# Patient Record
Sex: MALE | Race: WHITE | Employment: UNEMPLOYED | URBAN - METROPOLITAN AREA
[De-identification: names, ages, dates, MRNs, and addresses within clinical notes are randomized per-mention and may not be internally consistent; named-entity substitution may affect disease eponyms.]

---

## 2022-04-20 ENCOUNTER — APPOINTMENT (OUTPATIENT)
Dept: GENERAL RADIOLOGY | Age: 62
DRG: 617 | End: 2022-04-20
Payer: MEDICARE

## 2022-04-20 PROCEDURE — 96374 THER/PROPH/DIAG INJ IV PUSH: CPT

## 2022-04-20 PROCEDURE — 73630 X-RAY EXAM OF FOOT: CPT

## 2022-04-20 PROCEDURE — 99285 EMERGENCY DEPT VISIT HI MDM: CPT

## 2022-04-20 PROCEDURE — 96375 TX/PRO/DX INJ NEW DRUG ADDON: CPT

## 2022-04-20 ASSESSMENT — PAIN DESCRIPTION - PAIN TYPE: TYPE: ACUTE PAIN

## 2022-04-20 ASSESSMENT — PAIN - FUNCTIONAL ASSESSMENT: PAIN_FUNCTIONAL_ASSESSMENT: 0-10

## 2022-04-20 ASSESSMENT — PAIN DESCRIPTION - ORIENTATION: ORIENTATION: RIGHT

## 2022-04-20 ASSESSMENT — PAIN DESCRIPTION - LOCATION: LOCATION: TOE (COMMENT WHICH ONE)

## 2022-04-20 NOTE — ED PROVIDER NOTES
FIRST PROVIDER CONTACT ASSESSMENT NOTE           Department of Emergency Medicine                 First Provider Note            22  7:11 PM EDT    Date of Encounter: No admission date for patient encounter. Patient Name: Genesis Fuentes  : 1960  MRN: [id number]    Chief Complaint: Wound Check (wound check R foot second toe.)      History of Present Illness:   Genesis Fuentes is a 63 y.o. male who presents to the ED for evaluation of diabetic foot ulcer on right 2nd toe and follows with a podiatrist joel Gregorio and had a history of having frostbite this past January and recently was told he needs to start taking insulin by his pcp and he does take metformin and does not take his daily blood sugars. He does follow with a provider Ardean Fleischer from [facility] and is staying with a friend in [facility] and was told to come to the ED for evaluation for worsening pain. He reports the last time he was on antibiotics was 5 or 6 weeks ago and was taking Bactrim. He denies any fever or chills and states he has pain in the toe and does have diabetic neuropathies. Focused Physical Exam:  VS:    ED Triage Vitals [22 1907]   BP Temp Temp Source Pulse Resp SpO2 Height Weight   (!) 140/103 97.6 °F (36.4 °C) Temporal 94 16 96 % 6' (1.829 m) 270 lb (122.5 kg)        Physical Ex: Constitutional: Alert and non-toxic. Medical History:  has no past medical history on file. Surgical History:  has no past surgical history on file. Social History:    Family History: family history is not on file. Allergies: Patient has no known allergies.      Initial Plan of Care: Initiate Treatment-Testing, Proceed toTreatment Area When Bed Available for ED Attending/MLP to Continue Care      ---END OF FIRST PROVIDER CONTACT ASSESSMENT NOTE---  Electronically signed by CHRIST Denise CNP   DD: 22      CHRIST Denise CNP  22 250 CHRIST Foss CNP  22 [id number]

## 2022-04-21 ENCOUNTER — APPOINTMENT (OUTPATIENT)
Dept: INTERVENTIONAL RADIOLOGY/VASCULAR | Age: 62
DRG: 617 | End: 2022-04-21
Payer: MEDICARE

## 2022-04-21 ENCOUNTER — HOSPITAL ENCOUNTER (INPATIENT)
Age: 62
LOS: 5 days | Discharge: HOME | DRG: 617 | End: 2022-04-26
Attending: STUDENT IN AN ORGANIZED HEALTH CARE EDUCATION/TRAINING PROGRAM | Admitting: INTERNAL MEDICINE
Payer: MEDICARE

## 2022-04-21 ENCOUNTER — ANESTHESIA EVENT (OUTPATIENT)
Dept: OPERATING ROOM | Age: 62
DRG: 617 | End: 2022-04-21
Payer: MEDICARE

## 2022-04-21 DIAGNOSIS — S91.301A OPEN WOUND OF RIGHT FOOT, INITIAL ENCOUNTER: Primary | ICD-10-CM

## 2022-04-21 PROBLEM — J44.9 COPD (CHRONIC OBSTRUCTIVE PULMONARY DISEASE) (HCC): Status: ACTIVE | Noted: 2022-04-21

## 2022-04-21 PROBLEM — E11.51 DM (DIABETES MELLITUS) WITH PERIPHERAL VASCULAR COMPLICATION (HCC): Status: ACTIVE | Noted: 2022-04-21

## 2022-04-21 PROBLEM — I10 HTN (HYPERTENSION): Status: ACTIVE | Noted: 2022-04-21

## 2022-04-21 PROBLEM — S91.104A OPEN WOUND OF SECOND TOE OF RIGHT FOOT: Status: ACTIVE | Noted: 2022-04-21

## 2022-04-21 PROBLEM — S99.921A: Status: ACTIVE | Noted: 2022-04-21

## 2022-04-21 PROBLEM — G47.30 SLEEP APNEA: Status: ACTIVE | Noted: 2022-04-21

## 2022-04-21 LAB
ANION GAP SERPL CALCULATED.3IONS-SCNC: 11 MMOL/L (ref 7–16)
BASOPHILS ABSOLUTE: 0.08 E9/L (ref 0–0.2)
BASOPHILS RELATIVE PERCENT: 0.9 % (ref 0–2)
BUN BLDV-MCNC: 11 MG/DL (ref 6–23)
C-REACTIVE PROTEIN: 1.1 MG/DL (ref 0–0.4)
CALCIUM SERPL-MCNC: 8.8 MG/DL (ref 8.6–10.2)
CHLORIDE BLD-SCNC: 102 MMOL/L (ref 98–107)
CO2: 25 MMOL/L (ref 22–29)
CREAT SERPL-MCNC: 0.9 MG/DL (ref 0.7–1.2)
EOSINOPHILS ABSOLUTE: 0.18 E9/L (ref 0.05–0.5)
EOSINOPHILS RELATIVE PERCENT: 2.1 % (ref 0–6)
GFR AFRICAN AMERICAN: >60
GFR NON-AFRICAN AMERICAN: >60 ML/MIN/1.73
GLUCOSE BLD-MCNC: 188 MG/DL (ref 74–99)
HCT VFR BLD CALC: 41.7 % (ref 37–54)
HEMOGLOBIN: 13.8 G/DL (ref 12.5–16.5)
IMMATURE GRANULOCYTES #: 0.03 E9/L
IMMATURE GRANULOCYTES %: 0.3 % (ref 0–5)
LACTIC ACID, SEPSIS: 1.5 MMOL/L (ref 0.5–1.9)
LACTIC ACID: 2.2 MMOL/L (ref 0.5–2.2)
LYMPHOCYTES ABSOLUTE: 2.76 E9/L (ref 1.5–4)
LYMPHOCYTES RELATIVE PERCENT: 31.9 % (ref 20–42)
MCH RBC QN AUTO: 29.4 PG (ref 26–35)
MCHC RBC AUTO-ENTMCNC: 33.1 % (ref 32–34.5)
MCV RBC AUTO: 88.9 FL (ref 80–99.9)
METER GLUCOSE: 141 MG/DL (ref 74–99)
METER GLUCOSE: 146 MG/DL (ref 74–99)
METER GLUCOSE: 153 MG/DL (ref 74–99)
METER GLUCOSE: 228 MG/DL (ref 74–99)
MONOCYTES ABSOLUTE: 0.72 E9/L (ref 0.1–0.95)
MONOCYTES RELATIVE PERCENT: 8.3 % (ref 2–12)
NEUTROPHILS ABSOLUTE: 4.88 E9/L (ref 1.8–7.3)
NEUTROPHILS RELATIVE PERCENT: 56.5 % (ref 43–80)
PDW BLD-RTO: 15.2 FL (ref 11.5–15)
PLATELET # BLD: 162 E9/L (ref 130–450)
PMV BLD AUTO: 11 FL (ref 7–12)
POTASSIUM SERPL-SCNC: 4.2 MMOL/L (ref 3.5–5)
PROCALCITONIN: 0.1 NG/ML (ref 0–0.08)
RBC # BLD: 4.69 E12/L (ref 3.8–5.8)
SEDIMENTATION RATE, ERYTHROCYTE: 23 MM/HR (ref 0–15)
SODIUM BLD-SCNC: 138 MMOL/L (ref 132–146)
WBC # BLD: 8.7 E9/L (ref 4.5–11.5)

## 2022-04-21 PROCEDURE — 99223 1ST HOSP IP/OBS HIGH 75: CPT | Performed by: INTERNAL MEDICINE

## 2022-04-21 PROCEDURE — APPSS45 APP SPLIT SHARED TIME 31-45 MINUTES: Performed by: NURSE PRACTITIONER

## 2022-04-21 PROCEDURE — 85651 RBC SED RATE NONAUTOMATED: CPT

## 2022-04-21 PROCEDURE — 80048 BASIC METABOLIC PNL TOTAL CA: CPT

## 2022-04-21 PROCEDURE — 6360000002 HC RX W HCPCS: Performed by: NURSE PRACTITIONER

## 2022-04-21 PROCEDURE — 87040 BLOOD CULTURE FOR BACTERIA: CPT

## 2022-04-21 PROCEDURE — 2580000003 HC RX 258: Performed by: STUDENT IN AN ORGANIZED HEALTH CARE EDUCATION/TRAINING PROGRAM

## 2022-04-21 PROCEDURE — 85025 COMPLETE CBC W/AUTO DIFF WBC: CPT

## 2022-04-21 PROCEDURE — 6360000002 HC RX W HCPCS: Performed by: STUDENT IN AN ORGANIZED HEALTH CARE EDUCATION/TRAINING PROGRAM

## 2022-04-21 PROCEDURE — 87070 CULTURE OTHR SPECIMN AEROBIC: CPT

## 2022-04-21 PROCEDURE — 36415 COLL VENOUS BLD VENIPUNCTURE: CPT

## 2022-04-21 PROCEDURE — 1200000000 HC SEMI PRIVATE

## 2022-04-21 PROCEDURE — 82962 GLUCOSE BLOOD TEST: CPT

## 2022-04-21 PROCEDURE — 6370000000 HC RX 637 (ALT 250 FOR IP): Performed by: NURSE PRACTITIONER

## 2022-04-21 PROCEDURE — 84145 PROCALCITONIN (PCT): CPT

## 2022-04-21 PROCEDURE — 86140 C-REACTIVE PROTEIN: CPT

## 2022-04-21 PROCEDURE — 2580000003 HC RX 258: Performed by: NURSE PRACTITIONER

## 2022-04-21 PROCEDURE — 87205 SMEAR GRAM STAIN: CPT

## 2022-04-21 PROCEDURE — 87186 SC STD MICRODIL/AGAR DIL: CPT

## 2022-04-21 PROCEDURE — 83605 ASSAY OF LACTIC ACID: CPT

## 2022-04-21 PROCEDURE — 87077 CULTURE AEROBIC IDENTIFY: CPT

## 2022-04-21 PROCEDURE — 93923 UPR/LXTR ART STDY 3+ LVLS: CPT

## 2022-04-21 RX ORDER — SERTRALINE HYDROCHLORIDE 100 MG/1
50 TABLET, FILM COATED ORAL DAILY
Status: DISCONTINUED | OUTPATIENT
Start: 2022-04-21 | End: 2022-04-26 | Stop reason: HOSPADM

## 2022-04-21 RX ORDER — ATORVASTATIN CALCIUM 40 MG/1
40 TABLET, FILM COATED ORAL NIGHTLY
COMMUNITY

## 2022-04-21 RX ORDER — ENOXAPARIN SODIUM 100 MG/ML
30 INJECTION SUBCUTANEOUS 2 TIMES DAILY
Status: DISCONTINUED | OUTPATIENT
Start: 2022-04-21 | End: 2022-04-26 | Stop reason: HOSPADM

## 2022-04-21 RX ORDER — SODIUM CHLORIDE 9 MG/ML
INJECTION, SOLUTION INTRAVENOUS PRN
Status: DISCONTINUED | OUTPATIENT
Start: 2022-04-21 | End: 2022-04-26 | Stop reason: HOSPADM

## 2022-04-21 RX ORDER — ONDANSETRON 2 MG/ML
4 INJECTION INTRAMUSCULAR; INTRAVENOUS EVERY 6 HOURS PRN
Status: DISCONTINUED | OUTPATIENT
Start: 2022-04-21 | End: 2022-04-26 | Stop reason: HOSPADM

## 2022-04-21 RX ORDER — LISINOPRIL 10 MG/1
10 TABLET ORAL DAILY
Status: ON HOLD | COMMUNITY
End: 2022-04-21

## 2022-04-21 RX ORDER — NICOTINE POLACRILEX 4 MG
15 LOZENGE BUCCAL PRN
Status: DISCONTINUED | OUTPATIENT
Start: 2022-04-21 | End: 2022-04-25 | Stop reason: CLARIF

## 2022-04-21 RX ORDER — POLYETHYLENE GLYCOL 3350 17 G/17G
17 POWDER, FOR SOLUTION ORAL DAILY PRN
Status: DISCONTINUED | OUTPATIENT
Start: 2022-04-21 | End: 2022-04-26 | Stop reason: HOSPADM

## 2022-04-21 RX ORDER — PREGABALIN 50 MG/1
100 CAPSULE ORAL NIGHTLY
Status: DISCONTINUED | OUTPATIENT
Start: 2022-04-21 | End: 2022-04-26 | Stop reason: HOSPADM

## 2022-04-21 RX ORDER — ATORVASTATIN CALCIUM 40 MG/1
40 TABLET, FILM COATED ORAL NIGHTLY
Status: DISCONTINUED | OUTPATIENT
Start: 2022-04-21 | End: 2022-04-26 | Stop reason: HOSPADM

## 2022-04-21 RX ORDER — SODIUM CHLORIDE 0.9 % (FLUSH) 0.9 %
5-40 SYRINGE (ML) INJECTION PRN
Status: DISCONTINUED | OUTPATIENT
Start: 2022-04-21 | End: 2022-04-26 | Stop reason: HOSPADM

## 2022-04-21 RX ORDER — HYDROCHLOROTHIAZIDE 12.5 MG/1
12.5 TABLET ORAL DAILY
Status: DISCONTINUED | OUTPATIENT
Start: 2022-04-21 | End: 2022-04-26 | Stop reason: HOSPADM

## 2022-04-21 RX ORDER — EMPAGLIFLOZIN 10 MG/1
10 TABLET, FILM COATED ORAL DAILY
COMMUNITY
Start: 2022-03-25

## 2022-04-21 RX ORDER — ACETAMINOPHEN 325 MG/1
650 TABLET ORAL EVERY 6 HOURS PRN
Status: DISCONTINUED | OUTPATIENT
Start: 2022-04-21 | End: 2022-04-26 | Stop reason: HOSPADM

## 2022-04-21 RX ORDER — MORPHINE SULFATE 2 MG/ML
2 INJECTION, SOLUTION INTRAMUSCULAR; INTRAVENOUS EVERY 4 HOURS PRN
Status: DISCONTINUED | OUTPATIENT
Start: 2022-04-21 | End: 2022-04-26 | Stop reason: HOSPADM

## 2022-04-21 RX ORDER — INSULIN LISPRO 100 [IU]/ML
0-3 INJECTION, SOLUTION INTRAVENOUS; SUBCUTANEOUS NIGHTLY
Status: DISCONTINUED | OUTPATIENT
Start: 2022-04-21 | End: 2022-04-26 | Stop reason: HOSPADM

## 2022-04-21 RX ORDER — LISINOPRIL AND HYDROCHLOROTHIAZIDE 20; 12.5 MG/1; MG/1
20 TABLET ORAL DAILY
COMMUNITY
Start: 2022-03-25

## 2022-04-21 RX ORDER — GLIMEPIRIDE 4 MG/1
4 TABLET ORAL 2 TIMES DAILY
COMMUNITY

## 2022-04-21 RX ORDER — LISINOPRIL AND HYDROCHLOROTHIAZIDE 20; 12.5 MG/1; MG/1
20 TABLET ORAL DAILY
Status: DISCONTINUED | OUTPATIENT
Start: 2022-04-21 | End: 2022-04-21 | Stop reason: CLARIF

## 2022-04-21 RX ORDER — LISINOPRIL 10 MG/1
10 TABLET ORAL DAILY
Status: DISCONTINUED | OUTPATIENT
Start: 2022-04-21 | End: 2022-04-24

## 2022-04-21 RX ORDER — 0.9 % SODIUM CHLORIDE 0.9 %
1000 INTRAVENOUS SOLUTION INTRAVENOUS ONCE
Status: COMPLETED | OUTPATIENT
Start: 2022-04-21 | End: 2022-04-21

## 2022-04-21 RX ORDER — ONDANSETRON 4 MG/1
4 TABLET, ORALLY DISINTEGRATING ORAL EVERY 8 HOURS PRN
Status: DISCONTINUED | OUTPATIENT
Start: 2022-04-21 | End: 2022-04-26 | Stop reason: HOSPADM

## 2022-04-21 RX ORDER — NICOTINE 21 MG/24HR
1 PATCH, TRANSDERMAL 24 HOURS TRANSDERMAL DAILY
Status: DISCONTINUED | OUTPATIENT
Start: 2022-04-21 | End: 2022-04-26 | Stop reason: HOSPADM

## 2022-04-21 RX ORDER — DEXTROSE MONOHYDRATE 50 MG/ML
100 INJECTION, SOLUTION INTRAVENOUS PRN
Status: DISCONTINUED | OUTPATIENT
Start: 2022-04-21 | End: 2022-04-26 | Stop reason: HOSPADM

## 2022-04-21 RX ORDER — LISINOPRIL 2.5 MG/1
2.5 TABLET ORAL DAILY
Status: ON HOLD | COMMUNITY
End: 2022-04-21

## 2022-04-21 RX ORDER — LEVOTHYROXINE SODIUM 0.03 MG/1
25 TABLET ORAL DAILY
COMMUNITY

## 2022-04-21 RX ORDER — DEXTROSE MONOHYDRATE 25 G/50ML
12.5 INJECTION, SOLUTION INTRAVENOUS PRN
Status: DISCONTINUED | OUTPATIENT
Start: 2022-04-21 | End: 2022-04-26 | Stop reason: HOSPADM

## 2022-04-21 RX ORDER — FENTANYL CITRATE 50 UG/ML
50 INJECTION, SOLUTION INTRAMUSCULAR; INTRAVENOUS ONCE
Status: COMPLETED | OUTPATIENT
Start: 2022-04-21 | End: 2022-04-21

## 2022-04-21 RX ORDER — INSULIN LISPRO 100 [IU]/ML
0-6 INJECTION, SOLUTION INTRAVENOUS; SUBCUTANEOUS
Status: DISCONTINUED | OUTPATIENT
Start: 2022-04-21 | End: 2022-04-26 | Stop reason: HOSPADM

## 2022-04-21 RX ORDER — ACETAMINOPHEN 650 MG/1
650 SUPPOSITORY RECTAL EVERY 6 HOURS PRN
Status: DISCONTINUED | OUTPATIENT
Start: 2022-04-21 | End: 2022-04-26 | Stop reason: HOSPADM

## 2022-04-21 RX ORDER — LEVOTHYROXINE SODIUM 0.03 MG/1
25 TABLET ORAL DAILY
Status: DISCONTINUED | OUTPATIENT
Start: 2022-04-21 | End: 2022-04-26 | Stop reason: HOSPADM

## 2022-04-21 RX ORDER — SODIUM CHLORIDE 9 MG/ML
INJECTION, SOLUTION INTRAVENOUS CONTINUOUS
Status: DISCONTINUED | OUTPATIENT
Start: 2022-04-21 | End: 2022-04-26

## 2022-04-21 RX ORDER — PREGABALIN 100 MG/1
100 CAPSULE ORAL NIGHTLY
COMMUNITY
Start: 2022-03-25

## 2022-04-21 RX ORDER — SODIUM CHLORIDE 0.9 % (FLUSH) 0.9 %
5-40 SYRINGE (ML) INJECTION EVERY 12 HOURS SCHEDULED
Status: DISCONTINUED | OUTPATIENT
Start: 2022-04-21 | End: 2022-04-26 | Stop reason: HOSPADM

## 2022-04-21 RX ORDER — HYDROXYZINE HYDROCHLORIDE 10 MG/1
10 TABLET, FILM COATED ORAL ONCE
Status: COMPLETED | OUTPATIENT
Start: 2022-04-21 | End: 2022-04-21

## 2022-04-21 RX ADMIN — SODIUM CHLORIDE, PRESERVATIVE FREE 10 ML: 5 INJECTION INTRAVENOUS at 20:40

## 2022-04-21 RX ADMIN — SODIUM CHLORIDE: 9 INJECTION, SOLUTION INTRAVENOUS at 18:37

## 2022-04-21 RX ADMIN — INSULIN LISPRO 1 UNITS: 100 INJECTION, SOLUTION INTRAVENOUS; SUBCUTANEOUS at 21:58

## 2022-04-21 RX ADMIN — Medication 2000 MG: at 02:41

## 2022-04-21 RX ADMIN — HYDROXYZINE HYDROCHLORIDE 10 MG: 10 TABLET ORAL at 21:56

## 2022-04-21 RX ADMIN — METOPROLOL TARTRATE 25 MG: 25 TABLET, FILM COATED ORAL at 12:42

## 2022-04-21 RX ADMIN — FENTANYL CITRATE 50 MCG: 50 INJECTION, SOLUTION INTRAMUSCULAR; INTRAVENOUS at 02:26

## 2022-04-21 RX ADMIN — PREGABALIN 100 MG: 50 CAPSULE ORAL at 20:39

## 2022-04-21 RX ADMIN — SERTRALINE 50 MG: 100 TABLET, FILM COATED ORAL at 12:42

## 2022-04-21 RX ADMIN — SODIUM CHLORIDE 1000 ML: 9 INJECTION, SOLUTION INTRAVENOUS at 02:27

## 2022-04-21 RX ADMIN — ATORVASTATIN CALCIUM 40 MG: 40 TABLET, FILM COATED ORAL at 20:39

## 2022-04-21 RX ADMIN — MORPHINE SULFATE 2 MG: 2 INJECTION, SOLUTION INTRAMUSCULAR; INTRAVENOUS at 15:37

## 2022-04-21 RX ADMIN — SODIUM CHLORIDE: 9 INJECTION, SOLUTION INTRAVENOUS at 05:47

## 2022-04-21 RX ADMIN — ENOXAPARIN SODIUM 30 MG: 100 INJECTION SUBCUTANEOUS at 12:42

## 2022-04-21 RX ADMIN — MORPHINE SULFATE 2 MG: 2 INJECTION, SOLUTION INTRAMUSCULAR; INTRAVENOUS at 11:01

## 2022-04-21 RX ADMIN — MORPHINE SULFATE 2 MG: 2 INJECTION, SOLUTION INTRAMUSCULAR; INTRAVENOUS at 20:37

## 2022-04-21 RX ADMIN — LISINOPRIL 10 MG: 10 TABLET ORAL at 12:42

## 2022-04-21 RX ADMIN — HYDROCHLOROTHIAZIDE 12.5 MG: 12.5 TABLET ORAL at 12:46

## 2022-04-21 RX ADMIN — SODIUM CHLORIDE, PRESERVATIVE FREE 5 ML: 5 INJECTION INTRAVENOUS at 11:01

## 2022-04-21 RX ADMIN — INSULIN LISPRO 2 UNITS: 100 INJECTION, SOLUTION INTRAVENOUS; SUBCUTANEOUS at 16:44

## 2022-04-21 RX ADMIN — MORPHINE SULFATE 2 MG: 2 INJECTION, SOLUTION INTRAMUSCULAR; INTRAVENOUS at 07:02

## 2022-04-21 RX ADMIN — ENOXAPARIN SODIUM 30 MG: 100 INJECTION SUBCUTANEOUS at 20:38

## 2022-04-21 ASSESSMENT — PAIN - FUNCTIONAL ASSESSMENT
PAIN_FUNCTIONAL_ASSESSMENT: ACTIVITIES ARE NOT PREVENTED
PAIN_FUNCTIONAL_ASSESSMENT: ACTIVITIES ARE NOT PREVENTED

## 2022-04-21 ASSESSMENT — ENCOUNTER SYMPTOMS
BLOOD IN STOOL: 0
NAUSEA: 0
SHORTNESS OF BREATH: 0
RHINORRHEA: 0
ABDOMINAL PAIN: 0
COUGH: 0
VOMITING: 0
DIARRHEA: 0
CONSTIPATION: 0
BACK PAIN: 0
SORE THROAT: 0
SHORTNESS OF BREATH: 0

## 2022-04-21 ASSESSMENT — PAIN SCALES - WONG BAKER
WONGBAKER_NUMERICALRESPONSE: 0
WONGBAKER_NUMERICALRESPONSE: 0

## 2022-04-21 ASSESSMENT — PAIN DESCRIPTION - PAIN TYPE
TYPE: ACUTE PAIN
TYPE: ACUTE PAIN

## 2022-04-21 ASSESSMENT — PAIN DESCRIPTION - ORIENTATION
ORIENTATION: RIGHT

## 2022-04-21 ASSESSMENT — PAIN SCALES - GENERAL
PAINLEVEL_OUTOF10: 4
PAINLEVEL_OUTOF10: 1
PAINLEVEL_OUTOF10: 2
PAINLEVEL_OUTOF10: 9
PAINLEVEL_OUTOF10: 9
PAINLEVEL_OUTOF10: 7
PAINLEVEL_OUTOF10: 5
PAINLEVEL_OUTOF10: 0
PAINLEVEL_OUTOF10: 9
PAINLEVEL_OUTOF10: 9

## 2022-04-21 ASSESSMENT — PAIN DESCRIPTION - LOCATION
LOCATION: FOOT
LOCATION: TOE (COMMENT WHICH ONE)
LOCATION: FOOT
LOCATION: TOE (COMMENT WHICH ONE)

## 2022-04-21 ASSESSMENT — PAIN DESCRIPTION - DESCRIPTORS
DESCRIPTORS: ACHING

## 2022-04-21 NOTE — H&P
[facility] History and Physical      CHIEF COMPLAINT:  Right foot wound    History of Present Illness: This is a 63year old male with PMH of HLD, DM, sleep apnea, HTN, CABG x 2 vessel and anxiety/depression. Patient living out of car since . Was seen in [address] [place] [address] in January for frostbite of right foot 2nd toe. Recently moved in with niece in order to get better. Was changing dressing Tuesday night and wound occurred. Seen Dr. Leonidas Hankins yesterday afternoon in office (foot doctor) that wanted him to go to [address] [place] [address] for amputee. Do to new living situation patient decided to come here instead. Patient denies fever, chills, SOB, CP, and abdominal pain. Is diabetic but does not check his BS. XR right foot showing no acute osseous abnormality and plantar soft tissue edema adjacent to MTP joints. Given 1 L bolus, cefazolin, and fentanyl in ED. Informant(s) for H&P: Patient and chart review    REVIEW OF SYSTEMS:  A comprehensive review of systems was negative except for: what is in the HPI      PMH:  No past medical history on file. HLD, DM, COPD, Sleep apnea, HTN, anxiety, and depression. Surgical History:  No past surgical history on file. CABG x 2 vessel, cholecystectomy, and back surgery x2. Medications Prior to Admission:    Prior to Admission medications    Not on File       Allergies:    Patient has no known allergies. Social History:    Patient started smoking at age 27 (28). Smoked 2-3 PPD until the last 4 years. Last 4 years patient smokes about 15 cigarettes per day. Denies alcohol and drugs. Was recently living in car since . Now staying with niece. Family History:   family history is not on file. Mother had dementia. Father  from stroke. Sister x 2 had cancer. PHYSICAL EXAM:  Vitals:  /65   Pulse 82   Temp 97.6 °F (36.4 °C) (Tympanic)   Resp 16   Ht 6' (1.829 m)   Wt 270 lb (122.5 kg)   SpO2 96%   BMI 36.62 kg/m²     General Appearance: Unkempt. alert and oriented to person, place and time and in no acute distress  Skin: warm and dry  Head: normocephalic and atraumatic  Eyes: pupils equal, round, and reactive to light, conjunctivae normal  Pulmonary/Chest: Lungs diminished to auscultation bilaterally- no wheezes, rales or rhonchi, no respiratory distress  Cardiovascular: normal rate, normal S1 and S2   Abdomen: soft, non-tender, non-distended, normal bowel sounds, no masses or organomegaly  Extremities: BLE discolored with multiple scabbed areas. Right 2nd phalange bone of foot protruding through skin. Neurologic: speech normal        LABS:  Recent Labs     04/21/22 0023      K 4.2      CO2 25   BUN 11   CREATININE 0.9   GLUCOSE 188*   CALCIUM 8.8       Recent Labs     04/21/22 0023   WBC 8.7   RBC 4.69   HGB 13.8   HCT 41.7   MCV 88.9   MCH 29.4   MCHC 33.1   RDW 15.2*      MPV 11.0       No results for input(s): POCGLU in the last 72 hours. CBC with Differential:    Lab Results   Component Value Date    WBC 8.7 04/21/2022    RBC 4.69 04/21/2022    HGB 13.8 04/21/2022    HCT 41.7 04/21/2022     04/21/2022    MCV 88.9 04/21/2022    MCH 29.4 04/21/2022    MCHC 33.1 04/21/2022    RDW 15.2 04/21/2022    LYMPHOPCT 31.9 04/21/2022    MONOPCT 8.3 04/21/2022    BASOPCT 0.9 04/21/2022    MONOSABS 0.72 04/21/2022    LYMPHSABS 2.76 04/21/2022    EOSABS 0.18 04/21/2022    BASOSABS 0.08 04/21/2022     BMP:    Lab Results   Component Value Date     04/21/2022    K 4.2 04/21/2022     04/21/2022    CO2 25 04/21/2022    BUN 11 04/21/2022    CREATININE 0.9 04/21/2022    CALCIUM 8.8 04/21/2022    GFRAA >60 04/21/2022    LABGLOM >60 04/21/2022    GLUCOSE 188 04/21/2022       Radiology:   XR FOOT RIGHT (MIN 3 VIEWS)   Final Result   No acute osseous abnormality. Plantar soft tissue edema adjacent to the MTP joints. EKG: NA    ASSESSMENT:      Active Problems:    * No active hospital problems.  *  Resolved Problems:    * No resolved hospital problems. *      PLAN:    1. Right 2nd phalange wound- Podiatry consulted. NPO for possible surgery this AM. Given cefazolin in ED. BC and wound culture done. Negative for osteomyelitis. Afebrile with no WBC count. Will hold off on antibiotics for now. 2.  Pain- tylenol and morphine as needed for pain. 3.  DM- AC HS BS checks with [facility]. Hypoglycemia protocol. 3.  HLD- Continue home medication. 4.  COPD- Stable. Will continue to monitor. 5.  Sleep apnea- Monitor for the need of oxygen. Non-complaint and does not use CPAP. 6.  Tobacco abuse- Counseled on smoking cessation. Will give nicotine patch while hospitalized. Code Status: Full code  DVT prophylaxis: Lovenox      NOTE: This report was transcribed using voice recognition software. Every effort was made to ensure accuracy; however, inadvertent computerized transcription errors may be present. Electronically signed by CHRIST Garcia CNP on 4/21/2022 at 3:16 AM         Aron Hurtado [facility]  Internal Medicine Clinic    Attending Physician Statement:  Merle Strong M.D., F.A.C.P. I have discussed the case, including pertinent history and exam findings with the resident/NP. I have seen and examined the patient and the key elements of the encounter have been performed by me. I agree with the resident ROS, PMHx, PSHx, meds reviewed and assessment, plan and orders as documented by the resident/NP      Hospital charts reviewed, including other providers notes, relevant labs and imaging. Chronic DM, PVD, poor hygiene, neuropathy, heavy tobacco  Was living in his car since 2013 (subsequently moved in with niece)  intially frost bite in [address]  -- was following podiatry dr Slava Siddiqi- afflliated with CC  (told needed amputated in past)  - he was removing dressing-- and pulled off tissue/toe- dry grangrene  And now bone exposed  Right 2nd phalange wound- Podiatry consulted  ? OR  DM control  Copd- not on o2, nicotene  Hx of ALCON - noncompliant cpap    >50% of time spent coordinating care with other providers and/or counseling patient/family  Remainder of medical problems as per resident note.

## 2022-04-21 NOTE — PLAN OF CARE
Problem: Discharge Planning  Goal: Discharge to home or other facility with appropriate resources  Outcome: Progressing     Problem: Pain  Goal: Verbalizes/displays adequate comfort level or baseline comfort level  4/21/2022 1735 by Nelly Cho RN  Outcome: Progressing  4/21/2022 0600 by Colin Arnold RN  Outcome: Progressing     Problem: Skin/Tissue Integrity  Goal: Absence of new skin breakdown  Description: 1. Monitor for areas of redness and/or skin breakdown  2. Assess vascular access sites hourly  3. Every 4-6 hours minimum:  Change oxygen saturation probe site  4. Every 4-6 hours:  If on nasal continuous positive airway pressure, respiratory therapy assess nares and determine need for appliance change or resting period.   Outcome: Progressing     Problem: Safety - Adult  Goal: Free from fall injury  Outcome: Progressing     Problem: ABCDS Injury Assessment  Goal: Absence of physical injury  Outcome: Progressing

## 2022-04-21 NOTE — PLAN OF CARE
Patient was seen and examined this AM. Seen by Podiatry for Right second toe ulceration to the bone level and possible underlying osteomyelitis. He is planned for the OR tomorrow.     Electronically signed by Tatum Dawson MD on 4/21/2022 at 3:21 PM

## 2022-04-21 NOTE — PROGRESS NOTES
Chart reviewed    X rays reviewed    Pt with exposed nonviable bone, nonviable tissue, rt 2nd toe    vasc studies     OR tomorrow for amp, full note to follow

## 2022-04-21 NOTE — ED PROVIDER NOTES
Ailyn Baldwin is a 63 y.o. male with a PMHx significant for DM, HTN, COPD,  who presents for evaluation of wound, beginning prior to arrival.  The complaint has been persistent, moderate in severity, and worsened by nothing. The patient states that he got frost bite of his foot back in January and it had been clearing up, but recently broke open. Niece at bedside helping to provide history. Notes he went to take the bandage off the foot two days ago; he had an appointment to see Dorothy Davila (his foot doctor), who saw him today told him it had to be taken off. Told him to go to a [facility] hospital, however due to proximity of care giver the patient came here. The history is provided by the patient, a relative and medical records. Review of Systems   Constitutional: Negative for chills and fever. HENT: Negative for postnasal drip, rhinorrhea and sore throat. Eyes: Negative for visual disturbance. Respiratory: Negative for cough and shortness of breath. Cardiovascular: Negative for chest pain. Gastrointestinal: Negative for abdominal pain, blood in stool, constipation, diarrhea, nausea and vomiting. Genitourinary: Negative for difficulty urinating, dysuria and urgency. Musculoskeletal: Negative for back pain. Skin: Positive for wound. Negative for rash. Neurological: Negative for dizziness, numbness and headaches. Physical Exam  Vitals and nursing note reviewed. Constitutional:       Appearance: He is well-developed. HENT:      Head: Normocephalic and atraumatic. Eyes:      Pupils: Pupils are equal, round, and reactive to light. Cardiovascular:      Rate and Rhythm: Normal rate and regular rhythm. Heart sounds: Normal heart sounds. No murmur heard. Pulmonary:      Effort: Pulmonary effort is normal. No respiratory distress. Breath sounds: Normal breath sounds. No wheezing or rales.    Abdominal:      General: Bowel sounds are normal.      Palpations: Abdomen is soft. Tenderness: There is no abdominal tenderness. There is no guarding or rebound. Musculoskeletal:      Cervical back: Normal range of motion and neck supple. Skin:     General: Skin is warm and dry. Neurological:      Mental Status: He is alert and oriented to person, place, and time. Cranial Nerves: No cranial nerve deficit. Coordination: Coordination normal.                Procedures     [facility]     ED Course as of 04/23/22 0546   Thu Apr 21, 2022   0309 Spoke with Dr. Heather Lopez, discussed the patient. He will admit the patient. Requesting we speak with Podiatry. [BB]   80 Spoke with Dr. Home Temple on call podiatry, discused the patient. He will provide consult. [BB]      ED Course User Index  [BB] DO Corrina Tena presents to the ED for evaluation of wound to foot. Workup in the ED revealed open ulceration and wound to second digit on foot. Patient notes he been having issues with this on and off for months now. Recently took the bandage off causing exposure. On exam there is visible bone, lactic acid at 1.5, WBCs at 8.7. XR foot was obtained, no acute osseous abnormality, plantar soft tissue edema adjacent to the MTP joints. Due to bone being exposed Ancef was ordered. Patient requesting Dr. Hayder Velázquez for podiatry however when attempting to contact service states they are not on-call therefore on-call Dr. Home Temple was contacted per hospitalist.  Patient requires continued workup and management of their symptoms and will be admitted to the hospital for further evaluation and treatment.      --------------------------------------------- PAST HISTORY ---------------------------------------------  Past Medical History:  has a past medical history of Anxiety, COPD (chronic obstructive pulmonary disease) ([facility] [address] Depression, [facility] mellitus[facility] Frostbite, HLD (hyperlipidemia), HTN (hypertension), and Sleep apnea.     Past Surgical History:  has a past surgical history that includes Cholecystectomy; back surgery; Coronary artery bypass graft; Uvulopalatopharygoplasty; and Toe amputation (Right, 4/22/2022). Social History:  reports that he has been smoking cigarettes. He started smoking about 42 years ago. He has been smoking about 2.50 packs per day. He does not have any smokeless tobacco history on file. He reports that he does not drink alcohol and does not use drugs. Family History: family history includes Dementia in his mother; Diabetes in his sister; Stroke in his father. The patients home medications have been reviewed. Allergies: Patient has no known allergies. -------------------------------------------------- RESULTS -------------------------------------------------    LABS:  Results for orders placed or performed during the hospital encounter of 04/21/22   Culture, Blood 1    Specimen: Blood   Result Value Ref Range    Blood Culture, Routine 24 Hours no growth    Culture, Blood 2    Specimen: Blood   Result Value Ref Range    Culture, Blood 2 24 Hours no growth    Culture, Wound    Specimen: Foot; Wound   Result Value Ref Range    Organism Staphylococcus aureus (A)     WOUND/ABSCESS Heavy growth  Sensitivity to follow      Gram stain    Specimen: Foot; Wound   Result Value Ref Range    Gram Stain Orderable       Gram stain performed from swab, interpret results with  caution. Swab specimens of sterile fluids are inferior to  aspirate specimens for organism recovery.   Rare Polymorphonuclear leukocytes  Epithelial cells not seen  Rare Gram positive cocci     CBC with Auto Differential   Result Value Ref Range    WBC 8.7 4.5 - 11.5 E9/L    RBC 4.69 3.80 - 5.80 E12/L    Hemoglobin 13.8 12.5 - 16.5 g/dL    Hematocrit 41.7 37.0 - 54.0 %    MCV 88.9 80.0 - 99.9 fL    MCH 29.4 26.0 - 35.0 pg    MCHC 33.1 32.0 - 34.5 %    RDW 15.2 (H) 11.5 - 15.0 fL    Platelets 362 499 - 423 E9/L    MPV 11.0 7.0 - 12.0 fL    Neutrophils % 56.5 43.0 - 80.0 %    Immature Granulocytes % 0.3 0.0 - 5.0 %    Lymphocytes % 31.9 20.0 - 42.0 %    Monocytes % 8.3 2.0 - 12.0 %    Eosinophils % 2.1 0.0 - 6.0 %    Basophils % 0.9 0.0 - 2.0 %    Neutrophils Absolute 4.88 1.80 - 7.30 E9/L    Immature Granulocytes # 0.03 E9/L    Lymphocytes Absolute 2.76 1.50 - 4.00 E9/L    Monocytes Absolute 0.72 0.10 - 0.95 E9/L    Eosinophils Absolute 0.18 0.05 - 0.50 E9/L    Basophils Absolute 0.08 0.00 - 0.20 O8/N   Basic Metabolic Panel   Result Value Ref Range    Sodium 138 132 - 146 mmol/L    Potassium 4.2 3.5 - 5.0 mmol/L    Chloride 102 98 - 107 mmol/L    CO2 25 22 - 29 mmol/L    Anion Gap 11 7 - 16 mmol/L    Glucose 188 (H) 74 - 99 mg/dL    BUN 11 6 - 23 mg/dL    CREATININE 0.9 0.7 - 1.2 mg/dL    GFR Non-African American >60 >=60 mL/min/1.73    GFR African American >60     Calcium 8.8 8.6 - 10.2 mg/dL   Lactic Acid   Result Value Ref Range    Lactic Acid 2.2 0.5 - 2.2 mmol/L   Sedimentation Rate   Result Value Ref Range    Sed Rate 23 (H) 0 - 15 mm/Hr   C-Reactive Protein   Result Value Ref Range    CRP 1.1 (H) 0.0 - 0.4 mg/dL   Lactate, Sepsis   Result Value Ref Range    Lactic Acid, Sepsis 1.5 0.5 - 1.9 mmol/L   Procalcitonin   Result Value Ref Range    Procalcitonin 0.10 (H) 0.00 - 0.08 ng/mL   CBC with Auto Differential   Result Value Ref Range    WBC 7.0 4.5 - 11.5 E9/L    RBC 4.29 3.80 - 5.80 E12/L    Hemoglobin 12.6 12.5 - 16.5 g/dL    Hematocrit 37.9 37.0 - 54.0 %    MCV 88.3 80.0 - 99.9 fL    MCH 29.4 26.0 - 35.0 pg    MCHC 33.2 32.0 - 34.5 %    RDW 14.9 11.5 - 15.0 fL    Platelets 342 122 - 263 E9/L    MPV 11.0 7.0 - 12.0 fL    Neutrophils % 60.6 43.0 - 80.0 %    Immature Granulocytes % 0.4 0.0 - 5.0 %    Lymphocytes % 28.5 20.0 - 42.0 %    Monocytes % 7.3 2.0 - 12.0 %    Eosinophils % 2.3 0.0 - 6.0 %    Basophils % 0.9 0.0 - 2.0 %    Neutrophils Absolute 4.24 1.80 - 7.30 E9/L    Immature Granulocytes # 0.03 E9/L    Lymphocytes Absolute 1.99 1.50 - 4.00 E9/L    Monocytes Absolute 0.51 0.10 - 0.95 E9/L    Eosinophils Absolute 0.16 0.05 - 0.50 E9/L    Basophils Absolute 0.06 0.00 - 0.20 E9/L   Comprehensive Metabolic Panel w/ Reflex to MG   Result Value Ref Range    Sodium 135 132 - 146 mmol/L    Potassium reflex Magnesium 4.5 3.5 - 5.0 mmol/L    Chloride 104 98 - 107 mmol/L    CO2 24 22 - 29 mmol/L    Anion Gap 7 7 - 16 mmol/L    Glucose 126 (H) 74 - 99 mg/dL    BUN 12 6 - 23 mg/dL    CREATININE 0.9 0.7 - 1.2 mg/dL    GFR Non-African American >60 >=60 mL/min/1.73    GFR African American >60     Calcium 8.2 (L) 8.6 - 10.2 mg/dL    Total Protein 6.3 (L) 6.4 - 8.3 g/dL    Albumin 3.2 (L) 3.5 - 5.2 g/dL    Total Bilirubin 0.3 0.0 - 1.2 mg/dL    Alkaline Phosphatase 117 40 - 129 U/L    ALT 18 0 - 40 U/L    AST 22 0 - 39 U/L   Hemoglobin A1C   Result Value Ref Range    Hemoglobin A1C 7.7 (H) 4.0 - 5.6 %   POCT Glucose   Result Value Ref Range    Meter Glucose 146 (H) 74 - 99 mg/dL   POCT Glucose   Result Value Ref Range    Meter Glucose 141 (H) 74 - 99 mg/dL   POCT Glucose   Result Value Ref Range    Meter Glucose 228 (H) 74 - 99 mg/dL   POCT Glucose   Result Value Ref Range    Meter Glucose 153 (H) 74 - 99 mg/dL   POCT Glucose   Result Value Ref Range    Meter Glucose 153 (H) 74 - 99 mg/dL   POCT Glucose   Result Value Ref Range    Meter Glucose 141 (H) 74 - 99 mg/dL   POCT Glucose   Result Value Ref Range    Meter Glucose 155 (H) 74 - 99 mg/dL   POCT Glucose   Result Value Ref Range    Meter Glucose 299 (H) 74 - 99 mg/dL   POCT Glucose   Result Value Ref Range    Meter Glucose 364 (H) 74 - 99 mg/dL       RADIOLOGY:  VL LOWER EXTREMITY ARTERIAL SEGMENTAL PRESSURES W PPG   Final Result   1. Normal ankle brachial indices bilaterally at 1.2. Multiphasic waveforms   identified at all levels bilaterally on arterial Doppler evaluation. This   lower suspicion for the presence of a high-grade stenosis. 2.  Normal PVR evaluation of the bilateral lower extremities.   Digital   waveform evaluation shows preserved admission.    --------------------------------- ADDITIONAL PROVIDER NOTES ---------------------------------  Consultations:  Time: 0320. Spoke with Dr. Berenice Smith. Discussed case. They will admit the patient. This patient's ED course included: a personal history and physicial examination, re-evaluation prior to disposition, multiple bedside re-evaluations, continuous pulse oximetry and complex medical decision making and emergency management    This patient has remained hemodynamically stable during their ED course. Diagnosis:  1. Open wound of right foot, initial encounter        Disposition:  Patient's disposition: Admit to telemetry  Patient's condition is stable. Please note that the above documentation was prepared using voice recognition software. Every attempt was made to ensure accuracy but there may be spelling, grammatical, and contextual errors.          Gardenia Ormond, [place]  04/23/22 7497

## 2022-04-21 NOTE — Clinical Note
Discharge Plan[de-identified] Other/[facility])   Telemetry/Cardiac Monitoring Required?: Yes

## 2022-04-21 NOTE — CONSULTS
Podiatry Consult H&P  4/21/2022   Dipak Fuentes       Consulting Diagnosis: Right second toe trauma  Consulting Physician: Dr. Emanuel Garza    Chief Complaint: Open fracture right second digit    SUBJECTIVE: Dipak Fuentes is a 63 y.o. non-insulin-dependent type II tabetic male who presented to the [facility] ED on 4/20/2022 with complaint of an open right second digit fracture. Podiatry was consulted on 4/21/2022 for evaluation management of the same chief complaint. Patient states that he was living out of his car for several years and earlier this past winter developed frostbite to the digits of the right foot. He has been following with a podiatrist out of [place], Dr. Amparo Dunn. He has been having regular dressing changes and states that the wound developed because he was removing one of the dressings and mistook his skin for part of the dressing. He has diffusely neuropathic to bilateral lower extremities. He denies any nausea, vomiting, diarrhea, fevers, chills, shortness of breath, calf pain, chest pain. X-rays in the ED reveal open fracture of the distal interphalangeal joint right second digit. Arterial studies are ordered and pending 4/21/2022, will require arterial studies for surgical planning. Arterial studies were placed as stat.       Past Medical History:   Diagnosis Date    Anxiety     COPD [facility]chronic obstructive [facility] disease) [facility] [address]     Depression     DM (diabetes mellitus) ([facility] [address]     Frostbite     HLD (hyperlipidemia)     HTN (hypertension)     Sleep apnea         Past Surgical History:   Procedure Laterality Date    BACK SURGERY      CHOLECYSTECTOMY      CORONARY ARTERY BYPASS GRAFT      2 vessel    UVULOPALATOPHARYGOPLASTY           Family History   Problem Relation Age of Onset    Dementia Mother     Stroke Father     Diabetes Sister         Social History     Tobacco Use    Smoking status: Current Every Day Smoker     Packs/day: 2.50     Types: Cigarettes     Start date: 1980    Smokeless tobacco: Not on file   Substance Use Topics    Alcohol use: Never        Prior to Admission medications    Medication Sig Start Date End Date Taking? Authorizing Provider   atorvastatin (LIPITOR) 40 MG tablet Take 40 mg by mouth at bedtime   Yes Historical Provider, MD   metoprolol tartrate (LOPRESSOR) 25 MG tablet Take 25 mg by mouth 2 times daily   Yes Historical Provider, MD   metFORMIN (GLUCOPHAGE) 1000 MG tablet Take 1,000 mg by mouth 2 times daily (with meals)   Yes Historical Provider, MD   levothyroxine (SYNTHROID) 25 MCG tablet Take 25 mcg by mouth Daily   Yes Historical Provider, MD   sertraline (ZOLOFT) 50 MG tablet Take 50 mg by mouth daily   Yes Historical Provider, MD   glimepiride (AMARYL) 4 MG tablet Take 4 mg by mouth in the morning and at bedtime   Yes Historical Provider, MD   JARDIANCE 10 MG tablet Take 10 mg by mouth daily 3/25/22   Historical Provider, MD   pregabalin (LYRICA) 100 MG capsule Take 100 mg by mouth at bedtime. 3/25/22   Historical Provider, MD   lisinopril-hydroCHLOROthiazide (PRINZIDE;ZESTORETIC) 20-12.5 MG per tablet Take 20 tablets by mouth daily 3/25/22   Historical Provider, MD   lisinopril (PRINIVIL;ZESTRIL) 2.5 MG tablet Take 2.5 mg by mouth daily  4/21/22  Historical Provider, MD        Patient has no known allergies. OBJECTIVE:        Vitals:    04/21/22 1101   BP:    Pulse:    Resp: 16   Temp:    SpO2:                           EXAM:        Pt is AAOx3, NAD    Vascular Exam:  DP and PT pulses intact with hand held doppler bilaterally. CFT is sluggish to 3 and 5 seconds digits 1345 right and 1-5 left. Skin temperature warm to warm proximal leg to distal toes. Soft compressible posterior most compartments bilaterally.     Neuro Exam: Diminished protective sensation    Dermatologic Exam:    -Full-thickness ulcer down to level of exposed bone right second digit DIPJ with exposed phalanx, devitalized nonviable dry gangrenous type changes to the remaining digit with degloving of the surrounding skin. Dried blood to right third digit nail bed. No other open wounds or lesions. MSK: Muscle strength 5/5 Bilateral  ROM is full without pain or crepitation bilateral.  No tenderness on palpation to periwound or to secondary right foot.     Current Facility-Administered Medications   Medication Dose Route Frequency Provider Last Rate Last Admin    sodium chloride flush 0.9 % injection 5-40 mL  5-40 mL IntraVENous 2 times per day CHRIST Duenas CNP   5 mL at 04/21/22 1101    sodium chloride flush 0.9 % injection 5-40 mL  5-40 mL IntraVENous PRN CHRIST Duenas CNP        0.9 % sodium chloride infusion   IntraVENous PRN CHRIST Duenas CNP        enoxaparin Sodium (LOVENOX) injection 30 mg  30 mg SubCUTAneous BID CHRIST Duenas CNP        ondansetron (ZOFRAN-ODT) disintegrating tablet 4 mg  4 mg Oral Q8H PRN CHRIST Duenas CNP        Or    ondansetron (ZOFRAN) injection 4 mg  4 mg IntraVENous Q6H PRN CHRIST Duenas CNP        polyethylene glycol (GLYCOLAX) packet 17 g  17 g Oral Daily PRN CHRIST Duenas CNP        acetaminophen (TYLENOL) tablet 650 mg  650 mg Oral Q6H PRN CHRIST Duenas CNP        Or    acetaminophen (TYLENOL) suppository 650 mg  650 mg Rectal Q6H PRN CHRIST Duenas CNP        morphine (PF) injection 2 mg  2 mg IntraVENous Q4H PRN CHRIST Duenas CNP   2 mg at 04/21/22 1101    glucose (GLUTOSE) 40 % oral gel 15 g  15 g Oral PRN CHRIST Duenas CNP        dextrose 50 % IV solution  12.5 g IntraVENous PRN CHRIST Duenas CNP        glucagon (rDNA) injection 1 mg  1 mg IntraMUSCular PRN CHRIST Duenas CNP        dextrose 5 % solution  100 mL/hr IntraVENous PRN HCRIST Duenas CNP        insulin lispro (HUMALOG) injection vial 0-6 Units  0-6 Units SubCUTAneous TID  CHRIST Duenas - CNP        insulin lispro (HUMALOG) injection vial 0-3 Units  0-3 Units SubCUTAneous Nightly Philippa HolsteinCHRIST - CNP        0.9 % sodium chloride infusion   IntraVENous Continuous Philippa HolsteinCHRIST - CNP 75 mL/hr at 04/21/22 0547 New Bag at 04/21/22 0547    atorvastatin (LIPITOR) tablet 40 mg  40 mg Oral Nightly Philippa Holstein, APRN - CNP        levothyroxine (SYNTHROID) tablet 25 mcg  25 mcg Oral Daily Philippa HolsteinCHRIST  CNP        metoprolol tartrate (LOPRESSOR) tablet 25 mg  25 mg Oral BID Philippa Holstein, APRN - CNP        pregabalin (LYRICA) capsule 100 mg  100 mg Oral Nightly Philippa HolsteinCHRIST  CNP        sertraline (ZOLOFT) tablet 50 mg  50 mg Oral Daily Philippa Holstein, APRN - CNP        lisinopril (PRINIVIL;ZESTRIL) tablet 10 mg  10 mg Oral Daily Philippa Holstein, APRN - CNP        And    hydroCHLOROthiazide (HYDRODIURIL) tablet 12.5 mg  12.5 mg Oral Daily Philippa Holstein APRJOANA - CNP        nicotine (NICODERM CQ) 21 MG/24HR 1 patch  1 patch TransDERmal Daily Philippa Holstein APRN - CNP   1 patch at 04/21/22 1107        Lab Results   Component Value Date    WBC 8.7 04/21/2022    HCT 41.7 04/21/2022    HGB 13.8 04/21/2022     04/21/2022     04/21/2022    K 4.2 04/21/2022     04/21/2022    CO2 25 04/21/2022    BUN 11 04/21/2022    CREATININE 0.9 04/21/2022    GLUCOSE 188 (H) 04/21/2022    CRP 1.1 (H) 04/21/2022       ASSESSMENT:  - Right second toe open dislocation  -Osteomyelitis right second digit, POA  -Full-thickness wound/ulcer down to level exposed bone right foot, POA, infected  -Non-insulin-dependent type 2 diabetes mellitus with peripheral neuropathy  - Frostbite right foot    PLAN:  - Examined and evaluated  - All labs, imaging, and charts reviewed  - WBC: 8.7, CRP 1.1, ESR 23  -X-ray right foot 4/21/2022: Plantar soft tissue edema with open fracture at the distal interphalangeal joint right second digit  -Arterial studies ordered and pending 4/21/2022  -Intraoperative cultures to be obtained 4/22/2022. - Abx per IM/ID  -Plan procedure: Right foot secondary amputation to be performed by Dr. Benjamín Mccoy on 4/22/22 @ noon.   - NPO midnight  - We will continue to follow    D/W: Dr. Benjamín Mccoy    Thank you for involving podiatry in this patients care. Please do not hesitate to call with any questions or concerns. Lauren Seay Podiatry PGY2  4/21/2022   12:31 PM      Patient evaluated, discussed with resident in detail. Agree with findings, treatment plan as outlined. Any changes made by me as deemed necessary.

## 2022-04-21 NOTE — PROGRESS NOTES
Reached out to POD to inquire about plans for OR since pt has been NPO since admission. Dr. Lizz Carpio will be here to see pt in 20 min to assess.

## 2022-04-21 NOTE — PROGRESS NOTES
[facility] Med Surg/Tele  [address]  [facility] [address]  Phone: [phone]             April 21, 2022    Patient: Jeane Levine May   YOB: 1960   Date of Visit: 4/21/2022       To Whom It May Concern:    Bertha Lilly May was seen and treated in our facility  beginning 4/21/2022 until}. His Niece Henrique Calle has been assisting in care planning during his stay.        Sincerely,       Fiorella Flores RN         Signature:__________________________________

## 2022-04-21 NOTE — PROGRESS NOTES
Consult multiple areas BLE  Podiatry consulted. Will defer to them  Harshil Marshall.  Philip Ferris, CNS, Wound Care

## 2022-04-21 NOTE — PLAN OF CARE
Problem: Pain  Goal: Verbalizes/displays adequate comfort level or baseline comfort level  Outcome: Progressing

## 2022-04-21 NOTE — CARE COORDINATION
CM/SW met with pt/ maikel Brewster at bedside. Pt from the [facility] area and has been transient , living in his car since 2013, and is very happy with the situation. Pt has a PCP Cristina Garrett in [place]; office( # [phone]) and sees a podiatrist in [place]; Dr Yunier Mckeon. Has no local PCP. Pt admitted with rt 2nd toe infection, for OR 4/22. Pt uses no assistive devices, but requests ww. No DME preferences; ordered through [place] [facility] DME. will need to notify her  when pt discharged for delivery. ID following, needs TBD. Pt plans to stay with maikel Mejia post discharge at [address] [facility]; [facility] , [address] [place] [address] ph [phone]. Will monitor for poss iv atb's;no HHC preference; will have [facility] DME follow. If so, will need to check with PCP in [place] to see if she can follow. Olga Mi plans to stay with Catalina Mejia post discharge until he recovers period of time undetermined. await OR/ consultant input and will follow. Evan Talbot.

## 2022-04-21 NOTE — ANESTHESIA PRE PROCEDURE
Department of Anesthesiology  Preprocedure Note       Name:  Radha Abdul   Age:  63 y.o.  :  1960                                          MRN:  [id number]         Date:  2022      Surgeon: Janice Lamb):  Kvng Valerio DPM    Procedure: Procedure(s):  AMPUTATION RIGHT SECOND TOE    Medications prior to admission:   Prior to Admission medications    Medication Sig Start Date End Date Taking? Authorizing Provider   atorvastatin (LIPITOR) 40 MG tablet Take 40 mg by mouth at bedtime   Yes Historical Provider, MD   metoprolol tartrate (LOPRESSOR) 25 MG tablet Take 25 mg by mouth 2 times daily   Yes Historical Provider, MD   metFORMIN (GLUCOPHAGE) 1000 MG tablet Take 1,000 mg by mouth 2 times daily (with meals)   Yes Historical Provider, MD   levothyroxine (SYNTHROID) 25 MCG tablet Take 25 mcg by mouth Daily   Yes Historical Provider, MD   sertraline (ZOLOFT) 50 MG tablet Take 50 mg by mouth daily   Yes Historical Provider, MD   glimepiride (AMARYL) 4 MG tablet Take 4 mg by mouth in the morning and at bedtime   Yes Historical Provider, MD   JARDIANCE 10 MG tablet Take 10 mg by mouth daily 3/25/22   Historical Provider, MD   pregabalin (LYRICA) 100 MG capsule Take 100 mg by mouth at bedtime.  3/25/22   Historical Provider, MD   lisinopril-hydroCHLOROthiazide (PRINZIDE;ZESTORETIC) 20-12.5 MG per tablet Take 20 tablets by mouth daily 3/25/22   Historical Provider, MD   lisinopril (PRINIVIL;ZESTRIL) 2.5 MG tablet Take 2.5 mg by mouth daily  22  Historical Provider, MD       Current medications:    Current Facility-Administered Medications   Medication Dose Route Frequency Provider Last Rate Last Admin    sodium chloride flush 0.9 % injection 5-40 mL  5-40 mL IntraVENous 2 times per day CHRIST Arguelles - CNP   5 mL at 22 1101    sodium chloride flush 0.9 % injection 5-40 mL  5-40 mL IntraVENous PRN CHRIST Arguelles - CNP        0.9 % sodium chloride infusion   IntraVENous PRN Cheng Julian CHRIST [place] CNP        enoxaparin Sodium (LOVENOX) injection 30 mg  30 mg SubCUTAneous BID CHRIST Arellano CNP   30 mg at 04/21/22 1242    ondansetron (ZOFRAN-ODT) disintegrating tablet 4 mg  4 mg Oral Q8H PRN CHRIST Arellano CNP        Or    ondansetron (ZOFRAN) injection 4 mg  4 mg IntraVENous Q6H PRN CHRIST Arellano CNP        polyethylene glycol (GLYCOLAX) packet 17 g  17 g Oral Daily PRN CHRIST Arellano CNP        acetaminophen (TYLENOL) tablet 650 mg  650 mg Oral Q6H PRN CHRIST Arellano CNP        Or    acetaminophen (TYLENOL) suppository 650 mg  650 mg Rectal Q6H PRN CHRIST Arellano CNP        morphine (PF) injection 2 mg  2 mg IntraVENous Q4H PRN CHRIST Arellano CNP   2 mg at 04/21/22 1101    glucose (GLUTOSE) 40 % oral gel 15 g  15 g Oral PRN CHRIST Arellano CNP        dextrose 50 % IV solution  12.5 g IntraVENous PRN CHRIST Arellano CNP        glucagon (rDNA) injection 1 mg  1 mg IntraMUSCular PRN CHRIST Arellano CNP        dextrose 5 % solution  100 mL/hr IntraVENous PRN CHRIST Arellano CNP        insulin lispro (HUMALOG) injection vial 0-6 Units  0-6 Units SubCUTAneous TID WC CHRIST Arellano CNP        insulin lispro (HUMALOG) injection vial 0-3 Units  0-3 Units SubCUTAneous Nightly CHRIST Arellano CNP        0.9 % sodium chloride infusion   IntraVENous Continuous CHRIST Arellano CNP 75 mL/hr at 04/21/22 0547 New Bag at 04/21/22 0547    atorvastatin (LIPITOR) tablet 40 mg  40 mg Oral Nightly CHRIST Arellano CNP        levothyroxine (SYNTHROID) tablet 25 mcg  25 mcg Oral Daily CHRIST Arellano CNP        metoprolol tartrate (LOPRESSOR) tablet 25 mg  25 mg Oral BID CHRIST Arellano CNP   25 mg at 04/21/22 1242    pregabalin (LYRICA) capsule 100 mg  100 mg Oral Nightly Minnette Ernie, APRN - CNP        sertraline (ZOLOFT) tablet 50 mg  50 mg Oral Daily [facility], APRN - CNP   50 mg at 04/21/22 1242    lisinopril (PRINIVIL;ZESTRIL) tablet 10 mg  10 mg Oral Daily [facility], APRN - CNP   10 mg at 04/21/22 1242    And    hydroCHLOROthiazide (HYDRODIURIL) tablet 12.5 mg  12.5 mg Oral Daily Thurl [place], APRN - CNP   12.5 mg at 04/21/22 1246    nicotine (NICODERM CQ) 21 MG/24HR 1 patch  1 patch TransDERmal Daily [facility], APRN - CNP   1 patch at 04/21/22 1107       Allergies:  No Known Allergies    Problem List:    Patient Active Problem List   Diagnosis Code    Open wound of second toe of right foot S91.104A    Toe trauma, right, initial encounter U56.867V    DM (diabetes mellitus) with peripheral vascular complication (HCC) X49.82    COPD (chronic obstructive pulmonary disease) ([facility] [address] J44.9    Sleep apnea G47.30    HTN (hypertension) I10       Past Medical History:        Diagnosis Date    Anxiety     [facility] ([facility] disease) ([facility] [address]     Depression     [facility]     Frostbite     HLD (hyperlipidemia)     HTN (hypertension)     Sleep apnea        Past Surgical History:        Procedure Laterality Date    BACK SURGERY      CHOLECYSTECTOMY      CORONARY ARTERY BYPASS GRAFT      2 vessel    UVULOPALATOPHARYGOPLASTY         Social History:    Social History     Tobacco Use    Smoking status: Current Every Day Smoker     Packs/day: 2.50     Types: Cigarettes     Start date: 1980    Smokeless tobacco: Not on file   Substance Use Topics    Alcohol use: Never                                Ready to quit: No  Counseling given: Yes      Vital Signs (Current):   Vitals:    04/21/22 0321 04/21/22 0415 04/21/22 0800 04/21/22 1101   BP: (!) 106/51 124/69 110/66    Pulse: 79 80 72    Resp: 16 16 16 16   Temp:  36.8 °C (98.3 °F) 36.7 °C (98 °F)    TempSrc:  Oral Oral    SpO2: 97% 98% 98%    Weight:       Height:                                                  BP Readings from Last 3 Encounters:   04/21/22 110/66       NPO Status:   Pt educated to remain NPO after midnight                                                                               BMI:   Wt Readings from Last 3 Encounters:   04/20/22 270 lb (122.5 kg)     Body mass index is 36.62 kg/m². CBC:   Lab Results   Component Value Date    WBC 8.7 04/21/2022    RBC 4.69 04/21/2022    HGB 13.8 04/21/2022    HCT 41.7 04/21/2022    MCV 88.9 04/21/2022    RDW 15.2 04/21/2022     04/21/2022       CMP:   Lab Results   Component Value Date     04/21/2022    K 4.2 04/21/2022     04/21/2022    CO2 25 04/21/2022    BUN 11 04/21/2022    CREATININE 0.9 04/21/2022    GFRAA >60 04/21/2022    LABGLOM >60 04/21/2022    GLUCOSE 188 04/21/2022    CALCIUM 8.8 04/21/2022       POC Tests: No results for input(s): POCGLU, POCNA, POCK, POCCL, POCBUN, POCHEMO, POCHCT in the last 72 hours. Coags: No results found for: PROTIME, INR, APTT    HCG (If Applicable): No results found for: PREGTESTUR, PREGSERUM, HCG, HCGQUANT     ABGs: No results found for: PHART, PO2ART, NFM0JTP, ZXL8TUE, BEART, Y4KEDGVV     Type & Screen (If Applicable):  No results found for: LABABO, LABRH    Drug/Infectious Status (If Applicable):  No results found for: HIV, HEPCAB    COVID-19 Screening (If Applicable): No results found for: COVID19        Anesthesia Evaluation  Patient summary reviewed history of anesthetic complications:   Airway: Mallampati: III  TM distance: <3 FB   Neck ROM: full  Comment: Large amount of facial hair  Mouth opening: < 3 FB Dental:          Pulmonary:normal exam    (+) COPD:  sleep apnea: on noncompliant,  current smoker ( 1 PPD x 45 years)    (-) shortness of breath          Patient did not smoke on day of surgery. Cardiovascular:  Exercise tolerance: poor (<4 METS),   (+) hypertension:, CABG/stent (Hx.  Double bypass 2017):, hyperlipidemia    (-) valvular problems/murmurs, past MI and  angina      Rhythm: regular  Rate: normal           Beta Blocker:  Dose within 24 Hrs         Neuro/Psych:   (+) psychiatric history:depression/anxiety    (-) seizures, TIA and CVA           GI/Hepatic/Renal:   (+) GERD:, morbid obesity     (-) liver disease and no renal disease       Endo/Other:    (+) DiabetesType II DM, , hypothyroidism, blood dyscrasia: anemia:., .                  ROS comment: Open wound of second toe of right foot  Toe trauma, right,     Abdominal:   (+) obese ( Morbid obesity),           Vascular: negative vascular ROS. Other Findings:           Anesthesia Plan      MAC     ASA 4       Induction: intravenous. MIPS: Postoperative opioids intended. Anesthetic plan and risks discussed with patient. Use of blood products discussed with patient whom consented to blood products. Plan discussed with CRNA and attending.               Conor Willett RN   4/21/2022

## 2022-04-21 NOTE — PROGRESS NOTES
Patients family said her or I will clean him up later. Family said its not happening right now because of patient out of it and sleeping. I did pass linens anyway.

## 2022-04-22 ENCOUNTER — ANESTHESIA (OUTPATIENT)
Dept: OPERATING ROOM | Age: 62
DRG: 617 | End: 2022-04-22
Payer: MEDICARE

## 2022-04-22 ENCOUNTER — ANESTHESIA EVENT (OUTPATIENT)
Dept: OPERATING ROOM | Age: 62
DRG: 617 | End: 2022-04-22
Payer: MEDICARE

## 2022-04-22 VITALS — DIASTOLIC BLOOD PRESSURE: 65 MMHG | SYSTOLIC BLOOD PRESSURE: 106 MMHG | OXYGEN SATURATION: 100 %

## 2022-04-22 LAB
ALBUMIN SERPL-MCNC: 3.2 G/DL (ref 3.5–5.2)
ALP BLD-CCNC: 117 U/L (ref 40–129)
ALT SERPL-CCNC: 18 U/L (ref 0–40)
ANION GAP SERPL CALCULATED.3IONS-SCNC: 7 MMOL/L (ref 7–16)
AST SERPL-CCNC: 22 U/L (ref 0–39)
BASOPHILS ABSOLUTE: 0.06 E9/L (ref 0–0.2)
BASOPHILS RELATIVE PERCENT: 0.9 % (ref 0–2)
BILIRUB SERPL-MCNC: 0.3 MG/DL (ref 0–1.2)
BUN BLDV-MCNC: 12 MG/DL (ref 6–23)
CALCIUM SERPL-MCNC: 8.2 MG/DL (ref 8.6–10.2)
CHLORIDE BLD-SCNC: 104 MMOL/L (ref 98–107)
CO2: 24 MMOL/L (ref 22–29)
CREAT SERPL-MCNC: 0.9 MG/DL (ref 0.7–1.2)
EOSINOPHILS ABSOLUTE: 0.16 E9/L (ref 0.05–0.5)
EOSINOPHILS RELATIVE PERCENT: 2.3 % (ref 0–6)
GFR AFRICAN AMERICAN: >60
GFR NON-AFRICAN AMERICAN: >60 ML/MIN/1.73
GLUCOSE BLD-MCNC: 126 MG/DL (ref 74–99)
GRAM STAIN ORDERABLE: NORMAL
HBA1C MFR BLD: 7.7 % (ref 4–5.6)
HCT VFR BLD CALC: 37.9 % (ref 37–54)
HEMOGLOBIN: 12.6 G/DL (ref 12.5–16.5)
IMMATURE GRANULOCYTES #: 0.03 E9/L
IMMATURE GRANULOCYTES %: 0.4 % (ref 0–5)
LYMPHOCYTES ABSOLUTE: 1.99 E9/L (ref 1.5–4)
LYMPHOCYTES RELATIVE PERCENT: 28.5 % (ref 20–42)
MCH RBC QN AUTO: 29.4 PG (ref 26–35)
MCHC RBC AUTO-ENTMCNC: 33.2 % (ref 32–34.5)
MCV RBC AUTO: 88.3 FL (ref 80–99.9)
METER GLUCOSE: 141 MG/DL (ref 74–99)
METER GLUCOSE: 153 MG/DL (ref 74–99)
METER GLUCOSE: 155 MG/DL (ref 74–99)
METER GLUCOSE: 299 MG/DL (ref 74–99)
METER GLUCOSE: 364 MG/DL (ref 74–99)
MONOCYTES ABSOLUTE: 0.51 E9/L (ref 0.1–0.95)
MONOCYTES RELATIVE PERCENT: 7.3 % (ref 2–12)
NEUTROPHILS ABSOLUTE: 4.24 E9/L (ref 1.8–7.3)
NEUTROPHILS RELATIVE PERCENT: 60.6 % (ref 43–80)
PDW BLD-RTO: 14.9 FL (ref 11.5–15)
PLATELET # BLD: 156 E9/L (ref 130–450)
PMV BLD AUTO: 11 FL (ref 7–12)
POTASSIUM REFLEX MAGNESIUM: 4.5 MMOL/L (ref 3.5–5)
RBC # BLD: 4.29 E12/L (ref 3.8–5.8)
SODIUM BLD-SCNC: 135 MMOL/L (ref 132–146)
TOTAL PROTEIN: 6.3 G/DL (ref 6.4–8.3)
WBC # BLD: 7 E9/L (ref 4.5–11.5)

## 2022-04-22 PROCEDURE — 36415 COLL VENOUS BLD VENIPUNCTURE: CPT

## 2022-04-22 PROCEDURE — 87015 SPECIMEN INFECT AGNT CONCNTJ: CPT

## 2022-04-22 PROCEDURE — 3700000000 HC ANESTHESIA ATTENDED CARE: Performed by: PODIATRIST

## 2022-04-22 PROCEDURE — 6370000000 HC RX 637 (ALT 250 FOR IP): Performed by: PODIATRIST

## 2022-04-22 PROCEDURE — 87205 SMEAR GRAM STAIN: CPT

## 2022-04-22 PROCEDURE — 2580000003 HC RX 258: Performed by: NURSE PRACTITIONER

## 2022-04-22 PROCEDURE — 99232 SBSQ HOSP IP/OBS MODERATE 35: CPT | Performed by: INTERNAL MEDICINE

## 2022-04-22 PROCEDURE — 6370000000 HC RX 637 (ALT 250 FOR IP): Performed by: NURSE PRACTITIONER

## 2022-04-22 PROCEDURE — 87075 CULTR BACTERIA EXCEPT BLOOD: CPT

## 2022-04-22 PROCEDURE — 87070 CULTURE OTHR SPECIMN AEROBIC: CPT

## 2022-04-22 PROCEDURE — 88305 TISSUE EXAM BY PATHOLOGIST: CPT

## 2022-04-22 PROCEDURE — 3600000002 HC SURGERY LEVEL 2 BASE: Performed by: PODIATRIST

## 2022-04-22 PROCEDURE — 83036 HEMOGLOBIN GLYCOSYLATED A1C: CPT

## 2022-04-22 PROCEDURE — 3700000001 HC ADD 15 MINUTES (ANESTHESIA): Performed by: PODIATRIST

## 2022-04-22 PROCEDURE — 80053 COMPREHEN METABOLIC PANEL: CPT

## 2022-04-22 PROCEDURE — 87206 SMEAR FLUORESCENT/ACID STAI: CPT

## 2022-04-22 PROCEDURE — 6360000002 HC RX W HCPCS: Performed by: PODIATRIST

## 2022-04-22 PROCEDURE — 1200000000 HC SEMI PRIVATE

## 2022-04-22 PROCEDURE — 2580000003 HC RX 258: Performed by: PODIATRIST

## 2022-04-22 PROCEDURE — 3600000012 HC SURGERY LEVEL 2 ADDTL 15MIN: Performed by: PODIATRIST

## 2022-04-22 PROCEDURE — 6360000002 HC RX W HCPCS: Performed by: NURSE PRACTITIONER

## 2022-04-22 PROCEDURE — 7100000000 HC PACU RECOVERY - FIRST 15 MIN: Performed by: PODIATRIST

## 2022-04-22 PROCEDURE — 87116 MYCOBACTERIA CULTURE: CPT

## 2022-04-22 PROCEDURE — 0Y6R0Z0 DETACHMENT AT RIGHT 2ND TOE, COMPLETE, OPEN APPROACH: ICD-10-PCS | Performed by: PODIATRIST

## 2022-04-22 PROCEDURE — 6360000002 HC RX W HCPCS

## 2022-04-22 PROCEDURE — 6360000002 HC RX W HCPCS: Performed by: ANESTHESIOLOGY

## 2022-04-22 PROCEDURE — 7100000001 HC PACU RECOVERY - ADDTL 15 MIN: Performed by: PODIATRIST

## 2022-04-22 PROCEDURE — 2500000003 HC RX 250 WO HCPCS

## 2022-04-22 PROCEDURE — 87077 CULTURE AEROBIC IDENTIFY: CPT

## 2022-04-22 PROCEDURE — 82962 GLUCOSE BLOOD TEST: CPT

## 2022-04-22 PROCEDURE — 2580000003 HC RX 258: Performed by: INTERNAL MEDICINE

## 2022-04-22 PROCEDURE — 88311 DECALCIFY TISSUE: CPT

## 2022-04-22 PROCEDURE — 87102 FUNGUS ISOLATION CULTURE: CPT

## 2022-04-22 PROCEDURE — 87186 SC STD MICRODIL/AGAR DIL: CPT

## 2022-04-22 PROCEDURE — 2500000003 HC RX 250 WO HCPCS: Performed by: PODIATRIST

## 2022-04-22 PROCEDURE — 85025 COMPLETE CBC W/AUTO DIFF WBC: CPT

## 2022-04-22 PROCEDURE — 2709999900 HC NON-CHARGEABLE SUPPLY: Performed by: PODIATRIST

## 2022-04-22 RX ORDER — 0.9 % SODIUM CHLORIDE 0.9 %
1000 INTRAVENOUS SOLUTION INTRAVENOUS ONCE
Status: COMPLETED | OUTPATIENT
Start: 2022-04-22 | End: 2022-04-22

## 2022-04-22 RX ORDER — LABETALOL HYDROCHLORIDE 5 MG/ML
10 INJECTION, SOLUTION INTRAVENOUS
Status: DISCONTINUED | OUTPATIENT
Start: 2022-04-22 | End: 2022-04-22

## 2022-04-22 RX ORDER — LIDOCAINE HYDROCHLORIDE 20 MG/ML
INJECTION, SOLUTION EPIDURAL; INFILTRATION; INTRACAUDAL; PERINEURAL PRN
Status: DISCONTINUED | OUTPATIENT
Start: 2022-04-22 | End: 2022-04-22 | Stop reason: SDUPTHER

## 2022-04-22 RX ORDER — SODIUM CHLORIDE 0.9 % (FLUSH) 0.9 %
5-40 SYRINGE (ML) INJECTION PRN
Status: DISCONTINUED | OUTPATIENT
Start: 2022-04-22 | End: 2022-04-22

## 2022-04-22 RX ORDER — LORAZEPAM 2 MG/ML
0.5 INJECTION INTRAMUSCULAR
Status: DISCONTINUED | OUTPATIENT
Start: 2022-04-22 | End: 2022-04-22

## 2022-04-22 RX ORDER — ONDANSETRON 2 MG/ML
INJECTION INTRAMUSCULAR; INTRAVENOUS PRN
Status: DISCONTINUED | OUTPATIENT
Start: 2022-04-22 | End: 2022-04-22 | Stop reason: SDUPTHER

## 2022-04-22 RX ORDER — SODIUM CHLORIDE 9 MG/ML
INJECTION, SOLUTION INTRAVENOUS PRN
Status: DISCONTINUED | OUTPATIENT
Start: 2022-04-22 | End: 2022-04-22

## 2022-04-22 RX ORDER — MEPERIDINE HYDROCHLORIDE 25 MG/ML
12.5 INJECTION INTRAMUSCULAR; INTRAVENOUS; SUBCUTANEOUS EVERY 5 MIN PRN
Status: DISCONTINUED | OUTPATIENT
Start: 2022-04-22 | End: 2022-04-22

## 2022-04-22 RX ORDER — SODIUM CHLORIDE 0.9 % (FLUSH) 0.9 %
5-40 SYRINGE (ML) INJECTION EVERY 12 HOURS SCHEDULED
Status: DISCONTINUED | OUTPATIENT
Start: 2022-04-22 | End: 2022-04-22

## 2022-04-22 RX ORDER — DEXAMETHASONE SODIUM PHOSPHATE 4 MG/ML
INJECTION, SOLUTION INTRA-ARTICULAR; INTRALESIONAL; INTRAMUSCULAR; INTRAVENOUS; SOFT TISSUE PRN
Status: DISCONTINUED | OUTPATIENT
Start: 2022-04-22 | End: 2022-04-22 | Stop reason: SDUPTHER

## 2022-04-22 RX ORDER — MIDAZOLAM HYDROCHLORIDE 1 MG/ML
INJECTION INTRAMUSCULAR; INTRAVENOUS PRN
Status: DISCONTINUED | OUTPATIENT
Start: 2022-04-22 | End: 2022-04-22 | Stop reason: SDUPTHER

## 2022-04-22 RX ORDER — HYDRALAZINE HYDROCHLORIDE 20 MG/ML
10 INJECTION INTRAMUSCULAR; INTRAVENOUS
Status: DISCONTINUED | OUTPATIENT
Start: 2022-04-22 | End: 2022-04-22

## 2022-04-22 RX ORDER — IPRATROPIUM BROMIDE AND ALBUTEROL SULFATE 2.5; .5 MG/3ML; MG/3ML
1 SOLUTION RESPIRATORY (INHALATION)
Status: DISCONTINUED | OUTPATIENT
Start: 2022-04-22 | End: 2022-04-22

## 2022-04-22 RX ORDER — DIPHENHYDRAMINE HYDROCHLORIDE 50 MG/ML
12.5 INJECTION INTRAMUSCULAR; INTRAVENOUS
Status: DISCONTINUED | OUTPATIENT
Start: 2022-04-22 | End: 2022-04-22

## 2022-04-22 RX ORDER — BUPIVACAINE HYDROCHLORIDE 5 MG/ML
INJECTION, SOLUTION EPIDURAL; INTRACAUDAL PRN
Status: DISCONTINUED | OUTPATIENT
Start: 2022-04-22 | End: 2022-04-22 | Stop reason: ALTCHOICE

## 2022-04-22 RX ORDER — PROPOFOL 10 MG/ML
INJECTION, EMULSION INTRAVENOUS PRN
Status: DISCONTINUED | OUTPATIENT
Start: 2022-04-22 | End: 2022-04-22 | Stop reason: SDUPTHER

## 2022-04-22 RX ORDER — KETAMINE HYDROCHLORIDE 10 MG/ML
INJECTION, SOLUTION INTRAMUSCULAR; INTRAVENOUS PRN
Status: DISCONTINUED | OUTPATIENT
Start: 2022-04-22 | End: 2022-04-22 | Stop reason: SDUPTHER

## 2022-04-22 RX ORDER — FENTANYL CITRATE 50 UG/ML
INJECTION, SOLUTION INTRAMUSCULAR; INTRAVENOUS PRN
Status: DISCONTINUED | OUTPATIENT
Start: 2022-04-22 | End: 2022-04-22 | Stop reason: SDUPTHER

## 2022-04-22 RX ORDER — OXYCODONE HYDROCHLORIDE AND ACETAMINOPHEN 5; 325 MG/1; MG/1
1 TABLET ORAL EVERY 6 HOURS PRN
Status: DISCONTINUED | OUTPATIENT
Start: 2022-04-22 | End: 2022-04-26 | Stop reason: HOSPADM

## 2022-04-22 RX ORDER — OXYCODONE HYDROCHLORIDE 5 MG/1
5 TABLET ORAL
Status: DISCONTINUED | OUTPATIENT
Start: 2022-04-22 | End: 2022-04-22

## 2022-04-22 RX ORDER — GLYCOPYRROLATE 1 MG/5 ML
SYRINGE (ML) INTRAVENOUS PRN
Status: DISCONTINUED | OUTPATIENT
Start: 2022-04-22 | End: 2022-04-22 | Stop reason: SDUPTHER

## 2022-04-22 RX ORDER — HALOPERIDOL 5 MG/ML
1 INJECTION INTRAMUSCULAR
Status: DISCONTINUED | OUTPATIENT
Start: 2022-04-22 | End: 2022-04-22

## 2022-04-22 RX ADMIN — SERTRALINE 50 MG: 100 TABLET, FILM COATED ORAL at 08:31

## 2022-04-22 RX ADMIN — PROPOFOL 50 MG: 10 INJECTION, EMULSION INTRAVENOUS at 11:04

## 2022-04-22 RX ADMIN — KETAMINE HYDROCHLORIDE 30 MG: 10 INJECTION INTRAMUSCULAR; INTRAVENOUS at 11:04

## 2022-04-22 RX ADMIN — MORPHINE SULFATE 2 MG: 2 INJECTION, SOLUTION INTRAMUSCULAR; INTRAVENOUS at 02:36

## 2022-04-22 RX ADMIN — FENTANYL CITRATE 25 MCG: 50 INJECTION, SOLUTION INTRAMUSCULAR; INTRAVENOUS at 11:25

## 2022-04-22 RX ADMIN — ENOXAPARIN SODIUM 30 MG: 100 INJECTION SUBCUTANEOUS at 19:30

## 2022-04-22 RX ADMIN — PHENYLEPHRINE HYDROCHLORIDE 100 MCG: 10 INJECTION INTRAVENOUS at 11:07

## 2022-04-22 RX ADMIN — SODIUM CHLORIDE 1000 ML: 9 INJECTION, SOLUTION INTRAVENOUS at 15:01

## 2022-04-22 RX ADMIN — INSULIN LISPRO 3 UNITS: 100 INJECTION, SOLUTION INTRAVENOUS; SUBCUTANEOUS at 19:30

## 2022-04-22 RX ADMIN — LEVOTHYROXINE SODIUM 25 MCG: 25 TABLET ORAL at 06:36

## 2022-04-22 RX ADMIN — MORPHINE SULFATE 2 MG: 2 INJECTION, SOLUTION INTRAMUSCULAR; INTRAVENOUS at 22:53

## 2022-04-22 RX ADMIN — LIDOCAINE HYDROCHLORIDE 60 MG: 20 INJECTION, SOLUTION EPIDURAL; INFILTRATION; INTRACAUDAL; PERINEURAL at 11:04

## 2022-04-22 RX ADMIN — ATORVASTATIN CALCIUM 40 MG: 40 TABLET, FILM COATED ORAL at 19:29

## 2022-04-22 RX ADMIN — MORPHINE SULFATE 2 MG: 2 INJECTION, SOLUTION INTRAMUSCULAR; INTRAVENOUS at 18:54

## 2022-04-22 RX ADMIN — METOPROLOL TARTRATE 25 MG: 25 TABLET, FILM COATED ORAL at 19:30

## 2022-04-22 RX ADMIN — SODIUM CHLORIDE: 9 INJECTION, SOLUTION INTRAVENOUS at 13:20

## 2022-04-22 RX ADMIN — PHENYLEPHRINE HYDROCHLORIDE 100 MCG: 10 INJECTION INTRAVENOUS at 11:15

## 2022-04-22 RX ADMIN — PREGABALIN 100 MG: 50 CAPSULE ORAL at 19:29

## 2022-04-22 RX ADMIN — MORPHINE SULFATE 2 MG: 2 INJECTION, SOLUTION INTRAMUSCULAR; INTRAVENOUS at 06:37

## 2022-04-22 RX ADMIN — MIDAZOLAM 2 MG: 1 INJECTION INTRAMUSCULAR; INTRAVENOUS at 10:45

## 2022-04-22 RX ADMIN — SODIUM CHLORIDE: 9 INJECTION, SOLUTION INTRAVENOUS at 11:15

## 2022-04-22 RX ADMIN — PROPOFOL 75 MCG/KG/MIN: 10 INJECTION, EMULSION INTRAVENOUS at 11:05

## 2022-04-22 RX ADMIN — SODIUM CHLORIDE, PRESERVATIVE FREE 5 ML: 5 INJECTION INTRAVENOUS at 07:44

## 2022-04-22 RX ADMIN — SODIUM CHLORIDE, PRESERVATIVE FREE 10 ML: 5 INJECTION INTRAVENOUS at 20:06

## 2022-04-22 RX ADMIN — INSULIN LISPRO 3 UNITS: 100 INJECTION, SOLUTION INTRAVENOUS; SUBCUTANEOUS at 16:41

## 2022-04-22 RX ADMIN — Medication 0.2 MG: at 10:45

## 2022-04-22 RX ADMIN — ONDANSETRON 4 MG: 2 INJECTION INTRAMUSCULAR; INTRAVENOUS at 10:45

## 2022-04-22 RX ADMIN — METOPROLOL TARTRATE 25 MG: 25 TABLET, FILM COATED ORAL at 08:32

## 2022-04-22 RX ADMIN — SODIUM CHLORIDE: 9 INJECTION, SOLUTION INTRAVENOUS at 10:45

## 2022-04-22 RX ADMIN — DEXAMETHASONE SODIUM PHOSPHATE 10 MG: 4 INJECTION, SOLUTION INTRAMUSCULAR; INTRAVENOUS at 10:45

## 2022-04-22 RX ADMIN — HYDROMORPHONE HYDROCHLORIDE 0.5 MG: 1 INJECTION, SOLUTION INTRAMUSCULAR; INTRAVENOUS; SUBCUTANEOUS at 11:55

## 2022-04-22 RX ADMIN — ENOXAPARIN SODIUM 30 MG: 100 INJECTION SUBCUTANEOUS at 13:04

## 2022-04-22 RX ADMIN — ACETAMINOPHEN 650 MG: 325 TABLET ORAL at 13:06

## 2022-04-22 ASSESSMENT — PAIN SCALES - GENERAL
PAINLEVEL_OUTOF10: 8
PAINLEVEL_OUTOF10: 10
PAINLEVEL_OUTOF10: 2
PAINLEVEL_OUTOF10: 0
PAINLEVEL_OUTOF10: 0

## 2022-04-22 ASSESSMENT — PAIN DESCRIPTION - DESCRIPTORS
DESCRIPTORS: ACHING
DESCRIPTORS: ACHING

## 2022-04-22 ASSESSMENT — PULMONARY FUNCTION TESTS
PIF_VALUE: 1

## 2022-04-22 ASSESSMENT — PAIN DESCRIPTION - LOCATION
LOCATION: FOOT
LOCATION: FOOT

## 2022-04-22 ASSESSMENT — PAIN DESCRIPTION - PAIN TYPE
TYPE: ACUTE PAIN
TYPE: ACUTE PAIN

## 2022-04-22 ASSESSMENT — PAIN SCALES - WONG BAKER
WONGBAKER_NUMERICALRESPONSE: 0
WONGBAKER_NUMERICALRESPONSE: 8
WONGBAKER_NUMERICALRESPONSE: 0
WONGBAKER_NUMERICALRESPONSE: 0

## 2022-04-22 ASSESSMENT — PAIN DESCRIPTION - ORIENTATION
ORIENTATION: RIGHT

## 2022-04-22 ASSESSMENT — LIFESTYLE VARIABLES
SMOKING_STATUS: 1
SMOKING_STATUS: 1

## 2022-04-22 NOTE — PROGRESS NOTES
[facility] Progress Note    Admitting Date and Time: 4/21/2022 12:43 AM  Admit Dx: Open wound of right foot, initial encounter [S91.301A]  Toe trauma, right, initial encounter [N19.438R]  Open wound of second toe of right foot, initial encounter [S91.104A]      Assessment:    Principal Problem:    Open wound of second toe of right foot  Active Problems: Toe trauma, right, initial encounter    DM (diabetes mellitus) with peripheral vascular complication (HCC)    COPD (chronic obstructive pulmonary disease) (HCC)    Sleep apnea    HTN (hypertension)  Resolved Problems:    * No resolved hospital problems. *      Plan:  Osteomyelitis of the right second toe with open dislocation  -S/p amputation of the right second toe per podiatry, awaiting bone biopsies. .    -Cancer studies ordered showed normal DK bilaterally  -ID noted no antibiotics warranted at this time. Hx diabetes mellitus, non-insulin-dependent  -Hold p.o. meds, currently on L SSI  -No A1c on file, will check    Hx hypertension-holding BP meds for borderline blood pressure post operative. Hx hyperlipidemia-on Lipitor  Hx hypothyroidism-on Synthroid  Hx COPD with underlying ALCON, not using CPAP    Hx CAD, s/p CABG    Lovenox  Full code    Subjective:  Patient is being followed for Open wound of right foot, initial encounter [S91.301A]  Toe trauma, right, initial encounter [P13.329T]  Open wound of second toe of right foot, initial encounter [C17.154N]     Patient was seen after surgery. Awake and alert, complains of pain at the surgical site. ROS: denies fever, chills, cp, sob, n/v, HA unless stated above.       sodium chloride flush  5-40 mL IntraVENous 2 times per day    enoxaparin  30 mg SubCUTAneous BID    insulin lispro  0-6 Units SubCUTAneous TID WC    insulin lispro  0-3 Units SubCUTAneous Nightly    atorvastatin  40 mg Oral Nightly    levothyroxine  25 mcg Oral Daily    metoprolol tartrate  25 mg Oral BID    pregabalin  100 mg Oral Nightly    sertraline  50 mg Oral Daily    [Held by provider] lisinopril  10 mg Oral Daily    And    [Held by provider] hydroCHLOROthiazide  12.5 mg Oral Daily    nicotine  1 patch TransDERmal Daily     oxyCODONE-acetaminophen, 1 tablet, Q6H PRN  sodium chloride flush, 5-40 mL, PRN  sodium chloride, , PRN  ondansetron, 4 mg, Q8H PRN   Or  ondansetron, 4 mg, Q6H PRN  polyethylene glycol, 17 g, Daily PRN  acetaminophen, 650 mg, Q6H PRN   Or  acetaminophen, 650 mg, Q6H PRN  morphine, 2 mg, Q4H PRN  glucose, 15 g, PRN  dextrose, 12.5 g, PRN  glucagon (rDNA), 1 mg, PRN  dextrose, 100 mL/hr, PRN         Objective:    BP (!) 109/55   Pulse 73   Temp 97 °F (36.1 °C) (Oral)   Resp 16   Ht 6' (1.829 m)   Wt 279 lb 12.8 oz (126.9 kg)   SpO2 98%   BMI 37.95 kg/m²     General Appearance: alert and oriented to person, place and time and in no acute distress  Skin: warm and dry  Head: normocephalic and atraumatic  Eyes: pupils equal, round, and reactive to light, extraocular eye movements intact, conjunctivae normal  Neck: neck supple and non tender without mass   Pulmonary/Chest: clear to auscultation bilaterally- no wheezes, rales or rhonchi, normal air movement, no respiratory distress  Cardiovascular: normal rate, normal S1 and S2 and no carotid bruits  Abdomen: soft, non-tender, non-distended, normal bowel sounds, no masses or organomegaly  Extremities: Right foot is wrapped in sterile dressing, s/p amputation of the second right toe  Neurologic: no cranial nerve deficit and speech normal        Recent Labs     04/21/22  0023 04/22/22  0249    135   K 4.2 4.5    104   CO2 25 24   BUN 11 12   CREATININE 0.9 0.9   GLUCOSE 188* 126*   CALCIUM 8.8 8.2*       Recent Labs     04/21/22  0023 04/22/22  0249   WBC 8.7 7.0   RBC 4.69 4.29   HGB 13.8 12.6   HCT 41.7 37.9   MCV 88.9 88.3   MCH 29.4 29.4   MCHC 33.1 33.2   RDW 15.2* 14.9    156   MPV 11.0 11.0       NOTE: This report was transcribed using voice recognition software. Every effort was made to ensure accuracy; however, inadvertent computerized transcription errors may be present.   Electronically signed by Mana Stephens MD on 4/22/2022 at 3:54 PM

## 2022-04-22 NOTE — ANESTHESIA PRE PROCEDURE
Department of Anesthesiology  Preprocedure Note       Name:  Nba Santamaria   Age:  63 y.o.  :  1960                                          MRN:  [id number]         Date:  2022      Surgeon: Lisbeth Maxwell):  Gregory Dumont DPM    Procedure: Procedure(s):  DELAYED PRIMARY CLOSURE RIGHT FOOT WOUND    Medications prior to admission:   Prior to Admission medications    Medication Sig Start Date End Date Taking? Authorizing Provider   atorvastatin (LIPITOR) 40 MG tablet Take 40 mg by mouth at bedtime   Yes Historical Provider, MD   metoprolol tartrate (LOPRESSOR) 25 MG tablet Take 25 mg by mouth 2 times daily   Yes Historical Provider, MD   metFORMIN (GLUCOPHAGE) 1000 MG tablet Take 1,000 mg by mouth 2 times daily (with meals)   Yes Historical Provider, MD   levothyroxine (SYNTHROID) 25 MCG tablet Take 25 mcg by mouth Daily   Yes Historical Provider, MD   sertraline (ZOLOFT) 50 MG tablet Take 50 mg by mouth daily   Yes Historical Provider, MD   glimepiride (AMARYL) 4 MG tablet Take 4 mg by mouth in the morning and at bedtime   Yes Historical Provider, MD   JARDIANCE 10 MG tablet Take 10 mg by mouth daily 3/25/22   Historical Provider, MD   pregabalin (LYRICA) 100 MG capsule Take 100 mg by mouth at bedtime.  3/25/22   Historical Provider, MD   lisinopril-hydroCHLOROthiazide (PRINZIDE;ZESTORETIC) 20-12.5 MG per tablet Take 20 tablets by mouth daily 3/25/22   Historical Provider, MD   lisinopril (PRINIVIL;ZESTRIL) 2.5 MG tablet Take 2.5 mg by mouth daily  22  Historical Provider, MD       Current medications:    Current Facility-Administered Medications   Medication Dose Route Frequency Provider Last Rate Last Admin    sodium chloride flush 0.9 % injection 5-40 mL  5-40 mL IntraVENous 2 times per day JEREMIAH ShahM   5 mL at 22 0744    sodium chloride flush 0.9 % injection 5-40 mL  5-40 mL IntraVENous PRN Gregory Dumont DPMOMO        0.9 % sodium chloride infusion   IntraVENous PRN Gregory Dumont DPM   New Bag at 04/22/22 1115    enoxaparin Sodium (LOVENOX) injection 30 mg  30 mg SubCUTAneous BID Mason Loosen, DPM   30 mg at 04/22/22 1304    ondansetron (ZOFRAN-ODT) disintegrating tablet 4 mg  4 mg Oral Q8H PRN Mason Loosen, DPM        Or    ondansetron [facility]) injection 4 mg  4 mg IntraVENous Q6H PRN Mason Loosen, DPM        polyethylene glycol (GLYCOLAX) packet 17 g  17 g Oral Daily PRN Mason Loosen, DPM        acetaminophen (TYLENOL) tablet 650 mg  650 mg Oral Q6H PRN Mason Loosen, DPM   650 mg at 04/22/22 1306    Or    acetaminophen (TYLENOL) suppository 650 mg  650 mg Rectal Q6H PRN Mason Loosen, DPM        morphine (PF) injection 2 mg  2 mg IntraVENous Q4H PRN Mason Loosen, DPM   2 mg at 04/22/22 0637    glucose (GLUTOSE) 40 % oral gel 15 g  15 g Oral PRN Mason Loosen, DPM        dextrose 50 % IV solution  12.5 g IntraVENous PRN Mason Loosen, DPM        glucagon (rDNA) injection 1 mg  1 mg IntraMUSCular PRN Mason Loosen, DPM        dextrose 5 % solution  100 mL/hr IntraVENous PRN Mason Loosen, DPM        insulin lispro (HUMALOG) injection vial 0-6 Units  0-6 Units SubCUTAneous TID WC Mason Loosen, DPM   2 Units at 04/21/22 1644    insulin lispro (HUMALOG) injection vial 0-3 Units  0-3 Units SubCUTAneous Nightly Mason Loosen, DPM   1 Units at 04/21/22 2158    0.9 % sodium chloride infusion   IntraVENous Continuous Mason Loosen, DPM 75 mL/hr at 04/22/22 1320 New Bag at 04/22/22 1320    atorvastatin (LIPITOR) tablet 40 mg  40 mg Oral Nightly Mason Loosen, DPM   40 mg at 04/21/22 2039    levothyroxine (SYNTHROID) tablet 25 mcg  25 mcg Oral Daily Mason Loosen, DPM   25 mcg at 04/22/22 0636    metoprolol tartrate (LOPRESSOR) tablet 25 mg  25 mg Oral BID Mason Loosen, DPM   25 mg at 04/22/22 8543    pregabalin (LYRICA) capsule 100 mg  100 mg Oral Nightly Mason Loosen, DPM   100 mg at 04/21/22 2039    sertraline (ZOLOFT) tablet 50 mg  50 mg Oral Daily Mason Maya, DPM   50 mg at 04/22/22 0831    lisinopril (PRINIVIL;ZESTRIL) tablet 10 mg  10 mg Oral Daily Mason Loosen, DPM   10 mg at 04/21/22 1242    And    hydroCHLOROthiazide (HYDRODIURIL) tablet 12.5 mg  12.5 mg Oral Daily Mason Loosen, DPM   12.5 mg at 04/21/22 1246    nicotine (NICODERM CQ) 21 MG/24HR 1 patch  1 patch TransDERmal Daily Mason Loosen, DPM   1 patch at 04/22/22 2849       Allergies:  No Known Allergies    Problem List:    Patient Active Problem List   Diagnosis Code    Open wound of second toe of right foot S91.104A    Toe trauma, right, initial encounter C95.057L    DM (diabetes mellitus) with peripheral vascular complication ([facility] M72.55    COPD (chronic obstructive pulmonary disease) ([facility]) J44.9    Sleep apnea G47.30    HTN (hypertension) I10       Past Medical History:        Diagnosis Date    Anxiety     [facility]     Depression     DM [facility]diabetes mellitus) [facility]     Frostbite     HLD (hyperlipidemia)     HTN (hypertension)     Sleep apnea        Past Surgical History:        Procedure Laterality Date    BACK SURGERY      CHOLECYSTECTOMY      CORONARY ARTERY BYPASS GRAFT REDO      2 vessel    TOE AMPUTATION Right 4/22/2022    AMPUTATION RIGHT SECOND TOE [facility] Mason [facility]    UVULOPALATOPHARYGOPLASTY         Social History:    Social History     Tobacco Use    Smoking status: Current Every Day Smoker     Packs/day: 2.50     Types: Cigarettes     Start date: 1980    Smokeless tobacco: Not on file   Substance Use Topics    Alcohol use: Never                                Ready to quit: No  Counseling given: Yes      Vital Signs (Current):   Vitals:    04/22/22 1200 04/22/22 1215 04/22/22 1227 04/22/22 1300   BP: (!) 82/56 102/65 92/65 (!) 83/41   Pulse: 61 62 55 76   Resp: 12 10 13 14   Temp:    36.1 °C (97 °F)   TempSrc:    Oral   SpO2: 92% 92% 93% 96%   Weight:       Height: BP Readings from Last 3 Encounters:   04/22/22 (!) 83/41       NPO Status: Time of last liquid consumption: 0000                        Time of last solid consumption: 0000                        Date of last liquid consumption: 04/21/22                        Date of last solid food consumption: 04/21/22    BMI:   Wt Readings from Last 3 Encounters:   04/22/22 279 lb 12.8 oz (126.9 kg)     Body mass index is 37.95 kg/m². CBC:   Lab Results   Component Value Date    WBC 7.0 04/22/2022    RBC 4.29 04/22/2022    HGB 12.6 04/22/2022    HCT 37.9 04/22/2022    MCV 88.3 04/22/2022    RDW 14.9 04/22/2022     04/22/2022       CMP:   Lab Results   Component Value Date     04/22/2022    K 4.5 04/22/2022     04/22/2022    CO2 24 04/22/2022    BUN 12 04/22/2022    CREATININE 0.9 04/22/2022    GFRAA >60 04/22/2022    LABGLOM >60 04/22/2022    GLUCOSE 126 04/22/2022    PROT 6.3 04/22/2022    CALCIUM 8.2 04/22/2022    BILITOT 0.3 04/22/2022    ALKPHOS 117 04/22/2022    AST 22 04/22/2022    ALT 18 04/22/2022       POC Tests: No results for input(s): POCGLU, POCNA, POCK, POCCL, POCBUN, POCHEMO, POCHCT in the last 72 hours. Coags: No results found for: PROTIME, INR, APTT    HCG (If Applicable): No results found for: PREGTESTUR, PREGSERUM, HCG, HCGQUANT     ABGs: No results found for: PHART, PO2ART, XUM6DXL, ZRH9HEY, BEART, K2GNSDOC     Type & Screen (If Applicable):  No results found for: LABABO, LABRH    Drug/Infectious Status (If Applicable):  No results found for: HIV, HEPCAB    COVID-19 Screening (If Applicable): No results found for: COVID19    Foot Xray 4/20/22  Impression   No acute osseous abnormality.       Plantar soft tissue edema adjacent to the MTP joints. Anesthesia Evaluation  Patient summary reviewed and Nursing notes reviewed history of anesthetic complications ( patient states he has a hard time swallowing and thought it was from a breathing tube.  Reassured patient it is probably related to esophagus and not trachea): Airway: Mallampati: IV  TM distance: <3 FB   Neck ROM: full  Comment: Large amount of facial hair  Mouth opening: < 3 FB Dental:          Pulmonary:   (+) COPD:  sleep apnea: on noncompliant,  decreased breath sounds,  current smoker          Patient did not smoke on day of surgery. Cardiovascular:    (+) hypertension:, CABG/stent ( CABG X2 April of 2017):, hyperlipidemia        Rhythm: regular  Rate: normal           Beta Blocker:  Dose within 24 Hrs         Neuro/Psych:   (+) psychiatric history:depression/anxiety  ( )            GI/Hepatic/Renal:   (+) GERD:,          ROS comment: obese. Endo/Other:    (+) DiabetesType II DM, , hypothyroidism: arthritis: OA., .                  ROS comment: obese Abdominal:             Vascular: negative vascular ROS. Other Findings: Patient reports history of difficult intubation          Anesthesia Plan      MAC     ASA 4       Induction: intravenous. Anesthetic plan and risks discussed with patient. Plan discussed with CRNA and attending. Shoshana Michaud RN   4/22/2022    DOS STAFF ADDENDUM:    Patient seen and examined, physical exam updated as needed, chart reviewed. NPO status confirmed. Anesthetic options and risks discussed with patient/legal guardian. Patient/legal guardian verbalized understanding and agrees to proceed. Erickson Wilkinson MD  Staff Anesthesiologist  April 25, 2022  10:02 AM                Chart reviewed, patient seen and interviewed. Agree with note above.   CHRIST Flower - CRNA

## 2022-04-22 NOTE — BRIEF OP NOTE
Brief Postoperative Note      Patient: Juliette Alvarez May  YOB: 1960  MRN: [id number]    Date of Procedure: 4/22/2022    Pre-Op Diagnosis: -Wound/osteomyelitis right foot second toe    Post-Op Diagnosis: Same       Procedure(s):  AMPUTATION RIGHT SECOND TOE    Surgeon(s):  Tyler Perales DPM    Assistant:  * No surgical staff found *    Anesthesia: Monitor Anesthesia Care    Estimated Blood Loss (mL): Minimal    Complications: None    Specimens:   ID Type Source Tests Collected by Time Destination   1 : PROXIMAL PHALANX RIGHT 2ND TOE FOR CULTURE Bone Bone CULTURE, ANAEROBIC, CULTURE, FUNGUS, GRAM STAIN, CULTURE, SURGICAL, CULTURE WITH SMEAR, ACID FAST BACILLIUS Tyler Perales DPM 4/22/2022 1118    A : RIGHT 2ND TOE Tissue Tissue SURGICAL PATHOLOGY Tyler Perales DPM 4/22/2022 1115        Implants:  * No implants in log *      Drains: * No LDAs found *    Findings:     Electronically signed by Tyler Perales DPM on 4/22/2022 at 11:41 AM

## 2022-04-22 NOTE — CONSULTS
[address] [place] [address] [facility]  Consultation Note     Admit Date: 4/21/2022 12:43 AM    Reason for Consult:   Osteomyelitis toe    Attending Physician:  Camilla Santos MD    HISTORY OF PRESENT ILLNESS:             The history is obtained from extensive review of available past medical records. The patient is a 63 y.o. male who is not known to the ID service. The patient was dealing with an ulcer on the dorsum of his right second toe since January of this year. He had been on a couple of courses of oral antibiotics. He remembers being on Bactrim and Cephalexin. He was admitted to the hospital on 4/21/2022. He underwent an amputation of the right second toe.     Past Medical History:        Diagnosis Date    Anxiety     [facility]     Depression     DM (diabetes mellitus) [facility]     Frostbite     HLD (hyperlipidemia)     HTN (hypertension)     Sleep apnea      Past Surgical History:        Procedure Laterality Date    BACK SURGERY      CHOLECYSTECTOMY      CORONARY ARTERY BYPASS GRAFT REDO      2 vessel    TOE AMPUTATION Right 4/22/2022    AMPUTATION RIGHT SECOND TOE performed by Macy Drew DPM [facility]    UVULOPALATOPHARYGOPLASTY       Current Medications:   Scheduled Meds:   sodium chloride  1,000 mL IntraVENous Once    sodium chloride flush  5-40 mL IntraVENous 2 times per day    enoxaparin  30 mg SubCUTAneous BID    insulin lispro  0-6 Units SubCUTAneous TID WC    insulin lispro  0-3 Units SubCUTAneous Nightly    atorvastatin  40 mg Oral Nightly    levothyroxine  25 mcg Oral Daily    metoprolol tartrate  25 mg Oral BID    pregabalin  100 mg Oral Nightly    sertraline  50 mg Oral Daily    [Held by provider] lisinopril  10 mg Oral Daily    And    [Held by provider] hydroCHLOROthiazide  12.5 mg Oral Daily    nicotine  1 patch TransDERmal Daily     Continuous Infusions:   sodium chloride      dextrose      sodium chloride 75 mL/hr at 04/22/22 1320     PRN Meds:oxyCODONE-acetaminophen, sodium chloride flush, sodium chloride, ondansetron **OR** ondansetron, polyethylene glycol, acetaminophen **OR** acetaminophen, morphine, glucose, dextrose, glucagon (rDNA), dextrose    Allergies:  Patient has no known allergies. Social History:   Social History     Socioeconomic History    Marital status:      Spouse name: None    Number of children: None    Years of education: None    Highest education level: None   Occupational History    None   Tobacco Use    Smoking status: Current Every Day Smoker     Packs/day: 2.50     Types: Cigarettes     Start date: 1980    Smokeless tobacco: None   Substance and Sexual Activity    Alcohol use: Never    Drug use: Never    Sexual activity: None   Other Topics Concern    None   Social History Narrative    None     Social Determinants of Health     Financial Resource Strain:     Difficulty of Paying Living Expenses: Not on file   Food Insecurity:     Worried About Running Out of Food in the Last Year: Not on file    Lexie of Food in the Last Year: Not on file   Transportation Needs:     Lack of Transportation (Medical): Not on file    Lack of Transportation (Non-Medical):  Not on file   Physical Activity:     Days of Exercise per Week: Not on file    Minutes of Exercise per Session: Not on file   Stress:     Feeling of Stress : Not on file   Social Connections:     Frequency of Communication with Friends and Family: Not on file    Frequency of Social Gatherings with Friends and Family: Not on file    Attends Yazdanism Services: Not on file    Active Member of Clubs or Organizations: Not on file    Attends Club or Organization Meetings: Not on file    Marital Status: Not on file   Intimate Partner Violence:     Fear of Current or Ex-Partner: Not on file    Emotionally Abused: Not on file    Physically Abused: Not on file    Sexually Abused: Not on file   Housing Stability:     Unable to Pay for Housing in the Last Year: Not on file    Number of Places Lived in the Last Year: Not on file    Unstable Housing in the Last Year: Not on file      Pets: Dog  Travel: No  The patient lives alone. She retired from driving trucks    Family History:       Problem Relation Age of Onset    Dementia Mother     Stroke Father     Diabetes Sister    . Otherwise non-pertinent to the chief complaint. REVIEW OF SYSTEMS:    Constitutional: Negative for fevers, chills, diaphoresis  Neurologic: Negative   Psychiatric: Negative  Rheumatologic: Negative   Endocrine: Diabetes. He does not check his sugars at home  Hematologic: Negative  Immunologic: Negative  ENT: Negative  Respiratory: Negative   Cardiovascular: Negative  GI: Negative  : Negative  Musculoskeletal: As in the HPI  Skin: No rashes. PHYSICAL EXAM:    Vitals:   BP (!) 83/41   Pulse 76   Temp 97 °F (36.1 °C) (Oral)   Resp 14   Ht 6' (1.829 m)   Wt 279 lb 12.8 oz (126.9 kg)   SpO2 96%   BMI 37.95 kg/m²   Constitutional: The patient is awake, alert, and oriented. No distress. Skin: Warm and dry. No rashes were noted. HEENT: Eyes show round, and reactive pupils. No jaundice. Moist mucous membranes, no ulcerations, no thrush. Neck: Supple to movements. No lymphadenopathy. Chest: No use of accessory muscles to breathe. Symmetrical expansion. Auscultation reveals no wheezing, crackles, or rhonchi. Cardiovascular: S1 and S2 are rhythmic and regular. No murmurs appreciated. Abdomen: Positive bowel sounds to auscultation. Benign to palpation. No masses felt. No hepatosplenomegaly. Extremities: Right foot is wrapped. The second toe is missing.   Lines: Peripheral.      CBC+dif:  Recent Labs     04/21/22  0023 04/21/22  0023 04/22/22  0249   WBC 8.7  --  7.0   HGB 13.8   < > 12.6   HCT 41.7   < > 37.9   MCV 88.9   < > 88.3      < > 156   NEUTROABS 4.88   < > 4.24    < > = values in this interval not displayed. Lab Results   Component Value Date    CRP 1.1 (H) 04/21/2022      No results found for: CRPHS  Lab Results   Component Value Date    SEDRATE 23 (H) 04/21/2022     Lab Results   Component Value Date    ALT 18 04/22/2022    AST 22 04/22/2022    ALKPHOS 117 04/22/2022    BILITOT 0.3 04/22/2022     Lab Results   Component Value Date     04/22/2022    K 4.5 04/22/2022     04/22/2022    CO2 24 04/22/2022    BUN 12 04/22/2022    CREATININE 0.9 04/22/2022    GFRAA >60 04/22/2022    LABGLOM >60 04/22/2022    GLUCOSE 126 04/22/2022    PROT 6.3 04/22/2022    LABALBU 3.2 04/22/2022    CALCIUM 8.2 04/22/2022    BILITOT 0.3 04/22/2022    ALKPHOS 117 04/22/2022    AST 22 04/22/2022    ALT 18 04/22/2022       No results found for: PROTIME, INR    No results found for: TSH    No results found for: NITRITE, COLORU, PHUR, LABCAST, WBCUA, RBCUA, MUCUS, TRICHOMONAS, YEAST, BACTERIA, CLARITYU, SPECGRAV, LEUKOCYTESUR, UROBILINOGEN, BILIRUBINUR, BLOODU, GLUCOSEU, AMORPHOUS    No results found for: HCO3, BE, O2SAT, PH, THGB, PCO2, PO2, TCO2  Radiology:  Noted    Microbiology:  Pending  Recent Labs     04/21/22  0215   BC 24 Hours no growth     Recent Labs     04/21/22  1218   ORG Staphylococcus aureus*     Recent Labs     04/21/22  0225   BLOODCULT2 24 Hours no growth     No results for input(s): STREPNEUMAGU in the last 72 hours. No results for input(s): LP1UAG in the last 72 hours. No results for input(s): ASO in the last 72 hours. No results for input(s): CULTRESP in the last 72 hours. Recent Labs     04/21/22  0520   PROCAL 0.10*       Assessment:  · Chronic osteomyelitis of the right second toe secondary to Staphylococcus aureus  · Status post amputation of the right second toe. There was no cellulitis or skin and soft tissue infection    Plan:    · The patient does not warrant any further antibiotic therapy  · ID will sign off    Thank you for having us see this patient in consultation.      Angela Montgomery Greyson Resendiz MD  2:56 PM  4/22/2022

## 2022-04-22 NOTE — ANESTHESIA POSTPROCEDURE EVALUATION
Department of Anesthesiology  Postprocedure Note    Patient: Tanya Fuentes  MRN: [id number]  YOB: 1960  Date of evaluation: 4/22/2022  Time:  1:07 PM     Procedure Summary     Date: 04/22/22 Room / Location: [facility] 01 / [facility]    Anesthesia Start: 2299 Anesthesia Stop: 6284    Procedure: AMPUTATION RIGHT SECOND TOE (Right Second Toe) Diagnosis: (-)    Surgeons: Avril Slater DPM Responsible Provider: Sherlie Lombard, MD    Anesthesia Type: MAC ASA Status: 4          Anesthesia Type: MAC    Bernard Phase I: Bernard Score: 10    Bernard Phase II:      Last vitals: Reviewed and per EMR flowsheets.        Anesthesia Post Evaluation    Patient location during evaluation: PACU  Patient participation: complete - patient participated  Level of consciousness: awake and alert  Pain score: 3  Airway patency: patent  Nausea & Vomiting: no nausea and no vomiting  Complications: no  Cardiovascular status: hemodynamically stable  Respiratory status: acceptable  Hydration status: euvolemic

## 2022-04-22 NOTE — OP NOTE
Operative Note      Patient: Carlotta Hoffman May  YOB: 1960  MRN: [id number]    Date of Procedure: 4/22/2022    Pre-Op Diagnosis: -Wound/osteomyelitis right second toe    Post-Op Diagnosis: Same       Procedure(s):  AMPUTATION RIGHT SECOND TOE    Surgeon(s):  Reyna Prieto DPM    Assistant:   * No surgical staff found *    Anesthesia: Monitor Anesthesia Care    Estimated Blood Loss (mL): Minimal    Complications: None    Specimens:   ID Type Source Tests Collected by Time Destination   1 : PROXIMAL PHALANX RIGHT 2ND TOE FOR CULTURE Bone Bone CULTURE, ANAEROBIC, CULTURE, FUNGUS, GRAM STAIN, CULTURE, SURGICAL, CULTURE WITH SMEAR, ACID FAST BACILLIUS Reyna Prieto DPM 4/22/2022 1118    A : RIGHT 2ND TOE Tissue Tissue SURGICAL PATHOLOGY Reyna Prieot DPM 4/22/2022 1115        Implants:  * No implants in log *      Drains: * No LDAs found *    Findings:     Interval note: Patient brought to the OR for the above-noted procedure. Patient presented to the ER for evaluation of open wound with exposed bone. Patient initially developed frostbite in January of this year while living out of his car. He was being treated in [facility]. Patient recently came appear with his niece. Physical exam demonstrated intact pedal pulses. His noninvasives were reviewed, adequate flow. Loss of protective sensation. Right second toe significant amount of soft tissue loss with exposed bone. X-ray did demonstrate dislocated DIPJ as well. Risk discussed with patient and family in detail including but not limited to persistence, recurrence, multiple procedures, loss of limb/life. No guarantees made, patient fully aware this is a salvage type procedure and more proximal type amputation may be warranted. Fully understood all and still wished to proceed. Detailed Description of Procedure: Following satisfactory preop evaluation patient was brought to the OR transferred on the OR table supine position.   Anesthesia per anesthesia department. 0.25% Marcaine plain utilized for local anesthesia regional block type fashion. Right lower extremity prepped and draped usual sterile manner. Attention directed to the right second toe where incision was made proximal to the devitalized nonviable tissue and carried down to the corresponding MPJ. Second toe was disarticulated and passed from the surgical field. Wound was irrigated with normal saline. Any bleeders were bovied and hand tied as deemed necessary. Bone was sent to microbiology for appropriate work-up as well as second toe to pathology. Surgicel applied to wound followed by sterile dressing. No complications encountered, patient tolerated procedure and anesthesia well. Anticipate back to the OR on Monday for possible delayed primary closure, this will give us a few days to see if the tissue remains viable around the amputation site. Discussed postoperative findings with patient as well as family.     Electronically signed by Rebecca Crooks DPM on 4/22/2022 at 11:41 AM

## 2022-04-22 NOTE — PLAN OF CARE

## 2022-04-22 NOTE — PLAN OF CARE
Problem: Safety - Adult  Goal: Free from fall injury  4/22/2022 0311 by Edison Lazo RN  Outcome: Progressing    Problem: ABCDS Injury Assessment  Goal: Absence of physical injury  4/22/2022 6488 by Edison Lazo RN  Outcome: Progressing

## 2022-04-22 NOTE — CARE COORDINATION
Pt for OR 2nd toe rt foot removal today; [facility] following; plan is for pt tod/c to [facility] [address] [place] [id number]. Has PCP in [facility] #[phone]. also has Podiatrist tory bolanos in [place], [place]. Kye Bates. Await podiatry plan. Kye Bates.

## 2022-04-23 LAB
GRAM STAIN ORDERABLE: NORMAL
METER GLUCOSE: 220 MG/DL (ref 74–99)
METER GLUCOSE: 292 MG/DL (ref 74–99)
METER GLUCOSE: 294 MG/DL (ref 74–99)
METER GLUCOSE: 359 MG/DL (ref 74–99)
METER GLUCOSE: 401 MG/DL (ref 74–99)
METER GLUCOSE: 421 MG/DL (ref 74–99)
ORGANISM: ABNORMAL
WOUND/ABSCESS: ABNORMAL

## 2022-04-23 PROCEDURE — 1200000000 HC SEMI PRIVATE

## 2022-04-23 PROCEDURE — 82962 GLUCOSE BLOOD TEST: CPT

## 2022-04-23 PROCEDURE — 99233 SBSQ HOSP IP/OBS HIGH 50: CPT | Performed by: INTERNAL MEDICINE

## 2022-04-23 PROCEDURE — 6360000002 HC RX W HCPCS: Performed by: PODIATRIST

## 2022-04-23 PROCEDURE — 2580000003 HC RX 258: Performed by: INTERNAL MEDICINE

## 2022-04-23 PROCEDURE — 6370000000 HC RX 637 (ALT 250 FOR IP): Performed by: INTERNAL MEDICINE

## 2022-04-23 PROCEDURE — 2580000003 HC RX 258: Performed by: PODIATRIST

## 2022-04-23 PROCEDURE — 6370000000 HC RX 637 (ALT 250 FOR IP): Performed by: PODIATRIST

## 2022-04-23 RX ORDER — INSULIN GLARGINE 100 [IU]/ML
5 INJECTION, SOLUTION SUBCUTANEOUS DAILY
Status: DISCONTINUED | OUTPATIENT
Start: 2022-04-23 | End: 2022-04-26 | Stop reason: HOSPADM

## 2022-04-23 RX ADMIN — MORPHINE SULFATE 2 MG: 2 INJECTION, SOLUTION INTRAMUSCULAR; INTRAVENOUS at 18:01

## 2022-04-23 RX ADMIN — ENOXAPARIN SODIUM 30 MG: 100 INJECTION SUBCUTANEOUS at 08:58

## 2022-04-23 RX ADMIN — PREGABALIN 100 MG: 50 CAPSULE ORAL at 20:13

## 2022-04-23 RX ADMIN — METOPROLOL TARTRATE 25 MG: 25 TABLET, FILM COATED ORAL at 08:57

## 2022-04-23 RX ADMIN — INSULIN LISPRO 3 UNITS: 100 INJECTION, SOLUTION INTRAVENOUS; SUBCUTANEOUS at 20:20

## 2022-04-23 RX ADMIN — MORPHINE SULFATE 2 MG: 2 INJECTION, SOLUTION INTRAMUSCULAR; INTRAVENOUS at 03:34

## 2022-04-23 RX ADMIN — OXYCODONE AND ACETAMINOPHEN 1 TABLET: 5; 325 TABLET ORAL at 20:14

## 2022-04-23 RX ADMIN — MORPHINE SULFATE 2 MG: 2 INJECTION, SOLUTION INTRAMUSCULAR; INTRAVENOUS at 08:57

## 2022-04-23 RX ADMIN — INSULIN LISPRO 2 UNITS: 100 INJECTION, SOLUTION INTRAVENOUS; SUBCUTANEOUS at 11:08

## 2022-04-23 RX ADMIN — SODIUM CHLORIDE: 9 INJECTION, SOLUTION INTRAVENOUS at 20:19

## 2022-04-23 RX ADMIN — LEVOTHYROXINE SODIUM 25 MCG: 25 TABLET ORAL at 06:41

## 2022-04-23 RX ADMIN — SODIUM CHLORIDE, PRESERVATIVE FREE 10 ML: 5 INJECTION INTRAVENOUS at 20:15

## 2022-04-23 RX ADMIN — OXYCODONE AND ACETAMINOPHEN 1 TABLET: 5; 325 TABLET ORAL at 11:06

## 2022-04-23 RX ADMIN — INSULIN LISPRO 3 UNITS: 100 INJECTION, SOLUTION INTRAVENOUS; SUBCUTANEOUS at 06:42

## 2022-04-23 RX ADMIN — ENOXAPARIN SODIUM 30 MG: 100 INJECTION SUBCUTANEOUS at 20:14

## 2022-04-23 RX ADMIN — MORPHINE SULFATE 2 MG: 2 INJECTION, SOLUTION INTRAMUSCULAR; INTRAVENOUS at 13:52

## 2022-04-23 RX ADMIN — SERTRALINE 50 MG: 100 TABLET, FILM COATED ORAL at 08:57

## 2022-04-23 RX ADMIN — OXYCODONE AND ACETAMINOPHEN 1 TABLET: 5; 325 TABLET ORAL at 00:55

## 2022-04-23 RX ADMIN — ATORVASTATIN CALCIUM 40 MG: 40 TABLET, FILM COATED ORAL at 20:14

## 2022-04-23 RX ADMIN — MORPHINE SULFATE 2 MG: 2 INJECTION, SOLUTION INTRAMUSCULAR; INTRAVENOUS at 21:56

## 2022-04-23 RX ADMIN — INSULIN LISPRO 3 UNITS: 100 INJECTION, SOLUTION INTRAVENOUS; SUBCUTANEOUS at 16:39

## 2022-04-23 RX ADMIN — METOPROLOL TARTRATE 25 MG: 25 TABLET, FILM COATED ORAL at 20:13

## 2022-04-23 RX ADMIN — INSULIN GLARGINE 5 UNITS: 100 INJECTION, SOLUTION SUBCUTANEOUS at 09:31

## 2022-04-23 ASSESSMENT — PAIN SCALES - GENERAL
PAINLEVEL_OUTOF10: 9
PAINLEVEL_OUTOF10: 7
PAINLEVEL_OUTOF10: 3
PAINLEVEL_OUTOF10: 9
PAINLEVEL_OUTOF10: 4
PAINLEVEL_OUTOF10: 8

## 2022-04-23 ASSESSMENT — PAIN DESCRIPTION - ORIENTATION
ORIENTATION: RIGHT

## 2022-04-23 ASSESSMENT — PAIN DESCRIPTION - LOCATION
LOCATION: FOOT

## 2022-04-23 ASSESSMENT — PAIN DESCRIPTION - DESCRIPTORS
DESCRIPTORS: ACHING

## 2022-04-23 ASSESSMENT — PAIN SCALES - WONG BAKER
WONGBAKER_NUMERICALRESPONSE: 0
WONGBAKER_NUMERICALRESPONSE: 2
WONGBAKER_NUMERICALRESPONSE: 2

## 2022-04-23 NOTE — PROGRESS NOTES
[facility] Progress Note    Admitting Date and Time: 4/21/2022 12:43 AM  Admit Dx: Open wound of right foot, initial encounter [S91.301A]  Toe trauma, right, initial encounter [Q64.977H]  Open wound of second toe of right foot, initial encounter [S91.104A]      Assessment:    Principal Problem:    Open wound of second toe of right foot  Active Problems: Toe trauma, right, initial encounter    DM (diabetes mellitus) with peripheral vascular complication (HCC)    COPD (chronic obstructive pulmonary disease) (HCC)    Sleep apnea    HTN (hypertension)  Resolved Problems:    * No resolved hospital problems. *      Plan:  Osteomyelitis of the right second toe with open dislocation  -S/p amputation of the right second toe per podiatry, wound culture growing MRSA. .  Will be back to the OR on Monday  -Vascular studies ordered showed normal DK bilaterally  -ID noted no antibiotics warranted at this time. Hx diabetes mellitus, non-insulin-dependent, A1c 7.7  -Hold p.o. meds for now  -We will start on 5 units of Lantus, continue L SSI. Will adjust insulin for BG goal at 140-180 for better wound healing  -Will discharge on p.o. meds    Hx hypertension-holding BP meds for borderline blood pressure post operative.  -We will start resuming BP meds with holding parameters as BP tolerates. Hx hyperlipidemia-on Lipitor  Hx hypothyroidism-on Synthroid  Hx COPD with underlying ALCON, not using CPAP    Hx CAD, s/p CABG    Lovenox  Full code    Subjective:  Patient is being followed for Open wound of right foot, initial encounter [S91.301A]  Toe trauma, right, initial encounter [Y71.977Z]  Open wound of second toe of right foot, initial encounter [T85.877M]     Patient was seen after surgery. Awake and alert, feels better today but remains anxious and nervous about the future plans.   He is aware he is going back to the OR on Monday as well as remaining NWB for now    ROS: denies fever, chills, cp, sob, n/v, HA unless stated above.       insulin glargine  5 Units SubCUTAneous Daily    sodium chloride flush  5-40 mL IntraVENous 2 times per day    enoxaparin  30 mg SubCUTAneous BID    insulin lispro  0-6 Units SubCUTAneous TID WC    insulin lispro  0-3 Units SubCUTAneous Nightly    atorvastatin  40 mg Oral Nightly    levothyroxine  25 mcg Oral Daily    metoprolol tartrate  25 mg Oral BID    pregabalin  100 mg Oral Nightly    sertraline  50 mg Oral Daily    [Held by provider] lisinopril  10 mg Oral Daily    And    [Held by provider] hydroCHLOROthiazide  12.5 mg Oral Daily    nicotine  1 patch TransDERmal Daily     oxyCODONE-acetaminophen, 1 tablet, Q6H PRN  sodium chloride flush, 5-40 mL, PRN  sodium chloride, , PRN  ondansetron, 4 mg, Q8H PRN   Or  ondansetron, 4 mg, Q6H PRN  polyethylene glycol, 17 g, Daily PRN  acetaminophen, 650 mg, Q6H PRN   Or  acetaminophen, 650 mg, Q6H PRN  morphine, 2 mg, Q4H PRN  glucose, 15 g, PRN  dextrose, 12.5 g, PRN  glucagon (rDNA), 1 mg, PRN  dextrose, 100 mL/hr, PRN         Objective:    /60   Pulse 80   Temp 97 °F (36.1 °C) (Oral)   Resp 18   Ht 6' (1.829 m)   Wt 281 lb 11.2 oz (127.8 kg)   SpO2 95%   BMI 38.21 kg/m²     General Appearance: alert and oriented to person, place and time and in no acute distress  Skin: warm and dry  Head: normocephalic and atraumatic  Eyes: pupils equal, round, and reactive to light, extraocular eye movements intact, conjunctivae normal  Neck: neck supple and non tender without mass   Pulmonary/Chest: clear to auscultation bilaterally- no wheezes, rales or rhonchi, normal air movement, no respiratory distress  Cardiovascular: normal rate, normal S1 and S2 and no carotid bruits  Abdomen: soft, non-tender, non-distended, normal bowel sounds, no masses or organomegaly  Extremities: Right foot is wrapped in sterile dressing, s/p amputation of the second right toe  Neurologic: no cranial nerve deficit and speech normal        Recent Labs 04/21/22  0023 04/22/22  0249    135   K 4.2 4.5    104   CO2 25 24   BUN 11 12   CREATININE 0.9 0.9   GLUCOSE 188* 126*   CALCIUM 8.8 8.2*       Recent Labs     04/21/22  0023 04/22/22  0249   WBC 8.7 7.0   RBC 4.69 4.29   HGB 13.8 12.6   HCT 41.7 37.9   MCV 88.9 88.3   MCH 29.4 29.4   MCHC 33.1 33.2   RDW 15.2* 14.9    156   MPV 11.0 11.0       NOTE: This report was transcribed using voice recognition software. Every effort was made to ensure accuracy; however, inadvertent computerized transcription errors may be present.   Electronically signed by Shane Page MD on 4/23/2022 at 10:41 AM

## 2022-04-23 NOTE — PROGRESS NOTES
Podiatry Progress Note  4/23/2022   Bossmantiti Mendoza May       SUBJECTIVE: This is a 63 y.o. male seen bedside s/p right 2nd digit amputation(DOS 4/22). Patient states he's feeling anxious about prospects of toe healing. Explained to him that maintaining target HgbA1c levels and smoking cessation will aid in wound healing. Patient used to smoke 2-3 packs per day but has cut down to about 14 cigarettes a day. Pt aware of possible OR on Monday for [facility]. Endorses some foot pain and nausea. No additional complaints. OBJECTIVE:      Pt is AAOx3    Vitals:    04/23/22 0845   BP: 127/60   Pulse: 80   Resp: 18   Temp: 97 °F (36.1 °C)   SpO2: 95%              EXAM:    Vascular Exam:DP and PT pulses intact. CFT is sluggish to 3 and 5 seconds digits 1345 right and 1-5 left. Skin temperature warm to warm proximal leg to distal toes. Soft compressible posterior most compartments bilaterally. Neuro Exam:  Diminished light touch sensation    Dermatologic Exam:  Full thickness surgical ulcer at site of right 2nd digit amputation. Gross bone exposure and minimal active bleeding. No purulence. No lymphangitis. Slight edema. No acute signs of infection. MSK: Muscle strength 5/5 Bilateral  ROM is full without pain or crepitation bilateral.  No tenderness on palpation to periwound.             Current Facility-Administered Medications   Medication Dose Route Frequency Provider Last Rate Last Admin    insulin glargine (LANTUS) injection vial 5 Units  5 Units SubCUTAneous Daily Osiel Gray MD   5 Units at 04/23/22 0931    oxyCODONE-acetaminophen (PERCOCET) 5-325 MG per tablet 1 tablet  1 tablet Oral Q6H PRN Osiel Gray MD   1 tablet at 04/23/22 0055    sodium chloride flush 0.9 % injection 5-40 mL  5-40 mL IntraVENous 2 times per day [place], DPM   10 mL at 04/22/22 2006    sodium chloride flush 0.9 % injection 5-40 mL  5-40 mL IntraVENous PRN [place], DPM        0.9 % sodium chloride infusion IntraVENous PRN Agustín Countess, DPM   New Bag at 04/22/22 1115    enoxaparin Sodium (LOVENOX) injection 30 mg  30 mg SubCUTAneous BID Agustín Countess, DPM   30 mg at 04/23/22 0858    ondansetron (ZOFRAN-ODT) disintegrating tablet 4 mg  4 mg Oral Q8H PRN Agustín Countess, DPM        Or    ondansetron [facility]) injection 4 mg  4 mg IntraVENous Q6H PRN Agustín Countess, DPM        polyethylene glycol (GLYCOLAX) packet 17 g  17 g Oral Daily PRN Agustín Countess, DPM        acetaminophen (TYLENOL) tablet 650 mg  650 mg Oral Q6H PRN Agustín Countess, DPM   650 mg at 04/22/22 1306    Or    acetaminophen (TYLENOL) suppository 650 mg  650 mg Rectal Q6H PRN Agustín Countess, DPM        morphine (PF) injection 2 mg  2 mg IntraVENous Q4H PRN Agustín Countess, DPM   2 mg at 04/23/22 0857    glucose (GLUTOSE) 40 % oral gel 15 g  15 g Oral PRN Agustín Countess, DPM        dextrose 50 % IV solution  12.5 g IntraVENous PRN Agustín Countess, DPM        glucagon (rDNA) injection 1 mg  1 mg IntraMUSCular PRN Agustín Countess, DPM        dextrose 5 % solution  100 mL/hr IntraVENous PRN Agustín Countess, DPM        insulin lispro (HUMALOG) injection vial 0-6 Units  0-6 Units SubCUTAneous TID WC Agustín Countess, DPM   3 Units at 04/23/22 2422    insulin lispro (HUMALOG) injection vial 0-3 Units  0-3 Units SubCUTAneous Nightly Agustín Countess, DPM   3 Units at 04/22/22 1930    0.9 % sodium chloride infusion   IntraVENous Continuous Gavin West MD 75 mL/hr at 04/23/22 0856 Rate Change at 04/23/22 0856    atorvastatin (LIPITOR) tablet 40 mg  40 mg Oral Nightly Agustín Countess, DPM   40 mg at 04/22/22 1929    levothyroxine (SYNTHROID) tablet 25 mcg  25 mcg Oral Daily Agustín Countess, DPM   25 mcg at 04/23/22 0641    metoprolol tartrate (LOPRESSOR) tablet 25 mg  25 mg Oral BID Agustín Countess, DPM   25 mg at 04/23/22 0857    pregabalin (LYRICA) capsule 100 mg  100 mg Oral Nightly Agustín Countregi, DPM   100 mg at 04/22/22 1929    sertraline (ZOLOFT) tablet 50 mg  50 mg Oral Daily Blondie Pickerel, DPM   50 mg at 04/23/22 0857    [Held by provider] lisinopril (PRINIVIL;ZESTRIL) tablet 10 mg  10 mg Oral Daily Blondie Pickerel, DPM   10 mg at 04/21/22 1242    And    [Held by provider] hydroCHLOROthiazide (HYDRODIURIL) tablet 12.5 mg  12.5 mg Oral Daily Blondie Pickerel, DPM   12.5 mg at 04/21/22 1246    nicotine (NICODERM CQ) 21 MG/24HR 1 patch  1 patch TransDERmal Daily Blondie Pickerel, DPM   1 patch at 04/23/22 0858        Lab Results   Component Value Date    WBC 7.0 04/22/2022    HCT 37.9 04/22/2022    HGB 12.6 04/22/2022     04/22/2022     04/22/2022    K 4.5 04/22/2022     04/22/2022    CO2 24 04/22/2022    BUN 12 04/22/2022    CREATININE 0.9 04/22/2022    GLUCOSE 126 (H) 04/22/2022    CRP 1.1 (H) 04/21/2022         Radiographs:    ASSESEMENT:  -s/p right 2nd digit amputation(DOS:4/22)  -OM(acute vs chronic), right 2nd digit  -NIDDM    PLAN:  - Examined and evaluated  - All labs, imaging, and charts reviewed  -Surgical cx:pending  -Surgical path:pending  -No abx warranted per ID  -Vascular studies:stephanie DK b/l at 1.2. Multiphasic waveforms identified at all levels b/l.  -Plan for OR on Monday(4/25):DPC of right foot. Explained procedure to patient. Expresses understanding and wishes to proceed.   -NPO at midnight on 4/25  -Local wound care for now:packing, ACE, DSD  -NWB to right foot  -Will continue to monitor while in house  -Discussed with Dr. Ayo Paul DPM   4/23/2022   10:18 AM

## 2022-04-24 LAB
ANAEROBIC CULTURE: NORMAL
ANION GAP SERPL CALCULATED.3IONS-SCNC: 6 MMOL/L (ref 7–16)
BASOPHILS ABSOLUTE: 0.06 E9/L (ref 0–0.2)
BASOPHILS RELATIVE PERCENT: 0.6 % (ref 0–2)
BUN BLDV-MCNC: 18 MG/DL (ref 6–23)
CALCIUM SERPL-MCNC: 8.6 MG/DL (ref 8.6–10.2)
CHLORIDE BLD-SCNC: 104 MMOL/L (ref 98–107)
CO2: 26 MMOL/L (ref 22–29)
CREAT SERPL-MCNC: 0.9 MG/DL (ref 0.7–1.2)
EOSINOPHILS ABSOLUTE: 0.11 E9/L (ref 0.05–0.5)
EOSINOPHILS RELATIVE PERCENT: 1.1 % (ref 0–6)
GFR AFRICAN AMERICAN: >60
GFR NON-AFRICAN AMERICAN: >60 ML/MIN/1.73
GLUCOSE BLD-MCNC: 181 MG/DL (ref 74–99)
HCT VFR BLD CALC: 37.6 % (ref 37–54)
HEMOGLOBIN: 12.6 G/DL (ref 12.5–16.5)
IMMATURE GRANULOCYTES #: 0.04 E9/L
IMMATURE GRANULOCYTES %: 0.4 % (ref 0–5)
LYMPHOCYTES ABSOLUTE: 2.36 E9/L (ref 1.5–4)
LYMPHOCYTES RELATIVE PERCENT: 24.6 % (ref 20–42)
MCH RBC QN AUTO: 29.2 PG (ref 26–35)
MCHC RBC AUTO-ENTMCNC: 33.5 % (ref 32–34.5)
MCV RBC AUTO: 87.2 FL (ref 80–99.9)
METER GLUCOSE: 159 MG/DL (ref 74–99)
METER GLUCOSE: 192 MG/DL (ref 74–99)
METER GLUCOSE: 207 MG/DL (ref 74–99)
METER GLUCOSE: 286 MG/DL (ref 74–99)
MONOCYTES ABSOLUTE: 0.65 E9/L (ref 0.1–0.95)
MONOCYTES RELATIVE PERCENT: 6.8 % (ref 2–12)
NEUTROPHILS ABSOLUTE: 6.38 E9/L (ref 1.8–7.3)
NEUTROPHILS RELATIVE PERCENT: 66.5 % (ref 43–80)
PDW BLD-RTO: 14.6 FL (ref 11.5–15)
PLATELET # BLD: 153 E9/L (ref 130–450)
PMV BLD AUTO: 11.6 FL (ref 7–12)
POTASSIUM SERPL-SCNC: 4.6 MMOL/L (ref 3.5–5)
RBC # BLD: 4.31 E12/L (ref 3.8–5.8)
SODIUM BLD-SCNC: 136 MMOL/L (ref 132–146)
WBC # BLD: 9.6 E9/L (ref 4.5–11.5)

## 2022-04-24 PROCEDURE — 2580000003 HC RX 258: Performed by: PODIATRIST

## 2022-04-24 PROCEDURE — 6370000000 HC RX 637 (ALT 250 FOR IP): Performed by: PODIATRIST

## 2022-04-24 PROCEDURE — 36415 COLL VENOUS BLD VENIPUNCTURE: CPT

## 2022-04-24 PROCEDURE — 6360000002 HC RX W HCPCS: Performed by: PODIATRIST

## 2022-04-24 PROCEDURE — 82962 GLUCOSE BLOOD TEST: CPT

## 2022-04-24 PROCEDURE — 80048 BASIC METABOLIC PNL TOTAL CA: CPT

## 2022-04-24 PROCEDURE — 1200000000 HC SEMI PRIVATE

## 2022-04-24 PROCEDURE — 99232 SBSQ HOSP IP/OBS MODERATE 35: CPT | Performed by: FAMILY MEDICINE

## 2022-04-24 PROCEDURE — 6370000000 HC RX 637 (ALT 250 FOR IP): Performed by: INTERNAL MEDICINE

## 2022-04-24 PROCEDURE — 85025 COMPLETE CBC W/AUTO DIFF WBC: CPT

## 2022-04-24 RX ORDER — LISINOPRIL 5 MG/1
5 TABLET ORAL DAILY
Status: DISCONTINUED | OUTPATIENT
Start: 2022-04-25 | End: 2022-04-26 | Stop reason: HOSPADM

## 2022-04-24 RX ADMIN — INSULIN LISPRO 1 UNITS: 100 INJECTION, SOLUTION INTRAVENOUS; SUBCUTANEOUS at 11:24

## 2022-04-24 RX ADMIN — LEVOTHYROXINE SODIUM 25 MCG: 25 TABLET ORAL at 06:08

## 2022-04-24 RX ADMIN — ENOXAPARIN SODIUM 30 MG: 100 INJECTION SUBCUTANEOUS at 09:08

## 2022-04-24 RX ADMIN — PREGABALIN 100 MG: 50 CAPSULE ORAL at 20:41

## 2022-04-24 RX ADMIN — INSULIN GLARGINE 5 UNITS: 100 INJECTION, SOLUTION SUBCUTANEOUS at 09:08

## 2022-04-24 RX ADMIN — SODIUM CHLORIDE, PRESERVATIVE FREE 10 ML: 5 INJECTION INTRAVENOUS at 20:42

## 2022-04-24 RX ADMIN — MORPHINE SULFATE 2 MG: 2 INJECTION, SOLUTION INTRAMUSCULAR; INTRAVENOUS at 02:10

## 2022-04-24 RX ADMIN — OXYCODONE AND ACETAMINOPHEN 1 TABLET: 5; 325 TABLET ORAL at 18:30

## 2022-04-24 RX ADMIN — INSULIN LISPRO 1 UNITS: 100 INJECTION, SOLUTION INTRAVENOUS; SUBCUTANEOUS at 17:09

## 2022-04-24 RX ADMIN — METOPROLOL TARTRATE 25 MG: 25 TABLET, FILM COATED ORAL at 09:08

## 2022-04-24 RX ADMIN — MORPHINE SULFATE 2 MG: 2 INJECTION, SOLUTION INTRAMUSCULAR; INTRAVENOUS at 06:05

## 2022-04-24 RX ADMIN — ONDANSETRON 4 MG: 2 INJECTION INTRAMUSCULAR; INTRAVENOUS at 13:04

## 2022-04-24 RX ADMIN — OXYCODONE AND ACETAMINOPHEN 1 TABLET: 5; 325 TABLET ORAL at 12:15

## 2022-04-24 RX ADMIN — ATORVASTATIN CALCIUM 40 MG: 40 TABLET, FILM COATED ORAL at 20:42

## 2022-04-24 RX ADMIN — INSULIN LISPRO 2 UNITS: 100 INJECTION, SOLUTION INTRAVENOUS; SUBCUTANEOUS at 07:03

## 2022-04-24 RX ADMIN — METOPROLOL TARTRATE 25 MG: 25 TABLET, FILM COATED ORAL at 20:41

## 2022-04-24 RX ADMIN — MORPHINE SULFATE 2 MG: 2 INJECTION, SOLUTION INTRAMUSCULAR; INTRAVENOUS at 10:34

## 2022-04-24 RX ADMIN — SERTRALINE 50 MG: 100 TABLET, FILM COATED ORAL at 09:08

## 2022-04-24 RX ADMIN — MORPHINE SULFATE 2 MG: 2 INJECTION, SOLUTION INTRAMUSCULAR; INTRAVENOUS at 15:05

## 2022-04-24 RX ADMIN — INSULIN LISPRO 2 UNITS: 100 INJECTION, SOLUTION INTRAVENOUS; SUBCUTANEOUS at 20:45

## 2022-04-24 RX ADMIN — MORPHINE SULFATE 2 MG: 2 INJECTION, SOLUTION INTRAMUSCULAR; INTRAVENOUS at 20:42

## 2022-04-24 ASSESSMENT — PAIN SCALES - WONG BAKER
WONGBAKER_NUMERICALRESPONSE: 2
WONGBAKER_NUMERICALRESPONSE: 2

## 2022-04-24 ASSESSMENT — PAIN SCALES - GENERAL
PAINLEVEL_OUTOF10: 9

## 2022-04-24 NOTE — PROGRESS NOTES
Consult placed to ID. Spoke with Dr. Rafael Valentine and he is requesting for provider to contact him regarding the consult.  Message was done via perfect serve

## 2022-04-24 NOTE — PROGRESS NOTES
[facility] Progress Note    Admitting Date and Time: 4/21/2022 12:43 AM  Admit Dx: Open wound of right foot, initial encounter [S91.301A]  Toe trauma, right, initial encounter [A78.492T]  Open wound of second toe of right foot, initial encounter [S91.104A]    Subjective:  Patient is being followed for Open wound of right foot, initial encounter [S91.301A]  Toe trauma, right, initial encounter [W02.718M]  Open wound of second toe of right foot, initial encounter [S91.104A]     Pt states appetite is decreased today. No events overnight. No fevers. No cough/wheezing/SOB. Per RN: nothing to report    ROS: denies fever, chills, cp, sob, n/v, HA unless stated above.       insulin glargine  5 Units SubCUTAneous Daily    sodium chloride flush  5-40 mL IntraVENous 2 times per day    enoxaparin  30 mg SubCUTAneous BID    insulin lispro  0-6 Units SubCUTAneous TID WC    insulin lispro  0-3 Units SubCUTAneous Nightly    atorvastatin  40 mg Oral Nightly    levothyroxine  25 mcg Oral Daily    metoprolol tartrate  25 mg Oral BID    pregabalin  100 mg Oral Nightly    sertraline  50 mg Oral Daily    [Held by provider] lisinopril  10 mg Oral Daily    And    [Held by provider] hydroCHLOROthiazide  12.5 mg Oral Daily    nicotine  1 patch TransDERmal Daily     oxyCODONE-acetaminophen, 1 tablet, Q6H PRN  sodium chloride flush, 5-40 mL, PRN  sodium chloride, , PRN  ondansetron, 4 mg, Q8H PRN   Or  ondansetron, 4 mg, Q6H PRN  polyethylene glycol, 17 g, Daily PRN  acetaminophen, 650 mg, Q6H PRN   Or  acetaminophen, 650 mg, Q6H PRN  morphine, 2 mg, Q4H PRN  glucose, 15 g, PRN  dextrose, 12.5 g, PRN  glucagon (rDNA), 1 mg, PRN  dextrose, 100 mL/hr, PRN         Objective:    /72   Pulse 70   Temp 98.4 °F (36.9 °C) (Oral)   Resp 18   Ht 6' (1.829 m)   Wt 281 lb 11.2 oz (127.8 kg)   SpO2 96%   BMI 38.21 kg/m²     General Appearance: alert and oriented to person, place and time and in no acute distress  Skin: warm and dry, good turgor, no rashes, no jaundice  Head: normocephalic and atraumatic  Eyes: pupils equal, round, and reactive to light, extraocular eye movements intact, conjunctivae normal  Neck: neck supple and non tender without mass   Pulmonary/Chest: clear to auscultation bilaterally- no wheezes, rales or rhonchi, normal air movement, no respiratory distress  Cardiovascular: normal rate, normal S1 and S2 and no carotid bruits  Abdomen: soft, non-tender, non-distended, normal bowel sounds, no masses or organomegaly  Extremities: no cyanosis, no clubbing and mild right shin edema, venous stasis dermatitis chelsi shins, erythema into right shin and warmth, right foot -- dressed  Neurologic: no cranial nerve deficit and speech normal        Recent Labs     04/22/22 0249 04/24/22  0220    136   K 4.5 4.6    104   CO2 24 26   BUN 12 18   CREATININE 0.9 0.9   GLUCOSE 126* 181*   CALCIUM 8.2* 8.6       Recent Labs     04/22/22 0249 04/24/22  0220   WBC 7.0 9.6   RBC 4.29 4.31   HGB 12.6 12.6   HCT 37.9 37.6   MCV 88.3 87.2   MCH 29.4 29.2   MCHC 33.2 33.5   RDW 14.9 14.6    153   MPV 11.0 11.6       Radiology:   None new    Assessment:    Principal Problem:    Open wound of second toe of right foot  Active Problems: Toe trauma, right, initial encounter    DM (diabetes mellitus) with peripheral vascular complication (HCC)    COPD (chronic obstructive pulmonary disease) (HCC)    Sleep apnea    HTN (hypertension)  Resolved Problems:    * No resolved hospital problems. *      Plan:  1) Osteomyelitis of the right second toe with open dislocation --  -S/p amputation of the right second toe per podiatry, wound culture growing MRSA. .  Will be back to the OR tomorrow with podiatry  -Vascular studies ordered showed normal DK bilaterally  -ID noted no antibiotics warranted at this time.      2) diabetes mellitus type 2, non-insulin-dependent, A1c 7.7  -Hold p.o. meds for now  -We will start on 5 units of Lantus, continue L SSI. Will adjust insulin for BG goal at 140-180 for better wound healing  -Will discharge on p.o. meds  -Glucose accord: 359/181/207/192/159  -Will start pre-meal insulin -- 3 units TID AC for now and increase as needed  -Check sugars intensively 4 times/day     3) Hypertension -- holding BP meds for borderline blood pressure post operative -- no longer hypotensive, 150/70 now  -Will re-institute      4) Hyperlipidemia - on Lipitor    5) Hypothyroidism - on Synthroid    6) COPD with underlying ALCON, not using CPAP     Hx CAD, s/p CABG     Lovenox -- hold in AM for surgery  Full code      NOTE: This report was transcribed using voice recognition software. Every effort was made to ensure accuracy; however, inadvertent computerized transcription errors may be present.   Electronically signed by Michael Wang DO on 4/24/2022 at 5:40 PM

## 2022-04-24 NOTE — PROGRESS NOTES
Podiatry Progress Note  4/24/2022   Jose Alberto Fuentes       SUBJECTIVE: This is a 63 y.o. male seen bedside s/p right 2nd digit amputation(DOS 4/22). Complaining of right foot pain as well as nausea. States he hasn't received any medications for his nausea yet. No acute overnight events. OBJECTIVE:      Pt is AAOx3    Vitals:    04/24/22 0800   BP: 130/72   Pulse: 70   Resp: 18   Temp: 98.4 °F (36.9 °C)   SpO2: 96%              EXAM:    Vascular Exam:DP and PT pulses intact. CFT is sluggish to 3 and 5 seconds digits 1345 right and 1-5 left. Skin temperature warm to warm proximal leg to distal toes. Soft compressible posterior most compartments bilaterally. Neuro Exam:  Diminished light touch sensation    Dermatologic Exam:  Full thickness surgical ulcer at site of right 2nd digit amputation. Gross bone exposure and minimal active bleeding. No purulence. No lymphangitis. Slight edema. No acute signs of infection. MSK: Muscle strength 5/5 Bilateral  ROM is full without pain or crepitation bilateral.  No tenderness on palpation to periwound.                 Current Facility-Administered Medications   Medication Dose Route Frequency Provider Last Rate Last Admin    insulin glargine (LANTUS) injection vial 5 Units  5 Units SubCUTAneous Daily Prisca Sanchez MD   5 Units at 04/24/22 0908    oxyCODONE-acetaminophen (PERCOCET) 5-325 MG per tablet 1 tablet  1 tablet Oral Q6H PRN Prisca Sanchez MD   1 tablet at 04/24/22 1215    sodium chloride flush 0.9 % injection 5-40 mL  5-40 mL IntraVENous 2 times per day Morena Sandoval DPM   10 mL at 04/23/22 2015    sodium chloride flush 0.9 % injection 5-40 mL  5-40 mL IntraVENous PRN Morena Sandoval DPM        0.9 % sodium chloride infusion   IntraVENous PRN Morena Sandoval DPM   New Bag at 04/22/22 1115    enoxaparin Sodium (LOVENOX) injection 30 mg  30 mg SubCUTAneous BID Morena Sandoval DPM   30 mg at 04/24/22 0908    ondansetron (ZOFRAN-ODT) disintegrating tablet 4 mg  4 mg Oral Q8H PRN Alla Mikael, DPM        Or    ondansetron [facility]) injection 4 mg  4 mg IntraVENous Q6H PRN Alla Mikael, DPM   4 mg at 04/24/22 1304    polyethylene glycol (GLYCOLAX) packet 17 g  17 g Oral Daily PRN Alla Mikael, DPM        acetaminophen (TYLENOL) tablet 650 mg  650 mg Oral Q6H PRN Alla Mikael, DPM   650 mg at 04/22/22 1306    Or    acetaminophen (TYLENOL) suppository 650 mg  650 mg Rectal Q6H PRN Alla Mikael, DPM        morphine (PF) injection 2 mg  2 mg IntraVENous Q4H PRN Alla Mikael, DPM   2 mg at 04/24/22 1034    glucose (GLUTOSE) 40 % oral gel 15 g  15 g Oral PRN Alla Mikael, DPM        dextrose 50 % IV solution  12.5 g IntraVENous PRN Alla Mikael, DPM        glucagon (rDNA) injection 1 mg  1 mg IntraMUSCular PRN Alla Mikael, DPM        dextrose 5 % solution  100 mL/hr IntraVENous PRN Alla Mikael, DPM        insulin lispro (HUMALOG) injection vial 0-6 Units  0-6 Units SubCUTAneous TID WC Alla Mikael, DPM   1 Units at 04/24/22 1124    insulin lispro (HUMALOG) injection vial 0-3 Units  0-3 Units SubCUTAneous Nightly Alla Mikael, DPM   3 Units at 04/23/22 2020    0.9 % sodium chloride infusion   IntraVENous Continuous Alex Eason MD 75 mL/hr at 04/23/22 2019 New Bag at 04/23/22 2019    atorvastatin (LIPITOR) tablet 40 mg  40 mg Oral Nightly Alla Mikael, DPM   40 mg at 04/23/22 2014    levothyroxine (SYNTHROID) tablet 25 mcg  25 mcg Oral Daily Alla Mikael, DPM   25 mcg at 04/24/22 8536    metoprolol tartrate (LOPRESSOR) tablet 25 mg  25 mg Oral BID Alla Mikael, DPM   25 mg at 04/24/22 0908    pregabalin (LYRICA) capsule 100 mg  100 mg Oral Nightly Alla Mikael, DPM   100 mg at 04/23/22 2013    sertraline (ZOLOFT) tablet 50 mg  50 mg Oral Daily Alla Mikael, DPM   50 mg at 04/24/22 0908    [Held by provider] lisinopril (PRINIVIL;ZESTRIL) tablet 10 mg  10 mg Oral Daily Alla Mikael, DPM   10 mg at 04/21/22 1242    And    [Held by

## 2022-04-25 ENCOUNTER — ANESTHESIA (OUTPATIENT)
Dept: OPERATING ROOM | Age: 62
DRG: 617 | End: 2022-04-25
Payer: MEDICARE

## 2022-04-25 VITALS
SYSTOLIC BLOOD PRESSURE: 98 MMHG | RESPIRATION RATE: 20 BRPM | DIASTOLIC BLOOD PRESSURE: 51 MMHG | OXYGEN SATURATION: 95 %

## 2022-04-25 LAB
ANION GAP SERPL CALCULATED.3IONS-SCNC: 10 MMOL/L (ref 7–16)
BASOPHILS ABSOLUTE: 0.05 E9/L (ref 0–0.2)
BASOPHILS RELATIVE PERCENT: 0.5 % (ref 0–2)
BUN BLDV-MCNC: 15 MG/DL (ref 6–23)
CALCIUM SERPL-MCNC: 8.8 MG/DL (ref 8.6–10.2)
CHLORIDE BLD-SCNC: 100 MMOL/L (ref 98–107)
CO2: 24 MMOL/L (ref 22–29)
CREAT SERPL-MCNC: 0.8 MG/DL (ref 0.7–1.2)
EOSINOPHILS ABSOLUTE: 0.11 E9/L (ref 0.05–0.5)
EOSINOPHILS RELATIVE PERCENT: 1.2 % (ref 0–6)
GFR AFRICAN AMERICAN: >60
GFR NON-AFRICAN AMERICAN: >60 ML/MIN/1.73
GLUCOSE BLD-MCNC: 164 MG/DL (ref 74–99)
HCT VFR BLD CALC: 40.5 % (ref 37–54)
HEMOGLOBIN: 13.6 G/DL (ref 12.5–16.5)
IMMATURE GRANULOCYTES #: 0.06 E9/L
IMMATURE GRANULOCYTES %: 0.7 % (ref 0–5)
LYMPHOCYTES ABSOLUTE: 1.97 E9/L (ref 1.5–4)
LYMPHOCYTES RELATIVE PERCENT: 21.6 % (ref 20–42)
MCH RBC QN AUTO: 29.6 PG (ref 26–35)
MCHC RBC AUTO-ENTMCNC: 33.6 % (ref 32–34.5)
MCV RBC AUTO: 88.2 FL (ref 80–99.9)
METER GLUCOSE: 139 MG/DL (ref 74–99)
METER GLUCOSE: 142 MG/DL (ref 74–99)
METER GLUCOSE: 157 MG/DL (ref 74–99)
METER GLUCOSE: 160 MG/DL (ref 74–99)
METER GLUCOSE: 193 MG/DL (ref 74–99)
MONOCYTES ABSOLUTE: 0.73 E9/L (ref 0.1–0.95)
MONOCYTES RELATIVE PERCENT: 8 % (ref 2–12)
NEUTROPHILS ABSOLUTE: 6.22 E9/L (ref 1.8–7.3)
NEUTROPHILS RELATIVE PERCENT: 68 % (ref 43–80)
PDW BLD-RTO: 14.9 FL (ref 11.5–15)
PLATELET # BLD: 161 E9/L (ref 130–450)
PMV BLD AUTO: 11 FL (ref 7–12)
POTASSIUM SERPL-SCNC: 4.7 MMOL/L (ref 3.5–5)
RBC # BLD: 4.59 E12/L (ref 3.8–5.8)
SODIUM BLD-SCNC: 134 MMOL/L (ref 132–146)
WBC # BLD: 9.1 E9/L (ref 4.5–11.5)

## 2022-04-25 PROCEDURE — 7100000011 HC PHASE II RECOVERY - ADDTL 15 MIN: Performed by: PODIATRIST

## 2022-04-25 PROCEDURE — 2580000003 HC RX 258: Performed by: INTERNAL MEDICINE

## 2022-04-25 PROCEDURE — 2580000003 HC RX 258: Performed by: PODIATRIST

## 2022-04-25 PROCEDURE — 6370000000 HC RX 637 (ALT 250 FOR IP): Performed by: INTERNAL MEDICINE

## 2022-04-25 PROCEDURE — 6360000002 HC RX W HCPCS: Performed by: NURSE ANESTHETIST, CERTIFIED REGISTERED

## 2022-04-25 PROCEDURE — 3600000002 HC SURGERY LEVEL 2 BASE: Performed by: PODIATRIST

## 2022-04-25 PROCEDURE — 6370000000 HC RX 637 (ALT 250 FOR IP): Performed by: PODIATRIST

## 2022-04-25 PROCEDURE — 2500000003 HC RX 250 WO HCPCS: Performed by: PODIATRIST

## 2022-04-25 PROCEDURE — 99233 SBSQ HOSP IP/OBS HIGH 50: CPT | Performed by: INTERNAL MEDICINE

## 2022-04-25 PROCEDURE — 7100000010 HC PHASE II RECOVERY - FIRST 15 MIN: Performed by: PODIATRIST

## 2022-04-25 PROCEDURE — 2580000003 HC RX 258: Performed by: NURSE ANESTHETIST, CERTIFIED REGISTERED

## 2022-04-25 PROCEDURE — 3700000000 HC ANESTHESIA ATTENDED CARE: Performed by: PODIATRIST

## 2022-04-25 PROCEDURE — 6360000002 HC RX W HCPCS: Performed by: PODIATRIST

## 2022-04-25 PROCEDURE — 2500000003 HC RX 250 WO HCPCS: Performed by: NURSE ANESTHETIST, CERTIFIED REGISTERED

## 2022-04-25 PROCEDURE — 36415 COLL VENOUS BLD VENIPUNCTURE: CPT

## 2022-04-25 PROCEDURE — 3600000012 HC SURGERY LEVEL 2 ADDTL 15MIN: Performed by: PODIATRIST

## 2022-04-25 PROCEDURE — 2709999900 HC NON-CHARGEABLE SUPPLY: Performed by: PODIATRIST

## 2022-04-25 PROCEDURE — 1200000000 HC SEMI PRIVATE

## 2022-04-25 PROCEDURE — 3700000001 HC ADD 15 MINUTES (ANESTHESIA): Performed by: PODIATRIST

## 2022-04-25 PROCEDURE — 85025 COMPLETE CBC W/AUTO DIFF WBC: CPT

## 2022-04-25 PROCEDURE — 0JQQ0ZZ REPAIR RIGHT FOOT SUBCUTANEOUS TISSUE AND FASCIA, OPEN APPROACH: ICD-10-PCS | Performed by: PODIATRIST

## 2022-04-25 PROCEDURE — 82962 GLUCOSE BLOOD TEST: CPT

## 2022-04-25 PROCEDURE — 80048 BASIC METABOLIC PNL TOTAL CA: CPT

## 2022-04-25 RX ORDER — BUPIVACAINE HYDROCHLORIDE 2.5 MG/ML
INJECTION, SOLUTION EPIDURAL; INFILTRATION; INTRACAUDAL PRN
Status: DISCONTINUED | OUTPATIENT
Start: 2022-04-25 | End: 2022-04-25 | Stop reason: ALTCHOICE

## 2022-04-25 RX ORDER — LIDOCAINE HYDROCHLORIDE 10 MG/ML
INJECTION, SOLUTION EPIDURAL; INFILTRATION; INTRACAUDAL; PERINEURAL PRN
Status: DISCONTINUED | OUTPATIENT
Start: 2022-04-25 | End: 2022-04-25 | Stop reason: SDUPTHER

## 2022-04-25 RX ORDER — PHENYLEPHRINE HCL IN 0.9% NACL 1 MG/10 ML
SYRINGE (ML) INTRAVENOUS PRN
Status: DISCONTINUED | OUTPATIENT
Start: 2022-04-25 | End: 2022-04-25 | Stop reason: SDUPTHER

## 2022-04-25 RX ORDER — SODIUM CHLORIDE 9 MG/ML
INJECTION, SOLUTION INTRAVENOUS PRN
Status: DISCONTINUED | OUTPATIENT
Start: 2022-04-25 | End: 2022-04-25 | Stop reason: HOSPADM

## 2022-04-25 RX ORDER — OXYCODONE HYDROCHLORIDE 5 MG/1
5 TABLET ORAL PRN
Status: DISCONTINUED | OUTPATIENT
Start: 2022-04-25 | End: 2022-04-25 | Stop reason: HOSPADM

## 2022-04-25 RX ORDER — SODIUM CHLORIDE 0.9 % (FLUSH) 0.9 %
5-40 SYRINGE (ML) INJECTION PRN
Status: DISCONTINUED | OUTPATIENT
Start: 2022-04-25 | End: 2022-04-25 | Stop reason: HOSPADM

## 2022-04-25 RX ORDER — PROPOFOL 10 MG/ML
INJECTION, EMULSION INTRAVENOUS PRN
Status: DISCONTINUED | OUTPATIENT
Start: 2022-04-25 | End: 2022-04-25 | Stop reason: SDUPTHER

## 2022-04-25 RX ORDER — OXYCODONE HYDROCHLORIDE 5 MG/1
10 TABLET ORAL PRN
Status: DISCONTINUED | OUTPATIENT
Start: 2022-04-25 | End: 2022-04-25 | Stop reason: HOSPADM

## 2022-04-25 RX ORDER — SODIUM CHLORIDE 0.9 % (FLUSH) 0.9 %
5-40 SYRINGE (ML) INJECTION EVERY 12 HOURS SCHEDULED
Status: DISCONTINUED | OUTPATIENT
Start: 2022-04-25 | End: 2022-04-25 | Stop reason: HOSPADM

## 2022-04-25 RX ORDER — SODIUM CHLORIDE 9 MG/ML
INJECTION, SOLUTION INTRAVENOUS CONTINUOUS PRN
Status: DISCONTINUED | OUTPATIENT
Start: 2022-04-25 | End: 2022-04-25 | Stop reason: SDUPTHER

## 2022-04-25 RX ADMIN — SERTRALINE 50 MG: 100 TABLET, FILM COATED ORAL at 09:19

## 2022-04-25 RX ADMIN — SODIUM CHLORIDE: 9 INJECTION, SOLUTION INTRAVENOUS at 10:54

## 2022-04-25 RX ADMIN — ATORVASTATIN CALCIUM 40 MG: 40 TABLET, FILM COATED ORAL at 20:12

## 2022-04-25 RX ADMIN — PROPOFOL 30 MG: 10 INJECTION, EMULSION INTRAVENOUS at 11:11

## 2022-04-25 RX ADMIN — SODIUM CHLORIDE: 9 INJECTION, SOLUTION INTRAVENOUS at 04:25

## 2022-04-25 RX ADMIN — MORPHINE SULFATE 2 MG: 2 INJECTION, SOLUTION INTRAMUSCULAR; INTRAVENOUS at 05:12

## 2022-04-25 RX ADMIN — INSULIN GLARGINE 5 UNITS: 100 INJECTION, SOLUTION SUBCUTANEOUS at 13:33

## 2022-04-25 RX ADMIN — PROPOFOL 110 MG: 10 INJECTION, EMULSION INTRAVENOUS at 11:06

## 2022-04-25 RX ADMIN — MORPHINE SULFATE 2 MG: 2 INJECTION, SOLUTION INTRAMUSCULAR; INTRAVENOUS at 01:00

## 2022-04-25 RX ADMIN — MORPHINE SULFATE 2 MG: 2 INJECTION, SOLUTION INTRAMUSCULAR; INTRAVENOUS at 13:33

## 2022-04-25 RX ADMIN — PROPOFOL 50 MCG/KG/MIN: 10 INJECTION, EMULSION INTRAVENOUS at 11:13

## 2022-04-25 RX ADMIN — MORPHINE SULFATE 2 MG: 2 INJECTION, SOLUTION INTRAMUSCULAR; INTRAVENOUS at 20:12

## 2022-04-25 RX ADMIN — MORPHINE SULFATE 2 MG: 2 INJECTION, SOLUTION INTRAMUSCULAR; INTRAVENOUS at 09:19

## 2022-04-25 RX ADMIN — PREGABALIN 100 MG: 50 CAPSULE ORAL at 20:12

## 2022-04-25 RX ADMIN — SODIUM CHLORIDE, PRESERVATIVE FREE 10 ML: 5 INJECTION INTRAVENOUS at 20:13

## 2022-04-25 RX ADMIN — METOPROLOL TARTRATE 25 MG: 25 TABLET, FILM COATED ORAL at 09:19

## 2022-04-25 RX ADMIN — INSULIN LISPRO 1 UNITS: 100 INJECTION, SOLUTION INTRAVENOUS; SUBCUTANEOUS at 20:13

## 2022-04-25 RX ADMIN — Medication 150 MCG: at 11:22

## 2022-04-25 RX ADMIN — LEVOTHYROXINE SODIUM 25 MCG: 25 TABLET ORAL at 06:23

## 2022-04-25 RX ADMIN — METOPROLOL TARTRATE 25 MG: 25 TABLET, FILM COATED ORAL at 20:12

## 2022-04-25 RX ADMIN — INSULIN LISPRO 1 UNITS: 100 INJECTION, SOLUTION INTRAVENOUS; SUBCUTANEOUS at 17:01

## 2022-04-25 RX ADMIN — LIDOCAINE HYDROCHLORIDE 20 MG: 10 INJECTION, SOLUTION EPIDURAL; INFILTRATION; INTRACAUDAL; PERINEURAL at 11:06

## 2022-04-25 ASSESSMENT — PULMONARY FUNCTION TESTS
PIF_VALUE: 1
PIF_VALUE: 2
PIF_VALUE: 1

## 2022-04-25 ASSESSMENT — PAIN SCALES - GENERAL
PAINLEVEL_OUTOF10: 7
PAINLEVEL_OUTOF10: 0
PAINLEVEL_OUTOF10: 9
PAINLEVEL_OUTOF10: 7
PAINLEVEL_OUTOF10: 0
PAINLEVEL_OUTOF10: 9
PAINLEVEL_OUTOF10: 6
PAINLEVEL_OUTOF10: 6
PAINLEVEL_OUTOF10: 8

## 2022-04-25 ASSESSMENT — PAIN SCALES - WONG BAKER
WONGBAKER_NUMERICALRESPONSE: 0
WONGBAKER_NUMERICALRESPONSE: 2
WONGBAKER_NUMERICALRESPONSE: 0
WONGBAKER_NUMERICALRESPONSE: 2
WONGBAKER_NUMERICALRESPONSE: 0

## 2022-04-25 ASSESSMENT — PAIN DESCRIPTION - ORIENTATION
ORIENTATION: RIGHT
ORIENTATION: RIGHT

## 2022-04-25 ASSESSMENT — PAIN DESCRIPTION - LOCATION
LOCATION: FOOT
LOCATION: FOOT

## 2022-04-25 ASSESSMENT — PAIN DESCRIPTION - DESCRIPTORS
DESCRIPTORS: ACHING
DESCRIPTORS: DULL

## 2022-04-25 NOTE — BRIEF OP NOTE
Brief Postoperative Note      Patient: Shreyas Arias May  YOB: 1960  MRN: [id number]    Date of Procedure: 4/25/2022    Pre-Op Diagnosis: -Right foot wound    Post-Op Diagnosis: Same       Procedure(s):  DELAYED PRIMARY CLOSURE RIGHT FOOT WOUND    Surgeon(s):  Manuel Cutler DPM    Assistant:  Resident: Patricia Boudreaux DPM    Anesthesia: Monitor Anesthesia Care    Estimated Blood Loss (mL): Minimal    Complications: None    Specimens:   * No specimens in log *    Implants:  * No implants in log *      Drains: * No LDAs found *    Findings:     Electronically signed by Manuel Cutler DPM on 4/25/2022 at 11:32 AM

## 2022-04-25 NOTE — PROGRESS NOTES
[facility] Progress Note    Admitting Date and Time: 4/21/2022 12:43 AM  Admit Dx: Open wound of right foot, initial encounter [S91.301A]  Toe trauma, right, initial encounter [E81.266F]  Open wound of second toe of right foot, initial encounter [S91.104A]      Assessment:    Principal Problem:    Open wound of second toe of right foot  Active Problems: Toe trauma, right, initial encounter    DM (diabetes mellitus) with peripheral vascular complication (HCC)    COPD (chronic obstructive pulmonary disease) (HCC)    Sleep apnea    HTN (hypertension)  Resolved Problems:    * No resolved hospital problems. *      Plan:  Osteomyelitis of the right second toe with open dislocation  -S/p amputation of the right second toe per podiatry, delayed primary closure of the wound as of 4/25   -wound culture growing MRSA. Polymicrobial growing from the surgical culture. -ID noted no antibiotics warranted at this time. -Vascular studies ordered showed normal DK bilaterally    Hx diabetes mellitus, non-insulin-dependent, A1c 7.7  -Hold p.o. meds for now  -Continuing on 5 units of Lantus, continue L SSI. Will adjust insulin for BG goal at 140-180 for better wound healing  -Will discharge on p.o. meds    Hx hypertension- you to hold BP meds for borderline blood pressure postoperative as of 4/25  -We will start resuming BP meds with holding parameters as BP tolerates.     Hx hyperlipidemia-on Lipitor  Hx hypothyroidism-on Synthroid  Hx COPD with underlying ALCON, not using CPAP    Hx CAD, s/p CABG    Lovenox, on hold for surgery  Full code    Subjective:  Patient is being followed for Open wound of right foot, initial encounter [S91.301A]  Toe trauma, right, initial encounter [O89.529S]  Open wound of second toe of right foot, initial encounter [S91.104A]     Patient was seen, s/p delayed primary closure of the right foot wound by podiatry this a.m.    ROS: denies fever, chills, cp, sob, n/v, HA unless stated above.      [Held by provider] hydroCHLOROthiazide  12.5 mg Oral Daily    And    lisinopril  5 mg Oral Daily    insulin glargine  5 Units SubCUTAneous Daily    sodium chloride flush  5-40 mL IntraVENous 2 times per day    [Held by provider] enoxaparin  30 mg SubCUTAneous BID    insulin lispro  0-6 Units SubCUTAneous TID WC    insulin lispro  0-3 Units SubCUTAneous Nightly    atorvastatin  40 mg Oral Nightly    levothyroxine  25 mcg Oral Daily    metoprolol tartrate  25 mg Oral BID    pregabalin  100 mg Oral Nightly    sertraline  50 mg Oral Daily    nicotine  1 patch TransDERmal Daily     oxyCODONE-acetaminophen, 1 tablet, Q6H PRN  sodium chloride flush, 5-40 mL, PRN  sodium chloride, , PRN  ondansetron, 4 mg, Q8H PRN   Or  ondansetron, 4 mg, Q6H PRN  polyethylene glycol, 17 g, Daily PRN  acetaminophen, 650 mg, Q6H PRN   Or  acetaminophen, 650 mg, Q6H PRN  morphine, 2 mg, Q4H PRN  glucose, 15 g, PRN  dextrose, 12.5 g, PRN  glucagon (rDNA), 1 mg, PRN  dextrose, 100 mL/hr, PRN         Objective:    BP 95/61   Pulse 65   Temp 95.9 °F (35.5 °C) (Temporal)   Resp 15   Ht 6' (1.829 m)   Wt 281 lb 11.2 oz (127.8 kg)   SpO2 95%   BMI 38.21 kg/m²     General Appearance: alert and oriented to person, place and time and in no acute distress  Skin: warm and dry  Head: normocephalic and atraumatic  Eyes: pupils equal, round, and reactive to light, extraocular eye movements intact, conjunctivae normal  Neck: neck supple and non tender without mass   Pulmonary/Chest: clear to auscultation bilaterally- no wheezes, rales or rhonchi, normal air movement, no respiratory distress  Cardiovascular: normal rate, normal S1 and S2 and no carotid bruits  Abdomen: soft, non-tender, non-distended, normal bowel sounds, no masses or organomegaly  Extremities: Right foot is wrapped in sterile dressing, s/p amputation of the second right toe  Neurologic: no cranial nerve deficit and speech normal        Recent Labs 04/24/22  0220 04/25/22  0611    134   K 4.6 4.7    100   CO2 26 24   BUN 18 15   CREATININE 0.9 0.8   GLUCOSE 181* 164*   CALCIUM 8.6 8.8       Recent Labs     04/24/22 0220 04/25/22  1312   WBC 9.6 9.1   RBC 4.31 4.59   HGB 12.6 13.6   HCT 37.6 40.5   MCV 87.2 88.2   MCH 29.2 29.6   MCHC 33.5 33.6   RDW 14.6 14.9    161   MPV 11.6 11.0       NOTE: This report was transcribed using voice recognition software. Every effort was made to ensure accuracy; however, inadvertent computerized transcription errors may be present.   Electronically signed by Rosalina Garcia MD on 4/25/2022 at 2:40 PM

## 2022-04-25 NOTE — OP NOTE
Operative Note      Patient: Andrew Fuentes  YOB: 1960  MRN: [id number]    Date of Procedure: 4/25/2022    Pre-Op Diagnosis: -Right foot wound    Post-Op Diagnosis: Same       Procedure(s):  DELAYED PRIMARY CLOSURE RIGHT FOOT WOUND    Surgeon(s):  Corazon Christopher DPM    Assistant:   Resident: Dory Lebron DPM    Anesthesia: Monitor Anesthesia Care    Estimated Blood Loss (mL): Minimal    Complications: None    Specimens:   * No specimens in log *    Implants:  * No implants in log *      Drains: * No LDAs found *    Findings:     Interval note: Patient brought back to the OR for above-noted procedure. Patient initially underwent amputation right second toe on April 22. Physical exam demonstrates intact vascular status. Wound is clean with no purulence or odor no surrounding erythema. There is a minimal amount of eschar to the medial aspect. Wound 3.0 x 1.5 x 3 cm in depth. Exposed metatarsal head clean as well. Risks discussed with patient in detail including but not limited to persistence, delayed healing, dehiscence, loss of limb/life. No guarantees made, fully understood all still wished to proceed. Detailed Description of Procedure: Following satisfactory preop evaluation patient brought to the OR remained on transfer cart. Anesthesia per anesthesia department. 0.5% Marcaine plain utilized for local anesthesia regional block type fashion. Right lower extremity prepped and draped in the usual sterile manner. Attention directed to the right foot wound where utilizing curette as well as 15 blade wound edges and base were freshened up and medial flap was fashioned in preparation for closure. Wound was irrigated with normal saline. Any bleeders were bovied as deemed necessary. 3-0 Vicryl utilized to reapproximate deep tissue as well as subcutaneous tissue. Skin reapproximated utilizing 3-0 and 4-0 nylon in single interrupted type fashion.   Good closure was achieved with no tension noted on the wound edges. Dry sterile dressing. No complications encountered. Blood loss noted be minimal.  Patient tolerated procedure and anesthesia well. Transferred back to PACU or be further monitored. Discussed postoperative findings with patient as well as family present.     Electronically signed by Zay Carlos DPM on 4/25/2022 at 11:33 AM

## 2022-04-25 NOTE — ANESTHESIA POSTPROCEDURE EVALUATION
Department of Anesthesiology  Postprocedure Note    Patient: Mckenzie Fuentes  MRN: [id number]  YOB: 1960  Date of evaluation: 4/25/2022  Time:  12:08 PM     Procedure Summary     Date: 04/25/22 Room / Location: [facility] 01 / 106 [facility]    Anesthesia Start: 1054 Anesthesia Stop: 5609    Procedure: DELAYED PRIMARY CLOSURE RIGHT FOOT WOUND (Right Foot) Diagnosis: (-)    Surgeons: Sonia Bishop DPM Responsible Provider: Reinaldo Murray MD    Anesthesia Type: MAC ASA Status: 4          Anesthesia Type: MAC    Bernard Phase I: Bernard Score: 10    Bernard Phase II:      Last vitals: Reviewed and per EMR flowsheets.        Anesthesia Post Evaluation    Patient location during evaluation: PACU  Patient participation: complete - patient participated  Level of consciousness: awake and alert  Airway patency: patent  Nausea & Vomiting: no nausea and no vomiting  Complications: no  Cardiovascular status: hemodynamically stable  Respiratory status: acceptable  Hydration status: euvolemic

## 2022-04-26 VITALS
SYSTOLIC BLOOD PRESSURE: 104 MMHG | WEIGHT: 281.7 LBS | TEMPERATURE: 97.7 F | OXYGEN SATURATION: 98 % | RESPIRATION RATE: 18 BRPM | HEART RATE: 77 BPM | DIASTOLIC BLOOD PRESSURE: 58 MMHG | BODY MASS INDEX: 38.16 KG/M2 | HEIGHT: 72 IN

## 2022-04-26 LAB
ANION GAP SERPL CALCULATED.3IONS-SCNC: 11 MMOL/L (ref 7–16)
BASOPHILS ABSOLUTE: 0.04 E9/L (ref 0–0.2)
BASOPHILS RELATIVE PERCENT: 0.4 % (ref 0–2)
BLOOD CULTURE, ROUTINE: NORMAL
BUN BLDV-MCNC: 18 MG/DL (ref 6–23)
CALCIUM SERPL-MCNC: 8.4 MG/DL (ref 8.6–10.2)
CHLORIDE BLD-SCNC: 101 MMOL/L (ref 98–107)
CO2: 23 MMOL/L (ref 22–29)
CREAT SERPL-MCNC: 0.9 MG/DL (ref 0.7–1.2)
CULTURE, BLOOD 2: NORMAL
EOSINOPHILS ABSOLUTE: 0.11 E9/L (ref 0.05–0.5)
EOSINOPHILS RELATIVE PERCENT: 1.2 % (ref 0–6)
GFR AFRICAN AMERICAN: >60
GFR NON-AFRICAN AMERICAN: >60 ML/MIN/1.73
GLUCOSE BLD-MCNC: 185 MG/DL (ref 74–99)
HCT VFR BLD CALC: 39.5 % (ref 37–54)
HEMOGLOBIN: 13.2 G/DL (ref 12.5–16.5)
IMMATURE GRANULOCYTES #: 0.04 E9/L
IMMATURE GRANULOCYTES %: 0.4 % (ref 0–5)
LYMPHOCYTES ABSOLUTE: 2.1 E9/L (ref 1.5–4)
LYMPHOCYTES RELATIVE PERCENT: 22.6 % (ref 20–42)
MCH RBC QN AUTO: 28.9 PG (ref 26–35)
MCHC RBC AUTO-ENTMCNC: 33.4 % (ref 32–34.5)
MCV RBC AUTO: 86.6 FL (ref 80–99.9)
METER GLUCOSE: 178 MG/DL (ref 74–99)
METER GLUCOSE: 241 MG/DL (ref 74–99)
MONOCYTES ABSOLUTE: 0.78 E9/L (ref 0.1–0.95)
MONOCYTES RELATIVE PERCENT: 8.4 % (ref 2–12)
NEUTROPHILS ABSOLUTE: 6.23 E9/L (ref 1.8–7.3)
NEUTROPHILS RELATIVE PERCENT: 67 % (ref 43–80)
PDW BLD-RTO: 14.8 FL (ref 11.5–15)
PLATELET # BLD: 159 E9/L (ref 130–450)
PMV BLD AUTO: 11.4 FL (ref 7–12)
POTASSIUM SERPL-SCNC: 4.5 MMOL/L (ref 3.5–5)
RBC # BLD: 4.56 E12/L (ref 3.8–5.8)
SODIUM BLD-SCNC: 135 MMOL/L (ref 132–146)
WBC # BLD: 9.3 E9/L (ref 4.5–11.5)

## 2022-04-26 PROCEDURE — 82962 GLUCOSE BLOOD TEST: CPT

## 2022-04-26 PROCEDURE — 6360000002 HC RX W HCPCS: Performed by: PODIATRIST

## 2022-04-26 PROCEDURE — 6370000000 HC RX 637 (ALT 250 FOR IP): Performed by: PODIATRIST

## 2022-04-26 PROCEDURE — 6370000000 HC RX 637 (ALT 250 FOR IP): Performed by: INTERNAL MEDICINE

## 2022-04-26 PROCEDURE — 36415 COLL VENOUS BLD VENIPUNCTURE: CPT

## 2022-04-26 PROCEDURE — 99233 SBSQ HOSP IP/OBS HIGH 50: CPT | Performed by: INTERNAL MEDICINE

## 2022-04-26 PROCEDURE — 80048 BASIC METABOLIC PNL TOTAL CA: CPT

## 2022-04-26 PROCEDURE — 85025 COMPLETE CBC W/AUTO DIFF WBC: CPT

## 2022-04-26 RX ORDER — OXYCODONE HYDROCHLORIDE AND ACETAMINOPHEN 5; 325 MG/1; MG/1
1 TABLET ORAL EVERY 6 HOURS PRN
Qty: 20 TABLET | Refills: 0 | Status: SHIPPED | OUTPATIENT
Start: 2022-04-26 | End: 2022-05-01

## 2022-04-26 RX ADMIN — ENOXAPARIN SODIUM 30 MG: 100 INJECTION SUBCUTANEOUS at 10:56

## 2022-04-26 RX ADMIN — SERTRALINE 50 MG: 100 TABLET, FILM COATED ORAL at 10:55

## 2022-04-26 RX ADMIN — MORPHINE SULFATE 2 MG: 2 INJECTION, SOLUTION INTRAMUSCULAR; INTRAVENOUS at 14:54

## 2022-04-26 RX ADMIN — INSULIN LISPRO 2 UNITS: 100 INJECTION, SOLUTION INTRAVENOUS; SUBCUTANEOUS at 11:41

## 2022-04-26 RX ADMIN — LEVOTHYROXINE SODIUM 25 MCG: 25 TABLET ORAL at 06:38

## 2022-04-26 RX ADMIN — METOPROLOL TARTRATE 25 MG: 25 TABLET, FILM COATED ORAL at 10:56

## 2022-04-26 RX ADMIN — INSULIN LISPRO 1 UNITS: 100 INJECTION, SOLUTION INTRAVENOUS; SUBCUTANEOUS at 06:39

## 2022-04-26 RX ADMIN — MORPHINE SULFATE 2 MG: 2 INJECTION, SOLUTION INTRAMUSCULAR; INTRAVENOUS at 06:37

## 2022-04-26 RX ADMIN — OXYCODONE AND ACETAMINOPHEN 1 TABLET: 5; 325 TABLET ORAL at 02:52

## 2022-04-26 RX ADMIN — MORPHINE SULFATE 2 MG: 2 INJECTION, SOLUTION INTRAMUSCULAR; INTRAVENOUS at 00:26

## 2022-04-26 RX ADMIN — LISINOPRIL 5 MG: 10 TABLET ORAL at 10:56

## 2022-04-26 RX ADMIN — INSULIN GLARGINE 5 UNITS: 100 INJECTION, SOLUTION SUBCUTANEOUS at 10:56

## 2022-04-26 RX ADMIN — OXYCODONE AND ACETAMINOPHEN 1 TABLET: 5; 325 TABLET ORAL at 10:56

## 2022-04-26 ASSESSMENT — PAIN SCALES - GENERAL
PAINLEVEL_OUTOF10: 9
PAINLEVEL_OUTOF10: 7
PAINLEVEL_OUTOF10: 8

## 2022-04-26 ASSESSMENT — PAIN DESCRIPTION - LOCATION: LOCATION: FOOT

## 2022-04-26 ASSESSMENT — PAIN DESCRIPTION - ORIENTATION: ORIENTATION: RIGHT

## 2022-04-26 ASSESSMENT — PAIN DESCRIPTION - PAIN TYPE: TYPE: ACUTE PAIN;SURGICAL PAIN

## 2022-04-26 NOTE — PROGRESS NOTES
Podiatry Progress Note  4/26/2022   Elliot Vila May       SUBJECTIVE: This is a 63 y.o. male seen bedside s/p right 2nd digit amputation(DOS 4/22/22) and R foot DPC (4/25/22). Dressing changed this AM, likely D/c today or tomorrow. Surgical shoe to Right foot at all times but STRICTLY nonweightbearing to the RLE at all times with assistance of walker. Patient has some drainage to dressings and admitted to some \"light\" unprotected ambulation. Reiterated to pt that if he continues to ambulate the sutures will not be able to hold and the wound may dehiscence. Patient acknowledged understanding. No acute overnight events. OBJECTIVE:      Pt is AAOx3    Vitals:    04/26/22 0838   BP: (!) 104/58   Pulse: 77   Resp: 18   Temp: 97.7 °F (36.5 °C)   SpO2: 98%              EXAM: AOx3 and NAD    Vascular Exam:DP and PT pulses intact. CFT is sluggish to 3 and 5 seconds digits 1345 right and 1-5 left. Skin temperature warm to warm proximal leg to distal toes. Soft compressible posterior most compartments bilaterally. Neuro Exam:  Diminished light touch sensation    Dermatologic Exam:  Post surgical wound to right distal 2nd digit amputation site with sutures in place and skin well coapted. Diffuse maceration to periwound tissues, no signs of dehiscence. Moderate to severe serosanguinous drainage to dressings and on dressing change. No crepitus or fluctuance or undermining or tunneling or proximal streaking. Mild periwound erythema as seen above. NO other clinical signs of infection. No other open wounds or lesions. MSK: Muscle strength 5/5 Bilateral  ROM is full without pain or crepitation bilateral.  No tenderness on palpation to periwound.       Current Facility-Administered Medications   Medication Dose Route Frequency Provider Last Rate Last Admin    glucose chewable tablet 16 g  16 g Oral ROSALINAN CHRIST Costello CNP        [Held by provider] hydroCHLOROthiazide (HYDRODIURIL) tablet 12.5 mg  12.5 mg Oral Daily Ramakrishna Steele, DPM   12.5 mg at 04/21/22 1246    And    lisinopril (PRINIVIL;ZESTRIL) tablet 5 mg  5 mg Oral Daily Gladis De La Cruz MD   5 mg at 04/26/22 1056    insulin glargine (LANTUS) injection vial 5 Units  5 Units SubCUTAneous Daily Ramakrishna Steele, DPM   5 Units at 04/26/22 1056    oxyCODONE-acetaminophen (PERCOCET) 5-325 MG per tablet 1 tablet  1 tablet Oral Q6H PRN Ramakrishna Steele, DPM   1 tablet at 04/26/22 1056    sodium chloride flush 0.9 % injection 5-40 mL  5-40 mL IntraVENous 2 times per day Ramakrishna Steele, DPM   10 mL at 04/25/22 2013    sodium chloride flush 0.9 % injection 5-40 mL  5-40 mL IntraVENous PRN Ramakrishna Steele, DPM        0.9 % sodium chloride infusion   IntraVENous PRN Ramakrishna Steele, DPM   New Bag at 04/22/22 1115    enoxaparin Sodium (LOVENOX) injection 30 mg  30 mg SubCUTAneous BID Ramakrishna Steele, DPM   30 mg at 04/26/22 1056    ondansetron (ZOFRAN-ODT) disintegrating tablet 4 mg  4 mg Oral Q8H PRN Ramakrishna Steele, DPMOMO        Or    ondansetron [facility]) injection 4 mg  4 mg IntraVENous Q6H PRN Ramakrishna Steele, DPM   4 mg at 04/24/22 1304    polyethylene glycol (GLYCOLAX) packet 17 g  17 g Oral Daily PRN Ramakrishna Steele, DPM        acetaminophen (TYLENOL) tablet 650 mg  650 mg Oral Q6H PRN Ramakrishna Steele, DPM   650 mg at 04/22/22 1306    Or    acetaminophen (TYLENOL) suppository 650 mg  650 mg Rectal Q6H PRN Ramakrishna Steele, JEREMIAHM        morphine (PF) injection 2 mg  2 mg IntraVENous Q4H PRN Ramakrishna Steele, DPM   2 mg at 04/26/22 0637    dextrose 50 % IV solution  12.5 g IntraVENous PRN Ramakrishna Steele, DPM        glucagon (rDNA) injection 1 mg  1 mg IntraMUSCular PRN Ramakrishna Steele, DPM        dextrose 5 % solution  100 mL/hr IntraVENous PRN Stetson Ivette, DPM        insulin lispro (HUMALOG) injection vial 0-6 Units  0-6 Units SubCUTAneous TID  Ramakrishna Steele DPM   2 Units at 04/26/22 1141    insulin lispro (HUMALOG) injection vial 0-3 Units  0-3 Units SubCUTAneous Nightly Rashid Griffin Oliver Bundy, DPM   1 Units at 04/25/22 2013    atorvastatin (LIPITOR) tablet 40 mg  40 mg Oral Nightly Mason Loosen, DPM   40 mg at 04/25/22 2012    levothyroxine (SYNTHROID) tablet 25 mcg  25 mcg Oral Daily Mason Loosen, DPM   25 mcg at 04/26/22 0991    metoprolol tartrate (LOPRESSOR) tablet 25 mg  25 mg Oral BID Pamela Garcia MD   25 mg at 04/26/22 1056    pregabalin (LYRICA) capsule 100 mg  100 mg Oral Nightly Mason Loosen, DPM   100 mg at 04/25/22 2012    sertraline (ZOLOFT) tablet 50 mg  50 mg Oral Daily Mason Loosen, DPM   50 mg at 04/26/22 1055    nicotine (NICODERM CQ) 21 MG/24HR 1 patch  1 patch TransDERmal Daily Mason Loosen, DPM   1 patch at 04/26/22 1123        Lab Results   Component Value Date    WBC 9.3 04/26/2022    HCT 39.5 04/26/2022    HGB 13.2 04/26/2022     04/26/2022     04/26/2022    K 4.5 04/26/2022     04/26/2022    CO2 23 04/26/2022    BUN 18 04/26/2022    CREATININE 0.9 04/26/2022    GLUCOSE 185 (H) 04/26/2022    CRP 1.1 (H) 04/21/2022       ASSESSMENT:  - S/p Right foot delayed primary closure (DOS 4/25/22)  - s/p right 2nd digit amputation(DOS:4/22)  - OM(acute vs chronic), right 2nd digit  - NIDDM    PLAN:  - Examined and evaluated  - All labs, imaging, and charts reviewed  -Surgical cx: MRSA, E. Faecalis, GNR, Myroides Spp, Staph Cohnii Spp urealyticum  -Surgical path:pending  -Vascular studies:normal DK b/l at 1.2. Multiphasic waveforms identified at all levels b/l.  -Local wound care for now: betadine, adaptic, alginate, DSD. QD changes while in house.    -NWB to right foot   -Will continue to monitor    -Discussed with Dr. Ramy Guajardo DPM   4/26/2022   1:09 PM

## 2022-04-26 NOTE — PROGRESS NOTES
Visual demonstration of how to perform a dressing change for foot wound with pt niece. Dry sterile dressing change performed. Niece stated verbal understanding.

## 2022-04-26 NOTE — DISCHARGE SUMMARY
[facility] Physician Discharge Summary       Rebecca Crooks, [address]  [facility] [address]  [phone]    Schedule an appointment as soon as possible for a visit in 2 weeks  For follow- up      Activity level: As tolerated  Dispo: Home     Condition on discharge: Stable     Patient ID:  Hakeem Crawley  [id number]  63 y.o.  1960    Admit date: 4/21/2022    Discharge date and time:  4/26/2022  1:19 PM    Admission Diagnoses: Principal Problem:    Open wound of second toe of right foot  Active Problems: Toe trauma, right, initial encounter    DM (diabetes mellitus) with peripheral vascular complication (HCC)    COPD (chronic obstructive pulmonary disease) (HCC)    Sleep apnea    HTN (hypertension)  Resolved Problems:    * No resolved hospital problems. *      Discharge Diagnoses: Principal Problem:    Open wound of second toe of right foot  Active Problems: Toe trauma, right, initial encounter    DM (diabetes mellitus) with peripheral vascular complication (HCC)    COPD (chronic obstructive pulmonary disease) (HCC)    Sleep apnea    HTN (hypertension)  Resolved Problems:    * No resolved hospital problems. *      Consults:  IP CONSULT TO PODIATRY  IP CONSULT TO INFECTIOUS DISEASES  IP CONSULT TO IV TEAM    Hospital Course:   Patient Hakeem Crawley is a 63 y.o. presented with Open wound of right foot, initial encounter [S91.301A]  Toe trauma, right, initial encounter [W39.533H]  Open wound of second toe of right foot, initial encounter [S91.104A]    Patient is 33-year-old male with past medical history significant for hypertension and diabetes mellitus. Patient presented to the ED on 4/21 with complaints of right foot wound. Work-up during hospitalization showed osteomyelitis of the right second toe which podiatry service was consulted, patient is status post amputation of the right second toe with delayed primary closure.   ID service was consulted during hospitalization and antibiotics were deemed not warranted at that time. Patient will be discharged in a stable condition to be followed up as outpatient with podiatry. Discharge Exam:  General Appearance: alert and oriented to person, place and time and in no acute distress  Skin: warm and dry  Head: normocephalic and atraumatic  Eyes: pupils equal, round, and reactive to light, extraocular eye movements intact, conjunctivae normal  Neck: neck supple and non tender without mass   Pulmonary/Chest: clear to auscultation bilaterally- no wheezes, rales or rhonchi, normal air movement, no respiratory distress  Cardiovascular: normal rate, normal S1 and S2 and no carotid bruits  Abdomen: soft, non-tender, non-distended, normal bowel sounds, no masses or organomegaly  Extremities: Right foot is wrapped in sterile dressing, s/p amputation of the second right toe  Neurologic: no cranial nerve deficit and speech normal        I/O last 3 completed shifts: In: 4006 [I.V.:4006]  Out: 2450 [Urine:2450]  No intake/output data recorded. LABS:  Recent Labs     04/24/22  0220 04/25/22  0611 04/26/22  0308    134 135   K 4.6 4.7 4.5    100 101   CO2 26 24 23   BUN 18 15 18   CREATININE 0.9 0.8 0.9   GLUCOSE 181* 164* 185*   CALCIUM 8.6 8.8 8.4*       Recent Labs     04/24/22  0220 04/25/22  1312 04/26/22  0308   WBC 9.6 9.1 9.3   RBC 4.31 4.59 4.56   HGB 12.6 13.6 13.2   HCT 37.6 40.5 39.5   MCV 87.2 88.2 86.6   MCH 29.2 29.6 28.9   MCHC 33.5 33.6 33.4   RDW 14.6 14.9 14.8    161 159   MPV 11.6 11.0 11.4       No results for input(s): POCGLU in the last 72 hours. Imaging:  VL LOWER EXTREMITY ARTERIAL SEGMENTAL PRESSURES W PPG    Result Date: 4/21/2022  EXAMINATION: ARTERIAL DUPLEX ULTRASOUND OF THE BILATERAL LOWER EXTREMITIES WITH SEGMENTAL PRESSURES AND PULSE VOLUME RECORDINGS 4/21/2022 3:53 pm TECHNIQUE: Arterial duplex DK ultrasound. Segmental pressures and PVR performed with Doppler. COMPARISON: None. HISTORY: ORDERING SYSTEM PROVIDED HISTORY: open fracture, concern for wound healing potential TECHNOLOGIST PROVIDED HISTORY: Plan for surgery tomorrow Reason for exam:->open fracture, concern for wound healing potential What reading provider will be dictating this exam?->CRC FINDINGS: The DK on the right is 1.2. The DK on the left is 1.2. Right lower extremity: Arterial Doppler evaluation from the common femoral artery through to the dorsalis pedis artery demonstrates multiphasic waveforms at all levels. PVR evaluation from the thigh to the metatarsal demonstrates preserved pulsatility and amplitudes. Digital waveform evaluation of the digits of the right foot demonstrate preserved pulsatility in all digits. Normal amplitudes in the 3rd through 5th digits. Mild to moderate decreased amplitude in the 1st and 2nd digits. Left lower extremity: Arterial Doppler evaluation from the common femoral artery through to the dorsalis pedis artery demonstrates preserved multiphasic waveforms throughout. PVR evaluation from the thigh to the metatarsal demonstrates preserved pulsatility and amplitude. Digital waveform evaluation of the digits of the left foot demonstrate preserved pulsatility in all digits. Mildly decreased amplitudes in the 2nd and 5th digits. Remaining digits have normal amplitudes. 1.  Normal ankle brachial indices bilaterally at 1.2. Multiphasic waveforms identified at all levels bilaterally on arterial Doppler evaluation. This lower suspicion for the presence of a high-grade stenosis. 2.  Normal PVR evaluation of the bilateral lower extremities. Digital waveform evaluation shows preserved pulsatility in all digits. Mild to moderate decreased amplitude in the right 1st and 2nd digits and mildly decreased amplitude in the left 2nd and 5th digits. Remaining digits have normal amplitudes.   Findings probably reflect changes related to mild underlying microvascular disease in some digits. At this time there is good collateral flow to the digits of the bilateral feet. Patient Instructions:      Medication List      START taking these medications    oxyCODONE-acetaminophen 5-325 MG per tablet  Commonly known as: Percocet  Take 1 tablet by mouth every 6 hours as needed for Pain for up to 5 days.         CONTINUE taking these medications    atorvastatin 40 MG tablet  Commonly known as: LIPITOR     glimepiride 4 MG tablet  Commonly known as: AMARYL     Jardiance 10 MG tablet  Generic drug: empagliflozin     levothyroxine 25 MCG tablet  Commonly known as: SYNTHROID     lisinopril-hydroCHLOROthiazide 20-12.5 MG per tablet  Commonly known as: PRINZIDE;ZESTORETIC     metFORMIN 1000 MG tablet  Commonly known as: GLUCOPHAGE     metoprolol tartrate 25 MG tablet  Commonly known as: LOPRESSOR     pregabalin 100 MG capsule  Commonly known as: LYRICA     sertraline 50 MG tablet  Commonly known as: ZOLOFT        STOP taking these medications    lisinopril 10 MG tablet  Commonly known as: PRINIVIL;ZESTRIL     lisinopril 2.5 MG tablet  Commonly known as: PRINIVIL;ZESTRIL           Where to Get Your Medications      These medications were sent to [address] [place] [address]    Phone: [phone]   · oxyCODONE-acetaminophen 5-325 MG per tablet           Note that more than 30 minutes was spent in preparing discharge papers, discussing discharge with patient, medication review, etc.    Signed:  Electronically signed by Asiya Gómez MD on 4/26/2022 at 1:19 PM

## 2022-04-26 NOTE — HOME CARE
Patient will need home health orders with specific wound care prior to discharge.  Thank you, [facility]

## 2022-04-26 NOTE — CARE COORDINATION
[facility] cancelled. Will not need for dressing changes. no IV atb's. Kaylene Gottlieb.

## 2022-04-26 NOTE — PROGRESS NOTES
Sent message to Dr. Rob Flower regarding activity status, dressing change orders and discharge. 10:30 AM Ok for discharge from POD follow up 2 weeks , no weight bearing right foot. See orders for dressing changes.

## 2022-04-27 LAB
CULTURE SURGICAL: ABNORMAL
ORGANISM: ABNORMAL

## 2022-05-06 ENCOUNTER — APPOINTMENT (OUTPATIENT)
Dept: CT IMAGING | Age: 62
DRG: 856 | End: 2022-05-06
Payer: MEDICARE

## 2022-05-06 ENCOUNTER — APPOINTMENT (OUTPATIENT)
Dept: GENERAL RADIOLOGY | Age: 62
DRG: 856 | End: 2022-05-06
Payer: MEDICARE

## 2022-05-06 ENCOUNTER — HOSPITAL ENCOUNTER (INPATIENT)
Age: 62
LOS: 13 days | Discharge: HOME HEALTH | DRG: 856 | End: 2022-05-19
Attending: EMERGENCY MEDICINE | Admitting: INTERNAL MEDICINE
Payer: MEDICARE

## 2022-05-06 DIAGNOSIS — M86.9 OSTEOMYELITIS OF RIGHT FOOT, UNSPECIFIED TYPE (HCC): ICD-10-CM

## 2022-05-06 DIAGNOSIS — A41.9 SEPTIC SHOCK (HCC): Primary | ICD-10-CM

## 2022-05-06 DIAGNOSIS — R65.21 SEPTIC SHOCK (HCC): Primary | ICD-10-CM

## 2022-05-06 LAB
ALBUMIN SERPL-MCNC: 3.3 G/DL (ref 3.5–5.2)
ALP BLD-CCNC: 123 U/L (ref 40–129)
ALT SERPL-CCNC: 21 U/L (ref 0–40)
ANION GAP SERPL CALCULATED.3IONS-SCNC: 12 MMOL/L (ref 7–16)
AST SERPL-CCNC: 17 U/L (ref 0–39)
BASOPHILS ABSOLUTE: 0.11 E9/L (ref 0–0.2)
BASOPHILS RELATIVE PERCENT: 0.7 % (ref 0–2)
BILIRUB SERPL-MCNC: <0.2 MG/DL (ref 0–1.2)
BILIRUBIN DIRECT: <0.2 MG/DL (ref 0–0.3)
BILIRUBIN, INDIRECT: ABNORMAL MG/DL (ref 0–1)
BUN BLDV-MCNC: 25 MG/DL (ref 6–23)
C-REACTIVE PROTEIN: 5.6 MG/DL (ref 0–0.4)
CALCIUM SERPL-MCNC: 9.8 MG/DL (ref 8.6–10.2)
CHLORIDE BLD-SCNC: 97 MMOL/L (ref 98–107)
CO2: 24 MMOL/L (ref 22–29)
CORTISOL TOTAL: 6.67 MCG/DL (ref 2.68–18.4)
CREAT SERPL-MCNC: 1.3 MG/DL (ref 0.7–1.2)
EOSINOPHILS ABSOLUTE: 0.21 E9/L (ref 0.05–0.5)
EOSINOPHILS RELATIVE PERCENT: 1.4 % (ref 0–6)
GFR AFRICAN AMERICAN: >60
GFR NON-AFRICAN AMERICAN: 56 ML/MIN/1.73
GLUCOSE BLD-MCNC: 240 MG/DL (ref 74–99)
HCT VFR BLD CALC: 42.8 % (ref 37–54)
HEMOGLOBIN: 14 G/DL (ref 12.5–16.5)
IMMATURE GRANULOCYTES #: 0.08 E9/L
IMMATURE GRANULOCYTES %: 0.5 % (ref 0–5)
LACTIC ACID: 2.3 MMOL/L (ref 0.5–2.2)
LIPASE: 47 U/L (ref 13–60)
LYMPHOCYTES ABSOLUTE: 2.65 E9/L (ref 1.5–4)
LYMPHOCYTES RELATIVE PERCENT: 17.5 % (ref 20–42)
MCH RBC QN AUTO: 28.6 PG (ref 26–35)
MCHC RBC AUTO-ENTMCNC: 32.7 % (ref 32–34.5)
MCV RBC AUTO: 87.3 FL (ref 80–99.9)
METER GLUCOSE: 193 MG/DL (ref 74–99)
MONOCYTES ABSOLUTE: 0.9 E9/L (ref 0.1–0.95)
MONOCYTES RELATIVE PERCENT: 6 % (ref 2–12)
NEUTROPHILS ABSOLUTE: 11.17 E9/L (ref 1.8–7.3)
NEUTROPHILS RELATIVE PERCENT: 73.9 % (ref 43–80)
PDW BLD-RTO: 13.8 FL (ref 11.5–15)
PLATELET # BLD: 315 E9/L (ref 130–450)
PMV BLD AUTO: 11.6 FL (ref 7–12)
POTASSIUM SERPL-SCNC: 4.8 MMOL/L (ref 3.5–5)
RBC # BLD: 4.9 E12/L (ref 3.8–5.8)
SEDIMENTATION RATE, ERYTHROCYTE: 57 MM/HR (ref 0–15)
SODIUM BLD-SCNC: 133 MMOL/L (ref 132–146)
TOTAL PROTEIN: 6.6 G/DL (ref 6.4–8.3)
WBC # BLD: 15.1 E9/L (ref 4.5–11.5)

## 2022-05-06 PROCEDURE — A4216 STERILE WATER/SALINE, 10 ML: HCPCS | Performed by: INTERNAL MEDICINE

## 2022-05-06 PROCEDURE — 71045 X-RAY EXAM CHEST 1 VIEW: CPT

## 2022-05-06 PROCEDURE — 36556 INSERT NON-TUNNEL CV CATH: CPT

## 2022-05-06 PROCEDURE — 2580000003 HC RX 258: Performed by: EMERGENCY MEDICINE

## 2022-05-06 PROCEDURE — 73630 X-RAY EXAM OF FOOT: CPT

## 2022-05-06 PROCEDURE — 99223 1ST HOSP IP/OBS HIGH 75: CPT | Performed by: INTERNAL MEDICINE

## 2022-05-06 PROCEDURE — 87040 BLOOD CULTURE FOR BACTERIA: CPT

## 2022-05-06 PROCEDURE — 87077 CULTURE AEROBIC IDENTIFY: CPT

## 2022-05-06 PROCEDURE — 36415 COLL VENOUS BLD VENIPUNCTURE: CPT

## 2022-05-06 PROCEDURE — 6360000002 HC RX W HCPCS: Performed by: EMERGENCY MEDICINE

## 2022-05-06 PROCEDURE — 2000000000 HC ICU R&B

## 2022-05-06 PROCEDURE — C9113 INJ PANTOPRAZOLE SODIUM, VIA: HCPCS | Performed by: INTERNAL MEDICINE

## 2022-05-06 PROCEDURE — 2580000003 HC RX 258: Performed by: INTERNAL MEDICINE

## 2022-05-06 PROCEDURE — 80076 HEPATIC FUNCTION PANEL: CPT

## 2022-05-06 PROCEDURE — 82533 TOTAL CORTISOL: CPT

## 2022-05-06 PROCEDURE — 6360000002 HC RX W HCPCS: Performed by: INTERNAL MEDICINE

## 2022-05-06 PROCEDURE — 99222 1ST HOSP IP/OBS MODERATE 55: CPT | Performed by: TRANSPLANT SURGERY

## 2022-05-06 PROCEDURE — 85651 RBC SED RATE NONAUTOMATED: CPT

## 2022-05-06 PROCEDURE — 2580000003 HC RX 258: Performed by: STUDENT IN AN ORGANIZED HEALTH CARE EDUCATION/TRAINING PROGRAM

## 2022-05-06 PROCEDURE — 6360000004 HC RX CONTRAST MEDICATION: Performed by: RADIOLOGY

## 2022-05-06 PROCEDURE — 85025 COMPLETE CBC W/AUTO DIFF WBC: CPT

## 2022-05-06 PROCEDURE — 86140 C-REACTIVE PROTEIN: CPT

## 2022-05-06 PROCEDURE — 6370000000 HC RX 637 (ALT 250 FOR IP): Performed by: INTERNAL MEDICINE

## 2022-05-06 PROCEDURE — 83605 ASSAY OF LACTIC ACID: CPT

## 2022-05-06 PROCEDURE — 83690 ASSAY OF LIPASE: CPT

## 2022-05-06 PROCEDURE — 99285 EMERGENCY DEPT VISIT HI MDM: CPT

## 2022-05-06 PROCEDURE — 2500000003 HC RX 250 WO HCPCS: Performed by: EMERGENCY MEDICINE

## 2022-05-06 PROCEDURE — 87081 CULTURE SCREEN ONLY: CPT

## 2022-05-06 PROCEDURE — 87186 SC STD MICRODIL/AGAR DIL: CPT

## 2022-05-06 PROCEDURE — 74177 CT ABD & PELVIS W/CONTRAST: CPT

## 2022-05-06 PROCEDURE — 96361 HYDRATE IV INFUSION ADD-ON: CPT

## 2022-05-06 PROCEDURE — 87070 CULTURE OTHR SPECIMN AEROBIC: CPT

## 2022-05-06 PROCEDURE — 96374 THER/PROPH/DIAG INJ IV PUSH: CPT

## 2022-05-06 PROCEDURE — 80048 BASIC METABOLIC PNL TOTAL CA: CPT

## 2022-05-06 PROCEDURE — 02HV33Z INSERTION OF INFUSION DEVICE INTO SUPERIOR VENA CAVA, PERCUTANEOUS APPROACH: ICD-10-PCS | Performed by: INTERNAL MEDICINE

## 2022-05-06 PROCEDURE — 82962 GLUCOSE BLOOD TEST: CPT

## 2022-05-06 RX ORDER — SODIUM CHLORIDE 0.9 % (FLUSH) 0.9 %
5-40 SYRINGE (ML) INJECTION EVERY 12 HOURS SCHEDULED
Status: DISCONTINUED | OUTPATIENT
Start: 2022-05-06 | End: 2022-05-11 | Stop reason: SDUPTHER

## 2022-05-06 RX ORDER — KETOROLAC TROMETHAMINE 30 MG/ML
30 INJECTION, SOLUTION INTRAMUSCULAR; INTRAVENOUS EVERY 6 HOURS PRN
Status: COMPLETED | OUTPATIENT
Start: 2022-05-06 | End: 2022-05-07

## 2022-05-06 RX ORDER — FENTANYL CITRATE 50 UG/ML
25 INJECTION, SOLUTION INTRAMUSCULAR; INTRAVENOUS
Status: DISCONTINUED | OUTPATIENT
Start: 2022-05-06 | End: 2022-05-07

## 2022-05-06 RX ORDER — SODIUM CHLORIDE 0.9 % (FLUSH) 0.9 %
5-40 SYRINGE (ML) INJECTION PRN
Status: DISCONTINUED | OUTPATIENT
Start: 2022-05-06 | End: 2022-05-11 | Stop reason: SDUPTHER

## 2022-05-06 RX ORDER — ONDANSETRON 4 MG/1
4 TABLET, ORALLY DISINTEGRATING ORAL EVERY 8 HOURS PRN
Status: DISCONTINUED | OUTPATIENT
Start: 2022-05-06 | End: 2022-05-19 | Stop reason: HOSPADM

## 2022-05-06 RX ORDER — METRONIDAZOLE 500 MG/100ML
500 INJECTION, SOLUTION INTRAVENOUS ONCE
Status: COMPLETED | OUTPATIENT
Start: 2022-05-06 | End: 2022-05-06

## 2022-05-06 RX ORDER — 0.9 % SODIUM CHLORIDE 0.9 %
1000 INTRAVENOUS SOLUTION INTRAVENOUS ONCE
Status: DISCONTINUED | OUTPATIENT
Start: 2022-05-06 | End: 2022-05-06

## 2022-05-06 RX ORDER — ATORVASTATIN CALCIUM 40 MG/1
40 TABLET, FILM COATED ORAL NIGHTLY
Status: DISCONTINUED | OUTPATIENT
Start: 2022-05-06 | End: 2022-05-19 | Stop reason: HOSPADM

## 2022-05-06 RX ORDER — LEVOTHYROXINE SODIUM 0.03 MG/1
25 TABLET ORAL DAILY
Status: DISCONTINUED | OUTPATIENT
Start: 2022-05-07 | End: 2022-05-19 | Stop reason: HOSPADM

## 2022-05-06 RX ORDER — INSULIN LISPRO 100 [IU]/ML
0-3 INJECTION, SOLUTION INTRAVENOUS; SUBCUTANEOUS NIGHTLY
Status: DISCONTINUED | OUTPATIENT
Start: 2022-05-06 | End: 2022-05-08

## 2022-05-06 RX ORDER — SODIUM CHLORIDE 9 MG/ML
INJECTION, SOLUTION INTRAVENOUS CONTINUOUS
Status: DISCONTINUED | OUTPATIENT
Start: 2022-05-06 | End: 2022-05-14

## 2022-05-06 RX ORDER — SODIUM CHLORIDE 0.9 % (FLUSH) 0.9 %
10 SYRINGE (ML) INJECTION PRN
Status: DISCONTINUED | OUTPATIENT
Start: 2022-05-06 | End: 2022-05-08 | Stop reason: SDUPTHER

## 2022-05-06 RX ORDER — CEFTRIAXONE 2 G/1
INJECTION, POWDER, FOR SOLUTION INTRAMUSCULAR; INTRAVENOUS
Status: DISPENSED
Start: 2022-05-06 | End: 2022-05-07

## 2022-05-06 RX ORDER — ACETAMINOPHEN 650 MG/1
650 SUPPOSITORY RECTAL EVERY 6 HOURS PRN
Status: DISCONTINUED | OUTPATIENT
Start: 2022-05-06 | End: 2022-05-19 | Stop reason: HOSPADM

## 2022-05-06 RX ORDER — ONDANSETRON 2 MG/ML
4 INJECTION INTRAMUSCULAR; INTRAVENOUS EVERY 6 HOURS PRN
Status: DISCONTINUED | OUTPATIENT
Start: 2022-05-06 | End: 2022-05-19 | Stop reason: HOSPADM

## 2022-05-06 RX ORDER — DEXTROSE MONOHYDRATE 25 G/50ML
12.5 INJECTION, SOLUTION INTRAVENOUS PRN
Status: DISCONTINUED | OUTPATIENT
Start: 2022-05-06 | End: 2022-05-06 | Stop reason: CLARIF

## 2022-05-06 RX ORDER — POLYETHYLENE GLYCOL 3350 17 G/17G
17 POWDER, FOR SOLUTION ORAL DAILY PRN
Status: DISCONTINUED | OUTPATIENT
Start: 2022-05-06 | End: 2022-05-19 | Stop reason: HOSPADM

## 2022-05-06 RX ORDER — INSULIN LISPRO 100 [IU]/ML
0-6 INJECTION, SOLUTION INTRAVENOUS; SUBCUTANEOUS
Status: DISCONTINUED | OUTPATIENT
Start: 2022-05-07 | End: 2022-05-08

## 2022-05-06 RX ORDER — DEXTROSE MONOHYDRATE 50 MG/ML
100 INJECTION, SOLUTION INTRAVENOUS PRN
Status: DISCONTINUED | OUTPATIENT
Start: 2022-05-06 | End: 2022-05-19 | Stop reason: HOSPADM

## 2022-05-06 RX ORDER — 0.9 % SODIUM CHLORIDE 0.9 %
30 INTRAVENOUS SOLUTION INTRAVENOUS ONCE
Status: COMPLETED | OUTPATIENT
Start: 2022-05-06 | End: 2022-05-06

## 2022-05-06 RX ORDER — SERTRALINE HYDROCHLORIDE 100 MG/1
50 TABLET, FILM COATED ORAL DAILY
Status: DISCONTINUED | OUTPATIENT
Start: 2022-05-06 | End: 2022-05-19 | Stop reason: HOSPADM

## 2022-05-06 RX ORDER — SODIUM CHLORIDE 9 MG/ML
INJECTION, SOLUTION INTRAVENOUS PRN
Status: DISCONTINUED | OUTPATIENT
Start: 2022-05-06 | End: 2022-05-11 | Stop reason: SDUPTHER

## 2022-05-06 RX ORDER — PREGABALIN 50 MG/1
100 CAPSULE ORAL NIGHTLY
Status: DISCONTINUED | OUTPATIENT
Start: 2022-05-06 | End: 2022-05-19 | Stop reason: HOSPADM

## 2022-05-06 RX ORDER — NICOTINE POLACRILEX 4 MG
15 LOZENGE BUCCAL PRN
Status: DISCONTINUED | OUTPATIENT
Start: 2022-05-06 | End: 2022-05-06 | Stop reason: CLARIF

## 2022-05-06 RX ORDER — ENOXAPARIN SODIUM 100 MG/ML
30 INJECTION SUBCUTANEOUS 2 TIMES DAILY
Status: DISCONTINUED | OUTPATIENT
Start: 2022-05-06 | End: 2022-05-07 | Stop reason: SDUPTHER

## 2022-05-06 RX ORDER — ACETAMINOPHEN 325 MG/1
650 TABLET ORAL EVERY 6 HOURS PRN
Status: DISCONTINUED | OUTPATIENT
Start: 2022-05-06 | End: 2022-05-19 | Stop reason: HOSPADM

## 2022-05-06 RX ADMIN — KETOROLAC TROMETHAMINE 30 MG: 30 INJECTION, SOLUTION INTRAMUSCULAR at 20:32

## 2022-05-06 RX ADMIN — NOREPINEPHRINE BITARTRATE 10 MCG/MIN: 1 INJECTION, SOLUTION, CONCENTRATE INTRAVENOUS at 17:00

## 2022-05-06 RX ADMIN — IOPAMIDOL 75 ML: 755 INJECTION, SOLUTION INTRAVENOUS at 13:46

## 2022-05-06 RX ADMIN — METRONIDAZOLE 500 MG: 500 INJECTION, SOLUTION INTRAVENOUS at 16:00

## 2022-05-06 RX ADMIN — WATER 2000 MG: 1 INJECTION INTRAMUSCULAR; INTRAVENOUS; SUBCUTANEOUS at 15:56

## 2022-05-06 RX ADMIN — ENOXAPARIN SODIUM 30 MG: 100 INJECTION SUBCUTANEOUS at 20:43

## 2022-05-06 RX ADMIN — ACETAMINOPHEN 650 MG: 325 TABLET ORAL at 20:35

## 2022-05-06 RX ADMIN — INSULIN LISPRO 1 UNITS: 100 INJECTION, SOLUTION INTRAVENOUS; SUBCUTANEOUS at 21:00

## 2022-05-06 RX ADMIN — ATORVASTATIN CALCIUM 40 MG: 40 TABLET, FILM COATED ORAL at 20:32

## 2022-05-06 RX ADMIN — SODIUM CHLORIDE 3537 ML: 9 INJECTION, SOLUTION INTRAVENOUS at 14:12

## 2022-05-06 RX ADMIN — SODIUM CHLORIDE 40 MG: 9 INJECTION INTRAMUSCULAR; INTRAVENOUS; SUBCUTANEOUS at 20:32

## 2022-05-06 RX ADMIN — SODIUM CHLORIDE: 9 INJECTION, SOLUTION INTRAVENOUS at 20:39

## 2022-05-06 RX ADMIN — PREGABALIN 100 MG: 50 CAPSULE ORAL at 20:32

## 2022-05-06 RX ADMIN — SODIUM CHLORIDE, PRESERVATIVE FREE 10 ML: 5 INJECTION INTRAVENOUS at 20:33

## 2022-05-06 ASSESSMENT — ENCOUNTER SYMPTOMS
BACK PAIN: 0
WHEEZING: 0
EYE PAIN: 0
RHINORRHEA: 0
ABDOMINAL DISTENTION: 0
COLOR CHANGE: 0
CONSTIPATION: 0
VOMITING: 0
DIARRHEA: 1
SORE THROAT: 0
ABDOMINAL PAIN: 1
SHORTNESS OF BREATH: 0
COLOR CHANGE: 1
NAUSEA: 1
COUGH: 0
ABDOMINAL PAIN: 0
SHORTNESS OF BREATH: 1
CHEST TIGHTNESS: 0
VOMITING: 1
NAUSEA: 0
SINUS PAIN: 0
DIARRHEA: 0

## 2022-05-06 ASSESSMENT — PAIN DESCRIPTION - LOCATION
LOCATION: FOOT
LOCATION: HEAD

## 2022-05-06 ASSESSMENT — PAIN SCALES - GENERAL
PAINLEVEL_OUTOF10: 5
PAINLEVEL_OUTOF10: 5
PAINLEVEL_OUTOF10: 0
PAINLEVEL_OUTOF10: 8

## 2022-05-06 ASSESSMENT — PAIN DESCRIPTION - ORIENTATION: ORIENTATION: RIGHT;LEFT

## 2022-05-06 ASSESSMENT — PAIN DESCRIPTION - DESCRIPTORS
DESCRIPTORS: THROBBING;ACHING
DESCRIPTORS: ACHING

## 2022-05-06 NOTE — LETTER
[place] Department Medicaid  CERTIFICATION OF NECESSITY  FOR NON-EMERGENCY TRANSPORTATION   BY GROUND AMBULANCE      Individual Information   1. Name: Gabino Jeffery May 2. [place] Medicaid Billing Number:    3. Address: [place] [address]      Transportation Provider Information   4. Provider Name: ALONA   5. [place] Medicaid Provider Number:  National Provider Identifier (NPI):      Certification  7. Criteria:  During transport, this individual requires:  [x] Medical treatment or continuous     supervision by an EMT. [] The administration or regulation of oxygen by another person. [] Supervised protective restraint. 8. Period Beginning Date: 5/ /22   9. Length  [x] Not more than 1 day(s)  [] One Year     Additional Information Relevant to Certification   10. Comments or Explanations, If Necessary or Appropriate   Acute sepsis. s/p right foot delayed primary closure. Acute Osteomyelitis right foot IDDM with neuropathy. Status post delayed primary closure right foot 5/10/2022. NWB RLE. Full thickness wound of right foot. Certifying Practitioner Information   11. Name of Practitioner: Dr Lesly العلي DO   12. [place] Medicaid Provider Number, If Applicable:  [address] Provider Identifier (NPI):      Signature Information   14. Date of Signature: 5/13/21 15. Name of Person Signing: Jalen Reyes [address] discharge planner   16. Signature and Professional Designation: Electronically signed by MARCELL Worthy on 5/13/2022 at 3:53 PM      OD [id number]  Rev. 7/2015                 [facility] [address] Encounter Date/Time: 5/6/2022 [facility]. Account: [de-identified]    MRN: [id number]    Patient: Octavio Metzger Serial #: [id number]      ENCOUNTER          Patient Class: I Private Enc? No Unit RM BD: [address] [facility] Service: TREY   Encounter DX: Septic shock ([facility] [address] [A41. *   ADM Provider: Lesly العلي DO   Procedure:     ATT Provider: Ford Blair DO   REF Provider:    Admission DX: Septic shock ([facility] [address] and DX codes: A41.9, R65.21      PATIENT                 Name: Genesis Mayo May : 1960 (62 yrs)   Address: [address] [place] [address] Sex: Male   [place] city: [address] [address]         Marital Status:    Employer: NOT EMPLOYED         Rastafarian: None   Primary Care Provider: Darrell Gentile APR*         Primary Phone: [phone]   EMERGENCY CONTACT   Contact Name Legal Guardian? Relationship to Patient Home Phone Work Phone   1. Yashira Manning  2. *No Contact Specified* No    Patient Advocate    [phone]                 GUARANTOR            Guarantor: Genesis Mayo May     : 1960   Address: [address] [place] [address] Sex: Male     [address]     Relation to Patient: Self       Home Phone: [phone]   Guarantor ID: [id number]       Work Phone:     Guarantor Employer: NOT EMPLOYED         Status: NOT EMPLO*      COVERAGE        PRIMARY INSURANCE   Payor: [employer] C* Plan: Jean Claude Burr RE*   Payor Address: [place] [address]       Group Number: [id number] Insurance Type: [address] Name: Leanne Dorsey : 1960   Subscriber ID: [id number] Pat. Rel. to Sub: Self   SECONDARY INSURANCE   Payor:   Plan:     Payor Address:  ,           Group Number:   Insurance Type:     Subscriber Name:   Subscriber :     Subscriber ID:   Pat.  Rel. to Sub:             CSN: [id number]

## 2022-05-06 NOTE — ED NOTES
Department of Emergency Medicine  FIRST PROVIDER TRIAGE NOTE             Independent MLP           5/6/22  12:01 PM EDT    Date of Encounter: 5/6/22   MRN: [id number]      HPI: Lilla Gitelman May is a 63 y.o. male who presents to the ED for Wound Infection (right foot, 2nd digit removed 2 weeks ago by dr Warren Rojo. )   no having drainage from toes and discoloration    ROS: Negative for cp, sob, cough or vomiting. PE: Gen Appearance/Constitutional: alert  HEENT: NC/NT. PERRLA,  Airway patent. Neck: supple     Initial Plan of Care: All treatment areas with department are currently occupied. Plan to order/Initiate the following while awaiting opening in ED: labs, imaging studies, antibiotics and IV fluids.   Initiate Treatment-Testing, Proceed toTreatment Area When Bed Available for ED Attending/MLP to Continue Care    Electronically signed by OSKAR Nagy   DD: 5/6/22         OSKAR Dupree  05/06/22 9974

## 2022-05-06 NOTE — ED PROVIDER NOTES
Neurological: Positive for light-headedness. Negative for dizziness, syncope and numbness. Physical Exam  Vitals and nursing note reviewed. Constitutional:       Appearance: Normal appearance. HENT:      Right Ear: Tympanic membrane normal.      Left Ear: Tympanic membrane normal.      Nose: No congestion or rhinorrhea. Mouth/Throat:      Mouth: Mucous membranes are moist.      Pharynx: No oropharyngeal exudate or posterior oropharyngeal erythema. Eyes:      Extraocular Movements: Extraocular movements intact. Conjunctiva/sclera: Conjunctivae normal.   Cardiovascular:      Rate and Rhythm: Normal rate and regular rhythm. Heart sounds: Normal heart sounds. No murmur heard. No gallop. Pulmonary:      Effort: Pulmonary effort is normal. No respiratory distress. Breath sounds: Normal breath sounds. No wheezing, rhonchi or rales. Abdominal:      General: Bowel sounds are normal. There is no distension. Palpations: Abdomen is soft. Tenderness: There is abdominal tenderness in the left upper quadrant and left lower quadrant. There is left CVA tenderness. There is no right CVA tenderness. Hernia: No hernia is present. Musculoskeletal:      Cervical back: No tenderness. Right lower leg: No edema. Left lower leg: No edema. Right Lower Extremity: (of right 2nd toe)  Feet:      Right foot:      Skin integrity: Skin breakdown, erythema and warmth present. Lymphadenopathy:      Cervical: No cervical adenopathy. Skin:     Findings: No erythema or rash. Neurological:      Mental Status: He is alert.       Deep Tendon Reflexes: Reflexes normal.          Central Line    Date/Time: 5/6/2022 3:37 PM  Performed by: Tahir Morrison DO  Authorized by: Javier Moctezuma DO     Consent:     Consent obtained:  Verbal    Consent given by:  Patient    Risks discussed:  Arterial puncture, bleeding, incorrect placement, nerve damage, pneumothorax and infection Alternatives discussed:  No treatment  Pre-procedure details:     Hand hygiene: Hand hygiene performed prior to insertion      Sterile barrier technique: All elements of maximal sterile technique followed      Skin preparation:  2% chlorhexidine    Skin preparation agent: Skin preparation agent completely dried prior to procedure    Procedure details:     Location:  R internal jugular    Patient position:  Flat    Procedural supplies:  Triple lumen    Landmarks identified: yes      Ultrasound guidance: yes      Sterile ultrasound techniques: Sterile gel and sterile probe covers were used      Number of attempts:  1    Successful placement: yes    Post-procedure details:     Post-procedure:  Dressing applied and line sutured    Assessment:  Blood return through all ports and placement verified by x-ray    Patient tolerance of procedure: Tolerated well, no immediate complications  Suture Removal    Date/Time: 5/6/2022 4:14 PM  Performed by: Radha Ashton DO  Authorized by: Hiral Lombardi DO     Consent:     Consent obtained:  Verbal    Consent given by:  Patient    Risks discussed:  Bleeding, pain and wound separation    Alternatives discussed:  Delayed treatment  Location:     Location:  Lower extremity    Lower extremity location:  Toe    Toe location:  R second toe (s/p amputation)  Procedure details:     Wound appearance:  Draining, purulent, tender, warm and red    Number of sutures removed:  4  Post-procedure details:     Post-removal:  No dressing applied    Patient tolerance of procedure: Tolerated well, no immediate complications         Patient presents to the ED for worsening redness and pain after amputation of his right second toe. Differential diagnoses included but not limited to cellulitis, wound dehiscence, septic shock, pancreatic abscess, pancreatic diverticulum. Workup in the ED revealed CT of probable pancreatic diverticulum. Surgery was consulted.  Patient had ALVINO with creatinine of 1.3 and was hypotensive. Sepsis bolus of normal saline was given. Patient remained hypotensive. Right IJ was placed and Levo was started. Cultures were taken of his wound and blood. Patient was given rocephin and flagyl once. ID was consulted. Critical care was consulted. Xray patient's foot showed no signs of osteomyelitis. Patient requires continued workup and management of their symptoms and will be admitted to the hospital for further evaluation and treatment.         --------------------------------------------- PAST HISTORY ---------------------------------------------  Past Medical History:  has a past medical history of Anxiety, COPD (chronic obstructive pulmonary disease) ([facility] [address] Depression, [facility] Frostbite, HLD (hyperlipidemia), HTN (hypertension), and Sleep apnea. Past Surgical History:  has a past surgical history that includes Cholecystectomy; back surgery; Coronary artery bypass graft; Uvulopalatopharygoplasty; Toe amputation (Right, 4/22/2022); and Foot Debridement (Right, 4/25/2022). Social History:  reports that he has been smoking cigarettes. He started smoking about 42 years ago. He has been smoking about 2.50 packs per day. He does not have any smokeless tobacco history on file. He reports that he does not drink alcohol and does not use drugs. Family History: family history includes Dementia in his mother; Diabetes in his sister; Stroke in his father. The patients home medications have been reviewed. Allergies: Patient has no known allergies.     -------------------------------------------------- RESULTS -------------------------------------------------    LABS:  Results for orders placed or performed during the hospital encounter of 05/06/22   CBC with Auto Differential   Result Value Ref Range    WBC 15.1 (H) 4.5 - 11.5 E9/L    RBC 4.90 3.80 - 5.80 E12/L    Hemoglobin 14.0 12.5 - 16.5 g/dL    Hematocrit 42.8 37.0 - 54.0 %    MCV 87.3 80.0 - 99.9 fL    MCH 28.6 26.0 - 35.0 pg    MCHC 32.7 32.0 - 34.5 %    RDW 13.8 11.5 - 15.0 fL    Platelets 624 774 - 016 E9/L    MPV 11.6 7.0 - 12.0 fL    Neutrophils % 73.9 43.0 - 80.0 %    Immature Granulocytes % 0.5 0.0 - 5.0 %    Lymphocytes % 17.5 (L) 20.0 - 42.0 %    Monocytes % 6.0 2.0 - 12.0 %    Eosinophils % 1.4 0.0 - 6.0 %    Basophils % 0.7 0.0 - 2.0 %    Neutrophils Absolute 11.17 (H) 1.80 - 7.30 E9/L    Immature Granulocytes # 0.08 E9/L    Lymphocytes Absolute 2.65 1.50 - 4.00 E9/L    Monocytes Absolute 0.90 0.10 - 0.95 E9/L    Eosinophils Absolute 0.21 0.05 - 0.50 E9/L    Basophils Absolute 0.11 0.00 - 0.20 F7/R   Basic Metabolic Panel   Result Value Ref Range    Sodium 133 132 - 146 mmol/L    Potassium 4.8 3.5 - 5.0 mmol/L    Chloride 97 (L) 98 - 107 mmol/L    CO2 24 22 - 29 mmol/L    Anion Gap 12 7 - 16 mmol/L    Glucose 240 (H) 74 - 99 mg/dL    BUN 25 (H) 6 - 23 mg/dL    CREATININE 1.3 (H) 0.7 - 1.2 mg/dL    GFR Non-African American 56 >=60 mL/min/1.73    GFR African American >60     Calcium 9.8 8.6 - 10.2 mg/dL   Lactic Acid   Result Value Ref Range    Lactic Acid 2.3 (H) 0.5 - 2.2 mmol/L       RADIOLOGY:  CT ABDOMEN PELVIS W IV CONTRAST Additional Contrast? None   Final Result   1. Rule out nonspecific postinflammatory changes in the right lower lobe of   the lungs. 2.  Small, rounded hypodensity, containing what appears to be air droplets   within the pancreatic head and near the ampulla. It lies immediately   adjacent to the duodenum and is in the path of the CBD. Question duodenal   diverticulum. An ampullary/pancreatic head lesion containing air cannot be   excluded. Upper GI study is recommended to see if this represents a   diverticulum. If no diverticulum is seen, then MRI of the abdomen with and   without intravenous contrast and including MRCP is recommended. 3.  Bilateral renal cortical cysts.   At least 1 on the left may represent a   hemorrhagic or debris laden cyst.  Renal ultrasound is medications, cardiac monitoring and continuous pulse oximetry    This patient has remained hemodynamically stable during their ED course. Diagnosis:  1. Septic shock (HCC)        Disposition:  Patient's disposition: Admit to CCU/ICU  Patient's condition is stable.              [facility],   Resident  05/06/22 [id number]

## 2022-05-06 NOTE — ED NOTES
Pt states that he is concerned taht his left foot is onfected where they amputated his toe about 2 weeks ago, he states the area is red and warm to touch, the area is red, he admits that he has also been feeling dizzy lately and his blood pressure, is low, he states taht he just feels very weak.       Arron Downey, AYE  05/06/22 [id number]

## 2022-05-06 NOTE — CONSULTS
Critical Care Admit/Consult Note         Patient - Emi Fuentes   MRN -  [id number]   Acct # - [de-identified]   - 1960      Date of Admission -  2022 12:38 PM  Date of evaluation -  2022  STANLEY/STANLEY   Hospital Day - 0            ADMIT/CONSULT DETAILS     Reason for Admit/Consult   Septic shock with hypotension requiring pressors    Consulting Service/Physician   Consulting - Ruth Gómez DO  Primary Care Physician - CHRIST Gardiner - CNP     ICU attending - Dr. Bety BAILEY   The patient is a 63 y.o. male with significant past medical history of hypertension, hyperlipidemia, obstructive sleep apnea, COPD currently smoking half a pack of cigarettes per day, CABG x2 vessels uncontrolled type 2 diabetes, status post amputation of the second digit on the right foot presents with Wound Infection (right foot, 2nd digit removed 2 weeks ago by dr Ankush Gregory. )    Patient is status post amputation of right second toe secondary to osteomyelitis on 2022. He presented today to the emergency department stating that for the past 4 days he has had fevers, chills, nausea, vomiting and pus draining from the right foot associated with pain and erythema. On exam he had findings concerning for acute infection of the right foot. He was found to be hypotensive requiring Levophed. A central line was placed in the right IJ. Additionally he had an elevated white blood cell count at 15. Dr. Tristen Coates, podiatry was consulted. Patient was also complaining of abdominal pain. A CT scan of the abdomen was ordered and showed concerns for air droplets within the pancreatic head near the ampulla. Dr. Mariela Berman was consulted from the ED. Patient was started on broad-spectrum antibiotics with Flagyl, vancomycin and Rocephin.             Awake and following commands: Yes  Current Ventilation: - No ventilator support  Sedation: none  Paralyzed: No  Vasopressors: norepinephrine    Past Medical History Diagnosis Date    Anxiety     [facility] obstructive [facility] disease) ([facility] [address]     Depression     DM (diabetes mellitus) ([facility] [facility]     Frostbite     HLD (hyperlipidemia)     HTN (hypertension)     Sleep apnea         Past Surgical History           Procedure Laterality Date    BACK SURGERY      CHOLECYSTECTOMY      CORONARY ARTERY BYPASS GRAFT REDO      2 vessel    FOOT DEBRIDEMENT Right 4/25/2022    DELAYED PRIMARY CLOSURE RIGHT FOOT WOUND performed by Mike Jarvis DPM at [facility] TOE AMPUTATION Right 4/22/2022    AMPUTATION RIGHT SECOND TOE performed by Mike Jarvis DPM at [address] [place] [address] Sq:    Social History     Tobacco History     Smoking Status  Current Every Day Smoker Smoking Start Date  1/1/1980 Smoking Frequency  2.5 packs/day Smoking Tobacco Type  Cigarettes    Smokeless Tobacco Use  Unknown                Current Medications   Current Medications    metroNIDAZOLE  500 mg IntraVENous Once    cefTRIAXone         sodium chloride flush  IV Drips/Infusions   norepinephrine       Home Medications  Not in a hospital admission. Diet/Nutrition   No diet orders on file    Allergies   Patient has no known allergies. Social History   Tobacco   reports that he has been smoking cigarettes. He started smoking about 42 years ago. He has been smoking about 2.50 packs per day. He does not have any smokeless tobacco history on file. Alcohol     reports no history of alcohol use. Occupational history/exposure? Family History         Problem Relation Age of Onset    Dementia Mother     Stroke Father     Diabetes Sister        ROS   Review of Systems   Constitutional: Positive for chills and fever. Negative for diaphoresis. HENT: Negative for ear pain, rhinorrhea, sinus pain and sore throat. Eyes: Negative for pain. Respiratory: Negative for cough and shortness of breath.     Cardiovascular: Negative for chest pain and leg swelling. Gastrointestinal: Positive for nausea and vomiting. Negative for abdominal pain and diarrhea. Genitourinary: Negative for dysuria, flank pain and frequency. Musculoskeletal: Negative for back pain, neck pain and neck stiffness. Patient admits to R foot pain, purulent drainage from surgical site, erythema and edema to the right foot    Neurological: Negative for dizziness, weakness, light-headedness and headaches. Psychiatric/Behavioral: Negative for agitation and confusion. Mechanical Ventilation Data   VENT SETTINGS (Comprehensive)     Additional Respiratory Assessments  Pulse: 80  Resp: 16  SpO2: 97 %    ABG  No results found for: PH, PCO2, PO2, HCO3, O2SAT  No results found for: IFIO2, MODE, SETTIDVOL, SETPEEP    Vitals    height is 6' (1.829 m) and weight is 260 lb (117.9 kg). His oral temperature is 96.4 °F (35.8 °C). His blood pressure is 83/53 (abnormal) and his pulse is 80. His respiration is 16 and oxygen saturation is 97%. Temperature Range: Temp: 96.4 °F (35.8 °C) Temp  Av.5 °F (35.8 °C)  Min: 96.4 °F (35.8 °C)  Max: 96.8 °F (36 °C)  BP Range:  Systolic (07TTJ), DAP:59 , Min:74 , PGO:11     Diastolic (70MPQ), ZOD:41, Min:50, Max:54    Pulse Range: Pulse  Av.7  Min: 75  Max: 80  Respiration Range: Resp  Av  Min: 14  Max: 18  Current Pulse Ox[de-identified]  SpO2: 97 %  24HR Pulse Ox Range:  SpO2  Av.8 %  Min: 96 %  Max: 100 %  Oxygen Amount and Delivery:      I/O (24 Hours)  Patient Vitals for the past 8 hrs:   BP Temp Temp src Pulse Resp SpO2 Height Weight   22 1601 (!) 83/53 96.4 °F (35.8 °C) Oral 80 16 97 %     22 1408 (!) 80/50 96.4 °F (35.8 °C) Oral 75 16 100 %     22 1252 (!) 74/50    18 96 %     22 1156 (!) 86/54 96.8 °F (36 °C) Temporal 78 14 98 % 6' (1.829 m) 260 lb (117.9 kg)     No intake or output data in the 24 hours ending 22 1621  No intake/output data recorded.      Patient Vitals for the past 96 hrs (Last 3 readings):   Weight   05/06/22 1156 260 lb (117.9 kg)       Exam   Physical Exam  Vitals reviewed. Constitutional:       General: He is not in acute distress. Appearance: Normal appearance. He is not toxic-appearing. HENT:      Head: Normocephalic and atraumatic. Nose: No congestion or rhinorrhea. Eyes:      General: No scleral icterus. Right eye: No discharge. Left eye: No discharge. Extraocular Movements: Extraocular movements intact. Pupils: Pupils are equal, round, and reactive to light. Cardiovascular:      Rate and Rhythm: Normal rate and regular rhythm. Heart sounds: Normal heart sounds. No murmur heard. No friction rub. No gallop. Pulmonary:      Effort: Pulmonary effort is normal. No respiratory distress. Breath sounds: No wheezing, rhonchi or rales. Abdominal:      General: Abdomen is flat. A surgical scar is present. There is no distension. Tenderness: There is no abdominal tenderness. There is no guarding or rebound. Musculoskeletal:         General: No deformity or signs of injury. Cervical back: Normal range of motion and neck supple. No rigidity or tenderness. Right lower leg: No edema. Left lower leg: No edema. Feet:      Comments: S/p amputation of the right second toe. Purulent drainage from the toe. Erythema and edema with bogginess extending to the midfoot. Skin:     General: Skin is warm and dry. Coloration: Skin is not jaundiced or pale. Neurological:      General: No focal deficit present. Mental Status: He is alert.    Psychiatric:         Mood and Affect: Mood normal.         Data   Old records and images have been reviewed    Lab Results   CBC     Lab Results   Component Value Date    WBC 15.1 05/06/2022    RBC 4.90 05/06/2022    HGB 14.0 05/06/2022    HCT 42.8 05/06/2022     05/06/2022    MCV 87.3 05/06/2022    MCH 28.6 05/06/2022    MCHC 32.7 05/06/2022    RDW 13.8 05/06/2022    LYMPHOPCT 17.5 05/06/2022    MONOPCT 6.0 05/06/2022    BASOPCT 0.7 05/06/2022    MONOSABS 0.90 05/06/2022    LYMPHSABS 2.65 05/06/2022    EOSABS 0.21 05/06/2022    BASOSABS 0.11 05/06/2022       BMP   Lab Results   Component Value Date     05/06/2022    K 4.8 05/06/2022    K 4.5 04/22/2022    CL 97 05/06/2022    CO2 24 05/06/2022    BUN 25 05/06/2022    CREATININE 1.3 05/06/2022    GLUCOSE 240 05/06/2022    CALCIUM 9.8 05/06/2022       LFTS  Lab Results   Component Value Date    ALKPHOS 117 04/22/2022    ALT 18 04/22/2022    AST 22 04/22/2022    PROT 6.3 04/22/2022    BILITOT 0.3 04/22/2022    LABALBU 3.2 04/22/2022       INR  No results for input(s): PROTIME, INR in the last 72 hours. APTT  No results for input(s): APTT in the last 72 hours. Lactic Acid  Lab Results   Component Value Date    LACTA 2.3 05/06/2022    LACTA 2.2 04/21/2022        BNP   No results for input(s): BNP in the last 72 hours. Cultures     No results for input(s): BC in the last 72 hours. No results for input(s): Jae Konig in the last 72 hours. No results for input(s): LABURIN in the last 72 hours. Radiology   X-ray of the right foot shows status post amputation of the right second toe. No acute osseous abnormalities. No evidence to suggest osteomyelitis. CT abdomen pelvis showed air droplets within the pancreatic head near the ampulla. Bilateral renal cortical cysts. Small left inguinal hernia containing fat. Hospital Course   04/21-patient admitted to the hospital for second phalange wound. 04/22-amputation of the right second toe by Chichi Guardian. Wound cultures were positive for MRSA, pseudomonas, enterococcus faecalis and staph cohnii  04/26-pt was discharged home with no abx coverage   05/06-Pt presented to ED with concerns for infected right foot. Pt hypotensive.      SYSTEMS ASSESSMENT  Septic shock with acute infection of the right foot   S/p amputation of the right second toe due to osteomyelitis   Patient complaining of fevers, chills, nausea, vomiting and found to be hypotensive with an elevated white blood cell count at 15 concerning for sepsis   Patient is on vancomycin    Hypotension currently being treated with Levophed   Likely need MRI of the foot    ID consult     Air in the head of the pancreas  · Patient complaining of abdominal pain  · CT scan findings in the ED were concerning  · , hepatobiliary was consulted  · Patient started on Flagyl and Rocephin in the ED    History of obstructive sleep apnea   Patient does not wear his CPAP at night   Empiric CPAP at night     History of COPD  · Patient continues to smoke half pack cigarettes per day  · Does not require oxygen at home    Status post CABG x2   Patient denying any chest pain   No recent echo   No recent stress test    ALVINO   CR at 1.3   IV fluids   Continue to monitor   MRI after improvement of Cr    Hypertension   Hold home meds due to hypotension    Uncontrolled type 2 diabetes   POC glucose    Insulin     Hyperlipidemia  · Home meds    Social/Spiritual/DNR/Other   Code status: Full code   Diet No diet orders on file   Stress ulcer prophylaxis:    DVT prophylaxis: pharmacologic prophylaxis (with the following: enoxaparin)   Consultations needed: Yes   Transfer out of ICU today? No    Lines/catheters   Date 05/06  o Amaury [address]ila 10 IJ         [place], [place]  4:21 PM  05/06/22      I personally saw, examined and provided care for the patient. Radiographs, labs and medication list were reviewed by me independently. I spoke with bedside nursing, therapists and consultants. Critical care services and times documented are independent of procedures and multidisciplinary rounds with Residents. Additionally comprehensive, multidisciplinary rounds were conducted with the MICU team. The case was discussed in detail and plans for care were established.  Review of Residents documentation was conducted and revisions were made as appropriate. I agree with the above documented exam, problem list and plan of care. Septic shock  Acute renal failure  Right foot osteomyelitis OM status postdebridement  HTN  HLD  ALCON  DM poorly controlled  Obesity BMI 35  Noncompliance  History of anxiety      Consult ID and podiatry may need further debridement  May need CT of the foot.   Would wait for MRI until kidney numbers are better      > 30 min CCT

## 2022-05-06 NOTE — CONSULTS
Hepatobiliary and Pancreatic Surgery    History and Physical      Patient's Name/Date of Birth: Fabiola Castellon May /1960 (63 y.o.)    Date: May 6, 2022     CC: toe pain    HPI:  Fabiola Fuentes is a 63year old male who presented to the hospital with toe pain s/p amputation around a week ago. HPB surgery was consulted for air in pancreatic head. Patient denies abdominal pain, nausea, vomiting, melena, hematochezia, and changes in bowel habits. He is not on any anticoagulation. Patient reports chronic issues with nausea and has had a cholecystectomy and multiple EGDs with a GI doctor in [place]. He denies alcohol or other drug use. He smokes tobacco. He reports recent unintentional weight loss. CT AP demonstrates air in the head of the pancreas consistent with duodenal diverticulum. Toe amputation appears grossly infected. He has been hypotensive, non-tachycardic, with leukocytosis of 15.1.     Past Medical History:   Diagnosis Date    Anxiety     [facility] [address]     Depression     DM [facility]diabetes mellitus) [facility]     Frostbite     HLD (hyperlipidemia)     HTN (hypertension)     Sleep apnea        Past Surgical History:   Procedure Laterality Date    BACK SURGERY      CHOLECYSTECTOMY      CORONARY ARTERY BYPASS GRAFT REDO      2 vessel    FOOT DEBRIDEMENT Right 4/25/2022    DELAYED PRIMARY CLOSURE RIGHT FOOT WOUND performed by Ramakrishna Steele DPM at [address] [facility] Right 4/22/2022    AMPUTATION RIGHT SECOND TOE performed by Ramakrishna Steele DPM at [address] UVULOPALATOPHARYGOPLASTY         Current Facility-Administered Medications   Medication Dose Route Frequency Provider Last Rate Last Admin    sodium chloride flush 0.9 % injection 10 mL  10 mL IntraVENous PRN OSKAR Donato        norepinephrine (LEVOPHED) 16 mg in dextrose 5 % 250 mL infusion  1-100 mcg/min IntraVENous Continuous Katherine Reyes DO        metronidazole (FLAGYL) 500 mg in 0.9% NaCl 100 mL IVPB premix  500 mg IntraVENous Once Nelda Martinez,  mL/hr at 05/06/22 1600 500 mg at 05/06/22 1600    cefTRIAXone (ROCEPHIN) 2 g injection              Current Outpatient Medications   Medication Sig Dispense Refill    atorvastatin (LIPITOR) 40 MG tablet Take 40 mg by mouth at bedtime      metoprolol tartrate (LOPRESSOR) 25 MG tablet Take 25 mg by mouth 2 times daily      metFORMIN (GLUCOPHAGE) 1000 MG tablet Take 1,000 mg by mouth 2 times daily (with meals)      levothyroxine (SYNTHROID) 25 MCG tablet Take 25 mcg by mouth Daily      sertraline (ZOLOFT) 50 MG tablet Take 50 mg by mouth daily      glimepiride (AMARYL) 4 MG tablet Take 4 mg by mouth in the morning and at bedtime      JARDIANCE 10 MG tablet Take 10 mg by mouth daily      pregabalin (LYRICA) 100 MG capsule Take 100 mg by mouth at bedtime.       lisinopril-hydroCHLOROthiazide (PRINZIDE;ZESTORETIC) 20-12.5 MG per tablet Take 20 tablets by mouth daily         No Known Allergies    Family History   Problem Relation Age of Onset    Dementia Mother     Stroke Father     Diabetes Sister        Social History     Socioeconomic History    Marital status:      Spouse name: Not on file    Number of children: Not on file    Years of education: Not on file    Highest education level: Not on file   Occupational History    Not on file   Tobacco Use    Smoking status: Current Every Day Smoker     Packs/day: 2.50     Types: Cigarettes     Start date: 1980    Smokeless tobacco: Not on file   Substance and Sexual Activity    Alcohol use: Never    Drug use: Never    Sexual activity: Not on file   Other Topics Concern    Not on file   Social History Narrative    Not on file     Social Determinants of Health     Financial Resource Strain:     Difficulty of Paying Living Expenses: Not on file   Food Insecurity:     Worried About [address] in the Last Year: Not on file    [address] in the Last Year: Not on file   Transportation Needs:     Lack of Transportation (Medical): Not on file    Lack of Transportation (Non-Medical): Not on file   Physical Activity:     Days of Exercise per Week: Not on file    Minutes of Exercise per Session: Not on file   Stress:     Feeling of Stress : Not on file   Social Connections:     Frequency of Communication with Friends and Family: Not on file    Frequency of Social Gatherings with Friends and Family: Not on file    Attends Jainism Services: Not on file    Active Member of [address] [place] [address] or Organizations: Not on file    Attends Club or Organization Meetings: Not on file    Marital Status: Not on file   Intimate Partner Violence:     Fear of Current or Ex-Partner: Not on file    Emotionally Abused: Not on file    Physically Abused: Not on file    Sexually Abused: Not on file   Housing Stability:     Unable to Pay for Housing in the Last Year: Not on file    Number of [place] in the Last Year: Not on file    Unstable Housing in the Last Year: Not on file       ROS:   Review of Systems   Constitutional: Negative for appetite change, chills and fever. Respiratory: Negative for cough, chest tightness and shortness of breath. Cardiovascular: Negative for chest pain. Gastrointestinal: Negative for abdominal distention, abdominal pain, constipation, diarrhea, nausea and vomiting. Genitourinary: Negative for dysuria. Musculoskeletal: Negative for arthralgias and myalgias. Skin: Negative for color change. Neurological: Negative for headaches. Hematological: Negative for adenopathy.        Physical Exam:  Vitals:    05/06/22 1601   BP: (!) 83/53   Pulse: 80   Resp: 16   Temp: 96.4 °F (35.8 °C)   SpO2: 97%       PSYCH: mood and affect normal, alert and oriented x 3: No apparent distress, comfortable  EYES: Sclera white, pupils equal round and reactive to light  ENMT:  Hearing normal, trachea midline, ears externally intact  RESP: Respiratory effort was normal with no retractions or use of accessory muscles. CV: Hypotensive. No pedal edema  GI/ Abdomen: The abdomen was soft and non distended. There was no tenderness, guarding, rebound, or rigidity. There was no, hepatosplenomegaly, or hernias. No inguinal hernias were noted on coughing and straining. MSK: no clubbing/ no cyanosis     Assessment/Plan:  Abdulkadir Rashid May is a 63year old male who presented to the emergency room with sepsis likely secondary to infected toe amputation site. Imaging revealed air at the head of the pancreas consistent with duodenal diverticulum. Do not think that his hepatopancreaticobiliary system is the source of his sepsis. [id number] for diet as tolerated from HPB surgery standpoint. No plans for acute inpatient surgical intervention at this time.    Monitor abdominal exam  Pain/nausea control prn    Electronically signed by Abhinav Bhat MD on 5/7/2022 at 9:26 AM

## 2022-05-06 NOTE — CONSULTS
Department of Podiatry   Consult Note        Patient is added to OR schedule tomorrow for right foot I&D. NPO at midnight. Treatment consent ordered. Reason for Consult:  Post-op infection    CHIEF COMPLAINT:  2nd digit amputation site with worsening drainage, malodor, and redness    HISTORY OF PRESENT ILLNESS:                 Mr. Fuentes is a 63 y.o. male with significant past medical history of DM, COPD, HLD, and HTN. Patient presented to the ED today for worsening malodor, drainage, and redness to right foot. Podiatry consulted the same day for further evaluation. Patient's niece accompanied at bedside to provide HPI. States on 4/21/22, patient underwent right 2nd toe aamputation, secondary to open fracture/OM. Since then, the wound has not been able to close completely. Patient and his niece noticed significant more drainage, malodor and erythema to amputation site this past 3 days. Patient admits to right foot pain, fevers, chills, nausea, and vomiting for 4 days. No other pedal complaints at this time. Past Medical History:        Diagnosis Date    Anxiety     [facility] [address]     Depression     DM [facility]diabetes mellitus) [facility] [address]     Frostbite     HLD (hyperlipidemia)     HTN (hypertension)     Sleep apnea    ·     Past Surgical History:        Procedure Laterality Date    BACK SURGERY      CHOLECYSTECTOMY      CORONARY ARTERY BYPASS GRAFT REDO      2 vessel    FOOT DEBRIDEMENT Right 4/25/2022    DELAYED PRIMARY CLOSURE RIGHT FOOT WOUND performed by Sarah Sheehan DPM at [facility] TOE AMPUTATION Right 4/22/2022    AMPUTATION RIGHT SECOND TOE performed by Sarah Sheehan DPM at [facility]     ·     Medications Prior to Admission:    · Not in a hospital admission. Allergies:  Patient has no known allergies. Social History:   · TOBACCO:   reports that he has been smoking cigarettes. He started smoking about 42 years ago.  He has been smoking about 2.50 packs per day. He does not have any smokeless tobacco history on file. · ETOH:   reports no history of alcohol use. DRUGS:   Social History     Substance and Sexual Activity   Drug Use Never   ·     Family History:       Problem Relation Age of Onset    Dementia Mother     Stroke Father     Diabetes Sister    ·       REVIEW OF SYSTEMS:    All pertinent positives and negatives as noted in HPI       LOWER EXTREMITY EXAMINATION     Vascular Exam:  DP and PT pulses intact with hand held doppler bilaterally. CFT delayed to distal digits B/L. Skin temperature warm to warm proximal leg to distal toes.      Neuro Exam: Diminished protective sensation B/L     Dermatologic Exam:  [id number] full-thickness open wound noted to right 2nd digit amputation site with visible signs of tissue necrosis. Wound is tunneling and probing deep to bone. Significant erythema, edema,  purulent drainage, malodor noted. Erythema extending from distal forefoot to midfoot level. No crepitus or fluctuant felt on palpation. Right 3rd digit with significant erythema and edema. MSK: Muscle strength 5/5 Bilateral . ROM is full without pain or crepitation bilateral.  Mild  tenderness on palpation to dorsal aspect of the right foot. Soft compressible posterior most compartments bilaterally. CONSULTS:  IP CONSULT TO GENERAL SURGERY  IP CONSULT TO GENERAL SURGERY  IP CONSULT TO CRITICAL CARE  IP CONSULT TO INFECTIOUS DISEASES  IP CONSULT TO PODIATRY    MEDICATION:  Scheduled Meds:   metroNIDAZOLE  500 mg IntraVENous Once    cefTRIAXone         Continuous Infusions:   norepinephrine       PRN Meds:.sodium chloride flush    RADIOLOGY:  CT ABDOMEN PELVIS W IV CONTRAST Additional Contrast? None   Final Result   1. Rule out nonspecific postinflammatory changes in the right lower lobe of   the lungs.       2.  Small, rounded hypodensity, containing what appears to be air droplets   within the pancreatic head and near the ampulla. It lies immediately   adjacent to the duodenum and is in the path of the CBD. Question duodenal   diverticulum. An ampullary/pancreatic head lesion containing air cannot be   excluded. Upper GI study is recommended to see if this represents a   diverticulum. If no diverticulum is seen, then MRI of the abdomen with and   without intravenous contrast and including MRCP is recommended. 3.  Bilateral renal cortical cysts. At least 1 on the left may represent a   hemorrhagic or debris laden cyst.  Renal ultrasound is recommended. 4.  Small left inguinal hernia, which contains fat. XR FOOT RIGHT (MIN 3 VIEWS)   Final Result   Patient is status post amputation of the right 2nd toe. No acute osseous   abnormality. No evidence to suggest osteomyelitis. If osteomyelitis needs   to be excluded further, than MRI of the foot with and without intravenous   gadolinium can be considered. XR CHEST PORTABLE    (Results Pending)       Vitals:    BP (!) 83/53   Pulse 80   Temp 96.4 °F (35.8 °C) (Oral)   Resp 16   Ht 6' (1.829 m)   Wt 260 lb (117.9 kg)   SpO2 97%   BMI 35.26 kg/m²     LABS:   Recent Labs     05/06/22  1302   WBC 15.1*   HGB 14.0   HCT 42.8        Recent Labs     05/06/22  1302      K 4.8   CL 97*   CO2 24   BUN 25*   CREATININE 1.3*     Recent Labs     05/06/22  1543   PROT 6.6       ASSESSMENTS:   - Full-thickness wound, right 2nd digit amputation site - infected - POA  - Cellulitis with abscess - POA  - S/P right 2nd toe amputation (4/21/22) secondary to open fracture/OM  - Leukocytosis WBC 15.1  - DM with peripheral neuropathy    Septic shock (HCC)       PLAN:    - Patient was examined and evaluated. Reviewed patient's recent lab results, charts and pertinent diagnostic imaging. Reviewed ancillary service notes. - WBC 15.1, ESR 57, CRP 5.6  - Arterial study (4/21/22): Normal DK B/L at 1.2 with multiphasic waveforms and normal PVR.   - Pt on ceftriaxone  - Cultures right foot obtained: results pending  - X-rays:  No gas, no OM, s/p right toe amputation changes. - Dressing: xeroform & DSD for now  - Patient is added to the OR schedule tomorrow after 10am for I&D of right foot, with bone biopsy. NPO at midnight. Treatment consent ordered. Details of the surgical procedure and complications discussed with patient and his niece at bedside. All questions answered. Patient agreeable to the plan. - Will continue to follow patient while they are in-house. - Discussed patient with Dr. Barbra Hernandez    Thank you for the opportunity to take part in the patient's care. Please do not hesitate to call for any questions or concerns.     Thang Sharpe DPM  PGY1  5/6/2022  5:58 PM

## 2022-05-06 NOTE — H&P
[place] [address] [facility]   History and Physical      CHIEF COMPLAINT:  Foot pain    History of Present Illness: pt is a 63 y.o. male who presents for right foot pain. Pt had recent amputation of toe with osteomyelitis. Pt had started to have symptoms about 3 to 4 days ago. Pt was having erythema, swelling, pain, and increased temp. Pt had c/o chills. Pt with fever with known temp as high as 101. 4. pt had been taking ibuprofen. Pt with n/v. Pt actively smoking. Pt denies cp, sob, abd pain, diarrhea, constipation, melena, hematochezia, change in urination, dysuria, or hematuria. REVIEW OF SYSTEMS:    A detailed system review was conducted with patient and is negative unless stated in hpi. PMH:  Past Medical History:   Diagnosis Date    Anxiety     [facility]     Depression     DM (diabetes mellitus) ([facility]     Frostbite     HLD (hyperlipidemia)     HTN (hypertension)     Sleep apnea        Surgical History:  Past Surgical History:   Procedure Laterality Date    BACK SURGERY      CHOLECYSTECTOMY      CORONARY ARTERY BYPASS GRAFT REDO      2 vessel    FOOT DEBRIDEMENT Right 4/25/2022    DELAYED PRIMARY CLOSURE RIGHT FOOT WOUND performed by Sal Calvillo DPM at [facility] TOE AMPUTATION Right 4/22/2022    AMPUTATION RIGHT SECOND TOE performed by Sal Calvillo DPM at [place] [address]         Medications Prior to Admission:    Prior to Admission medications    Medication Sig Start Date End Date Taking?  Authorizing Provider   atorvastatin (LIPITOR) 40 MG tablet Take 40 mg by mouth at bedtime    Historical Provider, MD   metoprolol tartrate (LOPRESSOR) 25 MG tablet Take 25 mg by mouth 2 times daily    Historical Provider, MD   metFORMIN (GLUCOPHAGE) 1000 MG tablet Take 1,000 mg by mouth 2 times daily (with meals)    Historical Provider, MD   levothyroxine (SYNTHROID) 25 MCG tablet Take 25 mcg by mouth Daily Historical Provider, MD   sertraline (ZOLOFT) 50 MG tablet Take 50 mg by mouth daily    Historical Provider, MD   glimepiride (AMARYL) 4 MG tablet Take 4 mg by mouth in the morning and at bedtime    Historical Provider, MD   JARDIANCE 10 MG tablet Take 10 mg by mouth daily 3/25/22   Historical Provider, MD   pregabalin (LYRICA) 100 MG capsule Take 100 mg by mouth at bedtime. 3/25/22   Historical Provider, MD   lisinopril-hydroCHLOROthiazide (PRINZIDE;ZESTORETIC) 20-12.5 MG per tablet Take 20 tablets by mouth daily 3/25/22   Historical Provider, MD   lisinopril (PRINIVIL;ZESTRIL) 2.5 MG tablet Take 2.5 mg by mouth daily  4/21/22  Historical Provider, MD       Allergies:    Patient has no known allergies. Social History:    reports that he has been smoking cigarettes. He started smoking about 42 years ago. He has been smoking about 2.50 packs per day. He does not have any smokeless tobacco history on file. He reports that he does not drink alcohol and does not use drugs. Family History:       Problem Relation Age of Onset   Moreno Dementia Mother     Stroke Father     Diabetes Sister              PHYSICAL EXAM:    Vitals:  /61   Pulse 81   Temp 97.8 °F (36.6 °C) (Oral)   Resp 11   Ht 6' (1.829 m)   Wt 260 lb (117.9 kg)   SpO2 98%   BMI 35.26 kg/m²     General:  Appears comfortable. Answers questions appropriately and cooperative with exam  HEENT:  Mucous membranes moist. No erythema, rhinorrhea, or post-nasal drip noted. Neck:  No carotid bruits. Heart:  Rhythm regular at rate of 84  Lungs:  CTA. No wheeze, rales, or rhonchi  Abdomen:  Positive bowel sounds positive. Soft. Non-tender. No guarding, rebound or rigidity. Breast/Rectal/Genitourinary: not pertinent. Extremities:  Negative for lower extremity edema  Skin:  Warm and dry. Foot wrapped  Vascular: 2/4 Dorsalis Pedis pulses bilaterally. Neuro:  Cranial nerves 2-12 grossly intact, no focal weakness or change in sensation noted. Extraocular muscles intact. Pupils equal, round, reactive to light. LABS:  Recent Labs     05/06/22  1302      K 4.8   CL 97*   CO2 24   BUN 25*   CREATININE 1.3*   GLUCOSE 240*   CALCIUM 9.8       Recent Labs     05/06/22  1302   WBC 15.1*   RBC 4.90   HGB 14.0   HCT 42.8   MCV 87.3   MCH 28.6   MCHC 32.7   RDW 13.8      MPV 11.6       No results for input(s): POCGLU in the last 72 hours. CBC with Differential:    Lab Results   Component Value Date    WBC 15.1 05/06/2022    RBC 4.90 05/06/2022    HGB 14.0 05/06/2022    HCT 42.8 05/06/2022     05/06/2022    MCV 87.3 05/06/2022    MCH 28.6 05/06/2022    MCHC 32.7 05/06/2022    RDW 13.8 05/06/2022    LYMPHOPCT 17.5 05/06/2022    MONOPCT 6.0 05/06/2022    BASOPCT 0.7 05/06/2022    MONOSABS 0.90 05/06/2022    LYMPHSABS 2.65 05/06/2022    EOSABS 0.21 05/06/2022    BASOSABS 0.11 05/06/2022     BMP:    Lab Results   Component Value Date     05/06/2022    K 4.8 05/06/2022    K 4.5 04/22/2022    CL 97 05/06/2022    CO2 24 05/06/2022    BUN 25 05/06/2022    LABALBU 3.3 05/06/2022    CREATININE 1.3 05/06/2022    CALCIUM 9.8 05/06/2022    GFRAA >60 05/06/2022    LABGLOM 56 05/06/2022    GLUCOSE 240 05/06/2022     Hepatic Function Panel:    Lab Results   Component Value Date    ALKPHOS 123 05/06/2022    ALT 21 05/06/2022    AST 17 05/06/2022    PROT 6.6 05/06/2022    BILITOT <0.2 05/06/2022    BILIDIR <0.2 05/06/2022    IBILI see below 05/06/2022    LABALBU 3.3 05/06/2022     LIPASE:    Lab Results   Component Value Date    LIPASE 47 05/06/2022       Radiology: XR FOOT RIGHT (MIN 3 VIEWS)    Result Date: 5/6/2022  EXAMINATION: THREE XRAY VIEWS OF THE RIGHT FOOT 5/6/2022 1:10 pm COMPARISON: The previous study performed 04/20/2022.  HISTORY: ORDERING SYSTEM PROVIDED HISTORY: post-op pain, infection 2nd toe TECHNOLOGIST PROVIDED HISTORY: Reason for exam:->post-op pain, infection 2nd toe FINDINGS: The patient is status post amputation of the 2nd toe. No acute fracture or dislocation is identified. No significant osteo-degenerative changes or other osseous abnormalities are appreciated. There is no evidence to suggest osteomyelitis. If osteomyelitis needs to be excluded further, then MRI of the foot with and without intravenous gadolinium can be considered. No discrete soft tissue gas is noted. Patient is status post amputation of the right 2nd toe. No acute osseous abnormality. No evidence to suggest osteomyelitis. If osteomyelitis needs to be excluded further, than MRI of the foot with and without intravenous gadolinium can be considered. CT ABDOMEN PELVIS W IV CONTRAST Additional Contrast? None    Result Date: 5/6/2022  EXAMINATION: CT OF THE ABDOMEN AND PELVIS WITH CONTRAST 5/6/2022 1:46 pm TECHNIQUE: CT of the abdomen and pelvis was performed with the administration of intravenous contrast. Multiplanar reformatted images are provided for review. Dose modulation, iterative reconstruction, and/or weight based adjustment of the mA/kV was utilized to reduce the radiation dose to as low as reasonably achievable. COMPARISON: None. HISTORY: ORDERING SYSTEM PROVIDED HISTORY: abdominal pain TECHNOLOGIST PROVIDED HISTORY: Reason for exam:->abdominal pain Additional Contrast?->None Decision Support Exception - unselect if not a suspected or confirmed emergency medical condition->Emergency Medical Condition (MA) FINDINGS: Lower Chest: There are ill-defined, hazy opacities dependently within the right lower lobe, along with tiny cystic foci. Rule out nonspecific postinflammatory changes. A tiny calcified right middle lobe granuloma is seen. The patient is status post cardiac surgery. Organs: The liver, spleen, biliary tree, and adrenal glands are unremarkable. The patient is status post cholecystectomy. Within the pancreatic head there is a 2.2 x 1.9 cm rounded hypodensity which contains what appear to be air droplets.   It is immediately adjacent to the duodenum. It also lies in the path of the CBD, by the ampullary. This could represent a duodenal diverticulum. The presence of an ampullary/pancreatic head lesion containing air cannot be excluded. At this time, upper GI study is recommended to see if this represents a diverticulum. If no diverticulum is seen, then MRI of the abdomen with and without intravenous contrast and including MRCP is recommended. Bilateral renal cortical cysts are noted. The cyst in the right kidney measures 1.6 x 1.0 cm and is in the midpole. The largest involving the left kidney is noted in the midpole and measures 1.2 cm in diameter. There is another exophytic subcentimeter hypodense lesion in the midpole cortex of the left kidney, which may represent a hemorrhagic or debris laden cyst.  A similar, but even smaller finding may be present in the upper pole cortex of the left kidney. Renal ultrasound is recommended. GI/Bowel: The stomach is suboptimally distended, but grossly unremarkable. The small and large bowel are unremarkable for a non oral contrast study. The appendix is normal in appearance. Pelvis: The urinary bladder is suboptimally distended and grossly unremarkable. The seminal vesicles are symmetric in size. The prostate gland is normal in appearance. Peritoneum/Retroperitoneum: No retroperitoneal lymphadenopathy is identified. Shotty bilateral external iliac lymph nodes are noted. No free fluid is seen in the abdomen and pelvis. No abdominal or pelvic soft tissue mass is appreciated. The abdominal aorta is atherosclerotic. Bones/Soft Tissues: A small left inguinal hernia is noted, which contains fat. Osteo-degenerative changes are noted involving the visualized thoracolumbar spine. 1.  Rule out nonspecific postinflammatory changes in the right lower lobe of the lungs. 2.  Small, rounded hypodensity, containing what appears to be air droplets within the pancreatic head and near the ampulla. It lies immediately adjacent to the duodenum and is in the path of the CBD. Question duodenal diverticulum. An ampullary/pancreatic head lesion containing air cannot be excluded. Upper GI study is recommended to see if this represents a diverticulum. If no diverticulum is seen, then MRI of the abdomen with and without intravenous contrast and including MRCP is recommended. 3.  Bilateral renal cortical cysts. At least 1 on the left may represent a hemorrhagic or debris laden cyst.  Renal ultrasound is recommended. 4.  Small left inguinal hernia, which contains fat. XR CHEST PORTABLE    Result Date: 5/6/2022  EXAMINATION: ONE XRAY VIEW OF THE CHEST 5/6/2022 4:01 pm COMPARISON: None. HISTORY: ORDERING SYSTEM PROVIDED HISTORY: central line placement TECHNOLOGIST PROVIDED HISTORY: Reason for exam:->central line placement FINDINGS: A single portable AP view of the chest was obtained. A right internal jugular central venous line terminates within the superior vena cava. There are sternotomy wires. Heart, mediastinum, and pulmonary vasculature are within normal limits. Lungs and pleural spaces are clear. There is no evidence of pneumothorax. The left lateral costophrenic angle is excluded from the field of view. Right internal jugular central venous line terminates within the superior vena cava with no evidence of pneumothorax. ASSESSMENT:      Principal Problem:    Septic shock ([address]  Resolved Problems:    * No resolved hospital problems. *      PLAN:    1. Sepsis with shock (leukocytosis with hypotension with infection)POA  Place in icu. Continue pressors. Supportive care and tx underlying infection. intensivist following  2. Right foot osteomyelitis  Continue abx. Podiatry with plan for surgery tomorrow. 3. Duodenal diverticulum gen surgery on consult  4. dimitri iv fluids  5. Dm type 2 uncontrolled monitor bs and tx with insulin prn  6. Dehydration iv fluids  7. htn monitor bp  8.  Elevated lactic acid level monitor            Electronically signed by Ramon Frank DO on 5/6/2022 at 6:51 PM

## 2022-05-07 ENCOUNTER — ANESTHESIA EVENT (OUTPATIENT)
Dept: OPERATING ROOM | Age: 62
DRG: 856 | End: 2022-05-07
Payer: MEDICARE

## 2022-05-07 ENCOUNTER — ANESTHESIA (OUTPATIENT)
Dept: OPERATING ROOM | Age: 62
DRG: 856 | End: 2022-05-07
Payer: MEDICARE

## 2022-05-07 VITALS
DIASTOLIC BLOOD PRESSURE: 67 MMHG | SYSTOLIC BLOOD PRESSURE: 152 MMHG | OXYGEN SATURATION: 96 % | RESPIRATION RATE: 16 BRPM

## 2022-05-07 LAB
ALBUMIN SERPL-MCNC: 3.1 G/DL (ref 3.5–5.2)
ALP BLD-CCNC: 107 U/L (ref 40–129)
ALT SERPL-CCNC: 19 U/L (ref 0–40)
ANION GAP SERPL CALCULATED.3IONS-SCNC: 8 MMOL/L (ref 7–16)
ANISOCYTOSIS: ABNORMAL
AST SERPL-CCNC: 14 U/L (ref 0–39)
ATYPICAL LYMPHOCYTE RELATIVE PERCENT: 2.6 % (ref 0–4)
BASOPHILS ABSOLUTE: 0 E9/L (ref 0–0.2)
BASOPHILS RELATIVE PERCENT: 0 % (ref 0–2)
BILIRUB SERPL-MCNC: <0.2 MG/DL (ref 0–1.2)
BUN BLDV-MCNC: 24 MG/DL (ref 6–23)
CALCIUM SERPL-MCNC: 8.7 MG/DL (ref 8.6–10.2)
CHLORIDE BLD-SCNC: 104 MMOL/L (ref 98–107)
CO2: 26 MMOL/L (ref 22–29)
CREAT SERPL-MCNC: 1.1 MG/DL (ref 0.7–1.2)
EOSINOPHILS ABSOLUTE: 0.33 E9/L (ref 0.05–0.5)
EOSINOPHILS RELATIVE PERCENT: 3.5 % (ref 0–6)
GFR AFRICAN AMERICAN: >60
GFR NON-AFRICAN AMERICAN: >60 ML/MIN/1.73
GLUCOSE BLD-MCNC: 117 MG/DL (ref 74–99)
HCT VFR BLD CALC: 37.2 % (ref 37–54)
HEMOGLOBIN: 12.4 G/DL (ref 12.5–16.5)
LYMPHOCYTES ABSOLUTE: 2.85 E9/L (ref 1.5–4)
LYMPHOCYTES RELATIVE PERCENT: 27.8 % (ref 20–42)
MAGNESIUM: 1.9 MG/DL (ref 1.6–2.6)
MCH RBC QN AUTO: 28.8 PG (ref 26–35)
MCHC RBC AUTO-ENTMCNC: 33.3 % (ref 32–34.5)
MCV RBC AUTO: 86.5 FL (ref 80–99.9)
METER GLUCOSE: 113 MG/DL (ref 74–99)
METER GLUCOSE: 170 MG/DL (ref 74–99)
METER GLUCOSE: 209 MG/DL (ref 74–99)
METER GLUCOSE: 82 MG/DL (ref 74–99)
MONOCYTES ABSOLUTE: 0.28 E9/L (ref 0.1–0.95)
MONOCYTES RELATIVE PERCENT: 2.6 % (ref 2–12)
NEUTROPHILS ABSOLUTE: 6.08 E9/L (ref 1.8–7.3)
NEUTROPHILS RELATIVE PERCENT: 63.5 % (ref 43–80)
NUCLEATED RED BLOOD CELLS: 0 /100 WBC
OVALOCYTES: ABNORMAL
PDW BLD-RTO: 13.8 FL (ref 11.5–15)
PHOSPHORUS: 4.3 MG/DL (ref 2.5–4.5)
PLATELET # BLD: 239 E9/L (ref 130–450)
PMV BLD AUTO: 11 FL (ref 7–12)
POIKILOCYTES: ABNORMAL
POLYCHROMASIA: ABNORMAL
POTASSIUM REFLEX MAGNESIUM: 4.4 MMOL/L (ref 3.5–5)
RBC # BLD: 4.3 E12/L (ref 3.8–5.8)
SODIUM BLD-SCNC: 138 MMOL/L (ref 132–146)
TEAR DROP CELLS: ABNORMAL
TOTAL PROTEIN: 6.3 G/DL (ref 6.4–8.3)
WBC # BLD: 9.5 E9/L (ref 4.5–11.5)

## 2022-05-07 PROCEDURE — 6360000002 HC RX W HCPCS: Performed by: INTERNAL MEDICINE

## 2022-05-07 PROCEDURE — 6370000000 HC RX 637 (ALT 250 FOR IP)

## 2022-05-07 PROCEDURE — 2580000003 HC RX 258: Performed by: INTERNAL MEDICINE

## 2022-05-07 PROCEDURE — 6370000000 HC RX 637 (ALT 250 FOR IP): Performed by: INTERNAL MEDICINE

## 2022-05-07 PROCEDURE — 82962 GLUCOSE BLOOD TEST: CPT

## 2022-05-07 PROCEDURE — 87205 SMEAR GRAM STAIN: CPT

## 2022-05-07 PROCEDURE — 36592 COLLECT BLOOD FROM PICC: CPT

## 2022-05-07 PROCEDURE — 3700000001 HC ADD 15 MINUTES (ANESTHESIA): Performed by: PODIATRIST

## 2022-05-07 PROCEDURE — 0Y9M0ZZ DRAINAGE OF RIGHT FOOT, OPEN APPROACH: ICD-10-PCS | Performed by: PODIATRIST

## 2022-05-07 PROCEDURE — 3600000012 HC SURGERY LEVEL 2 ADDTL 15MIN: Performed by: PODIATRIST

## 2022-05-07 PROCEDURE — 2500000003 HC RX 250 WO HCPCS: Performed by: PODIATRIST

## 2022-05-07 PROCEDURE — 87070 CULTURE OTHR SPECIMN AEROBIC: CPT

## 2022-05-07 PROCEDURE — A4216 STERILE WATER/SALINE, 10 ML: HCPCS | Performed by: SPECIALIST

## 2022-05-07 PROCEDURE — 87116 MYCOBACTERIA CULTURE: CPT

## 2022-05-07 PROCEDURE — 84100 ASSAY OF PHOSPHORUS: CPT

## 2022-05-07 PROCEDURE — 6360000002 HC RX W HCPCS

## 2022-05-07 PROCEDURE — 3600000002 HC SURGERY LEVEL 2 BASE: Performed by: PODIATRIST

## 2022-05-07 PROCEDURE — 6370000000 HC RX 637 (ALT 250 FOR IP): Performed by: NURSE PRACTITIONER

## 2022-05-07 PROCEDURE — 87102 FUNGUS ISOLATION CULTURE: CPT

## 2022-05-07 PROCEDURE — 2709999900 HC NON-CHARGEABLE SUPPLY: Performed by: PODIATRIST

## 2022-05-07 PROCEDURE — 3700000000 HC ANESTHESIA ATTENDED CARE: Performed by: PODIATRIST

## 2022-05-07 PROCEDURE — 87206 SMEAR FLUORESCENT/ACID STAI: CPT

## 2022-05-07 PROCEDURE — 2580000003 HC RX 258

## 2022-05-07 PROCEDURE — 6360000002 HC RX W HCPCS: Performed by: SPECIALIST

## 2022-05-07 PROCEDURE — 80053 COMPREHEN METABOLIC PANEL: CPT

## 2022-05-07 PROCEDURE — 83735 ASSAY OF MAGNESIUM: CPT

## 2022-05-07 PROCEDURE — 87015 SPECIMEN INFECT AGNT CONCNTJ: CPT

## 2022-05-07 PROCEDURE — 0Q9N0ZZ DRAINAGE OF RIGHT METATARSAL, OPEN APPROACH: ICD-10-PCS | Performed by: PODIATRIST

## 2022-05-07 PROCEDURE — 88311 DECALCIFY TISSUE: CPT

## 2022-05-07 PROCEDURE — 0KBV0ZZ EXCISION OF RIGHT FOOT MUSCLE, OPEN APPROACH: ICD-10-PCS | Performed by: PODIATRIST

## 2022-05-07 PROCEDURE — 99222 1ST HOSP IP/OBS MODERATE 55: CPT | Performed by: INTERNAL MEDICINE

## 2022-05-07 PROCEDURE — 1200000000 HC SEMI PRIVATE

## 2022-05-07 PROCEDURE — 87075 CULTR BACTERIA EXCEPT BLOOD: CPT

## 2022-05-07 PROCEDURE — 87077 CULTURE AEROBIC IDENTIFY: CPT

## 2022-05-07 PROCEDURE — 87186 SC STD MICRODIL/AGAR DIL: CPT

## 2022-05-07 PROCEDURE — 99233 SBSQ HOSP IP/OBS HIGH 50: CPT | Performed by: INTERNAL MEDICINE

## 2022-05-07 PROCEDURE — 36415 COLL VENOUS BLD VENIPUNCTURE: CPT

## 2022-05-07 PROCEDURE — 2580000003 HC RX 258: Performed by: SPECIALIST

## 2022-05-07 PROCEDURE — 88304 TISSUE EXAM BY PATHOLOGIST: CPT

## 2022-05-07 PROCEDURE — 85025 COMPLETE CBC W/AUTO DIFF WBC: CPT

## 2022-05-07 RX ORDER — ENOXAPARIN SODIUM 100 MG/ML
30 INJECTION SUBCUTANEOUS 2 TIMES DAILY
Status: DISCONTINUED | OUTPATIENT
Start: 2022-05-07 | End: 2022-05-19 | Stop reason: HOSPADM

## 2022-05-07 RX ORDER — ENOXAPARIN SODIUM 100 MG/ML
40 INJECTION SUBCUTANEOUS DAILY
Status: DISCONTINUED | OUTPATIENT
Start: 2022-05-07 | End: 2022-05-07 | Stop reason: DRUGHIGH

## 2022-05-07 RX ORDER — MIDODRINE HYDROCHLORIDE 5 MG/1
10 TABLET ORAL ONCE
Status: COMPLETED | OUTPATIENT
Start: 2022-05-07 | End: 2022-05-07

## 2022-05-07 RX ORDER — MIDAZOLAM HYDROCHLORIDE 1 MG/ML
INJECTION INTRAMUSCULAR; INTRAVENOUS PRN
Status: DISCONTINUED | OUTPATIENT
Start: 2022-05-07 | End: 2022-05-07 | Stop reason: SDUPTHER

## 2022-05-07 RX ORDER — 0.9 % SODIUM CHLORIDE 0.9 %
250 INTRAVENOUS SOLUTION INTRAVENOUS ONCE
Status: COMPLETED | OUTPATIENT
Start: 2022-05-07 | End: 2022-05-07

## 2022-05-07 RX ORDER — OXYCODONE HYDROCHLORIDE AND ACETAMINOPHEN 5; 325 MG/1; MG/1
1 TABLET ORAL EVERY 4 HOURS PRN
Status: DISCONTINUED | OUTPATIENT
Start: 2022-05-07 | End: 2022-05-12

## 2022-05-07 RX ORDER — FENTANYL CITRATE 50 UG/ML
INJECTION, SOLUTION INTRAMUSCULAR; INTRAVENOUS PRN
Status: DISCONTINUED | OUTPATIENT
Start: 2022-05-07 | End: 2022-05-07 | Stop reason: SDUPTHER

## 2022-05-07 RX ORDER — PROPOFOL 10 MG/ML
INJECTION, EMULSION INTRAVENOUS CONTINUOUS PRN
Status: DISCONTINUED | OUTPATIENT
Start: 2022-05-07 | End: 2022-05-07 | Stop reason: SDUPTHER

## 2022-05-07 RX ORDER — SODIUM CHLORIDE 9 MG/ML
INJECTION, SOLUTION INTRAVENOUS CONTINUOUS PRN
Status: DISCONTINUED | OUTPATIENT
Start: 2022-05-07 | End: 2022-05-07 | Stop reason: SDUPTHER

## 2022-05-07 RX ADMIN — INSULIN LISPRO 2 UNITS: 100 INJECTION, SOLUTION INTRAVENOUS; SUBCUTANEOUS at 11:48

## 2022-05-07 RX ADMIN — SODIUM CHLORIDE 250 ML: 9 INJECTION, SOLUTION INTRAVENOUS at 09:00

## 2022-05-07 RX ADMIN — FENTANYL CITRATE 25 MCG: 50 INJECTION, SOLUTION INTRAMUSCULAR; INTRAVENOUS at 19:47

## 2022-05-07 RX ADMIN — FENTANYL CITRATE 50 MCG: 50 INJECTION, SOLUTION INTRAMUSCULAR; INTRAVENOUS at 07:45

## 2022-05-07 RX ADMIN — OXYCODONE AND ACETAMINOPHEN 1 TABLET: 5; 325 TABLET ORAL at 22:58

## 2022-05-07 RX ADMIN — MIDAZOLAM 2 MG: 1 INJECTION INTRAMUSCULAR; INTRAVENOUS at 07:45

## 2022-05-07 RX ADMIN — SODIUM CHLORIDE: 9 INJECTION, SOLUTION INTRAVENOUS at 05:59

## 2022-05-07 RX ADMIN — MIDODRINE HYDROCHLORIDE 10 MG: 5 TABLET ORAL at 14:19

## 2022-05-07 RX ADMIN — SODIUM CHLORIDE, PRESERVATIVE FREE 10 ML: 5 INJECTION INTRAVENOUS at 21:10

## 2022-05-07 RX ADMIN — SODIUM CHLORIDE: 9 INJECTION, SOLUTION INTRAVENOUS at 07:40

## 2022-05-07 RX ADMIN — PREGABALIN 100 MG: 50 CAPSULE ORAL at 21:52

## 2022-05-07 RX ADMIN — ATORVASTATIN CALCIUM 40 MG: 40 TABLET, FILM COATED ORAL at 21:52

## 2022-05-07 RX ADMIN — PROPOFOL 75 MCG/KG/MIN: 10 INJECTION, EMULSION INTRAVENOUS at 07:45

## 2022-05-07 RX ADMIN — PIPERACILLIN SODIUM AND TAZOBACTAM SODIUM 4500 MG: 4; .5 INJECTION, POWDER, LYOPHILIZED, FOR SOLUTION INTRAVENOUS at 12:12

## 2022-05-07 RX ADMIN — SODIUM CHLORIDE: 9 INJECTION, SOLUTION INTRAVENOUS at 23:54

## 2022-05-07 RX ADMIN — DAPTOMYCIN 700 MG: 500 INJECTION, POWDER, LYOPHILIZED, FOR SOLUTION INTRAVENOUS at 12:05

## 2022-05-07 RX ADMIN — SERTRALINE 50 MG: 100 TABLET, FILM COATED ORAL at 09:03

## 2022-05-07 RX ADMIN — ENOXAPARIN SODIUM 30 MG: 100 INJECTION SUBCUTANEOUS at 21:51

## 2022-05-07 RX ADMIN — KETOROLAC TROMETHAMINE 30 MG: 30 INJECTION, SOLUTION INTRAMUSCULAR at 12:28

## 2022-05-07 RX ADMIN — ACETAMINOPHEN 650 MG: 325 TABLET ORAL at 17:32

## 2022-05-07 RX ADMIN — INSULIN LISPRO 1 UNITS: 100 INJECTION, SOLUTION INTRAVENOUS; SUBCUTANEOUS at 09:03

## 2022-05-07 RX ADMIN — ENOXAPARIN SODIUM 30 MG: 100 INJECTION SUBCUTANEOUS at 09:03

## 2022-05-07 RX ADMIN — PIPERACILLIN SODIUM AND TAZOBACTAM SODIUM 4500 MG: 4; .5 INJECTION, POWDER, LYOPHILIZED, FOR SOLUTION INTRAVENOUS at 19:51

## 2022-05-07 ASSESSMENT — PULMONARY FUNCTION TESTS
PIF_VALUE: 0
PIF_VALUE: 1
PIF_VALUE: 0
PIF_VALUE: 1
PIF_VALUE: 1
PIF_VALUE: 0
PIF_VALUE: 1
PIF_VALUE: 0
PIF_VALUE: 1
PIF_VALUE: 0
PIF_VALUE: 1
PIF_VALUE: 1
PIF_VALUE: 0
PIF_VALUE: 1
PIF_VALUE: 0
PIF_VALUE: 1
PIF_VALUE: 1

## 2022-05-07 ASSESSMENT — PAIN DESCRIPTION - LOCATION
LOCATION: FOOT
LOCATION: FOOT

## 2022-05-07 ASSESSMENT — PAIN - FUNCTIONAL ASSESSMENT
PAIN_FUNCTIONAL_ASSESSMENT: PREVENTS OR INTERFERES WITH ALL ACTIVE AND SOME PASSIVE ACTIVITIES
PAIN_FUNCTIONAL_ASSESSMENT: PREVENTS OR INTERFERES WITH MANY ACTIVE NOT PASSIVE ACTIVITIES

## 2022-05-07 ASSESSMENT — PAIN DESCRIPTION - DESCRIPTORS
DESCRIPTORS: THROBBING;SHARP;SORE
DESCRIPTORS: STABBING;THROBBING

## 2022-05-07 ASSESSMENT — PAIN SCALES - GENERAL
PAINLEVEL_OUTOF10: 8
PAINLEVEL_OUTOF10: 0
PAINLEVEL_OUTOF10: 0
PAINLEVEL_OUTOF10: 4
PAINLEVEL_OUTOF10: 9

## 2022-05-07 ASSESSMENT — PAIN DESCRIPTION - ORIENTATION
ORIENTATION: RIGHT
ORIENTATION: ANTERIOR;POSTERIOR

## 2022-05-07 ASSESSMENT — LIFESTYLE VARIABLES: SMOKING_STATUS: 1

## 2022-05-07 NOTE — PATIENT CARE CONFERENCE
Intensive Care Daily Quality Rounding Checklist      ICU Team Members: Dr. Katherine Valderrama, resident, bedside nurse, charge nurse,     ICU Day #: NUMBER: 2    Intubation Date: n/a     Ventilator Day #: n/a    Central Line Insertion Date: May 6        Day #: NUMBER: 2     Arterial Line Insertion Date: n/a       Day #: n/a    Temporary Hemodialysis Catheter Insertion Date: n/a       Day # n/a     DVT Prophylaxis: lovenox     GI Prophylaxis: protonix     Bryant Catheter Insertion Date: n/a        Day #: n/a       Continued need (if yes, reason documented and discussed with physician): n/a     Skin Issues/ Wounds and ordered treatment discussed on rounds: right foot wound     Goals/ Plans for the Day: Daily labs and replace as needed, monitor BP, I+D of right foot, diabetes education. Transfer to [facility] later.

## 2022-05-07 NOTE — BRIEF OP NOTE
Brief Postoperative Note      Patient: Mckenzie Huerta May  YOB: 1960  MRN: [id number]    Date of Procedure: 5/7/2022    Pre-Op Diagnosis: abscess right foot, ulcer right foot with necrosis of muscle, acute OM right foot    Post-Op Diagnosis: Same       Procedure(s):  FOOT DEBRIDEMENT INCISION AND DRAINAGE    Surgeon(s):  Gideon Hassan DPM    Assistant:  * No surgical staff found *    Anesthesia: Monitor Anesthesia Care    Estimated Blood Loss (mL): Minimal    Complications: None    Specimens:   ID Type Source Tests Collected by Time Destination   1 : ABSCESS RIGHT FOOT FOR CULTURE Tissue Tissue CULTURE, ANAEROBIC, CULTURE, FUNGUS, GRAM STAIN, CULTURE, SURGICAL, CULTURE WITH SMEAR, ACID FAST BACILLIUS Gideon X Dwain, AMAYA 5/7/2022 0754    2 : 2ND METATARSAL BONE RIGHT FOOT FOR CULTURE Bone Bone CULTURE, ANAEROBIC, CULTURE, FUNGUS, GRAM STAIN, CULTURE, SURGICAL, CULTURE WITH SMEAR, ACID FAST BACILLIUS Gideon X Dwain, AMAYA 5/7/2022 0758    A : 2ND METATARSAL BONE RIGHT FOOT Bone Bone SURGICAL PATHOLOGY Gideon Prakash DPM 5/7/2022 0756        Implants:  * No implants in log *      Drains: * No LDAs found *    Findings: 4-5 cc seropurulent abscess expressed.     Electronically signed by Casey Chavira DPM on 5/7/2022 at 8:07 AM

## 2022-05-07 NOTE — CONSULTS
HTN (hypertension)     Sleep apnea      April 2022. Admitted to [facility] with a chronic ulcer on the dorsum of the right second toe. He was being treated with Bactrim and Cephalexin as an outpatient. He underwent an amputation of the right second toe on 4/21/2022. Seen by ID for osteomyelitis of the toe. Seen by ID for osteomyelitis of the right second toe. There was no cellulitis in the proximal part of the toe or the foot so the patient did not warrant any antibiotics. Past Surgical History:        Procedure Laterality Date    BACK SURGERY      CHOLECYSTECTOMY      CORONARY ARTERY BYPASS GRAFT REDO      2 vessel    FOOT DEBRIDEMENT Right 4/25/2022    DELAYED PRIMARY CLOSURE RIGHT FOOT WOUND performed by Macy Drew DPM at [place] [address] TOE AMPUTATION Right 4/22/2022    AMPUTATION RIGHT SECOND TOE performed by Macy Drew DPM at [place] [address] UVULOPALATOPHARYGOPLASTY       Current Medications:   Scheduled Meds:   piperacillin-tazobactam  4,500 mg IntraVENous Q8H    daptomycin (CUBICIN) IVPB  6 mg/kg IntraVENous Q24H    atorvastatin  40 mg Oral Nightly    levothyroxine  25 mcg Oral Daily    metoprolol tartrate  25 mg Oral BID    sertraline  50 mg Oral Daily    pregabalin  100 mg Oral Nightly    sodium chloride flush  5-40 mL IntraVENous 2 times per day    enoxaparin  30 mg SubCUTAneous BID    insulin lispro  0-6 Units SubCUTAneous TID WC    insulin lispro  0-3 Units SubCUTAneous Nightly     Continuous Infusions:   norepinephrine Stopped (05/07/22 0809)    sodium chloride      sodium chloride 100 mL/hr at 05/07/22 0740    dextrose       PRN Meds:sodium chloride flush, sodium chloride flush, sodium chloride, ondansetron **OR** ondansetron, polyethylene glycol, acetaminophen **OR** acetaminophen, glucagon (rDNA), dextrose, glucose, dextrose bolus **OR** dextrose bolus, fentanNYL, HYDROmorphone, ketorolac    Allergies:  Patient has no known allergies.     Social History:   Social History     Socioeconomic History    Marital status:      Spouse name: Not on file    Number of children: Not on file    Years of education: Not on file    Highest education level: Not on file   Occupational History    Not on file   Tobacco Use    Smoking status: Current Every Day Smoker     Packs/day: 2.50     Types: Cigarettes     Start date: 38    Smokeless tobacco: Not on file   Substance and Sexual Activity    Alcohol use: Never    Drug use: Never    Sexual activity: Not on file   Other Topics Concern    Not on file   Social History Narrative    Not on file     Social Determinants of Health     Financial Resource Strain:     Difficulty of Paying Living Expenses: Not on file   Food Insecurity:     Worried About [address] in the Last Year: Not on file    Lexie of Food in the Last Year: Not on file   Transportation Needs:     Lack of Transportation (Medical): Not on file    Lack of Transportation (Non-Medical): Not on file   Physical Activity:     Days of Exercise per Week: Not on file    Minutes of Exercise per Session: Not on file   Stress:     Feeling of Stress : Not on file   Social Connections:     Frequency of Communication with Friends and Family: Not on file    Frequency of Social Gatherings with Friends and Family: Not on file    Attends Tenri[employer] Services: Not on file    Active Member of [address] [place] [address] [employer] or Organizations: Not on file    Attends Club or Organization Meetings: Not on file    Marital Status: Not on file   Intimate Partner Violence:     Fear of Current or Ex-Partner: Not on file    Emotionally Abused: Not on file    Physically Abused: Not on file    Sexually Abused: Not on file   Housing Stability:     Unable to Pay for Housing in the Last Year: Not on file    Number of [place] in the Last Year: Not on file    Unstable Housing in the Last Year: Not on file      Pets: Dog  Travel: No  The patient lives alone.   She retired from driving [employer]    Family History:       Problem Relation Age of Onset    Dementia Mother     Stroke Father     Diabetes Sister    . Otherwise non-pertinent to the chief complaint. REVIEW OF SYSTEMS:    Constitutional: Positive for fevers, chills, diaphoresis  Neurologic: Negative   Psychiatric: Negative  Rheumatologic: Negative   Endocrine: Negative  Hematologic: Negative  Immunologic: Negative  ENT: Negative  Respiratory: Negative   Cardiovascular: Negative  GI: As in the HPI. The nausea, vomiting and abdominal pain have improved  : Negative  Musculoskeletal: As in the HPI  Skin: No rashes. PHYSICAL EXAM:    Vitals:   BP (!) 89/53   Pulse 63   Temp 97 °F (36.1 °C)   Resp 16   Ht 6' (1.829 m)   Wt 265 lb 10.5 oz (120.5 kg)   SpO2 93%   BMI 36.03 kg/m²   Constitutional: The patient is awake, alert, and oriented. He is lying in bed in the ICU. He is in no distress. His niece is present. Skin: Warm and dry. No rashes were noted. HEENT: Eyes show round, and reactive pupils. No jaundice. Moist mucous membranes, no ulcerations, no thrush. Neck: Supple to movements. No lymphadenopathy. Chest: No use of accessory muscles to breathe. Symmetrical expansion. Auscultation reveals no wheezing, crackles, or rhonchi. Cardiovascular: S1 and S2 are rhythmic and regular. No murmurs appreciated. Abdomen: Positive bowel sounds to auscultation. Benign to palpation. No masses felt. No hepatosplenomegaly. Extremities: The right foot is wrapped. The second toe is missing. The wound could not be seen. Lines: Peripheral.    5/6/2022:          CBC+dif:  Recent Labs     05/06/22  1302 05/06/22  1302 05/07/22  0550   WBC 15.1*  --  9.5   HGB 14.0   < > 12.4*   HCT 42.8   < > 37.2   MCV 87.3   < > 86.5      < > 239   NEUTROABS 11.17*  --   --     < > = values in this interval not displayed.      Lab Results   Component Value Date    CRP 5.6 (H) 05/06/2022    CRP 1.1 (H) 04/21/2022      No results found for: [facility]  Lab Results   Component Value Date    SEDRATE 57 (H) 05/06/2022    SEDRATE 23 (H) 04/21/2022     Lab Results   Component Value Date    ALT 19 05/07/2022    AST 14 05/07/2022    ALKPHOS 107 05/07/2022    BILITOT <0.2 05/07/2022     Lab Results   Component Value Date     05/07/2022    K 4.4 05/07/2022     05/07/2022    CO2 26 05/07/2022    BUN 24 05/07/2022    CREATININE 1.1 05/07/2022    GFRAA >60 05/07/2022    LABGLOM >60 05/07/2022    GLUCOSE 117 05/07/2022    PROT 6.3 05/07/2022    LABALBU 3.1 05/07/2022    CALCIUM 8.7 05/07/2022    BILITOT <0.2 05/07/2022    ALKPHOS 107 05/07/2022    AST 14 05/07/2022    ALT 19 05/07/2022       No results found for: PROTIME, INR    No results found for: TSH    No results found for: NITRITE, COLORU, PHUR, LABCAST, WBCUA, RBCUA, MUCUS, TRICHOMONAS, YEAST, BACTERIA, CLARITYU, SPECGRAV, LEUKOCYTESUR, UROBILINOGEN, BILIRUBINUR, BLOODU, GLUCOSEU, AMORPHOUS    No results found for: HCO3, BE, O2SAT, PH, THGB, PCO2, PO2, TCO2  Radiology:  Noted    Microbiology:  Pending  No results for input(s): BC in the last 72 hours. No results for input(s): ORG in the last 72 hours. No results for input(s): Dickerson Carrel in the last 72 hours. No results for input(s): STREPNEUMAGU in the last 72 hours. No results for input(s): LP1UAG in the last 72 hours. No results for input(s): ASO in the last 72 hours. No results for input(s): CULTRESP in the last 72 hours. No results for input(s): PROCAL in the last 72 hours. Assessment:  · Surgical site infection right toe following right second toe amputation secondary to osteomyelitis.   Previous cultures grew MRSA, VS Enterococcus faecalis, pansensitive Pseudomonas, Myroides species and Staphylococcus cohnii  · Sepsis with septic shock secondary to right foot infection, improved  · Leukocytosis associated to the above  · Hypotension associated to the above    Plan:    · Start Zosyn and Daptomycin  · Check intraoperative cultures  · Monitor labs  · Will follow with you    Thank you for having us see this patient in consultation. Case discussed with Dr. Ghazal Ward.     Basia Wang MD  9:52 AM  5/7/2022

## 2022-05-07 NOTE — ANESTHESIA POSTPROCEDURE EVALUATION
Department of Anesthesiology  Postprocedure Note    Patient: Letty Johnson May  MRN: [id number]  YOB: 1960  Date of evaluation: 5/7/2022  Time:  8:26 AM     Procedure Summary     Date: 05/07/22 Room / Location: [facility] 01 / 106 [facility]    Anesthesia Start: 3966 Anesthesia Stop: 1354    Procedure: FOOT DEBRIDEMENT INCISION AND DRAINAGE (Right Foot) Diagnosis: (abscess right foot)    Surgeons: Ciara Rinaldi DPM Responsible Provider: Po Diaz MD    Anesthesia Type: MAC ASA Status: 4          Anesthesia Type: No value filed. Bernard Phase I:      Bernard Phase II:      Last vitals: Reviewed and per EMR flowsheets.        Anesthesia Post Evaluation    Patient location during evaluation: PACU  Patient participation: complete - patient participated  Level of consciousness: awake  Pain score: 1  Airway patency: patent  Nausea & Vomiting: no nausea and no vomiting  Complications: no  Cardiovascular status: blood pressure returned to baseline and hemodynamically stable  Respiratory status: acceptable  Hydration status: euvolemic

## 2022-05-07 NOTE — PLAN OF CARE
Problem: Discharge Planning  Goal: Discharge to home or other facility with appropriate resources  Outcome: Progressing  Flowsheets (Taken 5/6/2022 2000)  Discharge to home or other facility with appropriate resources: Identify discharge learning needs (meds, wound care, etc)     Problem: Pain  Goal: Verbalizes/displays adequate comfort level or baseline comfort level  Outcome: Progressing  Flowsheets  Taken 5/7/2022 0000  Verbalizes/displays adequate comfort level or baseline comfort level: Encourage patient to monitor pain and request assistance  Taken 5/6/2022 2000  Verbalizes/displays adequate comfort level or baseline comfort level: Implement non-pharmacological measures as appropriate and evaluate response     Problem: Skin/Tissue Integrity  Goal: Absence of new skin breakdown  Description: 1. Monitor for areas of redness and/or skin breakdown  2. Assess vascular access sites hourly  3. Every 4-6 hours minimum:  Change oxygen saturation probe site  4. Every 4-6 hours:  If on nasal continuous positive airway pressure, respiratory therapy assess nares and determine need for appliance change or resting period.   Outcome: Progressing     Problem: Safety - Adult  Goal: Free from fall injury  Outcome: Progressing     Problem: ABCDS Injury Assessment  Goal: Absence of physical injury  Outcome: Progressing

## 2022-05-07 NOTE — PROGRESS NOTES
I personally saw, examined and provided care for the patient. Radiographs, labs and medication list were reviewed by me independently. I spoke with bedside nursing, therapists and consultants. Critical care services and times documented are independent of procedures and multidisciplinary rounds with Residents. Additionally comprehensive, multidisciplinary rounds were conducted with the MICU team. The case was discussed in detail and plans for care were established. Review of Residents documentation was conducted and revisions were made as appropriate. I agree with the above documented exam, problem list and plan of care. 63 y.o. male > 80-py current smoker,  on disability due to diabetes with PMH of HTN, HLD, ALCON not on PAP, COPD, CAD with h/o CABG x2 vessels uncontrolled type 2 diabetes, with diabetic ulcer s/p amputation of the second digit on the right foot     Recent admission  4/21 - 4/26/2022 presented with right foot wound. After frostbite in 12/2021. He was seen as an outpatient at [facility] clinic given antibiotics and was seen podiatry  · Work-up showed osteomyelitis of the second toe. Cultures grew MRSA pansensitive Pseudomonas, Myroides species and Staphylococcus cohnii  · 4/25 underwent amputation 2nd R toeprimary closure of right foot wound. Pathology: OM  · Patient was discharged without antibiotics    5/6 presents with pain swelling of the right toe with fever with hypotension  Requiring pressors  Chest x-ray right IJ  CT chest with air near the head of the ampulla. Duodenal diverticulum  Foot x-ray showing no osseous abnormality  5/7 overnight weaned off of pressors. Patient underwent foot debridement incision and drainage  When he came back he is on a better pressures. 1. Septic shock resolved  2. wound Infection (right foot, 2nd digit removed 2 weeks ago by dr Nancy Servin. ) Diabetic foot ulcer right foot wound  3.  Acute renal failure with history of bilateral cortical

## 2022-05-07 NOTE — CONSULTS
ENDOCRINOLOGY INITIAL CONSULTATION NOTE      Date of admission: 5/6/2022  Date of service: 5/7/2022  Admitting physician: Ruth Gómez DO   Primary Care Physician: CHRIST Gardiner - CNP  Consultant physician: Marlen Anderson MD     Reason for the consultation:  Uncontrolled DM    History of Present Illness: The history is provided by the patient. Accuracy of the patient data is excellent    Emi Fuentes is a very pleasant 63 y.o. old male with PMH uncontrolled DM type 2, obesity, HTN, HLD and other listed below admitted to [facility] on 5/6/2022 because of Rt foot pain and swelling and found to have infected diabetic foot and in septic shock, endocrine service was consulted for diabetes management. The patient had an amputation of toe with osteomyelitis three weeks ago. He was going good after amputation until about 3 days ago when started c/o Rt foot erythema, swelling, pain, and increased temp. Prior to admission  The patient was diagnosed with type 2  DM long time ago. Prior to admission patient was on Metformin 1000 mg BID. Patient has had no hypoglycemic episodes. Patient has not been eating consistent carbohydrate meals, self-blood glucose monitoring has been variable goal prior to admission. In addition, patient reports both macrovascular and microvascular complications.  He is over due with the yearly diabetic eye exam.   Lab Results   Component Value Date    LABA1C 7.7 04/22/2022       Inpatient diet:   Carb Restricted diet     Point of care glucose monitoring   (Independently reviewed)   Recent Labs     05/06/22 2057 05/07/22  0832   GLUMET 193* 170*       Past medical history:   Past Medical History:   Diagnosis Date    Anxiety     [facility]     Depression     DM [facility]diabetes mellitus) [facility] [address]     Frostbite     HLD (hyperlipidemia)     HTN (hypertension)     Sleep apnea        Past surgical history:  Past Surgical History:   Procedure Laterality Date    BACK SURGERY      CHOLECYSTECTOMY      CORONARY ARTERY BYPASS GRAFT REDO      2 vessel    FOOT DEBRIDEMENT Right 4/25/2022    DELAYED PRIMARY CLOSURE RIGHT FOOT WOUND performed by Corazon Christopher DPM at [facility] TOE AMPUTATION Right 4/22/2022    AMPUTATION RIGHT SECOND TOE performed by Corazon Christopher DPM at [place] [address]         Social history:   Tobacco:   reports that he has been smoking cigarettes. He started smoking about 42 years ago. He has been smoking about 2.50 packs per day. He does not have any smokeless tobacco history on file. Alcohol:   reports no history of alcohol use. Drugs:   reports no history of drug use.     Family history:    Family History   Problem Relation Age of Onset    Dementia Mother     Stroke Father     Diabetes Sister        Allergy and drug reactions:   No Known Allergies    Scheduled Meds:   piperacillin-tazobactam  4,500 mg IntraVENous Q8H    daptomycin (CUBICIN) in NS IV syringe  6 mg/kg IntraVENous Q24H    enoxaparin  30 mg SubCUTAneous BID    atorvastatin  40 mg Oral Nightly    levothyroxine  25 mcg Oral Daily    metoprolol tartrate  25 mg Oral BID    sertraline  50 mg Oral Daily    pregabalin  100 mg Oral Nightly    sodium chloride flush  5-40 mL IntraVENous 2 times per day    insulin lispro  0-6 Units SubCUTAneous TID WC    insulin lispro  0-3 Units SubCUTAneous Nightly       PRN Meds:   sodium chloride flush, 10 mL, PRN  sodium chloride flush, 5-40 mL, PRN  sodium chloride, , PRN  ondansetron, 4 mg, Q8H PRN   Or  ondansetron, 4 mg, Q6H PRN  polyethylene glycol, 17 g, Daily PRN  acetaminophen, 650 mg, Q6H PRN   Or  acetaminophen, 650 mg, Q6H PRN  glucagon (rDNA), 1 mg, PRN  dextrose, 100 mL/hr, PRN  glucose, 16 g, PRN  dextrose bolus, 125 mL, PRN   Or  dextrose bolus, 250 mL, PRN  fentanNYL, 25 mcg, Q1H PRN  HYDROmorphone, 0.5 mg, Q4H PRN  ketorolac, 30 mg, Q6H PRN      Continuous Infusions:   norepinephrine Stopped (05/07/22 0809)  sodium chloride      sodium chloride 100 mL/hr at 05/07/22 0740    dextrose         Review of Systems  All systems reviewed. All negative except for symptoms mentioned in HPI     OBJECTIVE    BP (!) 89/53   Pulse 63   Temp 97 °F (36.1 °C)   Resp 16   Ht 6' (1.829 m)   Wt 265 lb 10.5 oz (120.5 kg)   SpO2 93%   BMI 36.03 kg/m²     Intake/Output Summary (Last 24 hours) at 5/7/2022 1006  Last data filed at 5/7/2022 0806  Gross per 24 hour   Intake 660 ml   Output 1100 ml   Net -440 ml       Physical examination:  General: awake alert, oriented x3  HEENT: normocephalic non traumatic, no exophthalmos   Neck: supple, No thyroid tenderness,  Pulm: good equal air entry no added sounds  CVS: S1 + S2  Abd: soft lax, no tenderness  Skin: warm, no lesions, no rash.  Diabetic foot   Neuro: CN intact, sensation decreased bilateral , muscle power normal  Psych: normal mood, and affect    Review of Laboratory Data:  I personally reviewed the following labs:   Recent Labs     05/06/22  1302 05/07/22  0550   WBC 15.1* 9.5   RBC 4.90 4.30   HGB 14.0 12.4*   HCT 42.8 37.2   MCV 87.3 86.5   MCH 28.6 28.8   MCHC 32.7 33.3   RDW 13.8 13.8    239   MPV 11.6 11.0     Recent Labs     05/06/22  1302 05/06/22  1543 05/07/22  0550     --  138   K 4.8  --  4.4   CL 97*  --  104   CO2 24  --  26   BUN 25*  --  24*   CREATININE 1.3*  --  1.1   GLUCOSE 240*  --  117*   CALCIUM 9.8  --  8.7   PROT  --  6.6 6.3*   LABALBU  --  3.3* 3.1*   BILITOT  --  <0.2 <0.2   ALKPHOS  --  123 107   AST  --  17 14   ALT  --  21 19     No results found for: BHYDRXBUT  Lab Results   Component Value Date    LABA1C 7.7 04/22/2022     No results found for: TSH, T4FREE, X5LAHRD, FT3, L3ABZPK, TSI, TPOABS, THGAB  Lab Results   Component Value Date    LABA1C 7.7 04/22/2022    GLUCOSE 117 05/07/2022     No results found for: TRIG, HDL, LDLCALC, CHOL    Blood culture   Lab Results   Component Value Date    BC 5 Days no growth 04/21/2022 Radiology:  XR CHEST PORTABLE   Final Result   Right internal jugular central venous line terminates within the superior   vena cava with no evidence of pneumothorax. CT ABDOMEN PELVIS W IV CONTRAST Additional Contrast? None   Final Result   1. Rule out nonspecific postinflammatory changes in the right lower lobe of   the lungs. 2.  Small, rounded hypodensity, containing what appears to be air droplets   within the pancreatic head and near the ampulla. It lies immediately   adjacent to the duodenum and is in the path of the CBD. Question duodenal   diverticulum. An ampullary/pancreatic head lesion containing air cannot be   excluded. Upper GI study is recommended to see if this represents a   diverticulum. If no diverticulum is seen, then MRI of the abdomen with and   without intravenous contrast and including MRCP is recommended. 3.  Bilateral renal cortical cysts. At least 1 on the left may represent a   hemorrhagic or debris laden cyst.  Renal ultrasound is recommended. 4.  Small left inguinal hernia, which contains fat. XR FOOT RIGHT (MIN 3 VIEWS)   Final Result   Patient is status post amputation of the right 2nd toe. No acute osseous   abnormality. No evidence to suggest osteomyelitis. If osteomyelitis needs   to be excluded further, than MRI of the foot with and without intravenous   gadolinium can be considered.              Medical Records/Labs/Images review:   I personally reviewed and summarized previous records   All labs and imaging were reviewed independently     [address] [facility] B May, a 63 y.o.-old male seen today for inpatient diabetes management     Diabetes Mellitus type 2   · Pt admit poor compliance with diet   · For now, will change diabetes regimen to:  · Low dose sliding scale with meals and at night   · Continue glucose check with meals and at bedtime   · Will titrate insulin dose based on the blood glucose trend & insulin requirement  · Pt will follow with us after discharge. Endocrine follow up visit,     Septic shock from infected diabetic foot   · Managed by critical care and ID service     The above issues were reviewed with the patient who understood and agreed with the plan. Thank you for allowing us to participate in the care of this patient. Please do not hesitate to contact us with any additional questions. Cecille Benson MD  Endocrinologist, [facility]   [address] [place] [id number]   Phone: [phone]  Fax: [phone]  --------------------------------------------  An electronic signature was used to authenticate this note.  Matt Galvez MD on 5/7/2022 at 10:06 AM

## 2022-05-07 NOTE — ANESTHESIA PRE PROCEDURE
Department of Anesthesiology  Preprocedure Note       Name:  Asif Jama   Age:  63 y.o.  :  1960                                          MRN:  [id number]         Date:  2022      Surgeon: Anna Dykes):  Gideon Lopez DPM    Procedure: Procedure(s):  FOOT DEBRIDEMENT INCISION AND DRAINAGE, POSSIBLE WOUND VAC    Medications prior to admission:   Prior to Admission medications    Medication Sig Start Date End Date Taking? Authorizing Provider   atorvastatin (LIPITOR) 40 MG tablet Take 40 mg by mouth at bedtime    Historical Provider, MD   metoprolol tartrate (LOPRESSOR) 25 MG tablet Take 25 mg by mouth 2 times daily    Historical Provider, MD   metFORMIN (GLUCOPHAGE) 1000 MG tablet Take 1,000 mg by mouth 2 times daily (with meals)    Historical Provider, MD   levothyroxine (SYNTHROID) 25 MCG tablet Take 25 mcg by mouth Daily    Historical Provider, MD   sertraline (ZOLOFT) 50 MG tablet Take 50 mg by mouth daily    Historical Provider, MD   glimepiride (AMARYL) 4 MG tablet Take 4 mg by mouth in the morning and at bedtime    Historical Provider, MD   JARDIANCE 10 MG tablet Take 10 mg by mouth daily 3/25/22   Historical Provider, MD   pregabalin (LYRICA) 100 MG capsule Take 100 mg by mouth at bedtime. 3/25/22   Historical Provider, MD   lisinopril-hydroCHLOROthiazide (PRINZIDE;ZESTORETIC) 20-12.5 MG per tablet Take 20 tablets by mouth daily 3/25/22   Historical Provider, MD   lisinopril (PRINIVIL;ZESTRIL) 2.5 MG tablet Take 2.5 mg by mouth daily  22  Historical Provider, MD       Current medications:    No current facility-administered medications for this visit. No current outpatient medications on file.      Facility-Administered Medications Ordered in Other Visits   Medication Dose Route Frequency Provider Last Rate Last Admin    sodium chloride flush 0.9 % injection 10 mL  10 mL IntraVENous PRN Tre Palmer DO        norepinephrine (LEVOPHED) 16 mg in dextrose 5 % 250 mL infusion  1-100 mcg/min IntraVENous Continuous Tre Hric, DO   Stopped at 05/07/22 0630    atorvastatin (LIPITOR) tablet 40 mg  40 mg Oral Nightly Tre Hric, DO   40 mg at 05/06/22 2032    levothyroxine (SYNTHROID) tablet 25 mcg  25 mcg Oral Daily Tre Hric, DO        metoprolol tartrate (LOPRESSOR) tablet 25 mg  25 mg Oral BID Tre Hric, DO        sertraline (ZOLOFT) tablet 50 mg  50 mg Oral Daily Tre Hric, DO        pregabalin (LYRICA) capsule 100 mg  100 mg Oral Nightly Tre Hric, DO   100 mg at 05/06/22 2032    sodium chloride flush 0.9 % injection 5-40 mL  5-40 mL IntraVENous 2 times per day Tre Hric, DO   10 mL at 05/06/22 2033    sodium chloride flush 0.9 % injection 5-40 mL  5-40 mL IntraVENous PRN Tre Hric, DO        0.9 % sodium chloride infusion   IntraVENous PRN Tre Hric, DO        enoxaparin Sodium (LOVENOX) injection 30 mg  30 mg SubCUTAneous BID Tre Hric, DO   30 mg at 05/06/22 2043    ondansetron (ZOFRAN-ODT) disintegrating tablet 4 mg  4 mg Oral Q8H PRN Tre Hric, DO        Or    ondansetron (ZOFRAN) injection 4 mg  4 mg IntraVENous Q6H PRN Tre Hric, DO        polyethylene glycol (GLYCOLAX) packet 17 g  17 g Oral Daily PRN Tre Hric, DO        acetaminophen (TYLENOL) tablet 650 mg  650 mg Oral Q6H PRN Tre Hric, DO   650 mg at 05/06/22 2035    Or    acetaminophen (TYLENOL) suppository 650 mg  650 mg Rectal Q6H PRN Tre Hric, DO        0.9 % sodium chloride infusion   IntraVENous Continuous Tre Hric,  mL/hr at 05/07/22 0559 New Bag at 05/07/22 0559    glucagon (rDNA) injection 1 mg  1 mg IntraMUSCular PRN Tre Hric, DO        dextrose 5 % solution  100 mL/hr IntraVENous PRN Tre Hric, DO        insulin lispro (HUMALOG) injection vial 0-6 Units  0-6 Units SubCUTAneous TID WC Tre Hric, DO        insulin lispro (HUMALOG) injection vial 0-3 Units  0-3 Units SubCUTAneous Nightly Tre Hric, DO   1 Units at 05/06/22 2100    glucose chewable tablet 16 g  16 g Oral PRN Tre Hric, DO        dextrose bolus 10% 125 mL  125 mL IntraVENous PRN Tre Hric, DO        Or    dextrose bolus 10% 250 mL  250 mL IntraVENous PRN Tre Hric, DO        fentaNYL (SUBLIMAZE) injection 25 mcg  25 mcg IntraVENous Q1H PRN Luis Mckinley MD        HYDROmorphone (DILAUDID) injection 0.5 mg  0.5 mg IntraVENous Q4H PRN Luis Mckinley MD        ketorolac (TORADOL) injection 30 mg  30 mg IntraVENous Q6H PRN Luis Mckinley MD   30 mg at 05/06/22 2032       Allergies:  No Known Allergies    Problem List:    Patient Active Problem List   Diagnosis Code    Open wound of second toe of right foot S91.104A    Toe trauma, right, initial encounter B22.182W    DM (diabetes mellitus) with peripheral vascular complication (HCC) O34.79    COPD (chronic obstructive pulmonary disease) ([facility] [address] J44.9    Sleep apnea G47.30    HTN (hypertension) I10    Septic shock ([facility]) A41.9, R65.21    Osteomyelitis of right foot ([address] M86.9    ALVINO (acute kidney injury) ([facility] [address] N17.9    Elevated lactic acid level R79.89    Uncontrolled type 2 diabetes mellitus with hyperglycemia ([address] E11.65       Past Medical History:        Diagnosis Date    Anxiety     COPD (chronic obstructive pulmonary disease) ([facility] [address] [facility]     Depression     DM (diabetes mellitus) ([facility] [address] [facility]     Frostbite     HLD (hyperlipidemia)     HTN (hypertension)     Sleep apnea        Past Surgical History:        Procedure Laterality Date    BACK SURGERY      CHOLECYSTECTOMY      CORONARY ARTERY BYPASS GRAFT REDO      2 vessel    FOOT DEBRIDEMENT Right 4/25/2022    DELAYED PRIMARY CLOSURE RIGHT FOOT WOUND performed by Sushil Deal DPM at [facility] TOE AMPUTATION Right 4/22/2022    AMPUTATION RIGHT SECOND TOE performed by Sushil Deal DPM at [facility]         Social History:    Social History     Tobacco Use    Smoking status: Current Every Day Smoker     Packs/day: 2.50     Types: Cigarettes     Start date: 38    Smokeless tobacco: Not on file   Substance Use Topics    Alcohol use: Never                                Ready to quit: Not Answered  Counseling given: Not Answered      Vital Signs (Current): There were no vitals filed for this visit. BP Readings from Last 3 Encounters:   05/07/22 84/61   04/26/22 (!) 104/58   04/25/22 (!) 98/51       NPO Status:                                                                                 BMI:   Wt Readings from Last 3 Encounters:   05/07/22 265 lb 10.5 oz (120.5 kg)   04/23/22 281 lb 11.2 oz (127.8 kg)     There is no height or weight on file to calculate BMI.    CBC:   Lab Results   Component Value Date    WBC 9.5 05/07/2022    RBC 4.30 05/07/2022    HGB 12.4 05/07/2022    HCT 37.2 05/07/2022    MCV 86.5 05/07/2022    RDW 13.8 05/07/2022     05/07/2022       CMP:   Lab Results   Component Value Date     05/07/2022    K 4.4 05/07/2022     05/07/2022    CO2 26 05/07/2022    BUN 24 05/07/2022    CREATININE 1.1 05/07/2022    GFRAA >60 05/07/2022    LABGLOM >60 05/07/2022    GLUCOSE 117 05/07/2022    PROT 6.3 05/07/2022    CALCIUM 8.7 05/07/2022    BILITOT <0.2 05/07/2022    ALKPHOS 107 05/07/2022    AST 14 05/07/2022    ALT 19 05/07/2022       POC Tests: No results for input(s): POCGLU, POCNA, POCK, POCCL, POCBUN, POCHEMO, POCHCT in the last 72 hours.     Coags: No results found for: PROTIME, INR, APTT    HCG (If Applicable): No results found for: PREGTESTUR, PREGSERUM, HCG, HCGQUANT     ABGs: No results found for: PHART, PO2ART, XCU2VVB, SIF2FOP, BEART, N0WFLMAT     Type & Screen (If Applicable):  No results found for: LABABO, LABRH    Drug/Infectious Status (If Applicable):  No results found for: HIV, HEPCAB    COVID-19 Screening (If Applicable): No results found for: COVID19    Foot Xray 4/20/22  Impression   No acute osseous abnormality.       Plantar soft tissue edema adjacent to the MTP joints. Anesthesia Evaluation  Patient summary reviewed and Nursing notes reviewed history of anesthetic complications ( patient states he has a hard time swallowing and thought it was from a breathing tube. Reassured patient it is probably related to esophagus and not trachea): Airway: Mallampati: IV  TM distance: <3 FB   Neck ROM: full  Comment: Large amount of facial hair  Mouth opening: < 3 FB Dental:          Pulmonary:   (+) COPD:  sleep apnea: on noncompliant,  decreased breath sounds,  current smoker          Patient did not smoke on day of surgery. Cardiovascular:    (+) hypertension:, CABG/stent ( CABG X2 April of 2017):, hyperlipidemia        Rhythm: regular  Rate: normal           Beta Blocker:  No for medical reason      ROS comment: Septic shock     Neuro/Psych:   (+) depression/anxiety  ( )            GI/Hepatic/Renal:   (+) GERD:,          ROS comment: obese. Endo/Other:    (+) DiabetesType II DM, poorly controlled, , hypothyroidism: arthritis: OA., .                  ROS comment: obese Abdominal:             Vascular: negative vascular ROS. Other Findings: Patient reports history of difficult intubation            Anesthesia Plan      MAC     ASA 4       Induction: intravenous. Anesthetic plan and risks discussed with patient. Plan discussed with CRNA.                 Patti Maki MD   5/7/2022

## 2022-05-07 NOTE — PROGRESS NOTES
Critical Care Team - Daily Progress Note      Date and time: 5/7/2022 6:16 AM  Patient's name:  Abdulkadir Fuentes  Medical Record Number: [id number]  Patient's account/billing number: [de-identified]  Patient's YOB: 1960  Age: 63 y.o. Date of Admission: 5/6/2022 12:38 PM  Length of stay during current admission: 1      Primary Care Physician: CHRIST Urban - CNP  ICU Attending Physician: Dr. Jemal Harvey     Code Status: Full Code    Reason for ICU admission: Septic shock with hypotension requiring pressors      SUBJECTIVE:     OVERNIGHT EVENTS:    Patient was hypotensive at times throughout the night. Patient states that he is doing well with his pain is currently at a 7 out of 10. He states this is much better than it was when he first came in. Patient denies any fever, chills, nausea, vomiting. Intake/Output:   I/O this shift:  In: 360 [P.O.:360]  Out: 1100 [Urine:1100]  No intake/output data recorded. Awake and following commands: Yes  Current Ventilation: - No ventilator support  Secretions: none   Sedation: none  Paralyzed: No  Vasopressors: None    Initial HPI + past overnight events: The patient is a 63 y.o. male with significant past medical history of hypertension, hyperlipidemia, obstructive sleep apnea, COPD currently smoking half a pack of cigarettes per day, CABG x2 vessels uncontrolled type 2 diabetes, status post amputation of the second digit on the right foot presents with Wound Infection (right foot, 2nd digit removed 2 weeks ago by dr Nancy Servin. )     Patient's status post amputation of right second toe secondary to osteomyelitis on 04/22/2022. Presented on 5/7/22 with fevers chills nausea vomiting and pus draining from the right foot associated with pain and erythema.     OBJECTIVE:     VITAL SIGNS:  BP 84/61   Pulse 65   Temp 97.7 °F (36.5 °C) (Oral)   Resp 11   Ht 6' (1.829 m)   Wt 265 lb 10.5 oz (120.5 kg)   SpO2 97%   BMI 36.03 kg/m²   Tmax over 24 hours: Temp (24hrs), Av.1 °F (36.2 °C), Min:96.1 °F (35.6 °C), Max:98.2 °F (36.8 °C)      Patient Vitals for the past 6 hrs:   BP Temp Temp src Pulse Resp SpO2 Weight   22 0600 84/61   65 11 97 %    22 0500 101/68   64  97 % 265 lb 10.5 oz (120.5 kg)   22 0400 87/66 97.7 °F (36.5 °C) Oral 65 14 97 %    22 0300 97/63   63  97 %    22 0200 (!) 79/57   68  97 %    22 0100 (!) 91/55   72  96 %          Intake/Output Summary (Last 24 hours) at 2022 0616  Last data filed at 2022 2300  Gross per 24 hour   Intake 360 ml   Output 1100 ml   Net -740 ml     Wt Readings from Last 2 Encounters:   22 265 lb 10.5 oz (120.5 kg)   22 281 lb 11.2 oz (127.8 kg)     Body mass index is 36.03 kg/m². Physical Exam  Vitals and nursing note reviewed. Constitutional:       General: He is awake. Appearance: Normal appearance. He is obese. HENT:      Head: Normocephalic and atraumatic. Nose: Nose normal. No congestion or rhinorrhea. Mouth/Throat:      Mouth: Mucous membranes are moist.      Pharynx: Oropharynx is clear. Eyes:      General: No scleral icterus. Extraocular Movements: Extraocular movements intact. Conjunctiva/sclera: Conjunctivae normal.      Pupils: Pupils are equal, round, and reactive to light. Cardiovascular:      Rate and Rhythm: Normal rate and regular rhythm. Pulses: Normal pulses. Heart sounds: Normal heart sounds. No murmur heard. Pulmonary:      Effort: Pulmonary effort is normal. No respiratory distress. Breath sounds: Normal breath sounds. No stridor. No wheezing. Abdominal:      General: Bowel sounds are normal. There is no distension. Palpations: Abdomen is soft. There is no mass. Tenderness: There is no abdominal tenderness. There is no guarding or rebound. Musculoskeletal:         General: Normal range of motion.       Cervical back: Normal range of motion and neck supple. No tenderness. Right lower leg: No edema. Left lower leg: No edema. Comments: Right foot in bandage appropriately. Foul smelling    Skin:     Findings: Lesion (several lesions) present. Neurological:      General: No focal deficit present. Mental Status: He is alert and oriented to person, place, and time. Psychiatric:         Attention and Perception: Attention normal.         Mood and Affect: Mood normal.         Behavior: Behavior normal. Behavior is cooperative. MEDICATIONS:    Scheduled Meds:   atorvastatin  40 mg Oral Nightly    levothyroxine  25 mcg Oral Daily    metoprolol tartrate  25 mg Oral BID    sertraline  50 mg Oral Daily    pregabalin  100 mg Oral Nightly    sodium chloride flush  5-40 mL IntraVENous 2 times per day    enoxaparin  30 mg SubCUTAneous BID    insulin lispro  0-6 Units SubCUTAneous TID WC    insulin lispro  0-3 Units SubCUTAneous Nightly     Continuous Infusions:   norepinephrine 1 mcg/min (05/07/22 0409)    sodium chloride      sodium chloride 100 mL/hr at 05/07/22 0559    dextrose       PRN Meds:   sodium chloride flush, 10 mL, PRN  sodium chloride flush, 5-40 mL, PRN  sodium chloride, , PRN  ondansetron, 4 mg, Q8H PRN   Or  ondansetron, 4 mg, Q6H PRN  polyethylene glycol, 17 g, Daily PRN  acetaminophen, 650 mg, Q6H PRN   Or  acetaminophen, 650 mg, Q6H PRN  glucagon (rDNA), 1 mg, PRN  dextrose, 100 mL/hr, PRN  glucose, 16 g, PRN  dextrose bolus, 125 mL, PRN   Or  dextrose bolus, 250 mL, PRN  fentanNYL, 25 mcg, Q1H PRN  HYDROmorphone, 0.5 mg, Q4H PRN  ketorolac, 30 mg, Q6H PRN          VENT SETTINGS (Comprehensive) (if applicable): Additional Respiratory Assessments  Pulse: 65  Resp: 11  SpO2: 97 %    Arterial Blood Gas 5/7/2022  None.       Laboratory findings:    Complete Blood Count:   Recent Labs     05/06/22  1302 05/07/22  0550   WBC 15.1* 9.5   HGB 14.0 12.4*   HCT 42.8 37.2    239        Last 3 Blood Glucose: Recent Labs     05/06/22  1302   GLUCOSE 240*        PT/INR:  No results found for: PROTIME, INR  PTT:  No results found for: APTT, PTT    Comprehensive Metabolic Profile:   Recent Labs     05/06/22  1302 05/06/22  1543     --    K 4.8  --    CL 97*  --    CO2 24  --    BUN 25*  --    CREATININE 1.3*  --    GLUCOSE 240*  --    CALCIUM 9.8  --    PROT  --  6.6   LABALBU  --  3.3*   BILITOT  --  <0.2   ALKPHOS  --  123   AST  --  17   ALT  --  21      Magnesium: No results found for: MG  Phosphorus: No results found for: PHOS  Ionized Calcium: No results found for: CAION     Urinalysis:     Troponin: No results for input(s): TROPONINI in the last 72 hours. Microbiology:  Cultures drawn:   Anaerobic culture, fungus, gram stain, surgical culture, acid-fast bacillus, MRSA screening, blood culture 1 and 2, wound cultures all pending    Radiology/Imaging:   CT scan last night ordered not completed. Chest Xray (5/7/2022):   No new imaging    ASSESSMENT:     Patient Active Problem List    Diagnosis Date Noted    Septic shock ([address] 05/06/2022    Osteomyelitis of right foot ([address]     ALVINO (acute kidney injury) ([address]     Elevated lactic acid level     Uncontrolled type 2 diabetes mellitus with hyperglycemia ([address]     Open wound of second toe of right foot 04/21/2022    Toe trauma, right, initial encounter 04/21/2022    DM (diabetes mellitus) with peripheral vascular complication ([address] 18/12/0357    COPD (chronic obstructive pulmonary disease) ([address] 04/21/2022    Sleep apnea 04/21/2022    HTN (hypertension) 04/21/2022         PLAN:     Neuro   Anxiety/depression    Respiratory   Possible ALCON    Cardiovascular   Hypotension   CAD status post CABG   Right bundle branch block    GI       Renal   Acute renal failure with history of bilateral cortical cysts improving    Infectious disease   Septic shock resolved   Surgical site infection right toe following second toe amputation due to osteomyelitis.  Leukocytosis   ID consulted- to start zosyn and daptomycin     Heme/Onc       Endocrine   Uncontrolled diabetes with neuropathy gabapentin   Endocrinology consulted-scanning device for diabetes to control sugar levels better    Social/Spiritual/DNR/Other   Code status: full    Diet Diet NPO   Stress ulcer prophylaxis:    DVT prophylaxis: pharmacologic prophylaxis (with the following: enoxaparin)   Consultations needed: Yes   Transfer out of ICU today?  Yes    Lines/catheters   Date 05/06  o Central line Right Alireza Bey MD  6:16 AM  05/07/22

## 2022-05-07 NOTE — PROGRESS NOTES
Dr. Tamy Ruiz, DO,    Your patient is on a medication that requires a renal and/or weight dose adjustment. Renal/Body Weight Function Assessment:    Date Body Weight IBW  Adjusted BW SCr  CrCl Dialysis status   5/7/2022 265 lb 10.5 oz (120.5 kg)  Ideal body weight: 77.6 kg (171 lb 1.2 oz)  Adjusted ideal body weight: 94.8 kg (208 lb 14.5 oz) Serum creatinine: 1.1 mg/dL 05/07/22 0550  Estimated creatinine clearance: 96 mL/min N/A       Pharmacy has dose-adjusted the following medication(s):    Date Previous Order Adjusted Order   5/7/2022 Lovenox 40 mg daily Lovenox 30 mg twice a day. These changes were made per protocol according to the [facility] Renal Dosing Policy/ [facility] Pharmacist Anticoagulant Review. *Please note this dose may need readjusted if your patient's condition changes. Please contact pharmacy with any questions regarding these changes.     Marian Rivas, [address] [place] [address]  5/7/2022  10:05 AM

## 2022-05-08 LAB
GRAM STAIN ORDERABLE: NORMAL
GRAM STAIN ORDERABLE: NORMAL
METER GLUCOSE: 139 MG/DL (ref 74–99)
METER GLUCOSE: 159 MG/DL (ref 74–99)
METER GLUCOSE: 205 MG/DL (ref 74–99)
METER GLUCOSE: 215 MG/DL (ref 74–99)
ORGANISM: ABNORMAL

## 2022-05-08 PROCEDURE — 6370000000 HC RX 637 (ALT 250 FOR IP): Performed by: NURSE PRACTITIONER

## 2022-05-08 PROCEDURE — 2580000003 HC RX 258: Performed by: INTERNAL MEDICINE

## 2022-05-08 PROCEDURE — 94660 CPAP INITIATION&MGMT: CPT

## 2022-05-08 PROCEDURE — 6370000000 HC RX 637 (ALT 250 FOR IP): Performed by: INTERNAL MEDICINE

## 2022-05-08 PROCEDURE — 99232 SBSQ HOSP IP/OBS MODERATE 35: CPT | Performed by: INTERNAL MEDICINE

## 2022-05-08 PROCEDURE — A4216 STERILE WATER/SALINE, 10 ML: HCPCS | Performed by: INTERNAL MEDICINE

## 2022-05-08 PROCEDURE — 6360000002 HC RX W HCPCS: Performed by: SPECIALIST

## 2022-05-08 PROCEDURE — 1200000000 HC SEMI PRIVATE

## 2022-05-08 PROCEDURE — 6360000002 HC RX W HCPCS: Performed by: INTERNAL MEDICINE

## 2022-05-08 PROCEDURE — 82962 GLUCOSE BLOOD TEST: CPT

## 2022-05-08 PROCEDURE — 2580000003 HC RX 258: Performed by: SPECIALIST

## 2022-05-08 RX ORDER — SODIUM CHLORIDE 0.9 % (FLUSH) 0.9 %
5-40 SYRINGE (ML) INJECTION EVERY 12 HOURS SCHEDULED
Status: DISCONTINUED | OUTPATIENT
Start: 2022-05-08 | End: 2022-05-11 | Stop reason: SDUPTHER

## 2022-05-08 RX ORDER — INSULIN LISPRO 100 [IU]/ML
0-12 INJECTION, SOLUTION INTRAVENOUS; SUBCUTANEOUS
Status: DISCONTINUED | OUTPATIENT
Start: 2022-05-09 | End: 2022-05-19 | Stop reason: HOSPADM

## 2022-05-08 RX ORDER — INSULIN LISPRO 100 [IU]/ML
0-6 INJECTION, SOLUTION INTRAVENOUS; SUBCUTANEOUS NIGHTLY
Status: DISCONTINUED | OUTPATIENT
Start: 2022-05-08 | End: 2022-05-19 | Stop reason: HOSPADM

## 2022-05-08 RX ORDER — HEPARIN SODIUM (PORCINE) LOCK FLUSH IV SOLN 100 UNIT/ML 100 UNIT/ML
3 SOLUTION INTRAVENOUS PRN
Status: DISCONTINUED | OUTPATIENT
Start: 2022-05-08 | End: 2022-05-19 | Stop reason: HOSPADM

## 2022-05-08 RX ORDER — SODIUM CHLORIDE 0.9 % (FLUSH) 0.9 %
5-40 SYRINGE (ML) INJECTION PRN
Status: DISCONTINUED | OUTPATIENT
Start: 2022-05-08 | End: 2022-05-11 | Stop reason: SDUPTHER

## 2022-05-08 RX ORDER — SODIUM CHLORIDE 9 MG/ML
INJECTION, SOLUTION INTRAVENOUS PRN
Status: DISCONTINUED | OUTPATIENT
Start: 2022-05-08 | End: 2022-05-11 | Stop reason: SDUPTHER

## 2022-05-08 RX ORDER — LIDOCAINE HYDROCHLORIDE 10 MG/ML
5 INJECTION, SOLUTION EPIDURAL; INFILTRATION; INTRACAUDAL; PERINEURAL ONCE
Status: COMPLETED | OUTPATIENT
Start: 2022-05-08 | End: 2022-05-09

## 2022-05-08 RX ORDER — ALBUTEROL SULFATE 2.5 MG/3ML
2.5 SOLUTION RESPIRATORY (INHALATION) EVERY 6 HOURS PRN
Status: DISCONTINUED | OUTPATIENT
Start: 2022-05-08 | End: 2022-05-19 | Stop reason: HOSPADM

## 2022-05-08 RX ORDER — HEPARIN SODIUM (PORCINE) LOCK FLUSH IV SOLN 100 UNIT/ML 100 UNIT/ML
3 SOLUTION INTRAVENOUS EVERY 12 HOURS SCHEDULED
Status: DISCONTINUED | OUTPATIENT
Start: 2022-05-08 | End: 2022-05-19 | Stop reason: HOSPADM

## 2022-05-08 RX ADMIN — PREGABALIN 100 MG: 50 CAPSULE ORAL at 21:03

## 2022-05-08 RX ADMIN — INSULIN LISPRO 1 UNITS: 100 INJECTION, SOLUTION INTRAVENOUS; SUBCUTANEOUS at 21:03

## 2022-05-08 RX ADMIN — ENOXAPARIN SODIUM 30 MG: 100 INJECTION SUBCUTANEOUS at 09:08

## 2022-05-08 RX ADMIN — SODIUM CHLORIDE: 9 INJECTION, SOLUTION INTRAVENOUS at 09:11

## 2022-05-08 RX ADMIN — DAPTOMYCIN 700 MG: 500 INJECTION, POWDER, LYOPHILIZED, FOR SOLUTION INTRAVENOUS at 11:14

## 2022-05-08 RX ADMIN — PIPERACILLIN SODIUM AND TAZOBACTAM SODIUM 4500 MG: 4; .5 INJECTION, POWDER, LYOPHILIZED, FOR SOLUTION INTRAVENOUS at 21:07

## 2022-05-08 RX ADMIN — OXYCODONE AND ACETAMINOPHEN 1 TABLET: 5; 325 TABLET ORAL at 09:16

## 2022-05-08 RX ADMIN — MUPIROCIN: 20 OINTMENT TOPICAL at 21:12

## 2022-05-08 RX ADMIN — SERTRALINE 50 MG: 100 TABLET, FILM COATED ORAL at 09:08

## 2022-05-08 RX ADMIN — SODIUM CHLORIDE: 9 INJECTION, SOLUTION INTRAVENOUS at 19:18

## 2022-05-08 RX ADMIN — HYDROMORPHONE HYDROCHLORIDE 0.5 MG: 1 INJECTION, SOLUTION INTRAMUSCULAR; INTRAVENOUS; SUBCUTANEOUS at 12:38

## 2022-05-08 RX ADMIN — OXYCODONE AND ACETAMINOPHEN 1 TABLET: 5; 325 TABLET ORAL at 16:19

## 2022-05-08 RX ADMIN — OXYCODONE AND ACETAMINOPHEN 1 TABLET: 5; 325 TABLET ORAL at 03:18

## 2022-05-08 RX ADMIN — PIPERACILLIN SODIUM AND TAZOBACTAM SODIUM 4500 MG: 4; .5 INJECTION, POWDER, LYOPHILIZED, FOR SOLUTION INTRAVENOUS at 02:42

## 2022-05-08 RX ADMIN — ATORVASTATIN CALCIUM 40 MG: 40 TABLET, FILM COATED ORAL at 21:03

## 2022-05-08 RX ADMIN — MUPIROCIN: 20 OINTMENT TOPICAL at 16:19

## 2022-05-08 RX ADMIN — OXYCODONE AND ACETAMINOPHEN 1 TABLET: 5; 325 TABLET ORAL at 23:22

## 2022-05-08 RX ADMIN — INSULIN LISPRO 2 UNITS: 100 INJECTION, SOLUTION INTRAVENOUS; SUBCUTANEOUS at 16:16

## 2022-05-08 RX ADMIN — INSULIN LISPRO 2 UNITS: 100 INJECTION, SOLUTION INTRAVENOUS; SUBCUTANEOUS at 11:21

## 2022-05-08 RX ADMIN — LEVOTHYROXINE SODIUM 25 MCG: 25 TABLET ORAL at 06:50

## 2022-05-08 RX ADMIN — HYDROMORPHONE HYDROCHLORIDE 0.5 MG: 1 INJECTION, SOLUTION INTRAMUSCULAR; INTRAVENOUS; SUBCUTANEOUS at 06:53

## 2022-05-08 RX ADMIN — ENOXAPARIN SODIUM 30 MG: 100 INJECTION SUBCUTANEOUS at 21:03

## 2022-05-08 RX ADMIN — METOPROLOL TARTRATE 25 MG: 25 TABLET, FILM COATED ORAL at 21:03

## 2022-05-08 RX ADMIN — HYDROMORPHONE HYDROCHLORIDE 0.5 MG: 1 INJECTION, SOLUTION INTRAMUSCULAR; INTRAVENOUS; SUBCUTANEOUS at 19:59

## 2022-05-08 RX ADMIN — PIPERACILLIN SODIUM AND TAZOBACTAM SODIUM 4500 MG: 4; .5 INJECTION, POWDER, LYOPHILIZED, FOR SOLUTION INTRAVENOUS at 12:54

## 2022-05-08 ASSESSMENT — PAIN SCALES - GENERAL
PAINLEVEL_OUTOF10: 8
PAINLEVEL_OUTOF10: 7
PAINLEVEL_OUTOF10: 8
PAINLEVEL_OUTOF10: 8
PAINLEVEL_OUTOF10: 9
PAINLEVEL_OUTOF10: 0
PAINLEVEL_OUTOF10: 8

## 2022-05-08 ASSESSMENT — PAIN DESCRIPTION - PAIN TYPE
TYPE: ACUTE PAIN;SURGICAL PAIN
TYPE: ACUTE PAIN;SURGICAL PAIN
TYPE: SURGICAL PAIN;ACUTE PAIN
TYPE: ACUTE PAIN;SURGICAL PAIN

## 2022-05-08 ASSESSMENT — PAIN - FUNCTIONAL ASSESSMENT
PAIN_FUNCTIONAL_ASSESSMENT: PREVENTS OR INTERFERES SOME ACTIVE ACTIVITIES AND ADLS

## 2022-05-08 ASSESSMENT — PAIN DESCRIPTION - LOCATION
LOCATION: FOOT

## 2022-05-08 ASSESSMENT — PAIN DESCRIPTION - DESCRIPTORS
DESCRIPTORS: ACHING;DISCOMFORT;BURNING;THROBBING
DESCRIPTORS: DISCOMFORT;SHARP
DESCRIPTORS: ACHING;SHOOTING
DESCRIPTORS: ACHING;DISCOMFORT
DESCRIPTORS: SHARP;SHOOTING
DESCRIPTORS: ACHING;BURNING;DISCOMFORT

## 2022-05-08 ASSESSMENT — PAIN DESCRIPTION - ORIENTATION
ORIENTATION: RIGHT
ORIENTATION: RIGHT;LEFT
ORIENTATION: RIGHT

## 2022-05-08 ASSESSMENT — PAIN DESCRIPTION - FREQUENCY
FREQUENCY: CONTINUOUS

## 2022-05-08 ASSESSMENT — PAIN DESCRIPTION - ONSET
ONSET: GRADUAL
ONSET: ON-GOING
ONSET: GRADUAL
ONSET: GRADUAL

## 2022-05-08 NOTE — CARE COORDINATION
Met with patient about diagnosis and discharge plan of care. Pt readmit for same dx. Did not follow up with specialist or PCP. According to old notes, pt previously lived in his car. Pt is NOW stays with his niece in ranch home. Has wheeled walker. Recent toe amp and closure at end of April. Pt is receptive to home care this time. WILL NEED TO REFERRAL TO [facility]. Possible need for short term placement? Surgical consult, endocrine consult pending. Will need followed-mjo    The Plan for Transition of Care is related to the following treatment goals: home with home care vs [facility]? The Patient and/or patient representative pt was provided with a choice of provider and agrees   with the discharge plan. [x] Yes [] No    Freedom of choice list was provided with basic dialogue that supports the patient's individualized plan of care/goals, treatment preferences and shares the quality data associated with the providers.  [x] Yes [] No

## 2022-05-08 NOTE — PROGRESS NOTES
[address] [place] [address] [facility]  Progress Note    SUBJECTIVE:  Chief Complaint   Patient presents with    Wound Infection     right foot, 2nd digit removed 2 weeks ago by dr Jose Rafael Peck. Patient is tolerating medications. No reported adverse drug reactions. No nausea, vomiting, diarrhea. Review of systems:  As stated above in the chief complaint, otherwise negative. Medications:  Scheduled Meds:   piperacillin-tazobactam  4,500 mg IntraVENous Q8H    daptomycin (CUBICIN) in NS IV syringe  6 mg/kg IntraVENous Q24H    enoxaparin  30 mg SubCUTAneous BID    atorvastatin  40 mg Oral Nightly    levothyroxine  25 mcg Oral Daily    metoprolol tartrate  25 mg Oral BID    sertraline  50 mg Oral Daily    pregabalin  100 mg Oral Nightly    sodium chloride flush  5-40 mL IntraVENous 2 times per day    insulin lispro  0-6 Units SubCUTAneous TID WC    insulin lispro  0-3 Units SubCUTAneous Nightly     Continuous Infusions:   sodium chloride      sodium chloride 100 mL/hr at 22 0911    dextrose       PRN Meds:oxyCODONE-acetaminophen, sodium chloride flush, sodium chloride, ondansetron **OR** ondansetron, polyethylene glycol, acetaminophen **OR** acetaminophen, glucagon (rDNA), dextrose, glucose, dextrose bolus **OR** dextrose bolus, HYDROmorphone    OBJECTIVE:  /65   Pulse 66   Temp 97.8 °F (36.6 °C) (Oral)   Resp 16   Ht 6' (1.829 m)   Wt 268 lb 8 oz (121.8 kg)   SpO2 97%   BMI 36.42 kg/m²   Temp  Av.9 °F (36.6 °C)  Min: 97.7 °F (36.5 °C)  Max: 98 °F (36.7 °C)  Constitutional: The patient is awake, alert, and oriented. Skin: Warm and dry. No rashes were noted. HEENT: Round and reactive pupils. Moist mucous membranes. No ulcerations or thrush. Neck: Supple to movements. Chest: No use of accessory muscles to breathe. Symmetrical expansion. No wheezing, crackles or rhonchi. Cardiovascular: S1 and S2 are rhythmic and regular. No murmurs appreciated. Abdomen: Positive bowel sounds to auscultation. Benign to palpation. No masses felt. No hepatosplenomegaly. Extremities: No clubbing, no cyanosis, no edema.  -Right foot wrapped. Previous pictures reviewed, dressing not removed  Lines: peripheral    Laboratory and Tests Review:  Lab Results   Component Value Date    WBC 9.5 05/07/2022    WBC 15.1 (H) 05/06/2022    WBC 9.3 04/26/2022    HGB 12.4 (L) 05/07/2022    HCT 37.2 05/07/2022    MCV 86.5 05/07/2022     05/07/2022     Lab Results   Component Value Date    NEUTROABS 6.08 05/07/2022    NEUTROABS 11.17 (H) 05/06/2022    NEUTROABS 6.23 04/26/2022     No results found for: [facility]  Lab Results   Component Value Date    ALT 19 05/07/2022    AST 14 05/07/2022    ALKPHOS 107 05/07/2022    BILITOT <0.2 05/07/2022     Lab Results   Component Value Date     05/07/2022    K 4.4 05/07/2022     05/07/2022    CO2 26 05/07/2022    BUN 24 05/07/2022    CREATININE 1.1 05/07/2022    CREATININE 1.3 05/06/2022    CREATININE 0.9 04/26/2022    GFRAA >60 05/07/2022    LABGLOM >60 05/07/2022    GLUCOSE 117 05/07/2022    PROT 6.3 05/07/2022    LABALBU 3.1 05/07/2022    CALCIUM 8.7 05/07/2022    BILITOT <0.2 05/07/2022    ALKPHOS 107 05/07/2022    AST 14 05/07/2022    ALT 19 05/07/2022     Lab Results   Component Value Date    CRP 5.6 (H) 05/06/2022    CRP 1.1 (H) 04/21/2022     Lab Results   Component Value Date    SEDRATE 57 (H) 05/06/2022    SEDRATE 23 (H) 04/21/2022     Radiology:      Microbiology:   Blood cultures 5/6/2022: Negative so far  Nares screen MRSA: Positive  Toe culture 5/6/2022: Nonhemolytic Streptococcus, alphahemolytic Streptococcus, Corynebacteria, GNR, M_SA  Bone culture 5/7/2022: Pending  Abscess culture 5/7/2022: Pending    Assessment:  · Surgical site infection right toe following right second toe amputation secondary to osteomyelitis.   Previous cultures grew MRSA, VS Enterococcus faecalis, pansensitive Pseudomonas, Myroides species and Staphylococcus cohnii  · Sepsis with septic shock secondary to right foot infection, improved  · Leukocytosis associated to the above-improved  · Hypotension associated to the above    Plan:    · Continue Zosyn and Daptomycin-discussed PICC  · Check intraoperative cultures  · Monitor labs  · Will follow with you    CHRIST Smith - CNS  10:12 AM  5/8/2022     Patient seen and examined. I had a face to face encounter with the patient. Agree with exam.  Assessment and plan as outlined above and directed by me. Addition and corrections were done as deemed appropriate. My exam and plan include: The patient is tolerating antibiotics. He is out of the ICU. Cultures growing multiple organisms, including GNR and Staphylococcus aureus. Continue Daptomycin and Zosyn. We will most likely order a PICC tomorrow.     Shashi Hernandez MD  5/8/2022  12:12 PM

## 2022-05-08 NOTE — PROGRESS NOTES
Pulmonary Progress Note  5/8/2022 9:57 AM  Subjective:   Admit Date: 5/6/2022  PCP: Javier Thompson, APRN - CNP  Interval History:   Transferred out of ICU 5/7    On room air. Pt states he lived in [place] and is transitioning to move here. Had PFT couple years ago in [place] and stated he has COPD with alb inhaler prn  Had ALCON and wore cpap for years - had uvulectomy about 7-8 years ago and stopped wearing CPAP  Dyspnea when active at times  No cough      Diet: ADULT DIET; Regular; 4 carb choices (60 gm/meal)  SOB is: Mild  Cough: None  Wheezing: None  chest pain: None    Data:   Scheduled Meds:    piperacillin-tazobactam  4,500 mg IntraVENous Q8H    daptomycin (CUBICIN) in NS IV syringe  6 mg/kg IntraVENous Q24H    enoxaparin  30 mg SubCUTAneous BID    atorvastatin  40 mg Oral Nightly    levothyroxine  25 mcg Oral Daily    metoprolol tartrate  25 mg Oral BID    sertraline  50 mg Oral Daily    pregabalin  100 mg Oral Nightly    sodium chloride flush  5-40 mL IntraVENous 2 times per day    insulin lispro  0-6 Units SubCUTAneous TID WC    insulin lispro  0-3 Units SubCUTAneous Nightly       Continuous Infusions:    sodium chloride      sodium chloride 100 mL/hr at 05/08/22 0911    dextrose         PRN Meds: oxyCODONE-acetaminophen, sodium chloride flush, sodium chloride, ondansetron **OR** ondansetron, polyethylene glycol, acetaminophen **OR** acetaminophen, glucagon (rDNA), dextrose, glucose, dextrose bolus **OR** dextrose bolus, HYDROmorphone    I/O last 3 completed shifts:   In: 4326.1 [P.O.:360; I.V.:3796.3; IV Piggyback:169.9]  Out: 2200 [Urine:2200]    I/O this shift:  In: 180 [P.O.:180]  Out: 450 [Urine:450]      Intake/Output Summary (Last 24 hours) at 5/8/2022 0957  Last data filed at 5/8/2022 0800  Gross per 24 hour   Intake 3846.14 ml   Output 1550 ml   Net 2296.14 ml       Patient Vitals for the past 96 hrs (Last 3 readings):   Weight   05/08/22 0310 268 lb 8 oz (121.8 kg) 22 0500 265 lb 10.5 oz (120.5 kg)   22 1156 260 lb (117.9 kg)         Recent Labs     22  1302 22  0550   WBC 15.1* 9.5   HGB 14.0 12.4*   HCT 42.8 37.2   MCV 87.3 86.5    239     Recent Labs     22  1302 22  0550    138   K 4.8 4.4   CL 97* 104   CO2 24 26   BUN 25* 24*   CREATININE 1.3* 1.1   PHOS  --  4.3     Recent Labs     22  1302 22  1543 22  0550   PROT  --  6.6 6.3*   ALKPHOS  --  123 107   ALT  --  21 19   AST  --  17 14   BILITOT  --  <0.2 <0.2   LIPASE 47  --   --            -----------------------------------------------------------------  RAD:   Results for orders placed during the hospital encounter of 22    XR CHEST  2022  FINDINGS:  A single portable AP view of the chest was obtained. A right internal jugular central venous line terminates within the superior  vena cava. There are sternotomy wires. Heart, mediastinum, and pulmonary  vasculature are within normal limits. Lungs and pleural spaces are clear. There is no evidence of pneumothorax. The left lateral costophrenic angle is  excluded from the field of view. Impression  Right internal jugular central venous line terminates within the superior  vena cava with no evidence of pneumothorax. Micro:  Recent Labs     22  1543   BC 24 Hours no growth     No results for input(s): ORG in the last 72 hours.   Recent Labs     22  1543   BLOODCULT2 24 Hours no growth          Additional Respiratory Assessments  Pulse: 66  Resp: 16  SpO2: 97 %    Objective:   Vitals:   Vitals:    22 0916   BP:    Pulse:    Resp: 16   Temp:    SpO2:       TEMP:Current: Temp: 97.8 °F (36.6 °C)  Max: Temp  Av.9 °F (36.6 °C)  Min: 97.7 °F (36.5 °C)  Max: 98 °F (36.7 °C)    BP Range: Systolic (73YJI), NRL:426 , Min:74 , PYJ:537     Diastolic (04HCO), HZP:81, Min:43, Max:76      General appearance: alert, appears stated age and cooperative  In no acute distress  Skin: No rashes or lesions  HEENT: mucous membranes are moist. Unable to visualize back of throat  Neck: No JVD  Lungs: symmetrical expansion, diminished, clear to auscultation, no use of accessory muscles  Heart: distant, S1S2 no murmurs,  Abdomen: soft, non tender,   Extremities: Trace BLE peripheral edema, R foot wrapped. Multiple scabs to BLE. Neurologic: Alert, oriented times 3,  Affect: pleasant      ASSESSMENT:  63 y.o. male with significant past medical history of hypertension, hyperlipidemia, obstructive sleep apnea, COPD currently smoking half a pack of cigarettes per day, CABG x2 vessels uncontrolled type 2 diabetes, status post amputation of the second digit on the right foot presents with Wound Infection     1. S/p septic shock  2. ARF  3. Right foot OM status postdebridement  4. HTN  5. HLD  6. ALCON  7. COPD  8. DM poorly controlled  9. Obesity BMI 35  10. Noncompliance  11. History of anxiety   04/21-patient admitted to the hospital for second phalange wound. 04/22-amputation of the right second toe by Dionna Wyatt. Wound cultures were positive for MRSA, pseudomonas, enterococcus faecalis and staph cohnii  04/26-pt was discharged home with no abx coverage   05/06-Pt presented to ED with concerns for infected right foot. Pt hypotensive. 5/8: transferred from ICU 5/7. On room air        PLAN:  · On room air, O2 if needed  · Hx of ALCON on CPAP but had uvulectomy and stopped wearing CPAP 7-8 years ago. Will order CPAP 8 cm H2O pressure HS and PRN. Was on provigil in the past for shift work disorder (night turn ). Will need oupt pt PSG   · Had PFT couple years ago that he states showed COPD on albuterol prn at home. Will add albuterol prn nebs. Will need outpt PFT.   · ID following and started Daptomycin - on Zosyn, ordered cultures  · Podiatry following - s/p debridement  · Endo following  · IS    CHRIST Rashid - CNP        I had a face-to-face encounter with the patient at the bedside. I agree with the nurse practitioner's note and assessment and plan as detailed above. Necessary editing and changes made to the note by myself.    CPAP nightly  Will see in office with PFTs and sleep study

## 2022-05-08 NOTE — PROGRESS NOTES
[facility] Hospitalist   Progress Note    Admitting Date and Time: 5/6/2022 12:38 PM  Admit Dx: Septic shock ([facility] [A41.9, R65.21]    Subjective:    Patient was admitted with Septic shock ([facility] [A41.9, R65.21]. Patient denies fever, chills, cp, sob, n/v.     piperacillin-tazobactam  4,500 mg IntraVENous Q8H    daptomycin (CUBICIN) in NS IV syringe  6 mg/kg IntraVENous Q24H    enoxaparin  30 mg SubCUTAneous BID    atorvastatin  40 mg Oral Nightly    levothyroxine  25 mcg Oral Daily    metoprolol tartrate  25 mg Oral BID    sertraline  50 mg Oral Daily    pregabalin  100 mg Oral Nightly    sodium chloride flush  5-40 mL IntraVENous 2 times per day    insulin lispro  0-6 Units SubCUTAneous TID WC    insulin lispro  0-3 Units SubCUTAneous Nightly     sodium chloride flush, 10 mL, PRN  sodium chloride flush, 5-40 mL, PRN  sodium chloride, , PRN  ondansetron, 4 mg, Q8H PRN   Or  ondansetron, 4 mg, Q6H PRN  polyethylene glycol, 17 g, Daily PRN  acetaminophen, 650 mg, Q6H PRN   Or  acetaminophen, 650 mg, Q6H PRN  glucagon (rDNA), 1 mg, PRN  dextrose, 100 mL/hr, PRN  glucose, 16 g, PRN  dextrose bolus, 125 mL, PRN   Or  dextrose bolus, 250 mL, PRN  fentanNYL, 25 mcg, Q1H PRN  HYDROmorphone, 0.5 mg, Q4H PRN         PHYSICAL EXAM:    Vitals:  /76   Pulse 74   Temp 97.9 °F (36.6 °C) (Oral)   Resp 22   Ht 6' (1.829 m)   Wt 265 lb 10.5 oz (120.5 kg)   SpO2 97%   BMI 36.03 kg/m²     General:  Appears comfortable. Answers questions appropriately and cooperative with exam  HEENT:  Mucous membranes moist. No erythema, rhinorrhea, or post-nasal drip noted. Neck:  No carotid bruits. Heart:  Rhythm regular at rate of 76  Lungs:  CTA. No wheeze, rales, or rhonchi  Abdomen:  Positive bowel sounds positive. Soft. Non-tender. No guarding, rebound or rigidity. Breast/Rectal/Genitourinary: not pertinent.     Extremities:  Negative for lower extremity edema  Skin:  Warm and dry  Vascular: 2/4 Dorsalis Pedis pulses bilaterally. Neuro:  Cranial nerves 2-12 grossly intact, no focal weakness or change in sensation noted. Extraocular muscles intact. Pupils equal, round, reactive to light. Recent Labs     05/06/22  1302 05/07/22  0550    138   K 4.8 4.4   CL 97* 104   CO2 24 26   BUN 25* 24*   CREATININE 1.3* 1.1   GLUCOSE 240* 117*   CALCIUM 9.8 8.7       Recent Labs     05/06/22  1302 05/07/22  0550   WBC 15.1* 9.5   RBC 4.90 4.30   HGB 14.0 12.4*   HCT 42.8 37.2   MCV 87.3 86.5   MCH 28.6 28.8   MCHC 32.7 33.3   RDW 13.8 13.8    239   MPV 11.6 11.0       CBC with Differential:    Lab Results   Component Value Date    WBC 9.5 05/07/2022    RBC 4.30 05/07/2022    HGB 12.4 05/07/2022    HCT 37.2 05/07/2022     05/07/2022    MCV 86.5 05/07/2022    MCH 28.8 05/07/2022    MCHC 33.3 05/07/2022    RDW 13.8 05/07/2022    NRBC 0.0 05/07/2022    LYMPHOPCT 27.8 05/07/2022    MONOPCT 2.6 05/07/2022    BASOPCT 0.0 05/07/2022    MONOSABS 0.28 05/07/2022    LYMPHSABS 2.85 05/07/2022    EOSABS 0.33 05/07/2022    BASOSABS 0.00 05/07/2022     CMP:    Lab Results   Component Value Date     05/07/2022    K 4.4 05/07/2022     05/07/2022    CO2 26 05/07/2022    BUN 24 05/07/2022    CREATININE 1.1 05/07/2022    GFRAA >60 05/07/2022    LABGLOM >60 05/07/2022    GLUCOSE 117 05/07/2022    PROT 6.3 05/07/2022    LABALBU 3.1 05/07/2022    CALCIUM 8.7 05/07/2022    BILITOT <0.2 05/07/2022    ALKPHOS 107 05/07/2022    AST 14 05/07/2022    ALT 19 05/07/2022     Magnesium:    Lab Results   Component Value Date    MG 1.9 05/07/2022     Phosphorus:    Lab Results   Component Value Date    PHOS 4.3 05/07/2022        Radiology:   XR CHEST PORTABLE   Final Result   Right internal jugular central venous line terminates within the superior   vena cava with no evidence of pneumothorax. CT ABDOMEN PELVIS W IV CONTRAST Additional Contrast? None   Final Result   1.   Rule out nonspecific postinflammatory changes in the right lower lobe of   the lungs. 2.  Small, rounded hypodensity, containing what appears to be air droplets   within the pancreatic head and near the ampulla. It lies immediately   adjacent to the duodenum and is in the path of the CBD. Question duodenal   diverticulum. An ampullary/pancreatic head lesion containing air cannot be   excluded. Upper GI study is recommended to see if this represents a   diverticulum. If no diverticulum is seen, then MRI of the abdomen with and   without intravenous contrast and including MRCP is recommended. 3.  Bilateral renal cortical cysts. At least 1 on the left may represent a   hemorrhagic or debris laden cyst.  Renal ultrasound is recommended. 4.  Small left inguinal hernia, which contains fat. XR FOOT RIGHT (MIN 3 VIEWS)   Final Result   Patient is status post amputation of the right 2nd toe. No acute osseous   abnormality. No evidence to suggest osteomyelitis. If osteomyelitis needs   to be excluded further, than MRI of the foot with and without intravenous   gadolinium can be considered. Assessment:    Principal Problem:    Septic shock (HCC)  Active Problems:    Osteomyelitis of right foot (HCC)    ALVINO (acute kidney injury) ([place] [address]    Elevated lactic acid level    Uncontrolled type 2 diabetes mellitus with hyperglycemia (HCC)  Resolved Problems:    * No resolved hospital problems. *      Plan:  1. Sepsis with shock (leukocytosis with hypotension with infection)POA. Monitor off pressors. Supportive care and tx underlying infection. intensivist following  2. Right foot osteomyelitis with cellulitis/surgical site infection  Continue abx. Podiatry following  3. Duodenal diverticulum gen surgery on consult  4. alvino iv fluids  5. Dm type 2 uncontrolled monitor bs and tx with insulin prn  6. Dehydration iv fluids  7. htn monitor bp  8.  Elevated lactic acid level monitor      Chart reviewed and updated by nursing    Time spent is 35 min        Electronically signed by Denese Denver, DO on 5/7/2022 at 8:36 PM

## 2022-05-08 NOTE — FLOWSHEET NOTE
Report was given to accepting nurse. Pt was transported via bed to room 511. Belongings were with patient (including clothes, 1 shoe a cellphone and ). The Rt IJ TLC was removed and a dsd was applied after hemostasis was assured.

## 2022-05-08 NOTE — OP NOTE
[id number] [address]                                OPERATIVE REPORT    PATIENT NAME: Annie Adair                      :        1960  MED REC NO:   [id number]                            ROOM:       0206  ACCOUNT NO:   [de-identified]                           ADMIT DATE: 2022  PROVIDER:     Yarely Perry DPM    DATE OF PROCEDURE:  2022    SURGEON:  Yarely Perry DPM.    ASSISTANT:  _____, PGY-1.    PREOPERATIVE DIAGNOSES:  1. Cellulitis with abscess, right foot. 2.  Acute osteomyelitis, right foot. 3.  Ulceration with total necrosis of muscle. 4.  Insulin-dependent diabetes mellitus with neuropathy. POSTOPERATIVE DIAGNOSES:  1. Cellulitis with abscess, right foot. 2.  Acute osteomyelitis, right foot. 3.   Ulceration with total necrosis of muscle. 4.  Insulin-dependent diabetes mellitus with neuropathy. PROCEDURES PERFORMED:  1. Incision and drainage, right second metatarsal bone. 2.  Excisional wound debridement, right foot to and through level of the  muscle. 3.  Incision and drainage, right dorsal compartment of the foot. ANESTHESIA:  Monitored anesthesia care. ESTIMATED BLOOD LOSS:  Minimal.    COMPLICATIONS:  None. SPECIMENS OBTAINED:  Abscess, right foot, with second metatarsal bone  for pathology and microbiology. INTRAOPERATIVE FINDINGS:  A 4 to 5 mL of seropurulent abscess expressed. INDICATIONS:  This is a 73-year-old male who presents today for right  foot incision and drainage and wound debridement. I counseled the  patient on the nature of the problem, proposed course of procedure,  potential benefits, risks, complications, and convalescence in detail. All questions were answered to his apparent satisfaction. No guarantees  were given as to the outcome of the procedure.     DESCRIPTION OF PROCEDURE:  Under mild sedation, the patient was brought  to the operating room and placed on the operating table in the supine  position. The right lower extremity was scrubbed, prepped, and draped  in the usual sterile fashion. At this time, using a #10 blade, I  performed a dorsal incision over the second ray. I then performed  full-thickness excisional wound debridement to and through level of the  deep fascia and muscle of the dehisced wound of the distal aspect of the  right amputation site of the second toe. I extended incision another 2  cm plantar as well. The wound was excised, sent off for microbiologic  examination. Next, using a rongeur, I was able to then excise through  the second metatarsal bone. I was able to culture the abscess and also  was able to send off bone for pathologic examination. Next, using a #10  blade, I performed a linear incision over the fourth ray. I was able to  evacuate another 2 to 3 mL of seropurulent abscess from that area. I  could not appreciate any further tracts _____ abscess. Irrigated the  wounds with copious amount of normal saline as well as pulsed  irrigation. Three liters of normal saline was used. I applied  quarter-inch packing gauze. Postoperative bandage was then applied. The patient tolerated the procedure and anesthesia well in apparent  satisfactory condition with vital signs stable and vascular status  intact to the right lower extremity. The patient was transferred to the  PACU for further monitoring prior to readmission to the nursing floor.         Itzel Taylor DPM    D: 05/07/2022 8:16:17       T: 05/07/2022 11:07:13     [email or web address]  Job#: [id number]     Doc#: [id number]    CC:

## 2022-05-08 NOTE — PROGRESS NOTES
[facility] Hospitalist   Progress Note    Admitting Date and Time: 5/6/2022 12:38 PM  Admit Dx: [facility] [A41.9, R65.21]    Subjective:    Patient was admitted with Septic shock ([facility] [A41.9, R65.21].  Patient denies fever, chills, cp, sob, n/v.     lidocaine PF  5 mL IntraDERmal Once    sodium chloride flush  5-40 mL IntraVENous 2 times per day    heparin flush  3 mL IntraVENous 2 times per day    piperacillin-tazobactam  4,500 mg IntraVENous Q8H    mupirocin   Nasal BID    daptomycin (CUBICIN) in NS IV syringe  6 mg/kg IntraVENous Q24H    enoxaparin  30 mg SubCUTAneous BID    atorvastatin  40 mg Oral Nightly    levothyroxine  25 mcg Oral Daily    metoprolol tartrate  25 mg Oral BID    sertraline  50 mg Oral Daily    pregabalin  100 mg Oral Nightly    sodium chloride flush  5-40 mL IntraVENous 2 times per day    insulin lispro  0-6 Units SubCUTAneous TID WC    insulin lispro  0-3 Units SubCUTAneous Nightly     albuterol, 2.5 mg, Q6H PRN  sodium chloride flush, 5-40 mL, PRN  sodium chloride, , PRN  heparin flush, 3 mL, PRN  oxyCODONE-acetaminophen, 1 tablet, Q4H PRN  sodium chloride flush, 5-40 mL, PRN  sodium chloride, , PRN  ondansetron, 4 mg, Q8H PRN   Or  ondansetron, 4 mg, Q6H PRN  polyethylene glycol, 17 g, Daily PRN  acetaminophen, 650 mg, Q6H PRN   Or  acetaminophen, 650 mg, Q6H PRN  glucagon (rDNA), 1 mg, PRN  dextrose, 100 mL/hr, PRN  glucose, 16 g, PRN  dextrose bolus, 125 mL, PRN   Or  dextrose bolus, 250 mL, PRN  HYDROmorphone, 0.5 mg, Q4H PRN         Objective:    /65   Pulse 66   Temp 97.8 °F (36.6 °C) (Oral)   Resp 16   Ht 6' (1.829 m)   Wt 268 lb 8 oz (121.8 kg)   SpO2 97%   BMI 36.42 kg/m²   Skin: warm and dry, no rash or erythema  Pulmonary/Chest: clear to auscultation bilaterally- no wheezes, rales or rhonchi, normal air movement, no respiratory distress  Cardiovascular: rhythm reg at rate of 68  Abdomen: soft, non-tender, non-distended, normal bowel sounds, no masses or organomegaly  Extremities: no cyanosis, no clubbing and no edema      Recent Labs     05/06/22  1302 05/07/22  0550    138   K 4.8 4.4   CL 97* 104   CO2 24 26   BUN 25* 24*   CREATININE 1.3* 1.1   GLUCOSE 240* 117*   CALCIUM 9.8 8.7       Recent Labs     05/06/22  1302 05/07/22  0550   WBC 15.1* 9.5   RBC 4.90 4.30   HGB 14.0 12.4*   HCT 42.8 37.2   MCV 87.3 86.5   MCH 28.6 28.8   MCHC 32.7 33.3   RDW 13.8 13.8    239   MPV 11.6 11.0       CBC with Differential:    Lab Results   Component Value Date    WBC 9.5 05/07/2022    RBC 4.30 05/07/2022    HGB 12.4 05/07/2022    HCT 37.2 05/07/2022     05/07/2022    MCV 86.5 05/07/2022    MCH 28.8 05/07/2022    MCHC 33.3 05/07/2022    RDW 13.8 05/07/2022    NRBC 0.0 05/07/2022    LYMPHOPCT 27.8 05/07/2022    MONOPCT 2.6 05/07/2022    BASOPCT 0.0 05/07/2022    MONOSABS 0.28 05/07/2022    LYMPHSABS 2.85 05/07/2022    EOSABS 0.33 05/07/2022    BASOSABS 0.00 05/07/2022     CMP:    Lab Results   Component Value Date     05/07/2022    K 4.4 05/07/2022     05/07/2022    CO2 26 05/07/2022    BUN 24 05/07/2022    CREATININE 1.1 05/07/2022    GFRAA >60 05/07/2022    LABGLOM >60 05/07/2022    GLUCOSE 117 05/07/2022    PROT 6.3 05/07/2022    LABALBU 3.1 05/07/2022    CALCIUM 8.7 05/07/2022    BILITOT <0.2 05/07/2022    ALKPHOS 107 05/07/2022    AST 14 05/07/2022    ALT 19 05/07/2022        Radiology:   XR CHEST PORTABLE   Final Result   Right internal jugular central venous line terminates within the superior   vena cava with no evidence of pneumothorax. CT ABDOMEN PELVIS W IV CONTRAST Additional Contrast? None   Final Result   1. Rule out nonspecific postinflammatory changes in the right lower lobe of   the lungs. 2.  Small, rounded hypodensity, containing what appears to be air droplets   within the pancreatic head and near the ampulla.   It lies immediately   adjacent to the duodenum and is in the path of the CBD. Question duodenal   diverticulum. An ampullary/pancreatic head lesion containing air cannot be   excluded. Upper GI study is recommended to see if this represents a   diverticulum. If no diverticulum is seen, then MRI of the abdomen with and   without intravenous contrast and including MRCP is recommended. 3.  Bilateral renal cortical cysts. At least 1 on the left may represent a   hemorrhagic or debris laden cyst.  Renal ultrasound is recommended. 4.  Small left inguinal hernia, which contains fat. XR FOOT RIGHT (MIN 3 VIEWS)   Final Result   Patient is status post amputation of the right 2nd toe. No acute osseous   abnormality. No evidence to suggest osteomyelitis. If osteomyelitis needs   to be excluded further, than MRI of the foot with and without intravenous   gadolinium can be considered. Assessment:    Principal Problem:    Septic shock (HCC)  Active Problems:    Osteomyelitis of right foot (HCC)    ALVINO (acute kidney injury) ([facility]    Elevated lactic acid level    Uncontrolled type 2 diabetes mellitus with hyperglycemia (HCC)    Cellulitis  Resolved Problems:    * No resolved hospital problems. *      Plan:  1. Sepsis with shock (leukocytosis with hypotension with infection)POA. Monitor off pressors. Supportive care and tx underlying infection. Improving. Pt out of icu.  2. Right foot osteomyelitis with cellulitis/surgical site infection  Continue abx. Podiatry following  3. Duodenal diverticulum gen surgery on consult  4. alvino iv fluids  5. Dm type 2 uncontrolled monitor bs and tx with insulin prn  6. Dehydration iv fluids  7. htn monitor bp  8.  Elevated lactic acid level monitor          Electronically signed by Pb Post DO on 5/8/2022 at 4:51 PM

## 2022-05-08 NOTE — PROGRESS NOTES
Department of Podiatry   Progress Note        Subjective:  Patient being seen s/p I&D of right 2nd metatarsal bone and dorsal compartment of foot(DOS:5/7). Experiencing some burning pain for his foot for which nursing gave him percocet. Denies any additional pedal concerns. Past Medical History:        Diagnosis Date    Anxiety     [facility]chronic obstructive pulmonary disease) [facility]     Depression     DM (diabetes mellitus) ([facility]     Frostbite     HLD (hyperlipidemia)     HTN (hypertension)     Sleep apnea        Past Surgical History:        Procedure Laterality Date    BACK SURGERY      CHOLECYSTECTOMY      CORONARY ARTERY BYPASS GRAFT REDO      2 vessel    FOOT DEBRIDEMENT Right 4/25/2022    DELAYED PRIMARY CLOSURE RIGHT FOOT WOUND performed by Orlando Lacy DPM at [facility] TOE AMPUTATION Right 4/22/2022    AMPUTATION RIGHT SECOND TOE performed by Orlando Lacy DPM at [place] [address]         Medications Prior to Admission:    Medications Prior to Admission: atorvastatin (LIPITOR) 40 MG tablet, Take 40 mg by mouth at bedtime  metoprolol tartrate (LOPRESSOR) 25 MG tablet, Take 25 mg by mouth 2 times daily  metFORMIN (GLUCOPHAGE) 1000 MG tablet, Take 1,000 mg by mouth 2 times daily (with meals)  levothyroxine (SYNTHROID) 25 MCG tablet, Take 25 mcg by mouth Daily  sertraline (ZOLOFT) 50 MG tablet, Take 50 mg by mouth daily  glimepiride (AMARYL) 4 MG tablet, Take 4 mg by mouth in the morning and at bedtime  JARDIANCE 10 MG tablet, Take 10 mg by mouth daily  pregabalin (LYRICA) 100 MG capsule, Take 100 mg by mouth at bedtime. lisinopril-hydroCHLOROthiazide (PRINZIDE;ZESTORETIC) 20-12.5 MG per tablet, Take 20 tablets by mouth daily    Allergies:  Patient has no known allergies. Social History:   · TOBACCO:   reports that he has been smoking cigarettes. He started smoking about 42 years ago. He has been smoking about 2.50 packs per day.  He does not have any smokeless tobacco history on file. · ETOH:   reports no history of alcohol use. DRUGS:   Social History     Substance and Sexual Activity   Drug Use Never       Family History:       Problem Relation Age of Onset    Dementia Mother     Stroke Father     Diabetes Sister          REVIEW OF SYSTEMS:    All pertinent positives and negatives as noted in HPI       LOWER EXTREMITY EXAMINATION     Vascular Exam:  DP and PT pulses intact with hand held doppler bilaterally. CFT delayed to distal digits B/L. Skin temperature warm to warm proximal leg to distal toes.      Neuro Exam: Diminished protective sensation B/L     Dermatologic Exam: Right 2nd digit amputation site wound noted with gross bone exposure and mild active bleeding. Red, granular tissue observed with no evidence of purulence, malodor, lymphangitis. Suture intact and in intended position. Partial thickness fasciotomy site noted to dorsal foot with retention suture intact and in intended position. No evidence of purulence. No malodor. No lymphangitis    MSK: Mild  tenderness on palpation to dorsal aspect of the right foot. Soft compressible posterior most compartments bilaterally.                         CONSULTS:  IP CONSULT TO GENERAL SURGERY  IP CONSULT TO GENERAL SURGERY  IP CONSULT TO CRITICAL CARE  IP CONSULT TO INFECTIOUS DISEASES  IP CONSULT TO PODIATRY  IP CONSULT TO SOCIAL WORK  IP CONSULT TO ENDOCRINOLOGY  IP CONSULT TO DIABETES EDUCATOR    MEDICATION:  Scheduled Meds:   piperacillin-tazobactam  4,500 mg IntraVENous Q8H    daptomycin (CUBICIN) in NS IV syringe  6 mg/kg IntraVENous Q24H    enoxaparin  30 mg SubCUTAneous BID    atorvastatin  40 mg Oral Nightly    levothyroxine  25 mcg Oral Daily    metoprolol tartrate  25 mg Oral BID    sertraline  50 mg Oral Daily    pregabalin  100 mg Oral Nightly    sodium chloride flush  5-40 mL IntraVENous 2 times per day    insulin lispro  0-6 Units SubCUTAneous TID     insulin lispro  0-3 Units SubCUTAneous Nightly     Continuous Infusions:   sodium chloride      sodium chloride 100 mL/hr at 05/08/22 0911    dextrose       PRN Meds:.oxyCODONE-acetaminophen, sodium chloride flush, sodium chloride, ondansetron **OR** ondansetron, polyethylene glycol, acetaminophen **OR** acetaminophen, glucagon (rDNA), dextrose, glucose, dextrose bolus **OR** dextrose bolus, HYDROmorphone    RADIOLOGY:  XR CHEST PORTABLE   Final Result   Right internal jugular central venous line terminates within the superior   vena cava with no evidence of pneumothorax. CT ABDOMEN PELVIS W IV CONTRAST Additional Contrast? None   Final Result   1. Rule out nonspecific postinflammatory changes in the right lower lobe of   the lungs. 2.  Small, rounded hypodensity, containing what appears to be air droplets   within the pancreatic head and near the ampulla. It lies immediately   adjacent to the duodenum and is in the path of the CBD. Question duodenal   diverticulum. An ampullary/pancreatic head lesion containing air cannot be   excluded. Upper GI study is recommended to see if this represents a   diverticulum. If no diverticulum is seen, then MRI of the abdomen with and   without intravenous contrast and including MRCP is recommended. 3.  Bilateral renal cortical cysts. At least 1 on the left may represent a   hemorrhagic or debris laden cyst.  Renal ultrasound is recommended. 4.  Small left inguinal hernia, which contains fat. XR FOOT RIGHT (MIN 3 VIEWS)   Final Result   Patient is status post amputation of the right 2nd toe. No acute osseous   abnormality. No evidence to suggest osteomyelitis. If osteomyelitis needs   to be excluded further, than MRI of the foot with and without intravenous   gadolinium can be considered.              Vitals:    /65   Pulse 66   Temp 97.8 °F (36.6 °C) (Oral)   Resp 16   Ht 6' (1.829 m)   Wt 268 lb 8 oz (121.8 kg)   SpO2 97%   BMI 36.42 kg/m²     LABS: Recent Labs     05/06/22  1302 05/07/22  0550   WBC 15.1* 9.5   HGB 14.0 12.4*   HCT 42.8 37.2    239     Recent Labs     05/07/22  0550      K 4.4      CO2 26   PHOS 4.3   BUN 24*   CREATININE 1.1     Recent Labs     05/06/22  1543 05/07/22  0550   PROT 6.6 6.3*       ASSESSMENTS:   - s/p I&D of right 2nd metatarsal bone and dorsal compartment of foot(DOS:5/7)  -Cellulitis with abscess, right foot  -Acute OM, right foot  -Ulceration with necrosis of muscle, right foot   -IDDM with neuropathy    PLAN:  - Examined and evaluated  - All labs, imaging, and charts reviewed  -Surgical cx:pending  -Abx per ID:Zosyn and dapto  -NWB to operative limb  -Continue offloading of lower extremities  -Will continue with conservative wound care:iodoform packing, xeroform DSD. May require delayed primary closure in future.   -Will continue to monitor while in-house     Discussed w/:AMAYA Basurto [id number]  Fellowship-Trained Foot and Ankle Surgeon  Diplomate, [employer]  [phone]       Thank you for the opportunity to take part in the patient's care. Please do not hesitate to call for any questions or concerns.

## 2022-05-09 ENCOUNTER — ANESTHESIA EVENT (OUTPATIENT)
Dept: OPERATING ROOM | Age: 62
DRG: 856 | End: 2022-05-09
Payer: MEDICARE

## 2022-05-09 LAB
ALBUMIN SERPL-MCNC: 2.9 G/DL (ref 3.5–5.2)
ALP BLD-CCNC: 109 U/L (ref 40–129)
ALT SERPL-CCNC: 20 U/L (ref 0–40)
ANAEROBIC CULTURE: NORMAL
ANAEROBIC CULTURE: NORMAL
ANION GAP SERPL CALCULATED.3IONS-SCNC: 8 MMOL/L (ref 7–16)
AST SERPL-CCNC: 19 U/L (ref 0–39)
BASOPHILS ABSOLUTE: 0.06 E9/L (ref 0–0.2)
BASOPHILS RELATIVE PERCENT: 0.9 % (ref 0–2)
BILIRUB SERPL-MCNC: 0.2 MG/DL (ref 0–1.2)
BUN BLDV-MCNC: 16 MG/DL (ref 6–23)
CALCIUM SERPL-MCNC: 8.1 MG/DL (ref 8.6–10.2)
CHLORIDE BLD-SCNC: 108 MMOL/L (ref 98–107)
CO2: 23 MMOL/L (ref 22–29)
CREAT SERPL-MCNC: 0.9 MG/DL (ref 0.7–1.2)
EOSINOPHILS ABSOLUTE: 0.23 E9/L (ref 0.05–0.5)
EOSINOPHILS RELATIVE PERCENT: 3.5 % (ref 0–6)
GFR AFRICAN AMERICAN: >60
GFR NON-AFRICAN AMERICAN: >60 ML/MIN/1.73
GLUCOSE BLD-MCNC: 151 MG/DL (ref 74–99)
HCT VFR BLD CALC: 34.1 % (ref 37–54)
HEMOGLOBIN: 11.2 G/DL (ref 12.5–16.5)
LYMPHOCYTES ABSOLUTE: 1.27 E9/L (ref 1.5–4)
LYMPHOCYTES RELATIVE PERCENT: 19.1 % (ref 20–42)
MAGNESIUM: 1.8 MG/DL (ref 1.6–2.6)
MCH RBC QN AUTO: 28.8 PG (ref 26–35)
MCHC RBC AUTO-ENTMCNC: 32.8 % (ref 32–34.5)
MCV RBC AUTO: 87.7 FL (ref 80–99.9)
METER GLUCOSE: 122 MG/DL (ref 74–99)
METER GLUCOSE: 133 MG/DL (ref 74–99)
METER GLUCOSE: 156 MG/DL (ref 74–99)
METER GLUCOSE: 173 MG/DL (ref 74–99)
MONOCYTES ABSOLUTE: 0.13 E9/L (ref 0.1–0.95)
MONOCYTES RELATIVE PERCENT: 1.7 % (ref 2–12)
NEUTROPHILS ABSOLUTE: 5.03 E9/L (ref 1.8–7.3)
NEUTROPHILS RELATIVE PERCENT: 74.8 % (ref 43–80)
NUCLEATED RED BLOOD CELLS: 0 /100 WBC
ORGANISM: ABNORMAL
PDW BLD-RTO: 13.6 FL (ref 11.5–15)
PHOSPHORUS: 2.9 MG/DL (ref 2.5–4.5)
PLATELET # BLD: 233 E9/L (ref 130–450)
PMV BLD AUTO: 11.2 FL (ref 7–12)
POTASSIUM SERPL-SCNC: 4.9 MMOL/L (ref 3.5–5)
RBC # BLD: 3.89 E12/L (ref 3.8–5.8)
RBC # BLD: NORMAL 10*6/UL
SODIUM BLD-SCNC: 139 MMOL/L (ref 132–146)
TOTAL PROTEIN: 6.3 G/DL (ref 6.4–8.3)
WBC # BLD: 6.7 E9/L (ref 4.5–11.5)
WOUND/ABSCESS: ABNORMAL
WOUND/ABSCESS: ABNORMAL

## 2022-05-09 PROCEDURE — APPSS30 APP SPLIT SHARED TIME 16-30 MINUTES: Performed by: PHYSICIAN ASSISTANT

## 2022-05-09 PROCEDURE — 6370000000 HC RX 637 (ALT 250 FOR IP): Performed by: INTERNAL MEDICINE

## 2022-05-09 PROCEDURE — 6360000002 HC RX W HCPCS: Performed by: SPECIALIST

## 2022-05-09 PROCEDURE — A4216 STERILE WATER/SALINE, 10 ML: HCPCS | Performed by: INTERNAL MEDICINE

## 2022-05-09 PROCEDURE — 82962 GLUCOSE BLOOD TEST: CPT

## 2022-05-09 PROCEDURE — 76937 US GUIDE VASCULAR ACCESS: CPT

## 2022-05-09 PROCEDURE — 80053 COMPREHEN METABOLIC PANEL: CPT

## 2022-05-09 PROCEDURE — 85025 COMPLETE CBC W/AUTO DIFF WBC: CPT

## 2022-05-09 PROCEDURE — 6360000002 HC RX W HCPCS: Performed by: INTERNAL MEDICINE

## 2022-05-09 PROCEDURE — 1200000000 HC SEMI PRIVATE

## 2022-05-09 PROCEDURE — 2580000003 HC RX 258: Performed by: SPECIALIST

## 2022-05-09 PROCEDURE — 94660 CPAP INITIATION&MGMT: CPT

## 2022-05-09 PROCEDURE — 99232 SBSQ HOSP IP/OBS MODERATE 35: CPT | Performed by: INTERNAL MEDICINE

## 2022-05-09 PROCEDURE — 36569 INSJ PICC 5 YR+ W/O IMAGING: CPT

## 2022-05-09 PROCEDURE — 36415 COLL VENOUS BLD VENIPUNCTURE: CPT

## 2022-05-09 PROCEDURE — 2580000003 HC RX 258: Performed by: INTERNAL MEDICINE

## 2022-05-09 PROCEDURE — 2500000003 HC RX 250 WO HCPCS: Performed by: CLINICAL NURSE SPECIALIST

## 2022-05-09 PROCEDURE — 83735 ASSAY OF MAGNESIUM: CPT

## 2022-05-09 PROCEDURE — C1751 CATH, INF, PER/CENT/MIDLINE: HCPCS

## 2022-05-09 PROCEDURE — 02HV33Z INSERTION OF INFUSION DEVICE INTO SUPERIOR VENA CAVA, PERCUTANEOUS APPROACH: ICD-10-PCS | Performed by: INTERNAL MEDICINE

## 2022-05-09 PROCEDURE — 6370000000 HC RX 637 (ALT 250 FOR IP): Performed by: NURSE PRACTITIONER

## 2022-05-09 PROCEDURE — 84100 ASSAY OF PHOSPHORUS: CPT

## 2022-05-09 PROCEDURE — 95800 SLP STDY UNATTENDED: CPT

## 2022-05-09 RX ADMIN — LIDOCAINE HYDROCHLORIDE 1 ML: 10 SOLUTION INTRAVENOUS at 11:45

## 2022-05-09 RX ADMIN — MUPIROCIN: 20 OINTMENT TOPICAL at 20:41

## 2022-05-09 RX ADMIN — METOPROLOL TARTRATE 25 MG: 25 TABLET, FILM COATED ORAL at 20:41

## 2022-05-09 RX ADMIN — INSULIN LISPRO 2 UNITS: 100 INJECTION, SOLUTION INTRAVENOUS; SUBCUTANEOUS at 10:55

## 2022-05-09 RX ADMIN — HYDROMORPHONE HYDROCHLORIDE 0.5 MG: 1 INJECTION, SOLUTION INTRAMUSCULAR; INTRAVENOUS; SUBCUTANEOUS at 05:01

## 2022-05-09 RX ADMIN — HYDROMORPHONE HYDROCHLORIDE 0.5 MG: 1 INJECTION, SOLUTION INTRAMUSCULAR; INTRAVENOUS; SUBCUTANEOUS at 00:34

## 2022-05-09 RX ADMIN — PIPERACILLIN SODIUM AND TAZOBACTAM SODIUM 4500 MG: 4; .5 INJECTION, POWDER, LYOPHILIZED, FOR SOLUTION INTRAVENOUS at 20:45

## 2022-05-09 RX ADMIN — DAPTOMYCIN 700 MG: 500 INJECTION, POWDER, LYOPHILIZED, FOR SOLUTION INTRAVENOUS at 10:38

## 2022-05-09 RX ADMIN — HYDROMORPHONE HYDROCHLORIDE 0.5 MG: 1 INJECTION, SOLUTION INTRAMUSCULAR; INTRAVENOUS; SUBCUTANEOUS at 16:30

## 2022-05-09 RX ADMIN — OXYCODONE AND ACETAMINOPHEN 1 TABLET: 5; 325 TABLET ORAL at 19:29

## 2022-05-09 RX ADMIN — LEVOTHYROXINE SODIUM 25 MCG: 25 TABLET ORAL at 06:11

## 2022-05-09 RX ADMIN — PREGABALIN 100 MG: 50 CAPSULE ORAL at 20:41

## 2022-05-09 RX ADMIN — METOPROLOL TARTRATE 25 MG: 25 TABLET, FILM COATED ORAL at 08:38

## 2022-05-09 RX ADMIN — ENOXAPARIN SODIUM 30 MG: 100 INJECTION SUBCUTANEOUS at 08:37

## 2022-05-09 RX ADMIN — ATORVASTATIN CALCIUM 40 MG: 40 TABLET, FILM COATED ORAL at 20:41

## 2022-05-09 RX ADMIN — MUPIROCIN: 20 OINTMENT TOPICAL at 08:44

## 2022-05-09 RX ADMIN — SODIUM CHLORIDE: 9 INJECTION, SOLUTION INTRAVENOUS at 05:03

## 2022-05-09 RX ADMIN — PIPERACILLIN SODIUM AND TAZOBACTAM SODIUM 4500 MG: 4; .5 INJECTION, POWDER, LYOPHILIZED, FOR SOLUTION INTRAVENOUS at 13:50

## 2022-05-09 RX ADMIN — OXYCODONE AND ACETAMINOPHEN 1 TABLET: 5; 325 TABLET ORAL at 23:34

## 2022-05-09 RX ADMIN — HYDROMORPHONE HYDROCHLORIDE 0.5 MG: 1 INJECTION, SOLUTION INTRAMUSCULAR; INTRAVENOUS; SUBCUTANEOUS at 20:42

## 2022-05-09 RX ADMIN — PIPERACILLIN SODIUM AND TAZOBACTAM SODIUM 4500 MG: 4; .5 INJECTION, POWDER, LYOPHILIZED, FOR SOLUTION INTRAVENOUS at 05:01

## 2022-05-09 RX ADMIN — HYDROMORPHONE HYDROCHLORIDE 0.5 MG: 1 INJECTION, SOLUTION INTRAMUSCULAR; INTRAVENOUS; SUBCUTANEOUS at 10:38

## 2022-05-09 RX ADMIN — SERTRALINE 50 MG: 100 TABLET, FILM COATED ORAL at 08:38

## 2022-05-09 RX ADMIN — SODIUM CHLORIDE: 9 INJECTION, SOLUTION INTRAVENOUS at 13:55

## 2022-05-09 RX ADMIN — INSULIN LISPRO 2 UNITS: 100 INJECTION, SOLUTION INTRAVENOUS; SUBCUTANEOUS at 16:37

## 2022-05-09 RX ADMIN — OXYCODONE AND ACETAMINOPHEN 1 TABLET: 5; 325 TABLET ORAL at 08:37

## 2022-05-09 RX ADMIN — OXYCODONE AND ACETAMINOPHEN 1 TABLET: 5; 325 TABLET ORAL at 13:50

## 2022-05-09 RX ADMIN — OXYCODONE AND ACETAMINOPHEN 1 TABLET: 5; 325 TABLET ORAL at 03:59

## 2022-05-09 ASSESSMENT — PAIN SCALES - GENERAL
PAINLEVEL_OUTOF10: 6
PAINLEVEL_OUTOF10: 8
PAINLEVEL_OUTOF10: 5
PAINLEVEL_OUTOF10: 8
PAINLEVEL_OUTOF10: 8
PAINLEVEL_OUTOF10: 4
PAINLEVEL_OUTOF10: 4

## 2022-05-09 ASSESSMENT — PAIN DESCRIPTION - FREQUENCY
FREQUENCY: CONTINUOUS

## 2022-05-09 ASSESSMENT — PAIN DESCRIPTION - LOCATION
LOCATION: FOOT

## 2022-05-09 ASSESSMENT — PAIN DESCRIPTION - ORIENTATION
ORIENTATION: RIGHT

## 2022-05-09 ASSESSMENT — PAIN DESCRIPTION - DESCRIPTORS
DESCRIPTORS: SHOOTING
DESCRIPTORS: ACHING;DISCOMFORT;SHOOTING;SHARP
DESCRIPTORS: DISCOMFORT;SHARP;SHOOTING
DESCRIPTORS: ACHING;DISCOMFORT;SHARP
DESCRIPTORS: THROBBING;SORE
DESCRIPTORS: SHARP;SORE
DESCRIPTORS: SHOOTING

## 2022-05-09 ASSESSMENT — PAIN DESCRIPTION - PAIN TYPE
TYPE: ACUTE PAIN;SURGICAL PAIN
TYPE: SURGICAL PAIN

## 2022-05-09 ASSESSMENT — PAIN DESCRIPTION - ONSET
ONSET: ON-GOING

## 2022-05-09 ASSESSMENT — PAIN - FUNCTIONAL ASSESSMENT
PAIN_FUNCTIONAL_ASSESSMENT: PREVENTS OR INTERFERES SOME ACTIVE ACTIVITIES AND ADLS

## 2022-05-09 ASSESSMENT — LIFESTYLE VARIABLES: SMOKING_STATUS: 1

## 2022-05-09 ASSESSMENT — PAIN SCALES - WONG BAKER: WONGBAKER_NUMERICALRESPONSE: 2

## 2022-05-09 NOTE — PROGRESS NOTES
[address] [place] [address] [facility]  Progress Note    SUBJECTIVE:  Chief Complaint   Patient presents with    Wound Infection     right foot, 2nd digit removed 2 weeks ago by dr Jose Rafael Peck. A PICC was inserted. No pain. Tolerating antibiotic. Review of systems:  As stated above in the chief complaint, otherwise negative. Medications:  Scheduled Meds:   sodium chloride flush  5-40 mL IntraVENous 2 times per day    heparin flush  3 mL IntraVENous 2 times per day    piperacillin-tazobactam  4,500 mg IntraVENous Q8H    mupirocin   Nasal BID    insulin lispro  0-12 Units SubCUTAneous TID WC    insulin lispro  0-6 Units SubCUTAneous Nightly    daptomycin (CUBICIN) in NS IV syringe  6 mg/kg IntraVENous Q24H    enoxaparin  30 mg SubCUTAneous BID    atorvastatin  40 mg Oral Nightly    levothyroxine  25 mcg Oral Daily    metoprolol tartrate  25 mg Oral BID    sertraline  50 mg Oral Daily    pregabalin  100 mg Oral Nightly    sodium chloride flush  5-40 mL IntraVENous 2 times per day     Continuous Infusions:   sodium chloride      sodium chloride      sodium chloride 100 mL/hr at 22 0504    dextrose       PRN Meds:albuterol, sodium chloride flush, sodium chloride, heparin flush, oxyCODONE-acetaminophen, sodium chloride flush, sodium chloride, ondansetron **OR** ondansetron, polyethylene glycol, acetaminophen **OR** acetaminophen, glucagon (rDNA), dextrose, glucose, dextrose bolus **OR** dextrose bolus, HYDROmorphone    OBJECTIVE:  /66   Pulse 69   Temp 98.2 °F (36.8 °C) (Oral)   Resp 16   Ht 6' (1.829 m)   Wt 272 lb (123.4 kg)   SpO2 97%   BMI 36.89 kg/m²   Temp  Av.1 °F (36.7 °C)  Min: 98 °F (36.7 °C)  Max: 98.2 °F (36.8 °C)  Constitutional: The patient is awake, alert, and oriented. Lying in bed. No distress. Skin: Warm and dry. No rashes were noted. HEENT: Round and reactive pupils. Moist mucous membranes. No ulcerations or thrush.   Neck: Supple to movements. Chest: No respiratory distress. No crackles. Cardiovascular: Heart sounds rhythmic and regular. Abdomen: Positive bowel sounds to auscultation. Benign to palpation. Extremities: The right foot is wrapped. Second toe is missing. Lines: peripheral            Laboratory and Tests Review:  Lab Results   Component Value Date    WBC 6.7 05/09/2022    WBC 9.5 05/07/2022    WBC 15.1 (H) 05/06/2022    HGB 11.2 (L) 05/09/2022    HCT 34.1 (L) 05/09/2022    MCV 87.7 05/09/2022     05/09/2022     Lab Results   Component Value Date    NEUTROABS 5.03 05/09/2022    NEUTROABS 6.08 05/07/2022    NEUTROABS 11.17 (H) 05/06/2022     No results found for: [facility]  Lab Results   Component Value Date    ALT 20 05/09/2022    AST 19 05/09/2022    ALKPHOS 109 05/09/2022    BILITOT 0.2 05/09/2022     Lab Results   Component Value Date     05/09/2022    K 4.9 05/09/2022    K 4.4 05/07/2022     05/09/2022    CO2 23 05/09/2022    BUN 16 05/09/2022    CREATININE 0.9 05/09/2022    CREATININE 1.1 05/07/2022    CREATININE 1.3 05/06/2022    GFRAA >60 05/09/2022    LABGLOM >60 05/09/2022    GLUCOSE 151 05/09/2022    PROT 6.3 05/09/2022    LABALBU 2.9 05/09/2022    CALCIUM 8.1 05/09/2022    BILITOT 0.2 05/09/2022    ALKPHOS 109 05/09/2022    AST 19 05/09/2022    ALT 20 05/09/2022     Lab Results   Component Value Date    CRP 5.6 (H) 05/06/2022    CRP 1.1 (H) 04/21/2022     Lab Results   Component Value Date    SEDRATE 57 (H) 05/06/2022    SEDRATE 23 (H) 04/21/2022     Radiology:      Microbiology:   Blood cultures 5/6/2022: Negative so far  Nares screen MRSA: Positive  Toe culture 5/6/2022: Nonhemolytic Streptococcus, alphahemolytic Streptococcus, Corynebacteria, ox + GNR, M_SA  Bone culture 5/7/2022: GPC, MRSA  Abscess culture 5/7/2022: GPC, Corynebacterium, alphahemolytic Streptococcus    Assessment:  · Surgical site infection right toe following right second toe amputation secondary to osteomyelitis.   Previous cultures grew MRSA, VS Enterococcus faecalis, pansensitive Pseudomonas, Myroides species and Staphylococcus cohnii  · Sepsis with septic shock secondary to right foot infection, improved  · Leukocytosis associated to the above-improved  · Hypotension associated to the above    Plan:    · Continue Zosyn and Daptomycin for a minimum of 2, possibly 6 weeks  · Check final intraoperative cultures  · Monitor labs  · Will follow with you    Moses Lopez MD  1:20 PM  5/9/2022

## 2022-05-09 NOTE — HOME CARE
Katharina Castillo received referral. Will follow after discharge. Spoke with patient and verified demographics.   Rui Frances LPN, Katharina Bynum [id number]

## 2022-05-09 NOTE — PATIENT CARE CONFERENCE
P Quality Flow/Interdisciplinary Rounds Progress Note        Quality Flow Rounds held on May 9, 2022    Disciplines Attending:  Bedside Nurse, ,  and Nursing Unit Nilson ARMSTRONG May was admitted on 5/6/2022 12:38 PM    Anticipated Discharge Date:       Disposition:    Jose Score:  Jose Scale Score: 20    Readmission Risk              Risk of Unplanned Readmission:  17           Discussed patient goal for the day, patient clinical progression, and barriers to discharge.   The following Goal(s) of the Day/Commitment(s) have been identified:  Diagnostics - Report Results      Tennille Pickett RN  May 9, 2022

## 2022-05-09 NOTE — PLAN OF CARE
Problem: Discharge Planning  Goal: Discharge to home or other facility with appropriate resources  5/9/2022 1153 by Arnold Celestin RN  Outcome: Progressing  5/8/2022 2155 by Alexus Anguiano RN  Outcome: Progressing

## 2022-05-09 NOTE — PROGRESS NOTES
ENDOCRINOLOGY PROGRESS NOTE      Date of admission: 5/6/2022  Date of service: 5/9/2022  Admitting physician: Ruth Gómez DO   Primary Care Physician: CHRIST Gardnier - CNP  Consultant physician: Marlen Anderson MD     Reason for the consultation:  Uncontrolled DM    History of Present Illness: The history is provided by the patient. Accuracy of the patient data is excellent    Emi Fuentes is a very pleasant 63 y.o. old male with PMH uncontrolled DM type 2, obesity, HTN, HLD and other listed below admitted to [facility] on 5/6/2022 because of Rt foot pain and swelling and found to have infected diabetic foot and in septic shock, endocrine service was consulted for diabetes management.     Subjective   No acute events, most BG at goal     Inpatient diet:   Carb Restricted diet     Point of care glucose monitoring   (Independently reviewed)   Recent Labs     05/07/22  2108 05/08/22  0648 05/08/22  1112 05/08/22  1614 05/08/22  2103 05/09/22  0610 05/09/22  1054 05/09/22  1630   GLUMET 82 139* 215* 205* 159* 122* 156* 173*     Scheduled Meds:   sodium chloride flush  5-40 mL IntraVENous 2 times per day    heparin flush  3 mL IntraVENous 2 times per day    piperacillin-tazobactam  4,500 mg IntraVENous Q8H    mupirocin   Nasal BID    insulin lispro  0-12 Units SubCUTAneous TID WC    insulin lispro  0-6 Units SubCUTAneous Nightly    daptomycin (CUBICIN) in NS IV syringe  6 mg/kg IntraVENous Q24H    enoxaparin  30 mg SubCUTAneous BID    atorvastatin  40 mg Oral Nightly    levothyroxine  25 mcg Oral Daily    metoprolol tartrate  25 mg Oral BID    sertraline  50 mg Oral Daily    pregabalin  100 mg Oral Nightly    sodium chloride flush  5-40 mL IntraVENous 2 times per day       PRN Meds:   albuterol, 2.5 mg, Q6H PRN  sodium chloride flush, 5-40 mL, PRN  sodium chloride, , PRN  heparin flush, 3 mL, PRN  oxyCODONE-acetaminophen, 1 tablet, Q4H PRN  sodium chloride flush, 5-40 mL, PRN  sodium chloride, , PRN  ondansetron, 4 mg, Q8H PRN   Or  ondansetron, 4 mg, Q6H PRN  polyethylene glycol, 17 g, Daily PRN  acetaminophen, 650 mg, Q6H PRN   Or  acetaminophen, 650 mg, Q6H PRN  glucagon (rDNA), 1 mg, PRN  dextrose, 100 mL/hr, PRN  glucose, 16 g, PRN  dextrose bolus, 125 mL, PRN   Or  dextrose bolus, 250 mL, PRN  HYDROmorphone, 0.5 mg, Q4H PRN      Continuous Infusions:   sodium chloride      sodium chloride      sodium chloride 100 mL/hr at 05/09/22 1355    dextrose         Review of Systems  All systems reviewed. All negative except for symptoms mentioned in HPI     OBJECTIVE    /66   Pulse 69   Temp 98.2 °F (36.8 °C) (Oral)   Resp 18   Ht 6' (1.829 m)   Wt 272 lb (123.4 kg)   SpO2 97%   BMI 36.89 kg/m²     Intake/Output Summary (Last 24 hours) at 5/9/2022 1930  Last data filed at 5/9/2022 1905  Gross per 24 hour   Intake 1735.55 ml   Output 3800 ml   Net -2064.45 ml       Physical examination:  General: awake alert, oriented x3  HEENT: normocephalic non traumatic, no exophthalmos   Neck: supple, No thyroid tenderness,  Pulm: good equal air entry no added sounds  CVS: S1 + S2  Abd: soft lax, no tenderness  Skin: warm, no lesions, no rash.  Diabetic foot   Neuro: CN intact, sensation decreased bilateral , muscle power normal  Psych: normal mood, and affect    Review of Laboratory Data:  I personally reviewed the following labs:   Recent Labs     05/07/22  0550 05/09/22  0245   WBC 9.5 6.7   RBC 4.30 3.89   HGB 12.4* 11.2*   HCT 37.2 34.1*   MCV 86.5 87.7   MCH 28.8 28.8   MCHC 33.3 32.8   RDW 13.8 13.6    233   MPV 11.0 11.2     Recent Labs     05/07/22  0550 05/09/22  0245    139   K 4.4 4.9    108*   CO2 26 23   BUN 24* 16   CREATININE 1.1 0.9   GLUCOSE 117* 151*   CALCIUM 8.7 8.1*   PROT 6.3* 6.3*   LABALBU 3.1* 2.9*   BILITOT <0.2 0.2   ALKPHOS 107 109   AST 14 19   ALT 19 20     No results found for: BHYDRXBUT  Lab Results   Component Value Date    LABA1C 7.7 04/22/2022     No results found for: TSH, T4FREE, B2RXAAQ, FT3, E2OIIGJ, TSI, TPOABS, THGAB  Lab Results   Component Value Date    LABA1C 7.7 04/22/2022    GLUCOSE 151 05/09/2022     No results found for: TRIG, HDL, LDLCALC, CHOL    Blood culture   Lab Results   Component Value Date    BC 24 Hours no growth 05/06/2022    BC 5 Days no growth 04/21/2022       Radiology:  XR CHEST PORTABLE   Final Result   Right internal jugular central venous line terminates within the superior   vena cava with no evidence of pneumothorax. CT ABDOMEN PELVIS W IV CONTRAST Additional Contrast? None   Final Result   1. Rule out nonspecific postinflammatory changes in the right lower lobe of   the lungs. 2.  Small, rounded hypodensity, containing what appears to be air droplets   within the pancreatic head and near the ampulla. It lies immediately   adjacent to the duodenum and is in the path of the CBD. Question duodenal   diverticulum. An ampullary/pancreatic head lesion containing air cannot be   excluded. Upper GI study is recommended to see if this represents a   diverticulum. If no diverticulum is seen, then MRI of the abdomen with and   without intravenous contrast and including MRCP is recommended. 3.  Bilateral renal cortical cysts. At least 1 on the left may represent a   hemorrhagic or debris laden cyst.  Renal ultrasound is recommended. 4.  Small left inguinal hernia, which contains fat. XR FOOT RIGHT (MIN 3 VIEWS)   Final Result   Patient is status post amputation of the right 2nd toe. No acute osseous   abnormality. No evidence to suggest osteomyelitis. If osteomyelitis needs   to be excluded further, than MRI of the foot with and without intravenous   gadolinium can be considered.              Medical Records/Labs/Images review:   I personally reviewed and summarized previous records   All labs and imaging were reviewed independently     [address] [place] [address] B May, a 63 y.o.-old male seen today for inpatient diabetes management     Diabetes Mellitus type 2   · Pt admit poor compliance with diet   · We recommend the following diabetes regimen   · Medium dose sliding scale  · Continue glucose check with meals and at bedtime   · Will titrate insulin dose based on the blood glucose trend & insulin requirement  · Pt will follow with us after discharge     Septic shock from infected diabetic foot   · Managed by critical care and ID service     The above issues were reviewed with the patient who understood and agreed with the plan. Thank you for allowing us to participate in the care of this patient. Please do not hesitate to contact us with any additional questions. Jessica Yin MD  Endocrinologist, [place] [address] [facility]   [address] [place] [id number]   Phone: [phone]  Fax: [phone]  --------------------------------------------  An electronic signature was used to authenticate this note.  Johanna Casillas MD on 5/9/2022 at 7:30 PM

## 2022-05-09 NOTE — PROGRESS NOTES
Department of Podiatry   Progress Note    Subjective:  Patient being seen s/p I&D of right 2nd metatarsal bone and dorsal compartment of foot(DOS:5/7). Complaining of some pain to operative foot. Otherwise doing well. Patient aware of OR tomorrow for delayed primary closure of right foot. Past Medical History:        Diagnosis Date    Anxiety     [facility]     Depression     DM (diabetes mellitus) ([facility]     Frostbite     HLD (hyperlipidemia)     HTN (hypertension)     Sleep apnea        Past Surgical History:        Procedure Laterality Date    BACK SURGERY      CHOLECYSTECTOMY      CORONARY ARTERY BYPASS GRAFT REDO      2 vessel    FOOT DEBRIDEMENT Right 4/25/2022    DELAYED PRIMARY CLOSURE RIGHT FOOT WOUND performed by Delmis Cornell DPM at [address] [place] [address] Right 5/7/2022    FOOT DEBRIDEMENT INCISION AND DRAINAGE performed by Christo Akbar DPM at [facility] 22 TOE AMPUTATION Right 4/22/2022    AMPUTATION RIGHT SECOND TOE performed by Delmis Cornell DPM at [facility] 55         Medications Prior to Admission:    Medications Prior to Admission: atorvastatin (LIPITOR) 40 MG tablet, Take 40 mg by mouth at bedtime  metoprolol tartrate (LOPRESSOR) 25 MG tablet, Take 25 mg by mouth 2 times daily  metFORMIN (GLUCOPHAGE) 1000 MG tablet, Take 1,000 mg by mouth 2 times daily (with meals)  levothyroxine (SYNTHROID) 25 MCG tablet, Take 25 mcg by mouth Daily  sertraline (ZOLOFT) 50 MG tablet, Take 50 mg by mouth daily  glimepiride (AMARYL) 4 MG tablet, Take 4 mg by mouth in the morning and at bedtime  JARDIANCE 10 MG tablet, Take 10 mg by mouth daily  pregabalin (LYRICA) 100 MG capsule, Take 100 mg by mouth at bedtime. lisinopril-hydroCHLOROthiazide (PRINZIDE;ZESTORETIC) 20-12.5 MG per tablet, Take 20 tablets by mouth daily    Allergies:  Patient has no known allergies.     Social History:   · TOBACCO:   reports that he has been smoking cigarettes. He started smoking about 42 years ago. He has been smoking about 2.50 packs per day. He does not have any smokeless tobacco history on file. · ETOH:   reports no history of alcohol use. DRUGS:   Social History     Substance and Sexual Activity   Drug Use Never       Family History:       Problem Relation Age of Onset    Dementia Mother     Stroke Father     Diabetes Sister          REVIEW OF SYSTEMS:    All pertinent positives and negatives as noted in HPI       LOWER EXTREMITY EXAMINATION     Vascular Exam:  DP and PT pulses intact with hand held doppler bilaterally. CFT delayed to distal digits B/L. Skin temperature warm to warm proximal leg to distal toes.      Neuro Exam: Diminished protective sensation B/L     Dermatologic Exam: Right 2nd digit amputation site wound noted with gross bone exposure and mild active bleeding. Red, granular tissue observed with no evidence of purulence, malodor, lymphangitis. Suture intact and in adequate position. Partial thickness fasciotomy site noted to dorsal foot with retention suture intact and in adequate position. No evidence of purulence. No malodor. No lymphangitis    MSK: Mild  tenderness on palpation to dorsal aspect of the right foot. Soft compressible posterior most compartments bilaterally.                             CONSULTS:  IP CONSULT TO GENERAL SURGERY  IP CONSULT TO GENERAL SURGERY  IP CONSULT TO CRITICAL CARE  IP CONSULT TO INFECTIOUS DISEASES  IP CONSULT TO PODIATRY  IP CONSULT TO SOCIAL WORK  IP CONSULT TO ENDOCRINOLOGY  IP CONSULT TO DIABETES EDUCATOR    MEDICATION:  Scheduled Meds:   sodium chloride flush  5-40 mL IntraVENous 2 times per day    heparin flush  3 mL IntraVENous 2 times per day    piperacillin-tazobactam  4,500 mg IntraVENous Q8H    mupirocin   Nasal BID    insulin lispro  0-12 Units SubCUTAneous TID WC    insulin lispro  0-6 Units SubCUTAneous Nightly    daptomycin (CUBICIN) in NS IV syringe  6 mg/kg IntraVENous Q24H    enoxaparin  30 mg SubCUTAneous BID    atorvastatin  40 mg Oral Nightly    levothyroxine  25 mcg Oral Daily    metoprolol tartrate  25 mg Oral BID    sertraline  50 mg Oral Daily    pregabalin  100 mg Oral Nightly    sodium chloride flush  5-40 mL IntraVENous 2 times per day     Continuous Infusions:   sodium chloride      sodium chloride      sodium chloride 100 mL/hr at 05/09/22 0504    dextrose       PRN Meds:.albuterol, sodium chloride flush, sodium chloride, heparin flush, oxyCODONE-acetaminophen, sodium chloride flush, sodium chloride, ondansetron **OR** ondansetron, polyethylene glycol, acetaminophen **OR** acetaminophen, glucagon (rDNA), dextrose, glucose, dextrose bolus **OR** dextrose bolus, HYDROmorphone    RADIOLOGY:  XR CHEST PORTABLE   Final Result   Right internal jugular central venous line terminates within the superior   vena cava with no evidence of pneumothorax. CT ABDOMEN PELVIS W IV CONTRAST Additional Contrast? None   Final Result   1. Rule out nonspecific postinflammatory changes in the right lower lobe of   the lungs. 2.  Small, rounded hypodensity, containing what appears to be air droplets   within the pancreatic head and near the ampulla. It lies immediately   adjacent to the duodenum and is in the path of the CBD. Question duodenal   diverticulum. An ampullary/pancreatic head lesion containing air cannot be   excluded. Upper GI study is recommended to see if this represents a   diverticulum. If no diverticulum is seen, then MRI of the abdomen with and   without intravenous contrast and including MRCP is recommended. 3.  Bilateral renal cortical cysts. At least 1 on the left may represent a   hemorrhagic or debris laden cyst.  Renal ultrasound is recommended. 4.  Small left inguinal hernia, which contains fat. XR FOOT RIGHT (MIN 3 VIEWS)   Final Result   Patient is status post amputation of the right 2nd toe.   No acute osseous   abnormality. No evidence to suggest osteomyelitis. If osteomyelitis needs   to be excluded further, than MRI of the foot with and without intravenous   gadolinium can be considered. Vitals:    /66   Pulse 69   Temp 98.2 °F (36.8 °C) (Oral)   Resp 16   Ht 6' (1.829 m)   Wt 272 lb (123.4 kg)   SpO2 97%   BMI 36.89 kg/m²     LABS:   Recent Labs     05/07/22  0550 05/09/22  0245   WBC 9.5 6.7   HGB 12.4* 11.2*   HCT 37.2 34.1*    233     Recent Labs     05/09/22  0245      K 4.9   *   CO2 23   PHOS 2.9   BUN 16   CREATININE 0.9     Recent Labs     05/07/22  0550 05/09/22  0245   PROT 6.3* 6.3*       ASSESSMENTS:   - s/p I&D of right 2nd metatarsal bone and dorsal compartment of foot(DOS:5/7)  -Cellulitis with abscess, right foot  -Acute OM, right foot  -Ulceration with necrosis of muscle, right foot   -IDDM with neuropathy    PLAN:  - Examined and evaluated  - All labs, imaging, and charts reviewed  -Surgical cx:Staph aureus  -Abx per ID:Zosyn and dapto  -NWB to operative limb  -Continue offloading of lower extremities  -OR tomorrow:Right foot delayed primary closure and possible repeat debridement. Patient aware of plan and expresses agreement. NPO and treatment consent ordered. -NPO at midnight on 5/10.    -Dressing change today:xeroform, ACE DSD  -Will continue to monitor while in-house     Discussed w/:Gideon Smiley DPM FACFAS  Fellowship-Trained Foot and Ankle Surgeon  Diplomate, [employer] Foot [employer]le [employer]  [phone]       Thank you for the opportunity to take part in the patient's care. Please do not hesitate to call for any questions or concerns.

## 2022-05-09 NOTE — ANESTHESIA PRE PROCEDURE
Department of Anesthesiology  Preprocedure Note       Name:  Marija Boudreaux   Age:  63 y.o.  :  1960                                          MRN:  [id number]         Date:  2022      Surgeon: Ariela Guerra):  Gideon Frank DPM    Procedure: Procedure(s):  RIGHT FOOT DELAYED PRIMARY CLOSURE WITH POSSIBLE DEBRIDEMENT    ++CONTACT ISOLATION++   (DR AVAIL. AT 4PM)    Medications prior to admission:   Prior to Admission medications    Medication Sig Start Date End Date Taking? Authorizing Provider   atorvastatin (LIPITOR) 40 MG tablet Take 40 mg by mouth at bedtime    Historical Provider, MD   metoprolol tartrate (LOPRESSOR) 25 MG tablet Take 25 mg by mouth 2 times daily    Historical Provider, MD   metFORMIN (GLUCOPHAGE) 1000 MG tablet Take 1,000 mg by mouth 2 times daily (with meals)    Historical Provider, MD   levothyroxine (SYNTHROID) 25 MCG tablet Take 25 mcg by mouth Daily    Historical Provider, MD   sertraline (ZOLOFT) 50 MG tablet Take 50 mg by mouth daily    Historical Provider, MD   glimepiride (AMARYL) 4 MG tablet Take 4 mg by mouth in the morning and at bedtime    Historical Provider, MD   JARDIANCE 10 MG tablet Take 10 mg by mouth daily 3/25/22   Historical Provider, MD   pregabalin (LYRICA) 100 MG capsule Take 100 mg by mouth at bedtime. 3/25/22   Historical Provider, MD   lisinopril-hydroCHLOROthiazide (PRINZIDE;ZESTORETIC) 20-12.5 MG per tablet Take 20 tablets by mouth daily 3/25/22   Historical Provider, MD   lisinopril (PRINIVIL;ZESTRIL) 2.5 MG tablet Take 2.5 mg by mouth daily  22  Historical Provider, MD       Current medications:    No current facility-administered medications for this visit. No current outpatient medications on file.      Facility-Administered Medications Ordered in Other Visits   Medication Dose Route Frequency Provider Last Rate Last Admin    albuterol (PROVENTIL) nebulizer solution 2.5 mg  2.5 mg Nebulization Q6H PRN CHRIST Dawson - CNP        sodium chloride flush 0.9 % injection 5-40 mL  5-40 mL IntraVENous 2 times per day [facility], APRN - CNS        sodium chloride flush 0.9 % injection 5-40 mL  5-40 mL IntraVENous PRN Sherle Chain, APRN - CNS        0.9 % sodium chloride infusion   IntraVENous PRN [facility] Chain, APRN - CNS        heparin flush 100 UNIT/ML injection 300 Units  3 mL IntraVENous 2 times per day [facility] Chain, APRN - CNS        heparin flush 100 UNIT/ML injection 300 Units  3 mL IntraCATHeter PRN [facility] Chain, APRN - CNS        piperacillin-tazobactam (ZOSYN) 4,500 mg in dextrose 5 % 50 mL IVPB (mini-bag)  4,500 mg IntraVENous Q8H Shashi Hernandez MD 12.5 mL/hr at 05/09/22 1350 4,500 mg at 05/09/22 1350    mupirocin (BACTROBAN) 2 % ointment   Nasal BID Tre Hric, DO   Given at 05/09/22 0844    insulin lispro (HUMALOG) injection vial 0-12 Units  0-12 Units SubCUTAneous TID WC Mariaa Oliveira MD   2 Units at 05/09/22 1637    insulin lispro (HUMALOG) injection vial 0-6 Units  0-6 Units SubCUTAneous Nightly Mariaa Oliveira MD   1 Units at 05/08/22 2103    DAPTOmycin (CUBICIN) 700 mg in sodium chloride (PF) 14 mL IV syringe  6 mg/kg IntraVENous Q24H Glenda Salgado MD   700 mg at 05/09/22 1038    enoxaparin Sodium (LOVENOX) injection 30 mg  30 mg SubCUTAneous BID Glenda Salgado MD   30 mg at 05/09/22 0837    oxyCODONE-acetaminophen (PERCOCET) 5-325 MG per tablet 1 tablet  1 tablet Oral Q4H PRN CHRIST Pennington - CNP   1 tablet at 05/09/22 1350    atorvastatin (LIPITOR) tablet 40 mg  40 mg Oral Nightly Glenda Salgado MD   40 mg at 05/08/22 2103    levothyroxine (SYNTHROID) tablet 25 mcg  25 mcg Oral Daily Glenda Salgado MD   25 mcg at 05/09/22 0611    metoprolol tartrate (LOPRESSOR) tablet 25 mg  25 mg Oral BID Glenda Salgado MD   25 mg at 05/09/22 0838    sertraline (ZOLOFT) tablet 50 mg  50 mg Oral Daily Glenda Salgado MD   50 mg at 05/09/22 0838    pregabalin (LYRICA) capsule 100 mg  100 mg Oral Nightly Noris Jemal Harvey MD   100 mg at 05/08/22 2103    sodium chloride flush 0.9 % injection 5-40 mL  5-40 mL IntraVENous 2 times per day Taran Simons MD   10 mL at 05/07/22 2110    sodium chloride flush 0.9 % injection 5-40 mL  5-40 mL IntraVENous PRN Taran Simons MD        0.9 % sodium chloride infusion   IntraVENous PRN Taran Simons MD        ondansetron (ZOFRAN-ODT) disintegrating tablet 4 mg  4 mg Oral Q8H PRN Taran Simons MD        Or    ondansetron (ZOFRAN) injection 4 mg  4 mg IntraVENous Q6H PRN Taran Simons MD        polyethylene glycol (GLYCOLAX) packet 17 g  17 g Oral Daily PRN Taran Simons MD        acetaminophen (TYLENOL) tablet 650 mg  650 mg Oral Q6H PRN Taran Simons MD   650 mg at 05/07/22 1732    Or    acetaminophen (TYLENOL) suppository 650 mg  650 mg Rectal Q6H PRN Taran Simons MD        0.9 % sodium chloride infusion   IntraVENous Continuous Taran Simons  mL/hr at 05/09/22 1355 New Bag at 05/09/22 1355    glucagon (rDNA) injection 1 mg  1 mg IntraMUSCular PRN Taran Simons MD        dextrose 5 % solution  100 mL/hr IntraVENous PRN Taran Simons MD        glucose chewable tablet 16 g  16 g Oral PRN Taran Simons MD        dextrose bolus 10% 125 mL  125 mL IntraVENous PRN Taran Simons MD        Or    dextrose bolus 10% 250 mL  250 mL IntraVENous PRN Taran Simons MD        HYDROmorphone (DILAUDID) injection 0.5 mg  0.5 mg IntraVENous Q4H PRN Taran Simons MD   0.5 mg at 05/09/22 1630       Allergies:  No Known Allergies    Problem List:    Patient Active Problem List   Diagnosis Code    Open wound of second toe of right foot S91.104A    Toe trauma, right, initial encounter Z90.563Q    DM (diabetes mellitus) with peripheral vascular complication (HCC) J55.36    COPD (chronic obstructive pulmonary disease) [facility] J44.9    Sleep apnea G47.30    HTN (hypertension) I10    Septic shock (HCC) A41.9, R65.21    [facility] M86.9    ALVINO (acute kidney injury) ([facility] N17.9    Elevated lactic acid level R79.89    Uncontrolled type 2 diabetes mellitus with hyperglycemia (HCC) E11.65    Cellulitis L03.90    Duodenal diverticulum K57.10       Past Medical History:        Diagnosis Date    Anxiety     COPD (chronic obstructive pulmonary disease) ([facility]     Depression     DM [facility]diabetes mellitus) ([facility]     Frostbite     HLD (hyperlipidemia)     HTN (hypertension)     Sleep apnea        Past Surgical History:        Procedure Laterality Date    BACK SURGERY      CHOLECYSTECTOMY      CORONARY ARTERY BYPASS GRAFT REDO      2 vessel    FOOT DEBRIDEMENT Right 4/25/2022    DELAYED PRIMARY CLOSURE RIGHT FOOT WOUND performed by Zay Carlos DPM at [facility] [address] Right 5/7/2022    FOOT DEBRIDEMENT INCISION AND DRAINAGE performed by Claudette Colon DPM at [address] [place] [address] PICC LINE  5/9/2022         TOE AMPUTATION Right 4/22/2022    AMPUTATION RIGHT SECOND TOE performed by Zay Carlos DPM at [place] [address]         Social History:    Social History     Tobacco Use    Smoking status: Current Every Day Smoker     Packs/day: 2.50     Types: Cigarettes     Start date: 1980    Smokeless tobacco: Not on file   Substance Use Topics    Alcohol use: Never                                Ready to quit: Not Answered  Counseling given: Not Answered      Vital Signs (Current): There were no vitals filed for this visit.                                            BP Readings from Last 3 Encounters:   05/09/22 108/66   05/07/22 (!) 152/67   04/26/22 (!) 104/58       NPO Status:                                                                                 BMI:   Wt Readings from Last 3 Encounters:   05/09/22 272 lb (123.4 kg)   04/23/22 281 lb 11.2 oz (127.8 kg)     There is no height or weight on file to calculate BMI.    CBC:   Lab Results   Component Value Date    WBC 6.7 05/09/2022    RBC 3.89 05/09/2022    HGB 11.2 05/09/2022    HCT 34.1 05/09/2022    MCV 87.7 05/09/2022    RDW 13.6 05/09/2022     05/09/2022       CMP:   Lab Results   Component Value Date     05/09/2022    K 4.9 05/09/2022    K 4.4 05/07/2022     05/09/2022    CO2 23 05/09/2022    BUN 16 05/09/2022    CREATININE 0.9 05/09/2022    GFRAA >60 05/09/2022    LABGLOM >60 05/09/2022    GLUCOSE 151 05/09/2022    PROT 6.3 05/09/2022    CALCIUM 8.1 05/09/2022    BILITOT 0.2 05/09/2022    ALKPHOS 109 05/09/2022    AST 19 05/09/2022    ALT 20 05/09/2022       POC Tests: No results for input(s): POCGLU, POCNA, POCK, POCCL, POCBUN, POCHEMO, POCHCT in the last 72 hours. Coags: No results found for: PROTIME, INR, APTT    HCG (If Applicable): No results found for: PREGTESTUR, PREGSERUM, HCG, HCGQUANT     ABGs: No results found for: PHART, PO2ART, XQS5OKC, OVG4KMP, BEART, H5THZZQB     Type & Screen (If Applicable):  No results found for: LABABO, LABRH    Drug/Infectious Status (If Applicable):  No results found for: HIV, HEPCAB    COVID-19 Screening (If Applicable): No results found for: COVID19    Foot Xray 4/20/22  Impression   No acute osseous abnormality.       Plantar soft tissue edema adjacent to the MTP joints. Anesthesia Evaluation  Patient summary reviewed and Nursing notes reviewed history of anesthetic complications:   Airway: Mallampati: IV  TM distance: >3 FB   Neck ROM: full  Comment: Large amount of facial hair  Mouth opening: > = 3 FB Dental:          Pulmonary: breath sounds clear to auscultation  (+) COPD:  sleep apnea: on noncompliant,  current smoker    (-) shortness of breath          Patient did not smoke on day of surgery.                  Cardiovascular:  Exercise tolerance: poor (<4 METS),   (+) hypertension:, CABG/stent ( CABG X2 April of 2017):, hyperlipidemia        Rhythm: regular  Rate: normal           Beta Blocker:  Dose within 24 Hrs      ROS comment: Septic shock     Neuro/Psych:   (+) neuromuscular disease:, depression/anxiety  ( ) GI/Hepatic/Renal:   (+) GERD:,          ROS comment: obese. Endo/Other:    (+) DiabetesType II DM, poorly controlled, , hypothyroidism: arthritis: OA., .                  ROS comment: obese Abdominal:             Vascular: negative vascular ROS. Other Findings: Patient reports history of difficult intubation            Anesthesia Plan      MAC     ASA 4       Induction: intravenous. Anesthetic plan and risks discussed with patient. Plan discussed with CRNA. Department of Anesthesiology  Preprocedure Note       Name:  Ailyn Baldwin   Age:  63 y.o.  :  1960                                          MRN:  [id number]         Date:  5/10/2022      Surgeon: Danni Higgins):  Gideon Del Valle DPM    Procedure: Procedure(s):  RIGHT FOOT DELAYED PRIMARY CLOSURE WITH POSSIBLE DEBRIDEMENT    ++CONTACT ISOLATION++   (DR AVAIL. AT 4PM)    Medications prior to admission:   Prior to Admission medications    Medication Sig Start Date End Date Taking? Authorizing Provider   atorvastatin (LIPITOR) 40 MG tablet Take 40 mg by mouth at bedtime    Historical Provider, MD   metoprolol tartrate (LOPRESSOR) 25 MG tablet Take 25 mg by mouth 2 times daily    Historical Provider, MD   metFORMIN (GLUCOPHAGE) 1000 MG tablet Take 1,000 mg by mouth 2 times daily (with meals)    Historical Provider, MD   levothyroxine (SYNTHROID) 25 MCG tablet Take 25 mcg by mouth Daily    Historical Provider, MD   sertraline (ZOLOFT) 50 MG tablet Take 50 mg by mouth daily    Historical Provider, MD   glimepiride (AMARYL) 4 MG tablet Take 4 mg by mouth in the morning and at bedtime    Historical Provider, MD   JARDIANCE 10 MG tablet Take 10 mg by mouth daily 3/25/22   Historical Provider, MD   pregabalin (LYRICA) 100 MG capsule Take 100 mg by mouth at bedtime.  3/25/22   Historical Provider, MD   lisinopril-hydroCHLOROthiazide (PRINZIDE;ZESTORETIC) 20-12.5 MG per tablet Take 20 tablets by mouth daily 3/25/22   Historical Provider, MD   lisinopril (PRINIVIL;ZESTRIL) 2.5 MG tablet Take 2.5 mg by mouth daily  4/21/22  Historical Provider, MD       Current medications:    No current facility-administered medications for this visit. No current outpatient medications on file.      Facility-Administered Medications Ordered in Other Visits   Medication Dose Route Frequency Provider Last Rate Last Admin    albuterol (PROVENTIL) nebulizer solution 2.5 mg  2.5 mg Nebulization Q6H PRN Lizabethwilfrido Landry, CHRIST - CNP        sodium chloride flush 0.9 % injection 5-40 mL  5-40 mL IntraVENous 2 times per day Alfred Guerrero, APRN - CNS        sodium chloride flush 0.9 % injection 5-40 mL  5-40 mL IntraVENous PRN Alfred Guerrero, APRN - CNS        0.9 % sodium chloride infusion   IntraVENous PRN Alfred Guerrero, APRN - CNS        heparin flush 100 UNIT/ML injection 300 Units  3 mL IntraVENous 2 times per day Alfred Guerrero, APRN - CNS        heparin flush 100 UNIT/ML injection 300 Units  3 mL IntraCATHeter PRN Alfred Guerrero, APRN - CNS        piperacillin-tazobactam (ZOSYN) 4,500 mg in dextrose 5 % 50 mL IVPB (mini-bag)  4,500 mg IntraVENous Q8H Basia Wang MD 12.5 mL/hr at 05/10/22 1423 4,500 mg at 05/10/22 1423    mupirocin (BACTROBAN) 2 % ointment   Nasal BID Tre Hric, DO   Given at 05/10/22 0946    insulin lispro (HUMALOG) injection vial 0-12 Units  0-12 Units SubCUTAneous TID WC Tony Garner MD   2 Units at 05/09/22 1637    insulin lispro (HUMALOG) injection vial 0-6 Units  0-6 Units SubCUTAneous Nightly Tony Garner MD   1 Units at 05/08/22 2103    DAPTOmycin (CUBICIN) 700 mg in sodium chloride (PF) 14 mL IV syringe  6 mg/kg IntraVENous Q24H Macrina Khoury MD   700 mg at 05/10/22 0949    enoxaparin Sodium (LOVENOX) injection 30 mg  30 mg SubCUTAneous BID Macrina Khoury MD   30 mg at 05/09/22 0837    oxyCODONE-acetaminophen (PERCOCET) 5-325 MG per tablet 1 tablet  1 tablet Oral Q4H PRN CHRIST Curtis - CASPER   1 tablet at 05/10/22 0338    atorvastatin (LIPITOR) tablet 40 mg  40 mg Oral Nightly Ashanti Mckinnon MD   40 mg at 05/09/22 2041    levothyroxine (SYNTHROID) tablet 25 mcg  25 mcg Oral Daily Ashanti Mckinnon MD   25 mcg at 05/09/22 0611    metoprolol tartrate (LOPRESSOR) tablet 25 mg  25 mg Oral BID Ashanti Mckinnon MD   25 mg at 05/09/22 2041    sertraline (ZOLOFT) tablet 50 mg  50 mg Oral Daily Ashanti Mckinnon MD   50 mg at 05/09/22 0838    pregabalin (LYRICA) capsule 100 mg  100 mg Oral Nightly Ashanti Mckinnon MD   100 mg at 05/09/22 2041    sodium chloride flush 0.9 % injection 5-40 mL  5-40 mL IntraVENous 2 times per day Ashanti Mckinnon MD   10 mL at 05/07/22 2110    sodium chloride flush 0.9 % injection 5-40 mL  5-40 mL IntraVENous PRN Ashanti Mckinnon MD        0.9 % sodium chloride infusion   IntraVENous PRN Ashanti Mckinnon MD        ondansetron (ZOFRAN-ODT) disintegrating tablet 4 mg  4 mg Oral Q8H PRN Ashanti Mckinnon MD        Or    ondansetron (ZOFRAN) injection 4 mg  4 mg IntraVENous Q6H PRN Ashanti Mckinnon MD        polyethylene glycol (GLYCOLAX) packet 17 g  17 g Oral Daily PRN Ashanti Mckinnon MD        acetaminophen (TYLENOL) tablet 650 mg  650 mg Oral Q6H PRN Ashanti Mckinnon MD   650 mg at 05/07/22 1732    Or    acetaminophen (TYLENOL) suppository 650 mg  650 mg Rectal Q6H PRN Ashanti Mckinnon MD        0.9 % sodium chloride infusion   IntraVENous Continuous Ashanti Mckinnon  mL/hr at 05/10/22 0043 New Bag at 05/10/22 0043    glucagon (rDNA) injection 1 mg  1 mg IntraMUSCular PRN Ashanti Mckinnon MD        dextrose 5 % solution  100 mL/hr IntraVENous PRN Ashanti Mckinnon MD        glucose chewable tablet 16 g  16 g Oral PRN Ashanti Mckinnon MD        dextrose bolus 10% 125 mL  125 mL IntraVENous PRN Ashanti Mckinnon MD        Or    dextrose bolus 10% 250 mL  250 mL IntraVENous PRN Ashanti Mckinnon MD        HYDROmorphone (DILAUDID) injection 0.5 mg  0.5 mg IntraVENous Q4H PRN Ashanti Mckinnon MD   0.5 mg at 05/10/22 9850 Allergies:  No Known Allergies    Problem List:    Patient Active Problem List   Diagnosis Code    Open wound of second toe of right foot S91.104A    Toe trauma, right, initial encounter E81.422E    DM (diabetes mellitus) with peripheral vascular complication ([facility]) F44.59    COPD (chronic obstructive pulmonary disease) ([facility] J44.9    Sleep apnea G47.30    HTN (hypertension) I10    Septic shock ([facility]) A41.9, R65.21    Osteomyelitis of right foot ([facility] M86.9    ALVINO (acute kidney injury) ([facility] N17.9    Elevated lactic acid level R79.89    Uncontrolled type 2 diabetes mellitus with hyperglycemia ([facility]) E11.65    Cellulitis L03.90    Duodenal diverticulum K57.10       Past Medical History:        Diagnosis Date    Anxiety     COPD (chronic obstructive pulmonary disease) ([facility]     Depression     DM (diabetes mellitus) ([facility])     Frostbite     HLD (hyperlipidemia)     HTN (hypertension)     Sleep apnea        Past Surgical History:        Procedure Laterality Date    BACK SURGERY      CHOLECYSTECTOMY      CORONARY ARTERY BYPASS GRAFT REDO      2 vessel    FOOT DEBRIDEMENT Right 4/25/2022    DELAYED PRIMARY CLOSURE RIGHT FOOT WOUND performed by Sushil Deal DPM at [facility] [address] Right 5/7/2022    FOOT DEBRIDEMENT INCISION AND DRAINAGE performed by Lizzy Cohn DPM at [address] [place] [address] PICC LINE  5/9/2022         TOE AMPUTATION Right 4/22/2022    AMPUTATION RIGHT SECOND TOE performed by [facility]il Deal [facility] at [facility] OR    UVULOPALATOPHARYGOPLASTY         Social History:    Social History     Tobacco Use    Smoking status: Current Every Day Smoker     Packs/day: 2.50     Types: Cigarettes     Start date: 1980    Smokeless tobacco: Not on file   Substance Use Topics    Alcohol use: Never                                Ready to quit: Not Answered  Counseling given: Not Answered      Vital Signs (Current): There were no vitals filed for this visit. BP Readings from Last 3 Encounters:   05/09/22 121/62   05/07/22 (!) 152/67   04/26/22 (!) 104/58       NPO Status:                                                                                 BMI:   Wt Readings from Last 3 Encounters:   05/10/22 271 lb (122.9 kg)   04/23/22 281 lb 11.2 oz (127.8 kg)     There is no height or weight on file to calculate BMI.    CBC:   Lab Results   Component Value Date    WBC 7.0 05/10/2022    RBC 4.29 05/10/2022    HGB 12.1 05/10/2022    HCT 37.6 05/10/2022    MCV 87.6 05/10/2022    RDW 13.8 05/10/2022     05/10/2022       CMP:   Lab Results   Component Value Date     05/10/2022    K 4.9 05/10/2022    K 4.4 05/07/2022     05/10/2022    CO2 26 05/10/2022    BUN 12 05/10/2022    CREATININE 0.9 05/10/2022    GFRAA >60 05/10/2022    LABGLOM >60 05/10/2022    GLUCOSE 125 05/10/2022    PROT 6.3 05/09/2022    CALCIUM 8.8 05/10/2022    BILITOT 0.2 05/09/2022    ALKPHOS 109 05/09/2022    AST 19 05/09/2022    ALT 20 05/09/2022       POC Tests: No results for input(s): POCGLU, POCNA, POCK, POCCL, POCBUN, POCHEMO, POCHCT in the last 72 hours. Coags: No results found for: PROTIME, INR, APTT    HCG (If Applicable): No results found for: PREGTESTUR, PREGSERUM, HCG, HCGQUANT     ABGs: No results found for: PHART, PO2ART, MAU6IJM, BBF6FTZ, BEART, Z4LNXRLX     Type & Screen (If Applicable):  No results found for: LABABO, LABRH    Drug/Infectious Status (If Applicable):  No results found for: HIV, HEPCAB    COVID-19 Screening (If Applicable): No results found for: COVID19    Foot Xray 4/20/22  Impression   No acute osseous abnormality.       Plantar soft tissue edema adjacent to the MTP joints. Anesthesia Evaluation  Patient summary reviewed and Nursing notes reviewed history of anesthetic complications ( patient states he has a hard time swallowing and thought it was from a breathing tube.  Reassured patient it is probably related to esophagus and not trachea): Airway: Mallampati: IV  TM distance: <3 FB   Neck ROM: full  Comment: Large amount of facial hair  Mouth opening: < 3 FB Dental:          Pulmonary:   (+) COPD:  sleep apnea: on noncompliant,  decreased breath sounds,  current smoker          Patient did not smoke on day of surgery. Cardiovascular:    (+) hypertension:, CABG/stent ( CABG X2 April of 2017):, hyperlipidemia        Rhythm: regular  Rate: normal           Beta Blocker:  No for medical reason      ROS comment: Septic shock     Neuro/Psych:   (+) depression/anxiety  ( )            GI/Hepatic/Renal:   (+) GERD:,          ROS comment: obese. Endo/Other:    (+) DiabetesType II DM, poorly controlled, , hypothyroidism: arthritis: OA., .                  ROS comment: obese Abdominal:             Vascular: negative vascular ROS. Other Findings: Patient reports history of difficult intubation            Anesthesia Plan      MAC     ASA 4       Induction: intravenous. Anesthetic plan and risks discussed with patient. Plan discussed with CRNA.                 Vashti Wright MD   5/10/2022                Gerardo Hughes MD   5/9/2022      Patient will need to be re-evaluated prior to surgery by DOS anesthesiologist.    Gerardo Hughes MD           5/9/2022        5:20 PM

## 2022-05-09 NOTE — PROGRESS NOTES
ENDOCRINOLOGY PROGRESS NOTE      Date of admission: 5/6/2022  Date of service: 5/8/2022  Admitting physician: Aram Ward DO   Primary Care Physician: CHRIST Cardoza - CNP  Consultant physician: Barrett Moreland MD     Reason for the consultation:  Uncontrolled DM    History of Present Illness: The history is provided by the patient. Accuracy of the patient data is excellent    Yancy Fuentes is a very pleasant 63 y.o. old male with PMH uncontrolled DM type 2, obesity, HTN, HLD and other listed below admitted to [address] [place] [address] on 5/6/2022 because of Rt foot pain and swelling and found to have infected diabetic foot and in septic shock, endocrine service was consulted for diabetes management.     Subjective   Seen and examined this AM, no acute events, feels better     Inpatient diet:   Carb Restricted diet     Point of care glucose monitoring   (Independently reviewed)   Recent Labs     05/06/22  2057 05/07/22  0832 05/07/22  1146 05/07/22  1636 05/07/22  2108 05/08/22  0648 05/08/22  1112 05/08/22  1614   GLUMET 193* 170* 209* 113* 82 139* 215* 205*     Scheduled Meds:   lidocaine PF  5 mL IntraDERmal Once    sodium chloride flush  5-40 mL IntraVENous 2 times per day    heparin flush  3 mL IntraVENous 2 times per day    piperacillin-tazobactam  4,500 mg IntraVENous Q8H    mupirocin   Nasal BID    daptomycin (CUBICIN) in NS IV syringe  6 mg/kg IntraVENous Q24H    enoxaparin  30 mg SubCUTAneous BID    atorvastatin  40 mg Oral Nightly    levothyroxine  25 mcg Oral Daily    metoprolol tartrate  25 mg Oral BID    sertraline  50 mg Oral Daily    pregabalin  100 mg Oral Nightly    sodium chloride flush  5-40 mL IntraVENous 2 times per day    insulin lispro  0-6 Units SubCUTAneous TID WC    insulin lispro  0-3 Units SubCUTAneous Nightly       PRN Meds:   albuterol, 2.5 mg, Q6H PRN  sodium chloride flush, 5-40 mL, PRN  sodium chloride, , PRN  heparin flush, 3 mL, PRN  oxyCODONE-acetaminophen, 1 tablet, Q4H PRN  sodium chloride flush, 5-40 mL, PRN  sodium chloride, , PRN  ondansetron, 4 mg, Q8H PRN   Or  ondansetron, 4 mg, Q6H PRN  polyethylene glycol, 17 g, Daily PRN  acetaminophen, 650 mg, Q6H PRN   Or  acetaminophen, 650 mg, Q6H PRN  glucagon (rDNA), 1 mg, PRN  dextrose, 100 mL/hr, PRN  glucose, 16 g, PRN  dextrose bolus, 125 mL, PRN   Or  dextrose bolus, 250 mL, PRN  HYDROmorphone, 0.5 mg, Q4H PRN      Continuous Infusions:   sodium chloride      sodium chloride      sodium chloride 100 mL/hr at 05/08/22 1918    dextrose         Review of Systems  All systems reviewed. All negative except for symptoms mentioned in HPI     OBJECTIVE    /77   Pulse 68   Temp 98 °F (36.7 °C) (Oral)   Resp 16   Ht 6' (1.829 m)   Wt 268 lb 8 oz (121.8 kg)   SpO2 98%   BMI 36.42 kg/m²     Intake/Output Summary (Last 24 hours) at 5/8/2022 2011  Last data filed at 5/8/2022 2003  Gross per 24 hour   Intake 3992.7 ml   Output 2400 ml   Net 1592.7 ml       Physical examination:  General: awake alert, oriented x3  HEENT: normocephalic non traumatic, no exophthalmos   Neck: supple, No thyroid tenderness,  Pulm: good equal air entry no added sounds  CVS: S1 + S2  Abd: soft lax, no tenderness  Skin: warm, no lesions, no rash.  Diabetic foot   Neuro: CN intact, sensation decreased bilateral , muscle power normal  Psych: normal mood, and affect    Review of Laboratory Data:  I personally reviewed the following labs:   Recent Labs     05/06/22  1302 05/07/22  0550   WBC 15.1* 9.5   RBC 4.90 4.30   HGB 14.0 12.4*   HCT 42.8 37.2   MCV 87.3 86.5   MCH 28.6 28.8   MCHC 32.7 33.3   RDW 13.8 13.8    239   MPV 11.6 11.0     Recent Labs     05/06/22  1302 05/06/22  1543 05/07/22  0550     --  138   K 4.8  --  4.4   CL 97*  --  104   CO2 24  --  26   BUN 25*  --  24*   CREATININE 1.3*  --  1.1   GLUCOSE 240*  --  117*   CALCIUM 9.8  --  8.7   PROT  --  6.6 6.3*   LABALBU  --  3.3* 3.1*   BILITOT  --  <0.2 <0.2   ALKPHOS  --  123 107   AST  --  17 14   ALT  --  21 19     No results found for: BHYDRXBUT  Lab Results   Component Value Date    LABA1C 7.7 04/22/2022     No results found for: TSH, T4FREE, L8TQJBQ, FT3, X7BGCGW, TSI, TPOABS, THGAB  Lab Results   Component Value Date    LABA1C 7.7 04/22/2022    GLUCOSE 117 05/07/2022     No results found for: TRIG, HDL, LDLCALC, CHOL    Blood culture   Lab Results   Component Value Date    BC 24 Hours no growth 05/06/2022    BC 5 Days no growth 04/21/2022       Radiology:  XR CHEST PORTABLE   Final Result   Right internal jugular central venous line terminates within the superior   vena cava with no evidence of pneumothorax. CT ABDOMEN PELVIS W IV CONTRAST Additional Contrast? None   Final Result   1. Rule out nonspecific postinflammatory changes in the right lower lobe of   the lungs. 2.  Small, rounded hypodensity, containing what appears to be air droplets   within the pancreatic head and near the ampulla. It lies immediately   adjacent to the duodenum and is in the path of the CBD. Question duodenal   diverticulum. An ampullary/pancreatic head lesion containing air cannot be   excluded. Upper GI study is recommended to see if this represents a   diverticulum. If no diverticulum is seen, then MRI of the abdomen with and   without intravenous contrast and including MRCP is recommended. 3.  Bilateral renal cortical cysts. At least 1 on the left may represent a   hemorrhagic or debris laden cyst.  Renal ultrasound is recommended. 4.  Small left inguinal hernia, which contains fat. XR FOOT RIGHT (MIN 3 VIEWS)   Final Result   Patient is status post amputation of the right 2nd toe. No acute osseous   abnormality. No evidence to suggest osteomyelitis. If osteomyelitis needs   to be excluded further, than MRI of the foot with and without intravenous   gadolinium can be considered.              Medical Records/Labs/Images review:   I personally reviewed and summarized previous records   All labs and imaging were reviewed independently     [address] [facility] B May, a 63 y.o.-old male seen today for inpatient diabetes management     Diabetes Mellitus type 2   · Pt admit poor compliance with diet   · will change diabetes regimen to:  · Increase sliding scale to medium dose   · dose sliding scale with meals and at night   · Continue glucose check with meals and at bedtime   · Will titrate insulin dose based on the blood glucose trend & insulin requirement  · Pt will follow with us after discharge     Septic shock from infected diabetic foot   · Managed by critical care and ID service     The above issues were reviewed with the patient who understood and agreed with the plan. Thank you for allowing us to participate in the care of this patient. Please do not hesitate to contact us with any additional questions. Kong Chapman MD  Endocrinologist, [facility] Diabetes Care and Endocrinology   [address] [place] [address]   Phone: [phone]  Fax: [phone]  --------------------------------------------  An electronic signature was used to authenticate this note.  Tony Garner MD on 5/8/2022 at 8:11 PM

## 2022-05-09 NOTE — PROGRESS NOTES
[facility] Progress Note    Admitting Date and Time: 5/6/2022 12:38 PM  Admit Dx: Septic shock ([facility] [address] [A41.9, R65.21]    Subjective:  Patient is being followed for Septic shock ([facility] [address] [A41.9, R65.21]     Patient was lying in bed in no acute distress. Denies any new concerns. ROS: denies fever, chills, cp, sob, n/v, HA unless stated above.      lidocaine PF  5 mL IntraDERmal Once    sodium chloride flush  5-40 mL IntraVENous 2 times per day    heparin flush  3 mL IntraVENous 2 times per day    piperacillin-tazobactam  4,500 mg IntraVENous Q8H    mupirocin   Nasal BID    insulin lispro  0-12 Units SubCUTAneous TID WC    insulin lispro  0-6 Units SubCUTAneous Nightly    daptomycin (CUBICIN) in NS IV syringe  6 mg/kg IntraVENous Q24H    enoxaparin  30 mg SubCUTAneous BID    atorvastatin  40 mg Oral Nightly    levothyroxine  25 mcg Oral Daily    metoprolol tartrate  25 mg Oral BID    sertraline  50 mg Oral Daily    pregabalin  100 mg Oral Nightly    sodium chloride flush  5-40 mL IntraVENous 2 times per day     albuterol, 2.5 mg, Q6H PRN  sodium chloride flush, 5-40 mL, PRN  sodium chloride, , PRN  heparin flush, 3 mL, PRN  oxyCODONE-acetaminophen, 1 tablet, Q4H PRN  sodium chloride flush, 5-40 mL, PRN  sodium chloride, , PRN  ondansetron, 4 mg, Q8H PRN   Or  ondansetron, 4 mg, Q6H PRN  polyethylene glycol, 17 g, Daily PRN  acetaminophen, 650 mg, Q6H PRN   Or  acetaminophen, 650 mg, Q6H PRN  glucagon (rDNA), 1 mg, PRN  dextrose, 100 mL/hr, PRN  glucose, 16 g, PRN  dextrose bolus, 125 mL, PRN   Or  dextrose bolus, 250 mL, PRN  HYDROmorphone, 0.5 mg, Q4H PRN         Objective:    /66   Pulse 69   Temp 98.2 °F (36.8 °C) (Oral)   Resp 16   Ht 6' (1.829 m)   Wt 272 lb (123.4 kg)   SpO2 97%   BMI 36.89 kg/m²   General Appearance: alert and oriented to person, place and time and in no acute distress  Skin: warm and dry  Head: normocephalic and atraumatic  Eyes: pupils equal, round, and reactive to light, extraocular eye movements intact, conjunctivae normal  Neck: neck supple and non tender without mass   Pulmonary/Chest: clear to auscultation bilaterally- no wheezes, rales or rhonchi, normal air movement, no respiratory distress  Cardiovascular: normal rate, normal S1 and S2 and no carotid bruits  Abdomen: soft, non-tender, non-distended, normal bowel sounds, no masses or organomegaly  Extremities: no cyanosis, no clubbing and no edema, right foot wrapped  Neurologic: no cranial nerve deficit and speech normal        Recent Labs     05/06/22  1302 05/07/22  0550 05/09/22  0245    138 139   K 4.8 4.4 4.9   CL 97* 104 108*   CO2 24 26 23   BUN 25* 24* 16   CREATININE 1.3* 1.1 0.9   GLUCOSE 240* 117* 151*   CALCIUM 9.8 8.7 8.1*       Recent Labs     05/06/22  1302 05/07/22  0550 05/09/22  0245   WBC 15.1* 9.5 6.7   RBC 4.90 4.30 3.89   HGB 14.0 12.4* 11.2*   HCT 42.8 37.2 34.1*   MCV 87.3 86.5 87.7   MCH 28.6 28.8 28.8   MCHC 32.7 33.3 32.8   RDW 13.8 13.8 13.6    239 233   MPV 11.6 11.0 11.2       Radiology: None    Assessment:    Principal Problem:    Septic shock (HCC)  Active Problems:    Osteomyelitis of right foot (HCC)    ALVINO (acute kidney injury) ([facility])    Elevated lactic acid level    Uncontrolled type 2 diabetes mellitus with hyperglycemia (HCC)    Cellulitis    Duodenal diverticulum  Resolved Problems:    * No resolved hospital problems. *      Plan:  1. Sepsis with shock: Presented with hypotension and leukocytosis. Leukocytosis resolved. Hypotension remains intermittent. Transferred out of the ICU. Monitor off pressors. Blood cultures negative. Toe culture 5/6 positive for nonhemolytic Streptococcus, alphahemolytic streptococcus, Corynebacteria, GNR, and MRSA. ID following. Continue Zosyn and Daptomycin. PICC placed today. 2. Right foot OM with cellulitis and surgical site infection: S/p toe amputation on 4/21. S/p I&D of right foot with bone biopsy on 5/7. Continue antibiotics. Podiatry and ID following. Plans for right foot delayed primary closure and possible repeat debridement tomorrow. 3. 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 General surgery consulted. No plans for acute inpatient surgical intervention. 4. ALVINO: Resolved. Avoid nephrotoxic agents. Monitor. 5. DM2: Endocrinology consulted. Medium dose ssi. Hypoglycemic protocol. 6. Dehydration: Continue IVF. Monitor. 7. Lactic acidosis: Continue IVF. Monitor. 8. ALCON: Hx of ALCON on CPAP. Had uvulectomy and stopped wearing CPAP. Empiric CPAP at night. Will need outpatient sleep study. 9. COPD: PFT in the past showing COPD. Continue Albuterol prn. 10. CAD s/p CABG x2    11. Hypertension currently with intermittent hypotension: Continue Lopressor     12. Hyperlipidemia: Continue Lipitor      13. Anxiety/ depression: Continue Zoloft     14. Nicotine dependence: Encourage cessation    15. Hypothyroidism: Continue Synthroid. NOTE: This report was transcribed using voice recognition software. Every effort was made to ensure accuracy; however, inadvertent computerized transcription errors may be present. Electronically signed by Orquidea Brown PA-C on 5/9/2022 at 9:49 AM  HOSPITALIST ATTENDING PHYSICIAN NOTE 5/9/2022 1731PM:    Details of the evaluation - subjective assessment (including medication profile, past medical, family and social history when applicable), examination, review of lab and test data, diagnostic impressions and medical decision making - performed by Orquidea Brown PA-C, were discussed with me on the date of service and I agree with clinical information herein unless otherwise noted. The patient has been evaluated by me personally earlier today.   Pt reports no fevers, chills,n/v.     Exam: heart reg at rate of 72,lungs cta, abd pos bs soft nt, ext neg for b/l le edema    I agree with the assessment and plan of Rusty Jamison SOCORRO Asif    Sepsis with shock  Right foot osteomyelitis with cellulitis/surgical site infection   Duodenal diverticulum   dimitri  Dm type 2 uncontrolled  Dehydration  htn  Elevated lactic acid level       Electronically signed by Luis Meyer D.O.   Hospitalist  [facility] Hospitalist Service at [facility]

## 2022-05-09 NOTE — PROGRESS NOTES
treatments reviewed and literature provided. Patient instructed on the preparation and injection of insulin dose via pen method. Patient states he was to start on Lantus insulin via syringe and vial--he would prefer to use the insulin pens, and I feel this would help increase the likeliness of compliance. Patient verbalized understanding of site rotation, storage and proper sharps disposal.             Post-education Assessment  [x]  Attentive to teaching  [x]  Answered questions appropriately when asked   [x]  Seems able to apply concepts to daily lifestyle  [x]  Seems motivated to do well  [x]  Verbalized an understanding of the plate method for portion control   [x]  Verbalized an understanding of prescribed antidiabetes medications   [x]  Verbalized an understanding of target glucose ranges/A1C level  [x]  Expresses an intent to comply with treatment plan   []  Showed very little interest in complying with treatment plan   []  Seems to have trouble applying concepts to daily lifestyle       COMMENTS:   Patient pleasant and receptive to education. He seems to have good intentions to make improvements to his eating habits and is willing to eliminate sugary beverages from his diet. He plans to obtain his glucose meter from back home, but admits that he does not like checking his glucose level. He is willing to start, though. Explained that he could get a Relion glucose meter from [facility] for about $10 if he is unable to retrieve his meter from home in a timely fashion. Stressed the importance of good glucose control to promote proper wound healing. He understands to follow-up with all of his doctors as recommended. Explained that all contact information will be listed on his discharge instructions. Encouraged outpatient diabetes education in the future.                Recommendations:   [x] Carbohydrate-controlled diet    [] Script for glucometer and supplies (per preference of patient's insurance)  [x] Script for insulin pens and pen needles (if insulin is ordered at discharge for home use)   [] Consult to social work: patient has no insurance or has financial hardship  [] Inpatient consult to endocrinologist   [] Follow up with endocrinologist as an outpatient   [x] Home healthcare nursing to increase compliance to treatment plan   [x] Script for outpatient diabetes education classes (from doctor)        Thank you for this consult.     Doug Rhoades RN, BSN, Natal[facility] [place]ct[place]uma

## 2022-05-09 NOTE — CARE COORDINATION
Social Work discharge planning   Per CM note yesterday, referral called to [employer] [employer] with [facility] and faxed her pt's signed iv atb form for Brennan Alatorre. Original signed Infusion form for [facility] is in pt's folder. Per [employer] with [employer], first start of care date for pt would be Thursday 5/12. Will need [facility] order for home iv atb and any wound care. Also, await PT OT natalie.    Electronically signed by Seb Miguel on 5/9/2022 at 2:46 PM

## 2022-05-10 ENCOUNTER — ANESTHESIA (OUTPATIENT)
Dept: OPERATING ROOM | Age: 62
DRG: 856 | End: 2022-05-10
Payer: MEDICARE

## 2022-05-10 VITALS — SYSTOLIC BLOOD PRESSURE: 103 MMHG | DIASTOLIC BLOOD PRESSURE: 58 MMHG | OXYGEN SATURATION: 99 %

## 2022-05-10 LAB
ANION GAP SERPL CALCULATED.3IONS-SCNC: 7 MMOL/L (ref 7–16)
BASOPHILS ABSOLUTE: 0.06 E9/L (ref 0–0.2)
BASOPHILS RELATIVE PERCENT: 0.9 % (ref 0–2)
BUN BLDV-MCNC: 12 MG/DL (ref 6–23)
CALCIUM SERPL-MCNC: 8.8 MG/DL (ref 8.6–10.2)
CHLORIDE BLD-SCNC: 104 MMOL/L (ref 98–107)
CO2: 26 MMOL/L (ref 22–29)
CREAT SERPL-MCNC: 0.9 MG/DL (ref 0.7–1.2)
CULTURE SURGICAL: ABNORMAL
CULTURE SURGICAL: ABNORMAL
EOSINOPHILS ABSOLUTE: 0.16 E9/L (ref 0.05–0.5)
EOSINOPHILS RELATIVE PERCENT: 2.3 % (ref 0–6)
GFR AFRICAN AMERICAN: >60
GFR NON-AFRICAN AMERICAN: >60 ML/MIN/1.73
GLUCOSE BLD-MCNC: 125 MG/DL (ref 74–99)
HCT VFR BLD CALC: 37.6 % (ref 37–54)
HEMOGLOBIN: 12.1 G/DL (ref 12.5–16.5)
IMMATURE GRANULOCYTES #: 0.03 E9/L
IMMATURE GRANULOCYTES %: 0.4 % (ref 0–5)
LACTIC ACID: 0.8 MMOL/L (ref 0.5–2.2)
LYMPHOCYTES ABSOLUTE: 1.63 E9/L (ref 1.5–4)
LYMPHOCYTES RELATIVE PERCENT: 23.3 % (ref 20–42)
MCH RBC QN AUTO: 28.2 PG (ref 26–35)
MCHC RBC AUTO-ENTMCNC: 32.2 % (ref 32–34.5)
MCV RBC AUTO: 87.6 FL (ref 80–99.9)
METER GLUCOSE: 118 MG/DL (ref 74–99)
METER GLUCOSE: 131 MG/DL (ref 74–99)
METER GLUCOSE: 208 MG/DL (ref 74–99)
METER GLUCOSE: 98 MG/DL (ref 74–99)
MONOCYTES ABSOLUTE: 0.5 E9/L (ref 0.1–0.95)
MONOCYTES RELATIVE PERCENT: 7.2 % (ref 2–12)
NEUTROPHILS ABSOLUTE: 4.61 E9/L (ref 1.8–7.3)
NEUTROPHILS RELATIVE PERCENT: 65.9 % (ref 43–80)
ORGANISM: ABNORMAL
ORGANISM: ABNORMAL
PDW BLD-RTO: 13.8 FL (ref 11.5–15)
PLATELET # BLD: 252 E9/L (ref 130–450)
PMV BLD AUTO: 10.7 FL (ref 7–12)
POTASSIUM SERPL-SCNC: 4.9 MMOL/L (ref 3.5–5)
RBC # BLD: 4.29 E12/L (ref 3.8–5.8)
SODIUM BLD-SCNC: 137 MMOL/L (ref 132–146)
WBC # BLD: 7 E9/L (ref 4.5–11.5)

## 2022-05-10 PROCEDURE — 2580000003 HC RX 258: Performed by: NURSE ANESTHETIST, CERTIFIED REGISTERED

## 2022-05-10 PROCEDURE — 3600000002 HC SURGERY LEVEL 2 BASE: Performed by: PODIATRIST

## 2022-05-10 PROCEDURE — 6370000000 HC RX 637 (ALT 250 FOR IP): Performed by: NURSE PRACTITIONER

## 2022-05-10 PROCEDURE — 3700000000 HC ANESTHESIA ATTENDED CARE: Performed by: PODIATRIST

## 2022-05-10 PROCEDURE — 6370000000 HC RX 637 (ALT 250 FOR IP): Performed by: STUDENT IN AN ORGANIZED HEALTH CARE EDUCATION/TRAINING PROGRAM

## 2022-05-10 PROCEDURE — 36415 COLL VENOUS BLD VENIPUNCTURE: CPT

## 2022-05-10 PROCEDURE — 7100000010 HC PHASE II RECOVERY - FIRST 15 MIN: Performed by: PODIATRIST

## 2022-05-10 PROCEDURE — 80048 BASIC METABOLIC PNL TOTAL CA: CPT

## 2022-05-10 PROCEDURE — 6360000002 HC RX W HCPCS: Performed by: INTERNAL MEDICINE

## 2022-05-10 PROCEDURE — 85025 COMPLETE CBC W/AUTO DIFF WBC: CPT

## 2022-05-10 PROCEDURE — APPSS30 APP SPLIT SHARED TIME 16-30 MINUTES: Performed by: PHYSICIAN ASSISTANT

## 2022-05-10 PROCEDURE — 2709999900 HC NON-CHARGEABLE SUPPLY: Performed by: PODIATRIST

## 2022-05-10 PROCEDURE — 6360000002 HC RX W HCPCS: Performed by: STUDENT IN AN ORGANIZED HEALTH CARE EDUCATION/TRAINING PROGRAM

## 2022-05-10 PROCEDURE — 2500000003 HC RX 250 WO HCPCS: Performed by: PODIATRIST

## 2022-05-10 PROCEDURE — 82962 GLUCOSE BLOOD TEST: CPT

## 2022-05-10 PROCEDURE — 6360000002 HC RX W HCPCS: Performed by: SPECIALIST

## 2022-05-10 PROCEDURE — 94660 CPAP INITIATION&MGMT: CPT

## 2022-05-10 PROCEDURE — 6360000002 HC RX W HCPCS: Performed by: NURSE ANESTHETIST, CERTIFIED REGISTERED

## 2022-05-10 PROCEDURE — 99232 SBSQ HOSP IP/OBS MODERATE 35: CPT | Performed by: INTERNAL MEDICINE

## 2022-05-10 PROCEDURE — 2580000003 HC RX 258: Performed by: SPECIALIST

## 2022-05-10 PROCEDURE — 2580000003 HC RX 258: Performed by: INTERNAL MEDICINE

## 2022-05-10 PROCEDURE — 83605 ASSAY OF LACTIC ACID: CPT

## 2022-05-10 PROCEDURE — 3700000001 HC ADD 15 MINUTES (ANESTHESIA): Performed by: PODIATRIST

## 2022-05-10 PROCEDURE — 3600000012 HC SURGERY LEVEL 2 ADDTL 15MIN: Performed by: PODIATRIST

## 2022-05-10 PROCEDURE — 2580000003 HC RX 258: Performed by: STUDENT IN AN ORGANIZED HEALTH CARE EDUCATION/TRAINING PROGRAM

## 2022-05-10 PROCEDURE — 1200000000 HC SEMI PRIVATE

## 2022-05-10 PROCEDURE — 7100000011 HC PHASE II RECOVERY - ADDTL 15 MIN: Performed by: PODIATRIST

## 2022-05-10 PROCEDURE — 2500000003 HC RX 250 WO HCPCS: Performed by: NURSE ANESTHETIST, CERTIFIED REGISTERED

## 2022-05-10 PROCEDURE — A4216 STERILE WATER/SALINE, 10 ML: HCPCS | Performed by: INTERNAL MEDICINE

## 2022-05-10 PROCEDURE — 0YQMXZZ REPAIR RIGHT FOOT, EXTERNAL APPROACH: ICD-10-PCS | Performed by: PODIATRIST

## 2022-05-10 RX ORDER — SODIUM CHLORIDE 9 MG/ML
INJECTION, SOLUTION INTRAVENOUS PRN
Status: DISCONTINUED | OUTPATIENT
Start: 2022-05-10 | End: 2022-05-10 | Stop reason: HOSPADM

## 2022-05-10 RX ORDER — MEPERIDINE HYDROCHLORIDE 25 MG/ML
12.5 INJECTION INTRAMUSCULAR; INTRAVENOUS; SUBCUTANEOUS EVERY 10 MIN PRN
Status: DISCONTINUED | OUTPATIENT
Start: 2022-05-10 | End: 2022-05-10 | Stop reason: HOSPADM

## 2022-05-10 RX ORDER — SODIUM CHLORIDE 0.9 % (FLUSH) 0.9 %
5-40 SYRINGE (ML) INJECTION PRN
Status: DISCONTINUED | OUTPATIENT
Start: 2022-05-10 | End: 2022-05-19 | Stop reason: HOSPADM

## 2022-05-10 RX ORDER — SODIUM CHLORIDE 0.9 % (FLUSH) 0.9 %
5-40 SYRINGE (ML) INJECTION EVERY 12 HOURS SCHEDULED
Status: DISCONTINUED | OUTPATIENT
Start: 2022-05-10 | End: 2022-05-19 | Stop reason: HOSPADM

## 2022-05-10 RX ORDER — DIPHENHYDRAMINE HYDROCHLORIDE 50 MG/ML
12.5 INJECTION INTRAMUSCULAR; INTRAVENOUS
Status: DISCONTINUED | OUTPATIENT
Start: 2022-05-10 | End: 2022-05-10 | Stop reason: HOSPADM

## 2022-05-10 RX ORDER — SODIUM CHLORIDE 9 MG/ML
INJECTION, SOLUTION INTRAVENOUS PRN
Status: DISCONTINUED | OUTPATIENT
Start: 2022-05-10 | End: 2022-05-19 | Stop reason: HOSPADM

## 2022-05-10 RX ORDER — FENTANYL CITRATE 50 UG/ML
50 INJECTION, SOLUTION INTRAMUSCULAR; INTRAVENOUS EVERY 5 MIN PRN
Status: DISCONTINUED | OUTPATIENT
Start: 2022-05-10 | End: 2022-05-10 | Stop reason: HOSPADM

## 2022-05-10 RX ORDER — SODIUM CHLORIDE 0.9 % (FLUSH) 0.9 %
5-40 SYRINGE (ML) INJECTION EVERY 12 HOURS SCHEDULED
Status: DISCONTINUED | OUTPATIENT
Start: 2022-05-10 | End: 2022-05-10 | Stop reason: HOSPADM

## 2022-05-10 RX ORDER — SODIUM CHLORIDE 9 MG/ML
INJECTION, SOLUTION INTRAVENOUS CONTINUOUS PRN
Status: DISCONTINUED | OUTPATIENT
Start: 2022-05-10 | End: 2022-05-10 | Stop reason: SDUPTHER

## 2022-05-10 RX ORDER — PROPOFOL 10 MG/ML
INJECTION, EMULSION INTRAVENOUS CONTINUOUS PRN
Status: DISCONTINUED | OUTPATIENT
Start: 2022-05-10 | End: 2022-05-10 | Stop reason: SDUPTHER

## 2022-05-10 RX ORDER — KETAMINE HYDROCHLORIDE 10 MG/ML
INJECTION, SOLUTION INTRAMUSCULAR; INTRAVENOUS PRN
Status: DISCONTINUED | OUTPATIENT
Start: 2022-05-10 | End: 2022-05-10 | Stop reason: SDUPTHER

## 2022-05-10 RX ORDER — SODIUM CHLORIDE 0.9 % (FLUSH) 0.9 %
5-40 SYRINGE (ML) INJECTION PRN
Status: DISCONTINUED | OUTPATIENT
Start: 2022-05-10 | End: 2022-05-10 | Stop reason: HOSPADM

## 2022-05-10 RX ADMIN — DAPTOMYCIN 700 MG: 500 INJECTION, POWDER, LYOPHILIZED, FOR SOLUTION INTRAVENOUS at 09:49

## 2022-05-10 RX ADMIN — PROPOFOL 50 MCG/KG/MIN: 10 INJECTION, EMULSION INTRAVENOUS at 16:58

## 2022-05-10 RX ADMIN — PIPERACILLIN SODIUM AND TAZOBACTAM SODIUM 4500 MG: 4; .5 INJECTION, POWDER, LYOPHILIZED, FOR SOLUTION INTRAVENOUS at 14:23

## 2022-05-10 RX ADMIN — PREGABALIN 100 MG: 50 CAPSULE ORAL at 20:48

## 2022-05-10 RX ADMIN — HYDROMORPHONE HYDROCHLORIDE 0.5 MG: 1 INJECTION, SOLUTION INTRAMUSCULAR; INTRAVENOUS; SUBCUTANEOUS at 05:29

## 2022-05-10 RX ADMIN — MUPIROCIN: 20 OINTMENT TOPICAL at 20:49

## 2022-05-10 RX ADMIN — OXYCODONE AND ACETAMINOPHEN 1 TABLET: 5; 325 TABLET ORAL at 03:38

## 2022-05-10 RX ADMIN — SODIUM CHLORIDE: 9 INJECTION, SOLUTION INTRAVENOUS at 16:56

## 2022-05-10 RX ADMIN — HYDROMORPHONE HYDROCHLORIDE 0.5 MG: 1 INJECTION, SOLUTION INTRAMUSCULAR; INTRAVENOUS; SUBCUTANEOUS at 20:48

## 2022-05-10 RX ADMIN — METOPROLOL TARTRATE 25 MG: 25 TABLET, FILM COATED ORAL at 20:48

## 2022-05-10 RX ADMIN — OXYCODONE AND ACETAMINOPHEN 1 TABLET: 5; 325 TABLET ORAL at 23:16

## 2022-05-10 RX ADMIN — OXYCODONE AND ACETAMINOPHEN 1 TABLET: 5; 325 TABLET ORAL at 19:14

## 2022-05-10 RX ADMIN — ATORVASTATIN CALCIUM 40 MG: 40 TABLET, FILM COATED ORAL at 20:48

## 2022-05-10 RX ADMIN — HYDROMORPHONE HYDROCHLORIDE 0.5 MG: 1 INJECTION, SOLUTION INTRAMUSCULAR; INTRAVENOUS; SUBCUTANEOUS at 09:42

## 2022-05-10 RX ADMIN — ENOXAPARIN SODIUM 30 MG: 100 INJECTION SUBCUTANEOUS at 20:48

## 2022-05-10 RX ADMIN — PIPERACILLIN SODIUM AND TAZOBACTAM SODIUM 4500 MG: 4; .5 INJECTION, POWDER, LYOPHILIZED, FOR SOLUTION INTRAVENOUS at 20:48

## 2022-05-10 RX ADMIN — MUPIROCIN: 20 OINTMENT TOPICAL at 09:46

## 2022-05-10 RX ADMIN — HYDROMORPHONE HYDROCHLORIDE 0.5 MG: 1 INJECTION, SOLUTION INTRAMUSCULAR; INTRAVENOUS; SUBCUTANEOUS at 00:42

## 2022-05-10 RX ADMIN — KETAMINE HYDROCHLORIDE 30 MG: 10 INJECTION INTRAMUSCULAR; INTRAVENOUS at 16:58

## 2022-05-10 RX ADMIN — HYDROMORPHONE HYDROCHLORIDE 0.5 MG: 1 INJECTION, SOLUTION INTRAMUSCULAR; INTRAVENOUS; SUBCUTANEOUS at 14:23

## 2022-05-10 RX ADMIN — INSULIN LISPRO 2 UNITS: 100 INJECTION, SOLUTION INTRAVENOUS; SUBCUTANEOUS at 20:50

## 2022-05-10 RX ADMIN — SODIUM CHLORIDE: 9 INJECTION, SOLUTION INTRAVENOUS at 00:43

## 2022-05-10 RX ADMIN — PIPERACILLIN SODIUM AND TAZOBACTAM SODIUM 4500 MG: 4; .5 INJECTION, POWDER, LYOPHILIZED, FOR SOLUTION INTRAVENOUS at 05:31

## 2022-05-10 ASSESSMENT — PAIN SCALES - GENERAL
PAINLEVEL_OUTOF10: 0
PAINLEVEL_OUTOF10: 8

## 2022-05-10 ASSESSMENT — ENCOUNTER SYMPTOMS: SHORTNESS OF BREATH: 0

## 2022-05-10 ASSESSMENT — PAIN DESCRIPTION - DESCRIPTORS: DESCRIPTORS: SORE;THROBBING

## 2022-05-10 ASSESSMENT — PAIN DESCRIPTION - FREQUENCY: FREQUENCY: CONTINUOUS

## 2022-05-10 ASSESSMENT — PAIN DESCRIPTION - ORIENTATION: ORIENTATION: RIGHT

## 2022-05-10 ASSESSMENT — PAIN DESCRIPTION - ONSET: ONSET: ON-GOING

## 2022-05-10 ASSESSMENT — PAIN DESCRIPTION - LOCATION: LOCATION: FOOT

## 2022-05-10 ASSESSMENT — PAIN - FUNCTIONAL ASSESSMENT: PAIN_FUNCTIONAL_ASSESSMENT: PREVENTS OR INTERFERES SOME ACTIVE ACTIVITIES AND ADLS

## 2022-05-10 ASSESSMENT — PAIN DESCRIPTION - PAIN TYPE: TYPE: ACUTE PAIN;SURGICAL PAIN

## 2022-05-10 NOTE — PROGRESS NOTES
P Quality Flow/Interdisciplinary Rounds Progress Note        Quality Flow Rounds held on May 10, 2022    Disciplines Attending:  Bedside Nurse, ,  and Nursing Unit Nilson ARMSTRONG May was admitted on 5/6/2022 12:38 PM    Anticipated Discharge Date:       Disposition:    Jose Score:  Jose Scale Score: 20    Readmission Risk              Risk of Unplanned Readmission:  17           Discussed patient goal for the day, patient clinical progression, and barriers to discharge.   The following Goal(s) of the Day/Commitment(s) have been identified:  Diagnostics - Report Results and Labs - Report Results      Eitan Rico RN  May 10, 2022

## 2022-05-10 NOTE — PROGRESS NOTES
[address] [place] [address] [facility]  Progress Note    SUBJECTIVE:  Chief Complaint   Patient presents with    Wound Infection     right foot, 2nd digit removed 2 weeks ago by dr Wilfred Moreland. Patient is resting comfortably  No fevers. Tolerating antibiotics  Denies pain     Review of systems:  As stated above in the chief complaint, otherwise negative. Medications:  Scheduled Meds:   sodium chloride flush  5-40 mL IntraVENous 2 times per day    heparin flush  3 mL IntraVENous 2 times per day    piperacillin-tazobactam  4,500 mg IntraVENous Q8H    mupirocin   Nasal BID    insulin lispro  0-12 Units SubCUTAneous TID WC    insulin lispro  0-6 Units SubCUTAneous Nightly    daptomycin (CUBICIN) in NS IV syringe  6 mg/kg IntraVENous Q24H    enoxaparin  30 mg SubCUTAneous BID    atorvastatin  40 mg Oral Nightly    levothyroxine  25 mcg Oral Daily    metoprolol tartrate  25 mg Oral BID    sertraline  50 mg Oral Daily    pregabalin  100 mg Oral Nightly    sodium chloride flush  5-40 mL IntraVENous 2 times per day     Continuous Infusions:   sodium chloride      sodium chloride      sodium chloride 100 mL/hr at 05/10/22 0043    dextrose       PRN Meds:albuterol, sodium chloride flush, sodium chloride, heparin flush, oxyCODONE-acetaminophen, sodium chloride flush, sodium chloride, ondansetron **OR** ondansetron, polyethylene glycol, acetaminophen **OR** acetaminophen, glucagon (rDNA), dextrose, glucose, dextrose bolus **OR** dextrose bolus, HYDROmorphone    OBJECTIVE:  /62   Pulse 70   Temp 98 °F (36.7 °C) (Oral)   Resp 18   Ht 6' (1.829 m)   Wt 271 lb (122.9 kg)   SpO2 95%   BMI 36.75 kg/m²   Temp  Av °F (36.7 °C)  Min: 98 °F (36.7 °C)  Max: 98 °F (36.7 °C)  Constitutional: The patient is asleep but easily awakens. In no distress. Skin: Warm and dry. No rashes were noted. HEENT: Round and reactive pupils. Moist mucous membranes. No ulcerations or thrush.   Neck: Supple to movements. Chest: No respiratory distress. No crackles. Cardiovascular: Heart sounds rhythmic and regular. Abdomen: Positive bowel sounds to auscultation. Benign to palpation. Extremities: The right foot is wrapped. Second toe is missing.   Lines: PICC line right arm 5/9/2022              Laboratory and Tests Review:  Lab Results   Component Value Date    WBC 7.0 05/10/2022    WBC 6.7 05/09/2022    WBC 9.5 05/07/2022    HGB 12.1 (L) 05/10/2022    HCT 37.6 05/10/2022    MCV 87.6 05/10/2022     05/10/2022     Lab Results   Component Value Date    NEUTROABS 4.61 05/10/2022    NEUTROABS 5.03 05/09/2022    NEUTROABS 6.08 05/07/2022     No results found for: [facility]  Lab Results   Component Value Date    ALT 20 05/09/2022    AST 19 05/09/2022    ALKPHOS 109 05/09/2022    BILITOT 0.2 05/09/2022     Lab Results   Component Value Date     05/10/2022    K 4.9 05/10/2022    K 4.4 05/07/2022     05/10/2022    CO2 26 05/10/2022    BUN 12 05/10/2022    CREATININE 0.9 05/10/2022    CREATININE 0.9 05/09/2022    CREATININE 1.1 05/07/2022    GFRAA >60 05/10/2022    LABGLOM >60 05/10/2022    GLUCOSE 125 05/10/2022    PROT 6.3 05/09/2022    LABALBU 2.9 05/09/2022    CALCIUM 8.8 05/10/2022    BILITOT 0.2 05/09/2022    ALKPHOS 109 05/09/2022    AST 19 05/09/2022    ALT 20 05/09/2022     Lab Results   Component Value Date    CRP 5.6 (H) 05/06/2022    CRP 1.1 (H) 04/21/2022     Lab Results   Component Value Date    SEDRATE 57 (H) 05/06/2022    SEDRATE 23 (H) 04/21/2022     Radiology:      Microbiology:   Blood cultures 5/6/2022: Negative so far  Nares screen MRSA: Positive  Toe culture 5/6/2022: Nonhemolytic Streptococcus, alphahemolytic Streptococcus, Corynebacteria, ox + GNR, M_SA  Bone culture 5/7/2022: VSEnterococcus faecalis, MRSA  Abscess culture 5/7/2022: MRSA, Corynebacterium, alphahemolytic Streptococcus, VSEnterococcus faecalis    Assessment:  · Surgical site infection right toe following right second toe amputation secondary to osteomyelitis. Previous cultures grew MRSA, VS Enterococcus faecalis, pansensitive Pseudomonas, Myroides species and Staphylococcus cohnii  · Sepsis with septic shock secondary to right foot infection, improved  · Leukocytosis associated to the above-improved  · Hypotension associated to the above    Plan:    · Continue Zosyn and Daptomycin for a minimum of 2, possibly 6 weeks  · Check final intraoperative cultures  · Labs reviewed   · Discharge plan is home with [place] [id number] - cannot start service until Thursday - ok to discharge from ID standpoint when [place] [id number] is arranged     CHRIST Islas - CNP  12:21 PM  5/10/2022     Patient seen and examined. I had a face to face encounter with the patient. Agree with exam.  Assessment and plan as outlined above and directed by me. Addition and corrections were done as deemed appropriate. My exam and plan include: The patient is tolerating antibiotics. He will be going for a secondary closure this afternoon. Home health will be taking him on Thursday. He can be discharged tomorrow afternoon. He can miss a dose of an antibiotic before he goes home.     Lisa Deal MD  5/10/2022  12:40 PM

## 2022-05-10 NOTE — CARE COORDINATION
Social Work discharge 1 [facility] continues to follow for discharge planning. [address] referral was called to [facility] with [facility] and faxed her pt's signed iv atb form for Brennan Alatorre. Original signed Infusion form for [address] [place] [address] is in pt's folder. Per [facility] with [facility], first start of care date for pt would be Thursday 5/12. Will need [facility] order for home iv atb and any wound care. Also, await PT OT natalie.    Electronically signed by Sherry Romberg on 5/10/2022 at 11:55 AM

## 2022-05-10 NOTE — BRIEF OP NOTE
Brief Postoperative Note      Patient: Shreyas Arias May  YOB: 1960  MRN: [id number]    Date of Procedure: 5/10/2022    Pre-Op Diagnosis: right foot open wound    Post-Op Diagnosis: same       Procedure(s):  RIGHT FOOT DELAYED PRIMARY CLOSURE WITH POSSIBLE DEBRIDEMENT    ++CONTACT ISOLATION++   (DR AVAIL. AT 4PM)    Surgeon(s):  Giedon Ann DPM    Assistant:  Resident: Amarilis Wright DPM    Anesthesia: Monitor Anesthesia Care    Estimated Blood Loss (mL): Minimal    Complications: None    Specimens:   * No specimens in log *    Implants:  * No implants in log *      Drains: * No LDAs found *    Findings successful closure. No further evidence of abscess.     Electronically signed by Иван Champion DPM on 5/10/2022 at 5:36 PM

## 2022-05-10 NOTE — PROGRESS NOTES
[facility] Progress Note    Admitting Date and Time: 5/6/2022 12:38 PM  Ad[facility] D[facility] [A41.9, R65.21]    Subjective:  Patient is being followed for Septic shock ([facility] [A41.9, R65.21]     Patient was lying in bed in no acute distress. Reports some pain in bilateral feet. Pain controlled when he receives his pain medications. ROS: denies fever, chills, cp, sob, n/v, HA unless stated above.      sodium chloride flush  5-40 mL IntraVENous 2 times per day    heparin flush  3 mL IntraVENous 2 times per day    piperacillin-tazobactam  4,500 mg IntraVENous Q8H    mupirocin   Nasal BID    insulin lispro  0-12 Units SubCUTAneous TID WC    insulin lispro  0-6 Units SubCUTAneous Nightly    daptomycin (CUBICIN) in NS IV syringe  6 mg/kg IntraVENous Q24H    enoxaparin  30 mg SubCUTAneous BID    atorvastatin  40 mg Oral Nightly    levothyroxine  25 mcg Oral Daily    metoprolol tartrate  25 mg Oral BID    sertraline  50 mg Oral Daily    pregabalin  100 mg Oral Nightly    sodium chloride flush  5-40 mL IntraVENous 2 times per day     albuterol, 2.5 mg, Q6H PRN  sodium chloride flush, 5-40 mL, PRN  sodium chloride, , PRN  heparin flush, 3 mL, PRN  oxyCODONE-acetaminophen, 1 tablet, Q4H PRN  sodium chloride flush, 5-40 mL, PRN  sodium chloride, , PRN  ondansetron, 4 mg, Q8H PRN   Or  ondansetron, 4 mg, Q6H PRN  polyethylene glycol, 17 g, Daily PRN  acetaminophen, 650 mg, Q6H PRN   Or  acetaminophen, 650 mg, Q6H PRN  glucagon (rDNA), 1 mg, PRN  dextrose, 100 mL/hr, PRN  glucose, 16 g, PRN  dextrose bolus, 125 mL, PRN   Or  dextrose bolus, 250 mL, PRN  HYDROmorphone, 0.5 mg, Q4H PRN         Objective:    /62   Pulse 70   Temp 98 °F (36.7 °C) (Oral)   Resp 18   Ht 6' (1.829 m)   Wt 271 lb (122.9 kg)   SpO2 95%   BMI 36.75 kg/m²   General Appearance: alert and oriented to person, place and time and in no acute distress  Skin: warm and dry  Head: normocephalic and atraumatic  Eyes: pupils equal, round, and reactive to light, extraocular eye movements intact, conjunctivae normal  Neck: neck supple and non tender without mass   Pulmonary/Chest: clear to auscultation bilaterally- no wheezes, rales or rhonchi, normal air movement, no respiratory distress  Cardiovascular: normal rate, normal S1 and S2 and no carotid bruits  Abdomen: soft, non-tender, non-distended, normal bowel sounds, no masses or organomegaly  Extremities: no cyanosis, no clubbing and no edema, right foot wrapped  Neurologic: no cranial nerve deficit and speech normal        Recent Labs     05/09/22  0245      K 4.9   *   CO2 23   BUN 16   CREATININE 0.9   GLUCOSE 151*   CALCIUM 8.1*       Recent Labs     05/09/22  0245   WBC 6.7   RBC 3.89   HGB 11.2*   HCT 34.1*   MCV 87.7   MCH 28.8   MCHC 32.8   RDW 13.6      MPV 11.2       Radiology: None    Assessment:    Principal Problem:    Septic shock ([facility])  Active Problems:    Osteomyelitis of right foot ([facility])    ALVINO (acute kidney injury) ([facility])    Elevated lactic acid level    Uncontrolled type 2 diabetes mellitus with hyperglycemia ([facility])    Cellulitis    Duodenal diverticulum  Resolved Problems:    * No resolved hospital problems. *      Plan:  1. Sepsis with shock: Presented with hypotension and leukocytosis. Leukocytosis resolved. Hypotension remains intermittent. Transferred out of the ICU. Monitor off pressors. Blood cultures negative. Toe culture 5/6 positive for nonhemolytic Streptococcus, alphahemolytic streptococcus, Corynebacteria, GNR, and MRSA. ID following. Continue Zosyn and Daptomycin for minimum of 2 weeks, possibly 6 weeks. PICC placed 5/9.    2. Right foot OM with cellulitis and surgical site infection: S/p toe amputation on 4/21. S/p I&D of right foot with bone biopsy on 5/7. Continue antibiotics. Podiatry and ID following. Plans for right foot delayed primary closure and possible repeat debridement today.      3. 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 General surgery consulted. No plans for acute inpatient surgical intervention. 4. ALVINO: Resolved. Avoid nephrotoxic agents. Monitor. 5. DM2: Endocrinology consulted. Medium dose ssi. Hypoglycemic protocol. 6. Dehydration: Continue IVF. Monitor. 7. Lactic acidosis: Continue IVF. Monitor. 8. ALCON: Hx of ALCON on CPAP. Had uvulectomy and stopped wearing CPAP. Empiric CPAP at night. Will need outpatient sleep study. 9. COPD: PFT in the past showing COPD. Continue Albuterol prn. 10. CAD s/p CABG x2    11. Hypertension: Continue Lopressor     12. Hyperlipidemia: Continue Lipitor      13. Anxiety/ depression: Continue Zoloft     14. Nicotine dependence: Encourage cessation    15. Hypothyroidism: Continue Synthroid. NOTE: This report was transcribed using voice recognition software. Every effort was made to ensure accuracy; however, inadvertent computerized transcription errors may be present. Electronically signed by Mayra Hoffman PA-C on 5/10/2022 at 8:33 AM  HOSPITALIST ATTENDING PHYSICIAN NOTE 5/10/2022 1751PM:    Details of the evaluation - subjective assessment (including medication profile, past medical, family and social history when applicable), examination, review of lab and test data, diagnostic impressions and medical decision making - performed by Mayra Hoffman PA-C, were discussed with me on the date of service and I agree with clinical information herein unless otherwise noted. The patient has been evaluated by me personally earlier today.   Pt reports no fevers, chills,n/v.     Exam: heart reg at rate of 72,lungs cta, abd pos bs soft nt, ext neg for le edema    I agree with the assessment and plan of Dave Asif PA-C      Sepsis with shock  Right foot osteomyelitis with cellulitis/surgical site infection   Duodenal diverticulum   alvino  Dm type 2 uncontrolled  Dehydration  htn  Elevated lactic acid level       Electronically signed by Ruth Gómez D.O.   Hospitalist  [facility] Hospitalist Service at [facility]

## 2022-05-11 LAB
BLOOD CULTURE, ROUTINE: NORMAL
CULTURE SURGICAL: ABNORMAL
CULTURE, BLOOD 2: NORMAL
METER GLUCOSE: 121 MG/DL (ref 74–99)
METER GLUCOSE: 180 MG/DL (ref 74–99)
METER GLUCOSE: 182 MG/DL (ref 74–99)
METER GLUCOSE: 183 MG/DL (ref 74–99)
ORGANISM: ABNORMAL
SEDIMENTATION RATE, ERYTHROCYTE: 59 MM/HR (ref 0–15)

## 2022-05-11 PROCEDURE — 99232 SBSQ HOSP IP/OBS MODERATE 35: CPT | Performed by: INTERNAL MEDICINE

## 2022-05-11 PROCEDURE — 6370000000 HC RX 637 (ALT 250 FOR IP): Performed by: STUDENT IN AN ORGANIZED HEALTH CARE EDUCATION/TRAINING PROGRAM

## 2022-05-11 PROCEDURE — 94660 CPAP INITIATION&MGMT: CPT

## 2022-05-11 PROCEDURE — 6360000002 HC RX W HCPCS: Performed by: STUDENT IN AN ORGANIZED HEALTH CARE EDUCATION/TRAINING PROGRAM

## 2022-05-11 PROCEDURE — 85651 RBC SED RATE NONAUTOMATED: CPT

## 2022-05-11 PROCEDURE — 97116 GAIT TRAINING THERAPY: CPT

## 2022-05-11 PROCEDURE — 2580000003 HC RX 258: Performed by: STUDENT IN AN ORGANIZED HEALTH CARE EDUCATION/TRAINING PROGRAM

## 2022-05-11 PROCEDURE — 82962 GLUCOSE BLOOD TEST: CPT

## 2022-05-11 PROCEDURE — APPSS30 APP SPLIT SHARED TIME 16-30 MINUTES: Performed by: PHYSICIAN ASSISTANT

## 2022-05-11 PROCEDURE — A4216 STERILE WATER/SALINE, 10 ML: HCPCS | Performed by: STUDENT IN AN ORGANIZED HEALTH CARE EDUCATION/TRAINING PROGRAM

## 2022-05-11 PROCEDURE — 36415 COLL VENOUS BLD VENIPUNCTURE: CPT

## 2022-05-11 PROCEDURE — 1200000000 HC SEMI PRIVATE

## 2022-05-11 PROCEDURE — 97161 PT EVAL LOW COMPLEX 20 MIN: CPT

## 2022-05-11 RX ADMIN — METOPROLOL TARTRATE 25 MG: 25 TABLET, FILM COATED ORAL at 21:15

## 2022-05-11 RX ADMIN — INSULIN LISPRO 1 UNITS: 100 INJECTION, SOLUTION INTRAVENOUS; SUBCUTANEOUS at 21:13

## 2022-05-11 RX ADMIN — HYDROMORPHONE HYDROCHLORIDE 0.5 MG: 1 INJECTION, SOLUTION INTRAMUSCULAR; INTRAVENOUS; SUBCUTANEOUS at 16:20

## 2022-05-11 RX ADMIN — HYDROMORPHONE HYDROCHLORIDE 0.5 MG: 1 INJECTION, SOLUTION INTRAMUSCULAR; INTRAVENOUS; SUBCUTANEOUS at 04:43

## 2022-05-11 RX ADMIN — MUPIROCIN: 20 OINTMENT TOPICAL at 21:15

## 2022-05-11 RX ADMIN — METOPROLOL TARTRATE 25 MG: 25 TABLET, FILM COATED ORAL at 09:19

## 2022-05-11 RX ADMIN — DAPTOMYCIN 700 MG: 500 INJECTION, POWDER, LYOPHILIZED, FOR SOLUTION INTRAVENOUS at 11:30

## 2022-05-11 RX ADMIN — PIPERACILLIN SODIUM AND TAZOBACTAM SODIUM 4500 MG: 4; .5 INJECTION, POWDER, LYOPHILIZED, FOR SOLUTION INTRAVENOUS at 21:20

## 2022-05-11 RX ADMIN — ENOXAPARIN SODIUM 30 MG: 100 INJECTION SUBCUTANEOUS at 09:20

## 2022-05-11 RX ADMIN — SODIUM CHLORIDE: 9 INJECTION, SOLUTION INTRAVENOUS at 13:30

## 2022-05-11 RX ADMIN — SERTRALINE 50 MG: 100 TABLET, FILM COATED ORAL at 09:19

## 2022-05-11 RX ADMIN — HYDROMORPHONE HYDROCHLORIDE 0.5 MG: 1 INJECTION, SOLUTION INTRAMUSCULAR; INTRAVENOUS; SUBCUTANEOUS at 10:51

## 2022-05-11 RX ADMIN — OXYCODONE AND ACETAMINOPHEN 1 TABLET: 5; 325 TABLET ORAL at 09:20

## 2022-05-11 RX ADMIN — INSULIN LISPRO 2 UNITS: 100 INJECTION, SOLUTION INTRAVENOUS; SUBCUTANEOUS at 11:30

## 2022-05-11 RX ADMIN — ENOXAPARIN SODIUM 30 MG: 100 INJECTION SUBCUTANEOUS at 21:13

## 2022-05-11 RX ADMIN — OXYCODONE AND ACETAMINOPHEN 1 TABLET: 5; 325 TABLET ORAL at 13:28

## 2022-05-11 RX ADMIN — OXYCODONE AND ACETAMINOPHEN 1 TABLET: 5; 325 TABLET ORAL at 03:24

## 2022-05-11 RX ADMIN — PIPERACILLIN SODIUM AND TAZOBACTAM SODIUM 4500 MG: 4; .5 INJECTION, POWDER, LYOPHILIZED, FOR SOLUTION INTRAVENOUS at 04:42

## 2022-05-11 RX ADMIN — MUPIROCIN: 20 OINTMENT TOPICAL at 09:21

## 2022-05-11 RX ADMIN — INSULIN LISPRO 2 UNITS: 100 INJECTION, SOLUTION INTRAVENOUS; SUBCUTANEOUS at 16:21

## 2022-05-11 RX ADMIN — LEVOTHYROXINE SODIUM 25 MCG: 25 TABLET ORAL at 05:00

## 2022-05-11 RX ADMIN — HYDROMORPHONE HYDROCHLORIDE 0.5 MG: 1 INJECTION, SOLUTION INTRAMUSCULAR; INTRAVENOUS; SUBCUTANEOUS at 21:13

## 2022-05-11 RX ADMIN — HYDROMORPHONE HYDROCHLORIDE 0.5 MG: 1 INJECTION, SOLUTION INTRAMUSCULAR; INTRAVENOUS; SUBCUTANEOUS at 00:37

## 2022-05-11 RX ADMIN — ATORVASTATIN CALCIUM 40 MG: 40 TABLET, FILM COATED ORAL at 21:15

## 2022-05-11 RX ADMIN — PREGABALIN 100 MG: 50 CAPSULE ORAL at 21:15

## 2022-05-11 RX ADMIN — PIPERACILLIN SODIUM AND TAZOBACTAM SODIUM 4500 MG: 4; .5 INJECTION, POWDER, LYOPHILIZED, FOR SOLUTION INTRAVENOUS at 13:23

## 2022-05-11 ASSESSMENT — PAIN SCALES - GENERAL
PAINLEVEL_OUTOF10: 8
PAINLEVEL_OUTOF10: 7
PAINLEVEL_OUTOF10: 8
PAINLEVEL_OUTOF10: 9
PAINLEVEL_OUTOF10: 8
PAINLEVEL_OUTOF10: 8
PAINLEVEL_OUTOF10: 6
PAINLEVEL_OUTOF10: 8

## 2022-05-11 ASSESSMENT — PAIN DESCRIPTION - LOCATION
LOCATION: FOOT
LOCATION: FOOT

## 2022-05-11 ASSESSMENT — PAIN DESCRIPTION - ONSET: ONSET: ON-GOING

## 2022-05-11 ASSESSMENT — PAIN DESCRIPTION - FREQUENCY: FREQUENCY: CONTINUOUS

## 2022-05-11 ASSESSMENT — PAIN - FUNCTIONAL ASSESSMENT
PAIN_FUNCTIONAL_ASSESSMENT: PREVENTS OR INTERFERES SOME ACTIVE ACTIVITIES AND ADLS
PAIN_FUNCTIONAL_ASSESSMENT: PREVENTS OR INTERFERES SOME ACTIVE ACTIVITIES AND ADLS

## 2022-05-11 ASSESSMENT — PAIN DESCRIPTION - ORIENTATION
ORIENTATION: RIGHT
ORIENTATION: RIGHT

## 2022-05-11 ASSESSMENT — PAIN DESCRIPTION - PAIN TYPE
TYPE: SURGICAL PAIN
TYPE: SURGICAL PAIN

## 2022-05-11 ASSESSMENT — PAIN DESCRIPTION - DESCRIPTORS
DESCRIPTORS: THROBBING
DESCRIPTORS: DISCOMFORT;SHOOTING;THROBBING

## 2022-05-11 ASSESSMENT — PAIN SCALES - WONG BAKER: WONGBAKER_NUMERICALRESPONSE: 0

## 2022-05-11 NOTE — PROGRESS NOTES
[address] [place] [address]                               SLEEP STUDY REPORT    PATIENT NAME: Charmaine Burdick                      :        1960  MED REC NO:   [id number]                            ROOM:       8257  ACCOUNT NO:   [de-identified]                           ADMIT DATE: 2022  PROVIDER:     Alexa Fuller MD    DATE OF STUDY:  2022    HOME SLEEP STUDY, BEDSIDE CHANNEL    ADMITTING PROVIDER:  Jeneal Halsted, MD.    EQUIPMENT USED:  ApneaLink Air. This is done in room #511. Neck circumference is 18.5. [place] Sleepiness Scale is 15/24. The patient's BMI is 35.2. Total evaluation time is 11 hours and 23 minutes. INDICATIONS:  Witnessed apneas, excessive daytime hypersomnolence,  snoring, waking up gasping, restless sleep, morning headaches, trouble  with concentration and fatigue, difficulty with work performance. The  patient has a history of UPPP. Study was adequate in duration for 11 hours and 16 minutes. The patient had 132 apneas and 17 hypopneas for an apnea-hypopnea index  of 13.2. The patient had 19 desaturations for a desaturation index of  6.6. Lowest saturation recorded was 80%. Saturations were below 88%  for 15% minutes or 9% of the night. The patient's pulse was between 56  to 80 with an average pulse of 67 beats per minute. Hypnogram shows events and moderate snoring. The patient does meet criteria for the diagnosis of moderate obstructive  sleep apnea syndrome with an apnea-hypopnea index of 13.2 with  desaturations as low as 80%. RECOMMENDATIONS:  CPAP titration and avoiding sedative medications  including narcotics.         José Miguel Be MD      D: 05/10/2022 15:13:03       T: 2022 3:44:11     [email or web address]  Job#: [id number]     Doc#: [id number]    CC:

## 2022-05-11 NOTE — PROGRESS NOTES
Department of Podiatry   Progress Note    Subjective:  Patient being seen s/p right foot delayed primary closure(DOS:5/10). Complaining of moderate foot pain today. Denies any n/f/v/c. Past Medical History:        Diagnosis Date    Anxiety     [facility]ch[facility] obstructive pulmonary disease[facility]     Depression     [facility]     Frostbite     HLD (hyperlipidemia)     HTN (hypertension)     Sleep apnea        Past Surgical History:        Procedure Laterality Date    BACK SURGERY      CHOLECYSTECTOMY      CORONARY ARTERY BYPASS GRAFT REDO      2 vessel    FOOT DEBRIDEMENT Right 4/25/2022    DELAYED PRIMARY CLOSURE RIGHT FOOT WOUND performed by Sarah Sheehan DPM at [address] [place] [address] Right 5/7/2022    FOOT DEBRIDEMENT INCISION AND DRAINAGE performed by Mayte May DPM at [address] [place] [address] Right 5/10/2022    RIGHT FOOT DELAYED PRIMARY CLOSURE WITH POSSIBLE DEBRIDEMENT    ++CONTACT ISOLATION++   (DR AVAIL. AT 4PM) performed by Mayte May DPM at [address] [place] [address] PICC LINE  5/9/2022         TOE AMPUTATION Right 4/22/2022    AMPUTATION RIGHT SECOND TOE performed by Sarah Sheehan DPM at [place] [address]         Medications Prior to Admission:    Medications Prior to Admission: atorvastatin (LIPITOR) 40 MG tablet, Take 40 mg by mouth at bedtime  metoprolol tartrate (LOPRESSOR) 25 MG tablet, Take 25 mg by mouth 2 times daily  metFORMIN (GLUCOPHAGE) 1000 MG tablet, Take 1,000 mg by mouth 2 times daily (with meals)  levothyroxine (SYNTHROID) 25 MCG tablet, Take 25 mcg by mouth Daily  sertraline (ZOLOFT) 50 MG tablet, Take 50 mg by mouth daily  glimepiride (AMARYL) 4 MG tablet, Take 4 mg by mouth in the morning and at bedtime  JARDIANCE 10 MG tablet, Take 10 mg by mouth daily  pregabalin (LYRICA) 100 MG capsule, Take 100 mg by mouth at bedtime.   lisinopril-hydroCHLOROthiazide (PRINZIDE;ZESTORETIC) 20-12.5 MG per tablet, Take 20 tablets by mouth daily    Allergies:  Patient has no known allergies. Social History:   · TOBACCO:   reports that he has been smoking cigarettes. He started smoking about 42 years ago. He has been smoking about 2.50 packs per day. He does not have any smokeless tobacco history on file. · ETOH:   reports no history of alcohol use. DRUGS:   Social History     Substance and Sexual Activity   Drug Use Never       Family History:       Problem Relation Age of Onset    Dementia Mother     Stroke Father     Diabetes Sister          REVIEW OF SYSTEMS:    All pertinent positives and negatives as noted in HPI       LOWER EXTREMITY EXAMINATION   Dressing cdi today with excessive drainage noted. PREVIOUS physical exam findings:  Vascular Exam:  DP and PT pulses intact with hand held doppler bilaterally. CFT delayed to distal digits B/L. Skin temperature warm to warm proximal leg to distal toes.      Neuro Exam: Diminished protective sensation B/L     Dermatologic Exam: Right 2nd digit amputation site wound noted with gross bone exposure and mild active bleeding. Red, granular tissue observed with no evidence of purulence, malodor, lymphangitis. Suture intact and in adequate position. Partial thickness fasciotomy site noted to dorsal foot with retention suture intact and in adequate position. No evidence of purulence. No malodor. No lymphangitis    MSK: Mild  tenderness on palpation to dorsal aspect of the right foot. Soft compressible posterior most compartments bilaterally.                             CONSULTS:  IP CONSULT TO GENERAL SURGERY  IP CONSULT TO GENERAL SURGERY  IP CONSULT TO CRITICAL CARE  IP CONSULT TO INFECTIOUS DISEASES  IP CONSULT TO PODIATRY  IP CONSULT TO SOCIAL WORK  IP CONSULT TO ENDOCRINOLOGY  IP CONSULT TO DIABETES EDUCATOR    MEDICATION:  Scheduled Meds:   sodium chloride flush  5-40 mL IntraVENous 2 times per day    heparin flush  3 mL IntraVENous 2 times per day    piperacillin-tazobactam  4,500 mg IntraVENous Q8H    mupirocin   Nasal BID    insulin lispro  0-12 Units SubCUTAneous TID WC    insulin lispro  0-6 Units SubCUTAneous Nightly    daptomycin (CUBICIN) in NS IV syringe  6 mg/kg IntraVENous Q24H    enoxaparin  30 mg SubCUTAneous BID    atorvastatin  40 mg Oral Nightly    levothyroxine  25 mcg Oral Daily    metoprolol tartrate  25 mg Oral BID    sertraline  50 mg Oral Daily    pregabalin  100 mg Oral Nightly     Continuous Infusions:   sodium chloride      sodium chloride 100 mL/hr at 05/11/22 0532    dextrose       PRN Meds:.sodium chloride flush, sodium chloride, albuterol, heparin flush, oxyCODONE-acetaminophen, ondansetron **OR** ondansetron, polyethylene glycol, acetaminophen **OR** acetaminophen, glucagon (rDNA), dextrose, glucose, dextrose bolus **OR** dextrose bolus, HYDROmorphone    RADIOLOGY:  XR CHEST PORTABLE   Final Result   Right internal jugular central venous line terminates within the superior   vena cava with no evidence of pneumothorax. CT ABDOMEN PELVIS W IV CONTRAST Additional Contrast? None   Final Result   1. Rule out nonspecific postinflammatory changes in the right lower lobe of   the lungs. 2.  Small, rounded hypodensity, containing what appears to be air droplets   within the pancreatic head and near the ampulla. It lies immediately   adjacent to the duodenum and is in the path of the CBD. Question duodenal   diverticulum. An ampullary/pancreatic head lesion containing air cannot be   excluded. Upper GI study is recommended to see if this represents a   diverticulum. If no diverticulum is seen, then MRI of the abdomen with and   without intravenous contrast and including MRCP is recommended. 3.  Bilateral renal cortical cysts. At least 1 on the left may represent a   hemorrhagic or debris laden cyst.  Renal ultrasound is recommended. 4.  Small left inguinal hernia, which contains fat.          XR FOOT RIGHT (MIN 3 VIEWS)   Final Result   Patient is status post amputation of the right 2nd toe. No acute osseous   abnormality. No evidence to suggest osteomyelitis. If osteomyelitis needs   to be excluded further, than MRI of the foot with and without intravenous   gadolinium can be considered. Vitals:    BP (!) 141/60   Pulse 71   Temp 98.1 °F (36.7 °C) (Oral)   Resp 18   Ht 6' (1.829 m)   Wt 268 lb 8 oz (121.8 kg)   SpO2 96%   BMI 36.42 kg/m²     LABS:   Recent Labs     05/09/22  0245 05/10/22  0940   WBC 6.7 7.0   HGB 11.2* 12.1*   HCT 34.1* 37.6    252     Recent Labs     05/09/22  0245 05/09/22  0245 05/10/22  0940      < > 137   K 4.9   < > 4.9   *   < > 104   CO2 23   < > 26   PHOS 2.9  --   --    BUN 16   < > 12   CREATININE 0.9   < > 0.9    < > = values in this interval not displayed. Recent Labs     05/09/22 0245   PROT 6.3*       ASSESSMENTS:   -S/p delayed primary closure, right foot(DOS5/10)  -Full thickness wound, right foot  -Acute OM, right foot  -IDDM with neuropathy    PLAN:  - Examined and evaluated  - All labs, imaging, and charts reviewed  -Abx per ID:zosyn and dapto ongoing  -Arterial studies:Normal DK b/l at 1.2. Multiphasic waveforms at all levels b/l. -NWB to operative limb  -QoD dressing changes:betadine, xeroform DSD. Next change:5/12  -Stable for d/c from Podiatric perspective and once cleared by all other teams on board  -Will follow up on outpatient basis for continued Podiatric care  -Will continue to monitor while in-house    Discussed w/:AMAYA Bo [id number]  Fellowship-Trained Foot and Ankle Surgeon  Diplomate, [employer] Board [employer] Foot [employer] Ankle Surgeons  [phone]     Thank you for the opportunity to take part in the patient's care. Please do not hesitate to call for any questions or concerns.

## 2022-05-11 NOTE — CARE COORDINATION
Social Work discharge planning addendum-    Pt now agreeable to snf. Gave him snf choices list with his [address]. Asked pt for 3 choices asap. Need OT eval as well. Electronically signed by Brionna Baldwin on 5/11/2022 at 3:42 PM     Addendum-    Pt chose 1. [address] or 2. [address] [facility]. Referral called to [facility] and advised to start precert IF accepted once have OT note tomorrow. SW also asked pt to review list for 2 more choices for tomorrow am. Also notified Won Iqbal with [facility] of possible change to snf.   Electronically signed by Brionna Baldwin on 5/11/2022 at 3:53 PM

## 2022-05-11 NOTE — PROGRESS NOTES
ENDOCRINOLOGY PROGRESS NOTE      Date of admission: 5/6/2022  Date of service: 5/11/2022  Admitting physician: Caridad Murphy DO   Primary Care Physician: CHRIST Soto - CNP  Consultant physician: Arnaldo Chong MD     Reason for the consultation:  Uncontrolled DM    History of Present Illness: The history is provided by the patient. Accuracy of the patient data is excellent    Genesis Fuentes is a very pleasant 63 y.o. old male with PMH uncontrolled DM type 2, obesity, HTN, HLD and other listed below admitted to [facility] 5/6/2022 because of Rt foot pain and swelling and found to have infected diabetic foot and in septic shock, endocrine service was consulted for diabetes management.     Subjective   No acute events, most BG variable      Inpatient diet:   Carb Restricted diet     Point of care glucose monitoring   (Independently reviewed)   Recent Labs     05/09/22  2040 05/10/22  0633 05/10/22  1203 05/10/22  1736 05/10/22  2046 05/11/22  0551 05/11/22  1056 05/11/22  1619   GLUMET 133* 118* 131* 98 208* 121* 182* 180*     Scheduled Meds:   sodium chloride flush  5-40 mL IntraVENous 2 times per day    heparin flush  3 mL IntraVENous 2 times per day    piperacillin-tazobactam  4,500 mg IntraVENous Q8H    mupirocin   Nasal BID    insulin lispro  0-12 Units SubCUTAneous TID WC    insulin lispro  0-6 Units SubCUTAneous Nightly    daptomycin (CUBICIN) in NS IV syringe  6 mg/kg IntraVENous Q24H    enoxaparin  30 mg SubCUTAneous BID    atorvastatin  40 mg Oral Nightly    levothyroxine  25 mcg Oral Daily    metoprolol tartrate  25 mg Oral BID    sertraline  50 mg Oral Daily    pregabalin  100 mg Oral Nightly       PRN Meds:   sodium chloride flush, 5-40 mL, PRN  sodium chloride, , PRN  albuterol, 2.5 mg, Q6H PRN  heparin flush, 3 mL, PRN  oxyCODONE-acetaminophen, 1 tablet, Q4H PRN  ondansetron, 4 mg, Q8H PRN   Or  ondansetron, 4 mg, Q6H PRN  polyethylene glycol, 17 g, Daily PRN  acetaminophen, 650 mg, Q6H PRN   Or  acetaminophen, 650 mg, Q6H PRN  glucagon (rDNA), 1 mg, PRN  dextrose, 100 mL/hr, PRN  glucose, 16 g, PRN  dextrose bolus, 125 mL, PRN   Or  dextrose bolus, 250 mL, PRN  HYDROmorphone, 0.5 mg, Q4H PRN      Continuous Infusions:   sodium chloride      sodium chloride 100 mL/hr at 05/11/22 1330    dextrose         Review of Systems  All systems reviewed. All negative except for symptoms mentioned in HPI     OBJECTIVE    BP (!) 121/59   Pulse 70   Temp 98.3 °F (36.8 °C) (Oral)   Resp 16   Ht 6' (1.829 m)   Wt 268 lb 8 oz (121.8 kg)   SpO2 96%   BMI 36.42 kg/m²     Intake/Output Summary (Last 24 hours) at 5/11/2022 1907  Last data filed at 5/11/2022 1723  Gross per 24 hour   Intake 5850.92 ml   Output 1650 ml   Net 4200.92 ml       Physical examination:  General: awake alert, oriented x3  HEENT: normocephalic non traumatic, no exophthalmos   Neck: supple, No thyroid tenderness,  Pulm: good equal air entry no added sounds  CVS: S1 + S2  Abd: soft lax, no tenderness  Skin: warm, no lesions, no rash.  Diabetic foot   Neuro: CN intact, sensation decreased bilateral , muscle power normal  Psych: normal mood, and affect    Review of Laboratory Data:  I personally reviewed the following labs:   Recent Labs     05/09/22  0245 05/10/22  0940   WBC 6.7 7.0   RBC 3.89 4.29   HGB 11.2* 12.1*   HCT 34.1* 37.6   MCV 87.7 87.6   MCH 28.8 28.2   MCHC 32.8 32.2   RDW 13.6 13.8    252   MPV 11.2 10.7     Recent Labs     05/09/22  0245 05/10/22  0940    137   K 4.9 4.9   * 104   CO2 23 26   BUN 16 12   CREATININE 0.9 0.9   GLUCOSE 151* 125*   CALCIUM 8.1* 8.8   PROT 6.3*  --    LABALBU 2.9*  --    BILITOT 0.2  --    ALKPHOS 109  --    AST 19  --    ALT 20  --      No results found for: YDRXBUT  Lab Results   Component Value Date    LABA1C 7.7 04/22/2022     No results found for: TSH, T4FREE, M9XKOSZ, FT3, I5SUXMY, TSI, TPOABS, THGAB  Lab Results   Component Value Date    LABA1C 7.7 04/22/2022 GLUCOSE 125 05/10/2022     No results found for: TRIG, HDL, LDLCALC, CHOL    Blood culture   Lab Results   Component Value Date    BC 24 Hours no growth 05/06/2022    BC 5 Days no growth 04/21/2022       Radiology:  XR CHEST PORTABLE   Final Result   Right internal jugular central venous line terminates within the superior   vena cava with no evidence of pneumothorax. CT ABDOMEN PELVIS W IV CONTRAST Additional Contrast? None   Final Result   1. Rule out nonspecific postinflammatory changes in the right lower lobe of   the lungs. 2.  Small, rounded hypodensity, containing what appears to be air droplets   within the pancreatic head and near the ampulla. It lies immediately   adjacent to the duodenum and is in the path of the CBD. Question duodenal   diverticulum. An ampullary/pancreatic head lesion containing air cannot be   excluded. Upper GI study is recommended to see if this represents a   diverticulum. If no diverticulum is seen, then MRI of the abdomen with and   without intravenous contrast and including MRCP is recommended. 3.  Bilateral renal cortical cysts. At least 1 on the left may represent a   hemorrhagic or debris laden cyst.  Renal ultrasound is recommended. 4.  Small left inguinal hernia, which contains fat. XR FOOT RIGHT (MIN 3 VIEWS)   Final Result   Patient is status post amputation of the right 2nd toe. No acute osseous   abnormality. No evidence to suggest osteomyelitis. If osteomyelitis needs   to be excluded further, than MRI of the foot with and without intravenous   gadolinium can be considered.              Medical Records/Labs/Images review:   I personally reviewed and summarized previous records   All labs and imaging were reviewed independently     [address] [facility] B May, a 63 y.o.-old male seen today for inpatient diabetes management     Diabetes Mellitus type 2   · Pt admit poor compliance with diet   · We recommend the following diabetes regimen   · Medium dose sliding scale  · Continue glucose check with meals and at bedtime   · Will titrate insulin dose based on the blood glucose trend & insulin requirement  · On discharge, we recommend dc pt on Metformin 1000 mg BID   · Pt will follow with us after discharge     Septic shock from infected diabetic foot   · Managed by critical care and ID service     The above issues were reviewed with the patient who understood and agreed with the plan. Thank you for allowing us to participate in the care of this patient. Please do not hesitate to contact us with any additional questions. Arnaldo Chong MD  Endocrinologist, [facility] Diabetes Care and Endocrinology   [address] [facility] [address]   Phone: [phone]  Fax: [phone]  --------------------------------------------  An electronic signature was used to authenticate this note.  Eloy Paul MD on 5/11/2022 at 7:07 PM

## 2022-05-11 NOTE — PROGRESS NOTES
[address] [place] [address] [facility]  Progress Note    SUBJECTIVE:  Chief Complaint   Patient presents with    Wound Infection     right foot, 2nd digit removed 2 weeks ago by dr Cornelius Gonzales. Patient is lying in bed, eating lunch  In no distress  Tolerating antibiotics   No issues     Review of systems:  As stated above in the chief complaint, otherwise negative. Medications:  Scheduled Meds:   sodium chloride flush  5-40 mL IntraVENous 2 times per day    heparin flush  3 mL IntraVENous 2 times per day    piperacillin-tazobactam  4,500 mg IntraVENous Q8H    mupirocin   Nasal BID    insulin lispro  0-12 Units SubCUTAneous TID WC    insulin lispro  0-6 Units SubCUTAneous Nightly    daptomycin (CUBICIN) in NS IV syringe  6 mg/kg IntraVENous Q24H    enoxaparin  30 mg SubCUTAneous BID    atorvastatin  40 mg Oral Nightly    levothyroxine  25 mcg Oral Daily    metoprolol tartrate  25 mg Oral BID    sertraline  50 mg Oral Daily    pregabalin  100 mg Oral Nightly     Continuous Infusions:   sodium chloride      sodium chloride 100 mL/hr at 22 0532    dextrose       PRN Meds:sodium chloride flush, sodium chloride, albuterol, heparin flush, oxyCODONE-acetaminophen, ondansetron **OR** ondansetron, polyethylene glycol, acetaminophen **OR** acetaminophen, glucagon (rDNA), dextrose, glucose, dextrose bolus **OR** dextrose bolus, HYDROmorphone    OBJECTIVE:  /63   Pulse 69   Temp 98.1 °F (36.7 °C) (Oral)   Resp 18   Ht 6' (1.829 m)   Wt 268 lb 8 oz (121.8 kg)   SpO2 96%   BMI 36.42 kg/m²   Temp  Av °F (36.7 °C)  Min: 97.6 °F (36.4 °C)  Max: 98.1 °F (36.7 °C)  Constitutional: The patient is lying in bed, in no distress. Awake, alert. Skin: Warm and dry. No rashes were noted. HEENT: Round and reactive pupils. Moist mucous membranes. No ulcerations or thrush. Neck: Supple to movements. Chest: No respiratory distress. No crackles.   Cardiovascular: Heart sounds rhythmic and regular. Abdomen: Positive bowel sounds to auscultation. Benign to palpation. Extremities: The right foot is wrapped. Second toe is missing. Lines: PICC line right arm 5/9/2022              Laboratory and Tests Review:  Lab Results   Component Value Date    WBC 7.0 05/10/2022    WBC 6.7 05/09/2022    WBC 9.5 05/07/2022    HGB 12.1 (L) 05/10/2022    HCT 37.6 05/10/2022    MCV 87.6 05/10/2022     05/10/2022     Lab Results   Component Value Date    NEUTROABS 4.61 05/10/2022    NEUTROABS 5.03 05/09/2022    NEUTROABS 6.08 05/07/2022     No results found for: [facility]  Lab Results   Component Value Date    ALT 20 05/09/2022    AST 19 05/09/2022    ALKPHOS 109 05/09/2022    BILITOT 0.2 05/09/2022     Lab Results   Component Value Date     05/10/2022    K 4.9 05/10/2022    K 4.4 05/07/2022     05/10/2022    CO2 26 05/10/2022    BUN 12 05/10/2022    CREATININE 0.9 05/10/2022    CREATININE 0.9 05/09/2022    CREATININE 1.1 05/07/2022    GFRAA >60 05/10/2022    LABGLOM >60 05/10/2022    GLUCOSE 125 05/10/2022    PROT 6.3 05/09/2022    LABALBU 2.9 05/09/2022    CALCIUM 8.8 05/10/2022    BILITOT 0.2 05/09/2022    ALKPHOS 109 05/09/2022    AST 19 05/09/2022    ALT 20 05/09/2022     Lab Results   Component Value Date    CRP 5.6 (H) 05/06/2022    CRP 1.1 (H) 04/21/2022     Lab Results   Component Value Date    SEDRATE 59 (H) 05/11/2022    SEDRATE 57 (H) 05/06/2022    SEDRATE 23 (H) 04/21/2022     Radiology:      Microbiology:   Blood cultures 5/6/2022: Negative so far  Nares screen MRSA: Positive  Toe culture 5/6/2022: Nonhemolytic Streptococcus, alphahemolytic Streptococcus, Corynebacteria, ox + GNR, M_SA  Bone culture 5/7/2022: VSEnterococcus faecalis, MRSA  Abscess culture 5/7/2022: MRSA, Corynebacterium, alphahemolytic Streptococcus, VSEnterococcus faecalis    Surgical pathology: Subcortical bone with osteopenia, no   definite osteomyelitis.        Assessment:  · Surgical site infection right toe following right second toe amputation secondary to osteomyelitis. Previous cultures grew MRSA, VS Enterococcus faecalis, pansensitive Pseudomonas, Myroides species and Staphylococcus cohnii  · Sepsis with septic shock secondary to right foot infection, improved  · Leukocytosis associated to the above-improved  · Hypotension associated to the above  · Status post delayed primary closure right foot 5/10/2022    Plan:    · Continue Zosyn and Daptomycin for a minimum of 2, possibly 6 weeks  · Check final intraoperative cultures  · Labs reviewed   · Discharge plan is home with [facility] - patient can be discharge this afternoon & miss one dose of IV antibiotics if necessary     CHRIST Briceño - CNP  11:58 AM  5/11/2022     Patient seen and examined. I had a face to face encounter with the patient. Agree with exam.  Assessment and plan as outlined above and directed by me. Addition and corrections were done as deemed appropriate. My exam and plan include: The patient is tolerating antibiotics. Cultures growing Enterococcus faecalis, MRSA, alphahemolytic Streptococcus and Corynebacterium. He will be discharged on a minimum of 2 weeks of Zosyn and Daptomycin. Instructed to call the office and make an appointment before he gets to the 2-week andreia.     Courtney Mcdermott MD  5/11/2022  12:29 PM

## 2022-05-11 NOTE — OP NOTE
[address] [address]                                OPERATIVE REPORT    PATIENT NAME: Edith Cheng                      :        1960  MED REC NO:   [id number]                            ROOM:       4625  ACCOUNT NO:   [de-identified]                           ADMIT DATE: 2022  PROVIDER:     Shae Mccullough DPM    DATE OF PROCEDURE:  05/10/2022    SURGEON:  Shae Mccullough DPM.    ASSISTANT:  Jany Nation, PGY-3.    PREOPERATIVE DIAGNOSIS:  Open wound, right foot. POSTOPERATIVE DIAGNOSIS:  Open wound, right foot. PROCEDURE PERFORMED:  Delayed primary closure, right foot. ANESTHESIA:  Monitored anesthesia care. INJECTABLES:  20 mL of 0.5% Marcaine plain. ESTIMATED BLOOD LOSS:  Minimal.    MATERIALS USED:  3-0 nylon sutures. INTRAOPERATIVE FINDINGS:  Successful closure. No further evidence of  abscess. INDICATIONS:  This pleasant 61-year-old male presented today for right  foot delayed primary closure. I counseled the patient on the nature of  the problem, post course of the procedure, potential benefits, risks,  complications, and convalescence in detail. All questions were answered  to his apparent satisfaction. No guarantees were given as to the  outcome of the procedure. DESCRIPTION OF PROCEDURE:  Under mild sedation, the patient was brought  to the operating room and placed on the operating table in the supine  position. The right lower extremity was scrubbed, prepped, and draped  in the usual sterile fashion. At this time, using a #10 blade, I  performed a sharp excisional debridement of the open wound edges to and  through level of the deep fascia and muscle. All necrosed tissue was  removed from the surgical site in toto. Next, I irrigated the areas  using copious amounts of normal saline.   Post irrigation, then I was  able to perform delayed primary closure with 3-0 nylon suture using  over-and-over sutures. Successful closure was noted. No tension on the  skin line. Postoperative bandages were then applied. The patient tolerated the procedure and anesthesia well in apparent  satisfactory condition with vital signs stable and vascular status  intact to the right lower extremity. The patient was transferred to  PACU for further monitoring prior to readmission to the nursing floor.         Brannon Matta DPM    D: 05/10/2022 17:39:25       T: 05/11/2022 1:51:18     [email or web address]  Job#: [id number]     Doc#: [id number]    CC:

## 2022-05-11 NOTE — PROGRESS NOTES
P Quality Flow/Interdisciplinary Rounds Progress Note        Quality Flow Rounds held on May 11, 2022    Disciplines Attending:  Bedside Nurse, ,  and Nursing Unit Nilson ARMSTRONG May was admitted on 5/6/2022 12:38 PM    Anticipated Discharge Date:       Disposition:    Jose Score:  Jose Scale Score: 20    Readmission Risk              Risk of Unplanned Readmission:  14           Discussed patient goal for the day, patient clinical progression, and barriers to discharge.   The following Goal(s) of the Day/Commitment(s) have been identified:  Diagnostics - Report Results and Labs - Report Results, DC planning      Ade Renteria RN  May 11, 2022

## 2022-05-11 NOTE — PROGRESS NOTES
[facility] Progress Note    Admitting Date and Time: 5/6/2022 12:38 PM  Admit Dx: Septic shock ([facility] [A41.9, R65.21]    Subjective:  Patient is being followed for Septic shock ([facility] [A41.9, R65.21]     Patient was lying in bed in no acute distress. Continues to report pain in his feet. ROS: denies fever, chills, cp, sob, n/v, HA unless stated above.      sodium chloride flush  5-40 mL IntraVENous 2 times per day    heparin flush  3 mL IntraVENous 2 times per day    piperacillin-tazobactam  4,500 mg IntraVENous Q8H    mupirocin   Nasal BID    insulin lispro  0-12 Units SubCUTAneous TID WC    insulin lispro  0-6 Units SubCUTAneous Nightly    daptomycin (CUBICIN) in NS IV syringe  6 mg/kg IntraVENous Q24H    enoxaparin  30 mg SubCUTAneous BID    atorvastatin  40 mg Oral Nightly    levothyroxine  25 mcg Oral Daily    metoprolol tartrate  25 mg Oral BID    sertraline  50 mg Oral Daily    pregabalin  100 mg Oral Nightly     sodium chloride flush, 5-40 mL, PRN  sodium chloride, , PRN  albuterol, 2.5 mg, Q6H PRN  heparin flush, 3 mL, PRN  oxyCODONE-acetaminophen, 1 tablet, Q4H PRN  ondansetron, 4 mg, Q8H PRN   Or  ondansetron, 4 mg, Q6H PRN  polyethylene glycol, 17 g, Daily PRN  acetaminophen, 650 mg, Q6H PRN   Or  acetaminophen, 650 mg, Q6H PRN  glucagon (rDNA), 1 mg, PRN  dextrose, 100 mL/hr, PRN  glucose, 16 g, PRN  dextrose bolus, 125 mL, PRN   Or  dextrose bolus, 250 mL, PRN  HYDROmorphone, 0.5 mg, Q4H PRN         Objective:    BP (!) 98/57   Pulse 61   Temp 98 °F (36.7 °C) (Oral)   Resp 18   Ht 6' (1.829 m)   Wt 268 lb 8 oz (121.8 kg)   SpO2 97%   BMI 36.42 kg/m²   General Appearance: alert and oriented to person, place and time and in no acute distress  Skin: warm and dry  Head: normocephalic and atraumatic  Eyes: pupils equal, round, and reactive to light, extraocular eye movements intact, conjunctivae normal  Neck: neck supple and non tender without mass Pulmonary/Chest: clear to auscultation bilaterally- no wheezes, rales or rhonchi, normal air movement, no respiratory distress  Cardiovascular: normal rate, normal S1 and S2 and no carotid bruits  Abdomen: soft, non-tender, non-distended, normal bowel sounds, no masses or organomegaly  Extremities: no cyanosis, no clubbing and no edema, right foot wrapped  Neurologic: no cranial nerve deficit and speech normal        Recent Labs     05/09/22  0245 05/10/22  0940    137   K 4.9 4.9   * 104   CO2 23 26   BUN 16 12   CREATININE 0.9 0.9   GLUCOSE 151* 125*   CALCIUM 8.1* 8.8       Recent Labs     05/09/22  0245 05/10/22  0940   WBC 6.7 7.0   RBC 3.89 4.29   HGB 11.2* 12.1*   HCT 34.1* 37.6   MCV 87.7 87.6   MCH 28.8 28.2   MCHC 32.8 32.2   RDW 13.6 13.8    252   MPV 11.2 10.7       Radiology: None    Assessment:    Principal Problem:    Septic shock (HCC)  Active Problems:    Osteomyelitis of right foot (HCC)    ALVINO (acute kidney injury) (HCC)    Elevated lactic acid level    Uncontrolled type 2 diabetes mellitus with hyperglycemia (HCC)    Cellulitis    Duodenal diverticulum  Resolved Problems:    * No resolved hospital problems. *      Plan:  1. Sepsis with shock: Presented with hypotension and leukocytosis. Leukocytosis resolved. Hypotension remains intermittent. Transferred out of the ICU. Monitor off pressors. Blood cultures negative. Toe culture 5/6 positive for nonhemolytic Streptococcus, alphahemolytic streptococcus, Corynebacteria, GNR, and MRSA. ID following. Continue Zosyn and Daptomycin for minimum of 2 weeks, possibly 6 weeks. PICC placed 5/9.    2. Right foot OM with cellulitis and surgical site infection: S/p toe amputation on 4/21. S/p I&D of right foot with bone biopsy on 5/7. Continue antibiotics. Podiatry and ID following. S/p right foot delayed primary closure and possible repeat debridement on 5/10.     3. 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 General surgery consulted. No plans for acute inpatient surgical intervention. 4. ALVINO: Resolved. Avoid nephrotoxic agents. Monitor. 5. DM2: Endocrinology consulted. Medium dose ssi. Hypoglycemic protocol. 6. Dehydration: Continue IVF. Monitor. 7. Lactic acidosis: Resolved. Monitor. 8. ALCON: Hx of ALCON on CPAP. Had uvulectomy and stopped wearing CPAP. Empiric CPAP at night. Sleep study done 5/11 showing moderate obstructive sleep apnea syndrome. 9. COPD: PFT in the past showing COPD. Continue Albuterol prn. 10. CAD s/p CABG x2    11. Hypertension: Continue Lopressor     12. Hyperlipidemia: Continue Lipitor      13. Anxiety/ depression: Continue Zoloft     14. Nicotine dependence: Encourage cessation    15. Hypothyroidism: Continue Synthroid. Dispo: Patient scored 14/24 with PT/OT. Agreeable to SNF. Choices given. Case management following. NOTE: This report was transcribed using voice recognition software. Every effort was made to ensure accuracy; however, inadvertent computerized transcription errors may be present. Electronically signed by Myara Hoffman PA-C on 5/11/2022 at 8:16 AM  HOSPITALIST ATTENDING PHYSICIAN NOTE 5/11/2022 1852PM:    Details of the evaluation - subjective assessment (including medication profile, past medical, family and social history when applicable), examination, review of lab and test data, diagnostic impressions and medical decision making - performed by Mayra Hoffman PA-C, were discussed with me on the date of service and I agree with clinical information herein unless otherwise noted. The patient has been evaluated by me personally earlier today.   Pt reports no fevers, chills,n/v.     Exam: heart reg at rate of 60, lungs cta, abd pos bs soft nt, ext neg for le edema    I agree with the assessment and plan of Dave Asif PA-C    Sepsis with shock  Right foot osteomyelitis with cellulitis/surgical site infection   Duodenal diverticulum dimitri  Dm type 2 uncontrolled  Dehydration  htn  Elevated lactic acid level       Electronically signed by Vy Molina D.O.   Hospitalist  [facility] at [place] [address]

## 2022-05-11 NOTE — PROGRESS NOTES
Physical Therapy  Facility/Department: [facility] MED SURG  Physical Therapy Initial Assessment    Name: Rut Fuentes  : 1960  MRN: [id number]  Date of Service: 2022      Attending Provider:  Luz Maria Donald DO    Evaluating PT:  Tanya Cotton. Parvin King, P.T. Room #:  88/5388-A  Diagnosis:  [facility] [A41.9, R65.21]  Pertinent PMHx/PSHx:  R foot wound  Procedure/Surgery:  5/10/22 delayed primary closure R foot  Precautions:  Falls, bed/chair alarm, NWB R foot    SUBJECTIVE:    Pt lives with his niece in a 1 story home with 2 stairs and 1 post to enter. Pt ambulated with ww PTA. OBJECTIVE:   Initial Evaluation  Date: 22 Treatment Short Term/ Long Term   Goals   Was pt agreeable to Eval/treatment? yes     Does pt have pain? C/o R foot pain 8/10     Bed Mobility  Rolling: SBA  Supine to sit: MOD A  Sit to supine: SBA  Scooting: MIN A  Independent   Transfers Sit to stand: MOD A  Stand to sit: MIN A  Stand pivot: NA  Independent   Ambulation   5 feet with ww and pt was unable to maintain NWB RLE with MIN A  50 feet with ww NWB R foot Independent   WC propulsion for longer distances NA  150 feet using BUE Independent    Stair negotiation: ascended and descended NA, pt unable at this time  2 steps with AAD SBA   AM-PAC 6 Clicks 69/61       BLE ROM is WFL. LLE strength is grossly 4/5 and R hip and knee are 4-/5 to 4/5 and R ankle NA.    Balance: sitting is SBA and standing with ww is MIN A and pt had difficulty maintaining NWB R foot  Endurance: fair-    Patient education  Pt educated on NWB R foot    Patient response to education:   Pt verbalized understanding Pt demonstrated skill Pt requires further education in this area   yes no yes     ASSESSMENT:    Conditions Requiring Skilled Therapeutic Intervention:    [x]Decreased strength     []Decreased ROM  [x]Decreased functional mobility  [x]Decreased balance   [x]Decreased endurance   []Decreased posture  []Decreased sensation  []Decreased coordination   []Decreased vision  []Decreased safety awareness   [x]Increased pain   Comments:  Pt was found in bed and was agreeable to PT. He required MOD A to get up to EOB and had difficultly getting to EOB without MIN A. Pt stood up and required MOD A getting up with only his LLE. Pt had difficulty keeping R foot off the ground and kept placing R heel on floor despite VCs. Pt had difficulty hopping on LLE with ww and kept placing R foot on floor while attempting to amb and there fore amb distance was limited and pt was returned to bed. Pt is unable to maintain NWB RLE at this time and has decreased strength and endurance. Treatment:  Patient practiced and was instructed in the following treatment:     Pt was instructed in/attempted to practice gait training with NWB R foot with ww and he had difficulty maintaining NWB despite assist and VCs. Pt was left supine in bed with call light left by patient. Chair/bed alarm: bed alarm was re-activated. Pt's/ family goals   1. To go home. Patient and or family understand(s) diagnosis, prognosis, and plan of care. PHYSICAL THERAPY PLAN OF CARE:    PT POC is established based on physician order and patient diagnosis     Referring provider/PT Order:  PT eval and treat  Diagnosis:  Septic shock ([facility] [A41.9, R65.21]  Specific instructions for next treatment:  Progress pt's function as he is able while maintaining NWB RLE.      Current Treatment Recommendations:     [x] Strengthening to improve independence with functional mobility   [] ROM to improve ROM and decrease spasm and pain which will help promote independence with functional mobility   [x] Balance Training to improve static/dynamic balance and to reduce fall risk  [x] Endurance Training to improve activity tolerance during functional mobility   [x] Transfer Training to improve safety and independence with all functional transfers   [x] Gait Training to improve gait mechanics, endurance and assess need for appropriate assistive device  [x] Stair Training in preparation for safe discharge home and/or into the community   [] Positioning to prevent skin breakdown and contractures  [] Safety and Education Training   [x] Patient/Caregiver Education   [] HEP  [] Other     PT long term treatment goals are located in above grid    Frequency of treatments: 2-5x/week x 1-2 weeks. Time in  14:05  Time out  14:25    Total Treatment Time 8 minutes     Evaluation Time includes thorough review of current medical information, gathering information on past medical history/social history and prior level of function, completion of standardized testing/informal observation of tasks, assessment of data and education on plan of care and goals. CPT codes:  [x] Low Complexity PT evaluation 46977  [] Moderate Complexity PT evaluation 50658  [] High Complexity PT evaluation 67042  [] PT Re-evaluation 75383  [x] Gait training 24947 8 minutes  [] Manual therapy 37676 ** minutes  [] Therapeutic activities 19808 ** minutes  [] Therapeutic exercises 27362 ** minutes  [] Neuromuscular reeducation 84267 ** minutes     Tre Joseph Finger., P.T.   License Number: [id number]

## 2022-05-11 NOTE — CARE COORDINATION
Social Work discharge planning   Sw spoke to [facility] with [facility] and faxed her pt's 2 iv atb scripts. [facility] can accept pt to see tomorrow morning 5/12. Bull notified PT of post op eval this afternoon before pt can discharge.   Electronically signed by Sam Mendoza on 5/11/2022 at 1:27 PM

## 2022-05-11 NOTE — PROGRESS NOTES
flush, 3 mL, PRN  oxyCODONE-acetaminophen, 1 tablet, Q4H PRN  sodium chloride flush, 5-40 mL, PRN  sodium chloride, , PRN  ondansetron, 4 mg, Q8H PRN   Or  ondansetron, 4 mg, Q6H PRN  polyethylene glycol, 17 g, Daily PRN  acetaminophen, 650 mg, Q6H PRN   Or  acetaminophen, 650 mg, Q6H PRN  glucagon (rDNA), 1 mg, PRN  dextrose, 100 mL/hr, PRN  glucose, 16 g, PRN  dextrose bolus, 125 mL, PRN   Or  dextrose bolus, 250 mL, PRN  HYDROmorphone, 0.5 mg, Q4H PRN      Continuous Infusions:   sodium chloride      sodium chloride      sodium chloride      sodium chloride 100 mL/hr at 05/10/22 0043    dextrose         Review of Systems  All systems reviewed. All negative except for symptoms mentioned in HPI     OBJECTIVE    BP (!) 141/70   Pulse 72   Temp 97.6 °F (36.4 °C) (Oral)   Resp 18   Ht 6' (1.829 m)   Wt 271 lb (122.9 kg)   SpO2 97%   BMI 36.75 kg/m²     Intake/Output Summary (Last 24 hours) at 5/10/2022 2039  Last data filed at 5/10/2022 2006  Gross per 24 hour   Intake 100 ml   Output 3000 ml   Net -2900 ml       Physical examination:  General: awake alert, oriented x3  HEENT: normocephalic non traumatic, no exophthalmos   Neck: supple, No thyroid tenderness,  Pulm: good equal air entry no added sounds  CVS: S1 + S2  Abd: soft lax, no tenderness  Skin: warm, no lesions, no rash.  Diabetic foot   Neuro: CN intact, sensation decreased bilateral , muscle power normal  Psych: normal mood, and affect    Review of Laboratory Data:  I personally reviewed the following labs:   Recent Labs     05/09/22  0245 05/10/22  0940   WBC 6.7 7.0   RBC 3.89 4.29   HGB 11.2* 12.1*   HCT 34.1* 37.6   MCV 87.7 87.6   MCH 28.8 28.2   MCHC 32.8 32.2   RDW 13.6 13.8    252   MPV 11.2 10.7     Recent Labs     05/09/22  0245 05/10/22  0940    137   K 4.9 4.9   * 104   CO2 23 26   BUN 16 12   CREATININE 0.9 0.9   GLUCOSE 151* 125*   CALCIUM 8.1* 8.8   PROT 6.3*  --    LABALBU 2.9*  --    BILITOT 0.2  --    [facility] 109  --    AST 19  --    ALT 20  --      No results found for: BHYDRXBUT  Lab Results   Component Value Date    LABA1C 7.7 04/22/2022     No results found for: TSH, T4FREE, R0JBDWD, FT3, I2TVTAW, TSI, TPOABS, THGAB  Lab Results   Component Value Date    LABA1C 7.7 04/22/2022    GLUCOSE 125 05/10/2022     No results found for: TRIG, HDL, LDLCALC, CHOL    Blood culture   Lab Results   Component Value Date    BC 24 Hours no growth 05/06/2022    BC 5 Days no growth 04/21/2022       Radiology:  XR CHEST PORTABLE   Final Result   Right internal jugular central venous line terminates within the superior   vena cava with no evidence of pneumothorax. CT ABDOMEN PELVIS W IV CONTRAST Additional Contrast? None   Final Result   1. Rule out nonspecific postinflammatory changes in the right lower lobe of   the lungs. 2.  Small, rounded hypodensity, containing what appears to be air droplets   within the pancreatic head and near the ampulla. It lies immediately   adjacent to the duodenum and is in the path of the CBD. Question duodenal   diverticulum. An ampullary/pancreatic head lesion containing air cannot be   excluded. Upper GI study is recommended to see if this represents a   diverticulum. If no diverticulum is seen, then MRI of the abdomen with and   without intravenous contrast and including MRCP is recommended. 3.  Bilateral renal cortical cysts. At least 1 on the left may represent a   hemorrhagic or debris laden cyst.  Renal ultrasound is recommended. 4.  Small left inguinal hernia, which contains fat. XR FOOT RIGHT (MIN 3 VIEWS)   Final Result   Patient is status post amputation of the right 2nd toe. No acute osseous   abnormality. No evidence to suggest osteomyelitis. If osteomyelitis needs   to be excluded further, than MRI of the foot with and without intravenous   gadolinium can be considered.              Medical Records/Labs/Images review:   I personally reviewed and summarized previous records   All labs and imaging were reviewed independently     [address] [facility] B May, a 63 y.o.-old male seen today for inpatient diabetes management     Diabetes Mellitus type 2   · Pt admit poor compliance with diet   · We recommend the following diabetes regimen   · Medium dose sliding scale  · Continue glucose check with meals and at bedtime   · Will titrate insulin dose based on the blood glucose trend & insulin requirement  · Pt will follow with us after discharge     Septic shock from infected diabetic foot   · Managed by critical care and ID service     The above issues were reviewed with the patient who understood and agreed with the plan. Thank you for allowing us to participate in the care of this patient. Please do not hesitate to contact us with any additional questions. Meryle Laurence MD  Endocrinologist, [facility] Diabetes Care and Endocrinology   [address] [address]   Phone: [phone]  Fax: [phone]  --------------------------------------------  An electronic signature was used to authenticate this note.  Emily Rao MD on 5/10/2022 at 8:39 PM

## 2022-05-12 LAB
ANION GAP SERPL CALCULATED.3IONS-SCNC: 8 MMOL/L (ref 7–16)
BASOPHILS ABSOLUTE: 0 E9/L (ref 0–0.2)
BASOPHILS RELATIVE PERCENT: 0 % (ref 0–2)
BUN BLDV-MCNC: 12 MG/DL (ref 6–23)
CALCIUM SERPL-MCNC: 8.6 MG/DL (ref 8.6–10.2)
CHLORIDE BLD-SCNC: 105 MMOL/L (ref 98–107)
CO2: 26 MMOL/L (ref 22–29)
CREAT SERPL-MCNC: 0.9 MG/DL (ref 0.7–1.2)
EOSINOPHILS ABSOLUTE: 0.19 E9/L (ref 0.05–0.5)
EOSINOPHILS RELATIVE PERCENT: 2.6 % (ref 0–6)
GFR AFRICAN AMERICAN: >60
GFR NON-AFRICAN AMERICAN: >60 ML/MIN/1.73
GLUCOSE BLD-MCNC: 173 MG/DL (ref 74–99)
HCT VFR BLD CALC: 35.2 % (ref 37–54)
HEMOGLOBIN: 11.6 G/DL (ref 12.5–16.5)
LYMPHOCYTES ABSOLUTE: 2.52 E9/L (ref 1.5–4)
LYMPHOCYTES RELATIVE PERCENT: 33.9 % (ref 20–42)
MCH RBC QN AUTO: 28.8 PG (ref 26–35)
MCHC RBC AUTO-ENTMCNC: 33 % (ref 32–34.5)
MCV RBC AUTO: 87.3 FL (ref 80–99.9)
METER GLUCOSE: 141 MG/DL (ref 74–99)
METER GLUCOSE: 167 MG/DL (ref 74–99)
METER GLUCOSE: 179 MG/DL (ref 74–99)
METER GLUCOSE: 252 MG/DL (ref 74–99)
MONOCYTES ABSOLUTE: 0.22 E9/L (ref 0.1–0.95)
MONOCYTES RELATIVE PERCENT: 2.6 % (ref 2–12)
NEUTROPHILS ABSOLUTE: 4.51 E9/L (ref 1.8–7.3)
NEUTROPHILS RELATIVE PERCENT: 60.9 % (ref 43–80)
NUCLEATED RED BLOOD CELLS: 0 /100 WBC
PDW BLD-RTO: 13.7 FL (ref 11.5–15)
PLATELET # BLD: 257 E9/L (ref 130–450)
PMV BLD AUTO: 10.7 FL (ref 7–12)
POLYCHROMASIA: ABNORMAL
POTASSIUM SERPL-SCNC: 4.7 MMOL/L (ref 3.5–5)
RBC # BLD: 4.03 E12/L (ref 3.8–5.8)
SODIUM BLD-SCNC: 139 MMOL/L (ref 132–146)
WBC # BLD: 7.4 E9/L (ref 4.5–11.5)

## 2022-05-12 PROCEDURE — A4216 STERILE WATER/SALINE, 10 ML: HCPCS | Performed by: STUDENT IN AN ORGANIZED HEALTH CARE EDUCATION/TRAINING PROGRAM

## 2022-05-12 PROCEDURE — 6370000000 HC RX 637 (ALT 250 FOR IP): Performed by: STUDENT IN AN ORGANIZED HEALTH CARE EDUCATION/TRAINING PROGRAM

## 2022-05-12 PROCEDURE — 2580000003 HC RX 258: Performed by: STUDENT IN AN ORGANIZED HEALTH CARE EDUCATION/TRAINING PROGRAM

## 2022-05-12 PROCEDURE — 80048 BASIC METABOLIC PNL TOTAL CA: CPT

## 2022-05-12 PROCEDURE — 6360000002 HC RX W HCPCS: Performed by: STUDENT IN AN ORGANIZED HEALTH CARE EDUCATION/TRAINING PROGRAM

## 2022-05-12 PROCEDURE — 85025 COMPLETE CBC W/AUTO DIFF WBC: CPT

## 2022-05-12 PROCEDURE — 36415 COLL VENOUS BLD VENIPUNCTURE: CPT

## 2022-05-12 PROCEDURE — 97530 THERAPEUTIC ACTIVITIES: CPT

## 2022-05-12 PROCEDURE — 99232 SBSQ HOSP IP/OBS MODERATE 35: CPT | Performed by: INTERNAL MEDICINE

## 2022-05-12 PROCEDURE — 6370000000 HC RX 637 (ALT 250 FOR IP): Performed by: INTERNAL MEDICINE

## 2022-05-12 PROCEDURE — 94660 CPAP INITIATION&MGMT: CPT

## 2022-05-12 PROCEDURE — 99233 SBSQ HOSP IP/OBS HIGH 50: CPT | Performed by: INTERNAL MEDICINE

## 2022-05-12 PROCEDURE — 82962 GLUCOSE BLOOD TEST: CPT

## 2022-05-12 PROCEDURE — 97165 OT EVAL LOW COMPLEX 30 MIN: CPT

## 2022-05-12 PROCEDURE — 1200000000 HC SEMI PRIVATE

## 2022-05-12 RX ORDER — OXYCODONE HYDROCHLORIDE AND ACETAMINOPHEN 5; 325 MG/1; MG/1
2 TABLET ORAL EVERY 4 HOURS PRN
Status: DISCONTINUED | OUTPATIENT
Start: 2022-05-12 | End: 2022-05-19 | Stop reason: HOSPADM

## 2022-05-12 RX ADMIN — HYDROMORPHONE HYDROCHLORIDE 0.5 MG: 1 INJECTION, SOLUTION INTRAMUSCULAR; INTRAVENOUS; SUBCUTANEOUS at 10:57

## 2022-05-12 RX ADMIN — OXYCODONE AND ACETAMINOPHEN 1 TABLET: 5; 325 TABLET ORAL at 03:54

## 2022-05-12 RX ADMIN — OXYCODONE AND ACETAMINOPHEN 2 TABLET: 5; 325 TABLET ORAL at 18:55

## 2022-05-12 RX ADMIN — PREGABALIN 100 MG: 50 CAPSULE ORAL at 19:34

## 2022-05-12 RX ADMIN — SODIUM CHLORIDE, PRESERVATIVE FREE 10 ML: 5 INJECTION INTRAVENOUS at 15:40

## 2022-05-12 RX ADMIN — DAPTOMYCIN 700 MG: 500 INJECTION, POWDER, LYOPHILIZED, FOR SOLUTION INTRAVENOUS at 10:52

## 2022-05-12 RX ADMIN — SODIUM CHLORIDE: 9 INJECTION, SOLUTION INTRAVENOUS at 19:44

## 2022-05-12 RX ADMIN — LEVOTHYROXINE SODIUM 25 MCG: 25 TABLET ORAL at 06:47

## 2022-05-12 RX ADMIN — METOPROLOL TARTRATE 25 MG: 25 TABLET, FILM COATED ORAL at 19:34

## 2022-05-12 RX ADMIN — HYDROMORPHONE HYDROCHLORIDE 0.5 MG: 1 INJECTION, SOLUTION INTRAMUSCULAR; INTRAVENOUS; SUBCUTANEOUS at 06:51

## 2022-05-12 RX ADMIN — HYDROMORPHONE HYDROCHLORIDE 0.5 MG: 1 INJECTION, SOLUTION INTRAMUSCULAR; INTRAVENOUS; SUBCUTANEOUS at 01:15

## 2022-05-12 RX ADMIN — PIPERACILLIN SODIUM AND TAZOBACTAM SODIUM 4500 MG: 4; .5 INJECTION, POWDER, LYOPHILIZED, FOR SOLUTION INTRAVENOUS at 13:35

## 2022-05-12 RX ADMIN — MUPIROCIN: 20 OINTMENT TOPICAL at 19:41

## 2022-05-12 RX ADMIN — HYDROMORPHONE HYDROCHLORIDE 0.5 MG: 1 INJECTION, SOLUTION INTRAMUSCULAR; INTRAVENOUS; SUBCUTANEOUS at 15:40

## 2022-05-12 RX ADMIN — METOPROLOL TARTRATE 25 MG: 25 TABLET, FILM COATED ORAL at 09:11

## 2022-05-12 RX ADMIN — OXYCODONE AND ACETAMINOPHEN 1 TABLET: 5; 325 TABLET ORAL at 09:11

## 2022-05-12 RX ADMIN — INSULIN LISPRO 2 UNITS: 100 INJECTION, SOLUTION INTRAVENOUS; SUBCUTANEOUS at 11:48

## 2022-05-12 RX ADMIN — OXYCODONE AND ACETAMINOPHEN 1 TABLET: 5; 325 TABLET ORAL at 13:38

## 2022-05-12 RX ADMIN — SERTRALINE 50 MG: 100 TABLET, FILM COATED ORAL at 09:11

## 2022-05-12 RX ADMIN — MUPIROCIN: 20 OINTMENT TOPICAL at 09:14

## 2022-05-12 RX ADMIN — SODIUM CHLORIDE: 9 INJECTION, SOLUTION INTRAVENOUS at 00:48

## 2022-05-12 RX ADMIN — ENOXAPARIN SODIUM 30 MG: 100 INJECTION SUBCUTANEOUS at 19:34

## 2022-05-12 RX ADMIN — PIPERACILLIN SODIUM AND TAZOBACTAM SODIUM 4500 MG: 4; .5 INJECTION, POWDER, LYOPHILIZED, FOR SOLUTION INTRAVENOUS at 21:20

## 2022-05-12 RX ADMIN — OXYCODONE AND ACETAMINOPHEN 2 TABLET: 5; 325 TABLET ORAL at 23:24

## 2022-05-12 RX ADMIN — PIPERACILLIN SODIUM AND TAZOBACTAM SODIUM 4500 MG: 4; .5 INJECTION, POWDER, LYOPHILIZED, FOR SOLUTION INTRAVENOUS at 05:21

## 2022-05-12 RX ADMIN — INSULIN LISPRO 3 UNITS: 100 INJECTION, SOLUTION INTRAVENOUS; SUBCUTANEOUS at 19:36

## 2022-05-12 RX ADMIN — INSULIN LISPRO 2 UNITS: 100 INJECTION, SOLUTION INTRAVENOUS; SUBCUTANEOUS at 16:28

## 2022-05-12 RX ADMIN — ENOXAPARIN SODIUM 30 MG: 100 INJECTION SUBCUTANEOUS at 09:11

## 2022-05-12 RX ADMIN — SODIUM CHLORIDE: 9 INJECTION, SOLUTION INTRAVENOUS at 10:51

## 2022-05-12 RX ADMIN — ATORVASTATIN CALCIUM 40 MG: 40 TABLET, FILM COATED ORAL at 19:34

## 2022-05-12 RX ADMIN — INSULIN LISPRO 1 UNITS: 100 INJECTION, SOLUTION INTRAVENOUS; SUBCUTANEOUS at 07:00

## 2022-05-12 ASSESSMENT — PAIN DESCRIPTION - PAIN TYPE
TYPE: SURGICAL PAIN
TYPE: ACUTE PAIN;SURGICAL PAIN

## 2022-05-12 ASSESSMENT — PAIN SCALES - GENERAL
PAINLEVEL_OUTOF10: 8

## 2022-05-12 ASSESSMENT — PAIN DESCRIPTION - ONSET
ONSET: ON-GOING
ONSET: ON-GOING

## 2022-05-12 ASSESSMENT — PAIN - FUNCTIONAL ASSESSMENT
PAIN_FUNCTIONAL_ASSESSMENT: ACTIVITIES ARE NOT PREVENTED
PAIN_FUNCTIONAL_ASSESSMENT: PREVENTS OR INTERFERES SOME ACTIVE ACTIVITIES AND ADLS

## 2022-05-12 ASSESSMENT — PAIN DESCRIPTION - FREQUENCY
FREQUENCY: CONTINUOUS
FREQUENCY: CONTINUOUS

## 2022-05-12 ASSESSMENT — PAIN DESCRIPTION - LOCATION
LOCATION: FOOT
LOCATION: FOOT

## 2022-05-12 ASSESSMENT — PAIN DESCRIPTION - DESCRIPTORS
DESCRIPTORS: THROBBING;SHOOTING;DISCOMFORT
DESCRIPTORS: THROBBING;SHOOTING
DESCRIPTORS: THROBBING;SHOOTING

## 2022-05-12 ASSESSMENT — PAIN DESCRIPTION - ORIENTATION
ORIENTATION: RIGHT

## 2022-05-12 NOTE — PROGRESS NOTES
ENDOCRINOLOGY PROGRESS NOTE      Date of admission: 5/6/2022  Date of service: 5/12/2022  Admitting physician: Nelly Marino DO   Primary Care Physician: CHRIST Anthony - CNP  Consultant physician: Papi Washington MD     Reason for the consultation:  Uncontrolled DM    History of Present Illness: The history is provided by the patient. Accuracy of the patient data is excellent    Maddy Fuentes is a very pleasant 63 y.o. old male with PMH uncontrolled DM type 2, obesity, HTN, HLD and other listed below admitted to [facility] on 5/6/2022 because of Rt foot pain and swelling and found to have infected diabetic foot and in septic shock, endocrine service was consulted for diabetes management.     Subjective   No acute events, variable blood glucose levels    Inpatient diet:   Carb Restricted diet     Point of care glucose monitoring   (Independently reviewed)   Recent Labs     05/10/22  1203 05/10/22  1736 05/10/22  2046 05/11/22  0551 05/11/22  1056 05/11/22  1619 05/11/22  2112 05/12/22  0648   GLUMET 131* 98 208* 121* 182* 180* 183* 141*     Scheduled Meds:   sodium chloride flush  5-40 mL IntraVENous 2 times per day    heparin flush  3 mL IntraVENous 2 times per day    piperacillin-tazobactam  4,500 mg IntraVENous Q8H    mupirocin   Nasal BID    insulin lispro  0-12 Units SubCUTAneous TID WC    insulin lispro  0-6 Units SubCUTAneous Nightly    daptomycin (CUBICIN) in NS IV syringe  6 mg/kg IntraVENous Q24H    enoxaparin  30 mg SubCUTAneous BID    atorvastatin  40 mg Oral Nightly    levothyroxine  25 mcg Oral Daily    metoprolol tartrate  25 mg Oral BID    sertraline  50 mg Oral Daily    pregabalin  100 mg Oral Nightly       PRN Meds:   sodium chloride flush, 5-40 mL, PRN  sodium chloride, , PRN  albuterol, 2.5 mg, Q6H PRN  heparin flush, 3 mL, PRN  oxyCODONE-acetaminophen, 1 tablet, Q4H PRN  ondansetron, 4 mg, Q8H PRN   Or  ondansetron, 4 mg, Q6H PRN  polyethylene glycol, 17 g, Daily PRN  acetaminophen, 650 mg, Q6H PRN   Or  acetaminophen, 650 mg, Q6H PRN  glucagon (rDNA), 1 mg, PRN  dextrose, 100 mL/hr, PRN  glucose, 16 g, PRN  dextrose bolus, 125 mL, PRN   Or  dextrose bolus, 250 mL, PRN  HYDROmorphone, 0.5 mg, Q4H PRN      Continuous Infusions:   sodium chloride      sodium chloride 100 mL/hr at 05/12/22 0048    dextrose         Review of Systems  All systems reviewed. All negative except for symptoms mentioned in HPI     OBJECTIVE    BP (!) 113/57   Pulse 67   Temp 97.7 °F (36.5 °C) (Oral)   Resp 16   Ht 6' (1.829 m)   Wt 269 lb (122 kg)   SpO2 97%   BMI 36.48 kg/m²     Intake/Output Summary (Last 24 hours) at 5/12/2022 1002  Last data filed at 5/12/2022 0902  Gross per 24 hour   Intake 480 ml   Output 3000 ml   Net -2520 ml       Physical examination:  General: awake alert, oriented x3  HEENT: normocephalic non traumatic, no exophthalmos   Neck: supple, No thyroid tenderness,  Pulm: good equal air entry no added sounds  CVS: S1 + S2  Abd: soft lax, no tenderness  Skin: warm, no lesions, no rash.  Diabetic foot   Neuro: CN intact, sensation decreased bilateral , muscle power normal  Psych: normal mood, and affect    Review of Laboratory Data:  I personally reviewed the following labs:   Recent Labs     05/10/22  0940 05/12/22  0402   WBC 7.0 7.4   RBC 4.29 4.03   HGB 12.1* 11.6*   HCT 37.6 35.2*   MCV 87.6 87.3   MCH 28.2 28.8   MCHC 32.2 33.0   RDW 13.8 13.7    257   MPV 10.7 10.7     Recent Labs     05/10/22  0940 05/12/22  0402    139   K 4.9 4.7    105   CO2 26 26   BUN 12 12   CREATININE 0.9 0.9   GLUCOSE 125* 173*   CALCIUM 8.8 8.6     No results found for: BHYDRXBUT  Lab Results   Component Value Date    LABA1C 7.7 04/22/2022     No results found for: TSH, T4FREE, L5HUIMK, FT3, N1XADSJ, TSI, TPOABS, THGAB  Lab Results   Component Value Date    LABA1C 7.7 04/22/2022    GLUCOSE 173 05/12/2022     No results found for: TRIG, HDL, LDLCALC, CHOL    Blood culture Lab Results   Component Value Date    BC 5 Days no growth 05/06/2022    BC 5 Days no growth 04/21/2022       Radiology:  XR CHEST PORTABLE   Final Result   Right internal jugular central venous line terminates within the superior   vena cava with no evidence of pneumothorax. CT ABDOMEN PELVIS W IV CONTRAST Additional Contrast? None   Final Result   1. Rule out nonspecific postinflammatory changes in the right lower lobe of   the lungs. 2.  Small, rounded hypodensity, containing what appears to be air droplets   within the pancreatic head and near the ampulla. It lies immediately   adjacent to the duodenum and is in the path of the CBD. Question duodenal   diverticulum. An ampullary/pancreatic head lesion containing air cannot be   excluded. Upper GI study is recommended to see if this represents a   diverticulum. If no diverticulum is seen, then MRI of the abdomen with and   without intravenous contrast and including MRCP is recommended. 3.  Bilateral renal cortical cysts. At least 1 on the left may represent a   hemorrhagic or debris laden cyst.  Renal ultrasound is recommended. 4.  Small left inguinal hernia, which contains fat. XR FOOT RIGHT (MIN 3 VIEWS)   Final Result   Patient is status post amputation of the right 2nd toe. No acute osseous   abnormality. No evidence to suggest osteomyelitis. If osteomyelitis needs   to be excluded further, than MRI of the foot with and without intravenous   gadolinium can be considered.              Medical Records/Labs/Images review:   I personally reviewed and summarized previous records   All labs and imaging were reviewed independently     [address] [facility] B May, a 63 y.o.-old male seen today for inpatient diabetes management     Diabetes Mellitus type 2   · Pt admit poor compliance with diet   · We recommend the following diabetes regimen   · Medium dose sliding scale  · Continue glucose check with meals and at bedtime · Will titrate insulin dose based on the blood glucose trend & insulin requirement  · On discharge, we recommend dc pt on Metformin 1000 mg BID   · Pt will follow with us after discharge     Septic shock from infected diabetic foot   · Managed by critical care and ID service     The above issues were reviewed with the patient who understood and agreed with the plan. Thank you for allowing us to participate in the care of this patient. Please do not hesitate to contact us with any additional questions. I saw the patient and discussed the management with the resident physician Dr. Dionna Hubbard MD. I reviewed and agree with the findings and plan as documented in the resident's note    Lorelei Shields MD  Endocrinologist, [facility] and Endocrinology   [address] [place] [address], [address] [place] [address],[address] [address]   Phone: [phone]  Fax: [phone]  --------------------------------------------  An electronic signature was used to authenticate this note.  Puja Sharma MD on 5/12/2022 at 10:02 AM

## 2022-05-12 NOTE — PROGRESS NOTES
[facility] Progress Note    Admitting Date and Time: 5/6/2022 12:38 PM  Admit Dx: Septic shock ([facility] [A41.9, R65.21]    Subjective:  Patient is being followed for Septic shock ([facility] [A41.9, R65.21]   Pt feels  [facility], denies any chest pain or shortness of breath. ROS: denies fever, chills, cp, sob, n/v, HA unless stated above.       sodium chloride flush  5-40 mL IntraVENous 2 times per day    heparin flush  3 mL IntraVENous 2 times per day    piperacillin-tazobactam  4,500 mg IntraVENous Q8H    mupirocin   Nasal BID    insulin lispro  0-12 Units SubCUTAneous TID WC    insulin lispro  0-6 Units SubCUTAneous Nightly    daptomycin (CUBICIN) in NS IV syringe  6 mg/kg IntraVENous Q24H    enoxaparin  30 mg SubCUTAneous BID    atorvastatin  40 mg Oral Nightly    levothyroxine  25 mcg Oral Daily    metoprolol tartrate  25 mg Oral BID    sertraline  50 mg Oral Daily    pregabalin  100 mg Oral Nightly     sodium chloride flush, 5-40 mL, PRN  sodium chloride, , PRN  albuterol, 2.5 mg, Q6H PRN  heparin flush, 3 mL, PRN  oxyCODONE-acetaminophen, 1 tablet, Q4H PRN  ondansetron, 4 mg, Q8H PRN   Or  ondansetron, 4 mg, Q6H PRN  polyethylene glycol, 17 g, Daily PRN  acetaminophen, 650 mg, Q6H PRN   Or  acetaminophen, 650 mg, Q6H PRN  glucagon (rDNA), 1 mg, PRN  dextrose, 100 mL/hr, PRN  glucose, 16 g, PRN  dextrose bolus, 125 mL, PRN   Or  dextrose bolus, 250 mL, PRN  HYDROmorphone, 0.5 mg, Q4H PRN         Objective:    BP (!) 113/57   Pulse 67   Temp 97.7 °F (36.5 °C) (Oral)   Resp 16   Ht 6' (1.829 m)   Wt 269 lb (122 kg)   SpO2 97%   BMI 36.48 kg/m²     General Appearance: alert and oriented to person, place and time and in no acute distress  Skin: warm and dry  Head: normocephalic and atraumatic  Eyes: pupils equal, round, and reactive to light, extraocular eye movements intact, conjunctivae normal  Neck: neck supple and non tender without mass   Pulmonary/Chest: clear to auscultation bilaterally- no wheezes, rales or rhonchi, normal air movement, no respiratory distress  Cardiovascular: normal rate, normal S1 and S2 and no carotid bruits  Abdomen: soft, non-tender, non-distended, normal bowel sounds, no masses or organomegaly  Extremities: right foot infection with wrapped. Neurologic: no cranial nerve deficit and speech normal        Recent Labs     05/10/22  0940 05/12/22  0402    139   K 4.9 4.7    105   CO2 26 26   BUN 12 12   CREATININE 0.9 0.9   GLUCOSE 125* 173*   CALCIUM 8.8 8.6       Recent Labs     05/10/22  0940 05/12/22  0402   WBC 7.0 7.4   RBC 4.29 4.03   HGB 12.1* 11.6*   HCT 37.6 35.2*   MCV 87.6 87.3   MCH 28.2 28.8   MCHC 32.2 33.0   RDW 13.8 13.7    257   MPV 10.7 10.7           Assessment:    Principal Problem:    Septic shock ([facility])  Active Problems:    Osteomyelitis of right foot ([facility])    ALVINO (acute kidney injury) ([facility]    Elevated lactic acid level    Uncontrolled type 2 diabetes mellitus with hyperglycemia ([facility])    Cellulitis    Duodenal diverticulum  Resolved Problems:    * No resolved hospital problems. *      Plan:  1. Sepsis with septic shock: Presented with hypotension and leukocytosis. Leukocytosis resolved. Hypotension  Resolved. Transferred out of the ICU. Monitor off pressors. Blood cultures negative. Toe culture 5/6 positive for nonhemolytic Streptococcus, alphahemolytic streptococcus, Corynebacteria, GNR, and MRSA. ID following. Continue Zosyn and Daptomycin for minimum of 2 weeks, possibly 6 weeks-as per ID. PICC placed 5/9.     2. Right foot OM with cellulitis and surgical site infection: S/p toe amputation on 4/21. S/p I&D of right foot with bone biopsy on 5/7. Continue antibiotics. Podiatry and ID following. S/p right foot delayed primary closure and possible repeat debridement on 5/10.      3. 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 General surgery consulted.  No plans for acute inpatient surgical intervention.      4. ALVINO: Resolved. Avoid nephrotoxic agents.     5. DM2: Endocrinology consulted. Medium dose ssi. Hypoglycemic protocol. Mtformin 1000 mg BID.        6. ALCON: Hx of ALCON on CPAP. Had uvulectomy and stopped wearing CPAP. Sleep study showing ALCON and Recommended CPAP.     7. COPD: PFT in the past showing COPD. Continue Albuterol prn.      8. CAD s/p CABG x2-continue lipitor and metoprolol.     9. Hypertension: Continue Lopressor      10. Hyperlipidemia: Continue Lipitor       11. Anxiety/ depression: Continue Zoloft      12. Nicotine dependence: Encourage cessation     13. Hypothyroidism: Continue Synthroid. NOTE: This report was transcribed using voice recognition software. Every effort was made to ensure accuracy; however, inadvertent computerized transcription errors may be present.   Electronically signed by Drake Cabrales MD on 5/12/2022 at 12:33 PM

## 2022-05-12 NOTE — PROGRESS NOTES
Comprehensive Nutrition Assessment    Type and Reason for Visit:  Initial (LOS)    Nutrition Recommendations/Plan:   1. Start Silverio/Glucerna BID to promote wound healing. 2. Recommend modifying to a Diabetic carb control diet to promote lifestyle change      Malnutrition Assessment:  Malnutrition Status:  No malnutrition (05/12/22 1518)    Context:  Chronic Illness     Findings of the 6 clinical characteristics of malnutrition:  Energy Intake:  No significant decrease in energy intake  Weight Loss:  No significant weight loss     Body Fat Loss:  No significant body fat loss     Muscle Mass Loss:  No significant muscle mass loss    Fluid Accumulation:  No significant fluid accumulation     Strength:  Not Performed    Nutrition Assessment:    Pt adm w/ sepsis/shock, R foot wound OM/cellulitis/abscess s/p prior toe amputation (4/21), I&D w/ bone bx (5/7), delayed primary closure (5/10). Hx DM, COPD. Pt w/ increased nutrient needs, start ONS & monitor    Nutrition Related Findings:    A&Ox4, active BS, soft/round abd, +1 gen edema, +I/Os Wound Type: Surgical Incision (R foot OM/cellulitis/abscess)       Current Nutrition Intake & Therapies:    Average Meal Intake: %  Average Supplements Intake: None Ordered  ADULT DIET;  Regular    Anthropometric Measures:  Height: 6' (182.9 cm)  Ideal Body Weight (IBW): 178 lbs (81 kg)    Admission Body Weight: 265 lb 10.5 oz (120.5 kg) (5/7 first taken, bed scale)  Current Body Weight: 269 lb (122 kg) (5/12 bed scale), 151.1 % IBW  Current BMI (kg/m2): 36.5  Usual Body Weight: 279 lb 13 oz (126.9 kg) (4/2022 actual x 1 month, no prior wt hx)  % Weight Change (Calculated): -3.9  Weight Adjustment For: No Adjustment  BMI Categories: Obese Class 2 (BMI 35.0 -39.9)    Estimated Daily Nutrient Needs:  Energy Requirements Based On: Formula  Weight Used for Energy Requirements: Current  Energy (kcal/day): 9481-8012  Weight Used for Protein Requirements: Ideal  Protein (g/day): 120-130  Method Used for Fluid Requirements: 1 ml/kcal  Fluid (ml/day): 4386-4146    Nutrition Diagnosis:   · Increased nutrient needs related to increase demand for energy/nutrients as evidenced by wounds    Nutrition Interventions:   Food and/or Nutrient Delivery: Continue Current Diet,Start Oral Nutrition Supplement (Start Silverio/Glucerna BID, recommend carb control diet)  Nutrition Education/Counseling: No recommendation at this time  Coordination of Nutrition Care: Continue to monitor while inpatient    Goals:  Goals: Meet at least 75% of estimated needs    Nutrition Monitoring and Evaluation:   Behavioral-Environmental Outcomes: None Identified  Food/Nutrient Intake Outcomes: Food and Nutrient Intake,Supplement Intake  Physical Signs/Symptoms Outcomes: Biochemical Data,Nutrition Focused Physical Findings,Skin,Weight,Chewing or Swallowing,GI Status,Fluid Status or Edema    Discharge Planning:    Continue Oral Nutrition Supplement     Jeffrey [address] [facility], [place], RD, LD  Contact: [phone]

## 2022-05-12 NOTE — CARE COORDINATION
[facility] Facility believes patient would exceed their clinical care capacities with need for IV atb, trilogy/NIV, wound care, therapies. They cannot accept. Discussed with patient and offered an LTAC referral.  He was agreeable and selected [facility] on [address]. Call to [facility] with same and referral initiated. Will await her review and response regarding bed availability/acceptance. Patient requested I update maikel Rojas - ilana completed. Will follow along with  and assist with discharge planning as necessary. Catrachito Donnelly, MSN, RN  [facility] Case Management  [phone]    [facility] has accepted patient and will submit an authorization request to [facility]. Catrachito Donnelly MSN, RN  [facility] Case Management  [phone]

## 2022-05-12 NOTE — PROGRESS NOTES
Department of Podiatry   Progress Note    Subjective:  Patient being seen s/p right foot delayed primary closure(DOS:5/10). Complaining of throbbing pain to his foot. Denies any additional pedal concerns. Past Medical History:        Diagnosis Date    Anxiety     COPD (chronic obstructive pulmonary disease) ([facility] [address]     Depression     DM (diabetes mellitus) ([email or web address]     Frostbite     HLD (hyperlipidemia)     HTN (hypertension)     Sleep apnea        Past Surgical History:        Procedure Laterality Date    BACK SURGERY      CHOLECYSTECTOMY      CORONARY ARTERY BYPASS GRAFT REDO      2 vessel    FOOT DEBRIDEMENT Right 4/25/2022    DELAYED PRIMARY CLOSURE RIGHT FOOT WOUND performed by Manuel Cutler DPM at [address] Right 5/7/2022    FOOT DEBRIDEMENT INCISION AND DRAINAGE performed by Иван Champion DPM at [address] Right 5/10/2022    RIGHT FOOT DELAYED PRIMARY CLOSURE WITH POSSIBLE DEBRIDEMENT    ++CONTACT ISOLATION++   (DR AVAIL. AT 4PM) performed by Иван Champion DPM at [address] [place] [address] PICC LINE  5/9/2022         TOE AMPUTATION Right 4/22/2022    AMPUTATION RIGHT SECOND TOE performed by Manuel Cutler DPM at [place] [address]         Medications Prior to Admission:    Medications Prior to Admission: atorvastatin (LIPITOR) 40 MG tablet, Take 40 mg by mouth at bedtime  metoprolol tartrate (LOPRESSOR) 25 MG tablet, Take 25 mg by mouth 2 times daily  metFORMIN (GLUCOPHAGE) 1000 MG tablet, Take 1,000 mg by mouth 2 times daily (with meals)  levothyroxine (SYNTHROID) 25 MCG tablet, Take 25 mcg by mouth Daily  sertraline (ZOLOFT) 50 MG tablet, Take 50 mg by mouth daily  glimepiride (AMARYL) 4 MG tablet, Take 4 mg by mouth in the morning and at bedtime  JARDIANCE 10 MG tablet, Take 10 mg by mouth daily  pregabalin (LYRICA) 100 MG capsule, Take 100 mg by mouth at bedtime.   lisinopril-hydroCHLOROthiazide (PRINZIDE;ZESTORETIC) 20-12.5 MG per tablet, Take 20 tablets by mouth daily    Allergies:  Patient has no known allergies. Social History:   · TOBACCO:   reports that he has been smoking cigarettes. He started smoking about 42 years ago. He has been smoking about 2.50 packs per day. He does not have any smokeless tobacco history on file. · ETOH:   reports no history of alcohol use. DRUGS:   Social History     Substance and Sexual Activity   Drug Use Never       Family History:       Problem Relation Age of Onset    Dementia Mother     Stroke Father     Diabetes Sister          REVIEW OF SYSTEMS:    All pertinent positives and negatives as noted in HPI       LOWER EXTREMITY EXAMINATION     Vascular Exam:  DP and PT pulses intact with hand held doppler bilaterally. CFT delayed to distal digits B/L. Skin temperature warm to warm proximal leg to distal toes.      Neuro Exam: Diminished protective sensation B/L     Dermatologic Exam: 2 dorsal incisions noted with sutures intact and skin well coapted. Open wound at distal 2nd amputation site with granular wound bed and no purulence. No malodor. No lymphangitis. No acute clinical signs of infection    MSK: Mild  tenderness on palpation to dorsal aspect of the right foot. Soft compressible posterior most compartments bilaterally.                           CONSULTS:  IP CONSULT TO GENERAL SURGERY  IP CONSULT TO GENERAL SURGERY  IP CONSULT TO CRITICAL CARE  IP CONSULT TO INFECTIOUS DISEASES  IP CONSULT TO PODIATRY  IP CONSULT TO SOCIAL WORK  IP CONSULT TO ENDOCRINOLOGY  IP CONSULT TO DIABETES EDUCATOR  IP CONSULT TO HOME CARE NEEDS    MEDICATION:  Scheduled Meds:   sodium chloride flush  5-40 mL IntraVENous 2 times per day    heparin flush  3 mL IntraVENous 2 times per day    piperacillin-tazobactam  4,500 mg IntraVENous Q8H    mupirocin   Nasal BID    insulin lispro  0-12 Units SubCUTAneous TID WC    insulin lispro  0-6 Units SubCUTAneous Nightly    daptomycin (CUBICIN) in NS IV syringe  6 mg/kg IntraVENous Q24H    enoxaparin  30 mg SubCUTAneous BID    atorvastatin  40 mg Oral Nightly    levothyroxine  25 mcg Oral Daily    metoprolol tartrate  25 mg Oral BID    sertraline  50 mg Oral Daily    pregabalin  100 mg Oral Nightly     Continuous Infusions:   sodium chloride      sodium chloride 100 mL/hr at 05/12/22 0048    dextrose       PRN Meds:.sodium chloride flush, sodium chloride, albuterol, heparin flush, oxyCODONE-acetaminophen, ondansetron **OR** ondansetron, polyethylene glycol, acetaminophen **OR** acetaminophen, glucagon (rDNA), dextrose, glucose, dextrose bolus **OR** dextrose bolus, HYDROmorphone    RADIOLOGY:  XR CHEST PORTABLE   Final Result   Right internal jugular central venous line terminates within the superior   vena cava with no evidence of pneumothorax. CT ABDOMEN PELVIS W IV CONTRAST Additional Contrast? None   Final Result   1. Rule out nonspecific postinflammatory changes in the right lower lobe of   the lungs. 2.  Small, rounded hypodensity, containing what appears to be air droplets   within the pancreatic head and near the ampulla. It lies immediately   adjacent to the duodenum and is in the path of the CBD. Question duodenal   diverticulum. An ampullary/pancreatic head lesion containing air cannot be   excluded. Upper GI study is recommended to see if this represents a   diverticulum. If no diverticulum is seen, then MRI of the abdomen with and   without intravenous contrast and including MRCP is recommended. 3.  Bilateral renal cortical cysts. At least 1 on the left may represent a   hemorrhagic or debris laden cyst.  Renal ultrasound is recommended. 4.  Small left inguinal hernia, which contains fat. XR FOOT RIGHT (MIN 3 VIEWS)   Final Result   Patient is status post amputation of the right 2nd toe. No acute osseous   abnormality. No evidence to suggest osteomyelitis.   If osteomyelitis needs   to be excluded further, than MRI of the foot with and without intravenous   gadolinium can be considered. Vitals:    BP (!) 113/57   Pulse 67   Temp 97.7 °F (36.5 °C) (Oral)   Resp 16   Ht 6' (1.829 m)   Wt 269 lb (122 kg)   SpO2 97%   BMI 36.48 kg/m²     LABS:   Recent Labs     05/10/22  0940 05/12/22  0402   WBC 7.0 7.4   HGB 12.1* 11.6*   HCT 37.6 35.2*    257     Recent Labs     05/12/22  0402      K 4.7      CO2 26   BUN 12   CREATININE 0.9     No results for input(s): PROT, INR, APTT in the last 72 hours. ASSESSMENTS:   -S/p delayed primary closure, right foot(DOS5/10)  -Full thickness wound, right foot  -Acute OM, right foot  -IDDM with neuropathy    PLAN:  - Examined and evaluated  - All labs, imaging, and charts reviewed  -Abx per ID:zosyn and dapto ongoing  -Arterial studies:Normal DK b/l at 1.2. Multiphasic waveforms at all levels b/l.   -QoD dressing changes:betadine, xeroform DSD. Dressings changed today. Next change:5/14  -Stable for d/c from Podiatric perspective and once cleared by all other teams on board  -Will follow up on outpatient basis for continued Podiatric care  -Will continue to monitor while in-house    Discussed w/:Gideon Pino DPM Pod [facility] 0259  Fellowship-Trained Foot and Ankle Surgeon  Diplomate, [employer] Board of Foot and Ankle Surgeons  [phone]     Thank you for the opportunity to take part in the patient's care. Please do not hesitate to call for any questions or concerns.

## 2022-05-12 NOTE — PROGRESS NOTES
Physical Therapy  Facility/Department: [facility] MED SURG  Daily Treatment Note  NAME: Carlotta Fuentes  : 1960  MRN: [id number]    Date of Service: 2022     Attending Provider:  Patria Haynes DO     Evaluating PT:  Alisson Pappas, P.T.     Room #:  4407/1716-C  Diagnosis:  Septic shock ([facility] [address] [A41.9, R65.21]  Pertinent PMHx/PSHx:  R foot wound  Procedure/Surgery:  5/10/22 delayed primary closure R foot  Precautions:  Falls, bed/chair alarm, NWB R foot     SUBJECTIVE:     Pt lives with his niece in a 1 story home with 2 stairs and 1 post to enter. Pt ambulated with ww PTA.     OBJECTIVE:    Initial Evaluation  Date: 22 Treatment  22 Short Term/ Long Term   Goals   Was pt agreeable to Eval/treatment? yes  yes     Does pt have pain? C/o R foot pain 8/10       Bed Mobility  Rolling: SBA  Supine to sit: MOD A  Sit to supine: SBA  Scooting: MIN A  Supine to sit SBA Independent   Transfers Sit to stand: MOD A  Stand to sit: MIN A  Stand pivot: NA  sit <> stand min assist with cues for NWB Independent   Ambulation   5 feet with ww and pt was unable to maintain NWB RLE with MIN A  5 feet with ww min assist 50 feet with ww NWB R foot Independent   WC propulsion for longer distances NA   150 feet using BUE Independent    Stair negotiation: ascended and descended NA, pt unable at this time   2 steps with AAD SBA   AM-PAC 6 Clicks           Patient education  Pt was educated on transfer technique    Patient response to education:   Pt verbalized understanding Pt demonstrated skill Pt requires further education in this area   yes With assist yes     Additional Comments/treatment: Pt with difficulty maintaining NWB with sit<> stand transfers. Pt was able to maintain NWB to ambulate short distance. Pt was left seated edge of bed with call light left by patient.     Chair/bed alarm: yes    Time in: 08  Time out: 826    Total treatment time 13 minutes    Pt is making good progress toward established Physical Therapy goals. Continue with physical therapy current plan of care.     [facility] PSY[facility]ATRY PT [id number]      CPT codes:  [] Low Complexity PT evaluation 12626  [] Moderate Complexity PT evaluation 73246  [] High Complexity PT evaluation 97126  [] PT Re-evaluation 63947  [] Gait training 27207  minutes  [] Manual therapy 08166    minutes  [x] Therapeutic activities 79162   13  minutes  [] Therapeutic exercises 47615     minutes  [] Neuromuscular reeducation 23572     minutes

## 2022-05-12 NOTE — PATIENT CARE CONFERENCE
P Quality Flow/Interdisciplinary Rounds Progress Note        Quality Flow Rounds held on May 12, 2022    Disciplines Attending:  Bedside Nurse, ,  and Nursing Unit Nilson ARMSTRONG May was admitted on 5/6/2022 12:38 PM    Anticipated Discharge Date:       Disposition:    Jose Score:  Jose Scale Score: 20    Readmission Risk              Risk of Unplanned Readmission:  13           Discussed patient goal for the day, patient clinical progression, and barriers to discharge.   The following Goal(s) of the Day/Commitment(s) have been identified:  Diagnostics - Report Results and Labs - Report Results      Erin Bartlett RN  May 12, 2022

## 2022-05-12 NOTE — PROGRESS NOTES
[address] [place] [address] [facility]  Progress Note    SUBJECTIVE:  Chief Complaint   Patient presents with    Wound Infection     right foot, 2nd digit removed 2 weeks ago by dr Jose Rafael Peck. Patient is lying in bed, eating lunch  In no distress  Tolerating antibiotics   No issues   Pain to foot managed    Review of systems:  As stated above in the chief complaint, otherwise negative. Medications:  Scheduled Meds:   sodium chloride flush  5-40 mL IntraVENous 2 times per day    heparin flush  3 mL IntraVENous 2 times per day    piperacillin-tazobactam  4,500 mg IntraVENous Q8H    mupirocin   Nasal BID    insulin lispro  0-12 Units SubCUTAneous TID WC    insulin lispro  0-6 Units SubCUTAneous Nightly    daptomycin (CUBICIN) in NS IV syringe  6 mg/kg IntraVENous Q24H    enoxaparin  30 mg SubCUTAneous BID    atorvastatin  40 mg Oral Nightly    levothyroxine  25 mcg Oral Daily    metoprolol tartrate  25 mg Oral BID    sertraline  50 mg Oral Daily    pregabalin  100 mg Oral Nightly     Continuous Infusions:   sodium chloride      sodium chloride 100 mL/hr at 22 0048    dextrose       PRN Meds:sodium chloride flush, sodium chloride, albuterol, heparin flush, oxyCODONE-acetaminophen, ondansetron **OR** ondansetron, polyethylene glycol, acetaminophen **OR** acetaminophen, glucagon (rDNA), dextrose, glucose, dextrose bolus **OR** dextrose bolus, HYDROmorphone    OBJECTIVE:  BP (!) 113/57   Pulse 67   Temp 97.7 °F (36.5 °C) (Oral)   Resp 16   Ht 6' (1.829 m)   Wt 269 lb (122 kg)   SpO2 97%   BMI 36.48 kg/m²   Temp  Av °F (36.7 °C)  Min: 97.7 °F (36.5 °C)  Max: 98.3 °F (36.8 °C)  Constitutional: The patient is lying in bed, in no distress. Awake, alert. Skin: Warm and dry. No rashes were noted. HEENT: Round and reactive pupils. Moist mucous membranes. No ulcerations or thrush. Neck: Supple to movements. Chest: No respiratory distress.   No crackles. Cardiovascular: Heart sounds rhythmic and regular. Abdomen: Positive bowel sounds to auscultation. Benign to palpation. Extremities: The right foot is wrapped. Second toe is missing. Lines: PICC line right arm 5/9/2022              Laboratory and Tests Review:  Lab Results   Component Value Date    WBC 7.4 05/12/2022    WBC 7.0 05/10/2022    WBC 6.7 05/09/2022    HGB 11.6 (L) 05/12/2022    HCT 35.2 (L) 05/12/2022    MCV 87.3 05/12/2022     05/12/2022     Lab Results   Component Value Date    NEUTROABS 4.51 05/12/2022    NEUTROABS 4.61 05/10/2022    NEUTROABS 5.03 05/09/2022     No results found for: [facility]  Lab Results   Component Value Date    ALT 20 05/09/2022    AST 19 05/09/2022    ALKPHOS 109 05/09/2022    BILITOT 0.2 05/09/2022     Lab Results   Component Value Date     05/12/2022    K 4.7 05/12/2022    K 4.4 05/07/2022     05/12/2022    CO2 26 05/12/2022    BUN 12 05/12/2022    CREATININE 0.9 05/12/2022    CREATININE 0.9 05/10/2022    CREATININE 0.9 05/09/2022    GFRAA >60 05/12/2022    LABGLOM >60 05/12/2022    GLUCOSE 173 05/12/2022    PROT 6.3 05/09/2022    LABALBU 2.9 05/09/2022    CALCIUM 8.6 05/12/2022    BILITOT 0.2 05/09/2022    ALKPHOS 109 05/09/2022    AST 19 05/09/2022    ALT 20 05/09/2022     Lab Results   Component Value Date    CRP 5.6 (H) 05/06/2022    CRP 1.1 (H) 04/21/2022     Lab Results   Component Value Date    SEDRATE 59 (H) 05/11/2022    SEDRATE 57 (H) 05/06/2022    SEDRATE 23 (H) 04/21/2022     Radiology:      Microbiology:   Blood cultures 5/6/2022: Negative so far  Nares screen MRSA: Positive  Toe culture 5/6/2022: Nonhemolytic Streptococcus, alphahemolytic Streptococcus, Corynebacteria, ox + GNR, M_SA  Bone culture 5/7/2022: VSEnterococcus faecalis, MRSA  Abscess culture 5/7/2022: MRSA, Corynebacterium, alphahemolytic Streptococcus, VSEnterococcus faecalis    Surgical pathology: Subcortical bone with osteopenia, no   definite osteomyelitis. Assessment:  · Surgical site infection right toe following right second toe amputation secondary to osteomyelitis. Previous cultures grew MRSA, VS Enterococcus faecalis, pansensitive Pseudomonas, Myroides species and Staphylococcus cohnii  · Sepsis with septic shock secondary to right foot infection, improved  · Leukocytosis associated to the above-improved  · Hypotension associated to the above  · Status post delayed primary closure right foot 5/10/2022    Plan:    · Continue Zosyn and Daptomycin for a minimum of 2, possibly 6 weeks  · Check final intraoperative cultures  · Labs reviewed   · Discharge plan is now SNF  · Ok to d/c from ID POV, scripts in chart    CHRIST Khan - CNP  10:13 AM  5/12/2022     Patient seen and examined. I had a face to face encounter with the patient. Agree with exam.  Assessment and plan as outlined above and directed by me. Addition and corrections were done as deemed appropriate. My exam and plan include: The patient has decided to go to a SNF instead of going home. Awaiting for placement. Continue current antibiotics. Spoke with nurse.     Monica Randall MD  5/12/2022  1:00 PM

## 2022-05-12 NOTE — PROGRESS NOTES
Occupational Therapy  OCCUPATIONAL THERAPY INITIAL EVALUATION     [address] [place], [place]        MYTO:3/[birth year]43                                                  Patient Name: Rut Fuentes    MRN: [id number]    : 1960    Room: [address] [address]      Evaluating OT: Karen Yun OTR/L [id number]      Referring Provider: Luz Maria Donald DO    Specific Provider Orders/Date: OT eval and treat 22      Diagnosis:  Septic shock ([address] [A41.9, R65.21]     Surgery:  R foot delayed primary closure with possible debridement 5/10/22    Pertinent Medical History: COPD, DM, HTN          Precautions:  Fall Risk, contact isolation, alarm, NWB R foot     Assessment of current deficits    [x] Functional mobility  [x]ADLs  [x] Strength               []Cognition    [x] Functional transfers   [x] IADLs         [x] Safety Awareness   [x]Endurance    [] Fine Coordination              [x] Balance      [] Vision/perception   []Sensation     []Gross Motor Coordination  [] ROM  [] Delirium                   [] Motor Control     OT PLAN OF CARE   OT POC based on physician orders, patient diagnosis and results of clinical assessment    Frequency/Duration 2-4 days/wk for 2 weeks PRN   Specific OT Treatment Interventions to include:   * Instruction/training on adapted ADL techniques and AE recommendations to increase functional independence within precautions       * Training on energy conservation strategies, correct breathing pattern and techniques to improve independence/tolerance for self-care routine  * Functional transfer/mobility training/DME recommendations for increased independence, safety, and fall prevention  * Patient/Family education to increase follow through with safety techniques and functional independence  * Recommendation of environmental modifications for increased safety with functional transfers/mobility and ADLs  * Therapeutic exercise to improve motor endurance, ROM, and functional strength for ADLs/functional transfers  * Therapeutic activities to facilitate/challenge dynamic balance, stand tolerance for increased safety and independence with ADLs        Recommended Adaptive Equipment: extended tub bench    Home Living: Pt lives with niece in 1 story house with 2 SHARON. Bathroom setup: tub/shower   Equipment owned: wheeled walker    Prior Level of Function: independent with ADLs , assist with IADLs; functional mobility: wheeled walker    Pain Level: pt reported pain in R foot but did not rate; pt agreeable to therapy  Cognition: A&O: pt alert and oriented grossly; WFL command follow demonstrated   Memory:  Good   Sequencing:  Good   Problem solving:  Fair+   Judgement/safety:  Fair+     Functional Assessment:  AM-PAC Daily Activity Raw Score: 16/24   Initial Eval Status  Date: 5/12/22 Treatment Status  Date: STGs = LTGs  Time frame: 10-14 days   Feeding Set up     Grooming Min A  Mod I/I   UB Dressing Min A  Mod I/I   LB Dressing Mod A   To don pants   Mod I/I-with use of AD as appropriate/needed   Bathing Mod A  Mod I/I -with use of AD as appropriate/needed   Toileting Min A  Mod I/I    Bed Mobility  Supine to sit: SBA    Independent    Functional Transfers Min A with wheeled walker  Sit<>Stand from EOB   Cues for hand and feet placement and safe technique and NWB  Pt with difficulty maintaining NWB  Independent     Functional Mobility Min A with wheeled walker  Short distance in room  Cues for safe wheeled walker management.  Pt able to hop on LLE and maintain NWB  independent -with device as needed to maximize independence with ADLs and functional task completion and good adherence to WB   Balance Sitting:     Static:  Independent     Dynamic:SBA  Standing: CGA with wheeled walker  I for dynamic sitting balance to maximize independence with ADLs and functional task completion    I for standing balance to maximize independence with ADLs and functional task completion   Activity Tolerance Fair+ with light activity  Good with ADL activity. Pt will demonstrate good understanding of education provided on EC/WS techniques    Visual/  Perceptual Glasses: none at bedside                Additional long-term goal: Pt will increase functional independence to PLOF to allow pt to live in least restrictive environment. Hand Dominance R   AROM (PROM) Strength Additional Info:    RUE  WFL WFL good  and wfl FMC/dexterity noted during ADL tasks and functional transfers       LUE [facility] WFL good  and wfl FMC/dexterity noted during ADL tasks and functional transfers     Hearing: [facility]   Sensation:  No c/o numbness or tingling   Tone: WFL   Edema: RLE    Comments: Upon arrival patient lying in bed. At end of session, patient sitting EOb with call light and phone within reach, all lines and tubes intact, and alarm set. Overall patient demonstrated decreased independence and safety during completion of ADL/functional transfer/mobility tasks. Pt would benefit from continued skilled OT to increase safety and independence with completion of ADL/IADL tasks for functional independence and quality of life. Rehab Potential: Good for established goals     Patient / Family Goal: none stated    Patient and/or family were instructed on functional diagnosis, prognosis/goals and OT plan of care. Demonstrated good understanding.      Eval Complexity: Low    Time In: 0810  Time Out: 0825    Min Units   OT Eval Low 97165  x  1   OT Eval Medium 92143      OT Eval High 28464      OT Re-Eval [id number]       Therapeutic Ex [phone]       Therapeutic Activities 27934       ADL/Self Care 66446       Orthotic Management 08794       Manual [id number]     Neuro Re-Ed [id number]       Non-Billable Time          Evaluation Time additionally includes thorough review of current medical information, gathering information on past medical history/social history and prior level of function, interpretation of standardized testing/informal observation of tasks, assessment of data and development of plan of care and goals.             Alm Ganser, OTR/L, [id number]

## 2022-05-13 LAB
ANION GAP SERPL CALCULATED.3IONS-SCNC: 7 MMOL/L (ref 7–16)
BASOPHILS ABSOLUTE: 0 E9/L (ref 0–0.2)
BASOPHILS RELATIVE PERCENT: 0 % (ref 0–2)
BUN BLDV-MCNC: 11 MG/DL (ref 6–23)
CALCIUM SERPL-MCNC: 8.4 MG/DL (ref 8.6–10.2)
CHLORIDE BLD-SCNC: 106 MMOL/L (ref 98–107)
CO2: 25 MMOL/L (ref 22–29)
CREAT SERPL-MCNC: 0.9 MG/DL (ref 0.7–1.2)
EOSINOPHILS ABSOLUTE: 0.19 E9/L (ref 0.05–0.5)
EOSINOPHILS RELATIVE PERCENT: 2.6 % (ref 0–6)
GFR AFRICAN AMERICAN: >60
GFR NON-AFRICAN AMERICAN: >60 ML/MIN/1.73
GLUCOSE BLD-MCNC: 157 MG/DL (ref 74–99)
HCT VFR BLD CALC: 35.3 % (ref 37–54)
HEMOGLOBIN: 11.4 G/DL (ref 12.5–16.5)
LYMPHOCYTES ABSOLUTE: 1.97 E9/L (ref 1.5–4)
LYMPHOCYTES RELATIVE PERCENT: 26.7 % (ref 20–42)
MCH RBC QN AUTO: 28.7 PG (ref 26–35)
MCHC RBC AUTO-ENTMCNC: 32.3 % (ref 32–34.5)
MCV RBC AUTO: 88.9 FL (ref 80–99.9)
METER GLUCOSE: 159 MG/DL (ref 74–99)
METER GLUCOSE: 176 MG/DL (ref 74–99)
METER GLUCOSE: 205 MG/DL (ref 74–99)
METER GLUCOSE: 215 MG/DL (ref 74–99)
MONOCYTES ABSOLUTE: 0.44 E9/L (ref 0.1–0.95)
MONOCYTES RELATIVE PERCENT: 6 % (ref 2–12)
NEUTROPHILS ABSOLUTE: 4.75 E9/L (ref 1.8–7.3)
NEUTROPHILS RELATIVE PERCENT: 64.7 % (ref 43–80)
NUCLEATED RED BLOOD CELLS: 0 /100 WBC
PDW BLD-RTO: 14.1 FL (ref 11.5–15)
PLATELET # BLD: 238 E9/L (ref 130–450)
PMV BLD AUTO: 10.8 FL (ref 7–12)
POLYCHROMASIA: ABNORMAL
POTASSIUM SERPL-SCNC: 4.9 MMOL/L (ref 3.5–5)
RBC # BLD: 3.97 E12/L (ref 3.8–5.8)
SODIUM BLD-SCNC: 138 MMOL/L (ref 132–146)
WBC # BLD: 7.3 E9/L (ref 4.5–11.5)

## 2022-05-13 PROCEDURE — 6370000000 HC RX 637 (ALT 250 FOR IP): Performed by: STUDENT IN AN ORGANIZED HEALTH CARE EDUCATION/TRAINING PROGRAM

## 2022-05-13 PROCEDURE — 82962 GLUCOSE BLOOD TEST: CPT

## 2022-05-13 PROCEDURE — 6360000002 HC RX W HCPCS: Performed by: STUDENT IN AN ORGANIZED HEALTH CARE EDUCATION/TRAINING PROGRAM

## 2022-05-13 PROCEDURE — 94660 CPAP INITIATION&MGMT: CPT

## 2022-05-13 PROCEDURE — 6370000000 HC RX 637 (ALT 250 FOR IP): Performed by: INTERNAL MEDICINE

## 2022-05-13 PROCEDURE — 36415 COLL VENOUS BLD VENIPUNCTURE: CPT

## 2022-05-13 PROCEDURE — 99233 SBSQ HOSP IP/OBS HIGH 50: CPT | Performed by: STUDENT IN AN ORGANIZED HEALTH CARE EDUCATION/TRAINING PROGRAM

## 2022-05-13 PROCEDURE — 2580000003 HC RX 258: Performed by: STUDENT IN AN ORGANIZED HEALTH CARE EDUCATION/TRAINING PROGRAM

## 2022-05-13 PROCEDURE — 99232 SBSQ HOSP IP/OBS MODERATE 35: CPT | Performed by: INTERNAL MEDICINE

## 2022-05-13 PROCEDURE — A4216 STERILE WATER/SALINE, 10 ML: HCPCS | Performed by: STUDENT IN AN ORGANIZED HEALTH CARE EDUCATION/TRAINING PROGRAM

## 2022-05-13 PROCEDURE — 97530 THERAPEUTIC ACTIVITIES: CPT

## 2022-05-13 PROCEDURE — 80048 BASIC METABOLIC PNL TOTAL CA: CPT

## 2022-05-13 PROCEDURE — 1200000000 HC SEMI PRIVATE

## 2022-05-13 PROCEDURE — 85025 COMPLETE CBC W/AUTO DIFF WBC: CPT

## 2022-05-13 RX ADMIN — INSULIN LISPRO 2 UNITS: 100 INJECTION, SOLUTION INTRAVENOUS; SUBCUTANEOUS at 06:27

## 2022-05-13 RX ADMIN — OXYCODONE AND ACETAMINOPHEN 2 TABLET: 5; 325 TABLET ORAL at 03:19

## 2022-05-13 RX ADMIN — DAPTOMYCIN 700 MG: 500 INJECTION, POWDER, LYOPHILIZED, FOR SOLUTION INTRAVENOUS at 10:18

## 2022-05-13 RX ADMIN — MUPIROCIN: 20 OINTMENT TOPICAL at 09:38

## 2022-05-13 RX ADMIN — OXYCODONE AND ACETAMINOPHEN 2 TABLET: 5; 325 TABLET ORAL at 20:42

## 2022-05-13 RX ADMIN — INSULIN LISPRO 2 UNITS: 100 INJECTION, SOLUTION INTRAVENOUS; SUBCUTANEOUS at 11:17

## 2022-05-13 RX ADMIN — ATORVASTATIN CALCIUM 40 MG: 40 TABLET, FILM COATED ORAL at 20:43

## 2022-05-13 RX ADMIN — PREGABALIN 100 MG: 50 CAPSULE ORAL at 21:36

## 2022-05-13 RX ADMIN — PIPERACILLIN SODIUM AND TAZOBACTAM SODIUM 4500 MG: 4; .5 INJECTION, POWDER, LYOPHILIZED, FOR SOLUTION INTRAVENOUS at 20:54

## 2022-05-13 RX ADMIN — PIPERACILLIN SODIUM AND TAZOBACTAM SODIUM 4500 MG: 4; .5 INJECTION, POWDER, LYOPHILIZED, FOR SOLUTION INTRAVENOUS at 13:05

## 2022-05-13 RX ADMIN — SODIUM CHLORIDE: 9 INJECTION, SOLUTION INTRAVENOUS at 13:11

## 2022-05-13 RX ADMIN — PIPERACILLIN SODIUM AND TAZOBACTAM SODIUM 4500 MG: 4; .5 INJECTION, POWDER, LYOPHILIZED, FOR SOLUTION INTRAVENOUS at 04:46

## 2022-05-13 RX ADMIN — ENOXAPARIN SODIUM 30 MG: 100 INJECTION SUBCUTANEOUS at 20:43

## 2022-05-13 RX ADMIN — LEVOTHYROXINE SODIUM 25 MCG: 25 TABLET ORAL at 06:27

## 2022-05-13 RX ADMIN — SODIUM CHLORIDE: 9 INJECTION, SOLUTION INTRAVENOUS at 04:47

## 2022-05-13 RX ADMIN — OXYCODONE AND ACETAMINOPHEN 2 TABLET: 5; 325 TABLET ORAL at 16:43

## 2022-05-13 RX ADMIN — INSULIN LISPRO 4 UNITS: 100 INJECTION, SOLUTION INTRAVENOUS; SUBCUTANEOUS at 16:37

## 2022-05-13 RX ADMIN — MUPIROCIN: 20 OINTMENT TOPICAL at 20:44

## 2022-05-13 RX ADMIN — ENOXAPARIN SODIUM 30 MG: 100 INJECTION SUBCUTANEOUS at 09:38

## 2022-05-13 RX ADMIN — SERTRALINE 50 MG: 100 TABLET, FILM COATED ORAL at 09:33

## 2022-05-13 RX ADMIN — OXYCODONE AND ACETAMINOPHEN 2 TABLET: 5; 325 TABLET ORAL at 11:52

## 2022-05-13 RX ADMIN — METOPROLOL TARTRATE 25 MG: 25 TABLET, FILM COATED ORAL at 20:43

## 2022-05-13 RX ADMIN — INSULIN LISPRO 2 UNITS: 100 INJECTION, SOLUTION INTRAVENOUS; SUBCUTANEOUS at 20:46

## 2022-05-13 RX ADMIN — OXYCODONE AND ACETAMINOPHEN 2 TABLET: 5; 325 TABLET ORAL at 07:31

## 2022-05-13 ASSESSMENT — PAIN SCALES - WONG BAKER
WONGBAKER_NUMERICALRESPONSE: 4
WONGBAKER_NUMERICALRESPONSE: 4

## 2022-05-13 ASSESSMENT — PAIN SCALES - GENERAL
PAINLEVEL_OUTOF10: 7
PAINLEVEL_OUTOF10: 6
PAINLEVEL_OUTOF10: 8
PAINLEVEL_OUTOF10: 7
PAINLEVEL_OUTOF10: 6
PAINLEVEL_OUTOF10: 8
PAINLEVEL_OUTOF10: 6

## 2022-05-13 ASSESSMENT — PAIN DESCRIPTION - FREQUENCY: FREQUENCY: CONTINUOUS

## 2022-05-13 ASSESSMENT — PAIN DESCRIPTION - ORIENTATION
ORIENTATION: RIGHT

## 2022-05-13 ASSESSMENT — PAIN DESCRIPTION - LOCATION
LOCATION: FOOT

## 2022-05-13 ASSESSMENT — PAIN DESCRIPTION - DESCRIPTORS
DESCRIPTORS: SHARP;SHOOTING;SORE

## 2022-05-13 ASSESSMENT — PAIN - FUNCTIONAL ASSESSMENT: PAIN_FUNCTIONAL_ASSESSMENT: ACTIVITIES ARE NOT PREVENTED

## 2022-05-13 ASSESSMENT — PAIN DESCRIPTION - PAIN TYPE: TYPE: ACUTE PAIN;SURGICAL PAIN

## 2022-05-13 ASSESSMENT — PAIN DESCRIPTION - ONSET: ONSET: ON-GOING

## 2022-05-13 NOTE — PLAN OF CARE
Problem: Discharge Planning  Goal: Discharge to home or other facility with appropriate resources  Outcome: Progressing  Flowsheets (Taken 5/13/2022 1157)  Discharge to home or other facility with appropriate resources: Identify barriers to discharge with patient and caregiver     Problem: Pain  Goal: Verbalizes/displays adequate comfort level or baseline comfort level  Outcome: Progressing     Problem: Skin/Tissue Integrity  Goal: Absence of new skin breakdown  Description: 1. Monitor for areas of redness and/or skin breakdown  2. Assess vascular access sites hourly  3. Every 4-6 hours minimum:  Change oxygen saturation probe site  4. Every 4-6 hours:  If on nasal continuous positive airway pressure, respiratory therapy assess nares and determine need for appliance change or resting period.   Outcome: Progressing     Problem: Safety - Adult  Goal: Free from fall injury  Outcome: Progressing     Problem: ABCDS Injury Assessment  Goal: Absence of physical injury  Outcome: Progressing     Problem: Chronic Conditions and Co-morbidities  Goal: Patient's chronic conditions and co-morbidity symptoms are monitored and maintained or improved  Outcome: Progressing     Problem: Nutrition Deficit:  Goal: Optimize nutritional status  Outcome: Progressing

## 2022-05-13 NOTE — PROGRESS NOTES
ENDOCRINOLOGY PROGRESS NOTE      Date of admission: 5/6/2022  Date of service: 5/13/2022  Admitting physician: Matt Ramsey DO   Primary Care Physician: CHRIST Bui - CNP  Consultant physician: Kartik Farfan MD     Reason for the consultation:  Uncontrolled DM    History of Present Illness: The history is provided by the patient. Accuracy of the patient data is excellent    Juliette Fuentes is a very pleasant 63 y.o. old male with PMH uncontrolled DM type 2, obesity, HTN, HLD and other listed below admitted to [facility] on 5/6/2022 because of Rt foot pain and swelling and found to have infected diabetic foot and in septic shock, endocrine service was consulted for diabetes management.     Subjective   No acute events, variable blood glucose levels    Inpatient diet:   Carb Restricted diet     Point of care glucose monitoring   (Independently reviewed)   Recent Labs     05/11/22  2112 05/12/22  0648 05/12/22  1122 05/12/22  1551 05/12/22  1933 05/13/22  0626 05/13/22  1104 05/13/22  1609   GLUMET 183* 141* 179* 167* 252* 159* 176* 205*     Scheduled Meds:   sodium chloride flush  5-40 mL IntraVENous 2 times per day    heparin flush  3 mL IntraVENous 2 times per day    piperacillin-tazobactam  4,500 mg IntraVENous Q8H    mupirocin   Nasal BID    insulin lispro  0-12 Units SubCUTAneous TID WC    insulin lispro  0-6 Units SubCUTAneous Nightly    daptomycin (CUBICIN) in NS IV syringe  6 mg/kg IntraVENous Q24H    enoxaparin  30 mg SubCUTAneous BID    atorvastatin  40 mg Oral Nightly    levothyroxine  25 mcg Oral Daily    metoprolol tartrate  25 mg Oral BID    sertraline  50 mg Oral Daily    pregabalin  100 mg Oral Nightly       PRN Meds:   oxyCODONE-acetaminophen, 2 tablet, Q4H PRN  sodium chloride flush, 5-40 mL, PRN  sodium chloride, , PRN  albuterol, 2.5 mg, Q6H PRN  heparin flush, 3 mL, PRN  ondansetron, 4 mg, Q8H PRN   Or  ondansetron, 4 mg, Q6H PRN  polyethylene glycol, 17 g, Daily Value Date    BC 5 Days no growth 05/06/2022    BC 5 Days no growth 04/21/2022       Radiology:  XR CHEST PORTABLE   Final Result   Right internal jugular central venous line terminates within the superior   vena cava with no evidence of pneumothorax. CT ABDOMEN PELVIS W IV CONTRAST Additional Contrast? None   Final Result   1. Rule out nonspecific postinflammatory changes in the right lower lobe of   the lungs. 2.  Small, rounded hypodensity, containing what appears to be air droplets   within the pancreatic head and near the ampulla. It lies immediately   adjacent to the duodenum and is in the path of the CBD. Question duodenal   diverticulum. An ampullary/pancreatic head lesion containing air cannot be   excluded. Upper GI study is recommended to see if this represents a   diverticulum. If no diverticulum is seen, then MRI of the abdomen with and   without intravenous contrast and including MRCP is recommended. 3.  Bilateral renal cortical cysts. At least 1 on the left may represent a   hemorrhagic or debris laden cyst.  Renal ultrasound is recommended. 4.  Small left inguinal hernia, which contains fat. XR FOOT RIGHT (MIN 3 VIEWS)   Final Result   Patient is status post amputation of the right 2nd toe. No acute osseous   abnormality. No evidence to suggest osteomyelitis. If osteomyelitis needs   to be excluded further, than MRI of the foot with and without intravenous   gadolinium can be considered.              Medical Records/Labs/Images review:   I personally reviewed and summarized previous records   All labs and imaging were reviewed independently     [address] [facility] B May, a 63 y.o.-old male seen today for inpatient diabetes management     Diabetes Mellitus type 2   · Pt admit poor compliance with diet   · We recommend the following diabetes regimen   · Medium dose sliding scale  · Continue glucose check with meals and at bedtime   · Will titrate insulin dose based on the blood glucose trend & insulin requirement  · On discharge, we recommend dc pt on Metformin 1000 mg BID   · Pt will follow with us after discharge     Septic shock from infected diabetic foot   · Managed by critical care and ID service     The above issues were reviewed with the patient who understood and agreed with the plan. Thank you for allowing us to participate in the care of this patient. Please do not hesitate to contact us with any additional questions. Papi Washington MD  Endocrinologist, [place] [address] [facility]   [address] [place] [address]   Phone: [phone]  Fax: [phone]  --------------------------------------------  An electronic signature was used to authenticate this note.  Dennie Star, MD on 5/13/2022 at 5:10 PM

## 2022-05-13 NOTE — PROGRESS NOTES
[facility] Progress Note    Admitting Date and Time: 5/6/2022 12:38 PM  Admit Dx: Septic shock ([facility] [A41.9, R65.21]    Subjective:  Patient is being followed for Septic shock ([facility] [A41.9, R65.21]   Pt feels Better.           sodium chloride flush  5-40 mL IntraVENous 2 times per day    heparin flush  3 mL IntraVENous 2 times per day    piperacillin-tazobactam  4,500 mg IntraVENous Q8H    mupirocin   Nasal BID    insulin lispro  0-12 Units SubCUTAneous TID WC    insulin lispro  0-6 Units SubCUTAneous Nightly    daptomycin (CUBICIN) in NS IV syringe  6 mg/kg IntraVENous Q24H    enoxaparin  30 mg SubCUTAneous BID    atorvastatin  40 mg Oral Nightly    levothyroxine  25 mcg Oral Daily    metoprolol tartrate  25 mg Oral BID    sertraline  50 mg Oral Daily    pregabalin  100 mg Oral Nightly     oxyCODONE-acetaminophen, 2 tablet, Q4H PRN  sodium chloride flush, 5-40 mL, PRN  sodium chloride, , PRN  albuterol, 2.5 mg, Q6H PRN  heparin flush, 3 mL, PRN  ondansetron, 4 mg, Q8H PRN   Or  ondansetron, 4 mg, Q6H PRN  polyethylene glycol, 17 g, Daily PRN  acetaminophen, 650 mg, Q6H PRN   Or  acetaminophen, 650 mg, Q6H PRN  glucagon (rDNA), 1 mg, PRN  dextrose, 100 mL/hr, PRN  glucose, 16 g, PRN  dextrose bolus, 125 mL, PRN   Or  dextrose bolus, 250 mL, PRN         Objective:    /63   Pulse 54   Temp 97.8 °F (36.6 °C) (Oral)   Resp 18   Ht 6' (1.829 m)   Wt 274 lb (124.3 kg)   SpO2 96%   BMI 37.16 kg/m²     General Appearance: alert and oriented to person, place and time and in no acute distress  Skin: warm and dry  Head: normocephalic and atraumatic  Eyes: pupils equal, round, and reactive to light, extraocular eye movements intact, conjunctivae normal  Neck: neck supple and non tender without mass   Pulmonary/Chest: clear to auscultation bilaterally- no wheezes, rales or rhonchi, normal air movement, no respiratory distress  Cardiovascular: normal rate, normal S1 and S2 and no Hyperlipidemia: Continue Lipitor       11. Anxiety/ depression: Continue Zoloft      12. Nicotine dependence: Encourage cessation     13. Hypothyroidism: Continue Synthroid.       NOTE: This report was transcribed using voice recognition software. Every effort was made to ensure accuracy; however, inadvertent computerized transcription errors may be present.   Electronically signed by Shaun Mccloud DO on 5/13/2022 at 4:37 PM

## 2022-05-13 NOTE — PLAN OF CARE
Problem: Pain  Goal: Verbalizes/displays adequate comfort level or baseline comfort level  5/12/2022 2207 by Courtney Burton RN  Outcome: Progressing     Problem: Skin/Tissue Integrity  Goal: Absence of new skin breakdown  Description: 1. Monitor for areas of redness and/or skin breakdown  2. Assess vascular access sites hourly  3. Every 4-6 hours minimum:  Change oxygen saturation probe site  4. Every 4-6 hours:  If on nasal continuous positive airway pressure, respiratory therapy assess nares and determine need for appliance change or resting period.   5/12/2022 2207 by Courtney Burton RN  Outcome: Progressing     Problem: Safety - Adult  Goal: Free from fall injury  5/12/2022 2207 by Courtney Burton RN  Outcome: Progressing

## 2022-05-13 NOTE — PATIENT CARE CONFERENCE
P Quality Flow/Interdisciplinary Rounds Progress Note        Quality Flow Rounds held on May 13, 2022    Disciplines Attending:  Bedside Nurse, ,  and Nursing Unit Nilson ARMSTRONG May was admitted on 5/6/2022 12:38 PM    Anticipated Discharge Date:  Expected Discharge Date: 05/12/22    Disposition:    Jose Score:  Jose Scale Score: 20    Readmission Risk              Risk of Unplanned Readmission:  16           Discussed patient goal for the day, patient clinical progression, and barriers to discharge.   The following Goal(s) of the Day/Commitment(s) have been identified:  Discharge - Obtain Order      Sandra Patton RN  May 13, 2022

## 2022-05-13 NOTE — PROGRESS NOTES
Department of Podiatry   Progress Note    Subjective:  Patient being seen s/p right foot delayed primary closure(DOS:5/10). Complaining of grinding pain to his foot which he rates a 7/10. States that he's been receiving percocet for the pain which have been helping. No additional pedal complaints. Past Medical History:        Diagnosis Date    Anxiety     [facility]     Depression     DM (diabetes mellitus) ([facility]     Frostbite     HLD (hyperlipidemia)     HTN (hypertension)     Sleep apnea        Past Surgical History:        Procedure Laterality Date    BACK SURGERY      CHOLECYSTECTOMY      CORONARY ARTERY BYPASS GRAFT REDO      2 vessel    FOOT DEBRIDEMENT Right 4/25/2022    DELAYED PRIMARY CLOSURE RIGHT FOOT WOUND performed by Sarah Sheehan DPM at [facility] Right 5/7/2022    FOOT DEBRIDEMENT INCISION AND DRAINAGE performed by Mayte May DPM at [facility] Right 5/10/2022    RIGHT FOOT DELAYED PRIMARY CLOSURE WITH POSSIBLE DEBRIDEMENT    ++CONTACT ISOLATION++   (DR AVAIL.  AT 4PM) performed by Mayte May DPM at [address] [place] [address] PICC LINE  5/9/2022         TOE AMPUTATION Right 4/22/2022    AMPUTATION RIGHT SECOND TOE performed by Sarah Sheehan DPM at [place] [address]         Medications Prior to Admission:    Medications Prior to Admission: atorvastatin (LIPITOR) 40 MG tablet, Take 40 mg by mouth at bedtime  metoprolol tartrate (LOPRESSOR) 25 MG tablet, Take 25 mg by mouth 2 times daily  metFORMIN (GLUCOPHAGE) 1000 MG tablet, Take 1,000 mg by mouth 2 times daily (with meals)  levothyroxine (SYNTHROID) 25 MCG tablet, Take 25 mcg by mouth Daily  sertraline (ZOLOFT) 50 MG tablet, Take 50 mg by mouth daily  glimepiride (AMARYL) 4 MG tablet, Take 4 mg by mouth in the morning and at bedtime  JARDIANCE 10 MG tablet, Take 10 mg by mouth daily  pregabalin (LYRICA) 100 MG capsule, Take 100 mg by mouth at bedtime. lisinopril-hydroCHLOROthiazide (PRINZIDE;ZESTORETIC) 20-12.5 MG per tablet, Take 20 tablets by mouth daily    Allergies:  Patient has no known allergies. Social History:   · TOBACCO:   reports that he has been smoking cigarettes. He started smoking about 42 years ago. He has been smoking about 2.50 packs per day. He does not have any smokeless tobacco history on file. · ETOH:   reports no history of alcohol use. DRUGS:   Social History     Substance and Sexual Activity   Drug Use Never       Family History:       Problem Relation Age of Onset    Dementia Mother     Stroke Father     Diabetes Sister          REVIEW OF SYSTEMS:    All pertinent positives and negatives as noted in HPI       LOWER EXTREMITY EXAMINATION   Dressing cdi with no excessive drainage. Previous physical exam findings:  Vascular Exam:  DP and PT pulses intact with hand held doppler bilaterally. CFT delayed to distal digits B/L. Skin temperature warm to warm proximal leg to distal toes.      Neuro Exam: Diminished protective sensation B/L     Dermatologic Exam: 2 dorsal incisions noted with sutures intact and skin well coapted. Open wound at distal 2nd amputation site with granular wound bed and no purulence. No malodor. No lymphangitis. No acute clinical signs of infection    MSK: Mild  tenderness on palpation to dorsal aspect of the right foot. Soft compressible posterior most compartments bilaterally.                           CONSULTS:  IP CONSULT TO GENERAL SURGERY  IP CONSULT TO GENERAL SURGERY  IP CONSULT TO CRITICAL CARE  IP CONSULT TO INFECTIOUS DISEASES  IP CONSULT TO PODIATRY  IP CONSULT TO SOCIAL WORK  IP CONSULT TO ENDOCRINOLOGY  IP CONSULT TO DIABETES EDUCATOR  IP CONSULT TO HOME CARE NEEDS    MEDICATION:  Scheduled Meds:   sodium chloride flush  5-40 mL IntraVENous 2 times per day    heparin flush  3 mL IntraVENous 2 times per day    piperacillin-tazobactam  4,500 mg IntraVENous Q8H    mupirocin   Nasal BID    insulin lispro  0-12 Units SubCUTAneous TID WC    insulin lispro  0-6 Units SubCUTAneous Nightly    daptomycin (CUBICIN) in NS IV syringe  6 mg/kg IntraVENous Q24H    enoxaparin  30 mg SubCUTAneous BID    atorvastatin  40 mg Oral Nightly    levothyroxine  25 mcg Oral Daily    metoprolol tartrate  25 mg Oral BID    sertraline  50 mg Oral Daily    pregabalin  100 mg Oral Nightly     Continuous Infusions:   sodium chloride      sodium chloride 100 mL/hr at 05/13/22 0455    dextrose       PRN Meds:.oxyCODONE-acetaminophen, sodium chloride flush, sodium chloride, albuterol, heparin flush, ondansetron **OR** ondansetron, polyethylene glycol, acetaminophen **OR** acetaminophen, glucagon (rDNA), dextrose, glucose, dextrose bolus **OR** dextrose bolus    RADIOLOGY:  XR CHEST PORTABLE   Final Result   Right internal jugular central venous line terminates within the superior   vena cava with no evidence of pneumothorax. CT ABDOMEN PELVIS W IV CONTRAST Additional Contrast? None   Final Result   1. Rule out nonspecific postinflammatory changes in the right lower lobe of   the lungs. 2.  Small, rounded hypodensity, containing what appears to be air droplets   within the pancreatic head and near the ampulla. It lies immediately   adjacent to the duodenum and is in the path of the CBD. Question duodenal   diverticulum. An ampullary/pancreatic head lesion containing air cannot be   excluded. Upper GI study is recommended to see if this represents a   diverticulum. If no diverticulum is seen, then MRI of the abdomen with and   without intravenous contrast and including MRCP is recommended. 3.  Bilateral renal cortical cysts. At least 1 on the left may represent a   hemorrhagic or debris laden cyst.  Renal ultrasound is recommended. 4.  Small left inguinal hernia, which contains fat.          XR FOOT RIGHT (MIN 3 VIEWS)   Final Result   Patient is status post amputation of the right 2nd toe.  No acute osseous   abnormality. No evidence to suggest osteomyelitis. If osteomyelitis needs   to be excluded further, than MRI of the foot with and without intravenous   gadolinium can be considered. Vitals:    /62   Pulse 60   Temp 97.8 °F (36.6 °C) (Oral)   Resp 16   Ht 6' (1.829 m)   Wt 274 lb (124.3 kg)   SpO2 96%   BMI 37.16 kg/m²     LABS:   Recent Labs     05/12/22  0402 05/13/22  0451   WBC 7.4 7.3   HGB 11.6* 11.4*   HCT 35.2* 35.3*    238     Recent Labs     05/13/22  0451      K 4.9      CO2 25   BUN 11   CREATININE 0.9     No results for input(s): PROT, INR, APTT in the last 72 hours. ASSESSMENTS:   -S/p delayed primary closure, right foot(DOS5/10)  -Full thickness wound, right foot  -Acute OM, right foot  -IDDM with neuropathy    PLAN:  - Examined and evaluated  - All labs, imaging, and charts reviewed  -Abx per ID:zosyn and dapto ongoing  -Arterial studies:Normal DK b/l at 1.2. Multiphasic waveforms at all levels b/l.   -QoD dressing changes:betadine, xeroform DSD. Dressings changed yesterday. Next change:5/14  -Stable for d/c from Podiatric perspective and once cleared by all other teams on board  -Will follow up on outpatient basis for continued Podiatric care  -Will continue to monitor while in-house    Discussed w/:AMAYA Bales 0948  Fellowship-Trained Foot and Ankle Surgeon  Diplomate, [employer] Board [employer] Foot [employer]le [employer]  [phone]     Thank you for the opportunity to take part in the patient's care. Please do not hesitate to call for any questions or concerns.

## 2022-05-13 NOTE — PROGRESS NOTES
Occupational Therapy  OT BEDSIDE TREATMENT NOTE      Date:2022  Patient Name: Maddy Fuentes  MRN: [id number]  : 1960  Room: [address] [place] [address]     Evaluating OT: Alm Ganser, OTR/L [id number]       Referring Provider: Nelly Marino DO    Specific Provider Orders/Date: OT eval and treat 22       Diagnosis:  Septic shock ([facility] [A41.9, R65.21]     Surgery:  R foot delayed primary closure with possible debridement 5/10/22    Pertinent Medical History: COPD, DM, HTN           Precautions:  Fall Risk, contact isolation, alarm, NWB R foot      Assessment of current deficits    [x]? Functional mobility            [x]?ADLs           [x]? Strength                  []?Cognition    [x]? Functional transfers          [x]? IADLs         [x]? Safety Awareness   [x]? Endurance    []? Fine Coordination                         [x]? Balance      []? Vision/perception   []? Sensation      []? Gross Motor Coordination             []? ROM           []?  Delirium                   []? Motor Control      OT PLAN OF CARE   OT POC based on physician orders, patient diagnosis and results of clinical assessment     Frequency/Duration 2-4 days/wk for 2 weeks PRN   Specific OT Treatment Interventions to include:   * Instruction/training on adapted ADL techniques and AE recommendations to increase functional independence within precautions       * Training on energy conservation strategies, correct breathing pattern and techniques to improve independence/tolerance for self-care routine  * Functional transfer/mobility training/DME recommendations for increased independence, safety, and fall prevention  * Patient/Family education to increase follow through with safety techniques and functional independence  * Recommendation of environmental modifications for increased safety with functional transfers/mobility and ADLs  * Therapeutic exercise to improve motor endurance, ROM, and functional strength for ADLs/functional transfers  * Therapeutic activities to facilitate/challenge dynamic balance, stand tolerance for increased safety and independence with ADLs           Recommended Adaptive Equipment: extended tub bench     Home Living: Pt lives with niece in 1 story house with 2 SHARON. Bathroom setup: tub/shower   Equipment owned: wheeled walker     Prior Level of Function: independent with ADLs , assist with IADLs; functional mobility: wheeled walker     Pain Level: pt reported pain in R foot but did not rate; pt agreeable to therapy  Cognition: A&O: pt alert and oriented grossly; WFL command follow demonstrated              Memory:  Good              Sequencing:  Good              Problem solving:  Fair+              Judgement/safety:  Fair+                Functional Assessment:  AM-PAC Daily Activity Raw Score: 16/24    Initial Eval Status  Date: 5/12/22 Treatment Status  Date: 5/13/22 STGs = LTGs  Time frame: 10-14 days   Feeding Set up       Grooming Min A   Mod I/I   UB Dressing Min A Min A  Mod I/I   LB Dressing Mod A   To don pants  mod A  Mod I/I-with use of AD as appropriate/needed   Bathing Mod A  min A UB bathing Mod I/I -with use of AD as appropriate/needed   Toileting Min A   Mod I/I    Bed Mobility  Supine to sit: SBA    supine to sit SBA Independent    Functional Transfers Min A with wheeled walker  Sit<>Stand from EOB   Cues for hand and feet placement and safe technique and NWB  Pt with difficulty maintaining NWB  sit <> stand CGA Independent     Functional Mobility Min A with wheeled walker  Short distance in room  Cues for safe wheeled walker management.  Pt able to hop on LLE and maintain NWB CGA with WW SPT to chair, Cues required to maintain NWB  independent -with device as needed to maximize independence with ADLs and functional task completion and good adherence to WB   Balance Sitting:     Static:  Independent     Dynamic:SBA  Standing: CGA with wheeled walker  STanding SBA with WW I for dynamic sitting balance to maximize independence with ADLs and functional task completion     I for standing balance to maximize independence with ADLs and functional task completion   Activity Tolerance Fair+ with light activity Fair +  Good with ADL activity. Pt will demonstrate good understanding of education provided on EC/WS techniques    Visual/  Perceptual Glasses: none at bedside              Comments:  Patient cleared with nursing and agreeable to therapy. Good tolerance of activity and improvement demonstrated. Patient in chair with call light in reach and alarm on. Education/treatment: ADL and functional transfer/activity performed to increase safety and independence during self care tasks. Education provided on safety awareness, adl reeducation, functional transfer training, NWB status    · Pt has made progress towards set goals.      Time In: 1:35  Time Out: 1:50     Min Units   Therapeutic Ex 91946     Therapeutic Activities 86148 25 2   ADL/Self Care 38446     Orthotic Management 47716     Neuro Re-Ed 31121     Non-Billable Time     TOTAL TIMED TREATMENT 25 [address] Leretha Angelucci COTA/L [id number]

## 2022-05-13 NOTE — CARE COORDINATION
Social Work discharge [facility] spoke to Dahlia Layer admissions liaison Neptali Clement, who advised precert from [facility] is still pending. Neptali Clement advised she will let Sw know later today or tomorrow IF they receive approval. Marga Call in [address] started. Ambulance forms with envelope in folder.    Electronically signed by Vinnie Gabriel on 5/13/2022 at 3:44 PM

## 2022-05-13 NOTE — ANESTHESIA POSTPROCEDURE EVALUATION
Department of Anesthesiology  Postprocedure Note    Patient: Tanner Fuentes  MRN: [id number]  YOB: 1960  Date of evaluation: 5/13/2022  Time:  9:51 AM     Procedure Summary     Date: 05/10/22 Room / Location: [facility] OR 07 / [facility]    Anesthesia Start: 0019 Anesthesia Stop: 4064    Procedure: RIGHT FOOT DELAYED PRIMARY CLOSURE WITH POSSIBLE DEBRIDEMENT    ++CONTACT ISOLATION++   (DR AVAIL. AT 4PM) (Right ) Diagnosis: (/)    Surgeons: Sg Navas DPM Responsible Provider: Tucker Moise MD    Anesthesia Type: MAC ASA Status: 4          Anesthesia Type: No value filed. Bernard Phase I:      Bernard Phase II: Bernard Score: 10    Last vitals: Reviewed and per EMR flowsheets.        Anesthesia Post Evaluation    Patient location during evaluation: PACU  Patient participation: complete - patient participated  Level of consciousness: awake and alert  Airway patency: patent  Nausea & Vomiting: no vomiting and no nausea  Complications: no  Cardiovascular status: blood pressure returned to baseline  Respiratory status: acceptable  Hydration status: euvolemic  Multimodal analgesia pain management approach

## 2022-05-13 NOTE — PROGRESS NOTES
[address] [place] [address] [facility]  Progress Note    SUBJECTIVE:  Chief Complaint   Patient presents with    Wound Infection     right foot, 2nd digit removed 2 weeks ago by dr Benjamín Mccoy. Patient is lying in bed,   In no distress  Tolerating antibiotics   No issues   Pain to foot managed    Review of systems:  As stated above in the chief complaint, otherwise negative. Medications:  Scheduled Meds:   sodium chloride flush  5-40 mL IntraVENous 2 times per day    heparin flush  3 mL IntraVENous 2 times per day    piperacillin-tazobactam  4,500 mg IntraVENous Q8H    mupirocin   Nasal BID    insulin lispro  0-12 Units SubCUTAneous TID WC    insulin lispro  0-6 Units SubCUTAneous Nightly    daptomycin (CUBICIN) in NS IV syringe  6 mg/kg IntraVENous Q24H    enoxaparin  30 mg SubCUTAneous BID    atorvastatin  40 mg Oral Nightly    levothyroxine  25 mcg Oral Daily    metoprolol tartrate  25 mg Oral BID    sertraline  50 mg Oral Daily    pregabalin  100 mg Oral Nightly     Continuous Infusions:   sodium chloride      sodium chloride 100 mL/hr at 22 0455    dextrose       PRN Meds:oxyCODONE-acetaminophen, sodium chloride flush, sodium chloride, albuterol, heparin flush, ondansetron **OR** ondansetron, polyethylene glycol, acetaminophen **OR** acetaminophen, glucagon (rDNA), dextrose, glucose, dextrose bolus **OR** dextrose bolus    OBJECTIVE:  /62   Pulse 56   Temp 97.8 °F (36.6 °C) (Oral)   Resp 16   Ht 6' (1.829 m)   Wt 274 lb (124.3 kg)   SpO2 96%   BMI 37.16 kg/m²   Temp  Av.9 °F (36.6 °C)  Min: 97.8 °F (36.6 °C)  Max: 97.9 °F (36.6 °C)  Constitutional: The patient is lying in bed, in no distress. Awake, alert. Skin: Warm and dry. No rashes were noted. HEENT: Round and reactive pupils. Moist mucous membranes. No ulcerations or thrush. Neck: Supple to movements. Chest: No respiratory distress. No crackles.   Cardiovascular: Heart sounds rhythmic and regular. Abdomen: Positive bowel sounds to auscultation. Benign to palpation. Extremities: The right foot is wrapped. Second toe is missing. Lines: PICC line right arm 5/9/2022              Laboratory and Tests Review:  Lab Results   Component Value Date    WBC 7.3 05/13/2022    WBC 7.4 05/12/2022    WBC 7.0 05/10/2022    HGB 11.4 (L) 05/13/2022    HCT 35.3 (L) 05/13/2022    MCV 88.9 05/13/2022     05/13/2022     Lab Results   Component Value Date    NEUTROABS 4.75 05/13/2022    NEUTROABS 4.51 05/12/2022    NEUTROABS 4.61 05/10/2022     No results found for: [facility]  Lab Results   Component Value Date    ALT 20 05/09/2022    AST 19 05/09/2022    ALKPHOS 109 05/09/2022    BILITOT 0.2 05/09/2022     Lab Results   Component Value Date     05/13/2022    K 4.9 05/13/2022    K 4.4 05/07/2022     05/13/2022    CO2 25 05/13/2022    BUN 11 05/13/2022    CREATININE 0.9 05/13/2022    CREATININE 0.9 05/12/2022    CREATININE 0.9 05/10/2022    GFRAA >60 05/13/2022    LABGLOM >60 05/13/2022    GLUCOSE 157 05/13/2022    PROT 6.3 05/09/2022    LABALBU 2.9 05/09/2022    CALCIUM 8.4 05/13/2022    BILITOT 0.2 05/09/2022    ALKPHOS 109 05/09/2022    AST 19 05/09/2022    ALT 20 05/09/2022     Lab Results   Component Value Date    CRP 5.6 (H) 05/06/2022    CRP 1.1 (H) 04/21/2022     Lab Results   Component Value Date    SEDRATE 59 (H) 05/11/2022    SEDRATE 57 (H) 05/06/2022    SEDRATE 23 (H) 04/21/2022     Radiology:      Microbiology:   Blood cultures 5/6/2022: Negative so far  Nares screen MRSA: Positive  Toe culture 5/6/2022: Nonhemolytic Streptococcus, alphahemolytic Streptococcus, Corynebacteria, ox + GNR, M_SA  Bone culture 5/7/2022: VSEnterococcus faecalis, MRSA  Abscess culture 5/7/2022: MRSA, Corynebacterium, alphahemolytic Streptococcus, VSEnterococcus faecalis    Surgical pathology: Subcortical bone with osteopenia, no   definite osteomyelitis.        Assessment:  Surgical site infection right toe following right second toe amputation secondary to osteomyelitis. Previous cultures grew MRSA, VS Enterococcus faecalis, pansensitive Pseudomonas, Myroides species and Staphylococcus cohnii  Sepsis with septic shock secondary to right foot infection, improved  Leukocytosis associated to the above-improved  Hypotension associated to the above  Status post delayed primary closure right foot 5/10/2022    Plan:    Continue Zosyn and Daptomycin for a minimum of 2, possibly 6 weeks  Check final intraoperative cultures  Labs reviewed   Discharge plan is now [place] pending authorization  Ok to d/c from [facility] P[facility], scripts in chart    CHRIST Kumar CNP  10:44 AM  5/13/2022     Pt seen and examined. Above discussed agree with advanced practice nurse. Labs, cultures, and radiographs reviewed. Face to Face encounter occurred. Changes made as necessary.      Karoline Beckett MD

## 2022-05-14 LAB
ALBUMIN SERPL-MCNC: 3.5 G/DL (ref 3.5–5.2)
ANION GAP SERPL CALCULATED.3IONS-SCNC: 5 MMOL/L (ref 7–16)
ANION GAP SERPL CALCULATED.3IONS-SCNC: 9 MMOL/L (ref 7–16)
BASOPHILS ABSOLUTE: 0.14 E9/L (ref 0–0.2)
BASOPHILS RELATIVE PERCENT: 1.8 % (ref 0–2)
BUN BLDV-MCNC: 11 MG/DL (ref 6–23)
BUN BLDV-MCNC: 14 MG/DL (ref 6–23)
CALCIUM SERPL-MCNC: 8.2 MG/DL (ref 8.6–10.2)
CALCIUM SERPL-MCNC: 8.6 MG/DL (ref 8.6–10.2)
CHLORIDE BLD-SCNC: 106 MMOL/L (ref 98–107)
CHLORIDE BLD-SCNC: 107 MMOL/L (ref 98–107)
CO2: 24 MMOL/L (ref 22–29)
CO2: 27 MMOL/L (ref 22–29)
CREAT SERPL-MCNC: 0.8 MG/DL (ref 0.7–1.2)
CREAT SERPL-MCNC: 0.9 MG/DL (ref 0.7–1.2)
EOSINOPHILS ABSOLUTE: 0.21 E9/L (ref 0.05–0.5)
EOSINOPHILS RELATIVE PERCENT: 2.6 % (ref 0–6)
GFR AFRICAN AMERICAN: >60
GFR AFRICAN AMERICAN: >60
GFR NON-AFRICAN AMERICAN: >60 ML/MIN/1.73
GFR NON-AFRICAN AMERICAN: >60 ML/MIN/1.73
GLUCOSE BLD-MCNC: 152 MG/DL (ref 74–99)
GLUCOSE BLD-MCNC: 183 MG/DL (ref 74–99)
HCT VFR BLD CALC: 34.5 % (ref 37–54)
HEMOGLOBIN: 11.4 G/DL (ref 12.5–16.5)
LYMPHOCYTES ABSOLUTE: 1.74 E9/L (ref 1.5–4)
LYMPHOCYTES RELATIVE PERCENT: 22.1 % (ref 20–42)
MCH RBC QN AUTO: 29.1 PG (ref 26–35)
MCHC RBC AUTO-ENTMCNC: 33 % (ref 32–34.5)
MCV RBC AUTO: 88 FL (ref 80–99.9)
METER GLUCOSE: 141 MG/DL (ref 74–99)
METER GLUCOSE: 189 MG/DL (ref 74–99)
METER GLUCOSE: 190 MG/DL (ref 74–99)
METER GLUCOSE: 218 MG/DL (ref 74–99)
MONOCYTES ABSOLUTE: 0.47 E9/L (ref 0.1–0.95)
MONOCYTES RELATIVE PERCENT: 6.2 % (ref 2–12)
NEUTROPHILS ABSOLUTE: 5.29 E9/L (ref 1.8–7.3)
NEUTROPHILS RELATIVE PERCENT: 67.3 % (ref 43–80)
NUCLEATED RED BLOOD CELLS: 0 /100 WBC
PDW BLD-RTO: 14.5 FL (ref 11.5–15)
PHOSPHORUS: 2.6 MG/DL (ref 2.5–4.5)
PLATELET # BLD: 235 E9/L (ref 130–450)
PMV BLD AUTO: 10.5 FL (ref 7–12)
POLYCHROMASIA: ABNORMAL
POTASSIUM SERPL-SCNC: 4.7 MMOL/L (ref 3.5–5)
POTASSIUM SERPL-SCNC: 5.2 MMOL/L (ref 3.5–5)
RBC # BLD: 3.92 E12/L (ref 3.8–5.8)
SODIUM BLD-SCNC: 138 MMOL/L (ref 132–146)
SODIUM BLD-SCNC: 140 MMOL/L (ref 132–146)
WBC # BLD: 7.9 E9/L (ref 4.5–11.5)

## 2022-05-14 PROCEDURE — 6370000000 HC RX 637 (ALT 250 FOR IP): Performed by: INTERNAL MEDICINE

## 2022-05-14 PROCEDURE — 80048 BASIC METABOLIC PNL TOTAL CA: CPT

## 2022-05-14 PROCEDURE — 36415 COLL VENOUS BLD VENIPUNCTURE: CPT

## 2022-05-14 PROCEDURE — 6370000000 HC RX 637 (ALT 250 FOR IP): Performed by: STUDENT IN AN ORGANIZED HEALTH CARE EDUCATION/TRAINING PROGRAM

## 2022-05-14 PROCEDURE — 99233 SBSQ HOSP IP/OBS HIGH 50: CPT | Performed by: STUDENT IN AN ORGANIZED HEALTH CARE EDUCATION/TRAINING PROGRAM

## 2022-05-14 PROCEDURE — 2580000003 HC RX 258: Performed by: STUDENT IN AN ORGANIZED HEALTH CARE EDUCATION/TRAINING PROGRAM

## 2022-05-14 PROCEDURE — 82962 GLUCOSE BLOOD TEST: CPT

## 2022-05-14 PROCEDURE — 1200000000 HC SEMI PRIVATE

## 2022-05-14 PROCEDURE — 6360000002 HC RX W HCPCS: Performed by: STUDENT IN AN ORGANIZED HEALTH CARE EDUCATION/TRAINING PROGRAM

## 2022-05-14 PROCEDURE — 94660 CPAP INITIATION&MGMT: CPT

## 2022-05-14 PROCEDURE — 80069 RENAL FUNCTION PANEL: CPT

## 2022-05-14 PROCEDURE — 36592 COLLECT BLOOD FROM PICC: CPT

## 2022-05-14 PROCEDURE — 85025 COMPLETE CBC W/AUTO DIFF WBC: CPT

## 2022-05-14 PROCEDURE — A4216 STERILE WATER/SALINE, 10 ML: HCPCS | Performed by: STUDENT IN AN ORGANIZED HEALTH CARE EDUCATION/TRAINING PROGRAM

## 2022-05-14 PROCEDURE — 99232 SBSQ HOSP IP/OBS MODERATE 35: CPT | Performed by: INTERNAL MEDICINE

## 2022-05-14 RX ORDER — METFORMIN HYDROCHLORIDE 750 MG/1
750 TABLET, EXTENDED RELEASE ORAL 2 TIMES DAILY WITH MEALS
Status: DISCONTINUED | OUTPATIENT
Start: 2022-05-14 | End: 2022-05-19 | Stop reason: HOSPADM

## 2022-05-14 RX ADMIN — PIPERACILLIN SODIUM AND TAZOBACTAM SODIUM 4500 MG: 4; .5 INJECTION, POWDER, LYOPHILIZED, FOR SOLUTION INTRAVENOUS at 13:30

## 2022-05-14 RX ADMIN — SODIUM CHLORIDE, PRESERVATIVE FREE 10 ML: 5 INJECTION INTRAVENOUS at 13:30

## 2022-05-14 RX ADMIN — SODIUM CHLORIDE, PRESERVATIVE FREE 10 ML: 5 INJECTION INTRAVENOUS at 10:12

## 2022-05-14 RX ADMIN — MUPIROCIN: 20 OINTMENT TOPICAL at 09:11

## 2022-05-14 RX ADMIN — SODIUM CHLORIDE: 9 INJECTION, SOLUTION INTRAVENOUS at 00:42

## 2022-05-14 RX ADMIN — ENOXAPARIN SODIUM 30 MG: 100 INJECTION SUBCUTANEOUS at 09:07

## 2022-05-14 RX ADMIN — INSULIN LISPRO 2 UNITS: 100 INJECTION, SOLUTION INTRAVENOUS; SUBCUTANEOUS at 11:29

## 2022-05-14 RX ADMIN — METOPROLOL TARTRATE 25 MG: 25 TABLET, FILM COATED ORAL at 21:17

## 2022-05-14 RX ADMIN — HEPARIN 300 UNITS: 100 SYRINGE at 09:08

## 2022-05-14 RX ADMIN — DAPTOMYCIN 700 MG: 500 INJECTION, POWDER, LYOPHILIZED, FOR SOLUTION INTRAVENOUS at 10:12

## 2022-05-14 RX ADMIN — INSULIN LISPRO 1 UNITS: 100 INJECTION, SOLUTION INTRAVENOUS; SUBCUTANEOUS at 21:19

## 2022-05-14 RX ADMIN — OXYCODONE AND ACETAMINOPHEN 2 TABLET: 5; 325 TABLET ORAL at 19:52

## 2022-05-14 RX ADMIN — PIPERACILLIN SODIUM AND TAZOBACTAM SODIUM 4500 MG: 4; .5 INJECTION, POWDER, LYOPHILIZED, FOR SOLUTION INTRAVENOUS at 05:12

## 2022-05-14 RX ADMIN — MUPIROCIN: 20 OINTMENT TOPICAL at 21:40

## 2022-05-14 RX ADMIN — PIPERACILLIN SODIUM AND TAZOBACTAM SODIUM 4500 MG: 4; .5 INJECTION, POWDER, LYOPHILIZED, FOR SOLUTION INTRAVENOUS at 21:19

## 2022-05-14 RX ADMIN — OXYCODONE AND ACETAMINOPHEN 2 TABLET: 5; 325 TABLET ORAL at 15:37

## 2022-05-14 RX ADMIN — INSULIN LISPRO 2 UNITS: 100 INJECTION, SOLUTION INTRAVENOUS; SUBCUTANEOUS at 06:09

## 2022-05-14 RX ADMIN — SERTRALINE 50 MG: 100 TABLET, FILM COATED ORAL at 09:07

## 2022-05-14 RX ADMIN — METFORMIN HYDROCHLORIDE 750 MG: 750 TABLET, EXTENDED RELEASE ORAL at 16:29

## 2022-05-14 RX ADMIN — OXYCODONE AND ACETAMINOPHEN 2 TABLET: 5; 325 TABLET ORAL at 10:12

## 2022-05-14 RX ADMIN — OXYCODONE AND ACETAMINOPHEN 2 TABLET: 5; 325 TABLET ORAL at 00:42

## 2022-05-14 RX ADMIN — SODIUM CHLORIDE, PRESERVATIVE FREE 10 ML: 5 INJECTION INTRAVENOUS at 09:08

## 2022-05-14 RX ADMIN — PREGABALIN 100 MG: 50 CAPSULE ORAL at 21:17

## 2022-05-14 RX ADMIN — OXYCODONE AND ACETAMINOPHEN 2 TABLET: 5; 325 TABLET ORAL at 05:10

## 2022-05-14 RX ADMIN — SODIUM CHLORIDE, PRESERVATIVE FREE 10 ML: 5 INJECTION INTRAVENOUS at 21:17

## 2022-05-14 RX ADMIN — LEVOTHYROXINE SODIUM 25 MCG: 25 TABLET ORAL at 06:09

## 2022-05-14 RX ADMIN — ATORVASTATIN CALCIUM 40 MG: 40 TABLET, FILM COATED ORAL at 21:16

## 2022-05-14 RX ADMIN — ENOXAPARIN SODIUM 30 MG: 100 INJECTION SUBCUTANEOUS at 21:17

## 2022-05-14 RX ADMIN — INSULIN LISPRO 4 UNITS: 100 INJECTION, SOLUTION INTRAVENOUS; SUBCUTANEOUS at 16:29

## 2022-05-14 RX ADMIN — METFORMIN HYDROCHLORIDE 750 MG: 750 TABLET, EXTENDED RELEASE ORAL at 09:07

## 2022-05-14 RX ADMIN — HEPARIN 300 UNITS: 100 SYRINGE at 21:41

## 2022-05-14 ASSESSMENT — PAIN SCALES - GENERAL
PAINLEVEL_OUTOF10: 7
PAINLEVEL_OUTOF10: 7
PAINLEVEL_OUTOF10: 8
PAINLEVEL_OUTOF10: 6
PAINLEVEL_OUTOF10: 6
PAINLEVEL_OUTOF10: 8
PAINLEVEL_OUTOF10: 7
PAINLEVEL_OUTOF10: 8

## 2022-05-14 ASSESSMENT — PAIN DESCRIPTION - ORIENTATION: ORIENTATION: RIGHT

## 2022-05-14 ASSESSMENT — PAIN DESCRIPTION - PAIN TYPE: TYPE: ACUTE PAIN;SURGICAL PAIN

## 2022-05-14 ASSESSMENT — PAIN DESCRIPTION - FREQUENCY: FREQUENCY: CONTINUOUS

## 2022-05-14 ASSESSMENT — PAIN DESCRIPTION - DESCRIPTORS: DESCRIPTORS: TENDER;SORE;SHARP

## 2022-05-14 ASSESSMENT — PAIN SCALES - WONG BAKER
WONGBAKER_NUMERICALRESPONSE: 0
WONGBAKER_NUMERICALRESPONSE: 4

## 2022-05-14 ASSESSMENT — PAIN DESCRIPTION - LOCATION: LOCATION: FOOT

## 2022-05-14 ASSESSMENT — PAIN DESCRIPTION - ONSET: ONSET: ON-GOING

## 2022-05-14 ASSESSMENT — PAIN - FUNCTIONAL ASSESSMENT: PAIN_FUNCTIONAL_ASSESSMENT: ACTIVITIES ARE NOT PREVENTED

## 2022-05-14 NOTE — PROGRESS NOTES
[address] [place] [address] [facility]  Progress Note    SUBJECTIVE:  Chief Complaint   Patient presents with    Wound Infection     right foot, 2nd digit removed 2 weeks ago by dr Milly Quintana. Patient is lying in bed, awake and alert   Tolerating antibiotics   No issues   No fevers overnight    Review of systems:  As stated above in the chief complaint, otherwise negative. Medications:  Scheduled Meds:   metFORMIN  750 mg Oral BID WC    sodium chloride flush  5-40 mL IntraVENous 2 times per day    heparin flush  3 mL IntraVENous 2 times per day    piperacillin-tazobactam  4,500 mg IntraVENous Q8H    mupirocin   Nasal BID    insulin lispro  0-12 Units SubCUTAneous TID WC    insulin lispro  0-6 Units SubCUTAneous Nightly    daptomycin (CUBICIN) in NS IV syringe  6 mg/kg IntraVENous Q24H    enoxaparin  30 mg SubCUTAneous BID    atorvastatin  40 mg Oral Nightly    levothyroxine  25 mcg Oral Daily    metoprolol tartrate  25 mg Oral BID    sertraline  50 mg Oral Daily    pregabalin  100 mg Oral Nightly     Continuous Infusions:   sodium chloride      dextrose       PRN Meds:oxyCODONE-acetaminophen, sodium chloride flush, sodium chloride, albuterol, heparin flush, ondansetron **OR** ondansetron, polyethylene glycol, acetaminophen **OR** acetaminophen, glucagon (rDNA), dextrose, glucose, dextrose bolus **OR** dextrose bolus    OBJECTIVE:  BP (!) 108/57   Pulse 61   Temp 97.7 °F (36.5 °C) (Oral)   Resp 18   Ht 6' (1.829 m)   Wt 275 lb 1.6 oz (124.8 kg)   SpO2 96%   BMI 37.31 kg/m²   Temp  Av.9 °F (36.6 °C)  Min: 97.7 °F (36.5 °C)  Max: 98 °F (36.7 °C)  Constitutional: The patient is lying in bed, in no distress. Awake, alert. Skin: Warm and dry. No rashes were noted. HEENT: Round and reactive pupils. Moist mucous membranes. No ulcerations or thrush. Neck: Supple to movements. Chest: No respiratory distress. No crackles. Cardiovascular: Heart sounds rhythmic and regular.   Abdomen: Positive bowel sounds to auscultation. Benign to palpation. Extremities: The right foot is wrapped. Second toe is missing. Lines: PICC line right arm 5/9/2022              Laboratory and Tests Review:  Lab Results   Component Value Date    WBC 7.9 05/14/2022    WBC 7.3 05/13/2022    WBC 7.4 05/12/2022    HGB 11.4 (L) 05/14/2022    HCT 34.5 (L) 05/14/2022    MCV 88.0 05/14/2022     05/14/2022     Lab Results   Component Value Date    NEUTROABS 5.29 05/14/2022    NEUTROABS 4.75 05/13/2022    NEUTROABS 4.51 05/12/2022     No results found for: [facility]  Lab Results   Component Value Date    ALT 20 05/09/2022    AST 19 05/09/2022    ALKPHOS 109 05/09/2022    BILITOT 0.2 05/09/2022     Lab Results   Component Value Date     05/14/2022    K 4.7 05/14/2022    K 4.4 05/07/2022     05/14/2022    CO2 27 05/14/2022    BUN 14 05/14/2022    CREATININE 0.8 05/14/2022    CREATININE 0.9 05/14/2022    CREATININE 0.9 05/13/2022    GFRAA >60 05/14/2022    LABGLOM >60 05/14/2022    GLUCOSE 183 05/14/2022    PROT 6.3 05/09/2022    LABALBU 3.5 05/14/2022    CALCIUM 8.6 05/14/2022    BILITOT 0.2 05/09/2022    ALKPHOS 109 05/09/2022    AST 19 05/09/2022    ALT 20 05/09/2022     Lab Results   Component Value Date    CRP 5.6 (H) 05/06/2022    CRP 1.1 (H) 04/21/2022     Lab Results   Component Value Date    SEDRATE 59 (H) 05/11/2022    SEDRATE 57 (H) 05/06/2022    SEDRATE 23 (H) 04/21/2022     Radiology:  Reviewed    Microbiology:   Blood cultures 5/6/2022: Negative so far  Nares screen MRSA: Positive  Toe culture 5/6/2022: Nonhemolytic Streptococcus, alphahemolytic Streptococcus, Corynebacteria, ox + GNR, M_SA  Bone culture 5/7/2022: VSEnterococcus faecalis, MRSA  Abscess culture 5/7/2022: MRSA, Corynebacterium, alphahemolytic Streptococcus, VSEnterococcus faecalis    Surgical pathology: Subcortical bone with osteopenia, no   definite osteomyelitis.        Assessment:  Surgical site infection right toe following right second toe amputation secondary to osteomyelitis. Previous cultures grew MRSA, VS Enterococcus faecalis, pansensitive Pseudomonas, Myroides species and Staphylococcus cohnii  Sepsis with septic shock secondary to right foot infection, improved  Leukocytosis associated to the above-improved  Hypotension associated to the above  Status post delayed primary closure right foot 5/10/2022    Plan:    Continue Zosyn and Daptomycin for a minimum of 2, possibly 6 weeks  Check final intraoperative cultures  Labs reviewed   Discharge plan is now [place] pending authorization  [id number] Liliya Khan to d/c from ID P[facility], scripts in chart    CHRIST Veras CNP  12:10 PM  5/14/2022     Pt seen and examined. Above discussed agree with advanced practice nurse. Labs, cultures, and radiographs reviewed. Face to Face encounter occurred. Changes made as necessary.      Rhonda Slater MD

## 2022-05-14 NOTE — PROGRESS NOTES
ENDOCRINOLOGY PROGRESS NOTE      Date of admission: 5/6/2022  Date of service: 5/14/2022  Admitting physician: Jaciel Le DO   Primary Care Physician: CHRIST Valle - CNP  Consultant physician: Juleen Dubin MD     Reason for the consultation:  Uncontrolled DM    History of Present Illness: The history is provided by the patient. Accuracy of the patient data is excellent    Pamela Fuentes is a very pleasant 63 y.o. old male with PMH uncontrolled DM type 2, obesity, HTN, HLD and other listed below admitted to [facility] on 5/6/2022 because of Rt foot pain and swelling and found to have infected diabetic foot and in septic shock, endocrine service was consulted for diabetes management.     Subjective   No acute events, BG improving but above goal     Inpatient diet:   Carb Restricted diet     Point of care glucose monitoring   (Independently reviewed)   Recent Labs     05/12/22  1933 05/13/22  0626 05/13/22  1104 05/13/22  1609 05/13/22  2046 05/14/22  0608 05/14/22  1116 05/14/22  1555   GLUMET 252* 159* 176* 205* 215* 141* 189* 218*     Scheduled Meds:   metFORMIN  750 mg Oral BID WC    sodium chloride flush  5-40 mL IntraVENous 2 times per day    heparin flush  3 mL IntraVENous 2 times per day    piperacillin-tazobactam  4,500 mg IntraVENous Q8H    mupirocin   Nasal BID    insulin lispro  0-12 Units SubCUTAneous TID WC    insulin lispro  0-6 Units SubCUTAneous Nightly    daptomycin (CUBICIN) in NS IV syringe  6 mg/kg IntraVENous Q24H    enoxaparin  30 mg SubCUTAneous BID    atorvastatin  40 mg Oral Nightly    levothyroxine  25 mcg Oral Daily    metoprolol tartrate  25 mg Oral BID    sertraline  50 mg Oral Daily    pregabalin  100 mg Oral Nightly       PRN Meds:   oxyCODONE-acetaminophen, 2 tablet, Q4H PRN  sodium chloride flush, 5-40 mL, PRN  sodium chloride, , PRN  albuterol, 2.5 mg, Q6H PRN  heparin flush, 3 mL, PRN  ondansetron, 4 mg, Q8H PRN   Or  ondansetron, 4 mg, Q6H PRN  polyethylene glycol, 17 g, Daily PRN  acetaminophen, 650 mg, Q6H PRN   Or  acetaminophen, 650 mg, Q6H PRN  glucagon (rDNA), 1 mg, PRN  dextrose, 100 mL/hr, PRN  glucose, 16 g, PRN  dextrose bolus, 125 mL, PRN   Or  dextrose bolus, 250 mL, PRN      Continuous Infusions:   sodium chloride      dextrose         Review of Systems  All systems reviewed. All negative except for symptoms mentioned in HPI     OBJECTIVE    BP (!) 108/57   Pulse 61   Temp 97.7 °F (36.5 °C) (Oral)   Resp 18   Ht 6' (1.829 m)   Wt 275 lb 1.6 oz (124.8 kg)   SpO2 96%   BMI 37.31 kg/m²     Intake/Output Summary (Last 24 hours) at 5/14/2022 1908  Last data filed at 5/14/2022 1739  Gross per 24 hour   Intake 600 ml   Output 2900 ml   Net -2300 ml       Physical examination:  General: awake alert, oriented x3  HEENT: normocephalic non traumatic, no exophthalmos   Neck: supple, No thyroid tenderness,  Pulm: good equal air entry no added sounds  CVS: S1 + S2  Abd: soft lax, no tenderness  Skin: warm, no lesions, no rash.  Diabetic foot   Neuro: CN intact, sensation decreased bilateral , muscle power normal  Psych: normal mood, and affect    Review of Laboratory Data:  I personally reviewed the following labs:   Recent Labs     05/12/22  0402 05/13/22  0451 05/14/22  0514   WBC 7.4 7.3 7.9   RBC 4.03 3.97 3.92   HGB 11.6* 11.4* 11.4*   HCT 35.2* 35.3* 34.5*   MCV 87.3 88.9 88.0   MCH 28.8 28.7 29.1   MCHC 33.0 32.3 33.0   RDW 13.7 14.1 14.5    238 235   MPV 10.7 10.8 10.5     Recent Labs     05/13/22  0451 05/14/22  0514 05/14/22  1010    140 138   K 4.9 5.2* 4.7    107 106   CO2 25 24 27   BUN 11 11 14   CREATININE 0.9 0.9 0.8   GLUCOSE 157* 152* 183*   CALCIUM 8.4* 8.2* 8.6   LABALBU  --   --  3.5     No results found for: BHYDRXBUT  Lab Results   Component Value Date    LABA1C 7.7 04/22/2022     No results found for: TSH, T4FREE, I1IZWXL, FT3, C7OSCYU, TSI, TPOABS, THGAB  Lab Results   Component Value Date    LABA1C 7.7 04/22/2022 GLUCOSE 183 05/14/2022     No results found for: TRIG, HDL, LDLCALC, CHOL    Blood culture   Lab Results   Component Value Date    BC 5 Days no growth 05/06/2022    BC 5 Days no growth 04/21/2022       Radiology:  XR CHEST PORTABLE   Final Result   Right internal jugular central venous line terminates within the superior   vena cava with no evidence of pneumothorax. CT ABDOMEN PELVIS W IV CONTRAST Additional Contrast? None   Final Result   1. Rule out nonspecific postinflammatory changes in the right lower lobe of   the lungs. 2.  Small, rounded hypodensity, containing what appears to be air droplets   within the pancreatic head and near the ampulla. It lies immediately   adjacent to the duodenum and is in the path of the CBD. Question duodenal   diverticulum. An ampullary/pancreatic head lesion containing air cannot be   excluded. Upper GI study is recommended to see if this represents a   diverticulum. If no diverticulum is seen, then MRI of the abdomen with and   without intravenous contrast and including MRCP is recommended. 3.  Bilateral renal cortical cysts. At least 1 on the left may represent a   hemorrhagic or debris laden cyst.  Renal ultrasound is recommended. 4.  Small left inguinal hernia, which contains fat. XR FOOT RIGHT (MIN 3 VIEWS)   Final Result   Patient is status post amputation of the right 2nd toe. No acute osseous   abnormality. No evidence to suggest osteomyelitis. If osteomyelitis needs   to be excluded further, than MRI of the foot with and without intravenous   gadolinium can be considered.              Medical Records/Labs/Images review:   I personally reviewed and summarized previous records   All labs and imaging were reviewed independently     [address] [facility] B May, a 63 y.o.-old male seen today for inpatient diabetes management     Diabetes Mellitus type 2   · Pt admit poor compliance with diet   · We recommend the following diabetes regimen   · Medium dose sliding scale  · Start Metformin er 750 mg BID   · Continue glucose check with meals and at bedtime   · Will titrate insulin dose based on the blood glucose trend & insulin requirement    Septic shock from infected diabetic foot   · Managed by critical care and ID service     The above issues were reviewed with the patient who understood and agreed with the plan. Thank you for allowing us to participate in the care of this patient. Please do not hesitate to contact us with any additional questions. Cecille Benson MD  Endocrinologist, [facility] Diabetes [facility] and Endocrinology   [address] [place] [address]   Phone: [phone]  Fax: [phone]  --------------------------------------------  An electronic signature was used to authenticate this note.  Matt Galvez MD on 5/14/2022 at 7:08 PM

## 2022-05-14 NOTE — PLAN OF CARE
Problem: Pain  Goal: Verbalizes/displays adequate comfort level or baseline comfort level  5/13/2022 2155 by Georgie Keys RN  Outcome: Progressing     Problem: Skin/Tissue Integrity  Goal: Absence of new skin breakdown  Description: 1. Monitor for areas of redness and/or skin breakdown  2. Assess vascular access sites hourly  3. Every 4-6 hours minimum:  Change oxygen saturation probe site  4. Every 4-6 hours:  If on nasal continuous positive airway pressure, respiratory therapy assess nares and determine need for appliance change or resting period.   5/13/2022 2155 by Georgie Keys RN  Outcome: Progressing     Problem: Safety - Adult  Goal: Free from fall injury  5/13/2022 2155 by Georgie Keys RN  Outcome: Progressing     Problem: ABCDS Injury Assessment  Goal: Absence of physical injury  5/13/2022 2155 by Georgie Keys RN  Outcome: Progressing

## 2022-05-14 NOTE — PROGRESS NOTES
[facility] Progress Note    Admitting Date and Time: 5/6/2022 12:38 PM  Admit Dx: Septic shock ([facility] [address] [A41.9, R65.21]    Subjective:  Pt was seen and examined.           metFORMIN  750 mg Oral BID WC    sodium chloride flush  5-40 mL IntraVENous 2 times per day    heparin flush  3 mL IntraVENous 2 times per day    piperacillin-tazobactam  4,500 mg IntraVENous Q8H    mupirocin   Nasal BID    insulin lispro  0-12 Units SubCUTAneous TID WC    insulin lispro  0-6 Units SubCUTAneous Nightly    daptomycin (CUBICIN) in NS IV syringe  6 mg/kg IntraVENous Q24H    enoxaparin  30 mg SubCUTAneous BID    atorvastatin  40 mg Oral Nightly    levothyroxine  25 mcg Oral Daily    metoprolol tartrate  25 mg Oral BID    sertraline  50 mg Oral Daily    pregabalin  100 mg Oral Nightly     oxyCODONE-acetaminophen, 2 tablet, Q4H PRN  sodium chloride flush, 5-40 mL, PRN  sodium chloride, , PRN  albuterol, 2.5 mg, Q6H PRN  heparin flush, 3 mL, PRN  ondansetron, 4 mg, Q8H PRN   Or  ondansetron, 4 mg, Q6H PRN  polyethylene glycol, 17 g, Daily PRN  acetaminophen, 650 mg, Q6H PRN   Or  acetaminophen, 650 mg, Q6H PRN  glucagon (rDNA), 1 mg, PRN  dextrose, 100 mL/hr, PRN  glucose, 16 g, PRN  dextrose bolus, 125 mL, PRN   Or  dextrose bolus, 250 mL, PRN         Objective:    BP (!) 108/57   Pulse 61   Temp 97.7 °F (36.5 °C) (Oral)   Resp 18   Ht 6' (1.829 m)   Wt 275 lb 1.6 oz (124.8 kg)   SpO2 96%   BMI 37.31 kg/m²     General Appearance: alert and oriented to person, place and time and in no acute distress  Skin: warm and dry  Head: normocephalic and atraumatic  Eyes: pupils equal, round, and reactive to light, extraocular eye movements intact, conjunctivae normal  Neck: neck supple and non tender without mass   Pulmonary/Chest: clear to auscultation bilaterally- no wheezes, rales or rhonchi, normal air movement, no respiratory distress  Cardiovascular: normal rate, normal S1 and S2 and no carotid bruits  Abdomen: soft, non-tender, non-distended, normal bowel sounds, no masses or organomegaly  Extremities: no cyanosis, no clubbing and no edema  Neurologic: no cranial nerve deficit and speech normal        Recent Labs     05/13/22  0451 05/14/22  0514 05/14/22  1010    140 138   K 4.9 5.2* 4.7    107 106   CO2 25 24 27   BUN 11 11 14   CREATININE 0.9 0.9 0.8   GLUCOSE 157* 152* 183*   CALCIUM 8.4* 8.2* 8.6       Recent Labs     05/12/22  0402 05/13/22  0451 05/14/22  0514   WBC 7.4 7.3 7.9   RBC 4.03 3.97 3.92   HGB 11.6* 11.4* 11.4*   HCT 35.2* 35.3* 34.5*   MCV 87.3 88.9 88.0   MCH 28.8 28.7 29.1   MCHC 33.0 32.3 33.0   RDW 13.7 14.1 14.5    238 235   MPV 10.7 10.8 10.5       Radiology: none     Assessment:    1. Sepsis with septic shock: Presented with hypotension and leukocytosis. Leukocytosis resolved. Hypotension  Resolved. Transferred out of the ICU. Monitor off pressors. Blood cultures negative. Toe culture 5/6 positive for nonhemolytic Streptococcus, alphahemolytic streptococcus, Corynebacteria, GNR, and MRSA. ID following. Continue Zosyn and Daptomycin for minimum of 2 weeks, possibly 6 weeks-as per ID. PICC placed 5/9.     2. Right foot OM with cellulitis and surgical site infection: S/p toe amputation on 4/21. S/p I&D of right foot with bone biopsy on 5/7. Continue antibiotics. Podiatry and ID following. S/p right foot delayed primary closure and possible repeat debridement on 5/10.      3. 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 General surgery consulted. No plans for acute inpatient surgical intervention.      4. ALVINO: Resolved. Avoid nephrotoxic agents.     5. DM2: Endocrinology consulted. Medium dose ssi. Hypoglycemic protocol. Mtformin 1000 mg BID.        6. ALCON: Hx of ALCON on CPAP. Had uvulectomy and stopped wearing CPAP.  Sleep study showing ALCON and Recommended CPAP.     7. COPD: PFT in the past showing COPD. Continue Albuterol prn.      8.  CAD s/p CABG x2-continue lipitor and metoprolol.     9. Hypertension: Continue Lopressor      10. Hyperlipidemia: Continue Lipitor       11. Anxiety/ depression: Continue Zoloft      12. Nicotine dependence: Encourage cessation     13. Hypothyroidism: Continue Synthroid.       Pt is medically ready for discharge. awaiting pre-cert tot [facility].        NOTE: This report was transcribed using voice recognition software. Every effort was made to ensure accuracy; however, inadvertent computerized transcription errors may be present.   Electronically signed by Alda Jones DO on 5/14/2022 at 1:01 PM

## 2022-05-14 NOTE — PROGRESS NOTES
Department of Podiatry   Progress Note    Subjective:  PT IS A 64 y/o M seen s/p right foot delayed primary closure(DOS:5/10). Pt denies any current foot pain as well as any V/F/C/CP or SOB. Pt does admit to constipation for the last several days with some stomach ache. Pt has no additional pedal complaints or questions. Past Medical History:        Diagnosis Date    Anxiety     [facility]ch[facility] obstructive [facility] disease) ([facility]     Depression     DM [facility]diabetes mellitus) ([facility]     Frostbite     HLD (hyperlipidemia)     HTN (hypertension)     Sleep apnea        Past Surgical History:        Procedure Laterality Date    BACK SURGERY      CHOLECYSTECTOMY      CORONARY ARTERY BYPASS GRAFT REDO      2 vessel    FOOT DEBRIDEMENT Right 4/25/2022    DELAYED PRIMARY CLOSURE RIGHT FOOT WOUND performed by Reyna Prieto DPM at [facility] Right 5/7/2022    FOOT DEBRIDEMENT INCISION AND DRAINAGE performed by Ivon Carver DPM at [facility] Right 5/10/2022    RIGHT FOOT DELAYED PRIMARY CLOSURE WITH POSSIBLE DEBRIDEMENT    ++CONTACT ISOLATION++   (DR AVAIL.  AT 4PM) performed by Ivon Carver DPM at [address] [place] [address] LINE  5/9/2022         TOE AMPUTATION Right 4/22/2022    AMPUTATION RIGHT SECOND TOE performed by Reyna Prieto DPM at [place] [address]         Medications Prior to Admission:    Medications Prior to Admission: atorvastatin (LIPITOR) 40 MG tablet, Take 40 mg by mouth at bedtime  metoprolol tartrate (LOPRESSOR) 25 MG tablet, Take 25 mg by mouth 2 times daily  metFORMIN (GLUCOPHAGE) 1000 MG tablet, Take 1,000 mg by mouth 2 times daily (with meals)  levothyroxine (SYNTHROID) 25 MCG tablet, Take 25 mcg by mouth Daily  sertraline (ZOLOFT) 50 MG tablet, Take 50 mg by mouth daily  glimepiride (AMARYL) 4 MG tablet, Take 4 mg by mouth in the morning and at bedtime  JARDIANCE 10 MG tablet, Take 10 mg by mouth daily  pregabalin (LYRICA) 100 MG capsule, Take 100 mg by mouth at bedtime. lisinopril-hydroCHLOROthiazide (PRINZIDE;ZESTORETIC) 20-12.5 MG per tablet, Take 20 tablets by mouth daily    Allergies:  Patient has no known allergies. Social History:   · TOBACCO:   reports that he has been smoking cigarettes. He started smoking about 42 years ago. He has been smoking about 2.50 packs per day. He does not have any smokeless tobacco history on file. · ETOH:   reports no history of alcohol use. DRUGS:   Social History     Substance and Sexual Activity   Drug Use Never       Family History:       Problem Relation Age of Onset    Dementia Mother     Stroke Father     Diabetes Sister          REVIEW OF SYSTEMS:    All pertinent positives and negatives as noted in HPI       LOWER EXTREMITY EXAMINATION   Dressing cdi with no excessive drainage. Previous physical exam findings:  Vascular Exam:  DP and PT pulses intact with hand held doppler bilaterally. CFT delayed to distal digits B/L. Skin temperature warm to warm proximal leg to distal toes.      Neuro Exam: Diminished protective sensation B/L     Dermatologic Exam: 2 dorsal incisions noted with sutures intact and skin well coapted. Open wound at distal 2nd amputation site with granular wound bed and no purulence. No malodor. No lymphangitis. No acute clinical signs of infection    MSK: Mild  tenderness on palpation to dorsal aspect of the right foot. Soft compressible posterior most compartments bilaterally.                           CONSULTS:  IP CONSULT TO GENERAL SURGERY  IP CONSULT TO GENERAL SURGERY  IP CONSULT TO CRITICAL CARE  IP CONSULT TO INFECTIOUS DISEASES  IP CONSULT TO PODIATRY  IP CONSULT TO SOCIAL WORK  IP CONSULT TO ENDOCRINOLOGY  IP CONSULT TO DIABETES EDUCATOR  IP CONSULT TO HOME CARE NEEDS    MEDICATION:  Scheduled Meds:   metFORMIN  750 mg Oral BID WC    sodium chloride flush  5-40 mL IntraVENous 2 times per day    heparin flush  3 mL IntraVENous 2 times per day    piperacillin-tazobactam  4,500 mg IntraVENous Q8H    mupirocin   Nasal BID    insulin lispro  0-12 Units SubCUTAneous TID WC    insulin lispro  0-6 Units SubCUTAneous Nightly    daptomycin (CUBICIN) in NS IV syringe  6 mg/kg IntraVENous Q24H    enoxaparin  30 mg SubCUTAneous BID    atorvastatin  40 mg Oral Nightly    levothyroxine  25 mcg Oral Daily    metoprolol tartrate  25 mg Oral BID    sertraline  50 mg Oral Daily    pregabalin  100 mg Oral Nightly     Continuous Infusions:   sodium chloride      dextrose       PRN Meds:.oxyCODONE-acetaminophen, sodium chloride flush, sodium chloride, albuterol, heparin flush, ondansetron **OR** ondansetron, polyethylene glycol, acetaminophen **OR** acetaminophen, glucagon (rDNA), dextrose, glucose, dextrose bolus **OR** dextrose bolus    RADIOLOGY:  XR CHEST PORTABLE   Final Result   Right internal jugular central venous line terminates within the superior   vena cava with no evidence of pneumothorax. CT ABDOMEN PELVIS W IV CONTRAST Additional Contrast? None   Final Result   1. Rule out nonspecific postinflammatory changes in the right lower lobe of   the lungs. 2.  Small, rounded hypodensity, containing what appears to be air droplets   within the pancreatic head and near the ampulla. It lies immediately   adjacent to the duodenum and is in the path of the CBD. Question duodenal   diverticulum. An ampullary/pancreatic head lesion containing air cannot be   excluded. Upper GI study is recommended to see if this represents a   diverticulum. If no diverticulum is seen, then MRI of the abdomen with and   without intravenous contrast and including MRCP is recommended. 3.  Bilateral renal cortical cysts. At least 1 on the left may represent a   hemorrhagic or debris laden cyst.  Renal ultrasound is recommended. 4.  Small left inguinal hernia, which contains fat.          XR FOOT RIGHT (MIN 3 VIEWS)   Final Result   Patient is status post amputation of the right 2nd toe. No acute osseous   abnormality. No evidence to suggest osteomyelitis. If osteomyelitis needs   to be excluded further, than MRI of the foot with and without intravenous   gadolinium can be considered. Vitals:    BP (!) 108/57   Pulse 61   Temp 97.7 °F (36.5 °C) (Oral)   Resp 18   Ht 6' (1.829 m)   Wt 275 lb 1.6 oz (124.8 kg)   SpO2 96%   BMI 37.31 kg/m²     LABS:   Recent Labs     05/13/22  0451 05/14/22 0514   WBC 7.3 7.9   HGB 11.4* 11.4*   HCT 35.3* 34.5*    235     Recent Labs     05/14/22 0514      K 5.2*      CO2 24   BUN 11   CREATININE 0.9     No results for input(s): PROT, INR, APTT in the last 72 hours. ASSESSMENTS:   -S/p delayed primary closure, right foot(DOS5/10)  -Full thickness wound, right foot  -Acute OM, right foot  -IDDM with neuropathy    PLAN:  - Examined and evaluated  - All labs, imaging, and charts reviewed  -WBC 7.9  -Abx per ID:zosyn and dapto ongoing  -Arterial studies:Normal DK b/l at 1.2. Multiphasic waveforms at all levels b/l.   -QoD dressing changes:betadine, xeroform DSD. Dressings changed yesterday. Next change:5/16  -Stable for d/c from Podiatric perspective and once cleared by all other teams on board  -Will follow up on outpatient basis for continued Podiatric care  -Will continue to monitor while in-house    Discussed w/:Gideon Nguyen, AMAYA Nair [id number]  Fellowship-Trained Foot and Ankle Surgeon  Diplomate, [employer] Board [employer] Foot [employer] Ankle Surgeons  [phone]     Thank you for the opportunity to take part in the patient's care. Please do not hesitate to call for any questions or concerns.

## 2022-05-15 LAB
METER GLUCOSE: 125 MG/DL (ref 74–99)
METER GLUCOSE: 152 MG/DL (ref 74–99)
METER GLUCOSE: 185 MG/DL (ref 74–99)
METER GLUCOSE: 186 MG/DL (ref 74–99)

## 2022-05-15 PROCEDURE — 99233 SBSQ HOSP IP/OBS HIGH 50: CPT | Performed by: STUDENT IN AN ORGANIZED HEALTH CARE EDUCATION/TRAINING PROGRAM

## 2022-05-15 PROCEDURE — 82962 GLUCOSE BLOOD TEST: CPT

## 2022-05-15 PROCEDURE — A4216 STERILE WATER/SALINE, 10 ML: HCPCS | Performed by: STUDENT IN AN ORGANIZED HEALTH CARE EDUCATION/TRAINING PROGRAM

## 2022-05-15 PROCEDURE — 6360000002 HC RX W HCPCS: Performed by: STUDENT IN AN ORGANIZED HEALTH CARE EDUCATION/TRAINING PROGRAM

## 2022-05-15 PROCEDURE — 2580000003 HC RX 258: Performed by: STUDENT IN AN ORGANIZED HEALTH CARE EDUCATION/TRAINING PROGRAM

## 2022-05-15 PROCEDURE — 1200000000 HC SEMI PRIVATE

## 2022-05-15 PROCEDURE — 6370000000 HC RX 637 (ALT 250 FOR IP): Performed by: INTERNAL MEDICINE

## 2022-05-15 PROCEDURE — 6370000000 HC RX 637 (ALT 250 FOR IP): Performed by: STUDENT IN AN ORGANIZED HEALTH CARE EDUCATION/TRAINING PROGRAM

## 2022-05-15 PROCEDURE — 94660 CPAP INITIATION&MGMT: CPT

## 2022-05-15 RX ORDER — PROCHLORPERAZINE EDISYLATE 5 MG/ML
10 INJECTION INTRAMUSCULAR; INTRAVENOUS EVERY 6 HOURS PRN
Status: DISCONTINUED | OUTPATIENT
Start: 2022-05-15 | End: 2022-05-19 | Stop reason: HOSPADM

## 2022-05-15 RX ADMIN — METFORMIN HYDROCHLORIDE 750 MG: 750 TABLET, EXTENDED RELEASE ORAL at 17:12

## 2022-05-15 RX ADMIN — OXYCODONE AND ACETAMINOPHEN 2 TABLET: 5; 325 TABLET ORAL at 13:47

## 2022-05-15 RX ADMIN — INSULIN LISPRO 2 UNITS: 100 INJECTION, SOLUTION INTRAVENOUS; SUBCUTANEOUS at 17:16

## 2022-05-15 RX ADMIN — OXYCODONE AND ACETAMINOPHEN 2 TABLET: 5; 325 TABLET ORAL at 02:50

## 2022-05-15 RX ADMIN — PIPERACILLIN SODIUM AND TAZOBACTAM SODIUM 4500 MG: 4; .5 INJECTION, POWDER, LYOPHILIZED, FOR SOLUTION INTRAVENOUS at 06:12

## 2022-05-15 RX ADMIN — SODIUM CHLORIDE, PRESERVATIVE FREE 10 ML: 5 INJECTION INTRAVENOUS at 21:38

## 2022-05-15 RX ADMIN — ATORVASTATIN CALCIUM 40 MG: 40 TABLET, FILM COATED ORAL at 21:34

## 2022-05-15 RX ADMIN — PIPERACILLIN SODIUM AND TAZOBACTAM SODIUM 4500 MG: 4; .5 INJECTION, POWDER, LYOPHILIZED, FOR SOLUTION INTRAVENOUS at 21:45

## 2022-05-15 RX ADMIN — OXYCODONE AND ACETAMINOPHEN 2 TABLET: 5; 325 TABLET ORAL at 08:10

## 2022-05-15 RX ADMIN — METOPROLOL TARTRATE 25 MG: 25 TABLET, FILM COATED ORAL at 08:10

## 2022-05-15 RX ADMIN — LEVOTHYROXINE SODIUM 25 MCG: 25 TABLET ORAL at 06:09

## 2022-05-15 RX ADMIN — ENOXAPARIN SODIUM 30 MG: 100 INJECTION SUBCUTANEOUS at 08:11

## 2022-05-15 RX ADMIN — PIPERACILLIN SODIUM AND TAZOBACTAM SODIUM 4500 MG: 4; .5 INJECTION, POWDER, LYOPHILIZED, FOR SOLUTION INTRAVENOUS at 13:43

## 2022-05-15 RX ADMIN — SODIUM CHLORIDE, PRESERVATIVE FREE 10 ML: 5 INJECTION INTRAVENOUS at 10:16

## 2022-05-15 RX ADMIN — DAPTOMYCIN 700 MG: 500 INJECTION, POWDER, LYOPHILIZED, FOR SOLUTION INTRAVENOUS at 10:15

## 2022-05-15 RX ADMIN — INSULIN LISPRO 1 UNITS: 100 INJECTION, SOLUTION INTRAVENOUS; SUBCUTANEOUS at 21:46

## 2022-05-15 RX ADMIN — METOPROLOL TARTRATE 25 MG: 25 TABLET, FILM COATED ORAL at 21:35

## 2022-05-15 RX ADMIN — ENOXAPARIN SODIUM 30 MG: 100 INJECTION SUBCUTANEOUS at 21:36

## 2022-05-15 RX ADMIN — HEPARIN 300 UNITS: 100 SYRINGE at 10:16

## 2022-05-15 RX ADMIN — OXYCODONE AND ACETAMINOPHEN 2 TABLET: 5; 325 TABLET ORAL at 19:06

## 2022-05-15 RX ADMIN — METFORMIN HYDROCHLORIDE 750 MG: 750 TABLET, EXTENDED RELEASE ORAL at 08:10

## 2022-05-15 RX ADMIN — PREGABALIN 100 MG: 50 CAPSULE ORAL at 21:35

## 2022-05-15 RX ADMIN — INSULIN LISPRO 2 UNITS: 100 INJECTION, SOLUTION INTRAVENOUS; SUBCUTANEOUS at 11:45

## 2022-05-15 RX ADMIN — HEPARIN 300 UNITS: 100 SYRINGE at 21:37

## 2022-05-15 RX ADMIN — SERTRALINE 50 MG: 100 TABLET, FILM COATED ORAL at 08:10

## 2022-05-15 RX ADMIN — ONDANSETRON 4 MG: 2 INJECTION INTRAMUSCULAR; INTRAVENOUS at 14:51

## 2022-05-15 ASSESSMENT — PAIN DESCRIPTION - FREQUENCY
FREQUENCY: CONTINUOUS

## 2022-05-15 ASSESSMENT — PAIN DESCRIPTION - PAIN TYPE
TYPE: ACUTE PAIN;SURGICAL PAIN

## 2022-05-15 ASSESSMENT — PAIN DESCRIPTION - LOCATION
LOCATION: FOOT

## 2022-05-15 ASSESSMENT — PAIN DESCRIPTION - ORIENTATION
ORIENTATION: RIGHT

## 2022-05-15 ASSESSMENT — PAIN - FUNCTIONAL ASSESSMENT
PAIN_FUNCTIONAL_ASSESSMENT: PREVENTS OR INTERFERES SOME ACTIVE ACTIVITIES AND ADLS

## 2022-05-15 ASSESSMENT — PAIN SCALES - GENERAL
PAINLEVEL_OUTOF10: 8
PAINLEVEL_OUTOF10: 9
PAINLEVEL_OUTOF10: 3
PAINLEVEL_OUTOF10: 8
PAINLEVEL_OUTOF10: 3
PAINLEVEL_OUTOF10: 9

## 2022-05-15 ASSESSMENT — PAIN DESCRIPTION - DESCRIPTORS
DESCRIPTORS: SORE;ACHING
DESCRIPTORS: ACHING;SORE
DESCRIPTORS: TENDER;SORE;ACHING

## 2022-05-15 ASSESSMENT — PAIN DESCRIPTION - ONSET
ONSET: ON-GOING

## 2022-05-15 NOTE — PLAN OF CARE
Problem: Discharge Planning  Goal: Discharge to home or other facility with appropriate resources  Outcome: Progressing     Problem: Pain  Goal: Verbalizes/displays adequate comfort level or baseline comfort level  5/15/2022 0958 by Adonis Connell RN  Outcome: Progressing  5/14/2022 2259 by Giuliano Don RN  Outcome: Progressing

## 2022-05-15 NOTE — PROGRESS NOTES
[facility] Progress Note    Admitting Date and Time: 5/6/2022 12:38 PM  Admit Dx: Septic shock ([facility] [address] [A41.9, R65.21]    Subjective:  Pt was seen and examined.           metFORMIN  750 mg Oral BID WC    sodium chloride flush  5-40 mL IntraVENous 2 times per day    heparin flush  3 mL IntraVENous 2 times per day    piperacillin-tazobactam  4,500 mg IntraVENous Q8H    insulin lispro  0-12 Units SubCUTAneous TID WC    insulin lispro  0-6 Units SubCUTAneous Nightly    daptomycin (CUBICIN) in NS IV syringe  6 mg/kg IntraVENous Q24H    enoxaparin  30 mg SubCUTAneous BID    atorvastatin  40 mg Oral Nightly    levothyroxine  25 mcg Oral Daily    metoprolol tartrate  25 mg Oral BID    sertraline  50 mg Oral Daily    pregabalin  100 mg Oral Nightly     prochlorperazine, 10 mg, Q6H PRN  oxyCODONE-acetaminophen, 2 tablet, Q4H PRN  sodium chloride flush, 5-40 mL, PRN  sodium chloride, , PRN  albuterol, 2.5 mg, Q6H PRN  heparin flush, 3 mL, PRN  ondansetron, 4 mg, Q8H PRN   Or  ondansetron, 4 mg, Q6H PRN  polyethylene glycol, 17 g, Daily PRN  acetaminophen, 650 mg, Q6H PRN   Or  acetaminophen, 650 mg, Q6H PRN  glucagon (rDNA), 1 mg, PRN  dextrose, 100 mL/hr, PRN  glucose, 16 g, PRN  dextrose bolus, 125 mL, PRN   Or  dextrose bolus, 250 mL, PRN         Objective:    BP (!) 112/58   Pulse 71   Temp 97.9 °F (36.6 °C) (Oral)   Resp 18   Ht 6' (1.829 m)   Wt 271 lb (122.9 kg)   SpO2 96%   BMI 36.75 kg/m²     General Appearance: alert and oriented to person, place and time and in no acute distress  Skin: warm and dry  Head: normocephalic and atraumatic  Eyes: pupils equal, round, and reactive to light, extraocular eye movements intact, conjunctivae normal  Neck: neck supple and non tender without mass   Pulmonary/Chest: clear to auscultation bilaterally- no wheezes, rales or rhonchi, normal air movement, no respiratory distress  Cardiovascular: normal rate, normal S1 and S2 and no carotid bruits  Abdomen: soft, non-tender, non-distended, normal bowel sounds, no masses or organomegaly  Extremities: no cyanosis, no clubbing and no edema  Neurologic: no cranial nerve deficit and speech normal        Recent Labs     05/13/22  0451 05/14/22  0514 05/14/22  1010    140 138   K 4.9 5.2* 4.7    107 106   CO2 25 24 27   BUN 11 11 14   CREATININE 0.9 0.9 0.8   GLUCOSE 157* 152* 183*   CALCIUM 8.4* 8.2* 8.6       Recent Labs     05/13/22  0451 05/14/22  0514   WBC 7.3 7.9   RBC 3.97 3.92   HGB 11.4* 11.4*   HCT 35.3* 34.5*   MCV 88.9 88.0   MCH 28.7 29.1   MCHC 32.3 33.0   RDW 14.1 14.5    235   MPV 10.8 10.5       Radiology: none     Assessment:    1. Sepsis with septic shock: Presented with hypotension and leukocytosis. Leukocytosis resolved. Hypotension  Resolved. Transferred out of the ICU. Monitor off pressors. Blood cultures negative. Toe culture 5/6 positive for nonhemolytic Streptococcus, alphahemolytic streptococcus, Corynebacteria, GNR, and MRSA. ID following. Continue Zosyn and Daptomycin for minimum of 2 weeks, possibly 6 weeks-as per ID. PICC placed 5/9.     2. Right foot OM with cellulitis and surgical site infection: S/p toe amputation on 4/21. S/p I&D of right foot with bone biopsy on 5/7. Continue antibiotics. Podiatry and ID following. S/p right foot delayed primary closure and possible repeat debridement on 5/10.      3. 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 General surgery consulted. No plans for acute inpatient surgical intervention.      4. ALVINO: Resolved. Avoid nephrotoxic agents.     5. DM2: Endocrinology consulted. Medium dose ssi. Hypoglycemic protocol. Mtformin 1000 mg BID.        6. ALCON: Hx of ALCON on CPAP. Had uvulectomy and stopped wearing CPAP.  Sleep study showing ALCON and Recommended CPAP.     7. COPD: PFT in the past showing COPD. Continue Albuterol prn.      8. CAD s/p CABG x2-continue lipitor and metoprolol.     9. Hypertension: Continue Lopressor      10. Hyperlipidemia: Continue Lipitor       11. Anxiety/ depression: Continue Zoloft      12. Nicotine dependence: Encourage cessation     13. Hypothyroidism: Continue Synthroid.         Pt is medically ready for discharge. awaiting pre-cert tot [facility].          NOTE: This report was transcribed using voice recognition software. Every effort was made to ensure accuracy; however, inadvertent computerized transcription errors may be present.   Electronically signed by Melanie Guthrie DO on 5/15/2022 at 2:10 PM

## 2022-05-15 NOTE — PROGRESS NOTES
Department of Podiatry   Progress Note    Subjective:  PT IS A 62 y/o M seen s/p right foot delayed primary closure (DOS:5/10). Pt admits to some foot pain today but denies any V/F/C/CP or SOB. Pt does have some nausea. Pt has no additional pedal complaints or questions. Pt is cleared for DC and awaiting precert to [facility]. Past Medical History:        Diagnosis Date    Anxiety     [facility]     Depression     DM (diabetes mellitus) ([facility]     Frostbite     HLD (hyperlipidemia)     HTN (hypertension)     Sleep apnea        Past Surgical History:        Procedure Laterality Date    BACK SURGERY      CHOLECYSTECTOMY      CORONARY ARTERY BYPASS GRAFT REDO      2 vessel    FOOT DEBRIDEMENT Right 4/25/2022    DELAYED PRIMARY CLOSURE RIGHT FOOT WOUND performed by Rebecca Crooks DPM at [facility] Right 5/7/2022    FOOT DEBRIDEMENT INCISION AND DRAINAGE performed by Meli Phillips DPM at [facility] Right 5/10/2022    RIGHT FOOT DELAYED PRIMARY CLOSURE WITH POSSIBLE DEBRIDEMENT    ++CONTACT ISOLATION++   (DR AVAIL.  AT 4PM) performed by Meli Phillips DPM at [address] [place], [place] [address]C LINE  5/9/2022         TOE AMPUTATION Right 4/22/2022    AMPUTATION RIGHT SECOND TOE performed by Rebecca Crooks DPM at [place] [address]         Medications Prior to Admission:    Medications Prior to Admission: atorvastatin (LIPITOR) 40 MG tablet, Take 40 mg by mouth at bedtime  metoprolol tartrate (LOPRESSOR) 25 MG tablet, Take 25 mg by mouth 2 times daily  metFORMIN (GLUCOPHAGE) 1000 MG tablet, Take 1,000 mg by mouth 2 times daily (with meals)  levothyroxine (SYNTHROID) 25 MCG tablet, Take 25 mcg by mouth Daily  sertraline (ZOLOFT) 50 MG tablet, Take 50 mg by mouth daily  glimepiride (AMARYL) 4 MG tablet, Take 4 mg by mouth in the morning and at bedtime  JARDIANCE 10 MG tablet, Take 10 mg by mouth daily  pregabalin (LYRICA) 100 MG capsule, Take 100 mg by mouth at bedtime. lisinopril-hydroCHLOROthiazide (PRINZIDE;ZESTORETIC) 20-12.5 MG per tablet, Take 20 tablets by mouth daily    Allergies:  Patient has no known allergies. Social History:   · TOBACCO:   reports that he has been smoking cigarettes. He started smoking about 42 years ago. He has been smoking about 2.50 packs per day. He does not have any smokeless tobacco history on file. · ETOH:   reports no history of alcohol use. DRUGS:   Social History     Substance and Sexual Activity   Drug Use Never       Family History:       Problem Relation Age of Onset    Dementia Mother     Stroke Father     Diabetes Sister          REVIEW OF SYSTEMS:    All pertinent positives and negatives as noted in HPI       LOWER EXTREMITY EXAMINATION   Dressing cdi with no excessive drainage. Previous physical exam findings:  Vascular Exam:  DP and PT pulses intact with hand held doppler bilaterally. CFT delayed to distal digits B/L. Skin temperature warm to warm proximal leg to distal toes.      Neuro Exam: Diminished protective sensation B/L     Dermatologic Exam: 2 dorsal incisions noted with sutures intact and skin well coapted. Open wound at distal 2nd amputation site with granular wound bed and no purulence. No malodor. No lymphangitis. No acute clinical signs of infection    MSK: Mild  tenderness on palpation to dorsal aspect of the right foot. Soft compressible posterior most compartments bilaterally.                           CONSULTS:  IP CONSULT TO GENERAL SURGERY  IP CONSULT TO GENERAL SURGERY  IP CONSULT TO CRITICAL CARE  IP CONSULT TO INFECTIOUS DISEASES  IP CONSULT TO PODIATRY  IP CONSULT TO SOCIAL WORK  IP CONSULT TO ENDOCRINOLOGY  IP CONSULT TO DIABETES EDUCATOR  IP CONSULT TO HOME CARE NEEDS    MEDICATION:  Scheduled Meds:   metFORMIN  750 mg Oral BID WC    sodium chloride flush  5-40 mL IntraVENous 2 times per day    heparin flush  3 mL IntraVENous 2 times per day    piperacillin-tazobactam  4,500 mg IntraVENous Q8H    insulin lispro  0-12 Units SubCUTAneous TID WC    insulin lispro  0-6 Units SubCUTAneous Nightly    daptomycin (CUBICIN) in NS IV syringe  6 mg/kg IntraVENous Q24H    enoxaparin  30 mg SubCUTAneous BID    atorvastatin  40 mg Oral Nightly    levothyroxine  25 mcg Oral Daily    metoprolol tartrate  25 mg Oral BID    sertraline  50 mg Oral Daily    pregabalin  100 mg Oral Nightly     Continuous Infusions:   sodium chloride      dextrose       PRN Meds:.oxyCODONE-acetaminophen, sodium chloride flush, sodium chloride, albuterol, heparin flush, ondansetron **OR** ondansetron, polyethylene glycol, acetaminophen **OR** acetaminophen, glucagon (rDNA), dextrose, glucose, dextrose bolus **OR** dextrose bolus    RADIOLOGY:  XR CHEST PORTABLE   Final Result   Right internal jugular central venous line terminates within the superior   vena cava with no evidence of pneumothorax. CT ABDOMEN PELVIS W IV CONTRAST Additional Contrast? None   Final Result   1. Rule out nonspecific postinflammatory changes in the right lower lobe of   the lungs. 2.  Small, rounded hypodensity, containing what appears to be air droplets   within the pancreatic head and near the ampulla. It lies immediately   adjacent to the duodenum and is in the path of the CBD. Question duodenal   diverticulum. An ampullary/pancreatic head lesion containing air cannot be   excluded. Upper GI study is recommended to see if this represents a   diverticulum. If no diverticulum is seen, then MRI of the abdomen with and   without intravenous contrast and including MRCP is recommended. 3.  Bilateral renal cortical cysts. At least 1 on the left may represent a   hemorrhagic or debris laden cyst.  Renal ultrasound is recommended. 4.  Small left inguinal hernia, which contains fat.          XR FOOT RIGHT (MIN 3 VIEWS)   Final Result   Patient is status post amputation of the right 2nd toe.  No acute osseous   abnormality. No evidence to suggest osteomyelitis. If osteomyelitis needs   to be excluded further, than MRI of the foot with and without intravenous   gadolinium can be considered. Vitals:    BP (!) 112/58   Pulse 71   Temp 97.9 °F (36.6 °C) (Oral)   Resp 18   Ht 6' (1.829 m)   Wt 271 lb (122.9 kg)   SpO2 96%   BMI 36.75 kg/m²     LABS:   Recent Labs     05/13/22  0451 05/14/22  0514   WBC 7.3 7.9   HGB 11.4* 11.4*   HCT 35.3* 34.5*    235     Recent Labs     05/14/22  1010      K 4.7      CO2 27   PHOS 2.6   BUN 14   CREATININE 0.8     No results for input(s): PROT, INR, APTT in the last 72 hours. ASSESSMENTS:   -S/p delayed primary closure, right foot(DOS5/10)  -Full thickness wound, right foot  -Acute OM, right foot  -IDDM with neuropathy    PLAN:  - Examined and evaluated  - All labs, imaging, and charts reviewed  -WBC 7.9 yesterday  -Abx per ID: zosyn and dapto ongoing  -Arterial studies:Normal KD b/l at 1.2. Multiphasic waveforms at all levels b/l.   -QoD dressing changes:betadine, xeroform DSD. Dressings changed yesterday. Next change:5/16  -Stable for d/c from Podiatric perspective and once cleared by all other teams on board  -Will follow up on outpatient basis for continued Podiatric care  -Will continue to monitor while in-house    Discussed w/:AMAYA Jimenes 3029  Fellowship-Trained Foot and Ankle Surgeon  Diplomate, [employer] Board [employer] Foot [employer] Ankle Surgeons  [phone]     Thank you for the opportunity to take part in the patient's care. Please do not hesitate to call for any questions or concerns.

## 2022-05-15 NOTE — PROGRESS NOTES
[address] [place] [address] [facility]  Progress Note    SUBJECTIVE:  Chief Complaint   Patient presents with    Wound Infection     right foot, 2nd digit removed 2 weeks ago by dr Warren Rojo. Patient is awake and alert sitting on the edge of the bed eating lunch  Dany Pontiff he is a little nauseous today but is still able to eat  Tolerating antibiotics   No other issues reported  No fevers overnight    Review of systems:  As stated above in the chief complaint, otherwise negative. Medications:  Scheduled Meds:   metFORMIN  750 mg Oral BID WC    sodium chloride flush  5-40 mL IntraVENous 2 times per day    heparin flush  3 mL IntraVENous 2 times per day    piperacillin-tazobactam  4,500 mg IntraVENous Q8H    insulin lispro  0-12 Units SubCUTAneous TID WC    insulin lispro  0-6 Units SubCUTAneous Nightly    daptomycin (CUBICIN) in NS IV syringe  6 mg/kg IntraVENous Q24H    enoxaparin  30 mg SubCUTAneous BID    atorvastatin  40 mg Oral Nightly    levothyroxine  25 mcg Oral Daily    metoprolol tartrate  25 mg Oral BID    sertraline  50 mg Oral Daily    pregabalin  100 mg Oral Nightly     Continuous Infusions:   sodium chloride      dextrose       PRN Meds:oxyCODONE-acetaminophen, sodium chloride flush, sodium chloride, albuterol, heparin flush, ondansetron **OR** ondansetron, polyethylene glycol, acetaminophen **OR** acetaminophen, glucagon (rDNA), dextrose, glucose, dextrose bolus **OR** dextrose bolus    OBJECTIVE:  BP (!) 112/58   Pulse 71   Temp 97.9 °F (36.6 °C) (Oral)   Resp 18   Ht 6' (1.829 m)   Wt 271 lb (122.9 kg)   SpO2 96%   BMI 36.75 kg/m²   Temp  Av.9 °F (36.6 °C)  Min: 97.9 °F (36.6 °C)  Max: 97.9 °F (36.6 °C)  Constitutional: The patient is lying in bed, in no distress. Awake, alert. Sitting on edge of bed  Skin: Warm and dry. No rashes were noted. HEENT: Round and reactive pupils. Moist mucous membranes. No ulcerations or thrush. Neck: Supple to movements.    Chest: No respiratory distress. No crackles. Cardiovascular: Heart sounds rhythmic and regular. Abdomen: Positive bowel sounds to auscultation. Benign to palpation. Extremities: The right foot is wrapped. Second toe is missing.   Lines: PICC line right arm 5/9/2022              Laboratory and Tests Review:  Lab Results   Component Value Date    WBC 7.9 05/14/2022    WBC 7.3 05/13/2022    WBC 7.4 05/12/2022    HGB 11.4 (L) 05/14/2022    HCT 34.5 (L) 05/14/2022    MCV 88.0 05/14/2022     05/14/2022     Lab Results   Component Value Date    NEUTROABS 5.29 05/14/2022    NEUTROABS 4.75 05/13/2022    NEUTROABS 4.51 05/12/2022     No results found for: [facility]  Lab Results   Component Value Date    ALT 20 05/09/2022    AST 19 05/09/2022    ALKPHOS 109 05/09/2022    BILITOT 0.2 05/09/2022     Lab Results   Component Value Date     05/14/2022    K 4.7 05/14/2022    K 4.4 05/07/2022     05/14/2022    CO2 27 05/14/2022    BUN 14 05/14/2022    CREATININE 0.8 05/14/2022    CREATININE 0.9 05/14/2022    CREATININE 0.9 05/13/2022    GFRAA >60 05/14/2022    LABGLOM >60 05/14/2022    GLUCOSE 183 05/14/2022    PROT 6.3 05/09/2022    LABALBU 3.5 05/14/2022    CALCIUM 8.6 05/14/2022    BILITOT 0.2 05/09/2022    ALKPHOS 109 05/09/2022    AST 19 05/09/2022    ALT 20 05/09/2022     Lab Results   Component Value Date    CRP 5.6 (H) 05/06/2022    CRP 1.1 (H) 04/21/2022     Lab Results   Component Value Date    SEDRATE 59 (H) 05/11/2022    SEDRATE 57 (H) 05/06/2022    SEDRATE 23 (H) 04/21/2022     Radiology:  Reviewed    Microbiology:   Blood cultures 5/6/2022: Negative so far  Nares screen MRSA: Positive  Toe culture 5/6/2022: Nonhemolytic Streptococcus, alphahemolytic Streptococcus, Corynebacteria, ox + GNR, M_SA  Bone culture 5/7/2022: VSEnterococcus faecalis, MRSA  Abscess culture 5/7/2022: MRSA, Corynebacterium, alphahemolytic Streptococcus, VSEnterococcus faecalis    Surgical pathology: Subcortical bone with osteopenia, no definite osteomyelitis. Assessment:  Surgical site infection right toe following right second toe amputation secondary to osteomyelitis. Previous cultures grew MRSA, VS Enterococcus faecalis, pansensitive Pseudomonas, Myroides species and Staphylococcus cohnii  Sepsis with septic shock secondary to right foot infection, improved  Leukocytosis associated to the above-improved  Hypotension associated to the above  Status post delayed primary closure right foot 5/10/2022    Plan:    Continue Zosyn and Daptomycin for a minimum of 2, possibly 6 weeks  Check final intraoperative cultures  Labs reviewed   Discharge plan is now [place] pending authorization  [id number] Liliya Khan to d/c from [facility], scripts in chart    CHRIST Quiroz - CNP  12:14 PM  5/15/2022     Pt seen and examined. Above discussed agree with advanced practice nurse. Labs, cultures, and radiographs reviewed. Face to Face encounter occurred. Changes made as necessary.      Tay Olson MD

## 2022-05-16 ENCOUNTER — HOSPITAL ENCOUNTER (OUTPATIENT)
Dept: WOUND CARE | Age: 62
Discharge: HOME | End: 2022-05-16

## 2022-05-16 LAB
METER GLUCOSE: 146 MG/DL (ref 74–99)
METER GLUCOSE: 170 MG/DL (ref 74–99)
METER GLUCOSE: 178 MG/DL (ref 74–99)
METER GLUCOSE: 216 MG/DL (ref 74–99)

## 2022-05-16 PROCEDURE — 6360000002 HC RX W HCPCS: Performed by: STUDENT IN AN ORGANIZED HEALTH CARE EDUCATION/TRAINING PROGRAM

## 2022-05-16 PROCEDURE — 94660 CPAP INITIATION&MGMT: CPT

## 2022-05-16 PROCEDURE — 6370000000 HC RX 637 (ALT 250 FOR IP): Performed by: INTERNAL MEDICINE

## 2022-05-16 PROCEDURE — 6370000000 HC RX 637 (ALT 250 FOR IP): Performed by: STUDENT IN AN ORGANIZED HEALTH CARE EDUCATION/TRAINING PROGRAM

## 2022-05-16 PROCEDURE — 97116 GAIT TRAINING THERAPY: CPT

## 2022-05-16 PROCEDURE — 99232 SBSQ HOSP IP/OBS MODERATE 35: CPT | Performed by: INTERNAL MEDICINE

## 2022-05-16 PROCEDURE — A4216 STERILE WATER/SALINE, 10 ML: HCPCS | Performed by: STUDENT IN AN ORGANIZED HEALTH CARE EDUCATION/TRAINING PROGRAM

## 2022-05-16 PROCEDURE — 2580000003 HC RX 258: Performed by: STUDENT IN AN ORGANIZED HEALTH CARE EDUCATION/TRAINING PROGRAM

## 2022-05-16 PROCEDURE — 82962 GLUCOSE BLOOD TEST: CPT

## 2022-05-16 PROCEDURE — 1200000000 HC SEMI PRIVATE

## 2022-05-16 PROCEDURE — 97530 THERAPEUTIC ACTIVITIES: CPT

## 2022-05-16 PROCEDURE — 99233 SBSQ HOSP IP/OBS HIGH 50: CPT | Performed by: INTERNAL MEDICINE

## 2022-05-16 RX ADMIN — OXYCODONE AND ACETAMINOPHEN 2 TABLET: 5; 325 TABLET ORAL at 01:24

## 2022-05-16 RX ADMIN — ONDANSETRON 4 MG: 2 INJECTION INTRAMUSCULAR; INTRAVENOUS at 13:08

## 2022-05-16 RX ADMIN — OXYCODONE AND ACETAMINOPHEN 2 TABLET: 5; 325 TABLET ORAL at 16:00

## 2022-05-16 RX ADMIN — LEVOTHYROXINE SODIUM 25 MCG: 25 TABLET ORAL at 06:31

## 2022-05-16 RX ADMIN — INSULIN LISPRO 4 UNITS: 100 INJECTION, SOLUTION INTRAVENOUS; SUBCUTANEOUS at 16:00

## 2022-05-16 RX ADMIN — ENOXAPARIN SODIUM 30 MG: 100 INJECTION SUBCUTANEOUS at 09:53

## 2022-05-16 RX ADMIN — DAPTOMYCIN 700 MG: 500 INJECTION, POWDER, LYOPHILIZED, FOR SOLUTION INTRAVENOUS at 13:08

## 2022-05-16 RX ADMIN — INSULIN LISPRO 2 UNITS: 100 INJECTION, SOLUTION INTRAVENOUS; SUBCUTANEOUS at 11:14

## 2022-05-16 RX ADMIN — PIPERACILLIN SODIUM AND TAZOBACTAM SODIUM 4500 MG: 4; .5 INJECTION, POWDER, LYOPHILIZED, FOR SOLUTION INTRAVENOUS at 06:32

## 2022-05-16 RX ADMIN — ENOXAPARIN SODIUM 30 MG: 100 INJECTION SUBCUTANEOUS at 21:38

## 2022-05-16 RX ADMIN — PREGABALIN 100 MG: 50 CAPSULE ORAL at 21:37

## 2022-05-16 RX ADMIN — PIPERACILLIN SODIUM AND TAZOBACTAM SODIUM 4500 MG: 4; .5 INJECTION, POWDER, LYOPHILIZED, FOR SOLUTION INTRAVENOUS at 14:34

## 2022-05-16 RX ADMIN — METFORMIN HYDROCHLORIDE 750 MG: 750 TABLET, EXTENDED RELEASE ORAL at 09:53

## 2022-05-16 RX ADMIN — PIPERACILLIN SODIUM AND TAZOBACTAM SODIUM 4500 MG: 4; .5 INJECTION, POWDER, LYOPHILIZED, FOR SOLUTION INTRAVENOUS at 21:43

## 2022-05-16 RX ADMIN — SERTRALINE 50 MG: 100 TABLET, FILM COATED ORAL at 09:53

## 2022-05-16 RX ADMIN — OXYCODONE AND ACETAMINOPHEN 2 TABLET: 5; 325 TABLET ORAL at 21:38

## 2022-05-16 RX ADMIN — METFORMIN HYDROCHLORIDE 750 MG: 750 TABLET, EXTENDED RELEASE ORAL at 16:00

## 2022-05-16 RX ADMIN — ONDANSETRON 4 MG: 2 INJECTION INTRAMUSCULAR; INTRAVENOUS at 06:36

## 2022-05-16 RX ADMIN — OXYCODONE AND ACETAMINOPHEN 2 TABLET: 5; 325 TABLET ORAL at 11:23

## 2022-05-16 RX ADMIN — SODIUM CHLORIDE: 9 INJECTION, SOLUTION INTRAVENOUS at 14:32

## 2022-05-16 RX ADMIN — ATORVASTATIN CALCIUM 40 MG: 40 TABLET, FILM COATED ORAL at 21:38

## 2022-05-16 RX ADMIN — OXYCODONE AND ACETAMINOPHEN 2 TABLET: 5; 325 TABLET ORAL at 06:35

## 2022-05-16 ASSESSMENT — PAIN SCALES - GENERAL
PAINLEVEL_OUTOF10: 9
PAINLEVEL_OUTOF10: 8
PAINLEVEL_OUTOF10: 9
PAINLEVEL_OUTOF10: 8

## 2022-05-16 NOTE — PROGRESS NOTES
[address] [place] [address] [facility]  Progress Note    SUBJECTIVE:  Chief Complaint   Patient presents with    Wound Infection     right foot, 2nd digit removed 2 weeks ago by dr Carrillo Woodward. Patient is lying in bed, says he is feeling nauseous but has not had any vomiting  Denies diarrhea or abdominal pain. No fevers. Seems to be tolerating antibiotics     Review of systems:  As stated above in the chief complaint, otherwise negative. Medications:  Scheduled Meds:   metFORMIN  750 mg Oral BID WC    sodium chloride flush  5-40 mL IntraVENous 2 times per day    heparin flush  3 mL IntraVENous 2 times per day    piperacillin-tazobactam  4,500 mg IntraVENous Q8H    insulin lispro  0-12 Units SubCUTAneous TID WC    insulin lispro  0-6 Units SubCUTAneous Nightly    daptomycin (CUBICIN) in NS IV syringe  6 mg/kg IntraVENous Q24H    enoxaparin  30 mg SubCUTAneous BID    atorvastatin  40 mg Oral Nightly    levothyroxine  25 mcg Oral Daily    metoprolol tartrate  25 mg Oral BID    sertraline  50 mg Oral Daily    pregabalin  100 mg Oral Nightly     Continuous Infusions:   sodium chloride      dextrose       PRN Meds:prochlorperazine, oxyCODONE-acetaminophen, sodium chloride flush, sodium chloride, albuterol, heparin flush, ondansetron **OR** ondansetron, polyethylene glycol, acetaminophen **OR** acetaminophen, glucagon (rDNA), dextrose, glucose, dextrose bolus **OR** dextrose bolus    OBJECTIVE:  BP (!) 95/52   Pulse 66   Temp 98.2 °F (36.8 °C) (Oral)   Resp 18   Ht 6' (1.829 m)   Wt 271 lb (122.9 kg)   SpO2 93%   BMI 36.75 kg/m²   Temp  Av °F (36.7 °C)  Min: 97.8 °F (36.6 °C)  Max: 98.2 °F (36.8 °C)  Constitutional: The patient is lying in bed. In no distress. Awake, alert. Skin: Warm and dry. No rashes were noted. HEENT: Round and reactive pupils. Moist mucous membranes. No ulcerations or thrush. Neck: Supple to movements. Chest: No respiratory distress.   No crackles. Cardiovascular: Heart sounds rhythmic and regular. Abdomen: Positive bowel sounds to auscultation. Benign to palpation. Extremities: The right foot is wrapped. Second toe is missing.  +pain, pictures reviewed    Lines: PICC line right arm 5/9/2022- dressed no phlebitis               Laboratory and Tests Review:  Lab Results   Component Value Date    WBC 7.9 05/14/2022    WBC 7.3 05/13/2022    WBC 7.4 05/12/2022    HGB 11.4 (L) 05/14/2022    HCT 34.5 (L) 05/14/2022    MCV 88.0 05/14/2022     05/14/2022     Lab Results   Component Value Date    NEUTROABS 5.29 05/14/2022    NEUTROABS 4.75 05/13/2022    NEUTROABS 4.51 05/12/2022     No results found for: [facility]  Lab Results   Component Value Date    ALT 20 05/09/2022    AST 19 05/09/2022    ALKPHOS 109 05/09/2022    BILITOT 0.2 05/09/2022     Lab Results   Component Value Date     05/14/2022    K 4.7 05/14/2022    K 4.4 05/07/2022     05/14/2022    CO2 27 05/14/2022    BUN 14 05/14/2022    CREATININE 0.8 05/14/2022    CREATININE 0.9 05/14/2022    CREATININE 0.9 05/13/2022    GFRAA >60 05/14/2022    LABGLOM >60 05/14/2022    GLUCOSE 183 05/14/2022    PROT 6.3 05/09/2022    LABALBU 3.5 05/14/2022    CALCIUM 8.6 05/14/2022    BILITOT 0.2 05/09/2022    ALKPHOS 109 05/09/2022    AST 19 05/09/2022    ALT 20 05/09/2022     Lab Results   Component Value Date    CRP 5.6 (H) 05/06/2022    CRP 1.1 (H) 04/21/2022     Lab Results   Component Value Date    SEDRATE 59 (H) 05/11/2022    SEDRATE 57 (H) 05/06/2022    SEDRATE 23 (H) 04/21/2022     Radiology:  Reviewed    Microbiology:   Blood cultures 5/6/2022: Negative so far  Nares screen MRSA: Positive  Toe culture 5/6/2022: Nonhemolytic Streptococcus, alphahemolytic Streptococcus, Corynebacteria, ox + GNR, M_SA  Bone culture 5/7/2022: VSEnterococcus faecalis, MRSA  Abscess culture 5/7/2022: MRSA, Corynebacterium, alphahemolytic Streptococcus, VSEnterococcus faecalis    Surgical pathology: Subcortical bone with osteopenia, no   definite osteomyelitis. Assessment:  · Surgical site infection right toe following right second toe amputation secondary to osteomyelitis. Previous cultures grew MRSA, VS Enterococcus faecalis, pansensitive Pseudomonas, Myroides species and Staphylococcus cohnii  · Sepsis with septic shock secondary to right foot infection, improved  · Leukocytosis associated to the above-improved  · Hypotension associated to the above  · Status post delayed primary closure right foot 5/10/2022    Plan:    · Continue Zosyn and Daptomycin for a minimum of 2, possibly 6 weeks - has completed 10 days so far   · Labs reviewed   · Discharge plan is now [place] pending authorization --  [id number] Liliya Khan to discharge from [address] [place] [address]  11:07 AM  5/16/2022     Patient seen and examined. I had a face to face encounter with the patient. Agree with exam.  Assessment and plan as outlined above and directed by me. Addition and corrections were done as deemed appropriate. My exam and plan include: Patient is doing well and tolerating current antibiotics. Awaiting discharge to LT[facility]. He has completed almost 2 weeks of antibiotics here.     Courtney Mcdermott MD  5/16/2022  2:15 PM

## 2022-05-16 NOTE — PROGRESS NOTES
ENDOCRINOLOGY PROGRESS NOTE      Date of admission: 5/6/2022  Date of service: 5/16/2022  Admitting physician: Jerome Mace DO   Primary Care Physician: CHRIST Palma - CNP  Consultant physician: Amadeo Arrington MD     Reason for the consultation:  Uncontrolled DM    History of Present Illness: The history is provided by the patient. Accuracy of the patient data is excellent    Mckenzie Fuentes is a very pleasant 63 y.o. old male with PMH uncontrolled DM type 2, obesity, HTN, HLD and other listed below admitted to [facility] on 5/6/2022 because of Rt foot pain and swelling and found to have infected diabetic foot and in septic shock, endocrine service was consulted for diabetes management.     Subjective   No acute events, BG improving but above goal     Inpatient diet:   Carb Restricted diet     Point of care glucose monitoring   (Independently reviewed)   Recent Labs     05/14/22  2116 05/15/22  0608 05/15/22  1120 05/15/22  1551 05/15/22  1952 05/16/22  0631 05/16/22  1113 05/16/22  1552   GLUMET 190* 125* 185* 152* 186* 146* 178* 216*     Scheduled Meds:   metFORMIN  750 mg Oral BID WC    sodium chloride flush  5-40 mL IntraVENous 2 times per day    heparin flush  3 mL IntraVENous 2 times per day    piperacillin-tazobactam  4,500 mg IntraVENous Q8H    insulin lispro  0-12 Units SubCUTAneous TID WC    insulin lispro  0-6 Units SubCUTAneous Nightly    daptomycin (CUBICIN) in NS IV syringe  6 mg/kg IntraVENous Q24H    enoxaparin  30 mg SubCUTAneous BID    atorvastatin  40 mg Oral Nightly    levothyroxine  25 mcg Oral Daily    metoprolol tartrate  25 mg Oral BID    sertraline  50 mg Oral Daily    pregabalin  100 mg Oral Nightly       PRN Meds:   prochlorperazine, 10 mg, Q6H PRN  oxyCODONE-acetaminophen, 2 tablet, Q4H PRN  sodium chloride flush, 5-40 mL, PRN  sodium chloride, , PRN  albuterol, 2.5 mg, Q6H PRN  heparin flush, 3 mL, PRN  ondansetron, 4 mg, Q8H PRN   Or  ondansetron, 4 mg, Q6H PRN  polyethylene glycol, 17 g, Daily PRN  acetaminophen, 650 mg, Q6H PRN   Or  acetaminophen, 650 mg, Q6H PRN  glucagon (rDNA), 1 mg, PRN  dextrose, 100 mL/hr, PRN  glucose, 16 g, PRN  dextrose bolus, 125 mL, PRN   Or  dextrose bolus, 250 mL, PRN      Continuous Infusions:   sodium chloride 12.5 mL/hr at 05/16/22 1432    dextrose         Review of Systems  All systems reviewed. All negative except for symptoms mentioned in HPI     OBJECTIVE    BP (!) 110/57   Pulse 67   Temp 98.3 °F (36.8 °C) (Oral)   Resp 16   Ht 6' (1.829 m)   Wt 271 lb (122.9 kg)   SpO2 94%   BMI 36.75 kg/m²     Intake/Output Summary (Last 24 hours) at 5/16/2022 1857  Last data filed at 5/16/2022 1750  Gross per 24 hour   Intake 293.6 ml   Output 1375 ml   Net -1081.4 ml       Physical examination:  General: awake alert, oriented x3  HEENT: normocephalic non traumatic, no exophthalmos   Neck: supple, No thyroid tenderness,  Pulm: good equal air entry no added sounds  CVS: S1 + S2  Abd: soft lax, no tenderness  Skin: warm, no lesions, no rash.  Diabetic foot   Neuro: CN intact, sensation decreased bilateral , muscle power normal  Psych: normal mood, and affect    Review of Laboratory Data:  I personally reviewed the following labs:   Recent Labs     05/14/22  0514   WBC 7.9   RBC 3.92   HGB 11.4*   HCT 34.5*   MCV 88.0   MCH 29.1   MCHC 33.0   RDW 14.5      MPV 10.5     Recent Labs     05/14/22  0514 05/14/22  1010    138   K 5.2* 4.7    106   CO2 24 27   BUN 11 14   CREATININE 0.9 0.8   GLUCOSE 152* 183*   CALCIUM 8.2* 8.6   LABALBU  --  3.5     No results found for: BHYDRXBUT  Lab Results   Component Value Date    LABA1C 7.7 04/22/2022     No results found for: TSH, T4FREE, J6LNENL, FT3, Q0OVWMD, TSI, TPOABS, THGAB  Lab Results   Component Value Date    LABA1C 7.7 04/22/2022    GLUCOSE 183 05/14/2022     No results found for: TRIG, HDL, LDLCALC, CHOL    Blood culture   Lab Results   Component Value Date    BC 5 Days no growth 05/06/2022    BC 5 Days no growth 04/21/2022       Radiology:  XR CHEST PORTABLE   Final Result   Right internal jugular central venous line terminates within the superior   vena cava with no evidence of pneumothorax. CT ABDOMEN PELVIS W IV CONTRAST Additional Contrast? None   Final Result   1. Rule out nonspecific postinflammatory changes in the right lower lobe of   the lungs. 2.  Small, rounded hypodensity, containing what appears to be air droplets   within the pancreatic head and near the ampulla. It lies immediately   adjacent to the duodenum and is in the path of the CBD. Question duodenal   diverticulum. An ampullary/pancreatic head lesion containing air cannot be   excluded. Upper GI study is recommended to see if this represents a   diverticulum. If no diverticulum is seen, then MRI of the abdomen with and   without intravenous contrast and including MRCP is recommended. 3.  Bilateral renal cortical cysts. At least 1 on the left may represent a   hemorrhagic or debris laden cyst.  Renal ultrasound is recommended. 4.  Small left inguinal hernia, which contains fat. XR FOOT RIGHT (MIN 3 VIEWS)   Final Result   Patient is status post amputation of the right 2nd toe. No acute osseous   abnormality. No evidence to suggest osteomyelitis. If osteomyelitis needs   to be excluded further, than MRI of the foot with and without intravenous   gadolinium can be considered.              Medical Records/Labs/Images review:   I personally reviewed and summarized previous records   All labs and imaging were reviewed independently     [address] [facility] B May, a 63 y.o.-old male seen today for inpatient diabetes management     Diabetes Mellitus type 2   · Pt admit poor compliance with diet   · We recommend the following diabetes regimen   · Medium dose sliding scale  · Metformin er 750 mg BID   · Continue glucose check with meals and at bedtime   · Will titrate insulin dose based on the blood glucose trend & insulin requirement  · On discharge we recommend dc pt on Metformin er 750 mg BID with meals     infected diabetic foot   · Managed per primary and ID service     The above issues were reviewed with the patient who understood and agreed with the plan. Thank you for allowing us to participate in the care of this patient. Please do not hesitate to contact us with any additional questions. Hanna Pruett MD  Endocrinologist, [facility]   [address] [place] [id number]   Phone: [phone]  Fax: [phone]  --------------------------------------------  An electronic signature was used to authenticate this note.  Jace Lee MD on 5/16/2022 at 6:57 PM

## 2022-05-16 NOTE — PATIENT CARE CONFERENCE
P Quality Flow/Interdisciplinary Rounds Progress Note        Quality Flow Rounds held on May 16, 2022    Disciplines Attending:  Bedside Nurse, ,  and Nursing Unit Nilson ARMSTRONG May was admitted on 5/6/2022 12:38 PM    Anticipated Discharge Date:  Expected Discharge Date: 05/13/22    Disposition:    Jose Score:  Jose Scale Score: 20    Readmission Risk              Risk of Unplanned Readmission:  18           Discussed patient goal for the day, patient clinical progression, and barriers to discharge.   The following Goal(s) of the Day/Commitment(s) have been identified:  Discharge - Obtain Order      Rian Escamilla RN  May 16, 2022

## 2022-05-16 NOTE — CARE COORDINATION
Social Work discharge 1 [facility] spoke to Dante Higuera with [address], who advised precert remains pending. [address] [place] [address] continues to follow as well. [facility] said they could not accept pt.   Electronically signed by Alfredo Gore on 5/16/2022 at 11:10 AM

## 2022-05-16 NOTE — ADT AUTH CERT
Right Foot by Julia Murphy RN       Review Status Review Entered   In Primary 5/16/2022 14:02      Criteria Review                   Sepsis and Other Febrile Illness, without Focal Infection - Care Day 10 (5/15/2022) by Julia Murphy RN       Review Status Review Entered   Completed 5/16/2022 13:58      Criteria Review      Care Day: 10 Care Date: 5/15/2022 Level of Care: Telemetry    Guideline Day 3    Clinical Status    (X) * Mental status at baseline    5/16/2022 1:58 PM EDT by Nikia Byrne      Awake, alert    (X) * Afebrile or fever improved    5/16/2022 1:58 PM EDT by Nikia Byrne      Temp 97.9 °F (36.6 °C) (Oral)    Routes    (X) * Oral hydration    5/16/2022 1:58 PM EDT by Adilene Mckeon [address]; Lunch, Dinner; Diabetic Oral Supplement    (X) Parenteral or oral medications    5/16/2022 1:58 PM EDT by Nikia Byrne      pregabalin (LYRICA)   100 mg,   Oral,   Nightly   metFORMIN (GLUCOPHAGE-XR) extended release tablet 750 mg  750 mg,   Oral,   2 TIMES DAILY WITH MEALS    Medications    (X) Possible antimicrobial treatment    5/16/2022 1:58 PM EDT by Nikia Byrne      DAPTOmycin (CUBICIN) infusion  700 mg,   IntraVENous,   EVERY 24 HOURS      piperacillin-tazobactam (ZOSYN) infusion  4.5 g,   IntraVENous,   EVERY 8 HOURS    (X) Possible DVT prophylaxis    5/16/2022 1:58 PM EDT by Chio Mckeon      enoxaparin Sodium (LOVENOX) injection 30 mg  30 mg,   SubCUTAneous,   2 TIMES DAILY    * Milestone   Additional Notes   ID   BP (!) 112/58   Pulse 71   Temp 97.9 °F (36.6 °C) (Oral)   Resp 18   Ht 6' (1.829 m)   Wt 271 lb (122.9 kg)   SpO2 96%      Assessment:   · Surgical site infection right toe following right second toe amputation secondary to osteomyelitis.  Previous cultures grew MRSA, VS Enterococcus faecalis, pansensitive Pseudomonas, Myroides species and Staphylococcus cohnii   · Sepsis with septic shock secondary to right foot infection, improved   · Leukocytosis associated to the above-improved   · Hypotension associated to the above   · Status post delayed primary closure right foot 5/10/2022       Plan:     · Continue Zosyn and Daptomycin for a minimum of 2, possibly 6 weeks   · Check final intraoperative cultures   · Labs reviewed    · Discharge plan is now [facility] [place] pending authorization      =====================================   IM   Assessment:       1. Sepsis with septic shock: Presented with hypotension and leukocytosis. Leukocytosis resolved. Hypotension  Resolved. Transferred out of the ICU. Monitor off pressors. Blood cultures negative. Toe culture 5/6 positive for nonhemolytic Streptococcus, alphahemolytic streptococcus, Corynebacteria, GNR, and MRSA. ID following. Continue Zosyn and Daptomycin for minimum of 2 weeks, possibly 6 weeks-as per ID. PICC placed 5/9.       2. Right foot OM with cellulitis and surgical site infection: S/p toe amputation on 4/21. S/p I&D of right foot with bone biopsy on 5/7. Continue antibiotics. Podiatry and ID following. S/p right foot delayed primary closure and possible repeat debridement on 5/10.        3. 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 General surgery consulted. No plans for acute inpatient surgical intervention.        4. ALVINO: Resolved. Avoid nephrotoxic agents.       5. DM2: Endocrinology consulted. Medium dose ssi. Hypoglycemic protocol. Mtformin 1000 mg BID.           6. ALCON: Hx of ALCON on CPAP.  Had uvulectomy and stopped wearing CPAP.  Sleep study showing ALCON and Recommended CPAP.          awaiting pre-cert tot [facility]

## 2022-05-16 NOTE — PROGRESS NOTES
Occupational Therapy  OT BEDSIDE TREATMENT NOTE      Date:2022  Patient Name: Lilla Gitelman May  MRN: [id number]  : 1960  Room: [address] [place] [address]     Evaluating OT: DEEPTHI Brooke [id number]       Referring Provider: Johnie Jolly DO    Specific Provider Orders/Date: OT eval and treat 22       Diagnosis:  Septic shock ([facility] [address] [A41.9, R65.21]     Surgery:  R foot delayed primary closure with possible debridement 5/10/22    Pertinent Medical History: COPD, DM, HTN           Precautions:  Fall Risk, contact isolation, alarm, NWB R foot      Assessment of current deficits    [x]? Functional mobility            [x]?ADLs           [x]? Strength                  []?Cognition    [x]? Functional transfers          [x]? IADLs         [x]? Safety Awareness   [x]? Endurance    []? Fine Coordination                         [x]? Balance      []? Vision/perception   []? Sensation      []? Gross Motor Coordination             []? ROM           []?  Delirium                   []? Motor Control      OT PLAN OF CARE   OT POC based on physician orders, patient diagnosis and results of clinical assessment     Frequency/Duration 2-4 days/wk for 2 weeks PRN   Specific OT Treatment Interventions to include:   * Instruction/training on adapted ADL techniques and AE recommendations to increase functional independence within precautions       * Training on energy conservation strategies, correct breathing pattern and techniques to improve independence/tolerance for self-care routine  * Functional transfer/mobility training/DME recommendations for increased independence, safety, and fall prevention  * Patient/Family education to increase follow through with safety techniques and functional independence  * Recommendation of environmental modifications for increased safety with functional transfers/mobility and ADLs  * Therapeutic exercise to improve motor endurance, ROM, and functional strength for ADLs/functional transfers  * Therapeutic activities to facilitate/challenge dynamic balance, stand tolerance for increased safety and independence with ADLs           Recommended Adaptive Equipment: extended tub bench     Home Living: Pt lives with niece in 1 story house with 2 SHARON. Bathroom setup: tub/shower   Equipment owned: wheeled walker     Prior Level of Function: independent with ADLs , assist with IADLs; functional mobility: wheeled walker     Pain Level: pt reported pain in R foot but did not rate; pt agreeable to therapy  Cognition: A&O: pt alert and oriented grossly; WFL command follow demonstrated              Memory:  Good              Sequencing:  Good              Problem solving:  Fair+              Judgement/safety:  Fair+                Functional Assessment:  AM-PAC Daily Activity Raw Score: 16/24    Initial Eval Status  Date: 5/12/22 Treatment Status  Date: 5/16/22 STGs = LTGs  Time frame: 10-14 days   Feeding Set up       Grooming Min A  Supervision seated Mod I/I   UB Dressing Min A  Mod I/I   LB Dressing Mod A   To don pants  CGA  Mod I/I-with use of AD as appropriate/needed   Bathing Mod A  Mod I/I -with use of AD as appropriate/needed   Toileting Min A   Mod I/I    Bed Mobility  Supine to sit: SBA    supine to sit supervision Independent    Functional Transfers Min A with wheeled walker  Sit<>Stand from EOB   Cues for hand and feet placement and safe technique and NWB  Pt with difficulty maintaining NWB  sit <> stand SBA Independent     Functional Mobility Min A with wheeled walker  Short distance in room  Cues for safe wheeled walker management.  Pt able to hop on LLE and maintain NWB SBA with WW SPT to [facility], Cues required to maintain NWB  independent -with device as needed to maximize independence with ADLs and functional task completion and good adherence to WB   Balance Sitting:     Static:  Independent     Dynamic:SBA  Standing: CGA with wheeled walker  STanding SBA with WW I for dynamic sitting balance to maximize independence with ADLs and functional task completion     I for standing balance to maximize independence with ADLs and functional task completion   Activity Tolerance Fair+ with light activity Fair +  Good with ADL activity. Pt will demonstrate good understanding of education provided on EC/WS techniques    Visual/  Perceptual Glasses: none at bedside              Comments:  Patient cleared with nursing and agreeable to therapy. Good tolerance of activity and improvement demonstrated. Patient in bed with call light in reach and alarm on. Education/treatment: ADL and functional transfer/activity performed to increase safety and independence during self care tasks. Education provided on safety awareness, adl reeducation, functional transfer training, NWB status    · Pt has made progress towards set goals.      Time In: 9:10  Time Out: 9:26     Min Units   Therapeutic Ex 83256     Therapeutic Activities 35882 72 6   ADL/Self Care [id number]     Orthotic Management 71126     Neuro Re-Ed 98162     Non-Billable Time     TOTAL TIMED TREATMENT 16 [id number] [address] Samina ARGUELLES/TALISHA [address]

## 2022-05-16 NOTE — PLAN OF CARE

## 2022-05-16 NOTE — PROGRESS NOTES
Department of Podiatry   Progress Note    Subjective:  Patient is A 64 y/o male seen s/p right foot delayed primary closure (DOS:5/10). Admits to some foot pain today after working with physical therapy. Patient unsure whether he will be going to rehab or home. No additional pedal complaints. Past Medical History:        Diagnosis Date    Anxiety     [facility] (chronic obstructive pulmonary disease) ([facility] [address]     Depression     [facility]     Frostbite     HLD (hyperlipidemia)     HTN (hypertension)     Sleep apnea        Past Surgical History:        Procedure Laterality Date    BACK SURGERY      CHOLECYSTECTOMY      CORONARY ARTERY BYPASS GRAFT REDO      2 vessel    FOOT DEBRIDEMENT Right 4/25/2022    DELAYED PRIMARY CLOSURE RIGHT FOOT WOUND performed by Flaquita Eller DPM at [facility] [address] Right 5/7/2022    FOOT DEBRIDEMENT INCISION AND DRAINAGE performed by Carolina Garrett DPM at [facility] [address] Right 5/10/2022    RIGHT FOOT DELAYED PRIMARY CLOSURE WITH POSSIBLE DEBRIDEMENT    ++CONTACT ISOLATION++   (DR AVAIL.  AT 4PM) performed by Carolina Garrett DPM at [address] [place], [place] [address]C LINE  5/9/2022         TOE AMPUTATION Right 4/22/2022    AMPUTATION RIGHT SECOND TOE performed by Flaquita Eller DPM at [place] [address]         Medications Prior to Admission:    Medications Prior to Admission: atorvastatin (LIPITOR) 40 MG tablet, Take 40 mg by mouth at bedtime  metoprolol tartrate (LOPRESSOR) 25 MG tablet, Take 25 mg by mouth 2 times daily  metFORMIN (GLUCOPHAGE) 1000 MG tablet, Take 1,000 mg by mouth 2 times daily (with meals)  levothyroxine (SYNTHROID) 25 MCG tablet, Take 25 mcg by mouth Daily  sertraline (ZOLOFT) 50 MG tablet, Take 50 mg by mouth daily  glimepiride (AMARYL) 4 MG tablet, Take 4 mg by mouth in the morning and at bedtime  JARDIANCE 10 MG tablet, Take 10 mg by mouth daily  pregabalin (LYRICA) 100 MG capsule, Take 100 mg by mouth at bedtime. lisinopril-hydroCHLOROthiazide (PRINZIDE;ZESTORETIC) 20-12.5 MG per tablet, Take 20 tablets by mouth daily    Allergies:  Patient has no known allergies. Social History:   · TOBACCO:   reports that he has been smoking cigarettes. He started smoking about 42 years ago. He has been smoking about 2.50 packs per day. He does not have any smokeless tobacco history on file. · ETOH:   reports no history of alcohol use. DRUGS:   Social History     Substance and Sexual Activity   Drug Use Never       Family History:       Problem Relation Age of Onset    Dementia Mother     Stroke Father     Diabetes Sister          REVIEW OF SYSTEMS:    All pertinent positives and negatives as noted in HPI       LOWER EXTREMITY EXAMINATION     Vascular Exam:  DP and PT pulses intact with handheld doppler b/l . CFT delayed to distal digits B/L. Skin temperature warm to warm proximal leg to distal toes.      Neuro Exam: Diminished protective sensation B/L     Dermatologic Exam: 2 dorsal incisions noted with sutures intact and skin well coapted. Open wound at distal 2nd amputation site with granular wound bed and no purulence. No malodor. No lymphangitis. No acute clinical signs of infection    MSK: Mild  tenderness on palpation to dorsal aspect of the right foot. Soft compressible posterior most compartments bilaterally.                           CONSULTS:  IP CONSULT TO GENERAL SURGERY  IP CONSULT TO GENERAL SURGERY  IP CONSULT TO CRITICAL CARE  IP CONSULT TO INFECTIOUS DISEASES  IP CONSULT TO PODIATRY  IP CONSULT TO SOCIAL WORK  IP CONSULT TO ENDOCRINOLOGY  IP CONSULT TO DIABETES EDUCATOR  IP CONSULT TO HOME CARE NEEDS    MEDICATION:  Scheduled Meds:   metFORMIN  750 mg Oral BID WC    sodium chloride flush  5-40 mL IntraVENous 2 times per day    heparin flush  3 mL IntraVENous 2 times per day    piperacillin-tazobactam  4,500 mg IntraVENous Q8H    insulin lispro  0-12 Units SubCUTAneous TID WC    insulin lispro  0-6 Units SubCUTAneous Nightly    daptomycin (CUBICIN) in NS IV syringe  6 mg/kg IntraVENous Q24H    enoxaparin  30 mg SubCUTAneous BID    atorvastatin  40 mg Oral Nightly    levothyroxine  25 mcg Oral Daily    metoprolol tartrate  25 mg Oral BID    sertraline  50 mg Oral Daily    pregabalin  100 mg Oral Nightly     Continuous Infusions:   sodium chloride      dextrose       PRN Meds:.prochlorperazine, oxyCODONE-acetaminophen, sodium chloride flush, sodium chloride, albuterol, heparin flush, ondansetron **OR** ondansetron, polyethylene glycol, acetaminophen **OR** acetaminophen, glucagon (rDNA), dextrose, glucose, dextrose bolus **OR** dextrose bolus    RADIOLOGY:  XR CHEST PORTABLE   Final Result   Right internal jugular central venous line terminates within the superior   vena cava with no evidence of pneumothorax. CT ABDOMEN PELVIS W IV CONTRAST Additional Contrast? None   Final Result   1. Rule out nonspecific postinflammatory changes in the right lower lobe of   the lungs. 2.  Small, rounded hypodensity, containing what appears to be air droplets   within the pancreatic head and near the ampulla. It lies immediately   adjacent to the duodenum and is in the path of the CBD. Question duodenal   diverticulum. An ampullary/pancreatic head lesion containing air cannot be   excluded. Upper GI study is recommended to see if this represents a   diverticulum. If no diverticulum is seen, then MRI of the abdomen with and   without intravenous contrast and including MRCP is recommended. 3.  Bilateral renal cortical cysts. At least 1 on the left may represent a   hemorrhagic or debris laden cyst.  Renal ultrasound is recommended. 4.  Small left inguinal hernia, which contains fat. XR FOOT RIGHT (MIN 3 VIEWS)   Final Result   Patient is status post amputation of the right 2nd toe. No acute osseous   abnormality. No evidence to suggest osteomyelitis.   If osteomyelitis needs   to be excluded further, than MRI of the foot with and without intravenous   gadolinium can be considered. Vitals:    BP (!) 95/52   Pulse 66   Temp 98.2 °F (36.8 °C) (Oral)   Resp 18   Ht 6' (1.829 m)   Wt 271 lb (122.9 kg)   SpO2 93%   BMI 36.75 kg/m²     LABS:   Recent Labs     05/14/22  0514   WBC 7.9   HGB 11.4*   HCT 34.5*        Recent Labs     05/14/22  1010      K 4.7      CO2 27   PHOS 2.6   BUN 14   CREATININE 0.8     No results for input(s): PROT, INR, APTT in the last 72 hours. ASSESSMENTS:   -S/p delayed primary closure, right foot(DOS5/10)  -Full thickness wound, right foot  -Acute OM, right foot  -IDDM with neuropathy    PLAN:  - Examined and evaluated  - All labs, imaging, and charts reviewed  -Abx per ID: zosyn and dapto ongoing  -Arterial studies:Normal DK b/l at 1.2. Multiphasic waveforms at all levels b/l.   -QoD dressing changes:betadine, xeroform DSD. Dressings changed today. Next change:5/18  -Stable for d/c from Podiatric perspective and once cleared by all other teams on board  -Will follow up on outpatient basis for continued Podiatric care  -Will continue to monitor while in-house    Discussed w/:Gideon Saleem DPM Pod [id number]  Fellowship-Trained Foot and Ankle Surgeon  Diplomate, [employer] Board of Foot and Ankle Surgeons  [phone]     Thank you for the opportunity to take part in the patient's care. Please do not hesitate to call for any questions or concerns.

## 2022-05-16 NOTE — PROGRESS NOTES
Physical Therapy  Facility/Department: [facility] SURG  Physical Therapy Treatment Note  Name: Mckenzie Fuentes  : 1960  MRN: [id number]  Date of Service: 2022    Equipment Recommendation: Wheelchair with elevating leg rests. Attending Provider:  Yesenia Tellez MD    Evaluating PT:  Mane Reaves. Cosmo Bauer, P.T. Room #:  1390/5451-X  Diagnosis:  Septic shock ([address] [A41.9, R65.21]  Pertinent PMHx/PSHx:  R foot wound  Procedure/Surgery:  5/10/22 delayed primary closure R foot  Precautions:  Falls, bed/chair alarm, NWB R foot    SUBJECTIVE:    Pt lives with his niece in a 1 story home with 2 stairs and 1 post to enter. Pt ambulated with ww PTA. OBJECTIVE:   Initial Evaluation  Date: 22 Treatment Short Term/ Long Term   Goals   Was pt agreeable to Eval/treatment? yes yes    Does pt have pain? C/o R foot pain 8/10 C/o R foot pain 8/10    Bed Mobility  Rolling: SBA  Supine to sit: MOD A  Sit to supine: SBA  Scooting: MIN A Rolling: Independent  Supine to sit: Independent  Sit to supine: Independent  Scooting: Independent Independent   Transfers Sit to stand: MOD A  Stand to sit: MIN A  Stand pivot: NA Sit to stand: supervision  Stand to sit: supervision  Stand pivot: SBA with ww Independent   Ambulation   5 feet with ww and pt was unable to maintain NWB RLE with MIN A 5 feet x 2 +10+12+20 with ww SBA 50 feet with ww NWB R foot Independent   WC propulsion for longer distances NA 75 feet x2 reps with WC using BUE supervision 150 feet using BUE Independent    Stair negotiation: ascended and descended NA, pt unable at this time 1 step with ww SBA ascending backwards and descending forward. 2 steps with AAD SBA   AM-PAC 6 Clicks 87/67 95/18      BLE ROM is WFL. LLE strength is grossly 4+/5 and R hip and knee are 4/5 and R ankle NA.    Balance: sitting is Independent and standing with ww is supervision with NWB R foot  Endurance: fair+    Patient education  Pt educated on NWB R foot, gait with ww, WC propulsion, and stair negotiation with ww    Patient response to education:   Pt verbalized understanding Pt demonstrated skill Pt requires further education in this area   yes Yes, at times needed reminded of NWB R foot yes     ASSESSMENT:    Comments:  Pt was found in bed and agreeable to PT. Pt was able to get up OOB and stand with ww and maintained NWB R foot. He then hopped on LLE with ww and maintain NWB RLE 10 feet to chair and sat down. We reviewed and discussed how to ascend/descend stairs at home using WC and ww and he verbalized understanding and was agreeable to practicing this technique. He stood up from chair and hopped on LLE with ww to WC across the room and sat down. He was instructed in [facility] use, safety, and operation. He was able to propel WC in the kirby to the stairs with occasional VCs. He stood with ww and walked 5 feet to stairs turned around and was able to ascend/descend 1 step with ww and then hopped back to WC. At times he needed reminded to not place R heel on ground in order to maintain NWB R foot and he was able to correct. Pt was educated on placing WC at top of step, ascending 1 step if needed and then sitting down in WC at top of stairs to avoid having to hop up more stairs. He verbalized understanding. Once he wheeled himself back to his room in [facility], he stood up with ww and hopped 20 feet back to his bed and laid down. Pt is moving much better today than on eval and was able to safely use WC, ww, and perform stairs and is much better at maintaining NWB to RLE, but occasionally does need reminded to NWB R foot as he sometimes places R heel on ground. The pt's mobility limitation cannot be sufficiently or safely be resolved at this time by the use of an appropriately fitted cane or walker and the pt will need a WC at time of discharge.   The use of a manual WC in the home and community will significantly improve the pt's ability to participate in mobility related activity of daily living and also promote safety. Treatment:  Patient practiced and was instructed in the following treatment:     Bed mobility, transfers, gait with ww, and stairs to improve functional strength and endurance.  Gait training with ww and stair training with ww to teach pt NWB R foot and to allow pt to safely discharge home.  WC propulsion to help pt maintain NWB R foot and decrease risk of falling with longer distances. Pt was left supine in bed with call light left by patient. Chair/bed alarm: bed alarm was re-activated. PLAN:    Pt is making good progress toward established Physical Therapy goals. Continue with physical therapy current plan of care. Time in  07:40  Time out  08:20    Total Treatment Time  40 minutes     Evaluation Time includes thorough review of current medical information, gathering information on past medical history/social history and prior level of function, completion of standardized testing/informal observation of tasks, assessment of data and education on plan of care and goals. CPT codes:  [] Low Complexity PT evaluation 34041  [] Moderate Complexity PT evaluation 86527  [] High Complexity PT evaluation 69204  [] PT Re-evaluation 30502  [x] Gait training 24345 15 minutes  [] Manual therapy 29769 ** minutes  [x] Therapeutic activities 61449 25 minutes  [] Therapeutic exercises 21361 ** minutes  [] Neuromuscular reeducation 77760 ** minutes     Tre Ramesh., P.T.   License Number: [id number]

## 2022-05-16 NOTE — PROGRESS NOTES
[facility] Progress Note    Admitting Date and Time: 5/6/2022 12:38 PM  Admit Dx: Septic shock ([facility] [address] [A41.9, R65.21]    Subjective:  Patient is being followed for Septic shock ([facility] [address] [A41.9, R65.21]   Patient feels better, but worried about low BP.    ROS: denies fever, chills, cp, sob, n/v, HA unless stated above.      metFORMIN  750 mg Oral BID WC    sodium chloride flush  5-40 mL IntraVENous 2 times per day    heparin flush  3 mL IntraVENous 2 times per day    piperacillin-tazobactam  4,500 mg IntraVENous Q8H    insulin lispro  0-12 Units SubCUTAneous TID WC    insulin lispro  0-6 Units SubCUTAneous Nightly    daptomycin (CUBICIN) in NS IV syringe  6 mg/kg IntraVENous Q24H    enoxaparin  30 mg SubCUTAneous BID    atorvastatin  40 mg Oral Nightly    levothyroxine  25 mcg Oral Daily    metoprolol tartrate  25 mg Oral BID    sertraline  50 mg Oral Daily    pregabalin  100 mg Oral Nightly     prochlorperazine, 10 mg, Q6H PRN  oxyCODONE-acetaminophen, 2 tablet, Q4H PRN  sodium chloride flush, 5-40 mL, PRN  sodium chloride, , PRN  albuterol, 2.5 mg, Q6H PRN  heparin flush, 3 mL, PRN  ondansetron, 4 mg, Q8H PRN   Or  ondansetron, 4 mg, Q6H PRN  polyethylene glycol, 17 g, Daily PRN  acetaminophen, 650 mg, Q6H PRN   Or  acetaminophen, 650 mg, Q6H PRN  glucagon (rDNA), 1 mg, PRN  dextrose, 100 mL/hr, PRN  glucose, 16 g, PRN  dextrose bolus, 125 mL, PRN   Or  dextrose bolus, 250 mL, PRN         Objective:    BP (!) 100/58   Pulse 66   Temp 98.2 °F (36.8 °C) (Oral)   Resp 18   Ht 6' (1.829 m)   Wt 271 lb (122.9 kg)   SpO2 93%   BMI 36.75 kg/m²     General Appearance: alert and oriented to person, place and time and in no acute distress  Skin: warm and dry  Head: normocephalic and atraumatic  Eyes: pupils equal, round, and reactive to light, extraocular eye movements intact, conjunctivae normal  Neck: neck supple and non tender without mass   Pulmonary/Chest: clear to auscultation bilaterally- no wheezes, rales or rhonchi, normal air movement, no respiratory distress  Cardiovascular: normal rate, normal S1 and S2 and no carotid bruits  Abdomen: soft, non-tender, non-distended, normal bowel sounds, no masses or organomegaly  Extremities: right 2nd great toe amputation with wrap. Neurologic: no cranial nerve deficit and speech normal        Recent Labs     05/14/22  0514 05/14/22  1010    138   K 5.2* 4.7    106   CO2 24 27   BUN 11 14   CREATININE 0.9 0.8   GLUCOSE 152* 183*   CALCIUM 8.2* 8.6       Recent Labs     05/14/22  0514   WBC 7.9   RBC 3.92   HGB 11.4*   HCT 34.5*   MCV 88.0   MCH 29.1   MCHC 33.0   RDW 14.5      MPV 10.5         Assessment:    Principal Problem:    Septic shock (HCC)  Active Problems:    Osteomyelitis of right foot (HCC)    ALVINO (acute kidney injury) ([facility])    Elevated lactic acid level    Uncontrolled type 2 diabetes mellitus with hyperglycemia ([facility])    Cellulitis    Duodenal diverticulum  Resolved Problems:    * No resolved hospital problems. *      Plan:  1. Sepsis with septic shock: BP still on the lower side. . Leukocytosis resolved. Hypotension  Resolved. Transferred out of the ICU. Monitor off pressors. Blood cultures negative. Toe culture 5/6 positive for nonhemolytic Streptococcus, alphahemolytic streptococcus, Corynebacteria, GNR, and MRSA. ID following. Continue Zosyn and Daptomycin for minimum of 2 weeks, possibly 6 weeks-as per ID. PICC placed 5/9.     2. Right foot OM with cellulitis and surgical site infection: S/p toe amputation on 4/21. S/p I&D of right foot with bone biopsy on 5/7. Continue antibiotics. Podiatry and ID following. S/p right foot delayed primary closure and possible repeat debridement on 5/10.      3. 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 General surgery consulted. No plans for acute inpatient surgical intervention.      4. ALVINO: Resolved.  Avoid nephrotoxic agents.     5. DM2: Endocrinology consulted. Medium dose ssi. Hypoglycemic protocol. Mtformin 1000 mg BID.        6. ALCON: Hx of ALCON on CPAP. Had uvulectomy and stopped wearing CPAP. Sleep study showing ALCON and Recommended CPAP.     7. COPD: PFT in the past showing COPD. Continue Albuterol prn.      8. CAD s/p CABG x2-continue lipitor and metoprolol.     9. Hypertension: Continue Lopressor      10. Hyperlipidemia: Continue Lipitor       11. Anxiety/ depression: Continue Zoloft      12. Nicotine dependence: Encourage cessation     13. Hypothyroidism: Continue Synthroid.   Waiting to go to [facility].    NOTE: This report was transcribed using voice recognition software. Every effort was made to ensure accuracy; however, inadvertent computerized transcription errors may be present.   Electronically signed by Ivette Rahman MD on 5/16/2022 at 1:34 PM

## 2022-05-17 LAB
ANION GAP SERPL CALCULATED.3IONS-SCNC: 7 MMOL/L (ref 7–16)
BUN BLDV-MCNC: 30 MG/DL (ref 6–23)
CALCIUM SERPL-MCNC: 8.9 MG/DL (ref 8.6–10.2)
CHLORIDE BLD-SCNC: 105 MMOL/L (ref 98–107)
CO2: 26 MMOL/L (ref 22–29)
CREAT SERPL-MCNC: 1 MG/DL (ref 0.7–1.2)
GFR AFRICAN AMERICAN: >60
GFR NON-AFRICAN AMERICAN: >60 ML/MIN/1.73
GLUCOSE BLD-MCNC: 135 MG/DL (ref 74–99)
HCT VFR BLD CALC: 36.9 % (ref 37–54)
HEMOGLOBIN: 11.8 G/DL (ref 12.5–16.5)
MCH RBC QN AUTO: 28.6 PG (ref 26–35)
MCHC RBC AUTO-ENTMCNC: 32 % (ref 32–34.5)
MCV RBC AUTO: 89.3 FL (ref 80–99.9)
METER GLUCOSE: 129 MG/DL (ref 74–99)
METER GLUCOSE: 160 MG/DL (ref 74–99)
METER GLUCOSE: 179 MG/DL (ref 74–99)
METER GLUCOSE: 184 MG/DL (ref 74–99)
PDW BLD-RTO: 14.2 FL (ref 11.5–15)
PLATELET # BLD: 227 E9/L (ref 130–450)
PMV BLD AUTO: 10.9 FL (ref 7–12)
POTASSIUM SERPL-SCNC: 4.7 MMOL/L (ref 3.5–5)
RBC # BLD: 4.13 E12/L (ref 3.8–5.8)
SODIUM BLD-SCNC: 138 MMOL/L (ref 132–146)
WBC # BLD: 7.7 E9/L (ref 4.5–11.5)

## 2022-05-17 PROCEDURE — 6360000002 HC RX W HCPCS: Performed by: STUDENT IN AN ORGANIZED HEALTH CARE EDUCATION/TRAINING PROGRAM

## 2022-05-17 PROCEDURE — 6370000000 HC RX 637 (ALT 250 FOR IP): Performed by: STUDENT IN AN ORGANIZED HEALTH CARE EDUCATION/TRAINING PROGRAM

## 2022-05-17 PROCEDURE — 6370000000 HC RX 637 (ALT 250 FOR IP): Performed by: INTERNAL MEDICINE

## 2022-05-17 PROCEDURE — 1200000000 HC SEMI PRIVATE

## 2022-05-17 PROCEDURE — 2580000003 HC RX 258: Performed by: STUDENT IN AN ORGANIZED HEALTH CARE EDUCATION/TRAINING PROGRAM

## 2022-05-17 PROCEDURE — 94660 CPAP INITIATION&MGMT: CPT

## 2022-05-17 PROCEDURE — 80048 BASIC METABOLIC PNL TOTAL CA: CPT

## 2022-05-17 PROCEDURE — 99233 SBSQ HOSP IP/OBS HIGH 50: CPT | Performed by: INTERNAL MEDICINE

## 2022-05-17 PROCEDURE — 85027 COMPLETE CBC AUTOMATED: CPT

## 2022-05-17 PROCEDURE — 82962 GLUCOSE BLOOD TEST: CPT

## 2022-05-17 PROCEDURE — 99232 SBSQ HOSP IP/OBS MODERATE 35: CPT | Performed by: INTERNAL MEDICINE

## 2022-05-17 PROCEDURE — A4216 STERILE WATER/SALINE, 10 ML: HCPCS | Performed by: STUDENT IN AN ORGANIZED HEALTH CARE EDUCATION/TRAINING PROGRAM

## 2022-05-17 PROCEDURE — 36415 COLL VENOUS BLD VENIPUNCTURE: CPT

## 2022-05-17 RX ADMIN — HEPARIN 300 UNITS: 100 SYRINGE at 20:12

## 2022-05-17 RX ADMIN — PREGABALIN 100 MG: 50 CAPSULE ORAL at 20:11

## 2022-05-17 RX ADMIN — ATORVASTATIN CALCIUM 40 MG: 40 TABLET, FILM COATED ORAL at 20:11

## 2022-05-17 RX ADMIN — METFORMIN HYDROCHLORIDE 750 MG: 750 TABLET, EXTENDED RELEASE ORAL at 16:16

## 2022-05-17 RX ADMIN — SODIUM CHLORIDE, PRESERVATIVE FREE 10 ML: 5 INJECTION INTRAVENOUS at 20:13

## 2022-05-17 RX ADMIN — PIPERACILLIN SODIUM AND TAZOBACTAM SODIUM 4500 MG: 4; .5 INJECTION, POWDER, LYOPHILIZED, FOR SOLUTION INTRAVENOUS at 21:59

## 2022-05-17 RX ADMIN — ENOXAPARIN SODIUM 30 MG: 100 INJECTION SUBCUTANEOUS at 09:38

## 2022-05-17 RX ADMIN — OXYCODONE AND ACETAMINOPHEN 2 TABLET: 5; 325 TABLET ORAL at 05:36

## 2022-05-17 RX ADMIN — LEVOTHYROXINE SODIUM 25 MCG: 25 TABLET ORAL at 06:20

## 2022-05-17 RX ADMIN — PIPERACILLIN SODIUM AND TAZOBACTAM SODIUM 4500 MG: 4; .5 INJECTION, POWDER, LYOPHILIZED, FOR SOLUTION INTRAVENOUS at 06:20

## 2022-05-17 RX ADMIN — INSULIN LISPRO 2 UNITS: 100 INJECTION, SOLUTION INTRAVENOUS; SUBCUTANEOUS at 16:14

## 2022-05-17 RX ADMIN — INSULIN LISPRO 2 UNITS: 100 INJECTION, SOLUTION INTRAVENOUS; SUBCUTANEOUS at 11:19

## 2022-05-17 RX ADMIN — OXYCODONE AND ACETAMINOPHEN 2 TABLET: 5; 325 TABLET ORAL at 14:30

## 2022-05-17 RX ADMIN — PIPERACILLIN SODIUM AND TAZOBACTAM SODIUM 4500 MG: 4; .5 INJECTION, POWDER, LYOPHILIZED, FOR SOLUTION INTRAVENOUS at 14:33

## 2022-05-17 RX ADMIN — SERTRALINE 50 MG: 100 TABLET, FILM COATED ORAL at 09:38

## 2022-05-17 RX ADMIN — METOPROLOL TARTRATE 25 MG: 25 TABLET, FILM COATED ORAL at 20:11

## 2022-05-17 RX ADMIN — ENOXAPARIN SODIUM 30 MG: 100 INJECTION SUBCUTANEOUS at 20:12

## 2022-05-17 RX ADMIN — DAPTOMYCIN 700 MG: 500 INJECTION, POWDER, LYOPHILIZED, FOR SOLUTION INTRAVENOUS at 12:42

## 2022-05-17 RX ADMIN — OXYCODONE AND ACETAMINOPHEN 2 TABLET: 5; 325 TABLET ORAL at 20:12

## 2022-05-17 RX ADMIN — OXYCODONE AND ACETAMINOPHEN 2 TABLET: 5; 325 TABLET ORAL at 09:38

## 2022-05-17 RX ADMIN — INSULIN LISPRO 1 UNITS: 100 INJECTION, SOLUTION INTRAVENOUS; SUBCUTANEOUS at 20:08

## 2022-05-17 RX ADMIN — METFORMIN HYDROCHLORIDE 750 MG: 750 TABLET, EXTENDED RELEASE ORAL at 09:38

## 2022-05-17 RX ADMIN — SODIUM CHLORIDE: 9 INJECTION, SOLUTION INTRAVENOUS at 14:32

## 2022-05-17 ASSESSMENT — PAIN DESCRIPTION - DESCRIPTORS
DESCRIPTORS: ACHING;NAGGING
DESCRIPTORS: BURNING;NAGGING

## 2022-05-17 ASSESSMENT — PAIN DESCRIPTION - FREQUENCY: FREQUENCY: CONTINUOUS

## 2022-05-17 ASSESSMENT — PAIN SCALES - GENERAL
PAINLEVEL_OUTOF10: 8
PAINLEVEL_OUTOF10: 0

## 2022-05-17 ASSESSMENT — PAIN SCALES - WONG BAKER: WONGBAKER_NUMERICALRESPONSE: 0

## 2022-05-17 ASSESSMENT — PAIN DESCRIPTION - ORIENTATION: ORIENTATION: RIGHT

## 2022-05-17 ASSESSMENT — PAIN DESCRIPTION - ONSET: ONSET: ON-GOING

## 2022-05-17 ASSESSMENT — PAIN DESCRIPTION - LOCATION: LOCATION: FOOT

## 2022-05-17 ASSESSMENT — PAIN DESCRIPTION - PAIN TYPE: TYPE: ACUTE PAIN

## 2022-05-17 ASSESSMENT — PAIN - FUNCTIONAL ASSESSMENT: PAIN_FUNCTIONAL_ASSESSMENT: PREVENTS OR INTERFERES SOME ACTIVE ACTIVITIES AND ADLS

## 2022-05-17 NOTE — PROGRESS NOTES
[address] [place] [address] [facility]  Progress Note    SUBJECTIVE:  Chief Complaint   Patient presents with    Wound Infection     right foot, 2nd digit removed 2 weeks ago by dr Epifanio Bowden. Patient is lying in bed, nausea is improved - he is tolerating a diet  Says he is feeling pretty good today  No fevers or chills  Tolerating antibiotic     Review of systems:  As stated above in the chief complaint, otherwise negative. Medications:  Scheduled Meds:   metFORMIN  750 mg Oral BID WC    sodium chloride flush  5-40 mL IntraVENous 2 times per day    heparin flush  3 mL IntraVENous 2 times per day    piperacillin-tazobactam  4,500 mg IntraVENous Q8H    insulin lispro  0-12 Units SubCUTAneous TID WC    insulin lispro  0-6 Units SubCUTAneous Nightly    daptomycin (CUBICIN) in NS IV syringe  6 mg/kg IntraVENous Q24H    enoxaparin  30 mg SubCUTAneous BID    atorvastatin  40 mg Oral Nightly    levothyroxine  25 mcg Oral Daily    metoprolol tartrate  25 mg Oral BID    sertraline  50 mg Oral Daily    pregabalin  100 mg Oral Nightly     Continuous Infusions:   sodium chloride 12.5 mL/hr at 22 1432    dextrose       PRN Meds:prochlorperazine, oxyCODONE-acetaminophen, sodium chloride flush, sodium chloride, albuterol, heparin flush, ondansetron **OR** ondansetron, polyethylene glycol, acetaminophen **OR** acetaminophen, glucagon (rDNA), dextrose, glucose, dextrose bolus **OR** dextrose bolus    OBJECTIVE:  BP (!) 102/52   Pulse 71   Temp 97.8 °F (36.6 °C) (Oral)   Resp 16   Ht 6' (1.829 m)   Wt 270 lb (122.5 kg)   SpO2 94%   BMI 36.62 kg/m²   Temp  Av.1 °F (36.7 °C)  Min: 97.8 °F (36.6 °C)  Max: 98.3 °F (36.8 °C)  Constitutional: The patient is lying in bed. In no distress. Awake, alert. Skin: Warm and dry. No rashes were noted. HEENT: Round and reactive pupils. Moist mucous membranes. No ulcerations or thrush. Neck: Supple to movements.    Chest: No respiratory distress. No crackles. Cardiovascular: Heart sounds rhythmic and regular. Abdomen: Positive bowel sounds to auscultation. Benign to palpation. Extremities: The right foot is wrapped. Second toe is missing.  pictures reviewed    Lines: PICC line right arm 5/9/2022- dressed no phlebitis               Laboratory and Tests Review:  Lab Results   Component Value Date    WBC 7.7 05/17/2022    WBC 7.9 05/14/2022    WBC 7.3 05/13/2022    HGB 11.8 (L) 05/17/2022    HCT 36.9 (L) 05/17/2022    MCV 89.3 05/17/2022     05/17/2022     Lab Results   Component Value Date    NEUTROABS 5.29 05/14/2022    NEUTROABS 4.75 05/13/2022    NEUTROABS 4.51 05/12/2022     No results found for: [facility]  Lab Results   Component Value Date    ALT 20 05/09/2022    AST 19 05/09/2022    ALKPHOS 109 05/09/2022    BILITOT 0.2 05/09/2022     Lab Results   Component Value Date     05/17/2022    K 4.7 05/17/2022    K 4.4 05/07/2022     05/17/2022    CO2 26 05/17/2022    BUN 30 05/17/2022    CREATININE 1.0 05/17/2022    CREATININE 0.8 05/14/2022    CREATININE 0.9 05/14/2022    GFRAA >60 05/17/2022    LABGLOM >60 05/17/2022    GLUCOSE 135 05/17/2022    PROT 6.3 05/09/2022    LABALBU 3.5 05/14/2022    CALCIUM 8.9 05/17/2022    BILITOT 0.2 05/09/2022    ALKPHOS 109 05/09/2022    AST 19 05/09/2022    ALT 20 05/09/2022     Lab Results   Component Value Date    CRP 5.6 (H) 05/06/2022    CRP 1.1 (H) 04/21/2022     Lab Results   Component Value Date    SEDRATE 59 (H) 05/11/2022    SEDRATE 57 (H) 05/06/2022    SEDRATE 23 (H) 04/21/2022     Radiology:  Reviewed    Microbiology:   Blood cultures 5/6/2022: Negative so far  Nares screen MRSA: Positive  Toe culture 5/6/2022: Nonhemolytic Streptococcus, alphahemolytic Streptococcus, Corynebacteria, ox + GNR, MRSA  Bone culture 5/7/2022: VSEnterococcus faecalis, MRSA  Abscess culture 5/7/2022: MRSA, Corynebacterium, alphahemolytic Streptococcus, VSEnterococcus faecalis    Surgical pathology: Subcortical bone with osteopenia, no   definite osteomyelitis. Assessment:  · Surgical site infection right toe following right second toe amputation secondary to osteomyelitis. Previous cultures grew MRSA, VS Enterococcus faecalis, pansensitive Pseudomonas, Myroides species and Staphylococcus cohnii  · Sepsis with septic shock secondary to right foot infection, improved  · Leukocytosis associated to the above-improved  · Hypotension associated to the above  · Status post delayed primary closure right foot 5/10/2022    Plan:    · Continue Zosyn and Daptomycin for a minimum of 2, possibly 6 weeks - has completed 11 days so far   · Labs reviewed   · Discharge plan is [facility] pending precert vs [facility]   · Ok to discharge from [address] [place] [address]  12:38 PM  5/17/2022     Patient seen and examined. I had a face to face encounter with the patient. Agree with exam.  Assessment and plan as outlined above and directed by me. Addition and corrections were done as deemed appropriate. My exam and plan include: The patient is doing well with current antibiotics. He is waiting for precertification to go to either [facility] or [place] [id number]. Continue current treatment. Antibiotics have been reconciled.     Darrick Medina MD  5/17/2022  12:53 PM

## 2022-05-17 NOTE — PATIENT CARE CONFERENCE
[facility] Quality Flow/Interdisciplinary Rounds Progress Note        Quality Flow Rounds held on May 17, 2022    Disciplines Attending:  Bedside Nurse, ,  and Nursing Unit Nilson ARMSTRONG May was admitted on 5/6/2022 12:38 PM    Anticipated Discharge Date:  Expected Discharge Date: 05/13/22    Disposition:    Jose Score:  Jose Scale Score: 20    Readmission Risk              Risk of Unplanned Readmission:  17           Discussed patient goal for the day, patient clinical progression, and barriers to discharge.   The following Goal(s) of the Day/Commitment(s) have been identified:  Dc deirdre Turner RN  May 17, 2022

## 2022-05-17 NOTE — PROGRESS NOTES
[facility] Progress Note    Admitting Date and Time: 5/6/2022 12:38 PM  Admit Dx: Septic shock ([facility] [A41.9, R65.21]    Subjective:  Patient is being followed for Septic shock ([facility] [A41.9, R65.21]   Patient feels better, no anxiety, BP getting better. ROS: denies fever, chills, cp, sob, n/v, HA unless stated above.      metFORMIN  750 mg Oral BID WC    sodium chloride flush  5-40 mL IntraVENous 2 times per day    heparin flush  3 mL IntraVENous 2 times per day    piperacillin-tazobactam  4,500 mg IntraVENous Q8H    insulin lispro  0-12 Units SubCUTAneous TID WC    insulin lispro  0-6 Units SubCUTAneous Nightly    daptomycin (CUBICIN) in NS IV syringe  6 mg/kg IntraVENous Q24H    enoxaparin  30 mg SubCUTAneous BID    atorvastatin  40 mg Oral Nightly    levothyroxine  25 mcg Oral Daily    metoprolol tartrate  25 mg Oral BID    sertraline  50 mg Oral Daily    pregabalin  100 mg Oral Nightly     prochlorperazine, 10 mg, Q6H PRN  oxyCODONE-acetaminophen, 2 tablet, Q4H PRN  sodium chloride flush, 5-40 mL, PRN  sodium chloride, , PRN  albuterol, 2.5 mg, Q6H PRN  heparin flush, 3 mL, PRN  ondansetron, 4 mg, Q8H PRN   Or  ondansetron, 4 mg, Q6H PRN  polyethylene glycol, 17 g, Daily PRN  acetaminophen, 650 mg, Q6H PRN   Or  acetaminophen, 650 mg, Q6H PRN  glucagon (rDNA), 1 mg, PRN  dextrose, 100 mL/hr, PRN  glucose, 16 g, PRN  dextrose bolus, 125 mL, PRN   Or  dextrose bolus, 250 mL, PRN         Objective:    BP (!) 102/52   Pulse 71   Temp 97.8 °F (36.6 °C) (Oral)   Resp 16   Ht 6' (1.829 m)   Wt 270 lb (122.5 kg)   SpO2 94%   BMI 36.62 kg/m²     General Appearance: alert and oriented to person, place and time and in no acute distress  Skin: warm and dry  Head: normocephalic and atraumatic  Eyes: pupils equal, round, and reactive to light, extraocular eye movements intact, conjunctivae normal  Neck: neck supple and non tender without mass   Pulmonary/Chest: clear to auscultation bilaterally- no wheezes, rales or rhonchi, normal air movement, no respiratory distress  Cardiovascular: normal rate, normal S1 and S2 and no carotid bruits  Abdomen: soft, non-tender, non-distended, normal bowel sounds, no masses or organomegaly  Extremities: right 2nd great toe amputation with wrap. Neurologic: no cranial nerve deficit and speech normal        Recent Labs     05/17/22  0621      K 4.7      CO2 26   BUN 30*   CREATININE 1.0   GLUCOSE 135*   CALCIUM 8.9       Recent Labs     05/17/22  0621   WBC 7.7   RBC 4.13   HGB 11.8*   HCT 36.9*   MCV 89.3   MCH 28.6   MCHC 32.0   RDW 14.2      MPV 10.9         Assessment:    Principal Problem:    Septic shock (HCC)  Active Problems:    Osteomyelitis of right foot (HCC)    ALVINO (acute kidney injury) ([facility])    Elevated lactic acid level    Uncontrolled type 2 diabetes mellitus with hyperglycemia (HCC)    Cellulitis    Duodenal diverticulum  Resolved Problems:    * No resolved hospital problems. *      Plan:  1. Sepsis with septic shock: BP still on the lower side. . Leukocytosis resolved. Hypotension  Resolved. Transferred out of the ICU. Monitor off pressors. Blood cultures negative. Toe culture 5/6 positive for nonhemolytic Streptococcus, alphahemolytic streptococcus, Corynebacteria, GNR, and MRSA. ID following. Continue Zosyn and Daptomycin for minimum of 2 weeks, possibly 6 weeks-as per ID. PICC placed 5/9.     2. Right foot OM with cellulitis and surgical site infection: S/p toe amputation on 4/21. S/p I&D of right foot with bone biopsy on 5/7. Continue antibiotics. Podiatry and ID following. S/p right foot delayed primary closure and possible repeat debridement on 5/10.      3. 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 General surgery consulted. No plans for acute inpatient surgical intervention.      4. ALVINO: Resolved. Avoid nephrotoxic agents.     5. DM2: Endocrinology consulted. Medium dose ssi.  Hypoglycemic protocol. Mtformin 1000 mg BID.        6. ALCON: Hx of ALCON on CPAP. Had uvulectomy and stopped wearing CPAP. Sleep study showing ALCON and Recommended CPAP.     7. COPD: PFT in the past showing COPD. Continue Albuterol prn.      8. CAD s/p CABG x2-continue lipitor and metoprolol.     9. Hypertension: Continue Lopressor      10. Hyperlipidemia: Continue Lipitor       11. Anxiety/ depression: Continue Zoloft      12. Nicotine dependence: Encourage cessation     13. Hypothyroidism: Continue Synthroid.   Waiting to go to [facility].    NOTE: This report was transcribed using voice recognition software. Every effort was made to ensure accuracy; however, inadvertent computerized transcription errors may be present.   Electronically signed by Carrol Caldwell MD on 5/17/2022 at 1:28 PM

## 2022-05-17 NOTE — PROGRESS NOTES
Department of Podiatry   Progress Note    Subjective:  Patient is A 62 y/o male seen s/p right foot delayed primary closure (DOS:5/10). Rates foot pain a 7/10 and states it's relieved short term with percocet. Denies any n/f/v/c. Past Medical History:        Diagnosis Date    Anxiety     COPD (chronic obstructive pulmonary disease) ([facility]     Depression     DM (diabetes mellitus) ([facility]     Frostbite     HLD (hyperlipidemia)     HTN (hypertension)     Sleep apnea        Past Surgical History:        Procedure Laterality Date    BACK SURGERY      CHOLECYSTECTOMY      CORONARY ARTERY BYPASS GRAFT REDO      2 vessel    FOOT DEBRIDEMENT Right 4/25/2022    DELAYED PRIMARY CLOSURE RIGHT FOOT WOUND performed by Alla Youssef DPM at [facility] Right 5/7/2022    FOOT DEBRIDEMENT INCISION AND DRAINAGE performed by Reilly Bateman DPM at [facility] Right 5/10/2022    RIGHT FOOT DELAYED PRIMARY CLOSURE WITH POSSIBLE DEBRIDEMENT    ++CONTACT ISOLATION++   (DR AVAIL. AT 4PM) performed by Reilly Bateman DPM at [address] [place] [address] PICC LINE  5/9/2022         TOE AMPUTATION Right 4/22/2022    AMPUTATION RIGHT SECOND TOE performed by Alla Youssef DPM at [place] [address]         Medications Prior to Admission:    Medications Prior to Admission: atorvastatin (LIPITOR) 40 MG tablet, Take 40 mg by mouth at bedtime  metoprolol tartrate (LOPRESSOR) 25 MG tablet, Take 25 mg by mouth 2 times daily  metFORMIN (GLUCOPHAGE) 1000 MG tablet, Take 1,000 mg by mouth 2 times daily (with meals)  levothyroxine (SYNTHROID) 25 MCG tablet, Take 25 mcg by mouth Daily  sertraline (ZOLOFT) 50 MG tablet, Take 50 mg by mouth daily  glimepiride (AMARYL) 4 MG tablet, Take 4 mg by mouth in the morning and at bedtime  JARDIANCE 10 MG tablet, Take 10 mg by mouth daily  pregabalin (LYRICA) 100 MG capsule, Take 100 mg by mouth at bedtime.   lisinopril-hydroCHLOROthiazide (PRINZIDE;ZESTORETIC) 20-12.5 MG per tablet, Take 20 tablets by mouth daily    Allergies:  Patient has no known allergies. Social History:   · TOBACCO:   reports that he has been smoking cigarettes. He started smoking about 42 years ago. He has been smoking about 2.50 packs per day. He does not have any smokeless tobacco history on file. · ETOH:   reports no history of alcohol use. DRUGS:   Social History     Substance and Sexual Activity   Drug Use Never       Family History:       Problem Relation Age of Onset    Dementia Mother     Stroke Father     Diabetes Sister          REVIEW OF SYSTEMS:    All pertinent positives and negatives as noted in HPI       LOWER EXTREMITY EXAMINATION   Dressing cdi with no excessive drainage    PREVIOUS physical exam findings:  Vascular Exam:  DP and PT pulses intact with handheld doppler b/l . CFT delayed to distal digits B/L. Skin temperature warm to warm proximal leg to distal toes.      Neuro Exam: Diminished protective sensation B/L     Dermatologic Exam: 2 dorsal incisions noted with sutures intact and skin well coapted. Open wound at distal 2nd amputation site with granular wound bed and no purulence. No malodor. No lymphangitis. No acute clinical signs of infection    MSK: Mild  tenderness on palpation to dorsal aspect of the right foot. Soft compressible posterior most compartments bilaterally.                           CONSULTS:  IP CONSULT TO GENERAL SURGERY  IP CONSULT TO GENERAL SURGERY  IP CONSULT TO CRITICAL CARE  IP CONSULT TO INFECTIOUS DISEASES  IP CONSULT TO PODIATRY  IP CONSULT TO SOCIAL WORK  IP CONSULT TO ENDOCRINOLOGY  IP CONSULT TO DIABETES EDUCATOR  IP CONSULT TO HOME CARE NEEDS    MEDICATION:  Scheduled Meds:   metFORMIN  750 mg Oral BID WC    sodium chloride flush  5-40 mL IntraVENous 2 times per day    heparin flush  3 mL IntraVENous 2 times per day    piperacillin-tazobactam  4,500 mg IntraVENous Q8H    insulin lispro  0-12 Units SubCUTAneous TID WC    insulin lispro  0-6 Units SubCUTAneous Nightly    daptomycin (CUBICIN) in NS IV syringe  6 mg/kg IntraVENous Q24H    enoxaparin  30 mg SubCUTAneous BID    atorvastatin  40 mg Oral Nightly    levothyroxine  25 mcg Oral Daily    metoprolol tartrate  25 mg Oral BID    sertraline  50 mg Oral Daily    pregabalin  100 mg Oral Nightly     Continuous Infusions:   sodium chloride 12.5 mL/hr at 05/16/22 1432    dextrose       PRN Meds:.prochlorperazine, oxyCODONE-acetaminophen, sodium chloride flush, sodium chloride, albuterol, heparin flush, ondansetron **OR** ondansetron, polyethylene glycol, acetaminophen **OR** acetaminophen, glucagon (rDNA), dextrose, glucose, dextrose bolus **OR** dextrose bolus    RADIOLOGY:  XR CHEST PORTABLE   Final Result   Right internal jugular central venous line terminates within the superior   vena cava with no evidence of pneumothorax. CT ABDOMEN PELVIS W IV CONTRAST Additional Contrast? None   Final Result   1. Rule out nonspecific postinflammatory changes in the right lower lobe of   the lungs. 2.  Small, rounded hypodensity, containing what appears to be air droplets   within the pancreatic head and near the ampulla. It lies immediately   adjacent to the duodenum and is in the path of the CBD. Question duodenal   diverticulum. An ampullary/pancreatic head lesion containing air cannot be   excluded. Upper GI study is recommended to see if this represents a   diverticulum. If no diverticulum is seen, then MRI of the abdomen with and   without intravenous contrast and including MRCP is recommended. 3.  Bilateral renal cortical cysts. At least 1 on the left may represent a   hemorrhagic or debris laden cyst.  Renal ultrasound is recommended. 4.  Small left inguinal hernia, which contains fat. XR FOOT RIGHT (MIN 3 VIEWS)   Final Result   Patient is status post amputation of the right 2nd toe. No acute osseous   abnormality.   No evidence to suggest osteomyelitis. If osteomyelitis needs   to be excluded further, than MRI of the foot with and without intravenous   gadolinium can be considered. Vitals:    /60   Pulse 69   Temp 98.3 °F (36.8 °C) (Oral)   Resp 18   Ht 6' (1.829 m)   Wt 270 lb (122.5 kg)   SpO2 94%   BMI 36.62 kg/m²     LABS:   Recent Labs     05/17/22  0621   WBC 7.7   HGB 11.8*   HCT 36.9*        Recent Labs     05/14/22  1010 05/14/22  1010 05/17/22  0621      < > 138   K 4.7   < > 4.7      < > 105   CO2 27   < > 26   PHOS 2.6  --   --    BUN 14   < > 30*   CREATININE 0.8   < > 1.0    < > = values in this interval not displayed. No results for input(s): PROT, INR, APTT in the last 72 hours. ASSESSMENTS:   -S/p delayed primary closure, right foot(DOS5/10)  -Full thickness wound, right foot  -Acute OM, right foot  -IDDM with neuropathy    PLAN:  - Examined and evaluated  - All labs, imaging, and charts reviewed  -Abx per ID: zosyn and dapto ongoing  -Arterial studies:Normal DK b/l at 1.2. Multiphasic waveforms at all levels b/l. -PWB 50% right foot with surgical shoe   -QoD dressing changes:betadine, xeroform DSD. Dressings cdi today. Next change:5/18  -Stable for d/c from Podiatric perspective and once cleared by all other teams on board  -Will follow up on outpatient basis for continued Podiatric care  -Will continue to monitor while in-house    Discussed w/:Gideon Mayers DPM [facility] [id number]  Fellowship-Trained Foot and Ankle Surgeon  Diplomate, [employer] Board [employer] Foot [employer]le Sur[employer]  [phone]     Thank you for the opportunity to take part in the patient's care. Please do not hesitate to call for any questions or concerns.

## 2022-05-17 NOTE — CARE COORDINATION
Social Work discharge planning   MILDRED called Yanely Francisco with [facility], who advised they still have not heard from [facility] for precert. Mildred asked if there is phone number we can contact. She advised they will check.   Electronically signed by Pearl Ryder on 5/17/2022 at 2:33 PM

## 2022-05-17 NOTE — PROGRESS NOTES
Date: 5/16/2022    Time: 11:21 PM    Patient Placed On BIPAP/CPAP/ Non-Invasive Ventilation? Yes    If no must comment. Facial area red/color change? No           If YES are Blister/Lesion present? No   If yes must notify nursing staff  BIPAP/CPAP skin barrier?   Yes    Skin barrier type:mepilexlite       Comments:        Gricelda Murillo RCP

## 2022-05-18 LAB
ANION GAP SERPL CALCULATED.3IONS-SCNC: 10 MMOL/L (ref 7–16)
BUN BLDV-MCNC: 28 MG/DL (ref 6–23)
CALCIUM SERPL-MCNC: 9.1 MG/DL (ref 8.6–10.2)
CHLORIDE BLD-SCNC: 102 MMOL/L (ref 98–107)
CO2: 26 MMOL/L (ref 22–29)
CREAT SERPL-MCNC: 1 MG/DL (ref 0.7–1.2)
GFR AFRICAN AMERICAN: >60
GFR NON-AFRICAN AMERICAN: >60 ML/MIN/1.73
GLUCOSE BLD-MCNC: 119 MG/DL (ref 74–99)
HCT VFR BLD CALC: 37.1 % (ref 37–54)
HEMOGLOBIN: 12.2 G/DL (ref 12.5–16.5)
MCH RBC QN AUTO: 28.9 PG (ref 26–35)
MCHC RBC AUTO-ENTMCNC: 32.9 % (ref 32–34.5)
MCV RBC AUTO: 87.9 FL (ref 80–99.9)
METER GLUCOSE: 126 MG/DL (ref 74–99)
METER GLUCOSE: 160 MG/DL (ref 74–99)
METER GLUCOSE: 161 MG/DL (ref 74–99)
METER GLUCOSE: 201 MG/DL (ref 74–99)
PDW BLD-RTO: 14.1 FL (ref 11.5–15)
PLATELET # BLD: 217 E9/L (ref 130–450)
PMV BLD AUTO: 10.7 FL (ref 7–12)
POTASSIUM SERPL-SCNC: 4.6 MMOL/L (ref 3.5–5)
RBC # BLD: 4.22 E12/L (ref 3.8–5.8)
SODIUM BLD-SCNC: 138 MMOL/L (ref 132–146)
WBC # BLD: 8.8 E9/L (ref 4.5–11.5)

## 2022-05-18 PROCEDURE — 94660 CPAP INITIATION&MGMT: CPT

## 2022-05-18 PROCEDURE — 85027 COMPLETE CBC AUTOMATED: CPT

## 2022-05-18 PROCEDURE — 97116 GAIT TRAINING THERAPY: CPT

## 2022-05-18 PROCEDURE — 6370000000 HC RX 637 (ALT 250 FOR IP): Performed by: STUDENT IN AN ORGANIZED HEALTH CARE EDUCATION/TRAINING PROGRAM

## 2022-05-18 PROCEDURE — 6370000000 HC RX 637 (ALT 250 FOR IP): Performed by: INTERNAL MEDICINE

## 2022-05-18 PROCEDURE — 6360000002 HC RX W HCPCS: Performed by: STUDENT IN AN ORGANIZED HEALTH CARE EDUCATION/TRAINING PROGRAM

## 2022-05-18 PROCEDURE — 2580000003 HC RX 258: Performed by: STUDENT IN AN ORGANIZED HEALTH CARE EDUCATION/TRAINING PROGRAM

## 2022-05-18 PROCEDURE — A4216 STERILE WATER/SALINE, 10 ML: HCPCS | Performed by: STUDENT IN AN ORGANIZED HEALTH CARE EDUCATION/TRAINING PROGRAM

## 2022-05-18 PROCEDURE — 97164 PT RE-EVAL EST PLAN CARE: CPT

## 2022-05-18 PROCEDURE — 36415 COLL VENOUS BLD VENIPUNCTURE: CPT

## 2022-05-18 PROCEDURE — 1200000000 HC SEMI PRIVATE

## 2022-05-18 PROCEDURE — 80048 BASIC METABOLIC PNL TOTAL CA: CPT

## 2022-05-18 PROCEDURE — 99232 SBSQ HOSP IP/OBS MODERATE 35: CPT | Performed by: INTERNAL MEDICINE

## 2022-05-18 PROCEDURE — 82962 GLUCOSE BLOOD TEST: CPT

## 2022-05-18 PROCEDURE — 97530 THERAPEUTIC ACTIVITIES: CPT

## 2022-05-18 PROCEDURE — 99233 SBSQ HOSP IP/OBS HIGH 50: CPT | Performed by: INTERNAL MEDICINE

## 2022-05-18 RX ADMIN — ENOXAPARIN SODIUM 30 MG: 100 INJECTION SUBCUTANEOUS at 08:51

## 2022-05-18 RX ADMIN — METFORMIN HYDROCHLORIDE 750 MG: 750 TABLET, EXTENDED RELEASE ORAL at 16:38

## 2022-05-18 RX ADMIN — SERTRALINE 50 MG: 100 TABLET, FILM COATED ORAL at 08:54

## 2022-05-18 RX ADMIN — INSULIN LISPRO 1 UNITS: 100 INJECTION, SOLUTION INTRAVENOUS; SUBCUTANEOUS at 20:51

## 2022-05-18 RX ADMIN — ONDANSETRON 4 MG: 2 INJECTION INTRAMUSCULAR; INTRAVENOUS at 16:05

## 2022-05-18 RX ADMIN — PIPERACILLIN SODIUM AND TAZOBACTAM SODIUM 4500 MG: 4; .5 INJECTION, POWDER, LYOPHILIZED, FOR SOLUTION INTRAVENOUS at 05:55

## 2022-05-18 RX ADMIN — OXYCODONE AND ACETAMINOPHEN 2 TABLET: 5; 325 TABLET ORAL at 01:56

## 2022-05-18 RX ADMIN — PIPERACILLIN SODIUM AND TAZOBACTAM SODIUM 4500 MG: 4; .5 INJECTION, POWDER, LYOPHILIZED, FOR SOLUTION INTRAVENOUS at 22:24

## 2022-05-18 RX ADMIN — OXYCODONE AND ACETAMINOPHEN 2 TABLET: 5; 325 TABLET ORAL at 16:09

## 2022-05-18 RX ADMIN — OXYCODONE AND ACETAMINOPHEN 2 TABLET: 5; 325 TABLET ORAL at 10:46

## 2022-05-18 RX ADMIN — HEPARIN 300 UNITS: 100 SYRINGE at 20:42

## 2022-05-18 RX ADMIN — OXYCODONE AND ACETAMINOPHEN 2 TABLET: 5; 325 TABLET ORAL at 05:54

## 2022-05-18 RX ADMIN — SODIUM CHLORIDE, PRESERVATIVE FREE 10 ML: 5 INJECTION INTRAVENOUS at 22:25

## 2022-05-18 RX ADMIN — METFORMIN HYDROCHLORIDE 750 MG: 750 TABLET, EXTENDED RELEASE ORAL at 08:54

## 2022-05-18 RX ADMIN — INSULIN LISPRO 4 UNITS: 100 INJECTION, SOLUTION INTRAVENOUS; SUBCUTANEOUS at 16:10

## 2022-05-18 RX ADMIN — OXYCODONE AND ACETAMINOPHEN 2 TABLET: 5; 325 TABLET ORAL at 20:41

## 2022-05-18 RX ADMIN — PREGABALIN 100 MG: 50 CAPSULE ORAL at 21:03

## 2022-05-18 RX ADMIN — ATORVASTATIN CALCIUM 40 MG: 40 TABLET, FILM COATED ORAL at 20:41

## 2022-05-18 RX ADMIN — SODIUM CHLORIDE, PRESERVATIVE FREE 10 ML: 5 INJECTION INTRAVENOUS at 20:58

## 2022-05-18 RX ADMIN — ENOXAPARIN SODIUM 30 MG: 100 INJECTION SUBCUTANEOUS at 20:42

## 2022-05-18 RX ADMIN — LEVOTHYROXINE SODIUM 25 MCG: 25 TABLET ORAL at 05:54

## 2022-05-18 RX ADMIN — INSULIN LISPRO 2 UNITS: 100 INJECTION, SOLUTION INTRAVENOUS; SUBCUTANEOUS at 11:24

## 2022-05-18 RX ADMIN — DAPTOMYCIN 700 MG: 500 INJECTION, POWDER, LYOPHILIZED, FOR SOLUTION INTRAVENOUS at 12:46

## 2022-05-18 RX ADMIN — SODIUM CHLORIDE, PRESERVATIVE FREE 10 ML: 5 INJECTION INTRAVENOUS at 16:06

## 2022-05-18 RX ADMIN — PIPERACILLIN SODIUM AND TAZOBACTAM SODIUM 4500 MG: 4; .5 INJECTION, POWDER, LYOPHILIZED, FOR SOLUTION INTRAVENOUS at 13:40

## 2022-05-18 RX ADMIN — METOPROLOL TARTRATE 25 MG: 25 TABLET, FILM COATED ORAL at 20:41

## 2022-05-18 ASSESSMENT — PAIN SCALES - GENERAL
PAINLEVEL_OUTOF10: 5
PAINLEVEL_OUTOF10: 8

## 2022-05-18 ASSESSMENT — PAIN DESCRIPTION - FREQUENCY
FREQUENCY: CONTINUOUS

## 2022-05-18 ASSESSMENT — PAIN DESCRIPTION - LOCATION
LOCATION: FOOT

## 2022-05-18 ASSESSMENT — PAIN DESCRIPTION - PAIN TYPE
TYPE: ACUTE PAIN;SURGICAL PAIN

## 2022-05-18 ASSESSMENT — PAIN DESCRIPTION - ORIENTATION
ORIENTATION: RIGHT

## 2022-05-18 ASSESSMENT — PAIN DESCRIPTION - ONSET
ONSET: ON-GOING

## 2022-05-18 ASSESSMENT — PAIN DESCRIPTION - DESCRIPTORS
DESCRIPTORS: ACHING;DISCOMFORT

## 2022-05-18 NOTE — PROGRESS NOTES
[address] [place] [address] [facility]  Progress Note    SUBJECTIVE:  Chief Complaint   Patient presents with    Wound Infection     right foot, 2nd digit removed 2 weeks ago by dr Nancy Servin. Patient is lying in bed,resting comfortably  In no distress. Tolerating antibiotics. No issues reported     Review of systems:  As stated above in the chief complaint, otherwise negative. Medications:  Scheduled Meds:   metFORMIN  750 mg Oral BID WC    sodium chloride flush  5-40 mL IntraVENous 2 times per day    heparin flush  3 mL IntraVENous 2 times per day    piperacillin-tazobactam  4,500 mg IntraVENous Q8H    insulin lispro  0-12 Units SubCUTAneous TID WC    insulin lispro  0-6 Units SubCUTAneous Nightly    daptomycin (CUBICIN) in NS IV syringe  6 mg/kg IntraVENous Q24H    enoxaparin  30 mg SubCUTAneous BID    atorvastatin  40 mg Oral Nightly    levothyroxine  25 mcg Oral Daily    metoprolol tartrate  25 mg Oral BID    sertraline  50 mg Oral Daily    pregabalin  100 mg Oral Nightly     Continuous Infusions:   sodium chloride 12.5 mL/hr at 22 1432    dextrose       PRN Meds:prochlorperazine, oxyCODONE-acetaminophen, sodium chloride flush, sodium chloride, albuterol, heparin flush, ondansetron **OR** ondansetron, polyethylene glycol, acetaminophen **OR** acetaminophen, glucagon (rDNA), dextrose, glucose, dextrose bolus **OR** dextrose bolus    OBJECTIVE:  BP (!) 104/55   Pulse 69   Temp 97.9 °F (36.6 °C) (Oral)   Resp 16   Ht 6' (1.829 m)   Wt 270 lb 4.8 oz (122.6 kg)   SpO2 96%   BMI 36.66 kg/m²   Temp  Av.1 °F (36.7 °C)  Min: 97.9 °F (36.6 °C)  Max: 98.2 °F (36.8 °C)  Constitutional: The patient is lying in bed. In no distress. Awake, alert. Skin: Warm and dry. No rashes were noted. HEENT: Round and reactive pupils. Moist mucous membranes. No ulcerations or thrush. Neck: Supple to movements. Chest: No respiratory distress. No crackles.   Cardiovascular: Heart sounds rhythmic and regular. Abdomen: Positive bowel sounds to auscultation. Benign to palpation. Extremities: The right foot is wrapped. Second toe is missing.  pictures reviewed    Lines: PICC line right arm 5/9/2022- dressed no phlebitis               Laboratory and Tests Review:  Lab Results   Component Value Date    WBC 8.8 05/18/2022    WBC 7.7 05/17/2022    WBC 7.9 05/14/2022    HGB 12.2 (L) 05/18/2022    HCT 37.1 05/18/2022    MCV 87.9 05/18/2022     05/18/2022     Lab Results   Component Value Date    NEUTROABS 5.29 05/14/2022    NEUTROABS 4.75 05/13/2022    NEUTROABS 4.51 05/12/2022     No results found for: [facility]  Lab Results   Component Value Date    ALT 20 05/09/2022    AST 19 05/09/2022    ALKPHOS 109 05/09/2022    BILITOT 0.2 05/09/2022     Lab Results   Component Value Date     05/18/2022    K 4.6 05/18/2022    K 4.4 05/07/2022     05/18/2022    CO2 26 05/18/2022    BUN 28 05/18/2022    CREATININE 1.0 05/18/2022    CREATININE 1.0 05/17/2022    CREATININE 0.8 05/14/2022    GFRAA >60 05/18/2022    LABGLOM >60 05/18/2022    GLUCOSE 119 05/18/2022    PROT 6.3 05/09/2022    LABALBU 3.5 05/14/2022    CALCIUM 9.1 05/18/2022    BILITOT 0.2 05/09/2022    ALKPHOS 109 05/09/2022    AST 19 05/09/2022    ALT 20 05/09/2022     Lab Results   Component Value Date    CRP 5.6 (H) 05/06/2022    CRP 1.1 (H) 04/21/2022     Lab Results   Component Value Date    SEDRATE 59 (H) 05/11/2022    SEDRATE 57 (H) 05/06/2022    SEDRATE 23 (H) 04/21/2022     Radiology:  Reviewed    Microbiology:   Blood cultures 5/6/2022: Negative so far  Nares screen MRSA: Positive  Toe culture 5/6/2022: Nonhemolytic Streptococcus, alphahemolytic Streptococcus, Corynebacteria, ox + GNR, MRSA  Bone culture 5/7/2022: VSEnterococcus faecalis, MRSA  Abscess culture 5/7/2022: MRSA, Corynebacterium, alphahemolytic Streptococcus, VSEnterococcus faecalis    Surgical pathology: Subcortical bone with osteopenia, no   definite osteomyelitis. Assessment:  · Surgical site infection right toe following right second toe amputation secondary to osteomyelitis. Previous cultures grew MRSA, VS Enterococcus faecalis, pansensitive Pseudomonas, Myroides species and Staphylococcus cohnii  · Sepsis with septic shock secondary to right foot infection, improved  · Leukocytosis associated to the above-improved  · Hypotension associated to the above  · Status post delayed primary closure right foot 5/10/2022    Plan:    · Continue Zosyn and Daptomycin for a minimum of 2, possibly 6 weeks - has completed 12 days so far   · Labs reviewed   · Discharge plan is now home with [facility]   · Eleanor Bloch to discharge from [address] [place] [address]  12:51 PM  5/18/2022     Patient seen and examined. I had a face to face encounter with the patient. Agree with exam.  Assessment and plan as outlined above and directed by me. Addition and corrections were done as deemed appropriate. My exam and plan include: The patient is tolerating antibiotics well. He has completed 2 weeks of antibiotics. This admission. He can be discharged from ID standpoint. Still waiting for precertification to go to [facility] or home. Spoke with case management. Spoke with nursing.     Shashi Hernandez MD  5/18/2022  1:50 PM

## 2022-05-18 NOTE — ADT AUTH CERT
Utilization Reviews         Sepsis and Other Febrile Illness, without Focal Infection - Care Day 12 (5/17/2022) by Darvin Oquendo RN       Review Status Review Entered   Completed 5/18/2022 11:52      Criteria Review      Care Day: 12 Care Date: 5/17/2022 Level of Care: Telemetry    Guideline Day 4    Clinical Status    (X) * Hemodynamic stability    5/18/2022 11:52 AM EDT by Kiko Jean Baptiste      HR 71 70 69  /63 104/55 102/52    (X) * Afebrile or temperature acceptable for next level of care    5/18/2022 11:52 AM EDT by Kiko Jean Baptiste      TEMP 98.2 (36.8) 97.9 (36.6) 97.8 (36.6) ORAL    (X) * Hypoxemia absent    5/18/2022 11:52 AM EDT by Kiko Jean Baptiste      SPO2 97% 96% 94% RA    ( ) * Tachypnea absent    5/18/2022 11:52 AM EDT by Kiko Jean Baptiste      RR 22 18 13    ( ) * Cultures negative or infection identified and under adequate treatment    5/18/2022 11:52 AM EDT by Kiko Jean Baptiste      LATEST CULTURE Enterococcus faecalis taken 5/7/22    (X) * Mental status at baseline    5/18/2022 11:52 AM EDT by Kiko Jean Baptiste      alert and oriented to person, place and time    ( ) * Metabolic derangement (eg, dehydration, acidosis) absent    5/18/2022 11:52 AM EDT by Dean Stoner    ( ) * End organ dysfunction (eg, myocardial ischemia, renal failure) absent    5/18/2022 11:52 AM EDT by Dean Stoner    ( ) * Discharge plans and education understood    Activity    (X) * Ambulatory or acceptable for next level of care    5/18/2022 11:52 AM EDT by Kiko Jean Baptiste      Up as tolerated    Routes    (X) * Oral hydration    5/18/2022 11:52 AM EDT by Kiko Jean Baptiste      Intake0    (X) * Oral medications or regimen acceptable for next level of care    5/18/2022 11:52 AM EDT by Kiko Jean Baptiste      oxyCODONE-acetaminophen (PERCOCET) 5-325 MG per tablet 2 tablet EVERY 4 HOURS PRN Route: PO x4  pregabalin (LYRICA) capsule 100 mg Nightly Route: PO    (X) * Oral diet or acceptable for next level of care    5/18/2022 11:52 AM EDT by Jamari Morales DIET; Regular    Interventions    (X) * Isolation not indicated, or is performable at next level of care    5/18/2022 11:52 AM EDT by Sandrita Hauser      NONE NOTED    (X) WBC    5/18/2022 11:52 AM EDT by Sandrita Hauser      WBC: 7.7    Medications    (X) * Antimicrobial treatment not necessary or treatment at next level of care arranged    5/18/2022 11:52 AM EDT by Sandrita Hauser      DAPTOmycin (CUBICIN) 700 mg in sodium chloride (PF) 14 mL IV EVERY 24 HOURS Route: IV syringe  piperacillin-tazobactam (ZOSYN) 4,500 mg in dextrose 5 % 50 mL IVPB (mini-bag) EVERY 8 HOURS Route: IV    * Milestone   Additional Notes   DATE: 5/17/22 TELE            Pertinent Updates:   Mild  tenderness on palpation to dorsal aspect of the right foot. Soft compressible posterior most compartments bilaterally   Low Hemoglobin Quant: 11.8 and Hematocrit: 36.9    BP improving, nausea is improved . Jose Scale Score: 20               Vitals: TEMP 98.2 (36.8)  ORAL RR 22 HR 71  /63  SPO2 94% RA                   Abnl/Pertinent Labs/Radiology/Diagnostic Studies:   BUN,BUNPL: 30 (H)   GLUCOSE, FASTING,GF: 135 (H)   Hemoglobin Quant: 11.8 (L)   Hematocrit: 36.9 (L)   Meter Glucose:184 179 160 (H)                     Physical Exam:   Neck: No thyroid tenderness,   Skin:  Diabetic foot    Neuro: Sensation decreased bilateral                      MD Consults/Assessments & Plans:   ENDOCRINOLOGY   ASSESSMENT & PLAN    Yancy Eloina Fuentes, a 63 y.o.-old male seen today for inpatient diabetes management    Diabetes Mellitus type 2    · Pt admit poor compliance with diet    · We recommend the following diabetes regimen    ? Medium dose sliding scale   ?  Metformin er 750 mg BID    · Continue glucose check with meals and at bedtime    · Will titrate insulin dose based on the blood glucose trend & insulin requirement   · On discharge we recommend dc pt on Metformin er 750 mg BID with meals        infected diabetic foot    · Managed per primary and ID service             ID   Assessment:   · Surgical site infection right toe following right second toe amputation secondary to osteomyelitis.  Previous cultures grew MRSA, VS Enterococcus faecalis, pansensitive Pseudomonas, Myroides species and Staphylococcus cohnii   · Sepsis with septic shock secondary to right foot infection, improved   · Leukocytosis associated to the above-improved   · Hypotension associated to the above   · Status post delayed primary closure right foot 5/10/2022       Plan:     · Continue Zosyn and Daptomycin for a minimum of 2, possibly 6 weeks - has completed 11 days so far    · Labs reviewed    · Discharge plan is [facility] pending precert vs [facility]    · Ok to discharge from ID standpoint              PODIATRY   ASSESSMENTS:    -S/p delayed primary closure, right foot(DOS5/10)   -Full thickness wound, right foot   -Acute OM, right foot   -IDDM with neuropathy       PLAN:   - Examined and evaluated   - All labs, imaging, and charts reviewed   -Abx per ID: zosyn and dapto ongoing   -Arterial studies:Normal DK b/l at 1.2. Multiphasic waveforms at all levels b/l. -PWB 50% right foot with surgical shoe    -QoD dressing changes:betadine, xeroform DSD. Dressings cdi today. Next change:5/18   -Stable for d/c from Podiatric perspective and once cleared by all other teams on board   -Will follow up on outpatient basis for continued Podiatric care   -Will continue to monitor while in-house            IM   Assessment:   Principal Problem:     Septic shock ([address]   Active Problems:     Osteomyelitis of right foot ([address]     ALVINO (acute kidney injury) ([address]     Elevated lactic acid level     Uncontrolled type 2 diabetes mellitus with hyperglycemia ([address]     Cellulitis     Duodenal diverticulum   Resolved Problems:     * No resolved hospital problems. *       Plan:   1.  Sepsis DAILY WITH MEALS Route: PO   metoprolol tartrate (LOPRESSOR) tablet 25 mg  2 TIMES DAILY Route: PO   sertraline (ZOLOFT) tablet 50 mg  DAILY Route: PO       0.9 % sodium chloride infusion PRN Route: IV X1                     Orders:   Intake and output                      PT/OT/SLP/CM Assessments or Notes:   TIERRA LEVY called Neptali Clement with [facility], who advised they still have not heard from pt's insurance for precert.  Sw asked if there is phone number we can contact.  She advised they will check              2 dorsal incisions noted with sutures intact by Nena Barajas RN       Review Status Review Entered   In Primary 5/18/2022 11:37      Criteria Review           Sepsis and Other Febrile Illness, without Focal Infection - Care Day 11 (5/16/2022) by Nena Barajas RN       Review Status Review Entered   Completed 5/18/2022 11:20      Criteria Review      Care Day: 11 Care Date: 5/16/2022 Level of Care: Telemetry    Guideline Day 4    Clinical Status    (X) * Hemodynamic stability    5/18/2022 11:20 AM EDT by Wendy Barajas      HR 80 71 69  /77 117/60 112/58    (X) * Afebrile or temperature acceptable for next level of care    5/18/2022 11:20 AM EDT by Wendy Barajas      TEMP 98.3 (36.8) 98.2 (36.8) ORAL    (X) * Hypoxemia absent    5/18/2022 11:20 AM EDT by Wendy Barajas      SPO2 94% 93% RA    (X) * Tachypnea absent    5/18/2022 11:20 AM EDT by Wendy Barajas      RR 20 18 16    ( ) * Cultures negative or infection identified and under adequate treatment    5/18/2022 11:20 AM EDT by Wendy Barajas      LATEST CULTURE  Enterococcus faecalis  taken 5/7/22    (X) * Mental status at baseline    5/18/2022 11:20 AM EDT by Jodee Ballard alert and oriented to person, place and time    ( ) * Metabolic derangement (eg, dehydration, acidosis) absent    5/18/2022 11:20 AM EDT by Amber Rahman    ( ) * End organ dysfunction (eg, myocardial ischemia, renal failure) absent 5/18/2022 11:20 AM EDT by Amber Rahman    ( ) * Discharge plans and education understood    Activity    (X) * Ambulatory or acceptable for next level of care    5/18/2022 11:20 AM EDT by Wendy Barajas      Up with assistance    Routes    (X) * Oral hydration    5/18/2022 11:20 AM EDT by Wendy Barajas      [id number] ml    (X) * Oral medications or regimen acceptable for next level of care    5/18/2022 11:20 AM EDT by Wendy Barajas      oxyCODONE-acetaminophen (PERCOCET) 5-325 MG per tablet 2 tablet EVERY 4 HOURS PRN Route: PO X5  pregabalin (LYRICA) capsule 100 mg Nightly Route: PO    (X) * Oral diet or acceptable for next level of care    5/18/2022 11:20 AM EDT by Aurelia Delvalle, [facility]; Lunch, Dinner; Diabetic Oral Supplement    Interventions    (X) * Isolation not indicated, or is performable at next level of care    5/18/2022 11:20 AM EDT by Wendy Barajas      none noted    (X) WBC    5/18/2022 11:20 AM EDT by Wendy Barajas      NO BLOOD EXTRACTION THIS DAY  LATEST RESULT  WBC: 7.9    Medications    (X) * Antimicrobial treatment not necessary or treatment at next level of care arranged    5/18/2022 11:20 AM EDT by Wendy Barajas      DAPTOmycin (CUBICIN) 700 mg in sodium chloride (PF) 14 mL IV syringe EVERY 24 HOURS Route: IV  piperacillin-tazobactam (ZOSYN) 4,500 mg in dextrose 5 % 50 mL IVPB (mini-bag) EVERY 8 HOURS Route: IV    * Milestone   Additional Notes   DATE: 5/16/22 TELE            Pertinent Updates:   Says he is feeling nauseous but has not had any vomiting.    Given ZOFRAN PRN X2   Elevated Meter Glucose 216 178 170 146   Given GLUCOPHAGE-XR PO and HUMALOG  SC                  Vitals: TEMP 98.3 (36.8) ORAL RR 20 HR 80 /77 SPO2 93% RA                   Abnl/Pertinent Labs/Radiology/Diagnostic Studies:   Meter Glucose: 146 (H)   Meter Glucose: 178 (H)   Meter Glucose: 216 (H)   Meter Glucose: 170 (H) Physical Exam:   Extremities: The right foot is wrapped.  Second toe is missing. +pain, pictures reviewed     Lines: PICC line right arm 5/9/2022- dressed no phlebitis                      MD Consults/Assessments & Plans:   ENDOCRINOLOGY   ASSESSMENT & PLAN    Tanya Fuentes, a 63 y.o.-old male seen today for inpatient diabetes management        Diabetes Mellitus type 2    · Pt admit poor compliance with diet    · We recommend the following diabetes regimen    ? Medium dose sliding scale   ? Metformin er 750 mg BID    · Continue glucose check with meals and at bedtime    · Will titrate insulin dose based on the blood glucose trend & insulin requirement   · On discharge we recommend dc pt on Metformin er 750 mg BID with meals    infected diabetic foot    · Managed per primary and ID service          ID   Assessment:   · Surgical site infection right toe following right second toe amputation secondary to osteomyelitis.  Previous cultures grew MRSA, VS Enterococcus faecalis, pansensitive Pseudomonas, Myroides species and Staphylococcus cohnii   · Sepsis with septic shock secondary to right foot infection, improved   · Leukocytosis associated to the above-improved   · Hypotension associated to the above   · Status post delayed primary closure right foot 5/10/2022      Plan:     · Continue Zosyn and Daptomycin for a minimum of 2, possibly 6 weeks - has completed 10 days so far    · Labs reviewed    · Discharge plan is now [facility] pending authorization --  Ok to discharge from ID standpoint          PODIATRY   ASSESSMENTS:    -S/p delayed primary closure, right foot(DOS5/10)   -Full thickness wound, right foot   -Acute OM, right foot   -IDDM with neuropathy       PLAN:   - Examined and evaluated   - All labs, imaging, and charts reviewed   -Abx per ID: zosyn and dapto ongoing   -Arterial studies:Normal DK b/l at 1.2. Multiphasic waveforms at all levels b/l.    -QoD dressing changes:betadine, xeroform DSD.  Dressings changed today. Next change:5/18   -Stable for d/c from Podiatric perspective and once cleared by all other teams on board   -Will follow up on outpatient basis for continued Podiatric care   -Will continue to monitor while in-house         IM   Assessment:   Principal Problem:     Septic shock ([facility]   Active Problems:     Osteomyelitis of right foot ([address]     ALVINO (acute kidney injury) ([facility]     Elevated lactic acid level     Uncontrolled type 2 diabetes mellitus with hyperglycemia ([facility]     Cellulitis     Duodenal diverticulum   Resolved Problems:     * No resolved hospital problems. *       Plan:   1. Sepsis with septic shock: BP still on the lower side. . Leukocytosis resolved. Hypotension  Resolved. Transferred out of the ICU. Monitor off pressors. Blood cultures negative. Toe culture 5/6 positive for nonhemolytic Streptococcus, alphahemolytic streptococcus, Corynebacteria, GNR, and MRSA. ID following. Continue Zosyn and Daptomycin for minimum of 2 weeks, possibly 6 weeks-as per ID. PICC placed 5/9.       2. Right foot OM with cellulitis and surgical site infection: S/p toe amputation on 4/21. S/p I&D of right foot with bone biopsy on 5/7. Continue antibiotics. Podiatry and ID following. S/p right foot delayed primary closure and possible repeat debridement on 5/10.        3. 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 General surgery consulted. No plans for acute inpatient surgical intervention.        4. ALVINO: Resolved. Avoid nephrotoxic agents.       5. DM2: Endocrinology consulted. Medium dose ssi. Hypoglycemic protocol. Mtformin 1000 mg BID.       6. ALCON: Hx of ALCON on CPAP. Had uvulectomy and stopped wearing CPAP.  Sleep study showing ALCON and Recommended CPAP.       7. COPD: PFT in the past showing COPD. Continue Albuterol prn.        8. CAD s/p CABG x2-continue lipitor and metoprolol.       9. Hypertension: Continue Lopressor        10. Hyperlipidemia: Continue Lipitor         11. Anxiety/ depression: Continue Zoloft        12. Nicotine dependence: Encourage cessation       13. Hypothyroidism: Continue Synthroid.    Waiting to go to [facility].                     Medications:   atorvastatin (LIPITOR) tablet 40 mg  Nightly Route: PO   enoxaparin Sodium (LOVENOX) injection 30 mg 2 TIMES DAILY Route: SC    insulin lispro (HUMALOG) injection vial 0-12 Units 3 TIMES DAILY WITH MEALS Route: SC    levothyroxine (SYNTHROID) tablet 25 mcg DAILY Route: PO    metFORMIN (GLUCOPHAGE-XR) extended release tablet 750 mg 2 TIMES DAILY WITH MEALS Route: PO   sertraline (ZOLOFT) tablet 50 mg  DAILY Route: PO       ondansetron (ZOFRAN) injection 4 mg EVERY 6 HOURS PRN Route: IV X2   0.9 % sodium chloride infusion PRN Route: IV X1                     Orders:   Intake and output                      PT/OT/SLP/CM Assessments or Notes:   PT   Treatment:  Patient practiced and was instructed in the following treatment:     · Bed mobility, transfers, gait with ww, and stairs to improve functional strength and endurance. · Gait training with ww and stair training with ww to teach pt NWB R foot and to allow pt to safely discharge home.     · WC propulsion to help pt maintain NWB R foot and decrease risk of falling with longer distances.     Pt was left supine in bed with call light left by patient. Chair/bed alarm: bed alarm was re-activated.           OT   Recommended Adaptive Equipment: extended tub bench   Home Living: Pt lives with niece in 1 story house with 2 SHARON.     Bathroom setup: tub/shower    Equipment owned: wheeled walker           Right Foot by Julia Murphy RN       Review Status Review Entered   In Primary 5/16/2022 14:02      Criteria Review

## 2022-05-18 NOTE — PROGRESS NOTES
[facility] Progress Note    Admitting Date and Time: 5/6/2022 12:38 PM  Admit Dx: [facility] [A41.9, R65.21]    Subjective:  Patient is being followed for Septic shock ([facility] [A41.9, R65.21]   Patient feels better, no anxiety, BP getting better waiting to go to home or rehab. ROS: denies fever, chills, cp, sob, n/v, HA unless stated above.      metFORMIN  750 mg Oral BID WC    sodium chloride flush  5-40 mL IntraVENous 2 times per day    heparin flush  3 mL IntraVENous 2 times per day    piperacillin-tazobactam  4,500 mg IntraVENous Q8H    insulin lispro  0-12 Units SubCUTAneous TID WC    insulin lispro  0-6 Units SubCUTAneous Nightly    daptomycin (CUBICIN) in NS IV syringe  6 mg/kg IntraVENous Q24H    enoxaparin  30 mg SubCUTAneous BID    atorvastatin  40 mg Oral Nightly    levothyroxine  25 mcg Oral Daily    metoprolol tartrate  25 mg Oral BID    sertraline  50 mg Oral Daily    pregabalin  100 mg Oral Nightly     prochlorperazine, 10 mg, Q6H PRN  oxyCODONE-acetaminophen, 2 tablet, Q4H PRN  sodium chloride flush, 5-40 mL, PRN  sodium chloride, , PRN  albuterol, 2.5 mg, Q6H PRN  heparin flush, 3 mL, PRN  ondansetron, 4 mg, Q8H PRN   Or  ondansetron, 4 mg, Q6H PRN  polyethylene glycol, 17 g, Daily PRN  acetaminophen, 650 mg, Q6H PRN   Or  acetaminophen, 650 mg, Q6H PRN  glucagon (rDNA), 1 mg, PRN  dextrose, 100 mL/hr, PRN  glucose, 16 g, PRN  dextrose bolus, 125 mL, PRN   Or  dextrose bolus, 250 mL, PRN         Objective:    BP (!) 104/55   Pulse 69   Temp 97.9 °F (36.6 °C) (Oral)   Resp 16   Ht 6' (1.829 m)   Wt 270 lb 4.8 oz (122.6 kg)   SpO2 96%   BMI 36.66 kg/m²     General Appearance: alert and oriented to person, place and time and in no acute distress  Skin: warm and dry  Head: normocephalic and atraumatic  Eyes: pupils equal, round, and reactive to light, extraocular eye movements intact, conjunctivae normal  Neck: neck supple and non tender without mass Pulmonary/Chest: clear to auscultation bilaterally- no wheezes, rales or rhonchi, normal air movement, no respiratory distress  Cardiovascular: normal rate, normal S1 and S2 and no carotid bruits  Abdomen: soft, non-tender, non-distended, normal bowel sounds, no masses or organomegaly  Extremities: right 2nd great toe amputation with wrap. Neurologic: no cranial nerve deficit and speech normal        Recent Labs     05/17/22  0621 05/18/22  0204    138   K 4.7 4.6    102   CO2 26 26   BUN 30* 28*   CREATININE 1.0 1.0   GLUCOSE 135* 119*   CALCIUM 8.9 9.1       Recent Labs     05/17/22  0621 05/18/22  0204   WBC 7.7 8.8   RBC 4.13 4.22   HGB 11.8* 12.2*   HCT 36.9* 37.1   MCV 89.3 87.9   MCH 28.6 28.9   MCHC 32.0 32.9   RDW 14.2 14.1    217   MPV 10.9 10.7         Assessment:    Principal Problem:    Septic shock ([facility])  Active Problems:    Osteomyelitis of right foot ([facility])    ALVINO (acute kidney injury) ([facility])    Elevated lactic acid level    Uncontrolled type 2 diabetes mellitus with hyperglycemia ([facility])    Cellulitis    Duodenal diverticulum  Resolved Problems:    * No resolved hospital problems. *      Plan:  1. Sepsis with septic shock: BP still on the lower side. . Leukocytosis resolved. Hypotension  Resolved. Transferred out of the ICU. Monitor off pressors. Blood cultures negative. Toe culture 5/6 positive for nonhemolytic Streptococcus, alphahemolytic streptococcus, Corynebacteria, GNR, and MRSA. ID following. Continue Zosyn and Daptomycin for minimum of 2 weeks, possibly 6 weeks-as per ID. PICC placed 5/9.     2. Right foot OM with cellulitis and surgical site infection: S/p toe amputation on 4/21. S/p I&D of right foot with bone biopsy on 5/7. Continue antibiotics. Podiatry and ID following. S/p right foot delayed primary closure and possible repeat debridement on 5/10.      3. 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 General surgery consulted.  No plans for acute inpatient surgical intervention.      4. ALVINO: Resolved. Avoid nephrotoxic agents.     5. DM2: Endocrinology consulted. Medium dose ssi. Hypoglycemic protocol. Mtformin 1000 mg BID.        6. ALCON: Hx of ALCON on CPAP. Had uvulectomy and stopped wearing CPAP. Sleep study showing ALCON and Recommended CPAP.     7. COPD: PFT in the past showing COPD. Continue Albuterol prn.      8. CAD s/p CABG x2-continue lipitor and metoprolol.     9. Hypertension: Continue Lopressor      10. Hyperlipidemia: Continue Lipitor       11. Anxiety/ depression: Continue Zoloft      12. Nicotine dependence: Encourage cessation     13. Hypothyroidism: Continue Synthroid.   Waiting to go to [facility] or at home. NOTE: This report was transcribed using voice recognition software. Every effort was made to ensure accuracy; however, inadvertent computerized transcription errors may be present.   Electronically signed by Dale Rm MD on 5/18/2022 at 2:37 PM

## 2022-05-18 NOTE — PROGRESS NOTES
Department of Podiatry   Progress Note    Subjective:  Patient is a 64 y/o male seen s/p right foot delayed primary closure (DOS:5/10). Complaining of foot pain this morning that is shooting in nature. No additional pedal complaints. Past Medical History:        Diagnosis Date    Anxiety     [facility]     Depression     [facility]     Frostbite     HLD (hyperlipidemia)     HTN (hypertension)     Sleep apnea        Past Surgical History:        Procedure Laterality Date    BACK SURGERY      CHOLECYSTECTOMY      CORONARY ARTERY BYPASS GRAFT REDO      2 vessel    FOOT DEBRIDEMENT Right 4/25/2022    DELAYED PRIMARY CLOSURE RIGHT FOOT WOUND performed by Gregory Dumont DPM at [address] [place] [address] Right 5/7/2022    FOOT DEBRIDEMENT INCISION AND DRAINAGE performed by Elmer Escobar DPM at [address] [place] [address] Right 5/10/2022    RIGHT FOOT DELAYED PRIMARY CLOSURE WITH POSSIBLE DEBRIDEMENT    ++CONTACT ISOLATION++   (DR AVAIL. AT 4PM) performed by Elmer Escobar DPM at [address] [place] [address] PICC LINE  5/9/2022         TOE AMPUTATION Right 4/22/2022    AMPUTATION RIGHT SECOND TOE performed by Gregory Dumont DPM at [place] [address]         Medications Prior to Admission:    Medications Prior to Admission: atorvastatin (LIPITOR) 40 MG tablet, Take 40 mg by mouth at bedtime  metoprolol tartrate (LOPRESSOR) 25 MG tablet, Take 25 mg by mouth 2 times daily  metFORMIN (GLUCOPHAGE) 1000 MG tablet, Take 1,000 mg by mouth 2 times daily (with meals)  levothyroxine (SYNTHROID) 25 MCG tablet, Take 25 mcg by mouth Daily  sertraline (ZOLOFT) 50 MG tablet, Take 50 mg by mouth daily  glimepiride (AMARYL) 4 MG tablet, Take 4 mg by mouth in the morning and at bedtime  JARDIANCE 10 MG tablet, Take 10 mg by mouth daily  pregabalin (LYRICA) 100 MG capsule, Take 100 mg by mouth at bedtime.   lisinopril-hydroCHLOROthiazide (PRINZIDE;ZESTORETIC) 20-12.5 MG per tablet, Take 20 tablets by mouth daily    Allergies:  Patient has no known allergies. Social History:   · TOBACCO:   reports that he has been smoking cigarettes. He started smoking about 42 years ago. He has been smoking about 2.50 packs per day. He does not have any smokeless tobacco history on file. · ETOH:   reports no history of alcohol use. DRUGS:   Social History     Substance and Sexual Activity   Drug Use Never       Family History:       Problem Relation Age of Onset    Dementia Mother     Stroke Father     Diabetes Sister          REVIEW OF SYSTEMS:    All pertinent positives and negatives as noted in HPI       LOWER EXTREMITY EXAMINATION   Dressing cdi with no excessive drainage    PREVIOUS physical exam findings:  Vascular Exam:  DP and PT pulses intact with handheld doppler b/l . CFT delayed to distal digits B/L. Skin temperature warm to warm proximal leg to distal toes.      Neuro Exam: Diminished protective sensation B/L     Dermatologic Exam: 2 dorsal incisions noted with sutures intact and skin well coapted. Open wound at distal 2nd amputation site with granular wound bed and no purulence. No malodor. No lymphangitis. No acute clinical signs of infection    MSK: Mild  tenderness on palpation to dorsal aspect of the right foot. Soft compressible posterior most compartments bilaterally.                                 CONSULTS:  IP CONSULT TO GENERAL SURGERY  IP CONSULT TO GENERAL SURGERY  IP CONSULT TO CRITICAL CARE  IP CONSULT TO INFECTIOUS DISEASES  IP CONSULT TO PODIATRY  IP CONSULT TO SOCIAL WORK  IP CONSULT TO ENDOCRINOLOGY  IP CONSULT TO DIABETES EDUCATOR  IP CONSULT TO HOME CARE NEEDS    MEDICATION:  Scheduled Meds:   metFORMIN  750 mg Oral BID WC    sodium chloride flush  5-40 mL IntraVENous 2 times per day    heparin flush  3 mL IntraVENous 2 times per day    piperacillin-tazobactam  4,500 mg IntraVENous Q8H    insulin lispro  0-12 Units SubCUTAneous TID WC    insulin lispro  0-6 Units SubCUTAneous Nightly    daptomycin (CUBICIN) in NS IV syringe  6 mg/kg IntraVENous Q24H    enoxaparin  30 mg SubCUTAneous BID    atorvastatin  40 mg Oral Nightly    levothyroxine  25 mcg Oral Daily    metoprolol tartrate  25 mg Oral BID    sertraline  50 mg Oral Daily    pregabalin  100 mg Oral Nightly     Continuous Infusions:   sodium chloride 12.5 mL/hr at 05/17/22 1432    dextrose       PRN Meds:.prochlorperazine, oxyCODONE-acetaminophen, sodium chloride flush, sodium chloride, albuterol, heparin flush, ondansetron **OR** ondansetron, polyethylene glycol, acetaminophen **OR** acetaminophen, glucagon (rDNA), dextrose, glucose, dextrose bolus **OR** dextrose bolus    RADIOLOGY:  XR CHEST PORTABLE   Final Result   Right internal jugular central venous line terminates within the superior   vena cava with no evidence of pneumothorax. CT ABDOMEN PELVIS W IV CONTRAST Additional Contrast? None   Final Result   1. Rule out nonspecific postinflammatory changes in the right lower lobe of   the lungs. 2.  Small, rounded hypodensity, containing what appears to be air droplets   within the pancreatic head and near the ampulla. It lies immediately   adjacent to the duodenum and is in the path of the CBD. Question duodenal   diverticulum. An ampullary/pancreatic head lesion containing air cannot be   excluded. Upper GI study is recommended to see if this represents a   diverticulum. If no diverticulum is seen, then MRI of the abdomen with and   without intravenous contrast and including MRCP is recommended. 3.  Bilateral renal cortical cysts. At least 1 on the left may represent a   hemorrhagic or debris laden cyst.  Renal ultrasound is recommended. 4.  Small left inguinal hernia, which contains fat. XR FOOT RIGHT (MIN 3 VIEWS)   Final Result   Patient is status post amputation of the right 2nd toe. No acute osseous   abnormality.   No evidence to suggest osteomyelitis. If osteomyelitis needs   to be excluded further, than MRI of the foot with and without intravenous   gadolinium can be considered. Vitals:    BP (!) 104/55   Pulse 69   Temp 97.9 °F (36.6 °C) (Oral)   Resp 16   Ht 6' (1.829 m)   Wt 270 lb 4.8 oz (122.6 kg)   SpO2 96%   BMI 36.66 kg/m²     LABS:   Recent Labs     05/17/22  0621 05/18/22  0204   WBC 7.7 8.8   HGB 11.8* 12.2*   HCT 36.9* 37.1    217     Recent Labs     05/18/22  0204      K 4.6      CO2 26   BUN 28*   CREATININE 1.0     No results for input(s): PROT, INR, APTT in the last 72 hours. ASSESSMENTS:   -S/p delayed primary closure, right foot(DOS5/10)  -Full thickness wound, right foot  -Acute OM, right foot  -IDDM with neuropathy    PLAN:  - Examined and evaluated  - All labs, imaging, and charts reviewed  -Abx per ID: zosyn and dapto ongoing  -Arterial studies:Normal DK b/l at 1.2. Multiphasic waveforms at all levels b/l. -PWB 50% right foot with surgical shoe   -QoD dressing changes:betadine, xeroform DSD. Dressing changed today Next change:5/20  -Stable for d/c from Podiatric perspective and once cleared by all other teams on board  -Will follow up on outpatient basis for continued Podiatric care  -Will continue to monitor while in-house    Discussed w/:Gideon Hernandez DPM Pod [place] [id number]  Fellowship-Trained Foot and Ankle Surgeon  Diplomate, [employer] Board of Foot [employer] Ankle Surgeons  [phone]     Thank you for the opportunity to take part in the patient's care. Please do not hesitate to call for any questions or concerns.

## 2022-05-18 NOTE — PATIENT CARE CONFERENCE
P Quality Flow/Interdisciplinary Rounds Progress Note        Quality Flow Rounds held on May 18, 2022    Disciplines Attending:  Bedside Nurse, ,  and Nursing Unit Nilson ARMSTRONG May was admitted on 5/6/2022 12:38 PM    Anticipated Discharge Date:  Expected Discharge Date: 05/18/22    Disposition:    Jose Score:  Jose Scale Score: 19    Readmission Risk              Risk of Unplanned Readmission:  19           Discussed patient goal for the day, patient clinical progression, and barriers to discharge.   The following Goal(s) of the Day/Commitment(s) have been identified:  Dc planning    Ellen Gonzalez RN  May 18, 2022

## 2022-05-18 NOTE — PROGRESS NOTES
ENDOCRINOLOGY PROGRESS NOTE      Date of admission: 5/6/2022  Date of service: 5/18/2022  Admitting physician: Vy Molina DO   Primary Care Physician: CHRIST Reyes - CNP  Consultant physician: Wood Ceja MD     Reason for the consultation:  Uncontrolled DM    History of Present Illness: The history is provided by the patient. Accuracy of the patient data is excellent    Karis Fuentes is a very pleasant 63 y.o. old male with PMH uncontrolled DM type 2, obesity, HTN, HLD and other listed below admitted to [facility] on [facility]/6/2022 because of Rt foot pain and swelling and found to have infected diabetic foot and in septic shock, endocrine service was consulted for diabetes management.     Subjective   No acute events, BG improving     Inpatient diet:   Carb Restricted diet     Point of care glucose monitoring   (Independently reviewed)   Recent Labs     05/16/22 1958 05/17/22  0618 05/17/22  1106 05/17/22  1613 05/17/22  2007 05/18/22  0554 05/18/22  1111 05/18/22  1603   GLUMET 170* 129* 184* 160* 179* 126* 160* 201*     Scheduled Meds:   metFORMIN  750 mg Oral BID WC    sodium chloride flush  5-40 mL IntraVENous 2 times per day    heparin flush  3 mL IntraVENous 2 times per day    piperacillin-tazobactam  4,500 mg IntraVENous Q8H    insulin lispro  0-12 Units SubCUTAneous TID WC    insulin lispro  0-6 Units SubCUTAneous Nightly    daptomycin (CUBICIN) in NS IV syringe  6 mg/kg IntraVENous Q24H    enoxaparin  30 mg SubCUTAneous BID    atorvastatin  40 mg Oral Nightly    levothyroxine  25 mcg Oral Daily    metoprolol tartrate  25 mg Oral BID    sertraline  50 mg Oral Daily    pregabalin  100 mg Oral Nightly       PRN Meds:   prochlorperazine, 10 mg, Q6H PRN  oxyCODONE-acetaminophen, 2 tablet, Q4H PRN  sodium chloride flush, 5-40 mL, PRN  sodium chloride, , PRN  albuterol, 2.5 mg, Q6H PRN  heparin flush, 3 mL, PRN  ondansetron, 4 mg, Q8H PRN   Or  ondansetron, 4 mg, Q6H PRN  polyethylene glycol, 17 g, Daily PRN  acetaminophen, 650 mg, Q6H PRN   Or  acetaminophen, 650 mg, Q6H PRN  glucagon (rDNA), 1 mg, PRN  dextrose, 100 mL/hr, PRN  glucose, 16 g, PRN  dextrose bolus, 125 mL, PRN   Or  dextrose bolus, 250 mL, PRN      Continuous Infusions:   sodium chloride 12.5 mL/hr at 05/17/22 1432    dextrose         Review of Systems  All systems reviewed. All negative except for symptoms mentioned in HPI     OBJECTIVE    BP (!) 104/55   Pulse 69   Temp 97.9 °F (36.6 °C) (Oral)   Resp 16   Ht 6' (1.829 m)   Wt 270 lb 4.8 oz (122.6 kg)   SpO2 96%   BMI 36.66 kg/m²     Intake/Output Summary (Last 24 hours) at 5/18/2022 1845  Last data filed at 5/18/2022 1810  Gross per 24 hour   Intake 360 ml   Output 1700 ml   Net -1340 ml       Physical examination:  General: awake alert, oriented x3  HEENT: normocephalic non traumatic, no exophthalmos   Neck: supple, No thyroid tenderness,  Pulm: good equal air entry no added sounds  CVS: S1 + S2  Abd: soft lax, no tenderness  Skin: warm, no lesions, no rash.  Diabetic foot   Neuro: CN intact, sensation decreased bilateral , muscle power normal  Psych: normal mood, and affect    Review of Laboratory Data:  I personally reviewed the following labs:   Recent Labs     05/17/22  0621 05/18/22  0204   WBC 7.7 8.8   RBC 4.13 4.22   HGB 11.8* 12.2*   HCT 36.9* 37.1   MCV 89.3 87.9   MCH 28.6 28.9   MCHC 32.0 32.9   RDW 14.2 14.1    217   MPV 10.9 10.7     Recent Labs     05/17/22  0621 05/18/22  0204    138   K 4.7 4.6    102   CO2 26 26   BUN 30* 28*   CREATININE 1.0 1.0   GLUCOSE 135* 119*   CALCIUM 8.9 9.1     No results found for: BHYDRXBUT  Lab Results   Component Value Date    LABA1C 7.7 04/22/2022     No results found for: TSH, T4FREE, K1SAFZG, FT3, G6CGLQW, TSI, TPOABS, THGAB  Lab Results   Component Value Date    LABA1C 7.7 04/22/2022    GLUCOSE 119 05/18/2022     No results found for: TRIG, HDL, LDLCALC, CHOL    Blood culture   Lab Results   Component Value Date    BC 5 Days no growth 05/06/2022    BC 5 Days no growth 04/21/2022       Radiology:  XR CHEST PORTABLE   Final Result   Right internal jugular central venous line terminates within the superior   vena cava with no evidence of pneumothorax. CT ABDOMEN PELVIS W IV CONTRAST Additional Contrast? None   Final Result   1. Rule out nonspecific postinflammatory changes in the right lower lobe of   the lungs. 2.  Small, rounded hypodensity, containing what appears to be air droplets   within the pancreatic head and near the ampulla. It lies immediately   adjacent to the duodenum and is in the path of the CBD. Question duodenal   diverticulum. An ampullary/pancreatic head lesion containing air cannot be   excluded. Upper GI study is recommended to see if this represents a   diverticulum. If no diverticulum is seen, then MRI of the abdomen with and   without intravenous contrast and including MRCP is recommended. 3.  Bilateral renal cortical cysts. At least 1 on the left may represent a   hemorrhagic or debris laden cyst.  Renal ultrasound is recommended. 4.  Small left inguinal hernia, which contains fat. XR FOOT RIGHT (MIN 3 VIEWS)   Final Result   Patient is status post amputation of the right 2nd toe. No acute osseous   abnormality. No evidence to suggest osteomyelitis. If osteomyelitis needs   to be excluded further, than MRI of the foot with and without intravenous   gadolinium can be considered.              Medical Records/Labs/Images review:   I personally reviewed and summarized previous records   All labs and imaging were reviewed independently     [address] [facility] B May, a 63 y.o.-old male seen today for inpatient diabetes management     Diabetes Mellitus type 2   · Pt admit poor compliance with diet   · We recommend the following diabetes regimen   · Medium dose sliding scale  · Metformin er 750 mg BID   · Continue glucose check with meals and at bedtime · Will titrate insulin dose based on the blood glucose trend & insulin requirement  · On discharge we recommend dc pt on Metformin er 750 mg BID with meals     infected diabetic foot   · Managed per primary and ID service     The above issues were reviewed with the patient who understood and agreed with the plan. Thank you for allowing us to participate in the care of this patient. Please do not hesitate to contact us with any additional questions. Juleen Dubin MD  Endocrinologist, [facility]   [address] [place] [id number]   Phone: [phone]  Fax: [phone]  --------------------------------------------  An electronic signature was used to authenticate this note.  Alfie Stiles MD on 5/18/2022 at 6:45 PM

## 2022-05-18 NOTE — PLAN OF CARE
Problem: Discharge Planning  Goal: Discharge to home or other facility with appropriate resources  5/18/2022 1103 by Tika Guevara RN  Outcome: Progressing     Problem: Pain  Goal: Verbalizes/displays adequate comfort level or baseline comfort level  5/18/2022 1103 by Tika Guevara RN  Outcome: Progressing     Problem: Skin/Tissue Integrity  Goal: Absence of new skin breakdown  Description: 1. Monitor for areas of redness and/or skin breakdown  2. Assess vascular access sites hourly  3. Every 4-6 hours minimum:  Change oxygen saturation probe site  4. Every 4-6 hours:  If on nasal continuous positive airway pressure, respiratory therapy assess nares and determine need for appliance change or resting period.   Outcome: Progressing

## 2022-05-18 NOTE — CARE COORDINATION
Social Work discharge planning   Sw met with pt, and advised pt's insurance precert is still pending for [facility]. Pt said he would consider home with his PT OT [facility] scores. Pt said his niece Richa Vang [phone] would be his care taker to help learn iv atb for home. Bull gave Destiny's contact info to Epifanio Manzo with [address] and faxed iv atb rxs to them again. Await IV ATB scrips cost and [address] [place] check for cost.  Pt asking for glucometer and testing supplies RX. Or he said he would want the [address] Dr would prescribe for him. Pt does NOT have a cpap nor bipap at home. Pt said his PCP is Jen Lange CNP.   Electronically signed by Angie Silverman on 5/18/2022 at 2:41 PM

## 2022-05-18 NOTE — PROGRESS NOTES
Physical Therapy  Facility/Department: [facility] SURG  Physical Therapy Re-Evaluation  Name: Christal Aguiar  : 1960  MRN: [id number]  Date of Service: 2022    Attending Provider:  Andres Green MD    Evaluating PT:  Shani Bailey. Verena Boateng P.T. Room #:  8835/0181-  Diagnosis:  Septic shock ([facility] [address] [A41.9, R65.21]  Pertinent PMHx/PSHx:  R foot wound  Procedure/Surgery:  5/10/22 delayed primary closure R foot  Precautions:  Falls, bed/chair alarm, per podiatrist note 22 PWB 50% right foot with surgical shoe     SUBJECTIVE:    Pt lives with his niece in a 1 story home with 2 stairs and 1 post to enter. Pt ambulated with ww PTA. OBJECTIVE:   Initial Evaluation  Date: 22 Treatment Short Term/ Long Term   Goals   Was pt agreeable to Eval/treatment? yes yes    Does pt have pain? C/o R foot pain 8/10 C/o R foot pain moderate with amb    Bed Mobility  Rolling: SBA  Supine to sit: MOD A  Sit to supine: SBA  Scooting: MIN A Rolling: Independent  Supine to sit: Independent  Sit to supine: Independent  Scooting: Independent Independent   Transfers Sit to stand: MOD A  Stand to sit: MIN A  Stand pivot: NA Sit to stand: Independent   Stand to sit: Independent   Stand pivot: Independent with ww Independent   Ambulation   5 feet with ww and pt was unable to maintain NWB RLE with MIN A 75 feet x2 with ww PWB 50% or less via R heel with post-op shoe on supervision 150 feet with ww PWB 50% R foot with post-op shoe Independent   Stair negotiation: ascended and descended NA, pt unable at this time 1 step with ww on one side and rail on the other with supervision,  ascended forward and descended backward. 2 steps with ww and rail Independent    AM-PAC 6 Clicks 40/12 98/63      BLE ROM is WFL. LLE strength is grossly 4+/5 and R hip and knee are 4/5 and R ankle NA.    Balance: sitting is Independent and standing with ww is Independent with PWB 50% R foot   Endurance: fair+    Patient education  Pt educated on [employer] [facility] 50% R foot with post-op shoe, gait with ww, and stair negotiation with ww    Patient response to education:   Pt verbalized understanding Pt demonstrated skill Pt requires further education in this area   yes Yes yes     ASSESSMENT:    Comments:  Pt was found in bed and agreeable to PT. Pt sat on EOB and donned his L shoe himself and I sized and fitted pt with a mens medium post-op shoe. Podiatrist's note 5/17/22 stated pt may be PWB 50% on R foot with post-op shoe. Pt was educated on this and was able to walk with ww and maintain PWB 50% on RLE. It was much easier for pt to get around now that he does not have to hop. He walked to stairs and was able to safely perform step and reviewed with pt options on stairs depending on exact set-up at home. Pt verbalized understanding. He walked with ww back to his room doffed his shoe and post-op shoe and laid back down. Now that pt is PWB R foot he was re-evaluated and his PT goals have been updated based on his performance and new orders. Pt no longer has a need for a WC at this time. Treatment:  Patient practiced and was instructed in the following treatment:     Bed mobility, transfers, gait with ww, and stairs to improve functional strength and endurance.  Gait training with ww and stair training with ww to teach pt PWB R foot and to allow pt to safely discharge home. Pt was left supine in bed with call light left by patient. Chair/bed alarm: bed alarm was re-activated. PLAN:    Pt is making good progress toward established Physical Therapy goals and above PT goals have been updated today. Continue with physical therapy current plan of care.     Time in  07:45  Time out  08:15    Total Treatment Time  20 minutes     Evaluation Time includes thorough review of current medical information, gathering information on past medical history/social history and prior level of function, completion of standardized testing/informal observation of tasks, assessment of data and education on plan of care and goals. CPT codes:  [] Low Complexity PT evaluation 35794  [] Moderate Complexity PT evaluation 57935  [] High Complexity PT evaluation 11808  [x] PT Re-evaluation 57716  [x] Gait training 34010 10 minutes  [] Manual therapy 77030 ** minutes  [x] Therapeutic activities 36681 10 minutes  [] Therapeutic exercises 77347 ** minutes  [] Neuromuscular reeducation 58006 ** minutes     Tre King, P.T.   License Number: [id number]

## 2022-05-18 NOTE — PROGRESS NOTES
ENDOCRINOLOGY PROGRESS NOTE      Date of admission: 5/6/2022  Date of service: 5/17/2022  Admitting physician: Chidi Garcia DO   Primary Care Physician: CHRIST Gavin - CNP  Consultant physician: Flavia Daly MD     Reason for the consultation:  Uncontrolled DM    History of Present Illness: The history is provided by the patient. Accuracy of the patient data is excellent    Samara Fuentes is a very pleasant 63 y.o. old male with PMH uncontrolled DM type 2, obesity, HTN, HLD and other listed below admitted to [facility] on 5/6/2022 because of Rt foot pain and swelling and found to have infected diabetic foot and in septic shock, endocrine service was consulted for diabetes management.     Subjective   No acute events, BG improving     Inpatient diet:   Carb Restricted diet     Point of care glucose monitoring   (Independently reviewed)   Recent Labs     05/16/22  0631 05/16/22  1113 05/16/22  1552 05/16/22  1958 05/17/22  0618 05/17/22  1106 05/17/22  1613 05/17/22 2007   GLUMET 146* 178* 216* 170* 129* 184* 160* 179*     Scheduled Meds:   metFORMIN  750 mg Oral BID WC    sodium chloride flush  5-40 mL IntraVENous 2 times per day    heparin flush  3 mL IntraVENous 2 times per day    piperacillin-tazobactam  4,500 mg IntraVENous Q8H    insulin lispro  0-12 Units SubCUTAneous TID WC    insulin lispro  0-6 Units SubCUTAneous Nightly    daptomycin (CUBICIN) in NS IV syringe  6 mg/kg IntraVENous Q24H    enoxaparin  30 mg SubCUTAneous BID    atorvastatin  40 mg Oral Nightly    levothyroxine  25 mcg Oral Daily    metoprolol tartrate  25 mg Oral BID    sertraline  50 mg Oral Daily    pregabalin  100 mg Oral Nightly       PRN Meds:   prochlorperazine, 10 mg, Q6H PRN  oxyCODONE-acetaminophen, 2 tablet, Q4H PRN  sodium chloride flush, 5-40 mL, PRN  sodium chloride, , PRN  albuterol, 2.5 mg, Q6H PRN  heparin flush, 3 mL, PRN  ondansetron, 4 mg, Q8H PRN   Or  ondansetron, 4 mg, Q6H PRN  polyethylene glycol, 17 g, Daily PRN  acetaminophen, 650 mg, Q6H PRN   Or  acetaminophen, 650 mg, Q6H PRN  glucagon (rDNA), 1 mg, PRN  dextrose, 100 mL/hr, PRN  glucose, 16 g, PRN  dextrose bolus, 125 mL, PRN   Or  dextrose bolus, 250 mL, PRN      Continuous Infusions:   sodium chloride 12.5 mL/hr at 05/17/22 1432    dextrose         Review of Systems  All systems reviewed. All negative except for symptoms mentioned in HPI     OBJECTIVE    /63   Pulse 70   Temp 98.2 °F (36.8 °C) (Oral)   Resp 18   Ht 6' (1.829 m)   Wt 270 lb (122.5 kg)   SpO2 97%   BMI 36.62 kg/m²     Intake/Output Summary (Last 24 hours) at 5/17/2022 2032  Last data filed at 5/17/2022 1959  Gross per 24 hour   Intake    Output 1100 ml   Net -1100 ml       Physical examination:  General: awake alert, oriented x3  HEENT: normocephalic non traumatic, no exophthalmos   Neck: supple, No thyroid tenderness,  Pulm: good equal air entry no added sounds  CVS: S1 + S2  Abd: soft lax, no tenderness  Skin: warm, no lesions, no rash.  Diabetic foot   Neuro: CN intact, sensation decreased bilateral , muscle power normal  Psych: normal mood, and affect    Review of Laboratory Data:  I personally reviewed the following labs:   Recent Labs     05/17/22  0621   WBC 7.7   RBC 4.13   HGB 11.8*   HCT 36.9*   MCV 89.3   MCH 28.6   MCHC 32.0   RDW 14.2      MPV 10.9     Recent Labs     05/17/22  0621      K 4.7      CO2 26   BUN 30*   CREATININE 1.0   GLUCOSE 135*   CALCIUM 8.9     No results found for: BHYDRXBUT  Lab Results   Component Value Date    LABA1C 7.7 04/22/2022     No results found for: TSH, T4FREE, Y7JJCZK, FT3, K5CKSRF, TSI, TPOABS, THGAB  Lab Results   Component Value Date    LABA1C 7.7 04/22/2022    GLUCOSE 135 05/17/2022     No results found for: TRIG, HDL, LDLCALC, CHOL    Blood culture   Lab Results   Component Value Date    BC 5 Days no growth 05/06/2022    BC 5 Days no growth 04/21/2022       Radiology:  XR CHEST PORTABLE   Final Result Right internal jugular central venous line terminates within the superior   vena cava with no evidence of pneumothorax. CT ABDOMEN PELVIS W IV CONTRAST Additional Contrast? None   Final Result   1. Rule out nonspecific postinflammatory changes in the right lower lobe of   the lungs. 2.  Small, rounded hypodensity, containing what appears to be air droplets   within the pancreatic head and near the ampulla. It lies immediately   adjacent to the duodenum and is in the path of the CBD. Question duodenal   diverticulum. An ampullary/pancreatic head lesion containing air cannot be   excluded. Upper GI study is recommended to see if this represents a   diverticulum. If no diverticulum is seen, then MRI of the abdomen with and   without intravenous contrast and including MRCP is recommended. 3.  Bilateral renal cortical cysts. At least 1 on the left may represent a   hemorrhagic or debris laden cyst.  Renal ultrasound is recommended. 4.  Small left inguinal hernia, which contains fat. XR FOOT RIGHT (MIN 3 VIEWS)   Final Result   Patient is status post amputation of the right 2nd toe. No acute osseous   abnormality. No evidence to suggest osteomyelitis. If osteomyelitis needs   to be excluded further, than MRI of the foot with and without intravenous   gadolinium can be considered.              Medical Records/Labs/Images review:   I personally reviewed and summarized previous records   All labs and imaging were reviewed independently     [address] [facility] B May, a 63 y.o.-old male seen today for inpatient diabetes management     Diabetes Mellitus type 2   · Pt admit poor compliance with diet   · We recommend the following diabetes regimen   · Medium dose sliding scale  · Metformin er 750 mg BID   · Continue glucose check with meals and at bedtime   · Will titrate insulin dose based on the blood glucose trend & insulin requirement  · On discharge we recommend dc pt on Metformin er 750 mg BID with meals     infected diabetic foot   · Managed per primary and ID service     The above issues were reviewed with the patient who understood and agreed with the plan. Thank you for allowing us to participate in the care of this patient. Please do not hesitate to contact us with any additional questions. Flavia Daly MD  Endocrinologist, [facility] Diabetes Care and Endocrinology   [address] [facility] [address]   Phone: [phone]  Fax: [phone]  --------------------------------------------  An electronic signature was used to authenticate this note.  Hemant Burrows MD on 5/17/2022 at 8:32 PM

## 2022-05-18 NOTE — PROGRESS NOTES
Date: 5/17/2022    Time: 10:21 PM    Patient Placed On BIPAP/CPAP/ Non-Invasive Ventilation? Yes    If no must comment. Facial area red/color change? No           If YES are Blister/Lesion present? No   If yes must notify nursing staff  BIPAP/CPAP skin barrier?   Yes    Skin barrier type:mepilexlite       Comments:        Maci Lee RCP

## 2022-05-19 VITALS
SYSTOLIC BLOOD PRESSURE: 105 MMHG | OXYGEN SATURATION: 98 % | WEIGHT: 267 LBS | TEMPERATURE: 97.7 F | HEIGHT: 72 IN | HEART RATE: 69 BPM | BODY MASS INDEX: 36.16 KG/M2 | DIASTOLIC BLOOD PRESSURE: 53 MMHG | RESPIRATION RATE: 16 BRPM

## 2022-05-19 LAB
METER GLUCOSE: 143 MG/DL (ref 74–99)
METER GLUCOSE: 173 MG/DL (ref 74–99)
METER GLUCOSE: 190 MG/DL (ref 74–99)

## 2022-05-19 PROCEDURE — A4216 STERILE WATER/SALINE, 10 ML: HCPCS | Performed by: STUDENT IN AN ORGANIZED HEALTH CARE EDUCATION/TRAINING PROGRAM

## 2022-05-19 PROCEDURE — 82962 GLUCOSE BLOOD TEST: CPT

## 2022-05-19 PROCEDURE — 6370000000 HC RX 637 (ALT 250 FOR IP): Performed by: STUDENT IN AN ORGANIZED HEALTH CARE EDUCATION/TRAINING PROGRAM

## 2022-05-19 PROCEDURE — 2580000003 HC RX 258: Performed by: STUDENT IN AN ORGANIZED HEALTH CARE EDUCATION/TRAINING PROGRAM

## 2022-05-19 PROCEDURE — 97530 THERAPEUTIC ACTIVITIES: CPT

## 2022-05-19 PROCEDURE — 94660 CPAP INITIATION&MGMT: CPT

## 2022-05-19 PROCEDURE — 6360000002 HC RX W HCPCS: Performed by: STUDENT IN AN ORGANIZED HEALTH CARE EDUCATION/TRAINING PROGRAM

## 2022-05-19 PROCEDURE — 6370000000 HC RX 637 (ALT 250 FOR IP): Performed by: INTERNAL MEDICINE

## 2022-05-19 PROCEDURE — 99239 HOSP IP/OBS DSCHRG MGMT >30: CPT | Performed by: INTERNAL MEDICINE

## 2022-05-19 RX ORDER — OXYCODONE HYDROCHLORIDE AND ACETAMINOPHEN 5; 325 MG/1; MG/1
2 TABLET ORAL EVERY 6 HOURS PRN
Qty: 24 TABLET | Refills: 0 | Status: SHIPPED | OUTPATIENT
Start: 2022-05-19 | End: 2022-05-22

## 2022-05-19 RX ADMIN — PIPERACILLIN SODIUM AND TAZOBACTAM SODIUM 4500 MG: 4; .5 INJECTION, POWDER, LYOPHILIZED, FOR SOLUTION INTRAVENOUS at 14:01

## 2022-05-19 RX ADMIN — SODIUM CHLORIDE, PRESERVATIVE FREE 10 ML: 5 INJECTION INTRAVENOUS at 16:59

## 2022-05-19 RX ADMIN — ONDANSETRON 4 MG: 2 INJECTION INTRAMUSCULAR; INTRAVENOUS at 10:17

## 2022-05-19 RX ADMIN — PIPERACILLIN SODIUM AND TAZOBACTAM SODIUM 4500 MG: 4; .5 INJECTION, POWDER, LYOPHILIZED, FOR SOLUTION INTRAVENOUS at 05:35

## 2022-05-19 RX ADMIN — HEPARIN 300 UNITS: 100 SYRINGE at 16:59

## 2022-05-19 RX ADMIN — LEVOTHYROXINE SODIUM 25 MCG: 25 TABLET ORAL at 05:35

## 2022-05-19 RX ADMIN — INSULIN LISPRO 2 UNITS: 100 INJECTION, SOLUTION INTRAVENOUS; SUBCUTANEOUS at 11:17

## 2022-05-19 RX ADMIN — INSULIN LISPRO 2 UNITS: 100 INJECTION, SOLUTION INTRAVENOUS; SUBCUTANEOUS at 16:25

## 2022-05-19 RX ADMIN — METOPROLOL TARTRATE 25 MG: 25 TABLET, FILM COATED ORAL at 08:32

## 2022-05-19 RX ADMIN — HEPARIN 300 UNITS: 100 SYRINGE at 08:32

## 2022-05-19 RX ADMIN — METFORMIN HYDROCHLORIDE 750 MG: 750 TABLET, EXTENDED RELEASE ORAL at 16:25

## 2022-05-19 RX ADMIN — SODIUM CHLORIDE, PRESERVATIVE FREE 10 ML: 5 INJECTION INTRAVENOUS at 12:54

## 2022-05-19 RX ADMIN — DAPTOMYCIN 700 MG: 500 INJECTION, POWDER, LYOPHILIZED, FOR SOLUTION INTRAVENOUS at 12:54

## 2022-05-19 RX ADMIN — OXYCODONE AND ACETAMINOPHEN 2 TABLET: 5; 325 TABLET ORAL at 05:35

## 2022-05-19 RX ADMIN — INSULIN LISPRO 2 UNITS: 100 INJECTION, SOLUTION INTRAVENOUS; SUBCUTANEOUS at 07:15

## 2022-05-19 RX ADMIN — ENOXAPARIN SODIUM 30 MG: 100 INJECTION SUBCUTANEOUS at 08:32

## 2022-05-19 RX ADMIN — OXYCODONE AND ACETAMINOPHEN 2 TABLET: 5; 325 TABLET ORAL at 10:15

## 2022-05-19 RX ADMIN — SERTRALINE 50 MG: 100 TABLET, FILM COATED ORAL at 08:32

## 2022-05-19 RX ADMIN — SODIUM CHLORIDE, PRESERVATIVE FREE 10 ML: 5 INJECTION INTRAVENOUS at 08:32

## 2022-05-19 RX ADMIN — OXYCODONE AND ACETAMINOPHEN 2 TABLET: 5; 325 TABLET ORAL at 14:20

## 2022-05-19 RX ADMIN — METFORMIN HYDROCHLORIDE 750 MG: 750 TABLET, EXTENDED RELEASE ORAL at 07:15

## 2022-05-19 ASSESSMENT — PAIN DESCRIPTION - PAIN TYPE: TYPE: ACUTE PAIN;SURGICAL PAIN

## 2022-05-19 ASSESSMENT — PAIN DESCRIPTION - LOCATION: LOCATION: FOOT

## 2022-05-19 ASSESSMENT — PAIN DESCRIPTION - DESCRIPTORS: DESCRIPTORS: ACHING;DISCOMFORT

## 2022-05-19 ASSESSMENT — PAIN DESCRIPTION - ONSET: ONSET: ON-GOING

## 2022-05-19 ASSESSMENT — PAIN DESCRIPTION - ORIENTATION: ORIENTATION: RIGHT

## 2022-05-19 ASSESSMENT — PAIN DESCRIPTION - FREQUENCY: FREQUENCY: CONTINUOUS

## 2022-05-19 ASSESSMENT — PAIN SCALES - GENERAL
PAINLEVEL_OUTOF10: 8

## 2022-05-19 ASSESSMENT — PAIN - FUNCTIONAL ASSESSMENT: PAIN_FUNCTIONAL_ASSESSMENT: PREVENTS OR INTERFERES SOME ACTIVE ACTIVITIES AND ADLS

## 2022-05-19 NOTE — PATIENT CARE CONFERENCE
P Quality Flow/Interdisciplinary Rounds Progress Note        Quality Flow Rounds held on May 19, 2022    Disciplines Attending:  Bedside Nurse, ,  and Nursing Unit Nilson ARMSTRONG May was admitted on 5/6/2022 12:38 PM    Anticipated Discharge Date:  Expected Discharge Date: 05/19/22    Disposition:    Jose Score:  Jose Scale Score: 19    Readmission Risk              Risk of Unplanned Readmission:  19           Discussed patient goal for the day, patient clinical progression, and barriers to discharge.   The following Goal(s) of the Day/Commitment(s) have been identified:  Discharge - Obtain Order await kathleen Goel RN  May 19, 2022

## 2022-05-19 NOTE — PROGRESS NOTES
Ok per Dr. Munira Gambino to see patient out tomorrow am to initiate [address] and antibiotics. Guyla with [facility] made aware.     Electronically signed by Tennille Pickett RN on 5/19/2022 at 4:06 PM

## 2022-05-19 NOTE — PROGRESS NOTES
Date: 5/18/2022    Time: 10:08 PM    Patient Placed On BIPAP/CPAP/ Non-Invasive Ventilation? No, pt already placed on cpap    If no must comment. Facial area red/color change? No           If YES are Blister/Lesion present? No   If yes must notify nursing staff  BIPAP/CPAP skin barrier?   Yes    Skin barrier type:mepilexlite       Comments:        Fallon Torres RCP

## 2022-05-19 NOTE — CARE COORDINATION
Social Work discharge planning   Per [facility] with [facility], they have accepted pt and are checking to see IF they can see pt at home tonight for iv atb IF pt is discharged today. Electronically signed by Sonia Diaz on 5/19/2022 at 1:26 PM     Addendum-    Guyla with [facility] advised they CAn see pt at home today after his 2p glass IF discharged. Discussed with charge Fernando Mckeon.   Electronically signed by Sonia Diaz on 5/19/2022 at 1:37 PM

## 2022-05-19 NOTE — DISCHARGE SUMMARY
[facility] EMERGENCY SERVICE Physician Discharge Summary       Madelyn Raya MD  [address] [place] [address]  [address] [place] [address]  [phone]    Schedule an appointment as soon as possible for a visit in 2 weeks  For outpatient follow up    [id number]    Call in 1 week  For wound re-check    Lidafaraz Tejada, APRN [facility]  [address] [facility] [address] [facility]  [phone]    Schedule an appointment as soon as possible for a visit  Post hospital follow up    [address] [place] [address]  [phone]          Activity level: As tolerated    Diet: ADULT DIET; Regular  ADULT ORAL NUTRITION SUPPLEMENT; Breakfast, Dinner; Wound Healing Oral Supplement  ADULT ORAL NUTRITION SUPPLEMENT; Lunch, Dinner; Diabetic Oral Supplement    Labs:    Dispo: Home with home health care    Condition at discharge: Stable    Continue supplemental oxygen via nasal canula @ 2 LPM round-the-clock. Continue CPAP / BiPAP during sleep as prior to admission. Patient ID:  Nba Santamaria  [id number]  89 y.o.  1960    Admit date: 5/6/2022    Discharge date and time:  5/19/2022  3:37 PM    Admission Diagnoses: Principal Problem:    Septic shock ([facility])  Active Problems:    Osteomyelitis of right foot (HCC)    ALVINO (acute kidney injury) ([facility])    Elevated lactic acid level    Uncontrolled type 2 diabetes mellitus with hyperglycemia (HCC)    Cellulitis    Duodenal diverticulum  Resolved Problems:    * No resolved hospital problems. *      Discharge Diagnoses: Principal Problem:    Septic shock ([facility])  Active Problems:    Osteomyelitis of right foot (HCC)    ALVINO (acute kidney injury) ([facility])    Elevated lactic acid level    Uncontrolled type 2 diabetes mellitus with hyperglycemia (HCC)    Cellulitis    Duodenal diverticulum  Resolved Problems:    * No resolved hospital problems. *      Consults:  IP CONSULT TO GENERAL SURGERY  IP CONSULT TO GENERAL SURGERY  IP CONSULT TO CRITICAL CARE  IP CONSULT TO INFECTIOUS DISEASES  IP CONSULT TO PODIATRY  IP CONSULT TO SOCIAL WORK  IP CONSULT TO ENDOCRINOLOGY  IP CONSULT TO DIABETES EDUCATOR  IP CONSULT TO HOME CARE NEEDS    Procedures: Incision and drainage    Hospital Course: Patient was admitted with Septic shock ([facility] [address] [A41.9, R65.21]. Patient  Admitted on 6 6 with foot pain, patient with recent amputation of toe due to osteomyelitis. Admitted in sepsis with shock, in ICU. Did have right foot osteomyelitis. Known diabetes. Seen by ID on seventh, remains on Zosyn and daptomycin  Minimum of 2 weeks. Endocrine involved for management of diabetes. Patient did undergo incision and drainage involving right second metatarsal bone, excisional wound debridement, with podiatry. Patient did have delayed primary closure on 10th. AM PAC of 20/24. Patient was seen by general surgery for duodenal diverticulum, no intervention planned. As of today patient is planned for discharge home with home health care, patient is fully awake, alert, eager to go home, will continue with DC plans for patient to go home.     Discharge Exam:  Vitals:    05/19/22 0047 05/19/22 0418 05/19/22 0829 05/19/22 1045   BP:   (!) 105/53    Pulse:   69    Resp: 13 14 16 16   Temp:   97.7 °F (36.5 °C)    TempSrc:   Oral    SpO2:   98%    Weight:       Height:           General Appearance: alert and oriented to person, place and time, well-developed and well-nourished, in no acute distress  Skin: warm and dry, no rash or erythema  Head: normocephalic and atraumatic  Eyes: pupils equal, round, and reactive to light, extraocular eye movements intact, conjunctivae normal  ENT: tympanic membrane, external ear and ear canal normal bilaterally, oropharynx clear and moist with normal mucous membranes  Neck: neck supple and non tender without mass, no thyromegaly or thyroid nodules, no cervical lymphadenopathy   Pulmonary/Chest: clear to auscultation bilaterally- no wheezes, rales or rhonchi, normal air movement, no respiratory distress  Cardiovascular: normal rate, normal S1 and S2, no gallops, intact distal pulses and no carotid bruits  Abdomen: soft, non-tender, non-distended, normal bowel sounds, no masses or organomegaly  I/O last 3 completed shifts: In: 360 [P.O.:360]  Out: 2500 [Urine:2500]  No intake/output data recorded. LABS:  Recent Labs     05/17/22  0621 05/18/22  0204    138   K 4.7 4.6    102   CO2 26 26   BUN 30* 28*   CREATININE 1.0 1.0   GLUCOSE 135* 119*   CALCIUM 8.9 9.1       Recent Labs     05/17/22  0621 05/18/22  0204   WBC 7.7 8.8   RBC 4.13 4.22   HGB 11.8* 12.2*   HCT 36.9* 37.1   MCV 89.3 87.9   MCH 28.6 28.9   MCHC 32.0 32.9   RDW 14.2 14.1    217   MPV 10.9 10.7       Imaging:   XR CHEST PORTABLE   Final Result   Right internal jugular central venous line terminates within the superior   vena cava with no evidence of pneumothorax. CT ABDOMEN PELVIS W IV CONTRAST Additional Contrast? None   Final Result   1. Rule out nonspecific postinflammatory changes in the right lower lobe of   the lungs. 2.  Small, rounded hypodensity, containing what appears to be air droplets   within the pancreatic head and near the ampulla. It lies immediately   adjacent to the duodenum and is in the path of the CBD. Question duodenal   diverticulum. An ampullary/pancreatic head lesion containing air cannot be   excluded. Upper GI study is recommended to see if this represents a   diverticulum. If no diverticulum is seen, then MRI of the abdomen with and   without intravenous contrast and including MRCP is recommended. 3.  Bilateral renal cortical cysts. At least 1 on the left may represent a   hemorrhagic or debris laden cyst.  Renal ultrasound is recommended. 4.  Small left inguinal hernia, which contains fat.          XR FOOT RIGHT (MIN 3 VIEWS) Final Result   Patient is status post amputation of the right 2nd toe. No acute osseous   abnormality. No evidence to suggest osteomyelitis. If osteomyelitis needs   to be excluded further, than MRI of the foot with and without intravenous   gadolinium can be considered. Patient Instructions:      Medication List      START taking these medications    DAPTOmycin  infusion  Commonly known as: CUBICIN  Infuse 700 mg intravenously every 24 hours for 14 days Compound per protocol. oxyCODONE-acetaminophen 5-325 MG per tablet  Commonly known as: PERCOCET  Take 2 tablets by mouth every 6 hours as needed for Pain for up to 3 days. piperacillin-tazobactam  infusion  Commonly known as: ZOSYN  Infuse 4.5 g intravenously every 8 hours for 14 days Compound per protocol.         CONTINUE taking these medications    atorvastatin 40 MG tablet  Commonly known as: LIPITOR     glimepiride 4 MG tablet  Commonly known as: AMARYL     Jardiance 10 MG tablet  Generic drug: empagliflozin     levothyroxine 25 MCG tablet  Commonly known as: SYNTHROID     lisinopril-hydroCHLOROthiazide 20-12.5 MG per tablet  Commonly known as: PRINZIDE;ZESTORETIC     metFORMIN 1000 MG tablet  Commonly known as: GLUCOPHAGE     metoprolol tartrate 25 MG tablet  Commonly known as: LOPRESSOR     pregabalin 100 MG capsule  Commonly known as: LYRICA     sertraline 50 MG tablet  Commonly known as: ZOLOFT        STOP taking these medications    lisinopril 2.5 MG tablet  Commonly known as: PRINIVIL;ZESTRIL           Where to Get Your Medications      You can get these medications from any pharmacy    Bring a paper prescription for each of these medications  · DAPTOmycin  infusion  · oxyCODONE-acetaminophen 5-325 MG per tablet  · piperacillin-tazobactam  infusion           Note that more than 30 minutes was spent in preparing discharge papers, discussing discharge with patient, medication review, etc.    Signed:  Electronically signed by Dieter Isabel MD on 5/19/2022 at 3:37 PM    NOTE: This report was transcribed using voice recognition software. Every effort was made to ensure accuracy; however, inadvertent computerized transcription errors may be present.

## 2022-05-19 NOTE — PLAN OF CARE
Problem: Discharge Planning  Goal: Discharge to home or other facility with appropriate resources  5/18/2022 2209 by Roger Mcardle, RN  Outcome: Progressing  5/18/2022 1103 by Julian Haley RN  Outcome: Progressing     Problem: Pain  Goal: Verbalizes/displays adequate comfort level or baseline comfort level  5/18/2022 2209 by Roger Mcardle, RN  Outcome: Progressing  5/18/2022 1103 by Julian Haley RN  Outcome: Progressing     Problem: Skin/Tissue Integrity  Goal: Absence of new skin breakdown  Description: 1. Monitor for areas of redness and/or skin breakdown  2. Assess vascular access sites hourly  3. Every 4-6 hours minimum:  Change oxygen saturation probe site  4. Every 4-6 hours:  If on nasal continuous positive airway pressure, respiratory therapy assess nares and determine need for appliance change or resting period.   5/18/2022 2209 by Roger Mcardle, RN  Outcome: Progressing  5/18/2022 1103 by Julian Haley RN  Outcome: Progressing     Problem: Safety - Adult  Goal: Free from fall injury  Outcome: Progressing  Flowsheets (Taken 5/18/2022 2207)  Free From Fall Injury: Based on caregiver fall risk screen, instruct family/caregiver to ask for assistance with transferring infant if caregiver noted to have fall risk factors     Problem: ABCDS Injury Assessment  Goal: Absence of physical injury  Outcome: Progressing  Flowsheets (Taken 5/18/2022 2207)  Absence of Physical Injury: Implement safety measures based on patient assessment     Problem: Chronic Conditions and Co-morbidities  Goal: Patient's chronic conditions and co-morbidity symptoms are monitored and maintained or improved  Outcome: Progressing     Problem: Nutrition Deficit:  Goal: Optimize nutritional status  Outcome: Progressing

## 2022-05-19 NOTE — PROGRESS NOTES
ADLs/functional transfers  * Therapeutic activities to facilitate/challenge dynamic balance, stand tolerance for increased safety and independence with ADLs           Recommended Adaptive Equipment: extended tub bench     Home Living: Pt lives with niece in 1 story house with 2 SHARON. Bathroom setup: tub/shower   Equipment owned: wheeled walker     Prior Level of Function: independent with ADLs , assist with IADLs; functional mobility: wheeled walker     Pain Level: pt did not report pain this date; pt agreeable to therapy  Cognition: A&O: pt alert, conversing, and following commands. Functional Assessment:  AM-PAC Daily Activity Raw Score: 19/24    Initial Eval Status  Date: 5/12/22 Treatment Status  Date: 5/19/22 STGs = LTGs  Time frame: 10-14 days   Feeding Set up       Grooming Min A  Sup  To brush teeth standing at sink Mod I/I   UB Dressing Min A   Mod I/I   LB Dressing Mod A   To don pants  Sup  To don/doff surgical shoe  Mod I/I-with use of AD as appropriate/needed   Bathing Mod A   Mod I/I -with use of AD as appropriate/needed   Toileting Min A   Mod I/I    Bed Mobility  Supine to sit: SBA    Supine to sit: independent Independent    Functional Transfers Min A with wheeled walker  Sit<>Stand from EOB   Cues for hand and feet placement and safe technique and NWB  Pt with difficulty maintaining NWB  Independent with wheeled walker  Sit<>stand from EOB  Good adherence to PWB Independent     Functional Mobility Min A with wheeled walker  Short distance in room  Cues for safe wheeled walker management.  Pt able to hop on LLE and maintain NWB Sup with wheeled walker  To and from bathroom  Good adherence to PWB  independent -with device as needed to maximize independence with ADLs and functional task completion and good adherence to WB   Balance Sitting:     Static:  Independent     Dynamic:SBA  Standing: CGA with wheeled walker  Sitting:     Static:  Independent     Dynamic:Sup  Standing: Sup with wheeled walker I for dynamic sitting balance to maximize independence with ADLs and functional task completion     I for standing balance to maximize independence with ADLs and functional task completion   Activity Tolerance Fair+ with light activity  Good with light activity Good with ADL activity. Pt will demonstrate good understanding of education provided on EC/WS techniques    Visual/  Perceptual Glasses: none at bedside                       Additional long-term goal: Pt will increase functional independence to PLOF to allow pt to live in least restrictive environment. Comments: RN approved patient's participation in activities. Upon arrival, patient lying in bed with RN present. At end of session, patient returned to bed with call light and phone within reach, alarm set, and all lines and tubes intact. Treatment: OT treatment provided this date included:    Instruction/training on safety and adapted techniques for completion of ADLs. Pt was Sup to don surgical shoe seated EOB. Pt educated on adjustability of straps and verbalized understanding. Pt stood at sink with Sup to brush teeth. Occasional LUE support on sink. Pt expressed wanting to protect R foot and allow it to heel. Pt educated on performing some ADL tasks seated and problem solved doing so within home environment. Pt verbalized understanding.   Instruction/training on safe functional mobility/transfer techniques. Pt educated on new PWB status and verbalized understanding. Pt independent to sit to EOB and stand from EOB. Good adherence to PWB. Sup for functional mobility with cues for sequencing and safe wheeled walker management. Good adherence to PWB. Further skilled OT treatment indicated to increase patient's safety and independence with completion of ADL/IADL tasks in order to maximize patient's functional independence and quality of life. · Patient has made progress towards set goals. · Continue OT plan of care.     Time In: 1300  Time Out: 1320  Total Treatment Time: 20 minutes      Minutes Units   Therapeutic Ex 38478     Therapeutic Activities 21664 51 1   ADL/Self Care 00071     Orthotic Management 12116     Neuro Re-Ed 22065     Non-Billable Time  ---       MARIO ALBERTO Fernandez/L  License Number: [id number]

## 2022-05-19 NOTE — PROGRESS NOTES
Department of Podiatry   Progress Note    Subjective:  Patient is a 64 y/o male seen s/p right foot delayed primary closure (DOS:5/10). Still complaining of some pain to right foot. No acute overnight events. Past Medical History:        Diagnosis Date    Anxiety     [facility]     Depression     [facility]     Frostbite     HLD (hyperlipidemia)     HTN (hypertension)     Sleep apnea        Past Surgical History:        Procedure Laterality Date    BACK SURGERY      CHOLECYSTECTOMY      CORONARY ARTERY BYPASS GRAFT REDO      2 vessel    FOOT DEBRIDEMENT Right 4/25/2022    DELAYED PRIMARY CLOSURE RIGHT FOOT WOUND performed by Agustín Melendez DPM at [address] Right 5/7/2022    FOOT DEBRIDEMENT INCISION AND DRAINAGE performed by Sg Navas DPM at [address] Right 5/10/2022    RIGHT FOOT DELAYED PRIMARY CLOSURE WITH POSSIBLE DEBRIDEMENT    ++CONTACT ISOLATION++   (DR AVAIL. AT 4PM) performed by Sg Navas DPM at [address] [place], [place] [address]C LINE  5/9/2022         TOE AMPUTATION Right 4/22/2022    AMPUTATION RIGHT SECOND TOE performed by Agustín Melendez DPM at [place] [address]         Medications Prior to Admission:    Medications Prior to Admission: atorvastatin (LIPITOR) 40 MG tablet, Take 40 mg by mouth at bedtime  metoprolol tartrate (LOPRESSOR) 25 MG tablet, Take 25 mg by mouth 2 times daily  metFORMIN (GLUCOPHAGE) 1000 MG tablet, Take 1,000 mg by mouth 2 times daily (with meals)  levothyroxine (SYNTHROID) 25 MCG tablet, Take 25 mcg by mouth Daily  sertraline (ZOLOFT) 50 MG tablet, Take 50 mg by mouth daily  glimepiride (AMARYL) 4 MG tablet, Take 4 mg by mouth in the morning and at bedtime  JARDIANCE 10 MG tablet, Take 10 mg by mouth daily  pregabalin (LYRICA) 100 MG capsule, Take 100 mg by mouth at bedtime.   lisinopril-hydroCHLOROthiazide (PRINZIDE;ZESTORETIC) 20-12.5 MG per tablet, Take 20 tablets by mouth daily    Allergies:  Patient has no known allergies. Social History:   · TOBACCO:   reports that he has been smoking cigarettes. He started smoking about 42 years ago. He has been smoking about 2.50 packs per day. He does not have any smokeless tobacco history on file. · ETOH:   reports no history of alcohol use. DRUGS:   Social History     Substance and Sexual Activity   Drug Use Never       Family History:       Problem Relation Age of Onset    Dementia Mother     Stroke Father     Diabetes Sister          REVIEW OF SYSTEMS:    All pertinent positives and negatives as noted in HPI       LOWER EXTREMITY EXAMINATION   Dressing cdi with no excessive drainage    PREVIOUS physical exam findings:  Vascular Exam:  DP and PT pulses intact with handheld doppler b/l . CFT delayed to distal digits B/L. Skin temperature warm to warm proximal leg to distal toes.      Neuro Exam: Diminished protective sensation B/L     Dermatologic Exam: 2 dorsal incisions noted with sutures intact and skin well coapted. Open wound at distal 2nd amputation site with granular wound bed and no purulence. No malodor. No lymphangitis. No acute clinical signs of infection    MSK: Mild  tenderness on palpation to dorsal aspect of the right foot. Soft compressible posterior most compartments bilaterally.                                 CONSULTS:  IP CONSULT TO GENERAL SURGERY  IP CONSULT TO GENERAL SURGERY  IP CONSULT TO CRITICAL CARE  IP CONSULT TO INFECTIOUS DISEASES  IP CONSULT TO PODIATRY  IP CONSULT TO SOCIAL WORK  IP CONSULT TO ENDOCRINOLOGY  IP CONSULT TO DIABETES EDUCATOR  IP CONSULT TO HOME CARE NEEDS    MEDICATION:  Scheduled Meds:   metFORMIN  750 mg Oral BID WC    sodium chloride flush  5-40 mL IntraVENous 2 times per day    heparin flush  3 mL IntraVENous 2 times per day    piperacillin-tazobactam  4,500 mg IntraVENous Q8H    insulin lispro  0-12 Units SubCUTAneous TID WC    insulin lispro  0-6 Units SubCUTAneous Nightly    daptomycin (CUBICIN) in NS IV syringe  6 mg/kg IntraVENous Q24H    enoxaparin  30 mg SubCUTAneous BID    atorvastatin  40 mg Oral Nightly    levothyroxine  25 mcg Oral Daily    metoprolol tartrate  25 mg Oral BID    sertraline  50 mg Oral Daily    pregabalin  100 mg Oral Nightly     Continuous Infusions:   sodium chloride 12.5 mL/hr at 05/17/22 1432    dextrose       PRN Meds:.prochlorperazine, oxyCODONE-acetaminophen, sodium chloride flush, sodium chloride, albuterol, heparin flush, ondansetron **OR** ondansetron, polyethylene glycol, acetaminophen **OR** acetaminophen, glucagon (rDNA), dextrose, glucose, dextrose bolus **OR** dextrose bolus    RADIOLOGY:  XR CHEST PORTABLE   Final Result   Right internal jugular central venous line terminates within the superior   vena cava with no evidence of pneumothorax. CT ABDOMEN PELVIS W IV CONTRAST Additional Contrast? None   Final Result   1. Rule out nonspecific postinflammatory changes in the right lower lobe of   the lungs. 2.  Small, rounded hypodensity, containing what appears to be air droplets   within the pancreatic head and near the ampulla. It lies immediately   adjacent to the duodenum and is in the path of the CBD. Question duodenal   diverticulum. An ampullary/pancreatic head lesion containing air cannot be   excluded. Upper GI study is recommended to see if this represents a   diverticulum. If no diverticulum is seen, then MRI of the abdomen with and   without intravenous contrast and including MRCP is recommended. 3.  Bilateral renal cortical cysts. At least 1 on the left may represent a   hemorrhagic or debris laden cyst.  Renal ultrasound is recommended. 4.  Small left inguinal hernia, which contains fat. XR FOOT RIGHT (MIN 3 VIEWS)   Final Result   Patient is status post amputation of the right 2nd toe. No acute osseous   abnormality. No evidence to suggest osteomyelitis.   If osteomyelitis needs   to be excluded further, than MRI of the foot with and without intravenous   gadolinium can be considered. Vitals:    BP (!) 105/53   Pulse 69   Temp 97.7 °F (36.5 °C) (Oral)   Resp 16   Ht 6' (1.829 m)   Wt 267 lb (121.1 kg)   SpO2 98%   BMI 36.21 kg/m²     LABS:   Recent Labs     05/17/22  0621 05/18/22  0204   WBC 7.7 8.8   HGB 11.8* 12.2*   HCT 36.9* 37.1    217     Recent Labs     05/18/22  0204      K 4.6      CO2 26   BUN 28*   CREATININE 1.0     No results for input(s): PROT, INR, APTT in the last 72 hours. ASSESSMENTS:   -S/p delayed primary closure, right foot(DOS5/10)  -Full thickness wound, right foot  -Acute OM, right foot  -IDDM with neuropathy    PLAN:  - Examined and evaluated  - All labs, imaging, and charts reviewed  -Abx per ID: zosyn and dapto ongoing  -Arterial studies:Normal DK b/l at 1.2. Multiphasic waveforms at all levels b/l. -PWB 50% right foot with surgical shoe   -QoD dressing changes:betadine, xeroform DSD. Dressing changed yesterday. Next change:5/20  -Stable for d/c from Podiatric perspective and once cleared by all other teams on board  -Will follow up on outpatient basis for continued Podiatric care  -Will continue to monitor while in-house    Discussed w/:AMAYA Toure [phone]  Fellowship-Trained Foot and Ankle Surgeon  Diplomate, [employer] Board [employer] Foot [employer]le [employer]  [phone]     Thank you for the opportunity to take part in the patient's care. Please do not hesitate to call for any questions or concerns.

## 2022-05-19 NOTE — PROGRESS NOTES
Comprehensive Nutrition Assessment    Type and Reason for Visit:  Reassess    Nutrition Recommendations/Plan:   1. Continue current nutrition regimen and monitor. Malnutrition Assessment:  Malnutrition Status:  No malnutrition (05/12/22 1518)    Context:  Chronic Illness     Findings of the 6 clinical characteristics of malnutrition:  Energy Intake:  No significant decrease in energy intake  Weight Loss:  No significant weight loss     Body Fat Loss:  No significant body fat loss     Muscle Mass Loss:  No significant muscle mass loss    Fluid Accumulation:  No significant fluid accumulation     Strength:  Not Performed    Nutrition Assessment:    Pt nutritional status remains stable, consuming >75% PO w/ increased nutrient needs 2/2 adm w/ sepsis/shock d/t R foot wound OM/cellulitis/abscess s/p prior toe amputation (4/21), I&D w/ bone bx (5/7), delayed primary closure (5/10). Hx DM, COPD. Continue current nutrition regimen & monitor. Nutrition Related Findings:    A&Ox4, soft/round abd, active BS, +1BLE/trace BUE edema, -2.9L I/Os Wound Type: Surgical Incision (R foot OM/cellulitis/abscess)       Current Nutrition Intake & Therapies:    Average Meal Intake: %  Average Supplements Intake: Unable to assess  ADULT DIET; Regular  ADULT ORAL NUTRITION SUPPLEMENT; Breakfast, Dinner; Wound Healing Oral Supplement  ADULT ORAL NUTRITION SUPPLEMENT; Lunch, Dinner; Diabetic Oral Supplement    Anthropometric Measures:  Height: 6' (182.9 cm)  Ideal Body Weight (IBW): 178 lbs (81 kg)    Admission Body Weight: 265 lb 10.5 oz (120.5 kg) (5/7 first taken, bed scale)  Current Body Weight: 267 lb (121.1 kg) (5/19 bed scale), 151.1 % IBW.     Current BMI (kg/m2): 36.2  Usual Body Weight: 279 lb 13 oz (126.9 kg) (4/2022 actual x 1 month, no prior wt hx)  % Weight Change (Calculated): -3.9  Weight Adjustment For: No Adjustment   BMI Categories: Obese Class 2 (BMI 35.0 -39.9)    Estimated Daily Nutrient Needs:  Energy Requirements Based On: Formula  Weight Used for Energy Requirements: Current  Energy (kcal/day): 1105-2764  Weight Used for Protein Requirements: Ideal  Protein (g/day): 120-130  Method Used for Fluid Requirements: 1 ml/kcal  Fluid (ml/day): 0980-7964    Nutrition Diagnosis:   · Increased nutrient needs related to increase demand for energy/nutrients as evidenced by wounds    Nutrition Interventions:   Food and/or Nutrient Delivery: Continue Current Diet,Continue Oral Nutrition Supplement  Nutrition Education/Counseling: No recommendation at this time  Coordination of Nutrition Care: Continue to monitor while inpatient      Goals:  Previous Goal Met: Goal(s) Achieved  Goals: Meet at least 75% of estimated needs    Nutrition Monitoring and Evaluation:   Behavioral-Environmental Outcomes: None Identified  Food/Nutrient Intake Outcomes: Food and Nutrient Intake,Supplement Intake  Physical Signs/Symptoms Outcomes: Biochemical Data,Nutrition Focused Physical Findings,Skin,Weight,Chewing or Swallowing,GI Status,Fluid Status or Edema    Discharge Planning:    Continue Oral Nutrition Supplement     Jeffrey Portillo, MS, RD, LD  Contact: [phone]

## 2022-05-19 NOTE — ADT AUTH CERT
Utilization Reviews         2 dorsal incisions with sutures intact and skin well coapted by Maddie Cruz RN       Review Status Review Entered   In Primary 5/19/2022 10:05      Criteria Review           Sepsis and Other Febrile Illness, without Focal Infection - Care Day 13 (5/18/2022) by Maddie Cruz RN       Review Status Review Entered   Completed 5/19/2022 09:48      Criteria Review      Care Day: 13 Care Date: 5/18/2022 Level of Care: Telemetry    Guideline Day 4    Clinical Status    (X) * Hemodynamic stability    5/19/2022 9:48 AM EDT by Rc Rutherford      HR 83 69   /56 104/55    (X) * Afebrile or temperature acceptable for next level of care    5/19/2022 9:48 AM EDT by Rc Rutherford      TEMP 97.9 (36.6) 97.8 (36.6) ORAL    (X) * Hypoxemia absent    5/19/2022 9:48 AM EDT by Rc Rutherford      SPO2 96% RA    (X) * Tachypnea absent    5/19/2022 9:48 AM EDT by Rc Rutherford      RR 18 16 14    ( ) * Cultures negative or infection identified and under adequate treatment    5/19/2022 9:48 AM EDT by cR Rutherford      LATEST RESULT  5/7/2022 07:58  Culture Surgical: Enterococcus faecalis Abnormal    (X) * Mental status at baseline    5/19/2022 9:48 AM EDT by Rc Rutherford      : awake alert, oriented x3    ( ) * Metabolic derangement (eg, dehydration, acidosis) absent    5/19/2022 9:48 AM EDT by Mehreen Ferrara    ( ) * End organ dysfunction (eg, myocardial ischemia, renal failure) absent    5/19/2022 9:48 AM EDT by Mehreen Ferrara    ( ) * Discharge plans and education understood    Activity    (X) * Ambulatory or acceptable for next level of care    Routes    (X) * Oral hydration    5/19/2022 9:48 AM EDT by Rc Rutherford      [id number] ml    (X) * Oral medications or regimen acceptable for next level of care    5/19/2022 9:48 AM EDT by Rc Rutherford      oxyCODONE-acetaminophen (PERCOCET) 5-325 MG per tablet 2 tablet EVERY 4 HOURS PRN Route: PO X5  pregabalin (LYRICA) capsule 100 mg  Nightly Route: PO    (X) * Oral diet or acceptable for next level of care    5/19/2022 9:48 AM EDT by Golden [address] [place] [address]; Lunch, Dinner; Diabetic Oral Supplement    Interventions    (X) * Isolation not indicated, or is performable at next level of care    5/19/2022 9:48 AM EDT by Andrea Waller    (X) WBC    5/19/2022 9:48 AM EDT by Meryl Perera      WBC: 8.8    Medications    (X) * Antimicrobial treatment not necessary or treatment at next level of care arranged    5/19/2022 9:48 AM EDT by Meryl Perera      DAPTOmycin (CUBICIN) 700 mg in sodium chloride (PF) 14 mL IV syringe EVERY 24 HOURS Route: IV  piperacillin-tazobactam (ZOSYN) 4,500 mg in dextrose 5 % 50 mL IVPB (mini-bag) EVERY 8 HOURS Route: IV    * Milestone   Additional Notes   DATE: 5/18/22 TELE         Pertinent Updates:   Pain score 8/10   Give PERCOCET PRN X5    Elevated Meter Glucose: 201 160 161 126(H)   Given GLUCOPHAGE-XR PO and HUMALOG SC         Vitals: TEMP TEMP 97.9 (36.6)  ORAL RR 14 HR 69 /55 SPO2 96% RA         Abnl/Pertinent Labs/Radiology/Diagnostic Studies:   BUN,BUNPL: 28 (H)   GLUCOSE, FASTING,GF: 119 (H)   Hemoglobin Quant: 12.2 (L)   Meter Glucose: 201 160 161 126(H)         Physical Exam:   Neck: No thyroid tenderness,   Skin: warm,  Diabetic foot    Neuro:  sensation decreased bilateral          MD Consults/Assessments & Plans:   ENDOCRINOLOGY   Plan:   Diabetes Mellitus type 2    · Pt admit poor compliance with diet    · We recommend the following diabetes regimen    . Medium dose sliding scale   .  Metformin er 750 mg BID    · Continue glucose check with meals and at bedtime    · Will titrate insulin dose based on the blood glucose trend & insulin requirement   · On discharge we recommend dc pt on Metformin er 750 mg BID with meals    Infected diabetic foot    · Managed per primary and ID service CAD s/p CABG x2-continue lipitor and metoprolol. 9. Hypertension: Continue Lopressor    10. Hyperlipidemia: Continue Lipitor     11. Anxiety/ depression: Continue Zoloft    12. Nicotine dependence: Encourage cessation   13. Hypothyroidism: Continue Synthroid.    Waiting to go to [facility] or at home. Medications:   atorvastatin (LIPITOR) tablet 40 mg  Nightly Route: PO   enoxaparin Sodium (LOVENOX) injection 30 mg 2 TIMES DAILY Route: SC    insulin lispro (HUMALOG) injection vial 0-12 Units 3 TIMES DAILY WITH MEALS Route: SC X2   insulin lispro (HUMALOG) injection vial 0-6 Units NIGHTLY Route: SC   levothyroxine (SYNTHROID) tablet 25 mcg DAILY Route: PO    metFORMIN (GLUCOPHAGE-XR) extended release tablet 750 mg 2 TIMES DAILY WITH MEALS Route: PO    ondansetron (ZOFRAN) injection 4 mg EVERY 6 HOURS PRN Route: IV   metoprolol tartrate (LOPRESSOR) tablet 25 mg  2 TIMES DAILY Route: PO   sertraline (ZOLOFT) tablet 50 mg  DAILY Route: PO          Orders:   Intake and output          PT/OT/SLP/CM Assessments or Notes:   PT   Treatment:  Patient practiced and was instructed in the following treatment:     · Bed mobility, transfers, gait with ww, and stairs to improve functional strength and endurance. · Gait training with ww and stair training with ww to teach pt PWB R foot and to allow pt to safely discharge home.        TIERRA Gottlieb met with pt, and advised pt's insurance precert is still pending for [facility].  Pt said he would consider home with his PT OT [facility] scores.  Pt said his niece Lauren Jenkins [phone] would be his care taker to help learn iv atb for home. Bull gave Destiny's contact info to [facility] with [facility] [address] and faxed iv atb rxs to them again. Await IV ATB scrips [employer] and [employer] for cost.  Pt asking for glucometer and testing supplies RX.  Or he said he would want the Lora Foley Dr would prescribe for him. Pt does NOT have a cpap nor bipap at home.  Pt said his PCP is Stepan Solomon Ras SHIRLEY.

## 2022-05-19 NOTE — PROGRESS NOTES
[address] [place] [address] [facility]  Progress Note    SUBJECTIVE:  Chief Complaint   Patient presents with    Wound Infection     right foot, 2nd digit removed 2 weeks ago by dr Nancy Servin. Patient is lying in bed,resting comfortably  In no distress. Tolerating antibiotics. No issues reported   Foot pain. Review of systems:  As stated above in the chief complaint, otherwise negative. Medications:  Scheduled Meds:   metFORMIN  750 mg Oral BID WC    sodium chloride flush  5-40 mL IntraVENous 2 times per day    heparin flush  3 mL IntraVENous 2 times per day    piperacillin-tazobactam  4,500 mg IntraVENous Q8H    insulin lispro  0-12 Units SubCUTAneous TID WC    insulin lispro  0-6 Units SubCUTAneous Nightly    daptomycin (CUBICIN) in NS IV syringe  6 mg/kg IntraVENous Q24H    enoxaparin  30 mg SubCUTAneous BID    atorvastatin  40 mg Oral Nightly    levothyroxine  25 mcg Oral Daily    metoprolol tartrate  25 mg Oral BID    sertraline  50 mg Oral Daily    pregabalin  100 mg Oral Nightly     Continuous Infusions:   sodium chloride 12.5 mL/hr at 22 1432    dextrose       PRN Meds:prochlorperazine, oxyCODONE-acetaminophen, sodium chloride flush, sodium chloride, albuterol, heparin flush, ondansetron **OR** ondansetron, polyethylene glycol, acetaminophen **OR** acetaminophen, glucagon (rDNA), dextrose, glucose, dextrose bolus **OR** dextrose bolus    OBJECTIVE:  BP (!) 105/53   Pulse 69   Temp 97.7 °F (36.5 °C) (Oral)   Resp 16   Ht 6' (1.829 m)   Wt 267 lb (121.1 kg)   SpO2 98%   BMI 36.21 kg/m²   Temp  Av.8 °F (36.6 °C)  Min: 97.7 °F (36.5 °C)  Max: 97.8 °F (36.6 °C)  Constitutional: The patient is lying in bed. In no distress. Awake, alert. Skin: Warm and dry. No rashes were noted. HEENT: Round and reactive pupils. Moist mucous membranes. No ulcerations or thrush. Neck: Supple to movements. Chest: No respiratory distress.   No crackles. Cardiovascular: Heart sounds rhythmic and regular. Abdomen: Positive bowel sounds to auscultation. Benign to palpation. Extremities: The right foot is wrapped. Second toe is missing.  pictures reviewed    Lines: PICC line right arm 5/9/2022- dressed no phlebitis               Laboratory and Tests Review:  Lab Results   Component Value Date    WBC 8.8 05/18/2022    WBC 7.7 05/17/2022    WBC 7.9 05/14/2022    HGB 12.2 (L) 05/18/2022    HCT 37.1 05/18/2022    MCV 87.9 05/18/2022     05/18/2022     Lab Results   Component Value Date    NEUTROABS 5.29 05/14/2022    NEUTROABS 4.75 05/13/2022    NEUTROABS 4.51 05/12/2022     No results found for: [facility]  Lab Results   Component Value Date    ALT 20 05/09/2022    AST 19 05/09/2022    ALKPHOS 109 05/09/2022    BILITOT 0.2 05/09/2022     Lab Results   Component Value Date     05/18/2022    K 4.6 05/18/2022    K 4.4 05/07/2022     05/18/2022    CO2 26 05/18/2022    BUN 28 05/18/2022    CREATININE 1.0 05/18/2022    CREATININE 1.0 05/17/2022    CREATININE 0.8 05/14/2022    GFRAA >60 05/18/2022    LABGLOM >60 05/18/2022    GLUCOSE 119 05/18/2022    PROT 6.3 05/09/2022    LABALBU 3.5 05/14/2022    CALCIUM 9.1 05/18/2022    BILITOT 0.2 05/09/2022    ALKPHOS 109 05/09/2022    AST 19 05/09/2022    ALT 20 05/09/2022     Lab Results   Component Value Date    CRP 5.6 (H) 05/06/2022    CRP 1.1 (H) 04/21/2022     Lab Results   Component Value Date    SEDRATE 59 (H) 05/11/2022    SEDRATE 57 (H) 05/06/2022    SEDRATE 23 (H) 04/21/2022     Radiology:  Reviewed    Microbiology:   Blood cultures 5/6/2022: Negative so far  Nares screen MRSA: Positive  Toe culture 5/6/2022: Nonhemolytic Streptococcus, alphahemolytic Streptococcus, Corynebacteria, ox + GNR, MRSA  Bone culture 5/7/2022: VSEnterococcus faecalis, MRSA  Abscess culture 5/7/2022: MRSA, Corynebacterium, alphahemolytic Streptococcus, VSEnterococcus faecalis    Surgical pathology: Subcortical bone with osteopenia, no   definite osteomyelitis. Assessment:  · Surgical site infection right toe following right second toe amputation secondary to osteomyelitis. Previous cultures grew MRSA, VS Enterococcus faecalis, pansensitive Pseudomonas, Myroides species and Staphylococcus cohnii  · Sepsis with septic shock secondary to right foot infection, improved  · Leukocytosis associated to the above-improved  · Hypotension associated to the above  · Status post delayed primary closure right foot 5/10/2022    Plan:    · Continue Zosyn and Daptomycin for a minimum of 4- 6 weeks - has completed 13 days so far   · Labs reviewed   · Discharge plan is now [facility]   · [id number] [facility]  10:36 AM  5/19/2022     Patient seen and examined. I had a face to face encounter with the patient. Agree with exam.  Assessment and plan as outlined above and directed by me. Addition and corrections were done as deemed appropriate. My exam and plan include: The patient is doing well. We will going home to complete 4 to 6 weeks of IV antibiotics. He has an appointment on 5/26/2022. Spoke with nursing.     Eileen Alejo MD  5/19/2022  2:06 PM

## 2022-05-19 NOTE — PROGRESS NOTES
ENDOCRINOLOGY PROGRESS NOTE      Date of admission: 5/6/2022  Date of service: 5/19/2022  Admitting physician: Nasra Mclean DO   Primary Care Physician: CHRIST Tinoco - CNP  Consultant physician: Aimee Zhao MD     Reason for the consultation:  Uncontrolled DM    History of Present Illness: The history is provided by the patient. Accuracy of the patient data is excellent    Lidia Fuentes is a very pleasant 63 y.o. old male with PMH uncontrolled DM type 2, obesity, HTN, HLD and other listed below admitted to [facility] on 5/6/2022 because of Rt foot pain and swelling and found to have infected diabetic foot and in septic shock, endocrine service was consulted for diabetes management. Subjective   No acute events, BG controlled.     Inpatient diet:   Carb Restricted diet     Point of care glucose monitoring   (Independently reviewed)   Recent Labs     05/17/22  1613 05/17/22  2007 05/18/22  0554 05/18/22  1111 05/18/22  1603 05/18/22  2051 05/19/22  0609 05/19/22  1109   GLUMET 160* 179* 126* 160* 201* 161* 143* 173*     Scheduled Meds:   metFORMIN  750 mg Oral BID WC    sodium chloride flush  5-40 mL IntraVENous 2 times per day    heparin flush  3 mL IntraVENous 2 times per day    piperacillin-tazobactam  4,500 mg IntraVENous Q8H    insulin lispro  0-12 Units SubCUTAneous TID WC    insulin lispro  0-6 Units SubCUTAneous Nightly    daptomycin (CUBICIN) in NS IV syringe  6 mg/kg IntraVENous Q24H    enoxaparin  30 mg SubCUTAneous BID    atorvastatin  40 mg Oral Nightly    levothyroxine  25 mcg Oral Daily    metoprolol tartrate  25 mg Oral BID    sertraline  50 mg Oral Daily    pregabalin  100 mg Oral Nightly       PRN Meds:   prochlorperazine, 10 mg, Q6H PRN  oxyCODONE-acetaminophen, 2 tablet, Q4H PRN  sodium chloride flush, 5-40 mL, PRN  sodium chloride, , PRN  albuterol, 2.5 mg, Q6H PRN  heparin flush, 3 mL, PRN  ondansetron, 4 mg, Q8H PRN   Or  ondansetron, 4 mg, Q6H PRN  polyethylene glycol, 17 g, Daily PRN  acetaminophen, 650 mg, Q6H PRN   Or  acetaminophen, 650 mg, Q6H PRN  glucagon (rDNA), 1 mg, PRN  dextrose, 100 mL/hr, PRN  glucose, 16 g, PRN  dextrose bolus, 125 mL, PRN   Or  dextrose bolus, 250 mL, PRN      Continuous Infusions:   sodium chloride 12.5 mL/hr at 05/17/22 1432    dextrose         Review of Systems  All systems reviewed. All negative except for symptoms mentioned in HPI     OBJECTIVE    BP (!) 105/53   Pulse 69   Temp 97.7 °F (36.5 °C) (Oral)   Resp 16   Ht 6' (1.829 m)   Wt 267 lb (121.1 kg)   SpO2 98%   BMI 36.21 kg/m²     Intake/Output Summary (Last 24 hours) at 5/19/2022 1121  Last data filed at 5/19/2022 0620  Gross per 24 hour   Intake 180 ml   Output 1400 ml   Net -1220 ml       Physical examination:  General: awake alert, oriented x3  HEENT: normocephalic non traumatic, no exophthalmos   Neck: supple, No thyroid tenderness,  Pulm: good equal air entry no added sounds  CVS: S1 + S2  Abd: soft lax, no tenderness  Skin: warm, no lesions, no rash.  Diabetic foot   Neuro: CN intact, sensation decreased bilateral , muscle power normal  Psych: normal mood, and affect    Review of Laboratory Data:  I personally reviewed the following labs:   Recent Labs     05/17/22  0621 05/18/22  0204   WBC 7.7 8.8   RBC 4.13 4.22   HGB 11.8* 12.2*   HCT 36.9* 37.1   MCV 89.3 87.9   MCH 28.6 28.9   MCHC 32.0 32.9   RDW 14.2 14.1    217   MPV 10.9 10.7     Recent Labs     05/17/22  0621 05/18/22  0204    138   K 4.7 4.6    102   CO2 26 26   BUN 30* 28*   CREATININE 1.0 1.0   GLUCOSE 135* 119*   CALCIUM 8.9 9.1     No results found for: BHYDRXBUT  Lab Results   Component Value Date    LABA1C 7.7 04/22/2022     No results found for: TSH, T4FREE, R3MGXVF, FT3, A3WWUJT, TSI, TPOABS, THGAB  Lab Results   Component Value Date    LABA1C 7.7 04/22/2022    GLUCOSE 119 05/18/2022     No results found for: TRIG, HDL, LDLCALC, CHOL    Blood culture   Lab Results   Component Value Date BC 5 Days no growth 05/06/2022    BC 5 Days no growth 04/21/2022       Radiology:  XR CHEST PORTABLE   Final Result   Right internal jugular central venous line terminates within the superior   vena cava with no evidence of pneumothorax. CT ABDOMEN PELVIS W IV CONTRAST Additional Contrast? None   Final Result   1. Rule out nonspecific postinflammatory changes in the right lower lobe of   the lungs. 2.  Small, rounded hypodensity, containing what appears to be air droplets   within the pancreatic head and near the ampulla. It lies immediately   adjacent to the duodenum and is in the path of the CBD. Question duodenal   diverticulum. An ampullary/pancreatic head lesion containing air cannot be   excluded. Upper GI study is recommended to see if this represents a   diverticulum. If no diverticulum is seen, then MRI of the abdomen with and   without intravenous contrast and including MRCP is recommended. 3.  Bilateral renal cortical cysts. At least 1 on the left may represent a   hemorrhagic or debris laden cyst.  Renal ultrasound is recommended. 4.  Small left inguinal hernia, which contains fat. XR FOOT RIGHT (MIN 3 VIEWS)   Final Result   Patient is status post amputation of the right 2nd toe. No acute osseous   abnormality. No evidence to suggest osteomyelitis. If osteomyelitis needs   to be excluded further, than MRI of the foot with and without intravenous   gadolinium can be considered.              Medical Records/Labs/Images review:   I personally reviewed and summarized previous records   All labs and imaging were reviewed independently     [id number] [facility] B May, a 63 y.o.-old male seen today for inpatient diabetes management     Diabetes Mellitus type 2   · Pt admit poor compliance with diet, HbA1c (4/22/22)- 7.7  · We recommend the following diabetes regimen   · Medium dose sliding scale  · Metformin er 750 mg BID   · Continue glucose check with meals and at bedtime   · Will titrate insulin dose based on the blood glucose trend & insulin requirement  · On discharge we recommend dc pt on Metformin er 750 mg BID with meals and Jardiance 10 mg daily     Infected diabetic foot   · Managed per primary and ID service     The above issues were reviewed with the patient who understood and agreed with the plan. Thank you for allowing us to participate in the care of this patient. Please do not hesitate to contact us with any additional questions. Elsa Glynn MD  PGY-3 IM resident     Cecille Benson MD  Endocrinologist, [facility]   [address] [place] [address]   Phone: [phone]  Fax: [phone]  --------------------------------------------  An electronic signature was used to authenticate this note.  Matt Galvez MD on 5/19/2022 at 11:21 AM

## 2022-05-26 ENCOUNTER — APPOINTMENT (OUTPATIENT)
Dept: CT IMAGING | Age: 62
End: 2022-05-26
Payer: MEDICARE

## 2022-05-26 ENCOUNTER — HOSPITAL ENCOUNTER (EMERGENCY)
Age: 62
Discharge: HOME | End: 2022-05-26
Attending: EMERGENCY MEDICINE
Payer: MEDICARE

## 2022-05-26 ENCOUNTER — APPOINTMENT (OUTPATIENT)
Dept: GENERAL RADIOLOGY | Age: 62
End: 2022-05-26
Payer: MEDICARE

## 2022-05-26 VITALS
HEIGHT: 72 IN | HEART RATE: 86 BPM | SYSTOLIC BLOOD PRESSURE: 109 MMHG | TEMPERATURE: 97.9 F | BODY MASS INDEX: 35.21 KG/M2 | RESPIRATION RATE: 16 BRPM | OXYGEN SATURATION: 98 % | DIASTOLIC BLOOD PRESSURE: 64 MMHG | WEIGHT: 260 LBS

## 2022-05-26 DIAGNOSIS — R42 DIZZINESS: Primary | ICD-10-CM

## 2022-05-26 DIAGNOSIS — S91.104A OPEN WOUND OF SECOND TOE OF RIGHT FOOT, INITIAL ENCOUNTER: ICD-10-CM

## 2022-05-26 DIAGNOSIS — Z48.89 ENCOUNTER FOR POST SURGICAL WOUND CHECK: ICD-10-CM

## 2022-05-26 DIAGNOSIS — E11.51 DM (DIABETES MELLITUS) WITH PERIPHERAL VASCULAR COMPLICATION (HCC): ICD-10-CM

## 2022-05-26 DIAGNOSIS — R51.9 NONINTRACTABLE HEADACHE, UNSPECIFIED CHRONICITY PATTERN, UNSPECIFIED HEADACHE TYPE: ICD-10-CM

## 2022-05-26 LAB
ALBUMIN SERPL-MCNC: 3.9 G/DL (ref 3.5–5.2)
ALP BLD-CCNC: 115 U/L (ref 40–129)
ALT SERPL-CCNC: 27 U/L (ref 0–40)
ANION GAP SERPL CALCULATED.3IONS-SCNC: 11 MMOL/L (ref 7–16)
ANISOCYTOSIS: ABNORMAL
AST SERPL-CCNC: 19 U/L (ref 0–39)
BACTERIA: ABNORMAL /HPF
BASOPHILS ABSOLUTE: 0.09 E9/L (ref 0–0.2)
BASOPHILS RELATIVE PERCENT: 0.9 % (ref 0–2)
BILIRUB SERPL-MCNC: 0.2 MG/DL (ref 0–1.2)
BILIRUBIN URINE: NEGATIVE
BLOOD, URINE: NEGATIVE
BUN BLDV-MCNC: 12 MG/DL (ref 6–23)
CALCIUM SERPL-MCNC: 9.1 MG/DL (ref 8.6–10.2)
CHLORIDE BLD-SCNC: 104 MMOL/L (ref 98–107)
CLARITY: CLEAR
CO2: 24 MMOL/L (ref 22–29)
COLOR: YELLOW
CREAT SERPL-MCNC: 0.9 MG/DL (ref 0.7–1.2)
EKG ATRIAL RATE: 76 BPM
EKG P AXIS: 59 DEGREES
EKG P-R INTERVAL: 208 MS
EKG Q-T INTERVAL: 384 MS
EKG QRS DURATION: 92 MS
EKG QTC CALCULATION (BAZETT): 432 MS
EKG R AXIS: 24 DEGREES
EKG T AXIS: 63 DEGREES
EKG VENTRICULAR RATE: 76 BPM
EOSINOPHILS ABSOLUTE: 0.09 E9/L (ref 0.05–0.5)
EOSINOPHILS RELATIVE PERCENT: 0.9 % (ref 0–6)
GFR AFRICAN AMERICAN: >60
GFR NON-AFRICAN AMERICAN: >60 ML/MIN/1.73
GLUCOSE BLD-MCNC: 165 MG/DL (ref 74–99)
GLUCOSE URINE: >=1000 MG/DL
HCT VFR BLD CALC: 36.7 % (ref 37–54)
HEMOGLOBIN: 12.1 G/DL (ref 12.5–16.5)
INFLUENZA A BY PCR: NOT DETECTED
INFLUENZA B BY PCR: NOT DETECTED
KETONES, URINE: NEGATIVE MG/DL
LACTIC ACID: 1.2 MMOL/L (ref 0.5–2.2)
LEUKOCYTE ESTERASE, URINE: NEGATIVE
LYMPHOCYTES ABSOLUTE: 3.01 E9/L (ref 1.5–4)
LYMPHOCYTES RELATIVE PERCENT: 31 % (ref 20–42)
MCH RBC QN AUTO: 29.2 PG (ref 26–35)
MCHC RBC AUTO-ENTMCNC: 33 % (ref 32–34.5)
MCV RBC AUTO: 88.4 FL (ref 80–99.9)
MONOCYTES ABSOLUTE: 0.39 E9/L (ref 0.1–0.95)
MONOCYTES RELATIVE PERCENT: 4.4 % (ref 2–12)
NEUTROPHILS ABSOLUTE: 6.11 E9/L (ref 1.8–7.3)
NEUTROPHILS RELATIVE PERCENT: 62.8 % (ref 43–80)
NITRITE, URINE: NEGATIVE
NUCLEATED RED BLOOD CELLS: 0 /100 WBC
PDW BLD-RTO: 13.7 FL (ref 11.5–15)
PH UA: 7 (ref 5–9)
PLATELET # BLD: 163 E9/L (ref 130–450)
PMV BLD AUTO: 11.7 FL (ref 7–12)
POTASSIUM REFLEX MAGNESIUM: 4.6 MMOL/L (ref 3.5–5)
PRO-BNP: 230 PG/ML (ref 0–125)
PROTEIN UA: ABNORMAL MG/DL
RBC # BLD: 4.15 E12/L (ref 3.8–5.8)
RBC UA: ABNORMAL /HPF (ref 0–2)
SARS-COV-2, NAAT: NOT DETECTED
SODIUM BLD-SCNC: 139 MMOL/L (ref 132–146)
SPECIFIC GRAVITY UA: 1.01 (ref 1–1.03)
TOTAL PROTEIN: 7 G/DL (ref 6.4–8.3)
TROPONIN, HIGH SENSITIVITY: 10 NG/L (ref 0–11)
UROBILINOGEN, URINE: 0.2 E.U./DL
WBC # BLD: 9.7 E9/L (ref 4.5–11.5)
WBC UA: ABNORMAL /HPF (ref 0–5)

## 2022-05-26 PROCEDURE — 96361 HYDRATE IV INFUSION ADD-ON: CPT

## 2022-05-26 PROCEDURE — 70450 CT HEAD/BRAIN W/O DYE: CPT

## 2022-05-26 PROCEDURE — 71045 X-RAY EXAM CHEST 1 VIEW: CPT

## 2022-05-26 PROCEDURE — 2580000003 HC RX 258: Performed by: STUDENT IN AN ORGANIZED HEALTH CARE EDUCATION/TRAINING PROGRAM

## 2022-05-26 PROCEDURE — 73630 X-RAY EXAM OF FOOT: CPT

## 2022-05-26 PROCEDURE — 85025 COMPLETE CBC W/AUTO DIFF WBC: CPT

## 2022-05-26 PROCEDURE — 83605 ASSAY OF LACTIC ACID: CPT

## 2022-05-26 PROCEDURE — 84484 ASSAY OF TROPONIN QUANT: CPT

## 2022-05-26 PROCEDURE — 87635 SARS-COV-2 COVID-19 AMP PRB: CPT

## 2022-05-26 PROCEDURE — 87502 INFLUENZA DNA AMP PROBE: CPT

## 2022-05-26 PROCEDURE — 93005 ELECTROCARDIOGRAM TRACING: CPT | Performed by: STUDENT IN AN ORGANIZED HEALTH CARE EDUCATION/TRAINING PROGRAM

## 2022-05-26 PROCEDURE — 6360000002 HC RX W HCPCS: Performed by: STUDENT IN AN ORGANIZED HEALTH CARE EDUCATION/TRAINING PROGRAM

## 2022-05-26 PROCEDURE — 81001 URINALYSIS AUTO W/SCOPE: CPT

## 2022-05-26 PROCEDURE — 96374 THER/PROPH/DIAG INJ IV PUSH: CPT

## 2022-05-26 PROCEDURE — 96375 TX/PRO/DX INJ NEW DRUG ADDON: CPT

## 2022-05-26 PROCEDURE — 83880 ASSAY OF NATRIURETIC PEPTIDE: CPT

## 2022-05-26 PROCEDURE — 6370000000 HC RX 637 (ALT 250 FOR IP): Performed by: STUDENT IN AN ORGANIZED HEALTH CARE EDUCATION/TRAINING PROGRAM

## 2022-05-26 PROCEDURE — 80053 COMPREHEN METABOLIC PANEL: CPT

## 2022-05-26 PROCEDURE — 99285 EMERGENCY DEPT VISIT HI MDM: CPT

## 2022-05-26 RX ORDER — ONDANSETRON 2 MG/ML
4 INJECTION INTRAMUSCULAR; INTRAVENOUS ONCE
Status: DISCONTINUED | OUTPATIENT
Start: 2022-05-26 | End: 2022-05-26

## 2022-05-26 RX ORDER — HYDROCODONE BITARTRATE AND ACETAMINOPHEN 5; 325 MG/1; MG/1
1 TABLET ORAL ONCE
Status: COMPLETED | OUTPATIENT
Start: 2022-05-26 | End: 2022-05-26

## 2022-05-26 RX ORDER — OXYCODONE HYDROCHLORIDE AND ACETAMINOPHEN 5; 325 MG/1; MG/1
1 TABLET ORAL EVERY 8 HOURS PRN
Qty: 12 TABLET | Refills: 0 | Status: SHIPPED | OUTPATIENT
Start: 2022-05-26 | End: 2022-05-31

## 2022-05-26 RX ORDER — METOCLOPRAMIDE HYDROCHLORIDE 5 MG/ML
10 INJECTION INTRAMUSCULAR; INTRAVENOUS ONCE
Status: COMPLETED | OUTPATIENT
Start: 2022-05-26 | End: 2022-05-26

## 2022-05-26 RX ORDER — 0.9 % SODIUM CHLORIDE 0.9 %
1000 INTRAVENOUS SOLUTION INTRAVENOUS ONCE
Status: COMPLETED | OUTPATIENT
Start: 2022-05-26 | End: 2022-05-26

## 2022-05-26 RX ORDER — MORPHINE SULFATE 4 MG/ML
4 INJECTION, SOLUTION INTRAMUSCULAR; INTRAVENOUS ONCE
Status: COMPLETED | OUTPATIENT
Start: 2022-05-26 | End: 2022-05-26

## 2022-05-26 RX ORDER — DIPHENHYDRAMINE HYDROCHLORIDE 50 MG/ML
25 INJECTION INTRAMUSCULAR; INTRAVENOUS ONCE
Status: COMPLETED | OUTPATIENT
Start: 2022-05-26 | End: 2022-05-26

## 2022-05-26 RX ADMIN — DIPHENHYDRAMINE HYDROCHLORIDE 25 MG: 50 INJECTION, SOLUTION INTRAMUSCULAR; INTRAVENOUS at 17:19

## 2022-05-26 RX ADMIN — MORPHINE SULFATE 4 MG: 4 INJECTION, SOLUTION INTRAMUSCULAR; INTRAVENOUS at 17:57

## 2022-05-26 RX ADMIN — METOCLOPRAMIDE 10 MG: 5 INJECTION, SOLUTION INTRAMUSCULAR; INTRAVENOUS at 17:19

## 2022-05-26 RX ADMIN — SODIUM CHLORIDE 1000 ML: 9 INJECTION, SOLUTION INTRAVENOUS at 16:12

## 2022-05-26 RX ADMIN — HYDROCODONE BITARTRATE AND ACETAMINOPHEN 1 TABLET: 5; 325 TABLET ORAL at 17:19

## 2022-05-26 ASSESSMENT — ENCOUNTER SYMPTOMS
VOMITING: 0
COUGH: 0
EYE PAIN: 0
EYE REDNESS: 0
BACK PAIN: 0
DIARRHEA: 0
EYE DISCHARGE: 0
ABDOMINAL PAIN: 0
SINUS PRESSURE: 0
NAUSEA: 0
SHORTNESS OF BREATH: 1
WHEEZING: 0
SORE THROAT: 0

## 2022-05-26 ASSESSMENT — PAIN SCALES - GENERAL
PAINLEVEL_OUTOF10: 9
PAINLEVEL_OUTOF10: 8

## 2022-05-26 NOTE — ED PROVIDER NOTES
[facility]  Department of Emergency Medicine     Written by: Chris Shaffer DO  Patient Name: iDa Donnelly  Attending Provider: Karen Dixon DO  Admit Date: 2022  3:06 PM  MRN: [id number]                   : 1960        Chief Complaint   Patient presents with    Headache     HA, dizziness, lethargic, anxiety x1 day, states had blood work done today from [facility] visiting nurse    Dizziness    Fatigue    Anxiety     \"I'm having severe anxiety\"    Shortness of Breath    - Chief complaint    Patient is a 71-year-old male past med history of hyperlipidemia, diabetes, COPD, hypertension, depression and diabetic foot ulcer status post amputation. Patient presents with chief complaint of generalized weakness, dizziness, headache and anxiety. Patient states that he was recently discharged in the hospital after undergoing a second toe amputation for diabetic foot infection. Patient states that he has been using an IV antibiotics at home. Patient notes that he woke up this morning and had generalized feeling of weakness he also has generalized headache some dizziness described as a room spinning sensation. Patient currently rates pain a 10 out of 10. Patient denies any exacerbating relieving factors. States that symptoms have been constant onset. Patient denies any significant fevers. Patient notes that he was supposed to follow-up with his podiatrist did not make the appointment today. Patient denies any nausea, vomiting, chest pain, cough, abdominal pain, constipation, diarrhea, numbness, or tingling. The history is provided by the patient. No  was used. Review of Systems   Constitutional: Positive for fatigue. Negative for chills and fever. HENT: Negative for ear pain, sinus pressure and sore throat. Eyes: Negative for pain, discharge and redness. Respiratory: Positive for shortness of breath. Negative for cough and wheezing. Cardiovascular: Negative for chest pain. Gastrointestinal: Negative for abdominal pain, diarrhea, nausea and vomiting. Genitourinary: Negative for dysuria and frequency. Musculoskeletal: Negative for arthralgias and back pain. Skin: Negative for rash and wound. Neurological: Positive for dizziness, weakness and headaches. Hematological: Negative for adenopathy. Psychiatric/Behavioral: The patient is nervous/anxious. All other systems reviewed and are negative. Physical Exam  Vitals and nursing note reviewed. Constitutional:       Appearance: He is well-developed. HENT:      Head: Normocephalic and atraumatic. Eyes:      Conjunctiva/sclera: Conjunctivae normal.   Cardiovascular:      Rate and Rhythm: Normal rate and regular rhythm. Heart sounds: Normal heart sounds. No murmur heard. Pulmonary:      Effort: Pulmonary effort is normal. No respiratory distress. Breath sounds: Normal breath sounds. No wheezing or rales. Abdominal:      General: Bowel sounds are normal.      Palpations: Abdomen is soft. Tenderness: There is no abdominal tenderness. There is no guarding or rebound. Musculoskeletal:         General: No tenderness or deformity. Cervical back: Normal range of motion and neck supple. Comments: Second toe amputation noted to right foot, dressings clean dry and intact, incision looks well approximated no significant purulent drainage noted. Skin:     General: Skin is warm and dry. Neurological:      Mental Status: He is alert and oriented to person, place, and time. Cranial Nerves: No cranial nerve deficit. Coordination: Coordination normal.      Comments: On neuro exam no focal deficits, pupils equal and reactive to light, extraocular eye movements are intact, muscle strength 5 out of 5 to bilateral upper and lower extremities, sensation intact to light touch.           Procedures   EKG #1:  Interpreted by emergency department physician unless otherwise noted. Time:  1536    Rate: 76  Rhythm: Sinus. Interpretation: EKG obtained demonstrated normal sinus rhythm, rate 76, normal axis, , incomplete right bundle branch block, no acute ST segment changes. .  Comparison: stable as compared to patient's most recent EKG. MDM  Number of Diagnoses or Management Options  Dizziness  DM (diabetes mellitus) with peripheral vascular complication (HCC)  Encounter for post surgical wound check  Nonintractable headache, unspecified chronicity pattern, unspecified headache type  Open wound of second toe of right foot, initial encounter  Diagnosis management comments: Patient is a 59-year-old male past medical history of hyperlipidemia, diabetes, COPD, hypertension, depression diabetic foot ulcer status post amputation. Patient presents with chief complaint of headache, dizziness, fatigue anxiety and shortness of breath. Vital signs stable presentation. On physical exam heart regular rate and rhythm, lungs clear to auscultation bilaterally, abdomen soft nontender no rigidity rebound or guarding. No focal deficits on neurological exam.  On examination the right foot second toe amputation incision appears to be well approximated clean dry and intact no purulent drainage noted no significant warmth or erythema noted. EKG obtained demonstrated no acute ischemic changes. Laboratory work obtained CBC demonstrated mild anemia hemoglobin 12.1, CMP obtained demonstrated mildly elevated glucose of 165, troponin 10, proBNP 230. Urinalysis was obtained and was not indicative infection, lactic acid 1.2, flu negative, COVID-negative. CT scan of the head demonstrated no acute abnormalities, chest x-ray demonstrated no acute abnormalities.   Right foot x-rays obtained demonstrated bony destruction with acute osteomyelitis involving the head of the second metatarsal and medial aspect of the head of the third metatarsal.  Patient given IV fluids, Norco, morphine, Reglan and Benadryl with improvement in symptoms. Case discussed with podiatry patient currently on antibiotics laboratory work and imaging is reassuring. On reevaluation patient notes some improvement in symptoms. Decision made to discharge patient. Patient given prescription of short-term pain medications. In addition patient instructed to follow-up with primary care doctor, podiatrist as well as infectious disease doctor. The patient notes any new worrisome symptoms he should return to the emergency department for evaluation. Plan of care discussed with patient including discharge, all questions were answered, patient was in agreement plan of care and discharged home in stable condition. Amount and/or Complexity of Data Reviewed  Clinical lab tests: ordered and reviewed  Tests in the radiology section of CPT®: ordered and reviewed  Decide to obtain previous medical records or to obtain history from someone other than the patient: yes    Risk of Complications, Morbidity, and/or Mortality  Presenting problems: moderate  Diagnostic procedures: moderate  Management options: moderate    Patient Progress  Patient progress: stable             --------------------------------------------- PAST HISTORY ---------------------------------------------  Past Medical History:  has a past medical history of Anxiety, [facility] Depression, DM (diabetes mellitus) ([facility] Frostbite, HLD (hyperlipidemia), HTN (hypertension), and Sleep apnea. Past Surgical History:  has a past surgical history that includes Cholecystectomy; back surgery; Coronary artery bypass graft; Uvulopalatopharygoplasty; Toe amputation (Right, 4/22/2022); Foot Debridement (Right, 4/25/2022); Foot Debridement (Right, 5/7/2022); Insert Picc Line (5/9/2022); and Foot Debridement (Right, 5/10/2022). Social History:  reports that he has been smoking cigarettes. He started smoking about 42 years ago. He has been smoking about 2.50 packs per day. He does not have any smokeless tobacco history on file. He reports that he does not drink alcohol and does not use drugs. Family History: family history includes Dementia in his mother; Diabetes in his sister; Stroke in his father. The patients home medications have been reviewed. Allergies: Patient has no known allergies.     -------------------------------------------------- RESULTS -------------------------------------------------  Labs:  Results for orders placed or performed during the hospital encounter of 05/26/22   COVID-19, Rapid    Specimen: Nasopharyngeal Swab   Result Value Ref Range    SARS-CoV-2, NAAT Not Detected Not Detected   RAPID INFLUENZA A/B ANTIGENS    Specimen: Nasopharyngeal   Result Value Ref Range    Influenza A by PCR Not Detected Not Detected    Influenza B by PCR Not Detected Not Detected   CBC with Auto Differential   Result Value Ref Range    WBC 9.7 4.5 - 11.5 E9/L    RBC 4.15 3.80 - 5.80 E12/L    Hemoglobin 12.1 (L) 12.5 - 16.5 g/dL    Hematocrit 36.7 (L) 37.0 - 54.0 %    MCV 88.4 80.0 - 99.9 fL    MCH 29.2 26.0 - 35.0 pg    MCHC 33.0 32.0 - 34.5 %    RDW 13.7 11.5 - 15.0 fL    Platelets 940 604 - 809 E9/L    MPV 11.7 7.0 - 12.0 fL    Neutrophils % 62.8 43.0 - 80.0 %    Lymphocytes % 31.0 20.0 - 42.0 %    Monocytes % 4.4 2.0 - 12.0 %    Eosinophils % 0.9 0.0 - 6.0 %    Basophils % 0.9 0.0 - 2.0 %    Neutrophils Absolute 6.11 1.80 - 7.30 E9/L    Lymphocytes Absolute 3.01 1.50 - 4.00 E9/L    Monocytes Absolute 0.39 0.10 - 0.95 E9/L    Eosinophils Absolute 0.09 0.05 - 0.50 E9/L    Basophils Absolute 0.09 0.00 - 0.20 E9/L    nRBC 0.0 /100 WBC    Anisocytosis 1+    Comprehensive Metabolic Panel w/ Reflex to MG   Result Value Ref Range    Sodium 139 132 - 146 mmol/L    Potassium reflex Magnesium 4.6 3.5 - 5.0 mmol/L    Chloride 104 98 - 107 mmol/L    CO2 24 22 - 29 mmol/L    Anion Gap 11 7 - 16 mmol/L    Glucose 165 (H) 74 - 99 mg/dL BUN 12 6 - 23 mg/dL    CREATININE 0.9 0.7 - 1.2 mg/dL    GFR Non-African American >60 >=60 mL/min/1.73    GFR African American >60     Calcium 9.1 8.6 - 10.2 mg/dL    Total Protein 7.0 6.4 - 8.3 g/dL    Albumin 3.9 3.5 - 5.2 g/dL    Total Bilirubin 0.2 0.0 - 1.2 mg/dL    Alkaline Phosphatase 115 40 - 129 U/L    ALT 27 0 - 40 U/L    AST 19 0 - 39 U/L   Urinalysis with Microscopic   Result Value Ref Range    Color, UA Yellow Straw/Yellow    Clarity, UA Clear Clear    Glucose, Ur >=1000 (A) Negative mg/dL    Bilirubin Urine Negative Negative    Ketones, Urine Negative Negative mg/dL    Specific Gravity, UA 1.015 1.005 - 1.030    Blood, Urine Negative Negative    pH, UA 7.0 5.0 - 9.0    Protein, UA TRACE Negative mg/dL    Urobilinogen, Urine 0.2 <2.0 E.U./dL    Nitrite, Urine Negative Negative    Leukocyte Esterase, Urine Negative Negative    WBC, UA NONE 0 - 5 /HPF    RBC, UA NONE 0 - 2 /HPF    Bacteria, UA NONE SEEN None Seen /HPF   Lactic Acid   Result Value Ref Range    Lactic Acid 1.2 0.5 - 2.2 mmol/L   Troponin   Result Value Ref Range    Troponin, High Sensitivity 10 0 - 11 ng/L   Brain Natriuretic Peptide   Result Value Ref Range    Pro- (H) 0 - 125 pg/mL   EKG 12 Lead   Result Value Ref Range    Ventricular Rate 76 BPM    Atrial Rate 76 BPM    P-R Interval 208 ms    QRS Duration 92 ms    Q-T Interval 384 ms    QTc Calculation (Bazett) 432 ms    P Axis 59 degrees    R Axis 24 degrees    T Axis 63 degrees       Radiology:  CT Head WO Contrast   Final Result   No acute intracranial abnormality. XR FOOT RIGHT (MIN 3 VIEWS)   Final Result      Bone destruction and probable acute osteomyelitis involving the head of the   2nd metatarsal and medial aspect of the head of the 3rd metatarsal/proximal   3rd phalanx. XR CHEST PORTABLE   Final Result   Postop changes in the mediastinum, mild cardiomegaly. No acute process.              ------------------------- NURSING NOTES AND VITALS REVIEWED ---------------------------  Date / Time Roomed:  5/26/2022  3:06 PM  ED Bed Assignment:  12/12    The nursing notes within the ED encounter and vital signs as below have been reviewed. /64   Pulse 86   Temp 97.9 °F (36.6 °C) (Temporal)   Resp 16   Ht 6' (1.829 m)   Wt 260 lb (117.9 kg)   SpO2 98%   BMI 35.26 kg/m²   Oxygen Saturation Interpretation: Normal      ------------------------------------------ PROGRESS NOTES ------------------------------------------  10:15 PM EDT  I have spoken with the patient and discussed todays results, in addition to providing specific details for the plan of care and counseling regarding the diagnosis and prognosis. Their questions are answered at this time and they are agreeable with the plan. I discussed at length with them reasons for immediate return here for re evaluation. They will followup with their Podiatrist and primary care physician by calling their office tomorrow. --------------------------------- ADDITIONAL PROVIDER NOTES ---------------------------------  At this time the patient is without objective evidence of an acute process requiring hospitalization or inpatient management. They have remained hemodynamically stable throughout their entire ED visit and are stable for discharge with outpatient follow-up. The plan has been discussed in detail and they are aware of the specific conditions for emergent return, as well as the importance of follow-up. Discharge Medication List as of 5/26/2022  6:27 PM      START taking these medications    Details   oxyCODONE-acetaminophen (PERCOCET) 5-325 MG per tablet Take 1 tablet by mouth every 8 hours as needed for Pain for up to 5 days. Intended supply: 3 days. Take lowest dose possible to manage pain, Disp-12 tablet, R-0Normal             Diagnosis:  1. Dizziness    2. Nonintractable headache, unspecified chronicity pattern, unspecified headache type    3.  Encounter for post surgical wound check 4. DM (diabetes mellitus) with peripheral vascular complication (HCC)    5. Open wound of second toe of right foot, initial encounter        Disposition:  Patient's disposition: Discharge to home  Patient's condition is stable. Patient was seen and evaluated by myself and my attending Sissy Cardoso DO. Assessment and Plan discussed with attending provider, please see attestation for final plan of care.      DO Lala Pepper DO  Resident  05/26/22 0315

## 2022-05-30 LAB
FUNGUS (MYCOLOGY) CULTURE: NORMAL
FUNGUS STAIN: NORMAL

## 2022-06-13 LAB
FUNGUS (MYCOLOGY) CULTURE: NORMAL
FUNGUS (MYCOLOGY) CULTURE: NORMAL
FUNGUS STAIN: NORMAL
FUNGUS STAIN: NORMAL

## 2022-06-14 LAB
AFB CULTURE (MYCOBACTERIA): NORMAL
AFB SMEAR: NORMAL

## 2022-06-28 LAB
AFB CULTURE (MYCOBACTERIA): NORMAL
AFB CULTURE (MYCOBACTERIA): NORMAL
AFB SMEAR: NORMAL
AFB SMEAR: NORMAL

## 2022-11-09 ENCOUNTER — APPOINTMENT (OUTPATIENT)
Dept: ULTRASOUND IMAGING | Age: 62
DRG: 603 | End: 2022-11-09
Payer: MEDICARE

## 2022-11-09 ENCOUNTER — APPOINTMENT (OUTPATIENT)
Dept: CT IMAGING | Age: 62
DRG: 603 | End: 2022-11-09
Payer: MEDICARE

## 2022-11-09 ENCOUNTER — HOSPITAL ENCOUNTER (INPATIENT)
Age: 62
LOS: 2 days | Discharge: HOME | DRG: 603 | End: 2022-11-11
Attending: EMERGENCY MEDICINE | Admitting: INTERNAL MEDICINE
Payer: MEDICARE

## 2022-11-09 ENCOUNTER — APPOINTMENT (OUTPATIENT)
Dept: GENERAL RADIOLOGY | Age: 62
DRG: 603 | End: 2022-11-09
Payer: MEDICARE

## 2022-11-09 DIAGNOSIS — L03.115 CELLULITIS OF RIGHT LEG: ICD-10-CM

## 2022-11-09 DIAGNOSIS — R11.2 NAUSEA AND VOMITING, UNSPECIFIED VOMITING TYPE: Primary | ICD-10-CM

## 2022-11-09 LAB
ALBUMIN SERPL-MCNC: 3.9 G/DL (ref 3.5–5.2)
ALP BLD-CCNC: 108 U/L (ref 40–129)
ALT SERPL-CCNC: 10 U/L (ref 0–40)
ANION GAP SERPL CALCULATED.3IONS-SCNC: 11 MMOL/L (ref 7–16)
AST SERPL-CCNC: 23 U/L (ref 0–39)
BASOPHILS ABSOLUTE: 0.07 E9/L (ref 0–0.2)
BASOPHILS RELATIVE PERCENT: 0.7 % (ref 0–2)
BILIRUB SERPL-MCNC: 0.3 MG/DL (ref 0–1.2)
BUN BLDV-MCNC: 16 MG/DL (ref 6–23)
CALCIUM SERPL-MCNC: 9.4 MG/DL (ref 8.6–10.2)
CHLORIDE BLD-SCNC: 100 MMOL/L (ref 98–107)
CO2: 27 MMOL/L (ref 22–29)
CREAT SERPL-MCNC: 1 MG/DL (ref 0.7–1.2)
EOSINOPHILS ABSOLUTE: 0.16 E9/L (ref 0.05–0.5)
EOSINOPHILS RELATIVE PERCENT: 1.6 % (ref 0–6)
GFR SERPL CREATININE-BSD FRML MDRD: >60 ML/MIN/1.73
GLUCOSE BLD-MCNC: 208 MG/DL (ref 74–99)
HCT VFR BLD CALC: 43.8 % (ref 37–54)
HEMOGLOBIN: 14.6 G/DL (ref 12.5–16.5)
IMMATURE GRANULOCYTES #: 0.03 E9/L
IMMATURE GRANULOCYTES %: 0.3 % (ref 0–5)
LIPASE: 28 U/L (ref 13–60)
LYMPHOCYTES ABSOLUTE: 2.2 E9/L (ref 1.5–4)
LYMPHOCYTES RELATIVE PERCENT: 22.6 % (ref 20–42)
MCH RBC QN AUTO: 29.8 PG (ref 26–35)
MCHC RBC AUTO-ENTMCNC: 33.3 % (ref 32–34.5)
MCV RBC AUTO: 89.4 FL (ref 80–99.9)
MONOCYTES ABSOLUTE: 0.78 E9/L (ref 0.1–0.95)
MONOCYTES RELATIVE PERCENT: 8 % (ref 2–12)
NEUTROPHILS ABSOLUTE: 6.5 E9/L (ref 1.8–7.3)
NEUTROPHILS RELATIVE PERCENT: 66.8 % (ref 43–80)
PDW BLD-RTO: 14.3 FL (ref 11.5–15)
PLATELET # BLD: 198 E9/L (ref 130–450)
PMV BLD AUTO: 11.1 FL (ref 7–12)
POTASSIUM REFLEX MAGNESIUM: 5 MMOL/L (ref 3.5–5)
PRO-BNP: 135 PG/ML (ref 0–125)
RBC # BLD: 4.9 E12/L (ref 3.8–5.8)
SARS-COV-2, NAAT: NOT DETECTED
SODIUM BLD-SCNC: 138 MMOL/L (ref 132–146)
TOTAL PROTEIN: 7.1 G/DL (ref 6.4–8.3)
TROPONIN, HIGH SENSITIVITY: 14 NG/L (ref 0–11)
TROPONIN, HIGH SENSITIVITY: 15 NG/L (ref 0–11)
WBC # BLD: 9.7 E9/L (ref 4.5–11.5)

## 2022-11-09 PROCEDURE — 93971 EXTREMITY STUDY: CPT

## 2022-11-09 PROCEDURE — 6370000000 HC RX 637 (ALT 250 FOR IP): Performed by: INTERNAL MEDICINE

## 2022-11-09 PROCEDURE — 84484 ASSAY OF TROPONIN QUANT: CPT

## 2022-11-09 PROCEDURE — 71045 X-RAY EXAM CHEST 1 VIEW: CPT

## 2022-11-09 PROCEDURE — 85025 COMPLETE CBC W/AUTO DIFF WBC: CPT

## 2022-11-09 PROCEDURE — 83690 ASSAY OF LIPASE: CPT

## 2022-11-09 PROCEDURE — 2580000003 HC RX 258: Performed by: STUDENT IN AN ORGANIZED HEALTH CARE EDUCATION/TRAINING PROGRAM

## 2022-11-09 PROCEDURE — 96374 THER/PROPH/DIAG INJ IV PUSH: CPT

## 2022-11-09 PROCEDURE — 6360000004 HC RX CONTRAST MEDICATION: Performed by: RADIOLOGY

## 2022-11-09 PROCEDURE — 6370000000 HC RX 637 (ALT 250 FOR IP): Performed by: STUDENT IN AN ORGANIZED HEALTH CARE EDUCATION/TRAINING PROGRAM

## 2022-11-09 PROCEDURE — 83880 ASSAY OF NATRIURETIC PEPTIDE: CPT

## 2022-11-09 PROCEDURE — 80053 COMPREHEN METABOLIC PANEL: CPT

## 2022-11-09 PROCEDURE — 6360000002 HC RX W HCPCS: Performed by: STUDENT IN AN ORGANIZED HEALTH CARE EDUCATION/TRAINING PROGRAM

## 2022-11-09 PROCEDURE — 99223 1ST HOSP IP/OBS HIGH 75: CPT | Performed by: INTERNAL MEDICINE

## 2022-11-09 PROCEDURE — 87040 BLOOD CULTURE FOR BACTERIA: CPT

## 2022-11-09 PROCEDURE — 99285 EMERGENCY DEPT VISIT HI MDM: CPT

## 2022-11-09 PROCEDURE — 87635 SARS-COV-2 COVID-19 AMP PRB: CPT

## 2022-11-09 PROCEDURE — 74177 CT ABD & PELVIS W/CONTRAST: CPT

## 2022-11-09 PROCEDURE — 1200000000 HC SEMI PRIVATE

## 2022-11-09 PROCEDURE — 96361 HYDRATE IV INFUSION ADD-ON: CPT

## 2022-11-09 RX ORDER — ATORVASTATIN CALCIUM 40 MG/1
40 TABLET, FILM COATED ORAL NIGHTLY
Status: DISCONTINUED | OUTPATIENT
Start: 2022-11-09 | End: 2022-11-11 | Stop reason: HOSPADM

## 2022-11-09 RX ORDER — DOXYCYCLINE HYCLATE 100 MG/1
100 CAPSULE ORAL ONCE
Status: COMPLETED | OUTPATIENT
Start: 2022-11-09 | End: 2022-11-09

## 2022-11-09 RX ORDER — FENTANYL CITRATE 50 UG/ML
50 INJECTION, SOLUTION INTRAMUSCULAR; INTRAVENOUS ONCE
Status: COMPLETED | OUTPATIENT
Start: 2022-11-09 | End: 2022-11-09

## 2022-11-09 RX ORDER — HYDROCODONE BITARTRATE AND ACETAMINOPHEN 5; 325 MG/1; MG/1
1 TABLET ORAL EVERY 4 HOURS PRN
Status: DISCONTINUED | OUTPATIENT
Start: 2022-11-09 | End: 2022-11-11 | Stop reason: HOSPADM

## 2022-11-09 RX ORDER — INSULIN LISPRO 100 [IU]/ML
0-8 INJECTION, SOLUTION INTRAVENOUS; SUBCUTANEOUS
Status: DISCONTINUED | OUTPATIENT
Start: 2022-11-10 | End: 2022-11-11 | Stop reason: HOSPADM

## 2022-11-09 RX ORDER — LEVOTHYROXINE SODIUM 0.03 MG/1
25 TABLET ORAL DAILY
Status: DISCONTINUED | OUTPATIENT
Start: 2022-11-10 | End: 2022-11-11 | Stop reason: HOSPADM

## 2022-11-09 RX ORDER — 0.9 % SODIUM CHLORIDE 0.9 %
1000 INTRAVENOUS SOLUTION INTRAVENOUS ONCE
Status: COMPLETED | OUTPATIENT
Start: 2022-11-09 | End: 2022-11-09

## 2022-11-09 RX ORDER — FENTANYL CITRATE 50 UG/ML
50 INJECTION, SOLUTION INTRAMUSCULAR; INTRAVENOUS ONCE
Status: DISCONTINUED | OUTPATIENT
Start: 2022-11-09 | End: 2022-11-11 | Stop reason: HOSPADM

## 2022-11-09 RX ORDER — INSULIN LISPRO 100 [IU]/ML
0-4 INJECTION, SOLUTION INTRAVENOUS; SUBCUTANEOUS NIGHTLY
Status: DISCONTINUED | OUTPATIENT
Start: 2022-11-09 | End: 2022-11-11 | Stop reason: HOSPADM

## 2022-11-09 RX ORDER — LISINOPRIL AND HYDROCHLOROTHIAZIDE 20; 12.5 MG/1; MG/1
20 TABLET ORAL DAILY
Status: DISCONTINUED | OUTPATIENT
Start: 2022-11-10 | End: 2022-11-10 | Stop reason: CLARIF

## 2022-11-09 RX ORDER — PREGABALIN 50 MG/1
100 CAPSULE ORAL NIGHTLY
Status: DISCONTINUED | OUTPATIENT
Start: 2022-11-09 | End: 2022-11-11 | Stop reason: HOSPADM

## 2022-11-09 RX ORDER — ONDANSETRON 2 MG/ML
4 INJECTION INTRAMUSCULAR; INTRAVENOUS EVERY 6 HOURS PRN
Status: DISCONTINUED | OUTPATIENT
Start: 2022-11-09 | End: 2022-11-11 | Stop reason: HOSPADM

## 2022-11-09 RX ORDER — GLIPIZIDE 5 MG/1
10 TABLET ORAL
Status: DISCONTINUED | OUTPATIENT
Start: 2022-11-10 | End: 2022-11-11 | Stop reason: HOSPADM

## 2022-11-09 RX ADMIN — PREGABALIN 100 MG: 50 CAPSULE ORAL at 22:23

## 2022-11-09 RX ADMIN — HYDROCODONE BITARTRATE AND ACETAMINOPHEN 1 TABLET: 5; 325 TABLET ORAL at 22:23

## 2022-11-09 RX ADMIN — FENTANYL CITRATE 50 MCG: 50 INJECTION, SOLUTION INTRAMUSCULAR; INTRAVENOUS at 19:04

## 2022-11-09 RX ADMIN — IOPAMIDOL 65 ML: 755 INJECTION, SOLUTION INTRAVENOUS at 19:52

## 2022-11-09 RX ADMIN — WATER 2000 MG: 1 INJECTION INTRAMUSCULAR; INTRAVENOUS; SUBCUTANEOUS at 21:29

## 2022-11-09 RX ADMIN — SODIUM CHLORIDE 1000 ML: 9 INJECTION, SOLUTION INTRAVENOUS at 19:02

## 2022-11-09 RX ADMIN — DOXYCYCLINE HYCLATE 100 MG: 100 CAPSULE ORAL at 21:29

## 2022-11-09 ASSESSMENT — ENCOUNTER SYMPTOMS
DIARRHEA: 0
EYE PAIN: 0
ABDOMINAL PAIN: 1
COLOR CHANGE: 1
EYE REDNESS: 0
VOMITING: 1
COUGH: 1
SORE THROAT: 0
EYE DISCHARGE: 0
NAUSEA: 1
SINUS PRESSURE: 0
BACK PAIN: 0
WHEEZING: 0
SHORTNESS OF BREATH: 0

## 2022-11-09 ASSESSMENT — PAIN SCALES - GENERAL
PAINLEVEL_OUTOF10: 9
PAINLEVEL_OUTOF10: 9

## 2022-11-09 NOTE — ED TRIAGE NOTES
FIRST PROVIDER CONTACT ASSESSMENT NOTE       Department of Emergency Medicine                 First Provider Note            22  4:04 PM EST    Date of Encounter: No admission date for patient encounter. Patient Name: Elo Fuentes  : 1960  MRN: [id number]    Chief Complaint: Emesis (Ongoing for wk), Wound Check (R leg for wks), and Cough (For wks)      History of Present Illness:   Elo Fuentes is a 63 y.o. male who presents to the ED for COUGH for a month. Smoker. Has sore on right lower leg. Now with N/V. States he has been vomiting a week. States he can't keep anything down. Reports diffuse abdominal pain         Focused Physical Exam:  VS:    ED Triage Vitals   BP Temp Temp src Pulse Resp SpO2 Height Weight   -- -- -- -- -- -- -- --        Physical Ex: Constitutional: Alert and non-toxic. Medical History:  has a past medical history of Anxiety, COPD (chronic obstructive pulmonary disease) ([facility] Depression, DM (diabetes mellitus) ([facility] Frostbite, HLD (hyperlipidemia), HTN (hypertension), and Sleep apnea. Surgical History:  has a past surgical history that includes Cholecystectomy; back surgery; Coronary artery bypass graft; Uvulopalatopharygoplasty; Toe amputation (Right, 2022); Foot Debridement (Right, 2022); Foot Debridement (Right, 2022); Insert Picc Line (2022); and Foot Debridement (Right, 5/10/2022). Social History:  reports that he has been smoking cigarettes. He started smoking about 42 years ago. He has been smoking an average of 2.5 packs per day. He does not have any smokeless tobacco history on file. He reports that he does not drink alcohol and does not use drugs. Family History: family history includes Dementia in his mother; Diabetes in his sister; Stroke in his father. Allergies: Patient has no known allergies.      Initial Plan of Care: Initiate Treatment-Testing, Proceed toTreatment Area When Bed Available for ED Attending/MLP to Continue Care      ---END OF FIRST PROVIDER CONTACT ASSESSMENT NOTE---  Electronically signed by aZy Herbert PA-C   DD: 11/9/22

## 2022-11-10 ENCOUNTER — APPOINTMENT (OUTPATIENT)
Dept: GENERAL RADIOLOGY | Age: 62
DRG: 603 | End: 2022-11-10
Payer: MEDICARE

## 2022-11-10 LAB
ADENOVIRUS BY PCR: NOT DETECTED
ALBUMIN SERPL-MCNC: 3.5 G/DL (ref 3.5–5.2)
ALP BLD-CCNC: 90 U/L (ref 40–129)
ALT SERPL-CCNC: 23 U/L (ref 0–40)
AMPHETAMINE SCREEN, URINE: NOT DETECTED
ANION GAP SERPL CALCULATED.3IONS-SCNC: 11 MMOL/L (ref 7–16)
ANTISTREPTOLYSIN-O: 194 IU/ML (ref 0–200)
ANTISTREPTOLYSIN-O: 203 IU/ML (ref 0–200)
AST SERPL-CCNC: 25 U/L (ref 0–39)
BARBITURATE SCREEN URINE: NOT DETECTED
BASOPHILS ABSOLUTE: 0.05 E9/L (ref 0–0.2)
BASOPHILS RELATIVE PERCENT: 0.7 % (ref 0–2)
BENZODIAZEPINE SCREEN, URINE: NOT DETECTED
BILIRUB SERPL-MCNC: 0.2 MG/DL (ref 0–1.2)
BORDETELLA PARAPERTUSSIS BY PCR: NOT DETECTED
BORDETELLA PERTUSSIS BY PCR: NOT DETECTED
BUN BLDV-MCNC: 14 MG/DL (ref 6–23)
C-REACTIVE PROTEIN: 2.4 MG/DL (ref 0–0.4)
CALCIUM SERPL-MCNC: 8.7 MG/DL (ref 8.6–10.2)
CANNABINOID SCREEN URINE: POSITIVE
CHLAMYDOPHILIA PNEUMONIAE BY PCR: NOT DETECTED
CHLORIDE BLD-SCNC: 102 MMOL/L (ref 98–107)
CO2: 21 MMOL/L (ref 22–29)
COCAINE METABOLITE SCREEN URINE: NOT DETECTED
CORONAVIRUS 229E BY PCR: NOT DETECTED
CORONAVIRUS HKU1 BY PCR: NOT DETECTED
CORONAVIRUS NL63 BY PCR: NOT DETECTED
CORONAVIRUS OC43 BY PCR: NOT DETECTED
CREAT SERPL-MCNC: 0.9 MG/DL (ref 0.7–1.2)
EOSINOPHILS ABSOLUTE: 0.16 E9/L (ref 0.05–0.5)
EOSINOPHILS RELATIVE PERCENT: 2.2 % (ref 0–6)
FENTANYL SCREEN, URINE: NOT DETECTED
GFR SERPL CREATININE-BSD FRML MDRD: >60 ML/MIN/1.73
GLUCOSE BLD-MCNC: 183 MG/DL (ref 74–99)
HBA1C MFR BLD: 8.7 % (ref 4–5.6)
HCT VFR BLD CALC: 40.6 % (ref 37–54)
HEMOGLOBIN: 13.4 G/DL (ref 12.5–16.5)
HUMAN METAPNEUMOVIRUS BY PCR: NOT DETECTED
HUMAN RHINOVIRUS/ENTEROVIRUS BY PCR: NOT DETECTED
IMMATURE GRANULOCYTES #: 0.04 E9/L
IMMATURE GRANULOCYTES %: 0.5 % (ref 0–5)
INFLUENZA A BY PCR: NOT DETECTED
INFLUENZA B BY PCR: NOT DETECTED
LYMPHOCYTES ABSOLUTE: 2.1 E9/L (ref 1.5–4)
LYMPHOCYTES RELATIVE PERCENT: 28.4 % (ref 20–42)
Lab: ABNORMAL
MCH RBC QN AUTO: 29.5 PG (ref 26–35)
MCHC RBC AUTO-ENTMCNC: 33 % (ref 32–34.5)
MCV RBC AUTO: 89.4 FL (ref 80–99.9)
METER GLUCOSE: 157 MG/DL (ref 74–99)
METER GLUCOSE: 181 MG/DL (ref 74–99)
METER GLUCOSE: 189 MG/DL (ref 74–99)
METER GLUCOSE: 213 MG/DL (ref 74–99)
METER GLUCOSE: 227 MG/DL (ref 74–99)
METHADONE SCREEN, URINE: NOT DETECTED
MONOCYTES ABSOLUTE: 0.66 E9/L (ref 0.1–0.95)
MONOCYTES RELATIVE PERCENT: 8.9 % (ref 2–12)
MYCOPLASMA PNEUMONIAE BY PCR: NOT DETECTED
NEUTROPHILS ABSOLUTE: 4.39 E9/L (ref 1.8–7.3)
NEUTROPHILS RELATIVE PERCENT: 59.3 % (ref 43–80)
OPIATE SCREEN URINE: NOT DETECTED
OXYCODONE URINE: NOT DETECTED
PARAINFLUENZA VIRUS 1 BY PCR: NOT DETECTED
PARAINFLUENZA VIRUS 2 BY PCR: NOT DETECTED
PARAINFLUENZA VIRUS 3 BY PCR: NOT DETECTED
PARAINFLUENZA VIRUS 4 BY PCR: NOT DETECTED
PDW BLD-RTO: 14.4 FL (ref 11.5–15)
PHENCYCLIDINE SCREEN URINE: NOT DETECTED
PLATELET # BLD: 161 E9/L (ref 130–450)
PMV BLD AUTO: 11.8 FL (ref 7–12)
POTASSIUM SERPL-SCNC: 4.4 MMOL/L (ref 3.5–5)
PROCALCITONIN: 0.2 NG/ML (ref 0–0.08)
RBC # BLD: 4.54 E12/L (ref 3.8–5.8)
RESPIRATORY SYNCYTIAL VIRUS BY PCR: NOT DETECTED
SARS-COV-2, PCR: NOT DETECTED
SEDIMENTATION RATE, ERYTHROCYTE: 22 MM/HR (ref 0–15)
SODIUM BLD-SCNC: 134 MMOL/L (ref 132–146)
T4 FREE: 1.05 NG/DL (ref 0.93–1.7)
TOTAL PROTEIN: 6.3 G/DL (ref 6.4–8.3)
TSH SERPL DL<=0.05 MIU/L-ACNC: 2.64 UIU/ML (ref 0.27–4.2)
WBC # BLD: 7.4 E9/L (ref 4.5–11.5)

## 2022-11-10 PROCEDURE — 0202U NFCT DS 22 TRGT SARS-COV-2: CPT

## 2022-11-10 PROCEDURE — 84145 PROCALCITONIN (PCT): CPT

## 2022-11-10 PROCEDURE — 86140 C-REACTIVE PROTEIN: CPT

## 2022-11-10 PROCEDURE — 84443 ASSAY THYROID STIM HORMONE: CPT

## 2022-11-10 PROCEDURE — 87081 CULTURE SCREEN ONLY: CPT

## 2022-11-10 PROCEDURE — 1200000000 HC SEMI PRIVATE

## 2022-11-10 PROCEDURE — 97165 OT EVAL LOW COMPLEX 30 MIN: CPT

## 2022-11-10 PROCEDURE — 87186 SC STD MICRODIL/AGAR DIL: CPT

## 2022-11-10 PROCEDURE — 85651 RBC SED RATE NONAUTOMATED: CPT

## 2022-11-10 PROCEDURE — 85025 COMPLETE CBC W/AUTO DIFF WBC: CPT

## 2022-11-10 PROCEDURE — 6370000000 HC RX 637 (ALT 250 FOR IP): Performed by: INTERNAL MEDICINE

## 2022-11-10 PROCEDURE — 87077 CULTURE AEROBIC IDENTIFY: CPT

## 2022-11-10 PROCEDURE — 80053 COMPREHEN METABOLIC PANEL: CPT

## 2022-11-10 PROCEDURE — 83036 HEMOGLOBIN GLYCOSYLATED A1C: CPT

## 2022-11-10 PROCEDURE — 80307 DRUG TEST PRSMV CHEM ANLYZR: CPT

## 2022-11-10 PROCEDURE — 6370000000 HC RX 637 (ALT 250 FOR IP): Performed by: SPECIALIST

## 2022-11-10 PROCEDURE — 99232 SBSQ HOSP IP/OBS MODERATE 35: CPT | Performed by: INTERNAL MEDICINE

## 2022-11-10 PROCEDURE — 6360000002 HC RX W HCPCS: Performed by: SPECIALIST

## 2022-11-10 PROCEDURE — 73590 X-RAY EXAM OF LOWER LEG: CPT

## 2022-11-10 PROCEDURE — 82962 GLUCOSE BLOOD TEST: CPT

## 2022-11-10 PROCEDURE — 36415 COLL VENOUS BLD VENIPUNCTURE: CPT

## 2022-11-10 PROCEDURE — 87070 CULTURE OTHR SPECIMN AEROBIC: CPT

## 2022-11-10 PROCEDURE — 2580000003 HC RX 258: Performed by: SPECIALIST

## 2022-11-10 PROCEDURE — 86060 ANTISTREPTOLYSIN O TITER: CPT

## 2022-11-10 PROCEDURE — APPSS30 APP SPLIT SHARED TIME 16-30 MINUTES: Performed by: NURSE PRACTITIONER

## 2022-11-10 PROCEDURE — 84439 ASSAY OF FREE THYROXINE: CPT

## 2022-11-10 PROCEDURE — 97161 PT EVAL LOW COMPLEX 20 MIN: CPT

## 2022-11-10 RX ORDER — DOXYCYCLINE HYCLATE 100 MG/1
100 CAPSULE ORAL EVERY 12 HOURS SCHEDULED
Status: DISCONTINUED | OUTPATIENT
Start: 2022-11-10 | End: 2022-11-11 | Stop reason: HOSPADM

## 2022-11-10 RX ORDER — HYDROCHLOROTHIAZIDE 12.5 MG/1
12.5 TABLET ORAL DAILY
Status: DISCONTINUED | OUTPATIENT
Start: 2022-11-10 | End: 2022-11-11 | Stop reason: HOSPADM

## 2022-11-10 RX ORDER — LISINOPRIL 20 MG/1
20 TABLET ORAL DAILY
Status: DISCONTINUED | OUTPATIENT
Start: 2022-11-10 | End: 2022-11-11 | Stop reason: HOSPADM

## 2022-11-10 RX ADMIN — CEFAZOLIN 2000 MG: 2 INJECTION, POWDER, FOR SOLUTION INTRAMUSCULAR; INTRAVENOUS at 15:08

## 2022-11-10 RX ADMIN — METFORMIN HYDROCHLORIDE 1000 MG: 1000 TABLET ORAL at 06:31

## 2022-11-10 RX ADMIN — METOPROLOL TARTRATE 25 MG: 25 TABLET, FILM COATED ORAL at 08:32

## 2022-11-10 RX ADMIN — INSULIN LISPRO 2 UNITS: 100 INJECTION, SOLUTION INTRAVENOUS; SUBCUTANEOUS at 15:34

## 2022-11-10 RX ADMIN — HYDROCHLOROTHIAZIDE 12.5 MG: 12.5 TABLET ORAL at 08:34

## 2022-11-10 RX ADMIN — HYDROCODONE BITARTRATE AND ACETAMINOPHEN 1 TABLET: 5; 325 TABLET ORAL at 12:32

## 2022-11-10 RX ADMIN — SERTRALINE HYDROCHLORIDE 50 MG: 50 TABLET ORAL at 08:31

## 2022-11-10 RX ADMIN — METOPROLOL TARTRATE 25 MG: 25 TABLET, FILM COATED ORAL at 19:10

## 2022-11-10 RX ADMIN — GLIPIZIDE 10 MG: 5 TABLET ORAL at 06:31

## 2022-11-10 RX ADMIN — DOXYCYCLINE HYCLATE 100 MG: 100 CAPSULE ORAL at 19:10

## 2022-11-10 RX ADMIN — CEFAZOLIN 2000 MG: 2 INJECTION, POWDER, FOR SOLUTION INTRAMUSCULAR; INTRAVENOUS at 22:42

## 2022-11-10 RX ADMIN — PREGABALIN 100 MG: 50 CAPSULE ORAL at 19:10

## 2022-11-10 RX ADMIN — GLIPIZIDE 10 MG: 5 TABLET ORAL at 15:34

## 2022-11-10 RX ADMIN — LISINOPRIL 20 MG: 20 TABLET ORAL at 08:31

## 2022-11-10 RX ADMIN — HYDROCODONE BITARTRATE AND ACETAMINOPHEN 1 TABLET: 5; 325 TABLET ORAL at 19:10

## 2022-11-10 RX ADMIN — HYDROCODONE BITARTRATE AND ACETAMINOPHEN 1 TABLET: 5; 325 TABLET ORAL at 06:41

## 2022-11-10 RX ADMIN — ATORVASTATIN CALCIUM 40 MG: 40 TABLET, FILM COATED ORAL at 19:10

## 2022-11-10 RX ADMIN — LEVOTHYROXINE SODIUM 25 MCG: 25 TABLET ORAL at 07:34

## 2022-11-10 RX ADMIN — INSULIN LISPRO 2 UNITS: 100 INJECTION, SOLUTION INTRAVENOUS; SUBCUTANEOUS at 11:46

## 2022-11-10 RX ADMIN — METFORMIN HYDROCHLORIDE 1000 MG: 1000 TABLET ORAL at 15:34

## 2022-11-10 ASSESSMENT — PAIN DESCRIPTION - ORIENTATION
ORIENTATION: RIGHT;LEFT

## 2022-11-10 ASSESSMENT — PAIN SCALES - GENERAL
PAINLEVEL_OUTOF10: 8
PAINLEVEL_OUTOF10: 9
PAINLEVEL_OUTOF10: 9

## 2022-11-10 ASSESSMENT — PAIN - FUNCTIONAL ASSESSMENT: PAIN_FUNCTIONAL_ASSESSMENT: ACTIVITIES ARE NOT PREVENTED

## 2022-11-10 ASSESSMENT — PAIN DESCRIPTION - DESCRIPTORS: DESCRIPTORS: ACHING;SORE;TENDER

## 2022-11-10 ASSESSMENT — PAIN DESCRIPTION - LOCATION
LOCATION: FOOT

## 2022-11-10 NOTE — PROGRESS NOTES
Patient reports that he lives in his car with his dog usually around [place] but he stays with his Niece, Elisabet Mendes, in [facility] when its cold and if he has an appointments.     Electronically signed by Sugar Mims RN on 11/10/2022 at 7:44 AM

## 2022-11-10 NOTE — PROGRESS NOTES
Physical Therapy  Facility/Department: [facility]  Physical Therapy Initial Assessment    Name: Rufina Fuentes  : 1960  MRN: [id number]  Date of Service: 11/10/2022    Past Medical History:  has a past medical history of Anxiety, COPD (chronic obstructive pulmonary disease) ([facility] Depression, [facility] Frostbite, HLD (hyperlipidemia), HTN (hypertension), and Sleep apnea. Past Surgical History:  has a past surgical history that includes Cholecystectomy; back surgery; Coronary artery bypass graft; Uvulopalatopharygoplasty; Toe amputation (Right, 2022); Foot Debridement (Right, 2022); Foot Debridement (Right, 2022); Insert Picc Line (2022); and Foot Debridement (Right, 5/10/2022). Referring provider:  Juan Garcias MD    PT Order:  PT eval and treat     Evaluating PT:  Cinda Frederick PT, DPT PT [id number]    Room #:  2124/1009-Z  Diagnosis:  Cellulitis [L03.90]  Precautions:  fall risk  Equipment Needs:  none. Pt reported he owns a walker. SUBJECTIVE:    Pt lives with his niece in a 1 story home with 2 stairs to enter and a pole to grab onto. Bed and bath is on first floor. Pt ambulated with no device PTA. Pt reported he only ambulates short distances. OBJECTIVE:   Initial Evaluation  Date: 11/10 Treatment Short Term/ Long Term   Goals   Was pt agreeable to Eval/treatment? yes     Does pt have pain? Pain in right LE and B feet.       Bed Mobility  Rolling: independent  Supine to sit: independent  Sit to supine: independent  Scooting: independent to sitting EOB  independent   Transfers Sit to stand: Supervision  Stand to sit: supervision  Stand pivot: NT  independent   Ambulation    20 feet with no device supervision   100 feet with no device independent    Stair negotiation: ascended and descended  NT   2 steps with 1 rail modified independent   ROM BLE:  WFL     Strength BLE:  grossly 4/5  Grossly 4+/5   Balance Sitting EOB:  independent  Dynamic Standing: Supervision without device  Sitting EOB:  independent  Dynamic Standing:  independent without device   AM-PAC 6 Clicks 21/10       Pt is A & O x 3  Sensation:  Pt reported numbness in B feet. Patient education  Pt educated on PT objectives during eval and while in the hospital, calling for assistance. Patient response to education:   Pt verbalized understanding Pt demonstrated skill Pt requires further education in this area   yes With cueing yes     ASSESSMENT:    Conditions Requiring Skilled Therapeutic Intervention:    [x]Decreased strength     []Decreased ROM  [x]Decreased functional mobility  [x]Decreased balance   [x]Decreased endurance   [x]Decreased posture  []Decreased sensation  []Decreased coordination   []Decreased vision  []Decreased safety awareness   [x]Increased pain       Comments:  Pt found in bed and left sitting on the EOB with call light in reach and bed alarm on. No report of dizziness during functional mobility. No LOB during ambulation. Pt's/ family goals   1. None stated    Prognosis is good for reaching above PT goals. Patient and or family understand(s) diagnosis, prognosis, and plan of care.   yes    PHYSICAL THERAPY PLAN OF CARE:    PT POC is established based on physician order and patient diagnosis     Diagnosis:  Cellulitis [L03.90]    Current Treatment Recommendations:     [x] Strengthening to improve independence with functional mobility   [] ROM to improve independence with functional mobility   [x] Balance Training to improve static/dynamic balance and to reduce fall risk  [x] Endurance Training to improve activity tolerance during functional mobility   [x] Transfer Training to improve safety and independence with all functional transfers   [x] Gait Training to improve gait mechanics, endurance and assess need for appropriate assistive device  [x] Stair Training in preparation for safe discharge home and/or into the community   [] Positioning to prevent skin breakdown and contractures  [x] Safety and Education Training   [x] Patient/Caregiver Education   [] HEP  [] Other     PT long term treatment goals are located in above grid    Frequency of treatments: 2-5x/week x 1-2 weeks. Time in  1115  Time out  1128    Evaluation Time includes thorough review of current medical information, gathering information on past medical history/social history and prior level of function, completion of standardized testing/informal observation of tasks, assessment of data and education on plan of care and goals.     CPT codes:  [x] Low Complexity PT evaluation 72219  [] Moderate Complexity PT evaluation 68828  [] High Complexity PT evaluation 14720  [] PT Re-evaluation 44346  [] Therapeutic activities 00946 __minutes  [] Therapeutic exercises 25451 __ minutes      Steven Guerrero, PT, DPT  PT [id number]

## 2022-11-10 NOTE — CONSULTS
Podiatry Consult H&P  11/10/2022   Brandon Desir May       REASON FOR CONSULT: B/l leg wounds      HISTORY OF PRESENT ILLNESS: This is a diabetic 63 y.o. male seen bedside for wounds to b/l anterior legs. Pt has a pertinent PMH of anxiety, COPD, frostbite, HTN, HLD, and previous digit amputation. Pt states he was cutting wood roughly 6 weeks ago and the wood scrapped the his shins. Pt states his legs became red, swollen ,and painful since and have not healed up. Pt also admits to scratching his legs. Pt denies any current V/F/C but admits to some nausea. Pt has no other pedal questions or complaints at this time. Past Medical History:   Diagnosis Date    Anxiety     COPD (chronic obstructive pulmonary disease) ([facility]     Depression     [facility]di[facility]     Frostbite     HLD (hyperlipidemia)     HTN (hypertension)     Sleep apnea         Past Surgical History:   Procedure Laterality Date    BACK SURGERY      CHOLECYSTECTOMY      CORONARY ARTERY BYPASS GRAFT REDO      2 vessel    FOOT DEBRIDEMENT Right 4/25/2022    DELAYED PRIMARY CLOSURE RIGHT FOOT WOUND performed by Jaden Li DPM at [address] Right 5/7/2022    FOOT DEBRIDEMENT INCISION AND DRAINAGE performed by Zannie Schirmer, DPM at [address] Right 5/10/2022    RIGHT FOOT DELAYED PRIMARY CLOSURE WITH POSSIBLE DEBRIDEMENT    ++CONTACT ISOLATION++   (DR AVAIL.  AT 4PM) performed by Zannie Schirmer, DPM at [place] [address] PICC LINE  5/9/2022         TOE AMPUTATION Right 4/22/2022    AMPUTATION RIGHT SECOND TOE performed by Jaden Li DPM at [facility] OR    UVULOPALATOPHARYNGOPLASTY           Family History   Problem Relation Age of Onset    Dementia Mother     Stroke Father     Diabetes Sister         Social History     Tobacco Use    Smoking status: Every Day     Packs/day: 2.50     Types: Cigarettes     Start date: 1980    Smokeless tobacco: Not on file   Substance Use Topics    Alcohol use: Never Prior to Admission medications    Medication Sig Start Date End Date Taking? Authorizing Provider   ondansetron (ZOFRAN) 4 MG tablet Take 1 tablet by mouth 3 times daily as needed for Nausea or Vomiting 5/24/22   Dilshad CHRIST Michaels - CNP   atorvastatin (LIPITOR) 40 MG tablet Take 40 mg by mouth at bedtime    Historical Provider, MD   metoprolol tartrate (LOPRESSOR) 25 MG tablet Take 25 mg by mouth 2 times daily    Historical Provider, MD   metFORMIN (GLUCOPHAGE) 1000 MG tablet Take 1,000 mg by mouth 2 times daily (with meals)    Historical Provider, MD   levothyroxine (SYNTHROID) 25 MCG tablet Take 25 mcg by mouth Daily    Historical Provider, MD   sertraline (ZOLOFT) 50 MG tablet Take 50 mg by mouth daily    Historical Provider, MD   glimepiride (AMARYL) 4 MG tablet Take 4 mg by mouth in the morning and at bedtime    Historical Provider, MD   JARDIANCE 10 MG tablet Take 10 mg by mouth daily 3/25/22   Historical Provider, MD   pregabalin (LYRICA) 100 MG capsule Take 100 mg by mouth at bedtime. 3/25/22   Historical Provider, MD   lisinopril-hydroCHLOROthiazide (PRINZIDE;ZESTORETIC) 20-12.5 MG per tablet Take 20 tablets by mouth daily 3/25/22   Historical Provider, MD   lisinopril (PRINIVIL;ZESTRIL) 2.5 MG tablet Take 2.5 mg by mouth daily  4/21/22  Historical Provider, MD        Patient has no known allergies. OBJECTIVE:        Vitals:    11/10/22 0726   BP: (!) 148/63   Pulse: 86   Resp: 18   Temp: 97.7 °F (36.5 °C)   SpO2: 92%              EXAM:        Pt is AAOx3, NAD    Vascular Exam:  DP and PT pulses are palpable b/l. CFT is ~4 seconds bilateral lower extremity. Erythema and edema present to b/l LE. Neuro Exam:  Light touch and protective sensation diminished. Dermatologic Exam:  Anterior legs b/l are edematous and erythematous with multiple excoriations which have mostly dried and scabbed R worse than L. Some superficial ulcers also present to legs with no subcutaneous tissue visible. No probe to bone. No malodor, crepitus or fluctuance noted. Minimal serosanguinous drainage noted. MSK: Muscle strength 5/5 Bilateral  ROM is full without pain or crepitation bilateral. No POP with calf squeeze.      Current Facility-Administered Medications   Medication Dose Route Frequency Provider Last Rate Last Admin    lisinopril (PRINIVIL;ZESTRIL) tablet 20 mg  20 mg Oral Daily Harvinder Rojas MD   20 mg at 11/10/22 0831    And    hydroCHLOROthiazide (HYDRODIURIL) tablet 12.5 mg  12.5 mg Oral Daily Harvinder Rojas MD   12.5 mg at 11/10/22 0834    fentaNYL (SUBLIMAZE) injection 50 mcg  50 mcg IntraVENous Once Linnea Deng DO        HYDROcodone-acetaminophen (NORCO) 5-325 MG per tablet 1 tablet  1 tablet Oral Q4H PRN Harvinder Rojas MD   1 tablet at 11/10/22 0641    ondansetron [facility]) injection 4 mg  4 mg IntraVENous Q6H PRN Harvinder Rojas MD        insulin lispro (HUMALOG) injection vial 0-8 Units  0-8 Units SubCUTAneous TID  Harvinder Rojas MD        insulin lispro (HUMALOG) injection vial 0-4 Units  0-4 Units SubCUTAneous Nightly Harvinder Rojas MD        atorvastatin (LIPITOR) tablet 40 mg  40 mg Oral Nightly Harvinder Rojas MD        glipiZIDE (GLUCOTROL) tablet 10 mg  10 mg Oral BID AC Harvinder Rojas MD   10 mg at 11/10/22 0631    levothyroxine (SYNTHROID) tablet 25 mcg  25 mcg Oral Daily Harvinder Rojas MD   25 mcg at 11/10/22 0734    metFORMIN (GLUCOPHAGE) tablet 1,000 mg  1,000 mg Oral BID  Harvinder Rojas MD   1,000 mg at 11/10/22 0631    metoprolol tartrate (LOPRESSOR) tablet 25 mg  25 mg Oral BID Harvinder Rojas MD   25 mg at 11/10/22 5134    pregabalin (LYRICA) capsule 100 mg  100 mg Oral Nightly Harvinder Rojas MD   100 mg at 11/09/22 2223    sertraline (ZOLOFT) tablet 50 mg  50 mg Oral Daily Harvinder Rojas MD   50 mg at 11/10/22 0831        Lab Results   Component Value Date    WBC 7.4 11/10/2022    HCT 40.6 11/10/2022    HGB 13.4 11/10/2022     11/10/2022     11/10/2022    K 4.4 11/10/2022     11/10/2022    CO2 21 (L) 11/10/2022    BUN 14 11/10/2022    CREATININE 0.9 11/10/2022    GLUCOSE 183 (H) 11/10/2022    CRP 2.4 (H) 11/10/2022         Radiographs:    ASSESSMENT:  Cellulitis to b/l LE  Excoriations present to b/l LE, likely infected  Edema b/l LE  DM with neuropathy  History of digital amputation        PLAN:  -Patient examined and evaluated at bedside  -All pertinent labs, charts, and imaging reviewed prior to encounter   -WBC: 7.4  -Abx per ID  -wound culture pending   -bilateral tib fib x rays pending  -Wounds cleansed and dressed in DSD  -Pt advised he is to not scratch his legs  -No plans for surgical intervention at this time. Will proceed with lcoal wound care  -D/W:   Priscila Handley DPM FACFAS  Fellowship-Trained Foot and Ankle Surgeon  Diplomate, [employer] Board of Foot and Ankle Surgeons  [phone]        Thank you for involving podiatry in this patients care.  Please do not hesitate to call with any questions or concerns       Sebastien Funes DPM  PGY2 Podiatry Resident   11/10/2022   11:24 AM

## 2022-11-10 NOTE — PROGRESS NOTES
[facility] Progress Note    Admitting Date and Time: 11/9/2022  6:13 PM  Admit Dx: Cellulitis [L03.90]    Subjective:  Patient is being followed for Cellulitis [L03.90]       Patient awake and alert- resting in bed in no acute distress  Denies nausea/ vomiting today  Reporting sob ongoing x several months  Reporting legs have appeared more red - right leg draining  Ran out of meds- was out for 5 ~approximately 5 days      ROS: denies fever, chills, cp, sob, n/v, HA unless stated above. lisinopril  20 mg Oral Daily    And    hydroCHLOROthiazide  12.5 mg Oral Daily    fentanNYL  50 mcg IntraVENous Once    insulin lispro  0-8 Units SubCUTAneous TID WC    insulin lispro  0-4 Units SubCUTAneous Nightly    atorvastatin  40 mg Oral Nightly    glipiZIDE  10 mg Oral BID AC    levothyroxine  25 mcg Oral Daily    metFORMIN  1,000 mg Oral BID WC    metoprolol tartrate  25 mg Oral BID    pregabalin  100 mg Oral Nightly    sertraline  50 mg Oral Daily     HYDROcodone 5 mg - acetaminophen, 1 tablet, Q4H PRN  ondansetron, 4 mg, Q6H PRN         Objective:    BP (!) 148/63   Pulse 86   Temp 97.7 °F (36.5 °C) (Oral)   Resp 18   Ht 6' (1.829 m)   Wt 280 lb (127 kg)   SpO2 92%   BMI 37.97 kg/m²   General Appearance: alert and oriented to person, place and time and in no acute distress  Skin: warm and dry  Head: normocephalic and atraumatic  Neck: neck supple and non tender without mass   Pulmonary/Chest: clear to auscultation bilaterally  Cardiovascular: normal rate, normal S1 and S2 and no carotid bruits  Abdomen: soft, non-tender, non-distended, normal bowel sounds, no masses or organomegaly  Extremities: no cyanosis, no clubbing and lower ext edema Right > Left. Macerations noted on bilateral lower legs- worse on right leg.  Oozing/ draining noted with erythema   Neurologic: speech normal         Recent Labs     11/09/22  1855 11/10/22  0245    134   K 5.0 4.4    102   CO2 27 21*   BUN 16 14   CREATININE 1.0 0.9   GLUCOSE 208* 183*   CALCIUM 9.4 8.7       Recent Labs     11/09/22  1855 11/10/22  0245   WBC 9.7 7.4   RBC 4.90 4.54   HGB 14.6 13.4   HCT 43.8 40.6   MCV 89.4 89.4   MCH 29.8 29.5   MCHC 33.3 33.0   RDW 14.3 14.4    161   MPV 11.1 11.8         Assessment:    Principal Problem:    Cellulitis  Resolved Problems:    * No resolved hospital problems. *      Plan:  1. 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 Reporting history of chronic wounds from DM. Increased drainage/ warmth/ erythema. Reporting associated cough/ N/V. Afebrile and no leukocytosis on arrival. ID/ podiatry consulted. PT was given IV ancef x 1 and po doxycycline x 1.     2. Cough: CXR no pneumonia/ ? Mild chf. Check probnp 135. Respiratory panel negative. Reporting 1/2 ppd. Reporting sob ongoing x months. Lungs CTA- does not appear dyspneic on exam. Monitor. 3. N/V: CT abd- no acute findings. Left internal iliac station lymph node 17mm unchanged from 5/2022. Duodenal diverticulum vs choledochal cyst formation. Pt denies N/V today. Tolerating po.    4. COPD: does not appear to be in exacerbation. No wheezing on exam.    5. DM: check hga1c 8.7: metformin/ insulin ss    6. HTN: continue ace/ hctz/ lopressor    7. HLD: statin    8. Thyroid disease; synthroid     9. Tobacco use: reporting smoking 1/2 ppd- currently declining nicotine patch. 10. Social issues: reporting he ran out of his meds 5 days ago. Told ER has been off of his meds for more than 1 month. Documented that pt lives with his dog in car in [place]. During cold months he lives with family locally.        Time spent reviewing chart, clinical exam, discussing case and answering questions with staff/consultants/patient/family = 20 minutes       NOTE: This report was transcribed using voice recognition software. Every effort was made to ensure accuracy; however, inadvertent computerized transcription errors may be present.   Electronically signed by PREM Stauffer on 11/10/2022 at 9:28 AM

## 2022-11-10 NOTE — CONSULTS
[address] [place] [address] [facility]  Consultation Note     Admit Date: 11/9/2022  6:13 PM    Reason for Consult:   Bilateral diabetic shin wounds (necrobiosis lipoidica diabeticorum?)  Infected? Attending Physician:  Melva Bradford MD    HISTORY OF PRESENT ILLNESS:             The history is obtained from extensive review of available past medical records. The patient is a 63 y.o. male who is previously known to the ID service. The patient presented to the ED at [facility] on 11/9/2022 with wounds on both legs. He said he acquired the wounds by cutting wood several weeks ago and sustained scratches on his legs. He was feeling generalized malaise, subjective fevers with chills. He does not check his blood sugars. On presentation he was afebrile and slightly tachycardic. Respiratory panel and rapid SARS-CoV-2 was negative. A wound culture was obtained. He was treated with Cefazolin and Doxycycline x1. He tolerated the antibiotic well. Past Medical History:        Diagnosis Date    Anxiety     COPD (chronic obstructive pulmonary disease) ([facility] [address]     Depression     DM (diabetes mellitus) ([facility] [address]     Frostbite     HLD (hyperlipidemia)     HTN (hypertension)     Sleep apnea      May 2022. Admitted to [facility] with right foot pain with erythema and low-grade fevers. Treated with Ceftriaxone and Metronidazole and admitted to the ICU. Taken to the OR to drain abscess of the right foot. Seen by ID. Treated with Zosyn and Vancomycin. Cultures grew MRSA, Corynebacterium, alphahemolytic Streptococcus and VSE faecalis. Bone culture grew VSE and MRSA. He was discharged on Daptomycin and Zosyn x6 weeks. He was seen in the office in follow-up. April 2022. Admitted to [facility] with a chronic ulcer on the dorsum of the right second toe. He was being treated with Bactrim and Cephalexin as an outpatient. He underwent an amputation of the right second toe on 4/21/2022.   Seen by ID for osteomyelitis of the toe. Seen by ID for osteomyelitis of the right second toe. There was no cellulitis in the proximal part of the toe or the foot so the patient did not warrant any antibiotics. Past Surgical History:        Procedure Laterality Date    BACK SURGERY      CHOLECYSTECTOMY      CORONARY ARTERY BYPASS GRAFT REDO      2 vessel    FOOT DEBRIDEMENT Right 4/25/2022    DELAYED PRIMARY CLOSURE RIGHT FOOT WOUND performed by Jaden Li DPM at [address] [place] [address] Right 5/7/2022    FOOT DEBRIDEMENT INCISION AND DRAINAGE performed by Zannie Schirmer, DPM at [address] [place] [address] Right 5/10/2022    RIGHT FOOT DELAYED PRIMARY CLOSURE WITH POSSIBLE DEBRIDEMENT    ++CONTACT ISOLATION++   (DR AVAIL. AT 4PM) performed by Zannie Schirmer, DPM at [facility] OR    INSERT PICC LINE  5/9/2022         TOE AMPUTATION Right 4/22/2022    AMPUTATION RIGHT SECOND TOE performed by Jaden Li DPM at [facility] OR    UVULOPALATOPHARYNGOPLASTY       Current Medications:   Scheduled Meds:   lisinopril  20 mg Oral Daily    And    hydroCHLOROthiazide  12.5 mg Oral Daily    fentanNYL  50 mcg IntraVENous Once    insulin lispro  0-8 Units SubCUTAneous TID WC    insulin lispro  0-4 Units SubCUTAneous Nightly    atorvastatin  40 mg Oral Nightly    glipiZIDE  10 mg Oral BID AC    levothyroxine  25 mcg Oral Daily    metFORMIN  1,000 mg Oral BID WC    metoprolol tartrate  25 mg Oral BID    pregabalin  100 mg Oral Nightly    sertraline  50 mg Oral Daily     Continuous Infusions:  PRN Meds:HYDROcodone 5 mg - acetaminophen, ondansetron    Allergies:  Patient has no known allergies. Social History:   Social History     Socioeconomic History    Marital status:    Tobacco Use    Smoking status: Every Day     Packs/day: 2.50     Types: Cigarettes     Start date: 38   Substance and Sexual Activity    Alcohol use: Never    Drug use: Never      Pets: Cats and dogs when he stays at his niece's house.   In the summer he lives out of his car and he has a small dog. Travel: No  He retired from driving trucks. Family History:       Problem Relation Age of Onset    Dementia Mother     Stroke Father     Diabetes Sister    . Otherwise non-pertinent to the chief complaint. REVIEW OF SYSTEMS:    Constitutional: Subjective fevers, chills, diaphoresis  Neurologic: Neuropathy  Psychiatric: Negative  Rheumatologic: Negative   Endocrine: Diabetes  Hematologic: Negative  Immunologic: Negative  ENT: Negative  Respiratory: Negative   Cardiovascular: Negative  GI: Negative  : Negative  Musculoskeletal: As in the HPI  Skin: No rashes. PHYSICAL EXAM:    Vitals:   BP (!) 148/63   Pulse 86   Temp 97.7 °F (36.5 °C) (Oral)   Resp 18   Ht 6' (1.829 m)   Wt 280 lb (127 kg)   SpO2 92%   BMI 37.97 kg/m²   Constitutional: The patient is awake, alert, and oriented. No distress. Skin: Warm and dry. No rashes were noted. HEENT: Eyes show round, and reactive pupils. No jaundice. Moist mucous membranes, no ulcerations, no thrush. Neck: Supple to movements. No lymphadenopathy. Chest: No use of accessory muscles to breathe. Symmetrical expansion. Auscultation reveals no wheezing, crackles, or rhonchi. Cardiovascular: S1 and S2 are rhythmic and regular. No murmurs appreciated. Abdomen: Positive bowel sounds to auscultation. Benign to palpation. No masses felt. No hepatosplenomegaly. Extremities: He has venous stasis dermatitis in the left leg with a few open and small wounds. They are more wounds with minimal drainage on the right leg. These are surrounded by some erythema. The erythema goes up to the inner thigh. The right second toe is missing. Surgical wounds are well-healed.   Lines: Peripheral.      CBC+dif:  Recent Labs     11/09/22  1855 11/10/22  0245   WBC 9.7 7.4   HGB 14.6 13.4   HCT 43.8 40.6   MCV 89.4 89.4    161   NEUTROABS 6.50 4.39     Lab Results   Component Value Date    CRP 2.4 (H) 11/10/2022    CRP 0.8 (H) 06/06/2022    CRP 1.0 (H) 05/31/2022      No results found for: CRPHS  Lab Results   Component Value Date    SEDRATE 22 (H) 11/10/2022    SEDRATE 40 (H) 06/06/2022    SEDRATE 44 (H) 05/31/2022     Lab Results   Component Value Date    ALT 23 11/10/2022    AST 25 11/10/2022    ALKPHOS 90 11/10/2022    BILITOT 0.2 11/10/2022     Lab Results   Component Value Date/Time     11/10/2022 02:45 AM    K 4.4 11/10/2022 02:45 AM    K 5.0 11/09/2022 06:55 PM     11/10/2022 02:45 AM    CO2 21 11/10/2022 02:45 AM    BUN 14 11/10/2022 02:45 AM    CREATININE 0.9 11/10/2022 02:45 AM    GFRAA >60 06/06/2022 10:30 AM    LABGLOM >60 11/10/2022 02:45 AM    GLUCOSE 183 11/10/2022 02:45 AM    PROT 6.3 11/10/2022 02:45 AM    LABALBU 3.5 11/10/2022 02:45 AM    CALCIUM 8.7 11/10/2022 02:45 AM    BILITOT 0.2 11/10/2022 02:45 AM    ALKPHOS 90 11/10/2022 02:45 AM    AST 25 11/10/2022 02:45 AM    ALT 23 11/10/2022 02:45 AM       No results found for: PROTIME, INR    No results found for: TSH    Lab Results   Component Value Date/Time    COLORU Yellow 05/26/2022 04:05 PM    PHUR 7.0 05/26/2022 04:05 PM    WBCUA NONE 05/26/2022 04:05 PM    RBCUA NONE 05/26/2022 04:05 PM    BACTERIA NONE SEEN 05/26/2022 04:05 PM    CLARITYU Clear 05/26/2022 04:05 PM    SPECGRAV 1.015 05/26/2022 04:05 PM    LEUKOCYTESUR Negative 05/26/2022 04:05 PM    UROBILINOGEN 0.2 05/26/2022 04:05 PM    BILIRUBINUR Negative 05/26/2022 04:05 PM    BLOODU Negative 05/26/2022 04:05 PM    GLUCOSEU >=1000 05/26/2022 04:05 PM       No results found for: HCO3, BE, O2SAT, PH, THGB, PCO2, PO2, TCO2  Radiology:  Reviewed    Microbiology:  Pending  No results for input(s): BC in the last 72 hours. No results for input(s): ORG in the last 72 hours. No results for input(s): Keke Breech in the last 72 hours. No results for input(s): STREPNEUMAGU in the last 72 hours. No results for input(s): LP1UAG in the last 72 hours.   Recent Labs     11/10/22  0245   *     No results for input(s): CULTRESP in the last 72 hours. Recent Labs     11/10/22  0245   PROCAL 0.20*       Assessment:  Cellulitis of the right leg  Chronic wounds bilateral legs, right greater than left  Venous stasis dermatitis bilateral legs    Plan:    Resume Cefazolin and oral Doxycycline  Check cultures, ASO titer  Monitor labs  Will follow with you    Thank you for having us see this patient in consultation. I will be discussing this case with the treating physicians.     Farzana Matos MD  2:14 PM  11/10/2022

## 2022-11-10 NOTE — ED PROVIDER NOTES
[facility] [address]  Department of Emergency Medicine     Written by: Giorgio Fitzpatrick DO  Patient Name: Collins Zamorano Date: 2022  6:13 PM  MRN: [id number]                   : 1960        Chief Complaint   Patient presents with    Emesis     Ongoing for wk    Wound Check     R leg for wks    Cough     For wks    - Chief complaint    Patient is a 53-year-old male past medical history of hypertension, hyperlipidemia hypothyroidism and diabetes with diabetic foot wounds. Patient presents with chief complaint of nausea, vomiting as well as worsening right lower extremity wound. Patient states that for the last few days he has had gradually worsening right lower extremity pain. He states he has chronic leg wounds from his diabetes and that he has noted increased drainage as well as warmth and erythema. In addition patient notes that he has had cough as well as nausea and vomiting. Patient stated symptoms have been moderate in severity and constant onset. He denies any exacerbating relieving factors. He does admit to being without his medications for the last couple months. Patient denies any fevers, chest pain, headache, lightheadedness, numbness or tingling. The history is provided by the patient. No  was used. Review of Systems   Constitutional:  Negative for chills and fever. HENT:  Negative for ear pain, sinus pressure and sore throat. Eyes:  Negative for pain, discharge and redness. Respiratory:  Positive for cough. Negative for shortness of breath and wheezing. Cardiovascular:  Negative for chest pain. Gastrointestinal:  Positive for abdominal pain, nausea and vomiting. Negative for diarrhea. Genitourinary:  Negative for dysuria and frequency. Musculoskeletal:  Negative for arthralgias and back pain. Skin:  Positive for color change. Negative for rash and wound. Neurological:  Negative for weakness and headaches. Hematological:  Negative for adenopathy. All other systems reviewed and are negative. Physical Exam  Vitals and nursing note reviewed. Constitutional:       Appearance: He is well-developed. HENT:      Head: Normocephalic and atraumatic. Eyes:      Conjunctiva/sclera: Conjunctivae normal.   Cardiovascular:      Rate and Rhythm: Normal rate and regular rhythm. Heart sounds: Normal heart sounds. No murmur heard. Pulmonary:      Effort: Pulmonary effort is normal. No respiratory distress. Breath sounds: Normal breath sounds. No wheezing or rales. Abdominal:      General: Bowel sounds are normal.      Palpations: Abdomen is soft. Tenderness: There is no abdominal tenderness. There is no guarding or rebound. Musculoskeletal:         General: No tenderness or deformity. Cervical back: Normal range of motion and neck supple. Skin:     General: Skin is warm and dry. Findings: Erythema present. Comments: Right lower extremity erythema warmth and drainage. Neurological:      Mental Status: He is alert and oriented to person, place, and time. Cranial Nerves: No cranial nerve deficit. Coordination: Coordination normal.        Procedures       MDM  Number of Diagnoses or Management Options  Cellulitis of right leg  Nausea and vomiting, unspecified vomiting type  Diagnosis management comments: Patient is a 71-year-old male past medical history of hypertension, hyperlipidemia, hypothyroidism as well as diabetes with diabetic foot wounds. Patient presents with chief complaint of nausea, vomiting as well as right lower extremity erythema and swelling. Vital signs stable presentation. On physical exam heart regular rate and rhythm, lungs clear to auscultation bilaterally, abdomen soft nontender no rigidity rebound or guarding.   On examination right lower extremity there is significant warmth and erythema noted no large draining wounds noted no crepitus noted, right lower extremity is neurovascular intact. Laboratory work obtained CBC unremarkable, CMP unremarkable, COVID-negative, troponin was obtained and was 14 on repeat 15, proBNP 135, lipase 28. Chest x-ray obtained and showed mild CHF. CT scan abdomen pelvis demonstrated no acute abnormalities, right lower extremity ultrasound obtained demonstrate no evidence of DVT. Consistent with nausea as well as vomiting right lower extremity cellulitis. Patient given Ancef as well as doxycycline and fentanyl. Decision made to admit patient. Case discussed with hospitalist who agreed to admit patient. Plan of care discussed with patient including admission, all questions were answered, patient was agreement plan of care and admitted to the hospital stable condition       Amount and/or Complexity of Data Reviewed  Clinical lab tests: ordered and reviewed  Tests in the radiology section of CPT®: ordered and reviewed    Risk of Complications, Morbidity, and/or Mortality  Presenting problems: moderate  Diagnostic procedures: moderate  Management options: moderate    Patient Progress  Patient progress: stable       ED Course as of 11/11/22 0930   Wed Nov 09, 2022   2158 Spoke to hospitalist, he is admitted [AH]      ED Course User Index  [AH] Cortney Rangel MD      --------------------------------------------- PAST HISTORY ---------------------------------------------  Past Medical History:  has a past medical history of Anxiety, COPD (chronic obstructive pulmonary disease) ([facility] [address] Depression, [facility] Frostbite, HLD (hyperlipidemia), HTN (hypertension), and Sleep apnea. Past Surgical History:  has a past surgical history that includes Cholecystectomy; back surgery; Coronary artery bypass graft; Uvulopalatopharygoplasty; Toe amputation (Right, 4/22/2022); Foot Debridement (Right, 4/25/2022); Foot Debridement (Right, 5/7/2022); Insert Picc Line (5/9/2022); and Foot Debridement (Right, 5/10/2022).     Social History:  reports that he has been smoking cigarettes. He started smoking about 42 years ago. He has been smoking an average of 2.5 packs per day. He does not have any smokeless tobacco history on file. He reports that he does not drink alcohol and does not use drugs. Family History: family history includes Dementia in his mother; Diabetes in his sister; Stroke in his father. The patients home medications have been reviewed. Allergies: Patient has no known allergies.     -------------------------------------------------- RESULTS -------------------------------------------------    LABS:  Results for orders placed or performed during the hospital encounter of 11/09/22   COVID-19, Rapid    Specimen: Nasopharyngeal Swab   Result Value Ref Range    SARS-CoV-2, NAAT Not Detected Not Detected   Culture, Blood 1    Specimen: Blood   Result Value Ref Range    Blood Culture, Routine 24 Hours no growth    Culture, Blood 2    Specimen: Blood   Result Value Ref Range    Culture, Blood 2 24 Hours no growth    Respiratory Panel, Molecular, with COVID-19 (Restricted: peds pts or suitable admitted adults)    Specimen: Nasopharyngeal   Result Value Ref Range    Adenovirus by PCR Not Detected Not Detected    Bordetella parapertussis by PCR Not Detected Not Detected    Bordetella pertussis by PCR Not Detected Not Detected    Chlamydophilia pneumoniae by PCR Not Detected Not Detected    Coronavirus 229E by PCR Not Detected Not Detected    Coronavirus HKU1 by PCR Not Detected Not Detected    Coronavirus NL63 by PCR Not Detected Not Detected    Coronavirus OC43 by PCR Not Detected Not Detected    SARS-CoV-2, PCR Not Detected Not Detected    Human Metapneumovirus by PCR Not Detected Not Detected    Human Rhinovirus/Enterovirus by PCR Not Detected Not Detected    Influenza A by PCR Not Detected Not Detected    Influenza B by PCR Not Detected Not Detected    Mycoplasma pneumoniae by PCR Not Detected Not Detected    Parainfluenza Virus 1 by PCR Not Detected Not Detected    Parainfluenza Virus 2 by PCR Not Detected Not Detected    Parainfluenza Virus 3 by PCR Not Detected Not Detected    Parainfluenza Virus 4 by PCR Not Detected Not Detected    Respiratory Syncytial Virus by PCR Not Detected Not Detected   CBC with Auto Differential   Result Value Ref Range    WBC 9.7 4.5 - 11.5 E9/L    RBC 4.90 3.80 - 5.80 E12/L    Hemoglobin 14.6 12.5 - 16.5 g/dL    Hematocrit 43.8 37.0 - 54.0 %    MCV 89.4 80.0 - 99.9 fL    MCH 29.8 26.0 - 35.0 pg    MCHC 33.3 32.0 - 34.5 %    RDW 14.3 11.5 - 15.0 fL    Platelets 670 465 - 021 E9/L    MPV 11.1 7.0 - 12.0 fL    Neutrophils % 66.8 43.0 - 80.0 %    Immature Granulocytes % 0.3 0.0 - 5.0 %    Lymphocytes % 22.6 20.0 - 42.0 %    Monocytes % 8.0 2.0 - 12.0 %    Eosinophils % 1.6 0.0 - 6.0 %    Basophils % 0.7 0.0 - 2.0 %    Neutrophils Absolute 6.50 1.80 - 7.30 E9/L    Immature Granulocytes # 0.03 E9/L    Lymphocytes Absolute 2.20 1.50 - 4.00 E9/L    Monocytes Absolute 0.78 0.10 - 0.95 E9/L    Eosinophils Absolute 0.16 0.05 - 0.50 E9/L    Basophils Absolute 0.07 0.00 - 0.20 E9/L   Comprehensive Metabolic Panel w/ Reflex to MG   Result Value Ref Range    Sodium 138 132 - 146 mmol/L    Potassium reflex Magnesium 5.0 3.5 - 5.0 mmol/L    Chloride 100 98 - 107 mmol/L    CO2 27 22 - 29 mmol/L    Anion Gap 11 7 - 16 mmol/L    Glucose 208 (H) 74 - 99 mg/dL    BUN 16 6 - 23 mg/dL    Creatinine 1.0 0.7 - 1.2 mg/dL    Est, Glom Filt Rate >60 >=60 mL/min/1.73    Calcium 9.4 8.6 - 10.2 mg/dL    Total Protein 7.1 6.4 - 8.3 g/dL    Albumin 3.9 3.5 - 5.2 g/dL    Total Bilirubin 0.3 0.0 - 1.2 mg/dL    Alkaline Phosphatase 108 40 - 129 U/L    ALT 10 0 - 40 U/L    AST 23 0 - 39 U/L   Troponin   Result Value Ref Range    Troponin, High Sensitivity 14 (H) 0 - 11 ng/L   Lipase   Result Value Ref Range    Lipase 28 13 - 60 U/L   Troponin   Result Value Ref Range    Troponin, High Sensitivity 15 (H) 0 - 11 ng/L   Brain Natriuretic Peptide   Result Value Ref Range    Pro- (H) 0 - 125 pg/mL   ANTISTREPTOLYSIN O TITER   Result Value Ref Range     (H) 0 - 200 IU/mL   C-Reactive Protein   Result Value Ref Range    CRP 2.4 (H) 0.0 - 0.4 mg/dL   CBC with Auto Differential   Result Value Ref Range    WBC 7.4 4.5 - 11.5 E9/L    RBC 4.54 3.80 - 5.80 E12/L    Hemoglobin 13.4 12.5 - 16.5 g/dL    Hematocrit 40.6 37.0 - 54.0 %    MCV 89.4 80.0 - 99.9 fL    MCH 29.5 26.0 - 35.0 pg    MCHC 33.0 32.0 - 34.5 %    RDW 14.4 11.5 - 15.0 fL    Platelets 216 053 - 208 E9/L    MPV 11.8 7.0 - 12.0 fL    Neutrophils % 59.3 43.0 - 80.0 %    Immature Granulocytes % 0.5 0.0 - 5.0 %    Lymphocytes % 28.4 20.0 - 42.0 %    Monocytes % 8.9 2.0 - 12.0 %    Eosinophils % 2.2 0.0 - 6.0 %    Basophils % 0.7 0.0 - 2.0 %    Neutrophils Absolute 4.39 1.80 - 7.30 E9/L    Immature Granulocytes # 0.04 E9/L    Lymphocytes Absolute 2.10 1.50 - 4.00 E9/L    Monocytes Absolute 0.66 0.10 - 0.95 E9/L    Eosinophils Absolute 0.16 0.05 - 0.50 E9/L    Basophils Absolute 0.05 0.00 - 0.20 E9/L   Comprehensive Metabolic Panel   Result Value Ref Range    Sodium 134 132 - 146 mmol/L    Potassium 4.4 3.5 - 5.0 mmol/L    Chloride 102 98 - 107 mmol/L    CO2 21 (L) 22 - 29 mmol/L    Anion Gap 11 7 - 16 mmol/L    Glucose 183 (H) 74 - 99 mg/dL    BUN 14 6 - 23 mg/dL    Creatinine 0.9 0.7 - 1.2 mg/dL    Est, Glom Filt Rate >60 >=60 mL/min/1.73    Calcium 8.7 8.6 - 10.2 mg/dL    Total Protein 6.3 (L) 6.4 - 8.3 g/dL    Albumin 3.5 3.5 - 5.2 g/dL    Total Bilirubin 0.2 0.0 - 1.2 mg/dL    Alkaline Phosphatase 90 40 - 129 U/L    ALT 23 0 - 40 U/L    AST 25 0 - 39 U/L   Hemoglobin A1C   Result Value Ref Range    Hemoglobin A1C 8.7 (H) 4.0 - 5.6 %   Procalcitonin   Result Value Ref Range    Procalcitonin 0.20 (H) 0.00 - 0.08 ng/mL   Sedimentation Rate   Result Value Ref Range    Sed Rate 22 (H) 0 - 15 mm/Hr   URINE DRUG SCREEN   Result Value Ref Range    Amphetamine Screen, Urine NOT DETECTED Negative <1000 ng/mL    Barbiturate Screen, Ur NOT DETECTED Negative < 200 ng/mL    Benzodiazepine Screen, Urine NOT DETECTED Negative < 200 ng/mL    Cannabinoid Scrn, Ur POSITIVE (A) Negative < 50ng/mL    Cocaine Metabolite Screen, Urine NOT DETECTED Negative < 300 ng/mL    Opiate Scrn, Ur NOT DETECTED Negative < 300ng/mL    PCP Screen, Urine NOT DETECTED Negative < 25 ng/mL    Methadone Screen, Urine NOT DETECTED Negative <300 ng/mL    Oxycodone Urine NOT DETECTED Negative <100 ng/mL    FENTANYL SCREEN, URINE NOT DETECTED Negative <1 ng/mL    Drug Screen Comment: see below    T4, Free   Result Value Ref Range    T4 Free 1.05 0.93 - 1.70 ng/dL   TSH   Result Value Ref Range    TSH 2.640 0.270 - 4.200 uIU/mL   Antistreptolysin O Titer   Result Value Ref Range     0 - 200 IU/mL   Basic Metabolic Panel   Result Value Ref Range    Sodium 135 132 - 146 mmol/L    Potassium 5.1 (H) 3.5 - 5.0 mmol/L    Chloride 104 98 - 107 mmol/L    CO2 20 (L) 22 - 29 mmol/L    Anion Gap 11 7 - 16 mmol/L    Glucose 155 (H) 74 - 99 mg/dL    BUN 19 6 - 23 mg/dL    Creatinine 1.0 0.7 - 1.2 mg/dL    Est, Glom Filt Rate >60 >=60 mL/min/1.73    Calcium 8.5 (L) 8.6 - 10.2 mg/dL   CBC   Result Value Ref Range    WBC 7.5 4.5 - 11.5 E9/L    RBC 4.72 3.80 - 5.80 E12/L    Hemoglobin 13.6 12.5 - 16.5 g/dL    Hematocrit 42.3 37.0 - 54.0 %    MCV 89.6 80.0 - 99.9 fL    MCH 28.8 26.0 - 35.0 pg    MCHC 32.2 32.0 - 34.5 %    RDW 14.2 11.5 - 15.0 fL    Platelets 057 463 - 571 E9/L    MPV 11.5 7.0 - 12.0 fL   POCT Glucose   Result Value Ref Range    Meter Glucose 181 (H) 74 - 99 mg/dL   POCT Glucose   Result Value Ref Range    Meter Glucose 189 (H) 74 - 99 mg/dL   POCT Glucose   Result Value Ref Range    Meter Glucose 227 (H) 74 - 99 mg/dL   POCT Glucose   Result Value Ref Range    Meter Glucose 213 (H) 74 - 99 mg/dL   POCT Glucose   Result Value Ref Range    Meter Glucose 157 (H) 74 - 99 mg/dL   POCT Glucose   Result Value Ref Range    Meter Glucose ------------------------------------------  Re-evaluation(s):  Time: 2015  Patients symptoms show no change  Repeat physical examination is not changed    Counseling:  I have spoken with the patient and discussed todays results, in addition to providing specific details for the plan of care and counseling regarding the diagnosis and prognosis. Their questions are answered at this time and they are agreeable with the plan of admission.    --------------------------------- ADDITIONAL PROVIDER NOTES ---------------------------------  Consultations:   Spoke with Dr. Aleshia Vinson. Discussed case. They will admit the patient. This patient's ED course included: a personal history and physicial examination, re-evaluation prior to disposition, and multiple bedside re-evaluations    This patient has remained hemodynamically stable during their ED course. Diagnosis:  1. Nausea and vomiting, unspecified vomiting type    2. Cellulitis of right leg        Disposition:  Patient's disposition: Admit to med/surg floor  Patient's condition is stable. Patient was seen and evaluated by myself and my attending. Assessment and Plan discussed with attending provider, please see attestation for final plan of care.      DO Citlaly Keenan DO  Resident  11/11/22 [id number]

## 2022-11-10 NOTE — PATIENT CARE CONFERENCE
P Quality Flow/Interdisciplinary Rounds Progress Note        Quality Flow Rounds held on November 10, 2022    Disciplines Attending:  Bedside Nurse, , , and Nursing Unit Nilson Fuentes was admitted on 11/9/2022  6:13 PM    Anticipated Discharge Date:       Disposition:    Jose Score:  Jose Scale Score: 21    Readmission Risk              Risk of Unplanned Readmission:  14           Discussed patient goal for the day, patient clinical progression, and barriers to discharge.   The following Goal(s) of the Day/Commitment(s) have been identified:  Diagnostics - Report Results      Cheryle Haskins RN  November 10, 2022

## 2022-11-10 NOTE — CARE COORDINATION
Transition of Care-Met with patient at the bedside, introduced myself and role, agreeable to talk. Patient  confirmed he is NOT HOMELESS, he does reside with his niece in a ranch style home, he can return at discharge. Patient did report that he did establish with a PCP-he see's a NP Que Torres, she works with Dr. Salty Simpson in the [facility] in [facility]. Preferred pharmacy is [facility]. Patient admitted with cellulitis of the foot-ID and Podiatry following. History of [facility]-will use again if needed-tentative referral made, next start of care is Tuesday. Patient drove here, anticipate discharge in 48-72 hrs.     Elfego KISERN, RN  [facility]

## 2022-11-10 NOTE — H&P
[facility] History and Physical        Chief Complaint:  LE wounds  History of Present Illness   The patient is a 63 y.o. male    past medical history of hypertension, hyperlipidemia hypothyroidism and diabetes with diabetic foot wounds    He is reporting inc LE pain since running out of lyrica months ago-- lyrica Rx by his podiatrist dr. Kash Oliver  He does admit to being without his DM medications for the last couple months.--although NOT in DKA in ER    PCP is NP he doesn't know name, Rx his DM meds  Doesn't know an aic, and doesn't check BS regularly    He reports ongoing LE moderatly severe shin pain b/l --+neuropathy and worsenign LE edema  He admits to scatching LE and excoriations to skin, which have chronically bled over past couple months. Worsening months swelling chronic leg wounds from his diabetes and that he has noted increased drainage as well as warmth and erythema  NO fever    Also complains of cough,weeks   recent abd pain, N/V- over past week  (Plan respiratory panel order)        LE wounds chronic +acute decompensation? Noted in June sed 40-55 ,crp trended down from 1.5  Per ID: [email or web address] in [facility]  Surgical site infection right toe following right second toe amputation secondary to osteomyelitis. Previous cultures grew MRSA, VS Enterococcus faecalis, pansensitive Pseudomonas, Myroides species and Staphylococcus cohnii  Status post delayed primary closure right foot 5/10/2022  Last admission by our group in May: \"Hospital Course: Patient was admitted with Septic shock ([facility] [address] [A41.9, R65.21]. Patient  Admitted on 6 6 with foot pain, patient with recent amputation of toe due to osteomyelitis. Admitted in sepsis with shock, in ICU. Did have right foot osteomyelitis. Known diabetes. Patient did undergo incision and drainage involving right second metatarsal bone, excisional wound debridement, with podiatry. Patient did have delayed primary closure on 10th.   AM PAC of 20/24. Patient was seen by general surgery for duodenal diverticulum, no intervention planned. As of today patient is planned for discharge home with home health care\"      - hx taken from the   REVIEW OF SYSTEMS:  no fevers, chills, cp, sob, n/v, ha, vision/hearing changes, wt changes, hot/cold flashes, other open skin lesions, diarrhea, constipation, dysuria/hematuria unless noted in HPI. Complete ROS performed with the patient and is otherwise negative. Past Medical History:      Diagnosis Date    Anxiety     COPD (chronic obstructive pulmonary disease) ([facility] [address]     Depression     DM (diabetes mellitus) ([facility] [address]     Frostbite     HLD (hyperlipidemia)     HTN (hypertension)     Sleep apnea        Past Surgical History:        Procedure Laterality Date    BACK SURGERY      CHOLECYSTECTOMY      CORONARY ARTERY BYPASS GRAFT REDO      2 vessel    FOOT DEBRIDEMENT Right 4/25/2022    DELAYED PRIMARY CLOSURE RIGHT FOOT WOUND performed by Pierce Sharp DPM at [address] Right 5/7/2022    FOOT DEBRIDEMENT INCISION AND DRAINAGE performed by Vaishali Tovar DPM at [address] Right 5/10/2022    RIGHT FOOT DELAYED PRIMARY CLOSURE WITH POSSIBLE DEBRIDEMENT    ++CONTACT ISOLATION++   (DR AVAIL. AT 4PM) performed by Vaishali Tovar DPM at [place] [address] PICC LINE  5/9/2022         TOE AMPUTATION Right 4/22/2022    AMPUTATION RIGHT SECOND TOE performed by Pierce Sharp DPM at [address] Medications:  Prior to Admission medications    Medication Sig Start Date End Date Taking?  Authorizing Provider   ondansetron (ZOFRAN) 4 MG tablet Take 1 tablet by mouth 3 times daily as needed for Nausea or Vomiting 5/24/22   Chelsea Primes, APRN - CNP   atorvastatin (LIPITOR) 40 MG tablet Take 40 mg by mouth at bedtime    Historical Provider, MD   metoprolol tartrate (LOPRESSOR) 25 MG tablet Take 25 mg by mouth 2 times daily    Historical Provider, MD   metFORMIN (GLUCOPHAGE) 1000 MG tablet Take 1,000 mg by mouth 2 times daily (with meals)    Historical Provider, MD   levothyroxine (SYNTHROID) 25 MCG tablet Take 25 mcg by mouth Daily    Historical Provider, MD   sertraline (ZOLOFT) 50 MG tablet Take 50 mg by mouth daily    Historical Provider, MD   glimepiride (AMARYL) 4 MG tablet Take 4 mg by mouth in the morning and at bedtime    Historical Provider, MD   JARDIANCE 10 MG tablet Take 10 mg by mouth daily 3/25/22   Historical Provider, MD   pregabalin (LYRICA) 100 MG capsule Take 100 mg by mouth at bedtime. 3/25/22   Historical Provider, MD   lisinopril-hydroCHLOROthiazide (PRINZIDE;ZESTORETIC) 20-12.5 MG per tablet Take 20 tablets by mouth daily 3/25/22   Historical Provider, MD   lisinopril (PRINIVIL;ZESTRIL) 2.5 MG tablet Take 2.5 mg by mouth daily  4/21/22  Historical Provider, MD       Allergies:  Patient has no known allergies. Social History:   TOBACCO:   reports that he has been smoking cigarettes. He started smoking about 42 years ago. He has been smoking an average of 2.5 packs per day. He does not have any smokeless tobacco history on file. ETOH:   reports no history of alcohol use. Family History:       Problem Relation Age of Onset    Dementia Mother     Stroke Father     Diabetes Sister       Or deferred/otherwise considered non contributory to current admission  PHYSICAL EXAM:    VS: BP (!) 162/150   Pulse (!) 103   Temp 97.2 °F (36.2 °C) (Temporal)   Resp 14   Ht 6' (1.829 m)   Wt 280 lb (127 kg)   SpO2 100%   BMI 37.97 kg/m²     General Appearance:     no acute distress. Psych:  HEENT:    A.O. As per HPI details  NC/AT, PERRL, no pallor no icterus, lips/ext mucous membrane grossly dry   Neck:   Supple, trachea midline, no obvious JVD   Resp:     Dec BS trace wheezes, No rhonchi   Chest wall:    No tenderness or deformity   Heart:    Regular rate and rhythm, S1 and S2 normal, no rub or gallop.    Abdomen:     Soft, non-tender, bowel sounds active    Inc habitus   Genitalia & Rectal:    Deferred. Extremities x4:   Extremities  prior toes amputations  , atraumatic, no cyanosis, no clubbing   Musculoskeletal:      NO active synovitis or swollen b/l wrists, 2-5 MCPs examined   Skin:   Skin color, texture, turgor  + ACUTE rashes /lesions in lower legs  b/l diabetic shin wounds (necrobiosis lipoidic diabetorum?) infected?   +excoriations   Lymph nodes:   Cervical nodes grossly normal   Neurologic:  .Grossly symmetric  strength in UEs and LEs with symmetric grossly dec to light touch sensation     LABS:  CBC:   Lab Results   Component Value Date/Time    WBC 9.7 11/09/2022 06:55 PM    RBC 4.90 11/09/2022 06:55 PM    HGB 14.6 11/09/2022 06:55 PM    HCT 43.8 11/09/2022 06:55 PM     11/09/2022 06:55 PM    MCV 89.4 11/09/2022 06:55 PM     BMP:    Lab Results   Component Value Date/Time     11/09/2022 06:55 PM    K 5.0 11/09/2022 06:55 PM     11/09/2022 06:55 PM    CO2 27 11/09/2022 06:55 PM    BUN 16 11/09/2022 06:55 PM    CREATININE 1.0 11/09/2022 06:55 PM    GLUCOSE 208 11/09/2022 06:55 PM    CALCIUM 9.4 11/09/2022 06:55 PM     Hepatic Function Panel:    Lab Results   Component Value Date/Time    ALKPHOS 108 11/09/2022 06:55 PM    AST 23 11/09/2022 06:55 PM    ALT 10 11/09/2022 06:55 PM    PROT 7.1 11/09/2022 06:55 PM    LABALBU 3.9 11/09/2022 06:55 PM    BILITOT 0.3 11/09/2022 06:55 PM     Magnesium:    Lab Results   Component Value Date/Time    MG 1.8 05/09/2022 02:45 AM       PT/INR:  No results found for: PROTIME, INR  U/A:   Lab Results   Component Value Date/Time    LEUKOCYTESUR Negative 05/26/2022 04:05 PM    PHUR 7.0 05/26/2022 04:05 PM    WBCUA NONE 05/26/2022 04:05 PM    RBCUA NONE 05/26/2022 04:05 PM    BACTERIA NONE SEEN 05/26/2022 04:05 PM    SPECGRAV 1.015 05/26/2022 04:05 PM    BLOODU Negative 05/26/2022 04:05 PM    GLUCOSEU >=1000 05/26/2022 04:05 PM     ABG:  No results found for: PHART, WTI4CNI, PO2ART, U2GABINM, WIB8BWL, BEART  TSH:  No results found for: TSH  Cardiac Enzymes:   Lab Results   Component Value Date    CKTOTAL 27 06/06/2022    CKTOTAL 32 05/31/2022    CKTOTAL 31 05/23/2022       Radiology: CT ABDOMEN PELVIS W IV CONTRAST Additional Contrast? None    Result Date: 11/9/2022  EXAMINATION: CT OF THE ABDOMEN AND PELVIS WITH CONTRAST 11/9/2022 7:54 pm TECHNIQUE: CT of the abdomen and pelvis was performed with the administration of intravenous contrast. Multiplanar reformatted images are provided for review. Automated exposure control, iterative reconstruction, and/or weight based adjustment of the mA/kV was utilized to reduce the radiation dose to as low as reasonably achievable. COMPARISON: CT abdomen and pelvis dated 05/06/2022 HISTORY: ORDERING SYSTEM PROVIDED HISTORY: LLQ abd pain TECHNOLOGIST PROVIDED HISTORY: Reason for exam:->LLQ abd pain Additional Contrast?->None Decision Support Exception - unselect if not a suspected or confirmed emergency medical condition->Emergency Medical Condition (MA) FINDINGS: Lower Chest: Lung bases are clear. Organs: Liver without focal lesion. Gallbladder surgically. Pancreas and spleen unremarkable. Adrenals without nodule. Kidneys without suspicious renal lesion and no hydronephrosis. Multiple bilateral low-attenuation areas of renal cortical cysts. GI/Bowel: No focal thickening or disproportion dilatation of bowel. Duodenal diverticulum medially directed in the duodenal C loop near the ampullary portion as seen on prior with air-fluid level. No inflammatory findings. Pelvis: No suspicious pelvic lesion or bulky pelvic adenopathy/free fluid. Mild prostatomegaly Peritoneum/Retroperitoneum: No bulky retroperitoneal adenopathy however in the left internal iliac station region there is a 17 mm mildly enlarged lymph node along with right iliac 14 mm lymph node grossly unchanged from prior comparison. .  No suspicious peritoneal or mesenteric process Vasculature: Grossly normal caliber of abdominal aorta and vasculature Bones/Soft Tissues: No acute osseous or soft tissue findings. Small left fat containing direct inguinal hernia. No mechanical obstructive process of bowel or focal inflammatory findings. Redundant sigmoid colon without diverticular disease or acute inflammatory process. Stable mildly enlarged left internal iliac station lymph node 17 mm unchanged dating back to 05/06/2022 somewhat indeterminate. No obstructing uropathy. Mild prostatomegaly. Stable small left fat containing direct inguinal hernia Duodenal diverticulum versus choledochal cyst formation in the region of the ampullary portion and duodenal C loop with air-fluid level as seen on prior without interval progression or enlargement and no inflammatory change associated. XR CHEST PORTABLE    Result Date: 11/9/2022  EXAMINATION: ONE XRAY VIEW OF THE CHEST 11/9/2022 7:07 pm COMPARISON: 05/26/2022 HISTORY: ORDERING SYSTEM PROVIDED HISTORY: chest pain TECHNOLOGIST PROVIDED HISTORY: Reason for exam:->chest pain FINDINGS: There is cardiomegaly with vascular congestion. There is perihilar and bibasilar atelectasis. There is no focal consolidation or edema. Mild CHF. US DUP LOWER EXTREMITY RIGHT LAUREN    Result Date: 11/9/2022  EXAMINATION: DUPLEX VENOUS ULTRASOUND OF THE RIGHT LOWER EXTREMITY 11/9/2022 7:13 pm TECHNIQUE: Duplex ultrasound using B-mode/gray scaled imaging and Doppler spectral analysis and color flow was obtained of the deep venous structures of the right lower extremity. COMPARISON: None. HISTORY: ORDERING SYSTEM PROVIDED HISTORY: leg swelling TECHNOLOGIST PROVIDED HISTORY: Reason for exam:->leg swelling What reading provider will be dictating this exam?->CRC FINDINGS: The visualized veins of the right lower extremity are patent and free of echogenic thrombus. The veins demonstrate good compressibility with normal color flow study and spectral analysis.      No evidence of DVT in the right lower extremity. RECOMMENDATIONS: Unavailable       EKG:  Normal sinus rhythm  Incomplete right bundle branch block  Borderline ECG  No previous ECGs available   Assessment & Plan   ACTIVE hospital problems being addressed/reassessed for this admission:  Principal Problem:    Cellulitis  Resolved Problems:    * No resolved hospital problems. *    Code status/DVT prophylaxis and PLAN --see orders   Note extensive time spent coordinating care between ER docs, ER and floor nurses, and transitioning care over to day providers  Plan of care/ clinical impressions/communication specifics detailed below:      hypertension,- will resume home meds   hyperlipidemia   hypothyroidism - cehck Tsh/free t4- if N, stop \"home thryoid med he is NOT taking\"   diabetes with diabetic foot wounds    He is reporting inc LE pain since running out of lyrica months ago-- lyrica Rx by his podiatrist dr. Trent Loredo- resume Eddie Sena  He is also requesting pain meds  (?check urine drug screen)    He does admit to being without his DM medications for the last couple months.--although NOT in DKA in ER    PCP is NP he doesn't know name, Rx his DM meds  Doesn't know an aic, and doesn't check BS regularly      b/l diabetic shin wounds (necrobiosis lipoidic diabetorum?) infected? He reports ongoing LE moderatly severe shin pain b/l --+neuropathy and worsenign LE edema  He admits to scatching LE and excoriations to skin, which have chronically bled over past couple months. Worsening months swelling chronic leg wounds from his diabetes and that he has noted increased drainage as well as warmth and erythema  NO fever  WBC N  Cxr from drainage of wounds  ID consult, podiatry consult  Dressing - wound care  Avoid scratching    Neg flu and covid tests in ER  Also complains of cough,weeks   recent abd pain, N/V- over past week  (Plan respiratory panel order)  Cxr--Mild CHF    US  No evidence of DVT in the right lower extremity.          LE wounds chronic +acute decompensation? Noted in June sed 40-55 ,crp trended down from 1.5  Per ID: [email or web address] in [place]  Surgical site infection right toe following right second toe amputation secondary to osteomyelitis. Previous cultures grew MRSA, VS Enterococcus faecalis, pansensitive Pseudomonas, Myroides species and Staphylococcus cohnii  Status post delayed primary closure right foot 5/10/2022  Last admission by our group in May:  Septic shock and foot infection  atient was seen by general surgery for duodenal diverticulum, no intervention planned.     Silvina Perry MD   Night Officer, overnight admitting doctor at [facility] call day time doctor   for questions after 7:30am    Covering for [facility]  If Qs please call [phone]  Electronically signed by Arron Mandel MD on 11/9/2022 at 9:36 PM

## 2022-11-11 VITALS
TEMPERATURE: 97.8 F | SYSTOLIC BLOOD PRESSURE: 104 MMHG | DIASTOLIC BLOOD PRESSURE: 68 MMHG | BODY MASS INDEX: 39.42 KG/M2 | WEIGHT: 291 LBS | HEIGHT: 72 IN | OXYGEN SATURATION: 92 % | HEART RATE: 80 BPM | RESPIRATION RATE: 16 BRPM

## 2022-11-11 LAB
ANION GAP SERPL CALCULATED.3IONS-SCNC: 11 MMOL/L (ref 7–16)
BUN BLDV-MCNC: 19 MG/DL (ref 6–23)
CALCIUM SERPL-MCNC: 8.5 MG/DL (ref 8.6–10.2)
CHLORIDE BLD-SCNC: 104 MMOL/L (ref 98–107)
CO2: 20 MMOL/L (ref 22–29)
CREAT SERPL-MCNC: 1 MG/DL (ref 0.7–1.2)
GFR SERPL CREATININE-BSD FRML MDRD: >60 ML/MIN/1.73
GLUCOSE BLD-MCNC: 155 MG/DL (ref 74–99)
HCT VFR BLD CALC: 42.3 % (ref 37–54)
HEMOGLOBIN: 13.6 G/DL (ref 12.5–16.5)
MCH RBC QN AUTO: 28.8 PG (ref 26–35)
MCHC RBC AUTO-ENTMCNC: 32.2 % (ref 32–34.5)
MCV RBC AUTO: 89.6 FL (ref 80–99.9)
METER GLUCOSE: 164 MG/DL (ref 74–99)
METER GLUCOSE: 193 MG/DL (ref 74–99)
METER GLUCOSE: 196 MG/DL (ref 74–99)
MRSA CULTURE ONLY: NORMAL
PDW BLD-RTO: 14.2 FL (ref 11.5–15)
PLATELET # BLD: 180 E9/L (ref 130–450)
PMV BLD AUTO: 11.5 FL (ref 7–12)
POTASSIUM SERPL-SCNC: 5.1 MMOL/L (ref 3.5–5)
RBC # BLD: 4.72 E12/L (ref 3.8–5.8)
SODIUM BLD-SCNC: 135 MMOL/L (ref 132–146)
WBC # BLD: 7.5 E9/L (ref 4.5–11.5)

## 2022-11-11 PROCEDURE — 85027 COMPLETE CBC AUTOMATED: CPT

## 2022-11-11 PROCEDURE — 80048 BASIC METABOLIC PNL TOTAL CA: CPT

## 2022-11-11 PROCEDURE — 99239 HOSP IP/OBS DSCHRG MGMT >30: CPT | Performed by: INTERNAL MEDICINE

## 2022-11-11 PROCEDURE — 6370000000 HC RX 637 (ALT 250 FOR IP): Performed by: REGISTERED NURSE

## 2022-11-11 PROCEDURE — 82962 GLUCOSE BLOOD TEST: CPT

## 2022-11-11 PROCEDURE — 6370000000 HC RX 637 (ALT 250 FOR IP): Performed by: SPECIALIST

## 2022-11-11 PROCEDURE — 36415 COLL VENOUS BLD VENIPUNCTURE: CPT

## 2022-11-11 PROCEDURE — 6370000000 HC RX 637 (ALT 250 FOR IP): Performed by: INTERNAL MEDICINE

## 2022-11-11 PROCEDURE — 2580000003 HC RX 258: Performed by: SPECIALIST

## 2022-11-11 PROCEDURE — 6360000002 HC RX W HCPCS: Performed by: SPECIALIST

## 2022-11-11 PROCEDURE — APPSS45 APP SPLIT SHARED TIME 31-45 MINUTES: Performed by: NURSE PRACTITIONER

## 2022-11-11 RX ORDER — CEPHALEXIN 500 MG/1
1000 CAPSULE ORAL EVERY 8 HOURS SCHEDULED
Status: DISCONTINUED | OUTPATIENT
Start: 2022-11-11 | End: 2022-11-11 | Stop reason: HOSPADM

## 2022-11-11 RX ORDER — LEVOTHYROXINE SODIUM 0.03 MG/1
25 TABLET ORAL DAILY
Qty: 30 TABLET | Refills: 0 | Status: SHIPPED | OUTPATIENT
Start: 2022-11-11 | End: 2022-12-11

## 2022-11-11 RX ORDER — CEPHALEXIN 500 MG/1
1000 CAPSULE ORAL EVERY 8 HOURS SCHEDULED
Qty: 60 CAPSULE | Refills: 0 | Status: SHIPPED | OUTPATIENT
Start: 2022-11-11 | End: 2022-11-21

## 2022-11-11 RX ORDER — GLIMEPIRIDE 4 MG/1
4 TABLET ORAL 2 TIMES DAILY
Qty: 60 TABLET | Refills: 0 | Status: SHIPPED | OUTPATIENT
Start: 2022-11-11 | End: 2022-11-11 | Stop reason: SDUPTHER

## 2022-11-11 RX ORDER — GLIMEPIRIDE 4 MG/1
4 TABLET ORAL 2 TIMES DAILY
Qty: 60 TABLET | Refills: 0 | Status: SHIPPED | OUTPATIENT
Start: 2022-11-11 | End: 2022-12-11

## 2022-11-11 RX ORDER — LISINOPRIL AND HYDROCHLOROTHIAZIDE 20; 12.5 MG/1; MG/1
1 TABLET ORAL DAILY
Qty: 30 TABLET | Refills: 0 | Status: SHIPPED | OUTPATIENT
Start: 2022-11-11

## 2022-11-11 RX ORDER — ATORVASTATIN CALCIUM 40 MG/1
40 TABLET, FILM COATED ORAL NIGHTLY
Qty: 30 TABLET | Refills: 0 | Status: SHIPPED | OUTPATIENT
Start: 2022-11-11

## 2022-11-11 RX ORDER — PREGABALIN 100 MG/1
100 CAPSULE ORAL NIGHTLY
Qty: 30 CAPSULE | Refills: 0 | Status: SHIPPED | OUTPATIENT
Start: 2022-11-11 | End: 2022-12-11

## 2022-11-11 RX ORDER — DEXTROSE MONOHYDRATE 100 MG/ML
INJECTION, SOLUTION INTRAVENOUS CONTINUOUS PRN
Status: DISCONTINUED | OUTPATIENT
Start: 2022-11-11 | End: 2022-11-11 | Stop reason: HOSPADM

## 2022-11-11 RX ORDER — DOXYCYCLINE HYCLATE 100 MG/1
100 CAPSULE ORAL EVERY 12 HOURS SCHEDULED
Qty: 20 CAPSULE | Refills: 0 | Status: SHIPPED | OUTPATIENT
Start: 2022-11-11 | End: 2022-11-21

## 2022-11-11 RX ADMIN — LEVOTHYROXINE SODIUM 25 MCG: 25 TABLET ORAL at 06:25

## 2022-11-11 RX ADMIN — GLIPIZIDE 10 MG: 5 TABLET ORAL at 16:21

## 2022-11-11 RX ADMIN — HYDROCODONE BITARTRATE AND ACETAMINOPHEN 1 TABLET: 5; 325 TABLET ORAL at 09:47

## 2022-11-11 RX ADMIN — CEFAZOLIN 2000 MG: 2 INJECTION, POWDER, FOR SOLUTION INTRAMUSCULAR; INTRAVENOUS at 06:25

## 2022-11-11 RX ADMIN — SERTRALINE HYDROCHLORIDE 50 MG: 50 TABLET ORAL at 08:34

## 2022-11-11 RX ADMIN — METFORMIN HYDROCHLORIDE 1000 MG: 1000 TABLET ORAL at 16:23

## 2022-11-11 RX ADMIN — METFORMIN HYDROCHLORIDE 1000 MG: 1000 TABLET ORAL at 08:34

## 2022-11-11 RX ADMIN — DOXYCYCLINE HYCLATE 100 MG: 100 CAPSULE ORAL at 08:34

## 2022-11-11 RX ADMIN — CEPHALEXIN 1000 MG: 500 CAPSULE ORAL at 15:22

## 2022-11-11 RX ADMIN — GLIPIZIDE 10 MG: 5 TABLET ORAL at 06:25

## 2022-11-11 ASSESSMENT — PAIN DESCRIPTION - ORIENTATION: ORIENTATION: RIGHT;LEFT

## 2022-11-11 ASSESSMENT — PAIN SCALES - GENERAL
PAINLEVEL_OUTOF10: 2
PAINLEVEL_OUTOF10: 6

## 2022-11-11 ASSESSMENT — PAIN DESCRIPTION - FREQUENCY: FREQUENCY: CONTINUOUS

## 2022-11-11 ASSESSMENT — PAIN DESCRIPTION - LOCATION: LOCATION: LEG

## 2022-11-11 ASSESSMENT — PAIN DESCRIPTION - PAIN TYPE: TYPE: ACUTE PAIN

## 2022-11-11 ASSESSMENT — PAIN DESCRIPTION - ONSET: ONSET: ON-GOING

## 2022-11-11 ASSESSMENT — PAIN DESCRIPTION - DESCRIPTORS: DESCRIPTORS: ITCHING

## 2022-11-11 NOTE — PROGRESS NOTES
[address] [place] [address] [facility]  Progress Note    SUBJECTIVE:  Chief Complaint   Patient presents with    Emesis     Ongoing for wk    Wound Check     R leg for wks    Cough     For wks     Patient is tolerating medications. No reported adverse drug reactions. No nausea, vomiting, diarrhea. Denies pain to his legs but does have pain in his feet  No fevers   Discussed labs with patient     Review of systems:  As stated above in the chief complaint, otherwise negative. Medications:  Scheduled Meds:   lisinopril  20 mg Oral Daily    And    hydroCHLOROthiazide  12.5 mg Oral Daily    ceFAZolin  2,000 mg IntraVENous Q8H    doxycycline hyclate  100 mg Oral 2 times per day    fentanNYL  50 mcg IntraVENous Once    insulin lispro  0-8 Units SubCUTAneous TID WC    insulin lispro  0-4 Units SubCUTAneous Nightly    atorvastatin  40 mg Oral Nightly    glipiZIDE  10 mg Oral BID AC    levothyroxine  25 mcg Oral Daily    metFORMIN  1,000 mg Oral BID WC    metoprolol tartrate  25 mg Oral BID    pregabalin  100 mg Oral Nightly    sertraline  50 mg Oral Daily     Continuous Infusions:   dextrose       PRN Meds:glucose, dextrose bolus **OR** dextrose bolus, glucagon (rDNA), dextrose, HYDROcodone 5 mg - acetaminophen, ondansetron    OBJECTIVE:  /68   Pulse 80   Temp 97.8 °F (36.6 °C) (Oral)   Resp 18   Ht 6' (1.829 m)   Wt 291 lb (132 kg)   SpO2 92%   BMI 39.47 kg/m²   Temp  Av.8 °F (36.6 °C)  Min: 97.8 °F (36.6 °C)  Max: 97.8 °F (36.6 °C)  Constitutional: The patient is awake, alert, and oriented. Up to the chair, was ambulating in the halls   Skin: Warm and dry. No rashes were noted. HEENT: Round and reactive pupils. Moist mucous membranes. No ulcerations or thrush. Neck: Supple to movements. Chest: No use of accessory muscles to breathe. Symmetrical expansion. No wheezing, crackles or rhonchi. Cardiovascular: S1 and S2 are rhythmic and regular. No murmurs appreciated.    Abdomen: Positive bowel sounds to auscultation. Benign to palpation. No masses felt. Extremities: No edema.  Bilateral legs wrapped in ACE wraps  Lines: peripheral    Laboratory and Tests Review:  Lab Results   Component Value Date    WBC 7.5 11/11/2022    WBC 7.4 11/10/2022    WBC 9.7 11/09/2022    HGB 13.6 11/11/2022    HCT 42.3 11/11/2022    MCV 89.6 11/11/2022     11/11/2022     Lab Results   Component Value Date    NEUTROABS 4.39 11/10/2022    NEUTROABS 6.50 11/09/2022    NEUTROABS 4.77 06/06/2022     No results found for: CRP  Lab Results   Component Value Date    ALT 23 11/10/2022    AST 25 11/10/2022    ALKPHOS 90 11/10/2022    BILITOT 0.2 11/10/2022     Lab Results   Component Value Date/Time     11/11/2022 02:49 AM    K 5.1 11/11/2022 02:49 AM    K 5.0 11/09/2022 06:55 PM     11/11/2022 02:49 AM    CO2 20 11/11/2022 02:49 AM    BUN 19 11/11/2022 02:49 AM    CREATININE 1.0 11/11/2022 02:49 AM    CREATININE 0.9 11/10/2022 02:45 AM    CREATININE 1.0 11/09/2022 06:55 PM    GFRAA >60 06/06/2022 10:30 AM    LABGLOM >60 11/11/2022 02:49 AM    GLUCOSE 155 11/11/2022 02:49 AM    PROT 6.3 11/10/2022 02:45 AM    LABALBU 3.5 11/10/2022 02:45 AM    CALCIUM 8.5 11/11/2022 02:49 AM    BILITOT 0.2 11/10/2022 02:45 AM    ALKPHOS 90 11/10/2022 02:45 AM    AST 25 11/10/2022 02:45 AM    ALT 23 11/10/2022 02:45 AM     Lab Results   Component Value Date    CRP 2.4 (H) 11/10/2022    CRP 0.8 (H) 06/06/2022    CRP 1.0 (H) 05/31/2022     Lab Results   Component Value Date    SEDRATE 22 (H) 11/10/2022    SEDRATE 40 (H) 06/06/2022    SEDRATE 44 (H) 05/31/2022     Radiology:  Reviewed    Microbiology:   Blood cultures 11/9/2022: negative so far  Respiratory Viral Panel: negative   MRSA screening: pending  Wound culture: pending     Recent Labs     11/10/22  0245   PROCAL 0.20*       ASSESSMENT:  Cellulitis of the right leg  Chronic wounds bilateral legs, right greater than left  Venous stasis dermatitis bilateral legs    PLAN:  Consolidate to Cephalexin and Doxycycline for 10 days  Check final cultures  Monitor labs - , CRP 2.4, ESR 22  Ok to discharge from ID standpoint - med rec complete    CHRIST Castano - CNP  11:33 AM  11/11/2022     Patient seen and examined. I had a face to face encounter with the patient. Agree with exam.  Assessment and plan as outlined above and directed by me. Addition and corrections were done as deemed appropriate. My exam and plan include: The patient is doing well. Tolerating antibiotics. Legs arewrapped. He can be discharged on oral Cephalexin and Doxycycline x10 days. Follow-up with PCP.     Yeyo Wilder MD  11/11/2022  12:48 PM

## 2022-11-11 NOTE — DISCHARGE SUMMARY
[facility] Physician Discharge Summary       Ludmila Romero, [address] [phone]    Call on 11/14/2022      Deena Colon, DPM  [address] [facility] [id number]    Call on 11/14/2022      Milo Ta, [address] [facility] [address]  [address] [phone]    Follow up  As needed      Activity level: As tolerated     Dispo: Home    Condition on discharge: Stable     Patient ID:  Warner Barba  [id number]  63 y.o.  1960    Admit date: 11/9/2022    Discharge date and time:  11/11/2022  2:42 PM    Admission Diagnoses:   Principal Problem:    Cellulitis  Resolved Problems:    * No resolved hospital problems. *      Discharge Diagnoses:   Principal Problem:    Cellulitis  Resolved Problems:    * No resolved hospital problems. *      Consults:  IP CONSULT TO PODIATRY  IP CONSULT TO SOCIAL WORK  IP CONSULT TO INFECTIOUS DISEASES    Hospital Course:   Patient Warner Barba is a 63 y.o. presented with Cellulitis [L03.90]   Patient presented to the ER with worsening lower extremity wounds. Infectious Disease was consulted. Patient was followed and treated for;    1.  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 Reporting history of chronic wounds from DM. Increased drainage/ warmth/ erythema. Reporting associated cough/ N/V. Afebrile and no leukocytosis on arrival. ID/ podiatry consulted. PT was given IV ancef x 1 and po doxycycline x 1. Appreciate ID input. Continue po doxycycline and IV ancef. Follow cultures. ASO titer. Appreciate ID input. Antibiotics reconciled for discharge- plan for po cephalexin and doxycline x 10 days. Pt feeling better and wanting to go home. 2. Cough: CXR no pneumonia/ ? Mild chf. Check probnp 135. Respiratory panel negative. Reporting 1/2 ppd.  Reporting sob ongoing x months. Lungs CTA- does not appear dyspneic on exam. Monitor. Ambulated in room with no hypoxia. 3. N/V: CT abd- no acute findings. Left internal iliac station lymph node 17mm unchanged from 5/2022. Duodenal diverticulum vs choledochal cyst formation. Pt denies N/V today. Tolerating po.     4. COPD: does not appear to be in exacerbation. No wheezing on exam.     5. DM: check hga1c 8.7: metformin/ insulin ss     6. HTN: continue ace/ hctz/ lopressor     7. HLD: statin     8. Thyroid disease; synthroid      9. Tobacco use: reporting smoking 1/2 ppd- currently declining nicotine patch. 10. Social issues: reporting he ran out of his meds 5 days ago. Told ER has been off of his meds for more than 1 month. Documented that pt lives with his dog in car in [place]. During cold months he lives with family locally. 11. Deconditioning: am pac is 20. Patient feeling better. ID reconciled antibiotics for discharge. Pt wanting to go home. Patient discharged in stable condition with the following medications, instructions and follow up. Discharge Exam:  General Appearance: alert and oriented to person, place and time and in no acute distress  Skin: warm and dry  Head: normocephalic and atraumatic  Neck: neck supple and non tender without mass   Pulmonary/Chest: clear to auscultation bilaterally  Cardiovascular: normal rate, normal S1 and S2 and no carotid bruits  Abdomen: soft, non-tender, non-distended, normal bowel sounds, no masses or organomegaly  Extremities: no cyanosis, no clubbing and lower ext edema Right > Left. Macerations noted on bilateral lower legs- worse on right leg. Neurologic: speech normal        I/O last 3 completed shifts: In: 240 [P.O.:240]  Out: 600 [Urine:600]  I/O this shift:   In: 480 [P.O.:480]  Out: -       LABS:  Recent Labs     11/09/22  1855 11/10/22  0245 11/11/22  0249    134 135   K 5.0 4.4 5.1*    102 104   CO2 27 21* 20*   BUN 16 14 19   CREATININE 1.0 0.9 1.0   GLUCOSE 208* 183* 155*   CALCIUM 9.4 8.7 8.5*       Recent Labs     11/09/22  1855 11/10/22  0245 11/11/22  0249   WBC 9.7 7.4 7.5   RBC 4.90 4.54 4.72   HGB 14.6 13.4 13.6   HCT 43.8 40.6 42.3   MCV 89.4 89.4 89.6   MCH 29.8 29.5 28.8   MCHC 33.3 33.0 32.2   RDW 14.3 14.4 14.2    161 180   MPV 11.1 11.8 11.5       No results for input(s): POCGLU in the last 72 hours. Imaging:  XR TIBIA FIBULA LEFT (2 VIEWS)    Result Date: 11/10/2022  EXAMINATION: 4 XRAY VIEWS OF THE LEFT TIBIA AND FIBULA 11/10/2022 12:01 pm COMPARISON: None. HISTORY: ORDERING SYSTEM PROVIDED HISTORY: redness, swelling pain to leg TECHNOLOGIST PROVIDED HISTORY: Reason for exam:->redness, swelling pain to leg FINDINGS: There is no evidence of acute fracture. There is normal alignment. No acute joint abnormality. No focal osseous lesion. No focal soft tissue abnormality. No acute osseous abnormality. XR TIBIA FIBULA RIGHT (2 VIEWS)    Result Date: 11/10/2022  EXAMINATION: 4 XRAY VIEWS OF THE RIGHT TIBIA AND FIBULA 11/10/2022 12:01 pm COMPARISON: None. HISTORY: ORDERING SYSTEM PROVIDED HISTORY: redness, swelling pain to leg TECHNOLOGIST PROVIDED HISTORY: Reason for exam:->redness, swelling pain to leg FINDINGS: There is no evidence of acute fracture. There is normal alignment. No acute joint abnormality. No focal osseous lesion. No focal soft tissue abnormality. No acute osseous abnormality. CT ABDOMEN PELVIS W IV CONTRAST Additional Contrast? None    Result Date: 11/9/2022  EXAMINATION: CT OF THE ABDOMEN AND PELVIS WITH CONTRAST 11/9/2022 7:54 pm TECHNIQUE: CT of the abdomen and pelvis was performed with the administration of intravenous contrast. Multiplanar reformatted images are provided for review. Automated exposure control, iterative reconstruction, and/or weight based adjustment of the mA/kV was utilized to reduce the radiation dose to as low as reasonably achievable.  COMPARISON: CT abdomen and pelvis dated 05/06/2022 HISTORY: ORDERING SYSTEM PROVIDED HISTORY: LLQ abd pain TECHNOLOGIST PROVIDED HISTORY: Reason for exam:->LLQ abd pain Additional Contrast?->None Decision Support Exception - unselect if not a suspected or confirmed emergency medical condition->Emergency Medical Condition (MA) FINDINGS: Lower Chest: Lung bases are clear. Organs: Liver without focal lesion. Gallbladder surgically. Pancreas and spleen unremarkable. Adrenals without nodule. Kidneys without suspicious renal lesion and no hydronephrosis. Multiple bilateral low-attenuation areas of renal cortical cysts. GI/Bowel: No focal thickening or disproportion dilatation of bowel. Duodenal diverticulum medially directed in the duodenal C loop near the ampullary portion as seen on prior with air-fluid level. No inflammatory findings. Pelvis: No suspicious pelvic lesion or bulky pelvic adenopathy/free fluid. Mild prostatomegaly Peritoneum/Retroperitoneum: No bulky retroperitoneal adenopathy however in the left internal iliac station region there is a 17 mm mildly enlarged lymph node along with right iliac 14 mm lymph node grossly unchanged from prior comparison. .  No suspicious peritoneal or mesenteric process Vasculature: Grossly normal caliber of abdominal aorta and vasculature Bones/Soft Tissues: No acute osseous or soft tissue findings. Small left fat containing direct inguinal hernia. No mechanical obstructive process of bowel or focal inflammatory findings. Redundant sigmoid colon without diverticular disease or acute inflammatory process. Stable mildly enlarged left internal iliac station lymph node 17 mm unchanged dating back to 05/06/2022 somewhat indeterminate. No obstructing uropathy. Mild prostatomegaly.  Stable small left fat containing direct inguinal hernia Duodenal diverticulum versus choledochal cyst formation in the region of the ampullary portion and duodenal C loop with air-fluid level as seen on prior without interval progression or enlargement and no inflammatory change associated. XR CHEST PORTABLE    Result Date: 11/9/2022  EXAMINATION: ONE XRAY VIEW OF THE CHEST 11/9/2022 7:07 pm COMPARISON: 05/26/2022 HISTORY: ORDERING SYSTEM PROVIDED HISTORY: chest pain TECHNOLOGIST PROVIDED HISTORY: Reason for exam:->chest pain FINDINGS: There is cardiomegaly with vascular congestion. There is perihilar and bibasilar atelectasis. There is no focal consolidation or edema. Mild CHF. US DUP LOWER EXTREMITY RIGHT LAUREN    Result Date: 11/9/2022  EXAMINATION: DUPLEX VENOUS ULTRASOUND OF THE RIGHT LOWER EXTREMITY 11/9/2022 7:13 pm TECHNIQUE: Duplex ultrasound using B-mode/gray scaled imaging and Doppler spectral analysis and color flow was obtained of the deep venous structures of the right lower extremity. COMPARISON: None. HISTORY: ORDERING SYSTEM PROVIDED HISTORY: leg swelling TECHNOLOGIST PROVIDED HISTORY: Reason for exam:->leg swelling What reading provider will be dictating this exam?->CRC FINDINGS: The visualized veins of the right lower extremity are patent and free of echogenic thrombus. The veins demonstrate good compressibility with normal color flow study and spectral analysis. No evidence of DVT in the right lower extremity.  RECOMMENDATIONS: Unavailable       Patient Instructions:      Medication List        START taking these medications      cephALEXin 500 MG capsule  Commonly known as: KEFLEX  Take 2 capsules by mouth every 8 hours for 10 days     doxycycline hyclate 100 MG capsule  Commonly known as: VIBRAMYCIN  Take 1 capsule by mouth every 12 hours for 10 days            CHANGE how you take these medications      lisinopril-hydroCHLOROthiazide 20-12.5 MG per tablet  Commonly known as: PRINZIDE;ZESTORETIC  Take 1 tablet by mouth daily  What changed: how much to take            CONTINUE taking these medications      atorvastatin 40 MG tablet  Commonly known as: LIPITOR  Take 1 tablet by mouth at bedtime     glimepiride 4 MG tablet  Commonly known as: AMARYL  Take 1 tablet by mouth in the morning and at bedtime     levothyroxine 25 MCG tablet  Commonly known as: SYNTHROID  Take 1 tablet by mouth Daily     metFORMIN 1000 MG tablet  Commonly known as: GLUCOPHAGE  Take 1 tablet by mouth 2 times daily (with meals)     metoprolol tartrate 25 MG tablet  Commonly known as: LOPRESSOR  Take 1 tablet by mouth 2 times daily     pregabalin 100 MG capsule  Commonly known as: LYRICA  Take 1 capsule by mouth at bedtime for 30 days.      sertraline 50 MG tablet  Commonly known as: ZOLOFT  Take 1 tablet by mouth daily            STOP taking these medications      Jardiance 10 MG tablet  Generic drug: empagliflozin     lisinopril 2.5 MG tablet  Commonly known as: PRINIVIL;ZESTRIL     ondansetron 4 MG tablet  Commonly known as: Marishey Mullins               Where to Get Your Medications        These medications were sent to [address] [place] [address] [phone]  [address] [place] [address]      Phone: [phone]   atorvastatin 40 MG tablet  cephALEXin 500 MG capsule  doxycycline hyclate 100 MG capsule  glimepiride 4 MG tablet  levothyroxine 25 MCG tablet  lisinopril-hydroCHLOROthiazide 20-12.5 MG per tablet  metFORMIN 1000 MG tablet  metoprolol tartrate 25 MG tablet  sertraline 50 MG tablet       You can get these medications from any pharmacy    Bring a paper prescription for each of these medications  pregabalin 100 MG capsule           Note that more than 35 minutes was spent in preparing discharge papers, discussing discharge with patient, medication review, etc.    Signed:  Electronically signed by PREM Conde on 11/11/2022 at 2:42 PM

## 2022-11-12 LAB
ORGANISM: ABNORMAL
WOUND/ABSCESS: ABNORMAL

## 2022-11-14 NOTE — PROGRESS NOTES
CLINICAL PHARMACY NOTE: MEDS TO BEDS    Total # of Prescriptions Filled: 9   The following medications were delivered to the patient:  Doxy hy 100 caps  Zoloft 50  Lisinopril-hctz   Lipitor keflex 500  Metoprolol tart 25  Glimepiride 4  Levothyroxine  Pregabalin 100    Additional Documentation:

## 2022-11-15 LAB
BLOOD CULTURE, ROUTINE: NORMAL
CULTURE, BLOOD 2: NORMAL

## 2022-12-03 NOTE — PLAN OF CARE
Pharmacy Vancomycin Initial Note  Date of Service 2022  Patient's  1964  58 year old, male    Indication: Abscess and Skin and Soft Tissue Infection    Current estimated CrCl = Estimated Creatinine Clearance: 50.9 mL/min (A) (based on SCr of 2.21 mg/dL (H)).    Creatinine for last 3 days  2022:  4:47 PM Creatinine 2.21 mg/dL    Recent Vancomycin Level(s) for last 3 days  No results found for requested labs within last 72 hours.      Vancomycin IV Administrations (past 72 hours)      No vancomycin orders with administrations in past 72 hours.                Nephrotoxins and other renal medications (From now, onward)    Start     Dose/Rate Route Frequency Ordered Stop    22  vancomycin (VANCOCIN) 1,750 mg in sodium chloride 0.9 % 500 mL intermittent infusion         1,750 mg  over 120 Minutes Intravenous ONCE 22  vancomycin place clancy - receiving intermittent dosing         1 each Intravenous SEE ADMIN INSTRUCTIONS 22  ampicillin-sulbactam (UNASYN) 3 g vial to attach to  mL bag        Note to Pharmacy: DO NOT USE THIS FIELD FOR ADMIN INSTRUCTIONS; INFORMATION DOES NOT SHOW ON MAR. USE THE FIELD ABOVE MARKED ADMIN INSTRUCTIONS    3 g  200 mL/hr over 30 Minutes Intravenous ONCE 22            Contrast Orders - past 72 hours (72h ago, onward)    None                  Plan:  1. Give vancomycin 1750mg IV x1 now. Will dose intermittently based on levels for now due to ERICK on admit   2. Vancomycin monitoring method: Trough (Method 2 = manual dose calculation)  3. Vancomycin therapeutic monitoring goal: 10-15 mg/L  4. Pharmacy will check vancomycin levels as appropriate in 1-3 Days.    5. Serum creatinine levels will be ordered daily for the first week of therapy and at least twice weekly for subsequent weeks.      Juanpablo Conway, PharmD, BCPS     Problem: Pain  Goal: Verbalizes/displays adequate comfort level or baseline comfort level  Outcome: Progressing     Problem: Skin/Tissue Integrity  Goal: Absence of new skin breakdown  Description: 1. Monitor for areas of redness and/or skin breakdown  2. Assess vascular access sites hourly  3. Every 4-6 hours minimum:  Change oxygen saturation probe site  4. Every 4-6 hours:  If on nasal continuous positive airway pressure, respiratory therapy assess nares and determine need for appliance change or resting period.   Outcome: Progressing     Problem: Safety - Adult  Goal: Free from fall injury  Outcome: Progressing  Flowsheets (Taken 5/12/2022 1250)  Free From Fall Injury: Instruct family/caregiver on patient safety     Problem: ABCDS Injury Assessment  Goal: Absence of physical injury  Outcome: Progressing  Flowsheets (Taken 5/12/2022 1250)  Absence of Physical Injury: Implement safety measures based on patient assessment     Problem: Chronic Conditions and Co-morbidities  Goal: Patient's chronic conditions and co-morbidity symptoms are monitored and maintained or improved  Outcome: Progressing

## 2023-01-24 ENCOUNTER — HOSPITAL ENCOUNTER (EMERGENCY)
Age: 63
Discharge: HOME | End: 2023-01-25
Attending: EMERGENCY MEDICINE
Payer: MEDICARE

## 2023-01-24 ENCOUNTER — APPOINTMENT (OUTPATIENT)
Dept: GENERAL RADIOLOGY | Age: 63
End: 2023-01-24
Payer: MEDICARE

## 2023-01-24 DIAGNOSIS — R73.9 HYPERGLYCEMIA: Primary | ICD-10-CM

## 2023-01-24 DIAGNOSIS — R11.0 NAUSEA: ICD-10-CM

## 2023-01-24 LAB
BASOPHILS ABSOLUTE: 0.08 E9/L (ref 0–0.2)
BASOPHILS RELATIVE PERCENT: 0.7 % (ref 0–2)
EOSINOPHILS ABSOLUTE: 0.15 E9/L (ref 0.05–0.5)
EOSINOPHILS RELATIVE PERCENT: 1.4 % (ref 0–6)
GLUCOSE BLD-MCNC: ABNORMAL MG/DL
HCT VFR BLD CALC: 42.6 % (ref 37–54)
HEMOGLOBIN: 15 G/DL (ref 12.5–16.5)
IMMATURE GRANULOCYTES #: 0.05 E9/L
IMMATURE GRANULOCYTES %: 0.5 % (ref 0–5)
LYMPHOCYTES ABSOLUTE: 2.52 E9/L (ref 1.5–4)
LYMPHOCYTES RELATIVE PERCENT: 22.8 % (ref 20–42)
MCH RBC QN AUTO: 29.4 PG (ref 26–35)
MCHC RBC AUTO-ENTMCNC: 35.2 % (ref 32–34.5)
MCV RBC AUTO: 83.5 FL (ref 80–99.9)
METER GLUCOSE: >500 MG/DL (ref 74–99)
MONOCYTES ABSOLUTE: 0.84 E9/L (ref 0.1–0.95)
MONOCYTES RELATIVE PERCENT: 7.6 % (ref 2–12)
NEUTROPHILS ABSOLUTE: 7.4 E9/L (ref 1.8–7.3)
NEUTROPHILS RELATIVE PERCENT: 67 % (ref 43–80)
PDW BLD-RTO: 13.2 FL (ref 11.5–15)
PLATELET # BLD: 201 E9/L (ref 130–450)
PMV BLD AUTO: 12.3 FL (ref 7–12)
RBC # BLD: 5.1 E12/L (ref 3.8–5.8)
WBC # BLD: 11 E9/L (ref 4.5–11.5)

## 2023-01-24 PROCEDURE — 84484 ASSAY OF TROPONIN QUANT: CPT

## 2023-01-24 PROCEDURE — 85025 COMPLETE CBC W/AUTO DIFF WBC: CPT

## 2023-01-24 PROCEDURE — 80053 COMPREHEN METABOLIC PANEL: CPT

## 2023-01-24 PROCEDURE — 81001 URINALYSIS AUTO W/SCOPE: CPT

## 2023-01-24 PROCEDURE — 99285 EMERGENCY DEPT VISIT HI MDM: CPT

## 2023-01-24 PROCEDURE — 83605 ASSAY OF LACTIC ACID: CPT

## 2023-01-24 PROCEDURE — 83690 ASSAY OF LIPASE: CPT

## 2023-01-24 PROCEDURE — 96376 TX/PRO/DX INJ SAME DRUG ADON: CPT

## 2023-01-24 PROCEDURE — 93005 ELECTROCARDIOGRAM TRACING: CPT | Performed by: EMERGENCY MEDICINE

## 2023-01-24 PROCEDURE — 6360000002 HC RX W HCPCS: Performed by: EMERGENCY MEDICINE

## 2023-01-24 PROCEDURE — 96375 TX/PRO/DX INJ NEW DRUG ADDON: CPT

## 2023-01-24 PROCEDURE — 96374 THER/PROPH/DIAG INJ IV PUSH: CPT

## 2023-01-24 PROCEDURE — 83880 ASSAY OF NATRIURETIC PEPTIDE: CPT

## 2023-01-24 PROCEDURE — 2580000003 HC RX 258: Performed by: EMERGENCY MEDICINE

## 2023-01-24 PROCEDURE — 71045 X-RAY EXAM CHEST 1 VIEW: CPT

## 2023-01-24 PROCEDURE — 82962 GLUCOSE BLOOD TEST: CPT

## 2023-01-24 RX ORDER — 0.9 % SODIUM CHLORIDE 0.9 %
1000 INTRAVENOUS SOLUTION INTRAVENOUS ONCE
Status: COMPLETED | OUTPATIENT
Start: 2023-01-24 | End: 2023-01-25

## 2023-01-24 RX ORDER — ONDANSETRON 2 MG/ML
4 INJECTION INTRAMUSCULAR; INTRAVENOUS ONCE
Status: COMPLETED | OUTPATIENT
Start: 2023-01-24 | End: 2023-01-24

## 2023-01-24 RX ADMIN — SODIUM CHLORIDE 1000 ML: 9 INJECTION, SOLUTION INTRAVENOUS at 23:50

## 2023-01-24 RX ADMIN — ONDANSETRON 4 MG: 2 INJECTION INTRAMUSCULAR; INTRAVENOUS at 23:56

## 2023-01-24 ASSESSMENT — PAIN SCALES - GENERAL: PAINLEVEL_OUTOF10: 6

## 2023-01-24 ASSESSMENT — PAIN DESCRIPTION - LOCATION: LOCATION: CHEST

## 2023-01-25 ENCOUNTER — APPOINTMENT (OUTPATIENT)
Dept: CT IMAGING | Age: 63
End: 2023-01-25
Payer: MEDICARE

## 2023-01-25 VITALS
HEIGHT: 72 IN | SYSTOLIC BLOOD PRESSURE: 110 MMHG | BODY MASS INDEX: 38.6 KG/M2 | DIASTOLIC BLOOD PRESSURE: 61 MMHG | WEIGHT: 285 LBS | OXYGEN SATURATION: 100 % | TEMPERATURE: 98.1 F | HEART RATE: 85 BPM | RESPIRATION RATE: 16 BRPM

## 2023-01-25 LAB
ALBUMIN SERPL-MCNC: 3.9 G/DL (ref 3.5–5.2)
ALP BLD-CCNC: 174 U/L (ref 40–129)
ALT SERPL-CCNC: 29 U/L (ref 0–40)
ANION GAP SERPL CALCULATED.3IONS-SCNC: 11 MMOL/L (ref 7–16)
ANION GAP SERPL CALCULATED.3IONS-SCNC: 8 MMOL/L (ref 7–16)
AST SERPL-CCNC: 27 U/L (ref 0–39)
BACTERIA: ABNORMAL /HPF
BILIRUB SERPL-MCNC: 0.7 MG/DL (ref 0–1.2)
BILIRUBIN URINE: NEGATIVE
BLOOD, URINE: NEGATIVE
BUN BLDV-MCNC: 31 MG/DL (ref 6–23)
BUN BLDV-MCNC: 35 MG/DL (ref 6–23)
CALCIUM SERPL-MCNC: 8.3 MG/DL (ref 8.6–10.2)
CALCIUM SERPL-MCNC: 9.6 MG/DL (ref 8.6–10.2)
CHLORIDE BLD-SCNC: 100 MMOL/L (ref 98–107)
CHLORIDE BLD-SCNC: 89 MMOL/L (ref 98–107)
CLARITY: CLEAR
CO2: 23 MMOL/L (ref 22–29)
CO2: 23 MMOL/L (ref 22–29)
COLOR: YELLOW
CREAT SERPL-MCNC: 0.9 MG/DL (ref 0.7–1.2)
CREAT SERPL-MCNC: 1.1 MG/DL (ref 0.7–1.2)
EPITHELIAL CELLS, UA: ABNORMAL /HPF
GFR SERPL CREATININE-BSD FRML MDRD: >60 ML/MIN/1.73
GFR SERPL CREATININE-BSD FRML MDRD: >60 ML/MIN/1.73
GLUCOSE BLD-MCNC: 332 MG/DL (ref 74–99)
GLUCOSE BLD-MCNC: 676 MG/DL (ref 74–99)
GLUCOSE BLD-MCNC: ABNORMAL MG/DL
GLUCOSE URINE: >=1000 MG/DL
KETONES, URINE: NEGATIVE MG/DL
LACTIC ACID: 2.1 MMOL/L (ref 0.5–2.2)
LACTIC ACID: 2.5 MMOL/L (ref 0.5–2.2)
LEUKOCYTE ESTERASE, URINE: NEGATIVE
LIPASE: 106 U/L (ref 13–60)
METER GLUCOSE: >500 MG/DL (ref 74–99)
NITRITE, URINE: NEGATIVE
PH UA: 6 (ref 5–9)
POTASSIUM SERPL-SCNC: 4.3 MMOL/L (ref 3.5–5)
POTASSIUM SERPL-SCNC: 6.3 MMOL/L (ref 3.5–5)
PRO-BNP: 83 PG/ML (ref 0–125)
PROTEIN UA: NEGATIVE MG/DL
RBC UA: ABNORMAL /HPF (ref 0–2)
SODIUM BLD-SCNC: 123 MMOL/L (ref 132–146)
SODIUM BLD-SCNC: 131 MMOL/L (ref 132–146)
SPECIFIC GRAVITY UA: <=1.005 (ref 1–1.03)
TOTAL PROTEIN: 7.6 G/DL (ref 6.4–8.3)
TROPONIN, HIGH SENSITIVITY: 12 NG/L (ref 0–11)
TROPONIN, HIGH SENSITIVITY: 15 NG/L (ref 0–11)
UROBILINOGEN, URINE: 0.2 E.U./DL
WBC UA: ABNORMAL /HPF (ref 0–5)

## 2023-01-25 PROCEDURE — 6360000004 HC RX CONTRAST MEDICATION: Performed by: RADIOLOGY

## 2023-01-25 PROCEDURE — 84484 ASSAY OF TROPONIN QUANT: CPT

## 2023-01-25 PROCEDURE — 2500000003 HC RX 250 WO HCPCS: Performed by: EMERGENCY MEDICINE

## 2023-01-25 PROCEDURE — 70450 CT HEAD/BRAIN W/O DYE: CPT

## 2023-01-25 PROCEDURE — 74177 CT ABD & PELVIS W/CONTRAST: CPT

## 2023-01-25 PROCEDURE — 2580000003 HC RX 258: Performed by: EMERGENCY MEDICINE

## 2023-01-25 PROCEDURE — 6370000000 HC RX 637 (ALT 250 FOR IP): Performed by: EMERGENCY MEDICINE

## 2023-01-25 PROCEDURE — 80048 BASIC METABOLIC PNL TOTAL CA: CPT

## 2023-01-25 PROCEDURE — 83605 ASSAY OF LACTIC ACID: CPT

## 2023-01-25 PROCEDURE — 6360000002 HC RX W HCPCS: Performed by: EMERGENCY MEDICINE

## 2023-01-25 PROCEDURE — A4216 STERILE WATER/SALINE, 10 ML: HCPCS | Performed by: EMERGENCY MEDICINE

## 2023-01-25 PROCEDURE — 82962 GLUCOSE BLOOD TEST: CPT

## 2023-01-25 RX ORDER — KETOROLAC TROMETHAMINE 30 MG/ML
15 INJECTION, SOLUTION INTRAMUSCULAR; INTRAVENOUS ONCE
Status: COMPLETED | OUTPATIENT
Start: 2023-01-25 | End: 2023-01-25

## 2023-01-25 RX ORDER — 0.9 % SODIUM CHLORIDE 0.9 %
1000 INTRAVENOUS SOLUTION INTRAVENOUS ONCE
Status: DISCONTINUED | OUTPATIENT
Start: 2023-01-25 | End: 2023-01-25

## 2023-01-25 RX ORDER — ONDANSETRON 4 MG/1
4 TABLET, ORALLY DISINTEGRATING ORAL 3 TIMES DAILY PRN
Qty: 21 TABLET | Refills: 0 | Status: SHIPPED | OUTPATIENT
Start: 2023-01-25

## 2023-01-25 RX ORDER — ONDANSETRON 2 MG/ML
4 INJECTION INTRAMUSCULAR; INTRAVENOUS ONCE
Status: COMPLETED | OUTPATIENT
Start: 2023-01-25 | End: 2023-01-25

## 2023-01-25 RX ADMIN — ONDANSETRON 4 MG: 2 INJECTION INTRAMUSCULAR; INTRAVENOUS at 03:08

## 2023-01-25 RX ADMIN — SODIUM CHLORIDE 1000 ML: 9 INJECTION, SOLUTION INTRAVENOUS at 00:16

## 2023-01-25 RX ADMIN — SODIUM CHLORIDE 1000 ML: 9 INJECTION, SOLUTION INTRAVENOUS at 02:39

## 2023-01-25 RX ADMIN — ALUMINUM HYDROXIDE, MAGNESIUM HYDROXIDE, AND SIMETHICONE: 200; 200; 20 SUSPENSION ORAL at 03:00

## 2023-01-25 RX ADMIN — INSULIN HUMAN 10 UNITS: 100 INJECTION, SOLUTION PARENTERAL at 00:59

## 2023-01-25 RX ADMIN — KETOROLAC TROMETHAMINE 15 MG: 30 INJECTION, SOLUTION INTRAMUSCULAR; INTRAVENOUS at 03:08

## 2023-01-25 RX ADMIN — INSULIN HUMAN 10 UNITS: 100 INJECTION, SOLUTION PARENTERAL at 02:42

## 2023-01-25 RX ADMIN — IOPAMIDOL 75 ML: 755 INJECTION, SOLUTION INTRAVENOUS at 02:34

## 2023-01-25 RX ADMIN — FAMOTIDINE 20 MG: 10 INJECTION INTRAVENOUS at 03:00

## 2023-01-25 ASSESSMENT — PAIN DESCRIPTION - PAIN TYPE: TYPE: ACUTE PAIN

## 2023-01-25 ASSESSMENT — PAIN - FUNCTIONAL ASSESSMENT
PAIN_FUNCTIONAL_ASSESSMENT: NONE - DENIES PAIN
PAIN_FUNCTIONAL_ASSESSMENT: 0-10

## 2023-01-25 ASSESSMENT — PAIN SCALES - GENERAL
PAINLEVEL_OUTOF10: 5
PAINLEVEL_OUTOF10: 6
PAINLEVEL_OUTOF10: 7
PAINLEVEL_OUTOF10: 3
PAINLEVEL_OUTOF10: 6

## 2023-01-25 ASSESSMENT — PAIN DESCRIPTION - LOCATION
LOCATION: CHEST

## 2023-01-25 ASSESSMENT — PAIN DESCRIPTION - DESCRIPTORS
DESCRIPTORS: ACHING
DESCRIPTORS: ACHING
DESCRIPTORS: STABBING

## 2023-01-25 NOTE — FLOWSHEET NOTE
Patient resting in bed respirations non labored skin warm dry intact, vitals stable. Patient states he feels better now. Blood sugar in the 300's.  Niece at bedside, will continue to monitor

## 2023-01-25 NOTE — ED PROVIDER NOTES
[facility] ENCOUNTER        Pt Name: Jignesh Fuentes  MRN: [id number]  Filipegfdavida 1960  Date of evaluation: 1/24/2023  Provider: Jemal Davis DO  PCP: Michele Tobin DO  Note Started: 11:41 PM EST 1/24/23    CHIEF COMPLAINT       Chief Complaint   Patient presents with    Chest Pain     Chest pain starting 1 weeks ago, vomiting, Diabetic, BSS reading high. HISTORY OF PRESENT ILLNESS: 1 or more Elements   History From: Patient and niece and EMS    Limitations to history : None    Jignesh Fuentes is a 63 y.o. male who presents with abdominal pain, nausea, vomiting, chest pain. It has been ongoing since earlier today, he has been vomiting consistently throughout the day. Nothing is better, nothing makes worse, associated with generalized abdominal pain. He has not been having fevers or chills, denies shortness of breath. He does have a cardiac history unknown as he had an open heart 5 years ago. He also has peripheral vascular disease with no acute changes, chronic lower extremity ulcers and skin changes. He did have a recent admission for cellulitis in November 2022. Denies headaches or vision changes. No other acute complaints. Nursing Notes were all reviewed and agreed with or any disagreements were addressed in the HPI. REVIEW OF SYSTEMS :      Positives and Pertinent negatives as per HPI.      SURGICAL HISTORY     Past Surgical History:   Procedure Laterality Date    BACK SURGERY      CHOLECYSTECTOMY      CORONARY ARTERY BYPASS GRAFT REDO      2 vessel    FOOT DEBRIDEMENT Right 4/25/2022    DELAYED PRIMARY CLOSURE RIGHT FOOT WOUND performed by Rodney Hudson DPM at [address] [place] [address] Right 5/7/2022    FOOT DEBRIDEMENT INCISION AND DRAINAGE performed by Dora Glynn DPM at [address] [place] [address] Right 5/10/2022    RIGHT FOOT DELAYED PRIMARY CLOSURE WITH POSSIBLE DEBRIDEMENT    ++CONTACT ISOLATION++ (DR AVAIL. AT 4PM) performed by Lisa Osei DPM at [place] [address] PICC LINE  5/9/2022         TOE AMPUTATION Right 4/22/2022    AMPUTATION RIGHT SECOND TOE performed by Felisha Smith DPM at [facility]       Discharge Medication List as of 1/25/2023  4:56 AM        CONTINUE these medications which have NOT CHANGED    Details   atorvastatin (LIPITOR) 40 MG tablet Take 1 tablet by mouth at bedtime, Disp-30 tablet, R-0Normal      levothyroxine (SYNTHROID) 25 MCG tablet Take 1 tablet by mouth Daily, Disp-30 tablet, R-0Normal      pregabalin (LYRICA) 100 MG capsule Take 1 capsule by mouth at bedtime for 30 days. , Disp-30 capsule, R-0Normal      sertraline (ZOLOFT) 50 MG tablet Take 1 tablet by mouth daily, Disp-30 tablet, R-0Normal      metoprolol tartrate (LOPRESSOR) 25 MG tablet Take 1 tablet by mouth 2 times daily, Disp-60 tablet, R-0Normal      metFORMIN (GLUCOPHAGE) 1000 MG tablet Take 1 tablet by mouth 2 times daily (with meals), Disp-60 tablet, R-0Normal      lisinopril-hydroCHLOROthiazide (PRINZIDE;ZESTORETIC) 20-12.5 MG per tablet Take 1 tablet by mouth daily, Disp-30 tablet, R-0Normal      glimepiride (AMARYL) 4 MG tablet Take 1 tablet by mouth in the morning and at bedtime, Disp-60 tablet, R-0Normal             ALLERGIES     Patient has no known allergies.     FAMILYHISTORY       Family History   Problem Relation Age of Onset    Dementia Mother     Stroke Father     Diabetes Sister         SOCIAL HISTORY       Social History     Tobacco Use    Smoking status: Every Day     Packs/day: 2.50     Types: Cigarettes     Start date: 1980   Substance Use Topics    Alcohol use: Never    Drug use: Never       SCREENINGS        Omer Coma Scale  Eye Opening: Spontaneous  Best Verbal Response: Oriented  Best Motor Response: Obeys commands  Omer Coma Scale Score: 15                CIWA Assessment  BP: 110/61  Heart Rate: 85           PHYSICAL EXAM  1 or more Elements     ED Triage Vitals   BP Temp Temp src Pulse Resp SpO2 Height Weight   -- -- -- -- -- -- -- --     Constitutional/General: Alert and oriented x3  Head: Normocephalic and atraumatic  Eyes: PERRL, EOMI, conjunctiva normal, sclera non icteric  ENT:  Oropharynx clear, handling secretions, no trismus, no asymmetry of the posterior oropharynx or uvular edema, tongue dry  Neck: Supple, full ROM, no stridor, no meningeal signs  Respiratory: Lungs clear to auscultation bilaterally, no wheezes, rales, or rhonchi. Not in respiratory distress  Cardiovascular:  Regular rate. Regular rhythm. No murmurs, no gallops, no rubs. 2+ distal pulses. Equal extremity pulses. Chest: No chest wall tenderness  GI:  Abdomen Soft, generalized tenderness palpation, Non distended. +BS. No rebound, guarding, or rigidity. No pulsatile masses. Musculoskeletal: Moves all extremities x 4. Warm and well perfused, no clubbing, no cyanosis, no edema. Capillary refill <3 seconds  Integument: skin warm and dry. No rashes.   Chronic skin changes in bilateral lower extremities, shallow peripheral vascular disease ulcers bilaterally that have minimal purulence, are not foul-smelling, no surrounding erythema  Neurologic: GCS 15, no focal deficits, symmetric strength 5/5 in the upper and lower extremities bilaterally  Psychiatric: Normal Affect      DIAGNOSTIC RESULTS   LABS:    Labs Reviewed   CBC WITH AUTO DIFFERENTIAL - Abnormal; Notable for the following components:       Result Value    MCHC 35.2 (*)     MPV 12.3 (*)     Neutrophils Absolute 7.40 (*)     All other components within normal limits   COMPREHENSIVE METABOLIC PANEL - Abnormal; Notable for the following components:    Sodium 123 (*)     Potassium 6.3 (*)     Chloride 89 (*)     Glucose 676 (*)     BUN 35 (*)     Alkaline Phosphatase 174 (*)     All other components within normal limits    Narrative:     Grisel Sosa tel. [phone],  Chemistry results called to and read back by Jenni Doty RN, 01/25/2023  00:27, by Elis Bradford   LACTIC ACID - Abnormal; Notable for the following components:    Lactic Acid 2.5 (*)     All other components within normal limits   LIPASE - Abnormal; Notable for the following components:    Lipase 106 (*)     All other components within normal limits   TROPONIN - Abnormal; Notable for the following components:    Troponin, High Sensitivity 15 (*)     All other components within normal limits   URINALYSIS WITH MICROSCOPIC - Abnormal; Notable for the following components:    Glucose, Ur >=1000 (*)     All other components within normal limits   TROPONIN - Abnormal; Notable for the following components:    Troponin, High Sensitivity 12 (*)     All other components within normal limits   BASIC METABOLIC PANEL - Abnormal; Notable for the following components:    Sodium 131 (*)     Glucose 332 (*)     BUN 31 (*)     Calcium 8.3 (*)     All other components within normal limits   POCT GLUCOSE - Abnormal   POCT GLUCOSE - Abnormal; Notable for the following components:    Meter Glucose >500 (*)     All other components within normal limits   POCT GLUCOSE - Abnormal   POCT GLUCOSE - Abnormal; Notable for the following components:    Meter Glucose >500 (*)     All other components within normal limits   BRAIN NATRIURETIC PEPTIDE   LACTIC ACID       As interpreted by me, the above displayed labs are abnormal. All other labs obtained during this visit were within normal range or not returned as of this dictation.       EKG Interpretation  Interpreted by emergency department resident physician, Jenni Doty, DO  Rate: 87  Rhythm: Sinus and with incomplete Right BBB  Interpretation: no acute changes, no acute ST elevations, SD interval 196, normal axis, QTC is 440, incomplete RBBB unchanged from prior EKG study  Comparison: stable as compared to patient's most recent EKG on 5/26/2022      RADIOLOGY:   Non-plain film images such as CT, Ultrasound and MRI are read by the radiologist. Zhen Masterson radiographic images are visualized and preliminarily interpreted by the ED Provider with the below findings:    Interpretation per the Radiologist below, if available at the time of this note:    CT ABDOMEN PELVIS W IV CONTRAST Additional Contrast? None   Final Result   1. No acute disease. 2. Normal appendix right lower quadrant. RECOMMENDATIONS:   Careful clinical correlation and follow up recommended. CT Head W/O Contrast   Final Result   No acute intracranial abnormality. RECOMMENDATIONS:   Careful clinical correlation and follow up recommended. XR CHEST PORTABLE   Final Result   No acute process. No results found. No results found. PROCEDURES   Unless otherwise noted below, none    PAST MEDICAL HISTORY/Chronic Conditions Affecting Care      has a past medical history of Anxiety, COPD (chronic obstructive pulmonary disease) ([facility] [address] Depression, DM (diabetes mellitus) ([facility] [address] Frostbite, HLD (hyperlipidemia), HTN (hypertension), and Sleep apnea.      EMERGENCY DEPARTMENT COURSE    Vitals:    Vitals:    01/25/23 0300 01/25/23 0418 01/25/23 0454 01/25/23 0504   BP: (!) 101/57 92/62 111/82 110/61   Pulse: 86 82  85   Resp: 16 16  16   Temp:    98.1 °F (36.7 °C)   TempSrc:    Oral   SpO2: 95% 95%  100%   Weight:       Height:           Patient was given the following medications:  Medications   0.9 % sodium chloride bolus (0 mLs IntraVENous Stopped 1/25/23 0101)   ondansetron (ZOFRAN) injection 4 mg (4 mg IntraVENous Given 1/24/23 2356)   0.9 % sodium chloride bolus (0 mLs IntraVENous Stopped 1/25/23 0101)   iopamidol (ISOVUE-370) 76 % injection 75 mL (75 mLs IntraVENous Given 1/25/23 0234)   insulin regular (HUMULIN R;NOVOLIN R) injection 10 Units (10 Units IntraVENous Given 1/25/23 0059)   insulin regular (HUMULIN R;NOVOLIN R) injection 10 Units (10 Units IntraVENous Given 1/25/23 0242)   famotidine (PEPCID) 20 mg in sodium chloride (PF) 0.9 % 10 mL injection (20 mg IntraVENous Given 1/25/23 0300)   aluminum & magnesium hydroxide-simethicone (MAALOX) 30 mL, lidocaine viscous hcl (XYLOCAINE) 5 mL (GI COCKTAIL) ( Oral Given 1/25/23 0300)   ketorolac (TORADOL) injection 15 mg (15 mg IntraVENous Given 1/25/23 0308)   ondansetron (ZOFRAN) injection 4 mg (4 mg IntraVENous Given 1/25/23 0308)       Medical Decision Making/Differential Diagnosis:    CC/HPI Summary, Social Determinants of health, Records Reviewed, DDx, testing done/not done, ED Course, Reassessment, disposition considerations/shared decision making with patient, consults, disposition:        CC/HPI Summary, DDx, ED Course, Reassessment, Tests Considered, Patient expectation:   73-year-old male with past medical history of CP, diabetes, hypertension, hyperlipidemia presenting today with chest pain, abdominal pain, nausea and vomiting. It has been ongoing since earlier today, nothing makes better, nothing makes it worse, not associated with headaches or fevers. Differential diagnosis to include but not limited to ACS, sepsis, hypoglycemia, viral illness. Physical exam demonstrating gentleman in discomfort, alert and oriented, generalized tenderness palpation on abdominal exam, clinically dry appearing  Will be treated with 2 L fluid bolus and IV Zofran. Lab work remarkable for hyperglycemia with initial glucose of 676, corrected sodium normal, potassium 6.3 with no ST changes on EKG. Troponin trending down with unremarkable delta. No other acute findings on lab work. Patient is not in DKA. Chest x-ray without acute cardiopulmonary process, head CT without acute intracranial abnormality, CT abdomen pelvis with no acute disease. He was feeling symptomatically improved after Pepcid, GI cocktail, IV Toradol, repeat IV Zofran. He was given 2 doses of IV insulin, glucose trending down to 332 after fluid and insulin.   Discussed very strict return precautions and that he needs follow-up with his primary care doctor to discuss management of his diabetes. He is understanding and agreeable to the plan. Will be discharged with Zofran. ED Course as of 01/25/23 1210   Tue Jan 24, 2023   2343 Reviewed echo from 2/21/2022 demonstrating LVEF of 65%. As patient is clinically dry appearing we will start with 2 L fluid bolus. [MM]   Wed Jan 25, 2023   0433 Potassium 6.3, EKG without evidence of hyperkalemia. [MM]   0145 ATTENDING PROVIDER ATTESTATION:     I have personally performed and/or participated in the history, exam, medical decision making, and procedures and agree with all pertinent clinical information unless otherwise noted. I have also reviewed and agree with the past medical, family and social history unless otherwise noted. I have discussed this patient in detail with the resident and provided the instruction and education regarding the evidence-based evaluation and treatment of chest pain. Any EKG that may have been performed has been personally reviewed by me and I agree with the documentation as noted by the resident. History: Patient with vague chest pain that began 1 week ago associated with nausea and vomiting. He is diabetic. Blood sugars been reading \"high. \"    My findings: Sadiq Fuentes is a 63 y.o. male whom is in no distress. Physical exam reveals he is alert and oriented. Heart is regular, lungs are clear. Abdomen is soft, obese and nontender. Extremities are intact without edema. Skin is warm and dry. My plan: Symptomatic and supportive care. Labs, CT, x-ray. Electronically signed by Mimi Rios DO on 1/25/23 at 1:45 AM EST       [TG]   0424 Glucose, Random(!): 332 [MM]   0454 Discussed results and plan for discharge with patient, encouraged better diet as he is blatantly diabetic. Has a follow-up appointment with his PCP on Thursday and they will discuss his blood sugar issues at that time.  [MM]      ED Course User Index  [MM] Silviano Carrion DO  [TG] Can [place] Allegra Conway am the Primary Clinician of Record. CONSULTS: (Who and What was discussed)  None    I am the Primary Clinician of Record. FINAL IMPRESSION      1. Hyperglycemia    2. Nausea          DISPOSITION/PLAN     DISPOSITION Decision To Discharge 01/25/2023 04:55:38 AM      PATIENT REFERRED TO:  Aura Solis DO  [address] [address]  [phone]    Call   follow up    DISCHARGE MEDICATIONS:  Discharge Medication List as of 1/25/2023  4:56 AM        START taking these medications    Details   ondansetron (ZOFRAN-ODT) 4 MG disintegrating tablet Take 1 tablet by mouth 3 times daily as needed for Nausea or Vomiting, Disp-21 tablet, R-0Print                  (Please note that portions of this note were completed with a voice recognition program.  Efforts were made to edit the dictations but occasionally words are mis-transcribed. )    Raina Falcon DO (electronically signed)            Raina Falcon DO  Resident  01/25/23 [id number]

## 2023-01-26 LAB
EKG ATRIAL RATE: 87 BPM
EKG P AXIS: 47 DEGREES
EKG P-R INTERVAL: 196 MS
EKG Q-T INTERVAL: 366 MS
EKG QRS DURATION: 92 MS
EKG QTC CALCULATION (BAZETT): 440 MS
EKG R AXIS: 29 DEGREES
EKG T AXIS: 52 DEGREES
EKG VENTRICULAR RATE: 87 BPM

## 2023-01-26 PROCEDURE — 93010 ELECTROCARDIOGRAM REPORT: CPT | Performed by: INTERNAL MEDICINE

## 2023-01-30 ENCOUNTER — HOSPITAL ENCOUNTER (INPATIENT)
Age: 63
LOS: 3 days | Discharge: HOME | DRG: 641 | End: 2023-02-02
Attending: EMERGENCY MEDICINE | Admitting: INTERNAL MEDICINE
Payer: MEDICARE

## 2023-01-30 ENCOUNTER — APPOINTMENT (OUTPATIENT)
Dept: CT IMAGING | Age: 63
DRG: 641 | End: 2023-01-30
Payer: MEDICARE

## 2023-01-30 DIAGNOSIS — E86.0 DEHYDRATION: ICD-10-CM

## 2023-01-30 DIAGNOSIS — R73.9 HYPERGLYCEMIA: ICD-10-CM

## 2023-01-30 DIAGNOSIS — E87.5 HYPERKALEMIA: Primary | ICD-10-CM

## 2023-01-30 PROBLEM — E78.5 HYPERLIPIDEMIA: Status: ACTIVE | Noted: 2023-01-30

## 2023-01-30 PROBLEM — Z72.0 TOBACCO USE: Status: ACTIVE | Noted: 2023-01-30

## 2023-01-30 PROBLEM — E07.9 THYROID DISEASE: Status: ACTIVE | Noted: 2023-01-30

## 2023-01-30 PROBLEM — F41.9 ANXIETY AND DEPRESSION: Status: ACTIVE | Noted: 2023-01-30

## 2023-01-30 PROBLEM — F32.A ANXIETY AND DEPRESSION: Status: ACTIVE | Noted: 2023-01-30

## 2023-01-30 PROBLEM — E87.20 ACIDOSIS: Status: ACTIVE | Noted: 2023-01-30

## 2023-01-30 PROBLEM — I73.9 PVD (PERIPHERAL VASCULAR DISEASE) (HCC): Status: ACTIVE | Noted: 2023-01-30

## 2023-01-30 LAB
ALBUMIN SERPL-MCNC: 3.4 G/DL (ref 3.5–5.2)
ALP BLD-CCNC: 174 U/L (ref 40–129)
ALT SERPL-CCNC: 80 U/L (ref 0–40)
ANION GAP SERPL CALCULATED.3IONS-SCNC: 10 MMOL/L (ref 7–16)
ANION GAP SERPL CALCULATED.3IONS-SCNC: 9 MMOL/L (ref 7–16)
AST SERPL-CCNC: 86 U/L (ref 0–39)
BACTERIA: ABNORMAL /HPF
BASOPHILS ABSOLUTE: 0.07 E9/L (ref 0–0.2)
BASOPHILS RELATIVE PERCENT: 0.8 % (ref 0–2)
BETA-HYDROXYBUTYRATE: 0.2 MMOL/L (ref 0.02–0.27)
BILIRUB SERPL-MCNC: 0.3 MG/DL (ref 0–1.2)
BILIRUBIN URINE: NEGATIVE
BLOOD, URINE: NEGATIVE
BUN BLDV-MCNC: 29 MG/DL (ref 6–23)
BUN BLDV-MCNC: 31 MG/DL (ref 6–23)
C-REACTIVE PROTEIN: 4 MG/DL (ref 0–0.4)
CALCIUM SERPL-MCNC: 8 MG/DL (ref 8.6–10.2)
CALCIUM SERPL-MCNC: 8.9 MG/DL (ref 8.6–10.2)
CHLORIDE BLD-SCNC: 102 MMOL/L (ref 98–107)
CHLORIDE BLD-SCNC: 92 MMOL/L (ref 98–107)
CLARITY: CLEAR
CO2: 18 MMOL/L (ref 22–29)
CO2: 19 MMOL/L (ref 22–29)
COLOR: YELLOW
CREAT SERPL-MCNC: 1.2 MG/DL (ref 0.7–1.2)
CREAT SERPL-MCNC: 1.2 MG/DL (ref 0.7–1.2)
EOSINOPHILS ABSOLUTE: 0.18 E9/L (ref 0.05–0.5)
EOSINOPHILS RELATIVE PERCENT: 2.2 % (ref 0–6)
GFR SERPL CREATININE-BSD FRML MDRD: >60 ML/MIN/1.73
GFR SERPL CREATININE-BSD FRML MDRD: >60 ML/MIN/1.73
GLUCOSE BLD-MCNC: 384 MG/DL (ref 74–99)
GLUCOSE BLD-MCNC: 646 MG/DL (ref 74–99)
GLUCOSE URINE: >=1000 MG/DL
HCT VFR BLD CALC: 43 % (ref 37–54)
HEMOGLOBIN: 14.6 G/DL (ref 12.5–16.5)
IMMATURE GRANULOCYTES #: 0.03 E9/L
IMMATURE GRANULOCYTES %: 0.4 % (ref 0–5)
KETONES, URINE: NEGATIVE MG/DL
LACTIC ACID, SEPSIS: 2.1 MMOL/L (ref 0.5–1.9)
LACTIC ACID, SEPSIS: 3 MMOL/L (ref 0.5–1.9)
LEUKOCYTE ESTERASE, URINE: NEGATIVE
LIPASE: 129 U/L (ref 13–60)
LYMPHOCYTES ABSOLUTE: 1.51 E9/L (ref 1.5–4)
LYMPHOCYTES RELATIVE PERCENT: 18.1 % (ref 20–42)
MCH RBC QN AUTO: 29.4 PG (ref 26–35)
MCHC RBC AUTO-ENTMCNC: 34 % (ref 32–34.5)
MCV RBC AUTO: 86.5 FL (ref 80–99.9)
METER GLUCOSE: 272 MG/DL (ref 74–99)
METER GLUCOSE: 371 MG/DL (ref 74–99)
METER GLUCOSE: >500 MG/DL (ref 74–99)
MONOCYTES ABSOLUTE: 0.83 E9/L (ref 0.1–0.95)
MONOCYTES RELATIVE PERCENT: 10 % (ref 2–12)
NEUTROPHILS ABSOLUTE: 5.7 E9/L (ref 1.8–7.3)
NEUTROPHILS RELATIVE PERCENT: 68.5 % (ref 43–80)
NITRITE, URINE: NEGATIVE
PDW BLD-RTO: 13.7 FL (ref 11.5–15)
PH UA: 6 (ref 5–9)
PH VENOUS: 7.36 (ref 7.35–7.45)
PLATELET # BLD: 185 E9/L (ref 130–450)
PMV BLD AUTO: 12.3 FL (ref 7–12)
POTASSIUM REFLEX MAGNESIUM: 4.9 MMOL/L (ref 3.5–5)
POTASSIUM SERPL-SCNC: 6 MMOL/L (ref 3.5–5)
PROTEIN UA: NEGATIVE MG/DL
RBC # BLD: 4.97 E12/L (ref 3.8–5.8)
RBC UA: ABNORMAL /HPF (ref 0–2)
SEDIMENTATION RATE, ERYTHROCYTE: 11 MM/HR (ref 0–15)
SODIUM BLD-SCNC: 121 MMOL/L (ref 132–146)
SODIUM BLD-SCNC: 129 MMOL/L (ref 132–146)
SPECIFIC GRAVITY UA: <=1.005 (ref 1–1.03)
TOTAL PROTEIN: 6.9 G/DL (ref 6.4–8.3)
UROBILINOGEN, URINE: 0.2 E.U./DL
WBC # BLD: 8.3 E9/L (ref 4.5–11.5)
WBC UA: ABNORMAL /HPF (ref 0–5)

## 2023-01-30 PROCEDURE — 85651 RBC SED RATE NONAUTOMATED: CPT

## 2023-01-30 PROCEDURE — 96365 THER/PROPH/DIAG IV INF INIT: CPT

## 2023-01-30 PROCEDURE — 99222 1ST HOSP IP/OBS MODERATE 55: CPT | Performed by: NURSE PRACTITIONER

## 2023-01-30 PROCEDURE — 82800 BLOOD PH: CPT

## 2023-01-30 PROCEDURE — 6360000002 HC RX W HCPCS

## 2023-01-30 PROCEDURE — 83605 ASSAY OF LACTIC ACID: CPT

## 2023-01-30 PROCEDURE — 6360000004 HC RX CONTRAST MEDICATION: Performed by: RADIOLOGY

## 2023-01-30 PROCEDURE — 80048 BASIC METABOLIC PNL TOTAL CA: CPT

## 2023-01-30 PROCEDURE — 80053 COMPREHEN METABOLIC PANEL: CPT

## 2023-01-30 PROCEDURE — 99223 1ST HOSP IP/OBS HIGH 75: CPT | Performed by: INTERNAL MEDICINE

## 2023-01-30 PROCEDURE — 2580000003 HC RX 258: Performed by: NURSE PRACTITIONER

## 2023-01-30 PROCEDURE — 99285 EMERGENCY DEPT VISIT HI MDM: CPT

## 2023-01-30 PROCEDURE — 6370000000 HC RX 637 (ALT 250 FOR IP): Performed by: NURSE PRACTITIONER

## 2023-01-30 PROCEDURE — 2060000000 HC ICU INTERMEDIATE R&B

## 2023-01-30 PROCEDURE — 96375 TX/PRO/DX INJ NEW DRUG ADDON: CPT

## 2023-01-30 PROCEDURE — 85025 COMPLETE CBC W/AUTO DIFF WBC: CPT

## 2023-01-30 PROCEDURE — 96361 HYDRATE IV INFUSION ADD-ON: CPT

## 2023-01-30 PROCEDURE — 83690 ASSAY OF LIPASE: CPT

## 2023-01-30 PROCEDURE — 86140 C-REACTIVE PROTEIN: CPT

## 2023-01-30 PROCEDURE — 6360000002 HC RX W HCPCS: Performed by: NURSE PRACTITIONER

## 2023-01-30 PROCEDURE — 82010 KETONE BODYS QUAN: CPT

## 2023-01-30 PROCEDURE — 6370000000 HC RX 637 (ALT 250 FOR IP)

## 2023-01-30 PROCEDURE — 94664 DEMO&/EVAL PT USE INHALER: CPT

## 2023-01-30 PROCEDURE — 87040 BLOOD CULTURE FOR BACTERIA: CPT

## 2023-01-30 PROCEDURE — 94640 AIRWAY INHALATION TREATMENT: CPT

## 2023-01-30 PROCEDURE — 6370000000 HC RX 637 (ALT 250 FOR IP): Performed by: INTERNAL MEDICINE

## 2023-01-30 PROCEDURE — 81001 URINALYSIS AUTO W/SCOPE: CPT

## 2023-01-30 PROCEDURE — 74177 CT ABD & PELVIS W/CONTRAST: CPT

## 2023-01-30 PROCEDURE — 2580000003 HC RX 258: Performed by: EMERGENCY MEDICINE

## 2023-01-30 PROCEDURE — 2580000003 HC RX 258

## 2023-01-30 PROCEDURE — 82962 GLUCOSE BLOOD TEST: CPT

## 2023-01-30 RX ORDER — 0.9 % SODIUM CHLORIDE 0.9 %
2000 INTRAVENOUS SOLUTION INTRAVENOUS ONCE
Status: COMPLETED | OUTPATIENT
Start: 2023-01-30 | End: 2023-01-30

## 2023-01-30 RX ORDER — PREGABALIN 50 MG/1
100 CAPSULE ORAL 2 TIMES DAILY
Status: DISCONTINUED | OUTPATIENT
Start: 2023-01-30 | End: 2023-02-02 | Stop reason: HOSPADM

## 2023-01-30 RX ORDER — ONDANSETRON 2 MG/ML
4 INJECTION INTRAMUSCULAR; INTRAVENOUS EVERY 6 HOURS PRN
Status: DISCONTINUED | OUTPATIENT
Start: 2023-01-30 | End: 2023-02-02 | Stop reason: HOSPADM

## 2023-01-30 RX ORDER — LISINOPRIL AND HYDROCHLOROTHIAZIDE 20; 12.5 MG/1; MG/1
1 TABLET ORAL EVERY MORNING
COMMUNITY

## 2023-01-30 RX ORDER — POLYETHYLENE GLYCOL 3350 17 G/17G
17 POWDER, FOR SOLUTION ORAL DAILY PRN
Status: DISCONTINUED | OUTPATIENT
Start: 2023-01-30 | End: 2023-02-02 | Stop reason: HOSPADM

## 2023-01-30 RX ORDER — GLIMEPIRIDE 4 MG/1
4 TABLET ORAL 2 TIMES DAILY
Status: ON HOLD | COMMUNITY
End: 2023-02-02 | Stop reason: HOSPADM

## 2023-01-30 RX ORDER — SODIUM CHLORIDE 0.9 % (FLUSH) 0.9 %
5-40 SYRINGE (ML) INJECTION EVERY 12 HOURS SCHEDULED
Status: DISCONTINUED | OUTPATIENT
Start: 2023-01-30 | End: 2023-02-02 | Stop reason: HOSPADM

## 2023-01-30 RX ORDER — INSULIN LISPRO 100 [IU]/ML
0-4 INJECTION, SOLUTION INTRAVENOUS; SUBCUTANEOUS NIGHTLY
Status: DISCONTINUED | OUTPATIENT
Start: 2023-01-30 | End: 2023-02-02 | Stop reason: HOSPADM

## 2023-01-30 RX ORDER — DEXTROSE MONOHYDRATE 100 MG/ML
INJECTION, SOLUTION INTRAVENOUS CONTINUOUS PRN
Status: DISCONTINUED | OUTPATIENT
Start: 2023-01-30 | End: 2023-02-02 | Stop reason: HOSPADM

## 2023-01-30 RX ORDER — INSULIN GLARGINE 100 [IU]/ML
19 INJECTION, SOLUTION SUBCUTANEOUS DAILY
Status: DISCONTINUED | OUTPATIENT
Start: 2023-01-30 | End: 2023-01-31

## 2023-01-30 RX ORDER — ENOXAPARIN SODIUM 100 MG/ML
30 INJECTION SUBCUTANEOUS 2 TIMES DAILY
Status: DISCONTINUED | OUTPATIENT
Start: 2023-01-30 | End: 2023-02-02 | Stop reason: HOSPADM

## 2023-01-30 RX ORDER — BUDESONIDE AND FORMOTEROL FUMARATE DIHYDRATE 80; 4.5 UG/1; UG/1
2 AEROSOL RESPIRATORY (INHALATION) 2 TIMES DAILY
COMMUNITY

## 2023-01-30 RX ORDER — BUDESONIDE 0.25 MG/2ML
0.25 INHALANT ORAL 2 TIMES DAILY
Status: DISCONTINUED | OUTPATIENT
Start: 2023-01-30 | End: 2023-02-02 | Stop reason: HOSPADM

## 2023-01-30 RX ORDER — INSULIN GLARGINE 100 [IU]/ML
19 INJECTION, SOLUTION SUBCUTANEOUS DAILY
Status: ON HOLD | COMMUNITY
End: 2023-02-02 | Stop reason: HOSPADM

## 2023-01-30 RX ORDER — SULFAMETHOXAZOLE AND TRIMETHOPRIM 800; 160 MG/1; MG/1
1 TABLET ORAL 2 TIMES DAILY
COMMUNITY
Start: 2023-01-26 | End: 2023-02-05

## 2023-01-30 RX ORDER — PREGABALIN 100 MG/1
100 CAPSULE ORAL 2 TIMES DAILY
COMMUNITY

## 2023-01-30 RX ORDER — LEVOTHYROXINE SODIUM 0.03 MG/1
25 TABLET ORAL EVERY MORNING
COMMUNITY

## 2023-01-30 RX ORDER — 0.9 % SODIUM CHLORIDE 0.9 %
1000 INTRAVENOUS SOLUTION INTRAVENOUS ONCE
Status: COMPLETED | OUTPATIENT
Start: 2023-01-30 | End: 2023-01-30

## 2023-01-30 RX ORDER — ALBUTEROL SULFATE 90 UG/1
2 AEROSOL, METERED RESPIRATORY (INHALATION) EVERY 4 HOURS PRN
Status: DISCONTINUED | OUTPATIENT
Start: 2023-01-30 | End: 2023-01-30 | Stop reason: CLARIF

## 2023-01-30 RX ORDER — BUDESONIDE AND FORMOTEROL FUMARATE DIHYDRATE 80; 4.5 UG/1; UG/1
2 AEROSOL RESPIRATORY (INHALATION) 2 TIMES DAILY
Status: DISCONTINUED | OUTPATIENT
Start: 2023-01-30 | End: 2023-01-30 | Stop reason: CLARIF

## 2023-01-30 RX ORDER — ACETAMINOPHEN 650 MG/1
650 SUPPOSITORY RECTAL EVERY 6 HOURS PRN
Status: DISCONTINUED | OUTPATIENT
Start: 2023-01-30 | End: 2023-02-02 | Stop reason: HOSPADM

## 2023-01-30 RX ORDER — ONDANSETRON 4 MG/1
4 TABLET, ORALLY DISINTEGRATING ORAL EVERY 8 HOURS PRN
Status: DISCONTINUED | OUTPATIENT
Start: 2023-01-30 | End: 2023-02-02 | Stop reason: HOSPADM

## 2023-01-30 RX ORDER — FENTANYL CITRATE 50 UG/ML
50 INJECTION, SOLUTION INTRAMUSCULAR; INTRAVENOUS ONCE
Status: COMPLETED | OUTPATIENT
Start: 2023-01-30 | End: 2023-01-30

## 2023-01-30 RX ORDER — ALBUTEROL SULFATE 90 UG/1
2 AEROSOL, METERED RESPIRATORY (INHALATION)
COMMUNITY

## 2023-01-30 RX ORDER — INSULIN LISPRO 100 [IU]/ML
4 INJECTION, SOLUTION INTRAVENOUS; SUBCUTANEOUS ONCE
Status: COMPLETED | OUTPATIENT
Start: 2023-01-30 | End: 2023-01-30

## 2023-01-30 RX ORDER — LEVOTHYROXINE SODIUM 0.03 MG/1
25 TABLET ORAL EVERY MORNING
Status: DISCONTINUED | OUTPATIENT
Start: 2023-01-31 | End: 2023-02-02 | Stop reason: HOSPADM

## 2023-01-30 RX ORDER — ACETAMINOPHEN 325 MG/1
650 TABLET ORAL EVERY 6 HOURS PRN
Status: DISCONTINUED | OUTPATIENT
Start: 2023-01-30 | End: 2023-02-02 | Stop reason: HOSPADM

## 2023-01-30 RX ORDER — ATORVASTATIN CALCIUM 40 MG/1
40 TABLET, FILM COATED ORAL NIGHTLY
Status: DISCONTINUED | OUTPATIENT
Start: 2023-01-30 | End: 2023-02-02 | Stop reason: HOSPADM

## 2023-01-30 RX ORDER — INSULIN LISPRO 100 [IU]/ML
0-8 INJECTION, SOLUTION INTRAVENOUS; SUBCUTANEOUS
Status: DISCONTINUED | OUTPATIENT
Start: 2023-01-30 | End: 2023-02-01

## 2023-01-30 RX ORDER — L.ACID/L.CASEI/B.BIF/B.LON/FOS 2B CELL-50
1 CAPSULE ORAL 2 TIMES DAILY
COMMUNITY

## 2023-01-30 RX ORDER — ALBUTEROL SULFATE 2.5 MG/3ML
2.5 SOLUTION RESPIRATORY (INHALATION) EVERY 4 HOURS PRN
Status: DISCONTINUED | OUTPATIENT
Start: 2023-01-30 | End: 2023-02-02 | Stop reason: HOSPADM

## 2023-01-30 RX ORDER — SODIUM CHLORIDE 0.9 % (FLUSH) 0.9 %
5-40 SYRINGE (ML) INJECTION PRN
Status: DISCONTINUED | OUTPATIENT
Start: 2023-01-30 | End: 2023-02-02 | Stop reason: HOSPADM

## 2023-01-30 RX ORDER — ARFORMOTEROL TARTRATE 15 UG/2ML
15 SOLUTION RESPIRATORY (INHALATION) 2 TIMES DAILY
Status: DISCONTINUED | OUTPATIENT
Start: 2023-01-30 | End: 2023-02-02 | Stop reason: HOSPADM

## 2023-01-30 RX ORDER — ALBUTEROL SULFATE 2.5 MG/3ML
15 SOLUTION RESPIRATORY (INHALATION) ONCE
Status: COMPLETED | OUTPATIENT
Start: 2023-01-30 | End: 2023-01-30

## 2023-01-30 RX ORDER — SODIUM CHLORIDE 9 MG/ML
INJECTION, SOLUTION INTRAVENOUS CONTINUOUS
Status: DISCONTINUED | OUTPATIENT
Start: 2023-01-30 | End: 2023-02-02 | Stop reason: HOSPADM

## 2023-01-30 RX ORDER — SODIUM CHLORIDE 9 MG/ML
INJECTION, SOLUTION INTRAVENOUS PRN
Status: DISCONTINUED | OUTPATIENT
Start: 2023-01-30 | End: 2023-02-02 | Stop reason: HOSPADM

## 2023-01-30 RX ADMIN — CALCIUM GLUCONATE 1000 MG: 98 INJECTION, SOLUTION INTRAVENOUS at 13:16

## 2023-01-30 RX ADMIN — SODIUM CHLORIDE: 9 INJECTION, SOLUTION INTRAVENOUS at 17:52

## 2023-01-30 RX ADMIN — INSULIN GLARGINE 19 UNITS: 100 INJECTION, SOLUTION SUBCUTANEOUS at 17:59

## 2023-01-30 RX ADMIN — IOPAMIDOL 75 ML: 755 INJECTION, SOLUTION INTRAVENOUS at 12:23

## 2023-01-30 RX ADMIN — FENTANYL CITRATE 50 MCG: 50 INJECTION, SOLUTION INTRAMUSCULAR; INTRAVENOUS at 11:37

## 2023-01-30 RX ADMIN — PREGABALIN 100 MG: 50 CAPSULE ORAL at 21:39

## 2023-01-30 RX ADMIN — INSULIN HUMAN 10 UNITS: 100 INJECTION, SOLUTION PARENTERAL at 13:01

## 2023-01-30 RX ADMIN — SODIUM CHLORIDE 2000 ML: 9 INJECTION, SOLUTION INTRAVENOUS at 11:37

## 2023-01-30 RX ADMIN — INSULIN LISPRO 8 UNITS: 100 INJECTION, SOLUTION INTRAVENOUS; SUBCUTANEOUS at 18:00

## 2023-01-30 RX ADMIN — SODIUM CHLORIDE 1000 ML: 9 INJECTION, SOLUTION INTRAVENOUS at 14:55

## 2023-01-30 RX ADMIN — ARFORMOTEROL TARTRATE 15 MCG: 15 SOLUTION RESPIRATORY (INHALATION) at 20:06

## 2023-01-30 RX ADMIN — ATORVASTATIN CALCIUM 40 MG: 40 TABLET, FILM COATED ORAL at 21:09

## 2023-01-30 RX ADMIN — Medication 10 ML: at 21:48

## 2023-01-30 RX ADMIN — BUDESONIDE 250 MCG: 0.25 SUSPENSION RESPIRATORY (INHALATION) at 20:06

## 2023-01-30 RX ADMIN — INSULIN LISPRO 4 UNITS: 100 INJECTION, SOLUTION INTRAVENOUS; SUBCUTANEOUS at 18:07

## 2023-01-30 RX ADMIN — SODIUM ZIRCONIUM CYCLOSILICATE 10 G: 10 POWDER, FOR SUSPENSION ORAL at 13:14

## 2023-01-30 RX ADMIN — ENOXAPARIN SODIUM 30 MG: 100 INJECTION SUBCUTANEOUS at 21:08

## 2023-01-30 RX ADMIN — ALBUTEROL SULFATE 15 MG/HR: 2.5 SOLUTION RESPIRATORY (INHALATION) at 13:21

## 2023-01-30 ASSESSMENT — PAIN DESCRIPTION - ORIENTATION
ORIENTATION: RIGHT;LEFT
ORIENTATION: RIGHT

## 2023-01-30 ASSESSMENT — PAIN SCALES - GENERAL
PAINLEVEL_OUTOF10: 8
PAINLEVEL_OUTOF10: 8

## 2023-01-30 ASSESSMENT — PAIN DESCRIPTION - LOCATION
LOCATION: ABDOMEN;FOOT
LOCATION: LEG

## 2023-01-30 ASSESSMENT — PAIN - FUNCTIONAL ASSESSMENT: PAIN_FUNCTIONAL_ASSESSMENT: 0-10

## 2023-01-30 ASSESSMENT — PAIN DESCRIPTION - DESCRIPTORS: DESCRIPTORS: ACHING

## 2023-01-30 ASSESSMENT — PAIN DESCRIPTION - FREQUENCY
FREQUENCY: CONTINUOUS
FREQUENCY: CONTINUOUS

## 2023-01-30 ASSESSMENT — PAIN DESCRIPTION - ONSET
ONSET: ON-GOING
ONSET: ON-GOING

## 2023-01-30 ASSESSMENT — PAIN DESCRIPTION - PAIN TYPE: TYPE: ACUTE PAIN;CHRONIC PAIN

## 2023-01-30 NOTE — ED TRIAGE NOTES
Department of Emergency Medicine  FIRST PROVIDER TRIAGE NOTE             Independent MLP           1/30/23  10:58 AM EST    Date of Encounter: 1/30/23   MRN: [id number]      HPI: Gerardo Line May is a 66 y.o. male who presents to the ED for Wound Check (Bilat. Flynn wounds for months)   Had culture - pcp sent for iv abx    ROS: Negative for fever or vomiting. PE: Gen Appearance/Constitutional: alert     Initial Plan of Care: All treatment areas with department are currently occupied.  Plan to order/Initiate the following while awaiting opening in ED: pending exam.  Initiate Treatment-Testing, Proceed toTreatment Area When Bed Available for ED Attending/MLP to Continue Care    Electronically signed by CHRIST Eaton CNP   DD: 1/30/23

## 2023-01-30 NOTE — PROGRESS NOTES
Anticipated admission: Database initiated. Patient is A&O independent from home with niece. States he uses no assistive devices and is RA at baseline.  States he has a walker at home from when he got his toe amputated in the past.

## 2023-01-30 NOTE — H&P
[id number] [facility]   History and Physical      CHIEF COMPLAINT:  Wound Check    History of Present Illness:  66 y.o. male with a history of depression/anxiety, COPD, DM, HLD, HTN, ALCON, wounds presents with lower extremity wounds and left upper quadrant abdominal pain beginning approximately 1 month or so ago. Complaint is moderate severity, persistent, worsened by nothing. Patient states he presented to PCP several days ago. He was placed on oral antibiotic. Per chart review received prescription of Bactrim 800-160mg every 12 x10 days 1/26/2023. Patient states received call back from PCP stating he needed to present to ED for IV antibiotics. [facility] physician informed him there was a \"bug\" growing that was not covered by antibiotic given. He admits to having frequent vomiting with diarrhea. States he has not been taking insulin as he should, he currently does not have supply of needles. BUN/Crt 31/1.2. Glucose 646 K+6.0 alk phos 174 ALT 80 AST 86 bilirubin 0.3 lipase 129 WBC 8.3 H/H 14.6/43.0   Patient received 3L NSS bolus, albuterol nebulizer, calcium gluconate 1G IV, fentanyl 50 mcg IV, Humulin R 10 units IV, and Lokelma 10 G in ED course. Decision to admit patient for further evaluation and treatment. 11/9-11/11 presented with worsening lower extremity wounds. ID was consulted. Cefazolin x1 and oral doxycycline x1. Was DC'd home on cephalexin and doxycycline x10 days. .      Informant(s) for H&P: Patient and EMR    REVIEW OF SYSTEMS:  Admits to abdominal pain, nausea, vomiting, diarrhea, wounds to BLE. Denies  fevers, chills, cp, sob, n/v, ha, vision/hearing changes, wt changes, hot/cold flashes, other open skin lesions, diarrhea, constipation, dysuria/hematuria unless noted in HPI. Complete ROS performed with the patient and is otherwise negative.       PMH:  Past Medical History:   Diagnosis Date    Anxiety     COPD (chronic obstructive pulmonary disease) ([facility] [address] [facility] Depression     DM (diabetes mellitus) ([facility] [address]     Frostbite     HLD (hyperlipidemia)     HTN (hypertension)     Sleep apnea        Surgical History:  Past Surgical History:   Procedure Laterality Date    BACK SURGERY      CHOLECYSTECTOMY      CORONARY ARTERY BYPASS GRAFT REDO      2 vessel    FOOT DEBRIDEMENT Right 4/25/2022    DELAYED PRIMARY CLOSURE RIGHT FOOT WOUND performed by Domingo Cordero DPM at [address] Right 5/7/2022    FOOT DEBRIDEMENT INCISION AND DRAINAGE performed by Richi Flores DPM at [address] Right 5/10/2022    RIGHT FOOT DELAYED PRIMARY CLOSURE WITH POSSIBLE DEBRIDEMENT    ++CONTACT ISOLATION++   (DR AVAIL. AT 4PM) performed by Richi Flores DPM at [place] [address] PICC LINE  5/9/2022         TOE AMPUTATION Right 4/22/2022    AMPUTATION RIGHT SECOND TOE performed by Domingo Cordero DPM at [facility] OR    UVULOPALATOPHARYNGOPLASTY         Medications Prior to Admission:    Prior to Admission medications    Medication Sig Start Date End Date Taking? Authorizing Provider   ondansetron (ZOFRAN-ODT) 4 MG disintegrating tablet Take 1 tablet by mouth 3 times daily as needed for Nausea or Vomiting 1/25/23   Susanna [employer], DO   atorvastatin (LIPITOR) 40 MG tablet Take 1 tablet by mouth at bedtime 11/11/22   Verjustin Bishop APN   levothyroxine (SYNTHROID) 25 MCG tablet Take 1 tablet by mouth Daily 11/11/22 12/11/22  Verdijoel GramajoAshton, APN   pregabalin (LYRICA) 100 MG capsule Take 1 capsule by mouth at bedtime for 30 days.  11/11/22 12/11/22  Verdia Ashton, APN   sertraline (ZOLOFT) 50 MG tablet Take 1 tablet by mouth daily 11/11/22 12/11/22  Verdia Ashton, APN   metoprolol tartrate (LOPRESSOR) 25 MG tablet Take 1 tablet by mouth 2 times daily 11/11/22 12/11/22  Verdia Dario, APN   metFORMIN (GLUCOPHAGE) 1000 MG tablet Take 1 tablet by mouth 2 times daily (with meals) 11/11/22 12/11/22  Gómez Bishop APN   lisinopril-hydroCHLOROthiazide (PRINZIDE;ZESTORETIC) 20-12.5 MG per tablet Take 1 tablet by mouth daily 11/11/22   ArslanPREM Cole   glimepiride (AMARYL) 4 MG tablet Take 1 tablet by mouth in the morning and at bedtime 11/11/22 12/11/22  Arslan PREM Nevarez   lisinopril (PRINIVIL;ZESTRIL) 2.5 MG tablet Take 2.5 mg by mouth daily  4/21/22  Historical Provider, MD       Allergies:    Patient has no known allergies. Social History:    reports that he has been smoking cigarettes. He started smoking about 43 years ago. He has been smoking an average of 2.5 packs per day. He does not have any smokeless tobacco history on file. He reports that he does not drink alcohol and does not use drugs. Family History:   family history includes Dementia in his mother; Diabetes in his sister; Stroke in his father.   Viewed and updated with patient    PHYSICAL EXAM:  Vitals:  BP (!) 93/53   Pulse 74   Temp 98.6 °F (37 °C) (Oral)   Resp 16   Ht 6' (1.829 m)   Wt 285 lb (129.3 kg)   SpO2 97%   BMI 38.65 kg/m²     General Appearance: alert and oriented to person, place and time and in no acute distress  Skin: warm and dry  Head: normocephalic and atraumatic  Eyes: pupils equal, round, and reactive to light, extraocular eye movements intact, conjunctivae normal  Neck: neck supple and non tender without mass   Pulmonary/Chest: clear to auscultation bilaterally- no wheezes, rales or rhonchi, normal air movement, no respiratory distress  Cardiovascular: normal rate, normal S1 and S2 and no RGM  Abdomen: soft, non-tender, non-distended, normal bowel sounds  Extremities: no cyanosis, no clubbing and no edema  Neurologic: no cranial nerve deficit and speech normal    LABS:  Recent Labs     01/30/23  1123 01/30/23  1407   * 129*   K 6.0* 4.9   CL 92* 102   CO2 19* 18*   BUN 31* 29*   CREATININE 1.2 1.2   GLUCOSE 646* 384*   CALCIUM 8.9 8.0*       Recent Labs     01/30/23  1123   WBC 8.3   RBC 4.97   HGB 14.6   HCT 43.0   MCV 86.5   MCH 29.4 MCHC 34.0   RDW 13.7      MPV 12.3*       No results for input(s): POCGLU in the last 72 hours. Radiology: CT ABDOMEN PELVIS W IV CONTRAST Additional Contrast? None    Result Date: 1/30/2023  EXAMINATION: CT OF THE ABDOMEN AND PELVIS WITH CONTRAST 1/30/2023 12:26 pm TECHNIQUE: CT of the abdomen and pelvis was performed with the administration of intravenous contrast. Multiplanar reformatted images are provided for review. Automated exposure control, iterative reconstruction, and/or weight based adjustment of the mA/kV was utilized to reduce the radiation dose to as low as reasonably achievable. COMPARISON: None. HISTORY: ORDERING SYSTEM PROVIDED HISTORY: abdominal pain LUQ, vomiting, diarrhea TECHNOLOGIST PROVIDED HISTORY: Reason for exam:->abdominal pain LUQ, vomiting, diarrhea Additional Contrast?->None Decision Support Exception - unselect if not a suspected or confirmed emergency medical condition->Emergency Medical Condition (MA) FINDINGS: Lower Chest: Lung bases are grossly clear. Organs: Diffuse fatty infiltration liver with enlargement. The spleen is mildly enlarged. The gallbladder is been surgically removed. Pancreas is homogeneous. No underlying mass or lesion. Both adrenal glands within normal limits. Cyst identified on the kidneys bilaterally. No stones or distension seen in the renal collecting system. GI/Bowel: The stomach is unremarkable. Diverticulum identified in the duodenum. No wall thickening. Small bowel is otherwise unremarkable. Colon is within normal limits. Some stool. No signs of obvious obstruction or obstructing lesion. Pelvis: The bladder is mildly distended. There is some surrounding stranding in which is cystitis cannot be excluded. Correlation to urinalysis is recommended. Peritoneum/Retroperitoneum: No abdominal retroperitoneal lymphadenopathy. No free fluid or free air. No abnormal mass or fluid collections identified.  Bones/Soft Tissues: Bony structures reveal minimal degenerative changes seen diffusely throughout the spine and pelvis. There is diastasis of the rectus muscles with no ventral abdominal wall mass or defect. Mild stranding identified surrounding the bladder which is cystitis cannot be excluded. Correlation to urinalysis is recommended. The remainder of the abdomen and pelvis is grossly unremarkable with mild hepatosplenomegaly. Diffuse fatty infiltration of the liver. EKG: None    ASSESSMENT:      Principal Problem:    Hyperkalemia  Resolved Problems:    * No resolved hospital problems. *      PLAN:    Hyperkalemia- K+6.0>4.9. Received 3L NSS bolus/Lokelma 10 mg in ED course. Continue to follow  Hypokalemia- Na+ 121>128. Received 3L NSS bolus in ED course. Continue to follow  Uncontrolled DM With Hyperglycemia-presented with hyperglycemia glucose 646. Received calcium gluconate 1G IV/Humulin N 10 you IV in ED course. Repeat glucose 384. pH 7.36 Has seen Dr. Donna Stock in the past, however did not follow-up as outpatient. Continue Lantus/SSI/hypoglycemic protocol/carb controlled diet. Endocrinology input appreciated. Elevated LFTs-alk phos 174 ALT 80 AST 86 bilirubin 0.3 lipase 129. Received 3L NSS bolus in ED course. CT A/P mild hepatosplenomegaly with diffuse fatty infiltration of the liver. Continue IVF hydration. Monitor  Lower extremity wounds-was seen in hospital in November 2022 for wounds on BLE. D followed. Completed 10-day coursecephalexin and doxycycline. Followed with PCP. Started on Bactrim 800-160 mg 2 times daily 1/26. Patient states he has taken 3 pills. States received phone call from PCP to present to ED because he needed IV antibiotics. Check CRP/ESR. ID Input appreciated  COPD-noted wheezing upon presentation. Received albuterol nebulizer x1. Continue Brovana/Pulmicort. Follow  Depression/anxiety-continue Zoloft. HTN-continue Lopressor.   Follow adjust as needed  HLD-continue atorvastatin  Thyroid Disease- continue levothyroxine  Obesity BMI 38.65  PVD  Tobacco Use- 1pk day x 45years.  cessation. Nicotine patch. Code Status: Full  DVT prophylaxis: Lovenox    NOTE: This report was transcribed using voice recognition software. Every effort was made to ensure accuracy; however, inadvertent computerized transcription errors may be present. In Review of EMR,  evaluation, management and diagnosis. Care plan has been discussed with attending. Time spent 58  minutes. Electronically signed by Christ Mirza CNP on 1/30/2023 at 4:06 PM  HOSPITALIST ATTENDING PHYSICIAN NOTE 1/30/2023 2125PM:    Details of the evaluation - subjective assessment (including medication profile, past medical, family and social history when applicable), examination, review of lab and test data, diagnostic impressions and medical decision making - performed by Hector Dickerson. Eula Mirza CNP, were discussed with me on the date of service and I agree with clinical information herein unless otherwise noted. The patient has been evaluated by me personally earlier today. Pt reports no fevers, chills,n/v. PHYSICAL EXAM:    Vitals:  BP 88/62   Pulse 91   Temp 98.4 °F (36.9 °C) (Oral)   Resp 20   Ht 6' (1.829 m)   Wt 285 lb (129.3 kg)   SpO2 96%   BMI 38.65 kg/m²     General:  Appears comfortable. Answers questions appropriately and cooperative with exam  HEENT:  Mucous membranes moist. No erythema, rhinorrhea, or post-nasal drip noted. Neck:  No carotid bruits. Heart:  Rhythm regular at rate of 90  Lungs:  CTA. No wheeze, rales, or rhonchi  Abdomen:  Positive bowel sounds positive. Soft. Non-tender. No guarding, rebound or rigidity. Breast/Rectal/Genitourinary: not pertinent. Extremities:  Negative for lower extremity edema  Skin:  Warm and dry  Vascular: 2/4 Dorsalis Pedis pulses bilaterally.   Neuro:  Cranial nerves 2-12 grossly intact, no focal weakness or change in sensation noted. Extraocular muscles intact. Pupils equal, round, reactive to light. I agree with the assessment and plan of Adan Durand. Yair Saleh - CNP    Time spent is 59 min spent discussing case with ER, reviewing records, interviewing/examining pt, analyzing data, discussing with nursing, formulating treatment plan as noted in NP's note. Dm type 2 uncontrolled  acidosis  Hyperkalemia  Dehydration  Elevated lfts/fatty liver  LE wounds  Copd  Depression/anxiety  Htn  Hyperlipidemia  hypothyroidism      Electronically signed by Olivia Haynes D.O.   Hospitalist  [facility] at [facility]

## 2023-01-31 PROBLEM — D69.6 THROMBOCYTOPENIA (HCC): Status: ACTIVE | Noted: 2023-01-31

## 2023-01-31 LAB
ALBUMIN SERPL-MCNC: 3 G/DL (ref 3.5–5.2)
ALP BLD-CCNC: 132 U/L (ref 40–129)
ALT SERPL-CCNC: 64 U/L (ref 0–40)
ANION GAP SERPL CALCULATED.3IONS-SCNC: 9 MMOL/L (ref 7–16)
AST SERPL-CCNC: 75 U/L (ref 0–39)
BASOPHILS ABSOLUTE: 0.04 E9/L (ref 0–0.2)
BASOPHILS RELATIVE PERCENT: 0.6 % (ref 0–2)
BILIRUB SERPL-MCNC: <0.2 MG/DL (ref 0–1.2)
BUN BLDV-MCNC: 26 MG/DL (ref 6–23)
CALCIUM SERPL-MCNC: 7.8 MG/DL (ref 8.6–10.2)
CHLORIDE BLD-SCNC: 104 MMOL/L (ref 98–107)
CO2: 20 MMOL/L (ref 22–29)
CREAT SERPL-MCNC: 1.1 MG/DL (ref 0.7–1.2)
EOSINOPHILS ABSOLUTE: 0.2 E9/L (ref 0.05–0.5)
EOSINOPHILS RELATIVE PERCENT: 2.8 % (ref 0–6)
GFR SERPL CREATININE-BSD FRML MDRD: >60 ML/MIN/1.73
GLUCOSE BLD-MCNC: 323 MG/DL (ref 74–99)
HCT VFR BLD CALC: 37.1 % (ref 37–54)
HEMOGLOBIN: 12.5 G/DL (ref 12.5–16.5)
IMMATURE GRANULOCYTES #: 0.02 E9/L
IMMATURE GRANULOCYTES %: 0.3 % (ref 0–5)
LYMPHOCYTES ABSOLUTE: 1.92 E9/L (ref 1.5–4)
LYMPHOCYTES RELATIVE PERCENT: 26.9 % (ref 20–42)
MCH RBC QN AUTO: 29.6 PG (ref 26–35)
MCHC RBC AUTO-ENTMCNC: 33.7 % (ref 32–34.5)
MCV RBC AUTO: 87.7 FL (ref 80–99.9)
METER GLUCOSE: 243 MG/DL (ref 74–99)
METER GLUCOSE: 336 MG/DL (ref 74–99)
METER GLUCOSE: 382 MG/DL (ref 74–99)
METER GLUCOSE: 415 MG/DL (ref 74–99)
MONOCYTES ABSOLUTE: 0.55 E9/L (ref 0.1–0.95)
MONOCYTES RELATIVE PERCENT: 7.7 % (ref 2–12)
NEUTROPHILS ABSOLUTE: 4.41 E9/L (ref 1.8–7.3)
NEUTROPHILS RELATIVE PERCENT: 61.7 % (ref 43–80)
PDW BLD-RTO: 13.9 FL (ref 11.5–15)
PLATELET # BLD: 118 E9/L (ref 130–450)
PMV BLD AUTO: 12.6 FL (ref 7–12)
POTASSIUM REFLEX MAGNESIUM: 4.4 MMOL/L (ref 3.5–5)
RBC # BLD: 4.23 E12/L (ref 3.8–5.8)
REASON FOR REJECTION: NORMAL
REJECTED TEST: NORMAL
SODIUM BLD-SCNC: 133 MMOL/L (ref 132–146)
TOTAL PROTEIN: 5.8 G/DL (ref 6.4–8.3)
WBC # BLD: 7.1 E9/L (ref 4.5–11.5)

## 2023-01-31 PROCEDURE — 94640 AIRWAY INHALATION TREATMENT: CPT

## 2023-01-31 PROCEDURE — 85025 COMPLETE CBC W/AUTO DIFF WBC: CPT

## 2023-01-31 PROCEDURE — 36415 COLL VENOUS BLD VENIPUNCTURE: CPT

## 2023-01-31 PROCEDURE — 6360000002 HC RX W HCPCS: Performed by: NURSE PRACTITIONER

## 2023-01-31 PROCEDURE — 6370000000 HC RX 637 (ALT 250 FOR IP): Performed by: INTERNAL MEDICINE

## 2023-01-31 PROCEDURE — 2580000003 HC RX 258: Performed by: NURSE PRACTITIONER

## 2023-01-31 PROCEDURE — 6370000000 HC RX 637 (ALT 250 FOR IP): Performed by: NURSE PRACTITIONER

## 2023-01-31 PROCEDURE — 2060000000 HC ICU INTERMEDIATE R&B

## 2023-01-31 PROCEDURE — 99232 SBSQ HOSP IP/OBS MODERATE 35: CPT | Performed by: INTERNAL MEDICINE

## 2023-01-31 PROCEDURE — 99222 1ST HOSP IP/OBS MODERATE 55: CPT | Performed by: INTERNAL MEDICINE

## 2023-01-31 PROCEDURE — 80053 COMPREHEN METABOLIC PANEL: CPT

## 2023-01-31 PROCEDURE — 82962 GLUCOSE BLOOD TEST: CPT

## 2023-01-31 RX ORDER — INSULIN GLARGINE 100 [IU]/ML
15 INJECTION, SOLUTION SUBCUTANEOUS NIGHTLY
Status: DISCONTINUED | OUTPATIENT
Start: 2023-01-31 | End: 2023-02-01

## 2023-01-31 RX ORDER — INSULIN GLARGINE 100 [IU]/ML
20 INJECTION, SOLUTION SUBCUTANEOUS EVERY MORNING
Status: DISCONTINUED | OUTPATIENT
Start: 2023-02-01 | End: 2023-02-01

## 2023-01-31 RX ORDER — INSULIN LISPRO 100 [IU]/ML
10 INJECTION, SOLUTION INTRAVENOUS; SUBCUTANEOUS
Status: DISCONTINUED | OUTPATIENT
Start: 2023-01-31 | End: 2023-02-02 | Stop reason: HOSPADM

## 2023-01-31 RX ADMIN — INSULIN GLARGINE 19 UNITS: 100 INJECTION, SOLUTION SUBCUTANEOUS at 08:38

## 2023-01-31 RX ADMIN — INSULIN LISPRO 8 UNITS: 100 INJECTION, SOLUTION INTRAVENOUS; SUBCUTANEOUS at 17:19

## 2023-01-31 RX ADMIN — INSULIN GLARGINE 15 UNITS: 100 INJECTION, SOLUTION SUBCUTANEOUS at 20:02

## 2023-01-31 RX ADMIN — PREGABALIN 100 MG: 50 CAPSULE ORAL at 08:36

## 2023-01-31 RX ADMIN — ENOXAPARIN SODIUM 30 MG: 100 INJECTION SUBCUTANEOUS at 08:37

## 2023-01-31 RX ADMIN — INSULIN LISPRO 8 UNITS: 100 INJECTION, SOLUTION INTRAVENOUS; SUBCUTANEOUS at 11:37

## 2023-01-31 RX ADMIN — Medication 10 ML: at 19:53

## 2023-01-31 RX ADMIN — ENOXAPARIN SODIUM 30 MG: 100 INJECTION SUBCUTANEOUS at 19:53

## 2023-01-31 RX ADMIN — SODIUM CHLORIDE: 9 INJECTION, SOLUTION INTRAVENOUS at 10:38

## 2023-01-31 RX ADMIN — SODIUM CHLORIDE: 9 INJECTION, SOLUTION INTRAVENOUS at 19:32

## 2023-01-31 RX ADMIN — ARFORMOTEROL TARTRATE 15 MCG: 15 SOLUTION RESPIRATORY (INHALATION) at 09:41

## 2023-01-31 RX ADMIN — PREGABALIN 100 MG: 50 CAPSULE ORAL at 19:51

## 2023-01-31 RX ADMIN — LEVOTHYROXINE SODIUM 25 MCG: 25 TABLET ORAL at 08:36

## 2023-01-31 RX ADMIN — ARFORMOTEROL TARTRATE 15 MCG: 15 SOLUTION RESPIRATORY (INHALATION) at 21:06

## 2023-01-31 RX ADMIN — METOPROLOL TARTRATE 25 MG: 25 TABLET, FILM COATED ORAL at 19:51

## 2023-01-31 RX ADMIN — INSULIN LISPRO 10 UNITS: 100 INJECTION, SOLUTION INTRAVENOUS; SUBCUTANEOUS at 17:18

## 2023-01-31 RX ADMIN — INSULIN LISPRO 6 UNITS: 100 INJECTION, SOLUTION INTRAVENOUS; SUBCUTANEOUS at 06:44

## 2023-01-31 RX ADMIN — ATORVASTATIN CALCIUM 40 MG: 40 TABLET, FILM COATED ORAL at 19:51

## 2023-01-31 RX ADMIN — SERTRALINE 50 MG: 50 TABLET, FILM COATED ORAL at 08:36

## 2023-01-31 RX ADMIN — METOPROLOL TARTRATE 25 MG: 25 TABLET, FILM COATED ORAL at 08:36

## 2023-01-31 RX ADMIN — BUDESONIDE 250 MCG: 0.25 SUSPENSION RESPIRATORY (INHALATION) at 09:41

## 2023-01-31 RX ADMIN — BUDESONIDE 250 MCG: 0.25 SUSPENSION RESPIRATORY (INHALATION) at 21:06

## 2023-01-31 RX ADMIN — ACETAMINOPHEN 650 MG: 325 TABLET ORAL at 13:26

## 2023-01-31 ASSESSMENT — PAIN DESCRIPTION - PAIN TYPE: TYPE: ACUTE PAIN;CHRONIC PAIN

## 2023-01-31 ASSESSMENT — PAIN DESCRIPTION - FREQUENCY: FREQUENCY: CONTINUOUS

## 2023-01-31 ASSESSMENT — PAIN DESCRIPTION - LOCATION: LOCATION: FOOT

## 2023-01-31 ASSESSMENT — PAIN DESCRIPTION - ONSET: ONSET: ON-GOING

## 2023-01-31 ASSESSMENT — PAIN DESCRIPTION - ORIENTATION: ORIENTATION: RIGHT;LEFT

## 2023-01-31 ASSESSMENT — PAIN DESCRIPTION - DESCRIPTORS: DESCRIPTORS: ACHING

## 2023-01-31 ASSESSMENT — PAIN SCALES - GENERAL
PAINLEVEL_OUTOF10: 8
PAINLEVEL_OUTOF10: 9

## 2023-01-31 NOTE — CARE COORDINATION
Met w/ patient. Explained role of  and plan of care. Lives w/ his niece Bob in a ranch home- 2 steps to entrance. [facility] facility in [facility]. [facility]. PCP is Dr. Romana Chance and pharmacy is [facility] [address]. Spoke w/ niece Olman Sender [phone], pt WILL NEED scripts for diabetic testing strips and lancets on discharge. Per pt, plan is to return home w/ his niece on discharge- drove self to the hospital. Will follow Solo Arnold RN case manager

## 2023-01-31 NOTE — PROGRESS NOTES
Consult bilateral leg wounds  Rt leg below, dry, changes in skin color, pink/brownish. Several small dried dark red open areas  Left leg with hemosiderin staining also  No drainage. No edema      Left leg with staining. Uncontrolled diabetes. Not verbalizing understanding of health issues  Plan aquaphor to leg  Brian Cota.  Ashley Oconnell, CNS, Wound Care

## 2023-01-31 NOTE — CONSULTS
[address] [place] [address] [facility]    Consultation Note     Admit Date: 1/30/2023 10:58 AM    Reason for Consult:   bilateral LE wounds    Attending Physician:  Johanna Simms DO     Chief Complaint:  nausea/vomiting    HISTORY OF PRESENT ILLNESS:   The patient is a 66 y.o.  male not known to the Infectious Diseases service. The patient with hx of COPD, bilateral chronic skin changes presented with nausea/vomiting. Patient was started on bactrim 2 days ago for bilateral leg wound cellulitis. No cough or SOB or abdominal pain. Since admission, pt is afebrile. HS stable. On RA. Labs showed normal white count. Abnormal LFTs. Cr is 1.2/Bun 26 . His BG was very high . Blood cxin process UA is clean. CT A/p did not show any acute abnormalities. Patient did not get any antibiotics and I got consulted for further management. Past Medical History:        Diagnosis Date    Anxiety     COPD (chronic obstructive pulmonary disease) ([facility] [facility]     Depression     DM (diabetes mellitus) ([facility] [address]     Frostbite     HLD (hyperlipidemia)     HTN (hypertension)     Sleep apnea      Past Surgical History:        Procedure Laterality Date    BACK SURGERY      CHOLECYSTECTOMY      CORONARY ARTERY BYPASS GRAFT REDO      2 vessel    FOOT DEBRIDEMENT Right 4/25/2022    DELAYED PRIMARY CLOSURE RIGHT FOOT WOUND performed by Gen Harley DPM at [address] [place] [address] Right 5/7/2022    FOOT DEBRIDEMENT INCISION AND DRAINAGE performed by Isidro Dejesus DPM at [address] [place] [address] Right 5/10/2022    RIGHT FOOT DELAYED PRIMARY CLOSURE WITH POSSIBLE DEBRIDEMENT    ++CONTACT ISOLATION++   (DR AVAIL.  AT 4PM) performed by Isidro Dejesus DPM at [place] [address] PICC LINE  5/9/2022         TOE AMPUTATION Right 4/22/2022    AMPUTATION RIGHT SECOND TOE performed by Gen Harley DPM at [address]       Current Medications:   Scheduled Meds:   atorvastatin  40 mg Oral Nightly    insulin glargine  19 Units SubCUTAneous Daily    levothyroxine  25 mcg Oral QAM    metoprolol tartrate  25 mg Oral BID    sertraline  50 mg Oral QAM    sodium chloride flush  5-40 mL IntraVENous 2 times per day    enoxaparin  30 mg SubCUTAneous BID    insulin lispro  0-8 Units SubCUTAneous TID WC    insulin lispro  0-4 Units SubCUTAneous Nightly    arformoterol tartrate  15 mcg Nebulization BID    And    budesonide  0.25 mg Nebulization BID    pregabalin  100 mg Oral BID     Continuous Infusions:   sodium chloride      sodium chloride 125 mL/hr at 01/31/23 1038    dextrose       PRN Meds:sodium chloride flush, sodium chloride, ondansetron **OR** ondansetron, polyethylene glycol, acetaminophen **OR** acetaminophen, glucose, dextrose bolus **OR** dextrose bolus, glucagon (rDNA), dextrose, albuterol    Allergies:  Patient has no known allergies. Social History:   Social History     Socioeconomic History    Marital status:      Spouse name: None    Number of children: None    Years of education: None    Highest education level: None   Tobacco Use    Smoking status: Every Day     Packs/day: 2.50     Years: 43.00     Pack years: 107.50     Types: Cigarettes     Start date: 1980   Substance and Sexual Activity    Alcohol use: Not Currently     Comment: Drinks socially    Drug use: Not Currently     Types: Marijuana (Weed)     Comment: States last use last summer     Family History:       Problem Relation Age of Onset    Dementia Mother     Stroke Father     Diabetes Sister     Heart Attack Brother    . Otherwise non-pertinent to the chief complaint. REVIEW OF SYSTEMS:    As mentioned in HPI, all other systems negative. PHYSICAL EXAM:    Vitals:    BP (!) 110/57   Pulse 85   Temp 98 °F (36.7 °C) (Oral)   Resp 18   Ht 6' (1.829 m)   Wt 275 lb 1.6 oz (124.8 kg)   SpO2 97%   BMI 37.31 kg/m²   Constitutional: The patient is awake, alert, and oriented. Skin: Warm and dry. No rashes were noted. No jaundice.   HEENT: Eyes show round, and reactive pupils. Moist mucous membranes, no ulcerations, no thrush. Neck: Supple to movements. No lymphadenopathy. Chest: No use of accessory muscles to breathe. Symmetrical expansion. Auscultation reveals no wheezing, crackles, or rhonchi. Cardiovascular: S1 and S2 are rhythmic and regular. No murmurs appreciated. Abdomen: soft, non tender  Extremities: No clubbing, no cyanosis, no edema. Musculoskeletal: scratch marks with one open wound others scabbed over. No cellulitis.  Chronic pigmentation on both shins  Neurological: alert,oriented x 3  Lines: peripheral      CBC+dif:  Recent Labs     01/30/23  1123 01/31/23  0350   WBC 8.3 7.1   HGB 14.6 12.5   HCT 43.0 37.1   MCV 86.5 87.7    118*   NEUTROABS 5.70 4.41     Lab Results   Component Value Date    CRP 4.0 (H) 01/30/2023    CRP 2.4 (H) 11/10/2022    CRP 0.8 (H) 06/06/2022     No results found for: CRPHS  Lab Results   Component Value Date    SEDRATE 11 01/30/2023    SEDRATE 22 (H) 11/10/2022    SEDRATE 40 (H) 06/06/2022     Lab Results   Component Value Date    ALT 64 (H) 01/31/2023    AST 75 (H) 01/31/2023    ALKPHOS 132 (H) 01/31/2023    BILITOT <0.2 01/31/2023     Lab Results   Component Value Date/Time     01/31/2023 05:45 AM    K 4.4 01/31/2023 05:45 AM     01/31/2023 05:45 AM    CO2 20 01/31/2023 05:45 AM    BUN 26 01/31/2023 05:45 AM    CREATININE 1.1 01/31/2023 05:45 AM    GFRAA >60 06/06/2022 10:30 AM    LABGLOM >60 01/31/2023 05:45 AM    GLUCOSE 323 01/31/2023 05:45 AM    PROT 5.8 01/31/2023 05:45 AM    LABALBU 3.0 01/31/2023 05:45 AM    CALCIUM 7.8 01/31/2023 05:45 AM    BILITOT <0.2 01/31/2023 05:45 AM    ALKPHOS 132 01/31/2023 05:45 AM    AST 75 01/31/2023 05:45 AM    ALT 64 01/31/2023 05:45 AM       No results found for: PROTIME, INR    Lab Results   Component Value Date/Time    TSH 2.640 11/10/2022 03:25 PM       Lab Results   Component Value Date/Time    COLORU Yellow 01/30/2023 01:18 PM    PHUR 6.0 01/30/2023 01:18 PM    WBCUA NONE 01/30/2023 01:18 PM    RBCUA NONE 01/30/2023 01:18 PM    BACTERIA NONE SEEN 01/30/2023 01:18 PM    CLARITYU Clear 01/30/2023 01:18 PM    SPECGRAV <=1.005 01/30/2023 01:18 PM    LEUKOCYTESUR Negative 01/30/2023 01:18 PM    UROBILINOGEN 0.2 01/30/2023 01:18 PM    BILIRUBINUR Negative 01/30/2023 01:18 PM    BLOODU Negative 01/30/2023 01:18 PM    GLUCOSEU >=1000 01/30/2023 01:18 PM       No results found for: PTG2SCU, BEART, [id number], PHART, THGBART, [id number], PO2ART, OOA4KPZ  Radiology:  CT ABDOMEN PELVIS W IV CONTRAST Additional Contrast? None   Final Result   Mild stranding identified surrounding the bladder which is cystitis cannot be   excluded. Correlation to urinalysis is recommended. The remainder of the   abdomen and pelvis is grossly unremarkable with mild hepatosplenomegaly. Diffuse fatty infiltration of the liver. Microbiology:  Pending  No results for input(s): BC in the last 72 hours. No results for input(s): ORG in the last 72 hours. No results for input(s): Alberto Gandhi in the last 72 hours. No results for input(s): STREPNEUMAGU in the last 72 hours. No results for input(s): LP1UAG in the last 72 hours. No results for input(s): ASO in the last 72 hours. No results for input(s): CULTRESP in the last 72 hours. Assessment:  Scratch wounds bilateral shins with no active sign of infection:   Elevated liver enzymes : may be related to bactrim he was on at home  Uncontrolled DM    Plan:    No need of antibiotics. Pt can be discharged from ID stand point. Informed charge nurse. ID signs off. please call me with any questions. Thank you for having us see this patient in consultation. I will be discussing this case with the treating physicians.       Electronically signed by Lexis Molina MD on 1/31/2023 at 12:37 PM

## 2023-02-01 LAB
ALBUMIN SERPL-MCNC: 3.1 G/DL (ref 3.5–5.2)
ALP BLD-CCNC: 140 U/L (ref 40–129)
ALT SERPL-CCNC: 57 U/L (ref 0–40)
ANION GAP SERPL CALCULATED.3IONS-SCNC: 7 MMOL/L (ref 7–16)
AST SERPL-CCNC: 51 U/L (ref 0–39)
BILIRUB SERPL-MCNC: 0.2 MG/DL (ref 0–1.2)
BUN BLDV-MCNC: 17 MG/DL (ref 6–23)
CALCIUM SERPL-MCNC: 7.5 MG/DL (ref 8.6–10.2)
CHLORIDE BLD-SCNC: 108 MMOL/L (ref 98–107)
CO2: 19 MMOL/L (ref 22–29)
CREAT SERPL-MCNC: 0.9 MG/DL (ref 0.7–1.2)
GFR SERPL CREATININE-BSD FRML MDRD: >60 ML/MIN/1.73
GLUCOSE BLD-MCNC: 195 MG/DL (ref 74–99)
METER GLUCOSE: 173 MG/DL (ref 74–99)
METER GLUCOSE: 182 MG/DL (ref 74–99)
METER GLUCOSE: 225 MG/DL (ref 74–99)
METER GLUCOSE: 243 MG/DL (ref 74–99)
POTASSIUM REFLEX MAGNESIUM: 4.6 MMOL/L (ref 3.5–5)
SODIUM BLD-SCNC: 134 MMOL/L (ref 132–146)
TOTAL PROTEIN: 6.1 G/DL (ref 6.4–8.3)

## 2023-02-01 PROCEDURE — 6370000000 HC RX 637 (ALT 250 FOR IP): Performed by: INTERNAL MEDICINE

## 2023-02-01 PROCEDURE — 82962 GLUCOSE BLOOD TEST: CPT

## 2023-02-01 PROCEDURE — 94640 AIRWAY INHALATION TREATMENT: CPT

## 2023-02-01 PROCEDURE — 6360000002 HC RX W HCPCS: Performed by: NURSE PRACTITIONER

## 2023-02-01 PROCEDURE — 6370000000 HC RX 637 (ALT 250 FOR IP): Performed by: NURSE PRACTITIONER

## 2023-02-01 PROCEDURE — 36415 COLL VENOUS BLD VENIPUNCTURE: CPT

## 2023-02-01 PROCEDURE — 99232 SBSQ HOSP IP/OBS MODERATE 35: CPT | Performed by: INTERNAL MEDICINE

## 2023-02-01 PROCEDURE — 2060000000 HC ICU INTERMEDIATE R&B

## 2023-02-01 PROCEDURE — 2580000003 HC RX 258: Performed by: INTERNAL MEDICINE

## 2023-02-01 PROCEDURE — 2580000003 HC RX 258: Performed by: NURSE PRACTITIONER

## 2023-02-01 PROCEDURE — 80053 COMPREHEN METABOLIC PANEL: CPT

## 2023-02-01 RX ORDER — INSULIN GLARGINE 100 [IU]/ML
20 INJECTION, SOLUTION SUBCUTANEOUS 2 TIMES DAILY
Status: DISCONTINUED | OUTPATIENT
Start: 2023-02-01 | End: 2023-02-02 | Stop reason: HOSPADM

## 2023-02-01 RX ORDER — LANOLIN ALCOHOL/MO/W.PET/CERES
3 CREAM (GRAM) TOPICAL NIGHTLY PRN
Status: DISCONTINUED | OUTPATIENT
Start: 2023-02-01 | End: 2023-02-01

## 2023-02-01 RX ORDER — INSULIN LISPRO 100 [IU]/ML
0-12 INJECTION, SOLUTION INTRAVENOUS; SUBCUTANEOUS
Status: DISCONTINUED | OUTPATIENT
Start: 2023-02-01 | End: 2023-02-02 | Stop reason: HOSPADM

## 2023-02-01 RX ORDER — LANOLIN ALCOHOL/MO/W.PET/CERES
3 CREAM (GRAM) TOPICAL ONCE
Status: DISCONTINUED | OUTPATIENT
Start: 2023-02-01 | End: 2023-02-02 | Stop reason: HOSPADM

## 2023-02-01 RX ADMIN — PREGABALIN 100 MG: 50 CAPSULE ORAL at 08:12

## 2023-02-01 RX ADMIN — PREGABALIN 100 MG: 50 CAPSULE ORAL at 19:54

## 2023-02-01 RX ADMIN — METOPROLOL TARTRATE 25 MG: 25 TABLET, FILM COATED ORAL at 19:54

## 2023-02-01 RX ADMIN — INSULIN LISPRO 6 UNITS: 100 INJECTION, SOLUTION INTRAVENOUS; SUBCUTANEOUS at 17:03

## 2023-02-01 RX ADMIN — INSULIN GLARGINE 20 UNITS: 100 INJECTION, SOLUTION SUBCUTANEOUS at 09:49

## 2023-02-01 RX ADMIN — BUDESONIDE 250 MCG: 0.25 SUSPENSION RESPIRATORY (INHALATION) at 09:12

## 2023-02-01 RX ADMIN — ARFORMOTEROL TARTRATE 15 MCG: 15 SOLUTION RESPIRATORY (INHALATION) at 09:12

## 2023-02-01 RX ADMIN — Medication 10 ML: at 19:54

## 2023-02-01 RX ADMIN — Medication 10 ML: at 08:13

## 2023-02-01 RX ADMIN — SODIUM CHLORIDE: 9 INJECTION, SOLUTION INTRAVENOUS at 18:23

## 2023-02-01 RX ADMIN — INSULIN GLARGINE 20 UNITS: 100 INJECTION, SOLUTION SUBCUTANEOUS at 19:55

## 2023-02-01 RX ADMIN — INSULIN LISPRO 10 UNITS: 100 INJECTION, SOLUTION INTRAVENOUS; SUBCUTANEOUS at 11:45

## 2023-02-01 RX ADMIN — ATORVASTATIN CALCIUM 40 MG: 40 TABLET, FILM COATED ORAL at 19:54

## 2023-02-01 RX ADMIN — PETROLATUM: 420 OINTMENT TOPICAL at 08:13

## 2023-02-01 RX ADMIN — PETROLATUM: 420 OINTMENT TOPICAL at 17:04

## 2023-02-01 RX ADMIN — ENOXAPARIN SODIUM 30 MG: 100 INJECTION SUBCUTANEOUS at 08:12

## 2023-02-01 RX ADMIN — SERTRALINE 50 MG: 50 TABLET, FILM COATED ORAL at 08:12

## 2023-02-01 RX ADMIN — SODIUM CHLORIDE: 9 INJECTION, SOLUTION INTRAVENOUS at 09:54

## 2023-02-01 RX ADMIN — INSULIN LISPRO 2 UNITS: 100 INJECTION, SOLUTION INTRAVENOUS; SUBCUTANEOUS at 11:50

## 2023-02-01 RX ADMIN — LEVOTHYROXINE SODIUM 25 MCG: 25 TABLET ORAL at 08:12

## 2023-02-01 RX ADMIN — INSULIN LISPRO 10 UNITS: 100 INJECTION, SOLUTION INTRAVENOUS; SUBCUTANEOUS at 06:35

## 2023-02-01 RX ADMIN — ENOXAPARIN SODIUM 30 MG: 100 INJECTION SUBCUTANEOUS at 19:54

## 2023-02-01 RX ADMIN — INSULIN LISPRO 10 UNITS: 100 INJECTION, SOLUTION INTRAVENOUS; SUBCUTANEOUS at 17:03

## 2023-02-01 RX ADMIN — METOPROLOL TARTRATE 25 MG: 25 TABLET, FILM COATED ORAL at 08:12

## 2023-02-01 ASSESSMENT — PAIN SCALES - GENERAL: PAINLEVEL_OUTOF10: 8

## 2023-02-01 NOTE — PROGRESS NOTES
[facility] CARE AT [place] Hospitalist   Progress Note    Admitting Date and Time: 1/30/2023 10:58 AM  Admit Dx: Dehydration [E86.0]  Hyperkalemia [E87.5]  Hyperglycemia [R73.9]    Subjective:    Patient was admitted with Dehydration [E86.0]  Hyperkalemia [E87.5]  Hyperglycemia [R73.9].  Patient denies fever, chills, cp, sob, n/v.     white petrolatum   Topical BID    [START ON 2/1/2023] insulin glargine  20 Units SubCUTAneous QAM    insulin glargine  15 Units SubCUTAneous Nightly    insulin lispro  10 Units SubCUTAneous TID WC    atorvastatin  40 mg Oral Nightly    levothyroxine  25 mcg Oral QAM    metoprolol tartrate  25 mg Oral BID    sertraline  50 mg Oral QAM    sodium chloride flush  5-40 mL IntraVENous 2 times per day    enoxaparin  30 mg SubCUTAneous BID    insulin lispro  0-8 Units SubCUTAneous TID WC    insulin lispro  0-4 Units SubCUTAneous Nightly    arformoterol tartrate  15 mcg Nebulization BID    And    budesonide  0.25 mg Nebulization BID    pregabalin  100 mg Oral BID     sodium chloride flush, 5-40 mL, PRN  sodium chloride, , PRN  ondansetron, 4 mg, Q8H PRN   Or  ondansetron, 4 mg, Q6H PRN  polyethylene glycol, 17 g, Daily PRN  acetaminophen, 650 mg, Q6H PRN   Or  acetaminophen, 650 mg, Q6H PRN  glucose, 4 tablet, PRN  dextrose bolus, 125 mL, PRN   Or  dextrose bolus, 250 mL, PRN  glucagon (rDNA), 1 mg, PRN  dextrose, , Continuous PRN  albuterol, 2.5 mg, Q4H PRN         Objective:    BP (!) 125/48   Pulse 72   Temp 98.4 °F (36.9 °C) (Oral)   Resp 16   Ht 6' (1.829 m)   Wt 275 lb 1.6 oz (124.8 kg)   SpO2 97%   BMI 37.31 kg/m²   Skin: warm and dry, no rash or erythema  Pulmonary/Chest: clear to auscultation bilaterally- no wheezes, rales or rhonchi, normal air movement, no respiratory distress  Cardiovascular: rhythm reg at rate of 76  Abdomen: soft, non-tender, non-distended, normal bowel sounds, no masses or organomegaly  Extremities: no cyanosis, no clubbing, and no edema      Recent Labs 01/30/23  1123 01/30/23  1407 01/31/23  0545   * 129* 133   K 6.0* 4.9 4.4   CL 92* 102 104   CO2 19* 18* 20*   BUN 31* 29* 26*   CREATININE 1.2 1.2 1.1   GLUCOSE 646* 384* 323*   CALCIUM 8.9 8.0* 7.8*       Recent Labs     01/30/23  1123 01/31/23  0350   WBC 8.3 7.1   RBC 4.97 4.23   HGB 14.6 12.5   HCT 43.0 37.1   MCV 86.5 87.7   MCH 29.4 29.6   MCHC 34.0 33.7   RDW 13.7 13.9    118*   MPV 12.3* 12.6*       CBC with Differential:    Lab Results   Component Value Date/Time    WBC 7.1 01/31/2023 03:50 AM    RBC 4.23 01/31/2023 03:50 AM    HGB 12.5 01/31/2023 03:50 AM    HCT 37.1 01/31/2023 03:50 AM     01/31/2023 03:50 AM    MCV 87.7 01/31/2023 03:50 AM    MCH 29.6 01/31/2023 03:50 AM    MCHC 33.7 01/31/2023 03:50 AM    RDW 13.9 01/31/2023 03:50 AM    NRBC 0.0 05/26/2022 04:05 PM    LYMPHOPCT 26.9 01/31/2023 03:50 AM    MONOPCT 7.7 01/31/2023 03:50 AM    BASOPCT 0.6 01/31/2023 03:50 AM    MONOSABS 0.55 01/31/2023 03:50 AM    LYMPHSABS 1.92 01/31/2023 03:50 AM    EOSABS 0.20 01/31/2023 03:50 AM    BASOSABS 0.04 01/31/2023 03:50 AM     CMP:    Lab Results   Component Value Date/Time     01/31/2023 05:45 AM    K 4.4 01/31/2023 05:45 AM     01/31/2023 05:45 AM    CO2 20 01/31/2023 05:45 AM    BUN 26 01/31/2023 05:45 AM    CREATININE 1.1 01/31/2023 05:45 AM    GFRAA >60 06/06/2022 10:30 AM    LABGLOM >60 01/31/2023 05:45 AM    GLUCOSE 323 01/31/2023 05:45 AM    PROT 5.8 01/31/2023 05:45 AM    LABALBU 3.0 01/31/2023 05:45 AM    CALCIUM 7.8 01/31/2023 05:45 AM    BILITOT <0.2 01/31/2023 05:45 AM    ALKPHOS 132 01/31/2023 05:45 AM    AST 75 01/31/2023 05:45 AM    ALT 64 01/31/2023 05:45 AM        Radiology:   CT ABDOMEN PELVIS W IV CONTRAST Additional Contrast? None   Final Result   Mild stranding identified surrounding the bladder which is cystitis cannot be   excluded. Correlation to urinalysis is recommended.   The remainder of the   abdomen and pelvis is grossly unremarkable with mild hepatosplenomegaly. Diffuse fatty infiltration of the liver. Assessment:    Principal Problem:    Hyperkalemia  Active Problems:    Anxiety and depression    Hyperlipidemia    Thyroid disease    PVD (peripheral vascular disease) (HCC)    Tobacco use    Acidosis    Dehydration  Resolved Problems:    * No resolved hospital problems. *      Plan:  Dm type 2 uncontrolled  endocrine following continue to monitor bs and tx with insulin  Acidosis monitor  Hyperkalemia improving monitor  Dehydration iv fluids  Elevated lfts/fatty liver monitor  LE wounds appreciate ID input.   Wound care  Copd continue med  Depression/anxiety continue medication  Htn continue med  Hyperlipidemia continue med  Hypothyroidism continue med  Thrombocytopenia monitor        Electronically signed by Kike Cobb DO on 1/31/2023 at 9:46 PM

## 2023-02-01 NOTE — CARE COORDINATION
Continues on ivf, off abx per ID. Will need scripts for glucometer testing strips and lancets on discharge. Plan remains to return home w/ his niece on discharge- patient drove self to the hospital. No other needs.  Will follow Shae Moreira RN case manager

## 2023-02-01 NOTE — CONSULTS
ENDOCRINOLOGY INITIAL CONSULTATION NOTE      Date of admission: 1/30/2023  Date of service: 1/31/2023  Admitting physician: Clive Temple DO   Primary Care Physician: Timbo Champion DO  Consultant physician: Chriss Alegria MD     Reason for the consultation:  Uncontrolled DM    History of Present Illness: The history is provided by the patient. Accuracy of the patient data is excellent    Jolly Fuentes is a very pleasant 66 y.o. old male with PMH uncontrolled DM type 2, obesity, HTN, HLD and other listed below admitted to [address] [place] [address] on 1/30/2023 because of  nausea/vomiting and bilateral leg wound cellulitis. Endocrine service was consulted for diabetes management. Prior to admission  The patient was diagnosed with type 2  DM long time ago. Prior to admission patient was on Metformin 1000 mg BID, Jardiance 10 mg daily, Glimepiride 4 mg daily. Patient has had no hypoglycemic episodes. Patient has not been eating consistent carbohydrate meals, self-blood glucose monitoring has been variable goal prior to admission. In addition, patient reports both macrovascular and microvascular complications.  He is over due with the yearly diabetic eye exam.   Lab Results   Component Value Date/Time    LABA1C 8.7 11/10/2022 02:45 AM     Inpatient diet:   Carb Restricted diet     Point of care glucose monitoring   (Independently reviewed)   Recent Labs     01/30/23  1408 01/30/23  1757 01/30/23  2107 01/31/23  0643 01/31/23  1136 01/31/23  1613   GLUMET >500* 371* 272* 336* 415* 382*     Past medical history:   Past Medical History:   Diagnosis Date    Anxiety     COPD (chronic obstructive pulmonary disease) ([facility]     Depression     DM (diabetes mellitus) ([facility] [address]     Frostbite     HLD (hyperlipidemia)     HTN (hypertension)     Sleep apnea      Past surgical history:  Past Surgical History:   Procedure Laterality Date    BACK SURGERY      CHOLECYSTECTOMY      CORONARY ARTERY BYPASS GRAFT REDO      2 vessel    FOOT DEBRIDEMENT Right 4/25/2022    DELAYED PRIMARY CLOSURE RIGHT FOOT WOUND performed by Livier Wallace DPM at [address] [place] [address] Drive Right 5/7/2022    FOOT DEBRIDEMENT INCISION AND DRAINAGE performed by Gabriella Syed DPM at [address] [place] [address] Drive Right 5/10/2022    RIGHT FOOT DELAYED PRIMARY CLOSURE WITH POSSIBLE DEBRIDEMENT    ++CONTACT ISOLATION++   (DR AVAIL. AT 4PM) performed by Gabriella Syed DPM at [place] [address] PICC LINE  5/9/2022         TOE AMPUTATION Right 4/22/2022    AMPUTATION RIGHT SECOND TOE performed by Livier Wallace DPM at [address] [place] [address]         Social history:   Tobacco:   reports that he has been smoking cigarettes. He started smoking about 43 years ago. He has a 107.50 pack-year smoking history. He does not have any smokeless tobacco history on file. Alcohol:   reports that he does not currently use alcohol. Drugs:   reports that he does not currently use drugs after having used the following drugs: Marijuana Garrel Calender).     Family history:    Family History   Problem Relation Age of Onset    Dementia Mother     Stroke Father     Diabetes Sister     Heart Attack Brother        Allergy and drug reactions:   No Known Allergies    Scheduled Meds:   white petrolatum   Topical BID    [START ON 2/1/2023] insulin glargine  20 Units SubCUTAneous QAM    insulin glargine  15 Units SubCUTAneous Nightly    insulin lispro  10 Units SubCUTAneous TID WC    atorvastatin  40 mg Oral Nightly    levothyroxine  25 mcg Oral QAM    metoprolol tartrate  25 mg Oral BID    sertraline  50 mg Oral QAM    sodium chloride flush  5-40 mL IntraVENous 2 times per day    enoxaparin  30 mg SubCUTAneous BID    insulin lispro  0-8 Units SubCUTAneous TID WC    insulin lispro  0-4 Units SubCUTAneous Nightly    arformoterol tartrate  15 mcg Nebulization BID    And    budesonide  0.25 mg Nebulization BID    pregabalin  100 mg Oral BID       PRN Meds:   sodium chloride flush, 5-40 mL, PRN  sodium chloride, , PRN  ondansetron, 4 mg, Q8H PRN   Or  ondansetron, 4 mg, Q6H PRN  polyethylene glycol, 17 g, Daily PRN  acetaminophen, 650 mg, Q6H PRN   Or  acetaminophen, 650 mg, Q6H PRN  glucose, 4 tablet, PRN  dextrose bolus, 125 mL, PRN   Or  dextrose bolus, 250 mL, PRN  glucagon (rDNA), 1 mg, PRN  dextrose, , Continuous PRN  albuterol, 2.5 mg, Q4H PRN    Continuous Infusions:   sodium chloride      sodium chloride 125 mL/hr at 01/31/23 1038    dextrose         Review of Systems  All systems reviewed. All negative except for symptoms mentioned in HPI     OBJECTIVE    BP (!) 110/57   Pulse 85   Temp 98 °F (36.7 °C) (Oral)   Resp 18   Ht 6' (1.829 m)   Wt 275 lb 1.6 oz (124.8 kg)   SpO2 97%   BMI 37.31 kg/m²     Intake/Output Summary (Last 24 hours) at 1/31/2023 1915  Last data filed at 1/31/2023 2750  Gross per 24 hour   Intake --   Output 1350 ml   Net -1350 ml       Physical examination:  General: awake alert, oriented x3  HEENT: normocephalic non traumatic, no exophthalmos   Neck: supple, No thyroid tenderness,  Pulm: good equal air entry no added sounds  CVS: S1 + S2  Abd: soft lax, no tenderness  Skin: warm, no lesions, no rash.  Diabetic foot   Neuro: CN intact, sensation decreased bilateral , muscle power normal  Psych: normal mood, and affect    Review of Laboratory Data:  I personally reviewed the following labs:   Recent Labs     01/30/23  1123 01/31/23  0350   WBC 8.3 7.1   RBC 4.97 4.23   HGB 14.6 12.5   HCT 43.0 37.1   MCV 86.5 87.7   MCH 29.4 29.6   MCHC 34.0 33.7   RDW 13.7 13.9    118*   MPV 12.3* 12.6*     Recent Labs     01/30/23  1123 01/30/23  1407 01/31/23  0545   * 129* 133   K 6.0* 4.9 4.4   CL 92* 102 104   CO2 19* 18* 20*   BUN 31* 29* 26*   CREATININE 1.2 1.2 1.1   GLUCOSE 646* 384* 323*   CALCIUM 8.9 8.0* 7.8*   PROT 6.9  --  5.8*   LABALBU 3.4*  --  3.0*   BILITOT 0.3  --  <0.2   ALKPHOS 174*  --  132*   AST 86*  --  75*   ALT 80*  --  64*     Beta-Hydroxybutyrate   Date Value Ref Range Status   01/30/2023 0.20 0.02 - 0.27 mmol/L Final     Lab Results   Component Value Date/Time    LABA1C 8.7 11/10/2022 02:45 AM    LABA1C 7.7 04/22/2022 07:00 PM     Lab Results   Component Value Date/Time    TSH 2.640 11/10/2022 03:25 PM    T4FREE 1.05 11/10/2022 03:25 PM     Lab Results   Component Value Date/Time    LABA1C 8.7 11/10/2022 02:45 AM    GLUCOSE 323 01/31/2023 05:45 AM     No results found for: TRIG, HDL, LDLCALC, CHOL    Blood culture   Lab Results   Component Value Date/Time    BC 24 Hours no growth 01/30/2023 11:23 AM    BC 5 Days no growth 11/09/2022 09:14 PM       Radiology:  CT ABDOMEN PELVIS W IV CONTRAST Additional Contrast? None   Final Result   Mild stranding identified surrounding the bladder which is cystitis cannot be   excluded. Correlation to urinalysis is recommended. The remainder of the   abdomen and pelvis is grossly unremarkable with mild hepatosplenomegaly. Diffuse fatty infiltration of the liver. Medical Records/Labs/Images review:   I personally reviewed and summarized previous records   All labs and imaging were reviewed independently     [address] [facility] B May, a 66 y.o.-old male seen today for inpatient diabetes management     Diabetes Mellitus type 2   Pt admit poor compliance with diet   will change diabetes regimen to:  Lantus 20u AM, 15u nightly   Humalog 10u with meals   Medium dose sliding scale with meals and at night   Continue glucose check with meals and at bedtime   Will titrate insulin dose based on the blood glucose trend & insulin requirement  Pt will follow with us after discharge    Primary hypothyroidism   Continue Levothyroxine 25 mcg daily   TFT WNL in 11/2022     Bilateral LE wounds   Managed by primary service and ID service     The above issues were reviewed with the patient who understood and agreed with the plan. Thank you for allowing us to participate in the care of this patient.  Please do not hesitate to contact us with any additional questions. Vania Meyer MD  Endocrinologist, [facility] Diabetes Care and Endocrinology   [address] [facility] [address]   Phone: [phone]  Fax: [phone]  --------------------------------------------  An electronic signature was used to authenticate this note.  Omid Duque MD on 1/31/2023 at 7:15 PM

## 2023-02-02 VITALS
TEMPERATURE: 98.5 F | WEIGHT: 275.1 LBS | RESPIRATION RATE: 20 BRPM | DIASTOLIC BLOOD PRESSURE: 73 MMHG | SYSTOLIC BLOOD PRESSURE: 141 MMHG | OXYGEN SATURATION: 98 % | BODY MASS INDEX: 37.26 KG/M2 | HEIGHT: 72 IN | HEART RATE: 72 BPM

## 2023-02-02 LAB
ANION GAP SERPL CALCULATED.3IONS-SCNC: 7 MMOL/L (ref 7–16)
BUN BLDV-MCNC: 12 MG/DL (ref 6–23)
CALCIUM SERPL-MCNC: 7.2 MG/DL (ref 8.6–10.2)
CHLORIDE BLD-SCNC: 107 MMOL/L (ref 98–107)
CO2: 18 MMOL/L (ref 22–29)
CREAT SERPL-MCNC: 0.7 MG/DL (ref 0.7–1.2)
GFR SERPL CREATININE-BSD FRML MDRD: >60 ML/MIN/1.73
GLUCOSE BLD-MCNC: 284 MG/DL (ref 74–99)
HCT VFR BLD CALC: 38.3 % (ref 37–54)
HEMOGLOBIN: 12.6 G/DL (ref 12.5–16.5)
MAGNESIUM: 1.9 MG/DL (ref 1.6–2.6)
MCH RBC QN AUTO: 29.3 PG (ref 26–35)
MCHC RBC AUTO-ENTMCNC: 32.9 % (ref 32–34.5)
MCV RBC AUTO: 89.1 FL (ref 80–99.9)
METER GLUCOSE: 207 MG/DL (ref 74–99)
METER GLUCOSE: 247 MG/DL (ref 74–99)
PDW BLD-RTO: 13.8 FL (ref 11.5–15)
PHOSPHORUS: 1.7 MG/DL (ref 2.5–4.5)
PLATELET # BLD: 135 E9/L (ref 130–450)
PMV BLD AUTO: 11.5 FL (ref 7–12)
POTASSIUM SERPL-SCNC: 4.6 MMOL/L (ref 3.5–5)
RBC # BLD: 4.3 E12/L (ref 3.8–5.8)
SODIUM BLD-SCNC: 132 MMOL/L (ref 132–146)
WBC # BLD: 6.4 E9/L (ref 4.5–11.5)

## 2023-02-02 PROCEDURE — 99239 HOSP IP/OBS DSCHRG MGMT >30: CPT | Performed by: INTERNAL MEDICINE

## 2023-02-02 PROCEDURE — 83735 ASSAY OF MAGNESIUM: CPT

## 2023-02-02 PROCEDURE — 36415 COLL VENOUS BLD VENIPUNCTURE: CPT

## 2023-02-02 PROCEDURE — 80048 BASIC METABOLIC PNL TOTAL CA: CPT

## 2023-02-02 PROCEDURE — 2580000003 HC RX 258: Performed by: NURSE PRACTITIONER

## 2023-02-02 PROCEDURE — 94640 AIRWAY INHALATION TREATMENT: CPT

## 2023-02-02 PROCEDURE — 85027 COMPLETE CBC AUTOMATED: CPT

## 2023-02-02 PROCEDURE — 6370000000 HC RX 637 (ALT 250 FOR IP): Performed by: INTERNAL MEDICINE

## 2023-02-02 PROCEDURE — 6370000000 HC RX 637 (ALT 250 FOR IP): Performed by: NURSE PRACTITIONER

## 2023-02-02 PROCEDURE — 84100 ASSAY OF PHOSPHORUS: CPT

## 2023-02-02 PROCEDURE — 6360000002 HC RX W HCPCS: Performed by: NURSE PRACTITIONER

## 2023-02-02 PROCEDURE — 82962 GLUCOSE BLOOD TEST: CPT

## 2023-02-02 RX ORDER — INSULIN ASPART 100 [IU]/ML
INJECTION, SOLUTION INTRAVENOUS; SUBCUTANEOUS
Qty: 5 ADJUSTABLE DOSE PRE-FILLED PEN SYRINGE | Refills: 5 | Status: SHIPPED | OUTPATIENT
Start: 2023-02-02

## 2023-02-02 RX ORDER — LANCETS
EACH MISCELLANEOUS
Qty: 250 EACH | Refills: 5 | Status: SHIPPED | OUTPATIENT
Start: 2023-02-02

## 2023-02-02 RX ORDER — PEN NEEDLE, DIABETIC 32 GX 1/4"
NEEDLE, DISPOSABLE MISCELLANEOUS
Qty: 250 EACH | Refills: 5 | Status: SHIPPED | OUTPATIENT
Start: 2023-02-02

## 2023-02-02 RX ORDER — GLUCOSAMINE HCL/CHONDROITIN SU 500-400 MG
CAPSULE ORAL
Qty: 250 STRIP | Refills: 5 | Status: SHIPPED | OUTPATIENT
Start: 2023-02-02

## 2023-02-02 RX ORDER — INSULIN GLARGINE 100 [IU]/ML
20 INJECTION, SOLUTION SUBCUTANEOUS 2 TIMES DAILY
Qty: 10 ADJUSTABLE DOSE PRE-FILLED PEN SYRINGE | Refills: 5 | Status: SHIPPED | OUTPATIENT
Start: 2023-02-02

## 2023-02-02 RX ADMIN — BUDESONIDE 250 MCG: 0.25 SUSPENSION RESPIRATORY (INHALATION) at 08:35

## 2023-02-02 RX ADMIN — METOPROLOL TARTRATE 25 MG: 25 TABLET, FILM COATED ORAL at 08:42

## 2023-02-02 RX ADMIN — INSULIN LISPRO 10 UNITS: 100 INJECTION, SOLUTION INTRAVENOUS; SUBCUTANEOUS at 06:13

## 2023-02-02 RX ADMIN — LEVOTHYROXINE SODIUM 25 MCG: 25 TABLET ORAL at 08:42

## 2023-02-02 RX ADMIN — INSULIN LISPRO 4 UNITS: 100 INJECTION, SOLUTION INTRAVENOUS; SUBCUTANEOUS at 06:13

## 2023-02-02 RX ADMIN — INSULIN GLARGINE 20 UNITS: 100 INJECTION, SOLUTION SUBCUTANEOUS at 08:45

## 2023-02-02 RX ADMIN — ENOXAPARIN SODIUM 30 MG: 100 INJECTION SUBCUTANEOUS at 08:42

## 2023-02-02 RX ADMIN — INSULIN LISPRO 4 UNITS: 100 INJECTION, SOLUTION INTRAVENOUS; SUBCUTANEOUS at 11:08

## 2023-02-02 RX ADMIN — SERTRALINE 50 MG: 50 TABLET, FILM COATED ORAL at 08:45

## 2023-02-02 RX ADMIN — PREGABALIN 100 MG: 50 CAPSULE ORAL at 08:42

## 2023-02-02 RX ADMIN — INSULIN LISPRO 10 UNITS: 100 INJECTION, SOLUTION INTRAVENOUS; SUBCUTANEOUS at 11:09

## 2023-02-02 RX ADMIN — PETROLATUM: 420 OINTMENT TOPICAL at 08:44

## 2023-02-02 RX ADMIN — ARFORMOTEROL TARTRATE 15 MCG: 15 SOLUTION RESPIRATORY (INHALATION) at 08:35

## 2023-02-02 RX ADMIN — Medication 10 ML: at 08:44

## 2023-02-02 ASSESSMENT — PAIN DESCRIPTION - LOCATION: LOCATION: FOOT

## 2023-02-02 ASSESSMENT — PAIN SCALES - GENERAL: PAINLEVEL_OUTOF10: 8

## 2023-02-02 ASSESSMENT — PAIN DESCRIPTION - DESCRIPTORS: DESCRIPTORS: STABBING;BURNING

## 2023-02-02 NOTE — CARE COORDINATION
Continues on ivf. Will need scripts for glucometer testing strips and lancets on discharge. Plan remains to return home w/ his niece on discharge- patient drove self to the hospital. No other needs.  Will follow Holly Deras RN case manager

## 2023-02-02 NOTE — PROGRESS NOTES
ENDOCRINOLOGY PROGRESS NOTE      Date of admission: 1/30/2023  Date of service: 2/1/2023  Admitting physician: Isela Hurtado DO   Primary Care Physician: Catarino Martin DO  Consultant physician: Leonie Benavides MD     Reason for the consultation:  Uncontrolled DM    History of Present Illness: The history is provided by the patient. Accuracy of the patient data is excellent    Daisha Fuentes is a very pleasant 66 y.o. old male with PMH uncontrolled DM type 2, obesity, HTN, HLD and other listed below admitted to [facility] on 1/30/2023 because of  nausea/vomiting and bilateral leg wound cellulitis. Endocrine service was consulted for diabetes management. Subjective   The patient was seen and examined, no acute events.  BG at improving      Inpatient diet:   Carb Restricted diet     Point of care glucose monitoring   (Independently reviewed)   Recent Labs     01/30/23  2107 01/31/23  0643 01/31/23  1136 01/31/23  1613 01/31/23  1952 02/01/23  0634 02/01/23  1133 02/01/23  1543   GLUMET 272* 336* 415* 382* 243* 182* 243* 225*     Scheduled Meds:   melatonin  3 mg Oral Once    insulin glargine  20 Units SubCUTAneous BID    insulin lispro  0-12 Units SubCUTAneous TID WC    white petrolatum   Topical BID    insulin lispro  10 Units SubCUTAneous TID WC    atorvastatin  40 mg Oral Nightly    levothyroxine  25 mcg Oral QAM    metoprolol tartrate  25 mg Oral BID    sertraline  50 mg Oral QAM    sodium chloride flush  5-40 mL IntraVENous 2 times per day    enoxaparin  30 mg SubCUTAneous BID    insulin lispro  0-4 Units SubCUTAneous Nightly    arformoterol tartrate  15 mcg Nebulization BID    And    budesonide  0.25 mg Nebulization BID    pregabalin  100 mg Oral BID       PRN Meds:   sodium chloride flush, 5-40 mL, PRN  sodium chloride, , PRN  ondansetron, 4 mg, Q8H PRN   Or  ondansetron, 4 mg, Q6H PRN  polyethylene glycol, 17 g, Daily PRN  acetaminophen, 650 mg, Q6H PRN   Or  acetaminophen, 650 mg, Q6H PRN  glucose, 4 tablet, PRN  dextrose bolus, 125 mL, PRN   Or  dextrose bolus, 250 mL, PRN  glucagon (rDNA), 1 mg, PRN  dextrose, , Continuous PRN  albuterol, 2.5 mg, Q4H PRN    Continuous Infusions:   sodium chloride      sodium chloride 50 mL/hr at 02/01/23 1823    dextrose         Review of Systems  All systems reviewed. All negative except for symptoms mentioned in HPI     OBJECTIVE    /83   Pulse 78   Temp 97.8 °F (36.6 °C) (Oral)   Resp 20   Ht 6' (1.829 m)   Wt 275 lb 1.6 oz (124.8 kg)   SpO2 98%   BMI 37.31 kg/m²     Intake/Output Summary (Last 24 hours) at 2/1/2023 1945  Last data filed at 2/1/2023 1818  Gross per 24 hour   Intake 120 ml   Output 1275 ml   Net -1155 ml       Physical examination:  General: awake alert, oriented x3  HEENT: normocephalic non traumatic, no exophthalmos   Neck: supple, No thyroid tenderness,  Pulm: good equal air entry no added sounds  CVS: S1 + S2  Abd: soft lax, no tenderness  Skin: warm, no lesions, no rash.  Diabetic foot   Neuro: CN intact, sensation decreased bilateral , muscle power normal  Psych: normal mood, and affect    Review of Laboratory Data:  I personally reviewed the following labs:   Recent Labs     01/30/23  1123 01/31/23  0350   WBC 8.3 7.1   RBC 4.97 4.23   HGB 14.6 12.5   HCT 43.0 37.1   MCV 86.5 87.7   MCH 29.4 29.6   MCHC 34.0 33.7   RDW 13.7 13.9    118*   MPV 12.3* 12.6*     Recent Labs     01/30/23  1123 01/30/23  1407 01/31/23  0545 02/01/23  0347   * 129* 133 134   K 6.0* 4.9 4.4 4.6   CL 92* 102 104 108*   CO2 19* 18* 20* 19*   BUN 31* 29* 26* 17   CREATININE 1.2 1.2 1.1 0.9   GLUCOSE 646* 384* 323* 195*   CALCIUM 8.9 8.0* 7.8* 7.5*   PROT 6.9  --  5.8* 6.1*   LABALBU 3.4*  --  3.0* 3.1*   BILITOT 0.3  --  <0.2 0.2   ALKPHOS 174*  --  132* 140*   AST 86*  --  75* 51*   ALT 80*  --  64* 57*     Beta-Hydroxybutyrate   Date Value Ref Range Status   01/30/2023 0.20 0.02 - 0.27 mmol/L Final     Lab Results   Component Value Date/Time    LABA1C 8.7 11/10/2022 02:45 AM    LABA1C 7.7 04/22/2022 07:00 PM     Lab Results   Component Value Date/Time    TSH 2.640 11/10/2022 03:25 PM    T4FREE 1.05 11/10/2022 03:25 PM     Lab Results   Component Value Date/Time    LABA1C 8.7 11/10/2022 02:45 AM    GLUCOSE 195 02/01/2023 03:47 AM     No results found for: TRIG, HDL, LDLCALC, CHOL    Blood culture   Lab Results   Component Value Date/Time    BC 24 Hours no growth 01/30/2023 11:23 AM    BC 5 Days no growth 11/09/2022 09:14 PM       Radiology:  CT ABDOMEN PELVIS W IV CONTRAST Additional Contrast? None   Final Result   Mild stranding identified surrounding the bladder which is cystitis cannot be   excluded. Correlation to urinalysis is recommended. The remainder of the   abdomen and pelvis is grossly unremarkable with mild hepatosplenomegaly. Diffuse fatty infiltration of the liver. Medical Records/Labs/Images review:   I personally reviewed and summarized previous records   All labs and imaging were reviewed independently     [address] [facility] B May, a 66 y.o.-old male seen today for inpatient diabetes management     Diabetes Mellitus type 2   Pt admit poor compliance with diet   will change diabetes regimen to:  Lantus 20u BID   Humalog 10u with meals   Medium dose sliding scale with meals and at night   Continue glucose check with meals and at bedtime   Will titrate insulin dose based on the blood glucose trend & insulin requirement  Pt will follow with us after discharge    Primary hypothyroidism   Continue Levothyroxine 25 mcg daily   TFT WNL in 11/2022     Bilateral LE wounds   Managed by primary service and ID service     The above issues were reviewed with the patient who understood and agreed with the plan. Thank you for allowing us to participate in the care of this patient. Please do not hesitate to contact us with any additional questions.      Malgorzata De Anda MD  Endocrinologist, [facility] [place] Diabetes Care and Endocrinology   [facility] [place] Newsome [address] [place] [address],[facility] [address]   Phone: [phone]  Fax: [phone]  --------------------------------------------  An electronic signature was used to authenticate this note.  Tramaine Zamora MD on 2/1/2023 at 7:45 PM

## 2023-02-02 NOTE — DISCHARGE SUMMARY
[facility] Physician Discharge Summary     Rufina Lazacno May, 63 y.o.male /  1960  / MRN [id number]    Admission date  2023  Admission diagnoses   Hyperkalemia  Hyponatremia  Uncontrolled DM w hyperglycemia  Elevated LFTs  Chronic LE wounds  COPD  Depression / anxiety  HTN  HPL  Hypothyroid  Obesity  PVD  Tobacco use  Consults   ID, endocrinology  Procedures   See hospital course  Discharge date  2023  12:10 PM  Discharge diagnoses Same with electrolytes normalized and DM more well-controlled   Discharge condition  Stable    Discharge disposition Home  Activity level   As tolerated  Follow-up     Gipsy Pair, DO  [address]  [phone]    Follow up  follow up, As needed    Gloria s, [address] [address]  [phone]    Follow up  follow up, As needed    [address] Fly Dumont is a 32-year-old male with past medical history pertinent for insulin-dependent diabetes, COPD, hyperlipidemia, hypertension, sleep apnea, and anxiety/depression who was admitted on  with the above diagnoses after presenting to the ED reportedly to have his chronic foot wound checked. The foot itself did not appear to have an acute infection, ID consulted and felt no antibiotics were necessary. With initial blood sugar 646, patient was not quite in DKA; he was admitted, given insulin, and endocrinology was consulted who have adjusted his diabetic regimen and blood sugars are now in the 200s. Also noted to have electrolyte abnormalities likely associated with some acidosis from his significant hyperglycemia, which are now much improved. LFTs were bit elevated with imaging showing hepatosplenomegaly and fatty infiltration, these have also improved. Today, patient feels at baseline, blood sugars are more well controlled, and he is eager for discharge.   It is unclear why the patient was not discharged yesterday; hypotension mentioned but EMR reviewed and his blood pressures throughout the day were acceptable. He is now stable for discharge home pending the final recommendations of endocrinology.     Discharge Exam  Vitals: BP (!) 141/73   Pulse 72   Temp 98.5 °F (36.9 °C) (Oral)   Resp 20   Ht 6' (1.829 m)   Wt 275 lb 1.6 oz (124.8 kg)   SpO2 98%   BMI 37.31 kg/m²   General: well-developed, well-nourished, no acute distress, cooperative  Skin: warm, dry, and generally intact with normal color  HEENT: no gross abnormalities  Respiratory: clear to auscultation bilaterally without respiratory distress  Cardiovascular: regular rate and rhythm   Abdominal: obese, soft, nontender, nondistended, positive bowel sounds  Extremities: no obvious edema or deformity  Neurologic: awake, alert, no gross deficits  Psychiatric: normal affect, cooperative       Medication List        CONTINUE taking these medications      albuterol sulfate  (90 Base) MCG/ACT inhaler  Commonly known as: PROVENTIL;VENTOLIN;PROAIR     atorvastatin 40 MG tablet  Commonly known as: LIPITOR  Take 1 tablet by mouth at bedtime     levothyroxine 25 MCG tablet  Commonly known as: SYNTHROID     lisinopril-hydroCHLOROthiazide 20-12.5 MG per tablet  Commonly known as: PRINZIDE;ZESTORETIC     metoprolol tartrate 25 MG tablet  Commonly known as: LOPRESSOR     ondansetron 4 MG disintegrating tablet  Commonly known as: ZOFRAN-ODT  Take 1 tablet by mouth 3 times daily as needed for Nausea or Vomiting     pregabalin 100 MG capsule  Commonly known as: LYRICA     Probiotic Blend Caps     sertraline 50 MG tablet  Commonly known as: ZOLOFT     sulfamethoxazole-trimethoprim 800-160 MG per tablet  Commonly known as: BACTRIM DS;SEPTRA DS     Symbicort 80-4.5 MCG/ACT Aero  Generic drug: budesonide-formoterol            STOP taking these medications      lisinopril 2.5 MG tablet  Commonly known as: PRINIVIL;ZESTRIL            ASK your doctor about these medications      glimepiride 4 MG tablet  Commonly known as: AMARYL  Ask about: Which instructions should I use? insulin glargine 100 UNIT/ML injection vial  Commonly known as: LANTUS     Jardiance 10 MG tablet  Generic drug: empagliflozin     metFORMIN 1000 MG tablet  Commonly known as: GLUCOPHAGE  Ask about: Which instructions should I use?             Note that more than 30 minutes was spent in preparing discharge papers, discussing discharge with patient, medication review, etc.    Electronically signed by Carol Tarango DO on 2/2/2023 at 12:10 PM

## 2023-02-02 NOTE — PROGRESS NOTES
[facility] Hospitalist   Progress Note    Admitting Date and Time: 1/30/2023 10:58 AM  Admit Dx: Dehydration [E86.0]  Hyperkalemia [E87.5]  Hyperglycemia [R73.9]    Subjective:    Patient was admitted with Dehydration [E86.0]  Hyperkalemia [E87.5]  Hyperglycemia [R73.9].  Patient denies fever, chills, cp, sob, n/v.     melatonin  3 mg Oral Once    insulin glargine  20 Units SubCUTAneous BID    insulin lispro  0-12 Units SubCUTAneous TID WC    white petrolatum   Topical BID    insulin lispro  10 Units SubCUTAneous TID WC    atorvastatin  40 mg Oral Nightly    levothyroxine  25 mcg Oral QAM    metoprolol tartrate  25 mg Oral BID    sertraline  50 mg Oral QAM    sodium chloride flush  5-40 mL IntraVENous 2 times per day    enoxaparin  30 mg SubCUTAneous BID    insulin lispro  0-4 Units SubCUTAneous Nightly    arformoterol tartrate  15 mcg Nebulization BID    And    budesonide  0.25 mg Nebulization BID    pregabalin  100 mg Oral BID     sodium chloride flush, 5-40 mL, PRN  sodium chloride, , PRN  ondansetron, 4 mg, Q8H PRN   Or  ondansetron, 4 mg, Q6H PRN  polyethylene glycol, 17 g, Daily PRN  acetaminophen, 650 mg, Q6H PRN   Or  acetaminophen, 650 mg, Q6H PRN  glucose, 4 tablet, PRN  dextrose bolus, 125 mL, PRN   Or  dextrose bolus, 250 mL, PRN  glucagon (rDNA), 1 mg, PRN  dextrose, , Continuous PRN  albuterol, 2.5 mg, Q4H PRN         Objective:    /83   Pulse 78   Temp 97.8 °F (36.6 °C) (Oral)   Resp 20   Ht 6' (1.829 m)   Wt 275 lb 1.6 oz (124.8 kg)   SpO2 98%   BMI 37.31 kg/m²   Skin: warm and dry, no rash or erythema  Pulmonary/Chest: clear to auscultation bilaterally- no wheezes, rales or rhonchi, normal air movement, no respiratory distress  Cardiovascular: rhythm reg at rate of 80  Abdomen: soft, non-tender, non-distended, normal bowel sounds, no masses or organomegaly  Extremities: no cyanosis, no clubbing, and no edema      Recent Labs     01/30/23  1407 01/31/23  0563 02/01/23  0347   * 133 134   K 4.9 4.4 4.6    104 108*   CO2 18* 20* 19*   BUN 29* 26* 17   CREATININE 1.2 1.1 0.9   GLUCOSE 384* 323* 195*   CALCIUM 8.0* 7.8* 7.5*       Recent Labs     01/30/23  1123 01/31/23  0350   WBC 8.3 7.1   RBC 4.97 4.23   HGB 14.6 12.5   HCT 43.0 37.1   MCV 86.5 87.7   MCH 29.4 29.6   MCHC 34.0 33.7   RDW 13.7 13.9    118*   MPV 12.3* 12.6*       CMP:    Lab Results   Component Value Date/Time     02/01/2023 03:47 AM    K 4.6 02/01/2023 03:47 AM     02/01/2023 03:47 AM    CO2 19 02/01/2023 03:47 AM    BUN 17 02/01/2023 03:47 AM    CREATININE 0.9 02/01/2023 03:47 AM    GFRAA >60 06/06/2022 10:30 AM    LABGLOM >60 02/01/2023 03:47 AM    GLUCOSE 195 02/01/2023 03:47 AM    PROT 6.1 02/01/2023 03:47 AM    LABALBU 3.1 02/01/2023 03:47 AM    CALCIUM 7.5 02/01/2023 03:47 AM    BILITOT 0.2 02/01/2023 03:47 AM    ALKPHOS 140 02/01/2023 03:47 AM    AST 51 02/01/2023 03:47 AM    ALT 57 02/01/2023 03:47 AM        Radiology:   CT ABDOMEN PELVIS W IV CONTRAST Additional Contrast? None   Final Result   Mild stranding identified surrounding the bladder which is cystitis cannot be   excluded. Correlation to urinalysis is recommended. The remainder of the   abdomen and pelvis is grossly unremarkable with mild hepatosplenomegaly. Diffuse fatty infiltration of the liver. Assessment:    Principal Problem:    Hyperkalemia  Active Problems:    Anxiety and depression    Hyperlipidemia    Thyroid disease    PVD (peripheral vascular disease) (HCC)    Tobacco use    Acidosis    Dehydration    Thrombocytopenia (HCC)  Resolved Problems:    * No resolved hospital problems. *      Plan:  Dm type 2 uncontrolled  endocrine following continue to monitor bs and tx with insulin  Acidosis monitor  Hyperkalemia improving monitor  Dehydration iv fluids  Elevated lfts/fatty liver monitor  LE wounds appreciate ID input.   Wound care  Copd continue med  Depression/anxiety continue medication  Htn continue med  Hyperlipidemia continue med  Hypothyroidism continue med  Thrombocytopenia monitor  Hypotension pt appears to be improving with fluid resuscitation. Pt states that he is less dizzy when he gets up to walk around.  Pt had been severely dehydrated with osmotic diuresis from hyperglycemia as often he would drink sweet tea to satiate thirst.  Will reduce fluids and check orthos tomorrow and possible discharge if neg        Electronically signed by Miriam Noel DO on 2/1/2023 at 7:50 PM

## 2023-02-04 LAB
BLOOD CULTURE, ROUTINE: NORMAL
CULTURE, BLOOD 2: NORMAL

## 2023-03-01 PROBLEM — E86.0 DEHYDRATION: Status: RESOLVED | Noted: 2023-01-30 | Resolved: 2023-03-01

## 2023-03-23 ENCOUNTER — HOSPITAL ENCOUNTER (OUTPATIENT)
Dept: PULMONOLOGY | Age: 63
Discharge: HOME | End: 2023-03-23
Payer: MEDICARE

## 2023-03-23 ENCOUNTER — HOSPITAL ENCOUNTER (OUTPATIENT)
Dept: ULTRASOUND IMAGING | Age: 63
Discharge: HOME | End: 2023-03-25
Payer: MEDICARE

## 2023-03-23 DIAGNOSIS — R06.02 SHORTNESS OF BREATH: ICD-10-CM

## 2023-03-23 DIAGNOSIS — J44.9 CHRONIC OBSTRUCTIVE PULMONARY DISEASE, UNSPECIFIED COPD TYPE (HCC): ICD-10-CM

## 2023-03-23 DIAGNOSIS — I99.9 DISORDER OF CIRCULATORY SYSTEM: ICD-10-CM

## 2023-03-23 DIAGNOSIS — G47.30 SLEEP APNEA, UNSPECIFIED TYPE: ICD-10-CM

## 2023-03-23 DIAGNOSIS — I70.213 ATHEROSCLEROSIS OF NATIVE ARTERY OF BOTH LOWER EXTREMITIES WITH INTERMITTENT CLAUDICATION (HCC): ICD-10-CM

## 2023-03-23 PROCEDURE — 94060 EVALUATION OF WHEEZING: CPT

## 2023-03-23 PROCEDURE — 94729 DIFFUSING CAPACITY: CPT

## 2023-03-23 PROCEDURE — 94726 PLETHYSMOGRAPHY LUNG VOLUMES: CPT

## 2023-03-23 PROCEDURE — 93925 LOWER EXTREMITY STUDY: CPT

## 2023-03-24 NOTE — PROCEDURES
[address] [address]                               PULMONARY FUNCTION    PATIENT NAME: Marie Peter                      :        1960  MED REC NO:   [id number]                            ROOM:  ACCOUNT NO:   [de-identified]                           ADMIT DATE: 2023  PROVIDER:     Dixon Tabor MD    DATE OF PROCEDURE:  2023    The patient is here for full PFTs. SPIROMETRY:  Pre and post bronchodilators, lung volumes, and diffusion  capacity were performed. The patient showed excellent effort and  cooperation for the test.  Spirometry shows evidence of mild restrictive  lung disease and with moderate obstruction. FVC is 3.61 liters, 73%  predicted. FEV1 is 2.58 liters, 58% predicted. There is a 9%  improvement of FEV1 to 2.83 liters, improving to 75%. Lung volumes show  with mild restriction, the TLC is 76%. Diffusion capacity when  corrected for alveolar volume is normal at 92%.         Karina Magallanes MD    D: 2023 15:30:37       T: 2023 20:15:53     [email or web address]  Job#: [id number]     Doc#: [id number]    CC:

## 2023-04-03 ENCOUNTER — APPOINTMENT (OUTPATIENT)
Dept: GENERAL RADIOLOGY | Age: 63
End: 2023-04-03
Payer: COMMERCIAL

## 2023-04-03 ENCOUNTER — HOSPITAL ENCOUNTER (OUTPATIENT)
Age: 63
Setting detail: OBSERVATION
Discharge: HOME | End: 2023-04-05
Attending: EMERGENCY MEDICINE | Admitting: FAMILY MEDICINE
Payer: COMMERCIAL

## 2023-04-03 DIAGNOSIS — L08.9 DIABETIC FOOT INFECTION (HCC): Primary | ICD-10-CM

## 2023-04-03 DIAGNOSIS — E11.628 DIABETIC FOOT INFECTION (HCC): Primary | ICD-10-CM

## 2023-04-03 DIAGNOSIS — R73.9 HYPERGLYCEMIA: ICD-10-CM

## 2023-04-03 PROBLEM — E03.9 PRIMARY HYPOTHYROIDISM: Status: ACTIVE | Noted: 2023-04-03

## 2023-04-03 PROBLEM — Z91.119 DIETARY NONCOMPLIANCE: Status: ACTIVE | Noted: 2023-04-03

## 2023-04-03 LAB
ALBUMIN SERPL-MCNC: 3.4 G/DL (ref 3.5–5.2)
ALP SERPL-CCNC: 107 U/L (ref 40–129)
ALT SERPL-CCNC: 22 U/L (ref 0–40)
ANION GAP SERPL CALCULATED.3IONS-SCNC: 13 MMOL/L (ref 7–16)
AST SERPL-CCNC: 43 U/L (ref 0–39)
BASOPHILS # BLD: 0.06 E9/L (ref 0–0.2)
BASOPHILS NFR BLD: 0.6 % (ref 0–2)
BETA-HYDROXYBUTYRATE: 0.32 MMOL/L (ref 0.02–0.27)
BILIRUB DIRECT SERPL-MCNC: <0.2 MG/DL (ref 0–0.3)
BILIRUB INDIRECT SERPL-MCNC: ABNORMAL MG/DL (ref 0–1)
BILIRUB SERPL-MCNC: 0.5 MG/DL (ref 0–1.2)
BUN SERPL-MCNC: 22 MG/DL (ref 6–23)
CALCIUM SERPL-MCNC: 8.9 MG/DL (ref 8.6–10.2)
CHLORIDE SERPL-SCNC: 93 MMOL/L (ref 98–107)
CO2 SERPL-SCNC: 22 MMOL/L (ref 22–29)
CREAT SERPL-MCNC: 0.9 MG/DL (ref 0.7–1.2)
EOSINOPHIL # BLD: 0.13 E9/L (ref 0.05–0.5)
EOSINOPHIL NFR BLD: 1.4 % (ref 0–6)
ERYTHROCYTE [DISTWIDTH] IN BLOOD BY AUTOMATED COUNT: 12.8 FL (ref 11.5–15)
ERYTHROCYTE [SEDIMENTATION RATE] IN BLOOD BY WESTERGREN METHOD: 10 MM/HR (ref 0–15)
GLUCOSE SERPL-MCNC: 630 MG/DL (ref 74–99)
HBA1C MFR BLD: 12.6 % (ref 4–5.6)
HCT VFR BLD AUTO: 46.3 % (ref 37–54)
HGB BLD-MCNC: 15.6 G/DL (ref 12.5–16.5)
IMM GRANULOCYTES # BLD: 0.03 E9/L
IMM GRANULOCYTES NFR BLD: 0.3 % (ref 0–5)
LACTATE BLDV-SCNC: 3 MMOL/L (ref 0.5–1.9)
LYMPHOCYTES # BLD: 1.89 E9/L (ref 1.5–4)
LYMPHOCYTES NFR BLD: 20.1 % (ref 20–42)
MCH RBC QN AUTO: 28.5 PG (ref 26–35)
MCHC RBC AUTO-ENTMCNC: 33.7 % (ref 32–34.5)
MCV RBC AUTO: 84.5 FL (ref 80–99.9)
METER GLUCOSE: 340 MG/DL (ref 74–99)
METER GLUCOSE: 417 MG/DL (ref 74–99)
METER GLUCOSE: 448 MG/DL (ref 74–99)
MONOCYTES # BLD: 0.63 E9/L (ref 0.1–0.95)
MONOCYTES NFR BLD: 6.7 % (ref 2–12)
NEUTROPHILS # BLD: 6.68 E9/L (ref 1.8–7.3)
NEUTS SEG NFR BLD: 70.9 % (ref 43–80)
PLATELET # BLD AUTO: 182 E9/L (ref 130–450)
PMV BLD AUTO: 11.8 FL (ref 7–12)
POTASSIUM SERPL-SCNC: 3.8 MMOL/L (ref 3.5–5)
POTASSIUM SERPL-SCNC: 5.5 MMOL/L (ref 3.5–5)
PROT SERPL-MCNC: 6.9 G/DL (ref 6.4–8.3)
RBC # BLD AUTO: 5.48 E12/L (ref 3.8–5.8)
SODIUM SERPL-SCNC: 128 MMOL/L (ref 132–146)
WBC # BLD: 9.4 E9/L (ref 4.5–11.5)

## 2023-04-03 PROCEDURE — 83605 ASSAY OF LACTIC ACID: CPT

## 2023-04-03 PROCEDURE — 94664 DEMO&/EVAL PT USE INHALER: CPT

## 2023-04-03 PROCEDURE — 2580000003 HC RX 258: Performed by: NURSE PRACTITIONER

## 2023-04-03 PROCEDURE — 84132 ASSAY OF SERUM POTASSIUM: CPT

## 2023-04-03 PROCEDURE — 6360000002 HC RX W HCPCS: Performed by: EMERGENCY MEDICINE

## 2023-04-03 PROCEDURE — 80048 BASIC METABOLIC PNL TOTAL CA: CPT

## 2023-04-03 PROCEDURE — 6360000002 HC RX W HCPCS: Performed by: NURSE PRACTITIONER

## 2023-04-03 PROCEDURE — 96372 THER/PROPH/DIAG INJ SC/IM: CPT

## 2023-04-03 PROCEDURE — 99222 1ST HOSP IP/OBS MODERATE 55: CPT | Performed by: INTERNAL MEDICINE

## 2023-04-03 PROCEDURE — 99285 EMERGENCY DEPT VISIT HI MDM: CPT

## 2023-04-03 PROCEDURE — 87040 BLOOD CULTURE FOR BACTERIA: CPT

## 2023-04-03 PROCEDURE — 83036 HEMOGLOBIN GLYCOSYLATED A1C: CPT

## 2023-04-03 PROCEDURE — 84145 PROCALCITONIN (PCT): CPT

## 2023-04-03 PROCEDURE — G0378 HOSPITAL OBSERVATION PER HR: HCPCS

## 2023-04-03 PROCEDURE — 6360000002 HC RX W HCPCS

## 2023-04-03 PROCEDURE — 96361 HYDRATE IV INFUSION ADD-ON: CPT

## 2023-04-03 PROCEDURE — 99223 1ST HOSP IP/OBS HIGH 75: CPT | Performed by: NURSE PRACTITIONER

## 2023-04-03 PROCEDURE — 82010 KETONE BODYS QUAN: CPT

## 2023-04-03 PROCEDURE — 96374 THER/PROPH/DIAG INJ IV PUSH: CPT

## 2023-04-03 PROCEDURE — 6370000000 HC RX 637 (ALT 250 FOR IP): Performed by: NURSE PRACTITIONER

## 2023-04-03 PROCEDURE — 85651 RBC SED RATE NONAUTOMATED: CPT

## 2023-04-03 PROCEDURE — 96375 TX/PRO/DX INJ NEW DRUG ADDON: CPT

## 2023-04-03 PROCEDURE — 96376 TX/PRO/DX INJ SAME DRUG ADON: CPT

## 2023-04-03 PROCEDURE — 2580000003 HC RX 258: Performed by: FAMILY MEDICINE

## 2023-04-03 PROCEDURE — 86140 C-REACTIVE PROTEIN: CPT

## 2023-04-03 PROCEDURE — 94640 AIRWAY INHALATION TREATMENT: CPT

## 2023-04-03 PROCEDURE — 82962 GLUCOSE BLOOD TEST: CPT

## 2023-04-03 PROCEDURE — 80076 HEPATIC FUNCTION PANEL: CPT

## 2023-04-03 PROCEDURE — 6370000000 HC RX 637 (ALT 250 FOR IP): Performed by: INTERNAL MEDICINE

## 2023-04-03 PROCEDURE — 73630 X-RAY EXAM OF FOOT: CPT

## 2023-04-03 PROCEDURE — 85025 COMPLETE CBC W/AUTO DIFF WBC: CPT

## 2023-04-03 RX ORDER — SODIUM CHLORIDE 0.9 % (FLUSH) 0.9 %
5-40 SYRINGE (ML) INJECTION PRN
Status: DISCONTINUED | OUTPATIENT
Start: 2023-04-03 | End: 2023-04-05 | Stop reason: HOSPADM

## 2023-04-03 RX ORDER — SODIUM CHLORIDE 9 MG/ML
INJECTION, SOLUTION INTRAVENOUS PRN
Status: DISCONTINUED | OUTPATIENT
Start: 2023-04-03 | End: 2023-04-05 | Stop reason: HOSPADM

## 2023-04-03 RX ORDER — LEVOTHYROXINE SODIUM 0.03 MG/1
25 TABLET ORAL EVERY MORNING
Status: DISCONTINUED | OUTPATIENT
Start: 2023-04-04 | End: 2023-04-05 | Stop reason: HOSPADM

## 2023-04-03 RX ORDER — INSULIN GLARGINE 100 [IU]/ML
20 INJECTION, SOLUTION SUBCUTANEOUS 2 TIMES DAILY
Status: DISCONTINUED | OUTPATIENT
Start: 2023-04-03 | End: 2023-04-03

## 2023-04-03 RX ORDER — ALBUTEROL SULFATE 2.5 MG/3ML
2.5 SOLUTION RESPIRATORY (INHALATION) EVERY 4 HOURS PRN
Status: DISCONTINUED | OUTPATIENT
Start: 2023-04-03 | End: 2023-04-05 | Stop reason: HOSPADM

## 2023-04-03 RX ORDER — ALBUTEROL SULFATE 90 UG/1
2 AEROSOL, METERED RESPIRATORY (INHALATION) EVERY 4 HOURS PRN
Status: DISCONTINUED | OUTPATIENT
Start: 2023-04-03 | End: 2023-04-03 | Stop reason: CLARIF

## 2023-04-03 RX ORDER — BUDESONIDE 0.5 MG/2ML
0.5 INHALANT ORAL 2 TIMES DAILY
Status: DISCONTINUED | OUTPATIENT
Start: 2023-04-03 | End: 2023-04-05 | Stop reason: HOSPADM

## 2023-04-03 RX ORDER — ONDANSETRON 4 MG/1
4 TABLET, ORALLY DISINTEGRATING ORAL EVERY 8 HOURS PRN
Status: DISCONTINUED | OUTPATIENT
Start: 2023-04-03 | End: 2023-04-05 | Stop reason: HOSPADM

## 2023-04-03 RX ORDER — DEXTROSE MONOHYDRATE 100 MG/ML
INJECTION, SOLUTION INTRAVENOUS CONTINUOUS PRN
Status: DISCONTINUED | OUTPATIENT
Start: 2023-04-03 | End: 2023-04-05 | Stop reason: HOSPADM

## 2023-04-03 RX ORDER — ENOXAPARIN SODIUM 100 MG/ML
30 INJECTION SUBCUTANEOUS 2 TIMES DAILY
Status: DISCONTINUED | OUTPATIENT
Start: 2023-04-03 | End: 2023-04-05 | Stop reason: HOSPADM

## 2023-04-03 RX ORDER — 0.9 % SODIUM CHLORIDE 0.9 %
500 INTRAVENOUS SOLUTION INTRAVENOUS ONCE
Status: COMPLETED | OUTPATIENT
Start: 2023-04-03 | End: 2023-04-03

## 2023-04-03 RX ORDER — INSULIN LISPRO 100 [IU]/ML
0-12 INJECTION, SOLUTION INTRAVENOUS; SUBCUTANEOUS
Status: DISCONTINUED | OUTPATIENT
Start: 2023-04-03 | End: 2023-04-04

## 2023-04-03 RX ORDER — INSULIN GLARGINE 100 [IU]/ML
40 INJECTION, SOLUTION SUBCUTANEOUS NIGHTLY
Status: DISCONTINUED | OUTPATIENT
Start: 2023-04-03 | End: 2023-04-04

## 2023-04-03 RX ORDER — FENOFIBRATE 160 MG/1
160 TABLET ORAL DAILY
COMMUNITY

## 2023-04-03 RX ORDER — MORPHINE SULFATE 4 MG/ML
4 INJECTION, SOLUTION INTRAMUSCULAR; INTRAVENOUS ONCE
Status: COMPLETED | OUTPATIENT
Start: 2023-04-03 | End: 2023-04-03

## 2023-04-03 RX ORDER — BUDESONIDE AND FORMOTEROL FUMARATE DIHYDRATE 80; 4.5 UG/1; UG/1
2 AEROSOL RESPIRATORY (INHALATION) 2 TIMES DAILY
Status: DISCONTINUED | OUTPATIENT
Start: 2023-04-03 | End: 2023-04-03 | Stop reason: CLARIF

## 2023-04-03 RX ORDER — ARFORMOTEROL TARTRATE 15 UG/2ML
15 SOLUTION RESPIRATORY (INHALATION) 2 TIMES DAILY
Status: DISCONTINUED | OUTPATIENT
Start: 2023-04-03 | End: 2023-04-05 | Stop reason: HOSPADM

## 2023-04-03 RX ORDER — LISINOPRIL 20 MG/1
20 TABLET ORAL DAILY
Status: DISCONTINUED | OUTPATIENT
Start: 2023-04-04 | End: 2023-04-05 | Stop reason: HOSPADM

## 2023-04-03 RX ORDER — ACETAMINOPHEN 325 MG/1
650 TABLET ORAL EVERY 6 HOURS PRN
Status: DISCONTINUED | OUTPATIENT
Start: 2023-04-03 | End: 2023-04-05 | Stop reason: HOSPADM

## 2023-04-03 RX ORDER — INSULIN LISPRO 100 [IU]/ML
0-4 INJECTION, SOLUTION INTRAVENOUS; SUBCUTANEOUS
Status: DISCONTINUED | OUTPATIENT
Start: 2023-04-03 | End: 2023-04-03

## 2023-04-03 RX ORDER — SODIUM CHLORIDE 9 MG/ML
INJECTION, SOLUTION INTRAVENOUS CONTINUOUS
Status: DISCONTINUED | OUTPATIENT
Start: 2023-04-03 | End: 2023-04-05

## 2023-04-03 RX ORDER — INSULIN LISPRO 100 [IU]/ML
0-4 INJECTION, SOLUTION INTRAVENOUS; SUBCUTANEOUS NIGHTLY
Status: DISCONTINUED | OUTPATIENT
Start: 2023-04-03 | End: 2023-04-03

## 2023-04-03 RX ORDER — ACETAMINOPHEN 650 MG/1
650 SUPPOSITORY RECTAL EVERY 6 HOURS PRN
Status: DISCONTINUED | OUTPATIENT
Start: 2023-04-03 | End: 2023-04-05 | Stop reason: HOSPADM

## 2023-04-03 RX ORDER — LISINOPRIL 10 MG/1
10 TABLET ORAL EVERY MORNING
Status: ON HOLD | COMMUNITY
End: 2023-04-05 | Stop reason: HOSPADM

## 2023-04-03 RX ORDER — LISINOPRIL AND HYDROCHLOROTHIAZIDE 20; 12.5 MG/1; MG/1
1 TABLET ORAL EVERY MORNING
Status: DISCONTINUED | OUTPATIENT
Start: 2023-04-04 | End: 2023-04-03 | Stop reason: CLARIF

## 2023-04-03 RX ORDER — SODIUM CHLORIDE 0.9 % (FLUSH) 0.9 %
5-40 SYRINGE (ML) INJECTION EVERY 12 HOURS SCHEDULED
Status: DISCONTINUED | OUTPATIENT
Start: 2023-04-03 | End: 2023-04-05 | Stop reason: HOSPADM

## 2023-04-03 RX ORDER — KETOROLAC TROMETHAMINE 30 MG/ML
30 INJECTION, SOLUTION INTRAMUSCULAR; INTRAVENOUS EVERY 6 HOURS PRN
Status: DISCONTINUED | OUTPATIENT
Start: 2023-04-03 | End: 2023-04-05 | Stop reason: HOSPADM

## 2023-04-03 RX ORDER — ATORVASTATIN CALCIUM 40 MG/1
40 TABLET, FILM COATED ORAL NIGHTLY
Status: DISCONTINUED | OUTPATIENT
Start: 2023-04-03 | End: 2023-04-05 | Stop reason: HOSPADM

## 2023-04-03 RX ORDER — INSULIN LISPRO 100 [IU]/ML
10 INJECTION, SOLUTION INTRAVENOUS; SUBCUTANEOUS
Status: DISCONTINUED | OUTPATIENT
Start: 2023-04-03 | End: 2023-04-04

## 2023-04-03 RX ORDER — PREGABALIN 50 MG/1
100 CAPSULE ORAL 2 TIMES DAILY
Status: DISCONTINUED | OUTPATIENT
Start: 2023-04-03 | End: 2023-04-05 | Stop reason: HOSPADM

## 2023-04-03 RX ORDER — POLYETHYLENE GLYCOL 3350 17 G/17G
17 POWDER, FOR SOLUTION ORAL DAILY PRN
Status: DISCONTINUED | OUTPATIENT
Start: 2023-04-03 | End: 2023-04-05 | Stop reason: HOSPADM

## 2023-04-03 RX ORDER — HYDROCHLOROTHIAZIDE 12.5 MG/1
12.5 TABLET ORAL DAILY
Status: DISCONTINUED | OUTPATIENT
Start: 2023-04-04 | End: 2023-04-05 | Stop reason: HOSPADM

## 2023-04-03 RX ORDER — LACTOBACILLUS RHAMNOSUS GG 10B CELL
1 CAPSULE ORAL 2 TIMES DAILY
Status: DISCONTINUED | OUTPATIENT
Start: 2023-04-03 | End: 2023-04-05 | Stop reason: HOSPADM

## 2023-04-03 RX ORDER — ONDANSETRON 2 MG/ML
4 INJECTION INTRAMUSCULAR; INTRAVENOUS EVERY 6 HOURS PRN
Status: DISCONTINUED | OUTPATIENT
Start: 2023-04-03 | End: 2023-04-05 | Stop reason: HOSPADM

## 2023-04-03 RX ADMIN — Medication 1 CAPSULE: at 20:54

## 2023-04-03 RX ADMIN — INSULIN GLARGINE 40 UNITS: 100 INJECTION, SOLUTION SUBCUTANEOUS at 21:07

## 2023-04-03 RX ADMIN — KETOROLAC TROMETHAMINE 30 MG: 30 INJECTION, SOLUTION INTRAMUSCULAR at 20:53

## 2023-04-03 RX ADMIN — ATORVASTATIN CALCIUM 40 MG: 40 TABLET, FILM COATED ORAL at 20:53

## 2023-04-03 RX ADMIN — ENOXAPARIN SODIUM 30 MG: 100 INJECTION SUBCUTANEOUS at 20:54

## 2023-04-03 RX ADMIN — SODIUM CHLORIDE, PRESERVATIVE FREE 10 ML: 5 INJECTION INTRAVENOUS at 20:54

## 2023-04-03 RX ADMIN — BUDESONIDE 500 MCG: 0.5 SUSPENSION RESPIRATORY (INHALATION) at 20:12

## 2023-04-03 RX ADMIN — ARFORMOTEROL TARTRATE 15 MCG: 15 SOLUTION RESPIRATORY (INHALATION) at 20:12

## 2023-04-03 RX ADMIN — ENOXAPARIN SODIUM 30 MG: 100 INJECTION SUBCUTANEOUS at 16:30

## 2023-04-03 RX ADMIN — ONDANSETRON 4 MG: 2 INJECTION INTRAMUSCULAR; INTRAVENOUS at 16:46

## 2023-04-03 RX ADMIN — INSULIN LISPRO 12 UNITS: 100 INJECTION, SOLUTION INTRAVENOUS; SUBCUTANEOUS at 16:35

## 2023-04-03 RX ADMIN — MORPHINE SULFATE 4 MG: 4 INJECTION, SOLUTION INTRAMUSCULAR; INTRAVENOUS at 16:30

## 2023-04-03 RX ADMIN — SODIUM CHLORIDE: 9 INJECTION, SOLUTION INTRAVENOUS at 22:05

## 2023-04-03 RX ADMIN — PREGABALIN 100 MG: 50 CAPSULE ORAL at 20:54

## 2023-04-03 RX ADMIN — PREGABALIN 100 MG: 50 CAPSULE ORAL at 16:30

## 2023-04-03 RX ADMIN — MORPHINE SULFATE 4 MG: 4 INJECTION, SOLUTION INTRAMUSCULAR; INTRAVENOUS at 11:37

## 2023-04-03 RX ADMIN — METOPROLOL TARTRATE 25 MG: 25 TABLET, FILM COATED ORAL at 20:54

## 2023-04-03 RX ADMIN — SODIUM CHLORIDE: 9 INJECTION, SOLUTION INTRAVENOUS at 18:57

## 2023-04-03 RX ADMIN — INSULIN LISPRO 8 UNITS: 100 INJECTION, SOLUTION INTRAVENOUS; SUBCUTANEOUS at 21:07

## 2023-04-03 RX ADMIN — SODIUM CHLORIDE 500 ML: 9 INJECTION, SOLUTION INTRAVENOUS at 16:41

## 2023-04-03 RX ADMIN — INSULIN LISPRO 10 UNITS: 100 INJECTION, SOLUTION INTRAVENOUS; SUBCUTANEOUS at 16:35

## 2023-04-03 ASSESSMENT — ENCOUNTER SYMPTOMS
NAUSEA: 0
EYE REDNESS: 0
SORE THROAT: 0
WHEEZING: 0
EYE PAIN: 0
SINUS PRESSURE: 0
ABDOMINAL PAIN: 0
SHORTNESS OF BREATH: 0
DIARRHEA: 0
BACK PAIN: 0
EYE DISCHARGE: 0
COUGH: 0
VOMITING: 0

## 2023-04-03 ASSESSMENT — PAIN DESCRIPTION - ORIENTATION
ORIENTATION: LEFT
ORIENTATION: LEFT

## 2023-04-03 ASSESSMENT — PAIN SCALES - GENERAL
PAINLEVEL_OUTOF10: 9
PAINLEVEL_OUTOF10: 8
PAINLEVEL_OUTOF10: 9
PAINLEVEL_OUTOF10: 9

## 2023-04-03 ASSESSMENT — PAIN DESCRIPTION - LOCATION
LOCATION: FOOT

## 2023-04-03 ASSESSMENT — LIFESTYLE VARIABLES
HOW OFTEN DO YOU HAVE A DRINK CONTAINING ALCOHOL: NEVER
HOW MANY STANDARD DRINKS CONTAINING ALCOHOL DO YOU HAVE ON A TYPICAL DAY: PATIENT DOES NOT DRINK

## 2023-04-03 ASSESSMENT — PAIN DESCRIPTION - DESCRIPTORS
DESCRIPTORS: SHARP
DESCRIPTORS: ACHING

## 2023-04-03 ASSESSMENT — PAIN DESCRIPTION - PAIN TYPE
TYPE: ACUTE PAIN
TYPE: ACUTE PAIN

## 2023-04-03 ASSESSMENT — PAIN DESCRIPTION - FREQUENCY: FREQUENCY: CONTINUOUS

## 2023-04-03 NOTE — H&P
protocol/Carb Control Diet. Diabetic Educator. Repeat A1C/Beta Hydroxybutyrate. 500 cc NSS bolus/[place] or 10 units IV x1. Endocrinology input appreciated. Elevated LFTs- AST 43 ALT 22 Lipase 129. monitor  COPD-currently not in exacerbation. Follows with Dr. Soraya Esquivel. Symbicort/albuterol INH at home. We will continue on Brovana/Pulmicort. Follow   ALCON-sleep study initiated. Patient is due for second part of sleep study tonight. We will need to reschedule and complete as outpatient. HLD-continue atorvastatin  HTN-continue metoprolol/Lisinopril/HCTZ. Follow adjust as needed. Depressiion/Anxiety-continue Zoloft  Primary Hypothyroidism- Continue Levothyroxine. Code Status: Full  DVT prophylaxis: Lovenox    NOTE: This report was transcribed using voice recognition software. Every effort was made to ensure accuracy; however, inadvertent computerized transcription errors may be present. In Review of EMR,  evaluation, management and diagnosis. Care plan has been discussed with attending. Time spent 58  minutes. Electronically signed by Liliana Gibson Organ - CNP on 4/3/2023 at 1:22 PM

## 2023-04-03 NOTE — CONSULTS
Laterality Date    BACK SURGERY      CHOLECYSTECTOMY      CORONARY ARTERY BYPASS GRAFT REDO      2 vessel    FOOT DEBRIDEMENT Right 4/25/2022    DELAYED PRIMARY CLOSURE RIGHT FOOT WOUND performed by Loni Akhtar DPM at [address] Right 5/7/2022    FOOT DEBRIDEMENT INCISION AND DRAINAGE performed by Ally Vila DPM at [address] Right 5/10/2022    RIGHT FOOT DELAYED PRIMARY CLOSURE WITH POSSIBLE DEBRIDEMENT    ++CONTACT ISOLATION++   (DR AVAIL. AT 4PM) performed by Ally Vila DPM at [place] [address] PICC LINE  5/9/2022         TOE AMPUTATION Right 4/22/2022    AMPUTATION RIGHT SECOND TOE performed by Loni Akhtar DPM at [address]         Social history:   Tobacco:   reports that he has been smoking cigarettes. He started smoking about 43 years ago. He has a 22.50 pack-year smoking history. He does not have any smokeless tobacco history on file. Alcohol:   reports that he does not currently use alcohol. Drugs:   reports that he does not currently use drugs after having used the following drugs: Marijuana [place]).     Family history:    Family History   Problem Relation Age of Onset    Diabetes Mother     Dementia Mother     Diabetes Father     Stroke Father     Diabetes Sister     Heart Attack Brother        Allergy and drug reactions:   No Known Allergies    Scheduled Meds:   atorvastatin  40 mg Oral Nightly    [START ON 4/4/2023] levothyroxine  25 mcg Oral QAM    metoprolol tartrate  25 mg Oral BID    pregabalin  100 mg Oral BID    lactobacillus  1 capsule Oral BID    [START ON 4/4/2023] sertraline  50 mg Oral QAM    sodium chloride flush  5-40 mL IntraVENous 2 times per day    enoxaparin  30 mg SubCUTAneous BID    budesonide  0.5 mg Nebulization BID    arformoterol tartrate  15 mcg Nebulization BID    [START ON 4/4/2023] lisinopril  20 mg Oral Daily    And    [START ON 4/4/2023] hydroCHLOROthiazide  12.5 mg Oral Daily    insulin lispro  10

## 2023-04-03 NOTE — ED NOTES
ED to Inpatient Handoff Report    Notified Shelia Logan that electronic handoff available and patient ready for transport to room 519. Safety Risks: Risk of falls    Patient in Restraints: no    Constant Observer or Patient : no    Telemetry Monitoring Ordered :No           Order to transfer to unit without monitor:N/A    Last MEWS: 1 Time completed: 1515    Vitals:    04/03/23 1035 04/03/23 1100 04/03/23 1129 04/03/23 1515   BP: 104/72  118/72 122/70   Pulse: 89   80   Resp: 16   18   Temp: 97.7 °F (36.5 °C)   97.7 °F (36.5 °C)   TempSrc: Oral   Oral   SpO2: 96%   95%   Weight:  285 lb (129.3 kg)     Height:  6' (1.829 m)         Opportunity for questions and clarification was provided.          [facility]  04/03/23 7465

## 2023-04-04 ENCOUNTER — APPOINTMENT (OUTPATIENT)
Dept: ULTRASOUND IMAGING | Age: 63
End: 2023-04-04
Payer: COMMERCIAL

## 2023-04-04 LAB
ALBUMIN SERPL-MCNC: 3.1 G/DL (ref 3.5–5.2)
ALP SERPL-CCNC: 93 U/L (ref 40–129)
ALT SERPL-CCNC: 19 U/L (ref 0–40)
ANION GAP SERPL CALCULATED.3IONS-SCNC: 8 MMOL/L (ref 7–16)
AST SERPL-CCNC: 26 U/L (ref 0–39)
BASOPHILS # BLD: 0.06 E9/L (ref 0–0.2)
BASOPHILS NFR BLD: 0.8 % (ref 0–2)
BILIRUB SERPL-MCNC: 0.2 MG/DL (ref 0–1.2)
BUN SERPL-MCNC: 26 MG/DL (ref 6–23)
CALCIUM SERPL-MCNC: 8.3 MG/DL (ref 8.6–10.2)
CHLORIDE SERPL-SCNC: 102 MMOL/L (ref 98–107)
CO2 SERPL-SCNC: 23 MMOL/L (ref 22–29)
CREAT SERPL-MCNC: 1.3 MG/DL (ref 0.7–1.2)
EOSINOPHIL # BLD: 0.18 E9/L (ref 0.05–0.5)
EOSINOPHIL NFR BLD: 2.4 % (ref 0–6)
ERYTHROCYTE [DISTWIDTH] IN BLOOD BY AUTOMATED COUNT: 12.9 FL (ref 11.5–15)
GLUCOSE SERPL-MCNC: 275 MG/DL (ref 74–99)
HCT VFR BLD AUTO: 41.4 % (ref 37–54)
HGB BLD-MCNC: 14.2 G/DL (ref 12.5–16.5)
IMM GRANULOCYTES # BLD: 0.02 E9/L
IMM GRANULOCYTES NFR BLD: 0.3 % (ref 0–5)
LYMPHOCYTES # BLD: 2.68 E9/L (ref 1.5–4)
LYMPHOCYTES NFR BLD: 35 % (ref 20–42)
MCH RBC QN AUTO: 29.5 PG (ref 26–35)
MCHC RBC AUTO-ENTMCNC: 34.3 % (ref 32–34.5)
MCV RBC AUTO: 85.9 FL (ref 80–99.9)
METER GLUCOSE: 223 MG/DL (ref 74–99)
METER GLUCOSE: 228 MG/DL (ref 74–99)
METER GLUCOSE: 259 MG/DL (ref 74–99)
METER GLUCOSE: 338 MG/DL (ref 74–99)
MONOCYTES # BLD: 0.58 E9/L (ref 0.1–0.95)
MONOCYTES NFR BLD: 7.6 % (ref 2–12)
NEUTROPHILS # BLD: 4.13 E9/L (ref 1.8–7.3)
NEUTS SEG NFR BLD: 53.9 % (ref 43–80)
PLATELET # BLD AUTO: 146 E9/L (ref 130–450)
PMV BLD AUTO: 11.2 FL (ref 7–12)
POTASSIUM SERPL-SCNC: 4.3 MMOL/L (ref 3.5–5)
PROT SERPL-MCNC: 6.1 G/DL (ref 6.4–8.3)
RBC # BLD AUTO: 4.82 E12/L (ref 3.8–5.8)
SODIUM SERPL-SCNC: 133 MMOL/L (ref 132–146)
WBC # BLD: 7.7 E9/L (ref 4.5–11.5)

## 2023-04-04 PROCEDURE — 6370000000 HC RX 637 (ALT 250 FOR IP): Performed by: NURSE PRACTITIONER

## 2023-04-04 PROCEDURE — 6370000000 HC RX 637 (ALT 250 FOR IP): Performed by: INTERNAL MEDICINE

## 2023-04-04 PROCEDURE — 82962 GLUCOSE BLOOD TEST: CPT

## 2023-04-04 PROCEDURE — G0378 HOSPITAL OBSERVATION PER HR: HCPCS

## 2023-04-04 PROCEDURE — 6360000002 HC RX W HCPCS: Performed by: NURSE PRACTITIONER

## 2023-04-04 PROCEDURE — 96376 TX/PRO/DX INJ SAME DRUG ADON: CPT

## 2023-04-04 PROCEDURE — 36415 COLL VENOUS BLD VENIPUNCTURE: CPT

## 2023-04-04 PROCEDURE — 94640 AIRWAY INHALATION TREATMENT: CPT

## 2023-04-04 PROCEDURE — 99232 SBSQ HOSP IP/OBS MODERATE 35: CPT | Performed by: INTERNAL MEDICINE

## 2023-04-04 PROCEDURE — 80053 COMPREHEN METABOLIC PANEL: CPT

## 2023-04-04 PROCEDURE — 85025 COMPLETE CBC W/AUTO DIFF WBC: CPT

## 2023-04-04 PROCEDURE — 93923 UPR/LXTR ART STDY 3+ LVLS: CPT

## 2023-04-04 PROCEDURE — 96361 HYDRATE IV INFUSION ADD-ON: CPT

## 2023-04-04 PROCEDURE — 99233 SBSQ HOSP IP/OBS HIGH 50: CPT | Performed by: INTERNAL MEDICINE

## 2023-04-04 RX ORDER — INSULIN LISPRO 100 [IU]/ML
0-18 INJECTION, SOLUTION INTRAVENOUS; SUBCUTANEOUS
Status: DISCONTINUED | OUTPATIENT
Start: 2023-04-05 | End: 2023-04-05 | Stop reason: HOSPADM

## 2023-04-04 RX ORDER — INSULIN GLARGINE 100 [IU]/ML
5 INJECTION, SOLUTION SUBCUTANEOUS ONCE
Status: COMPLETED | OUTPATIENT
Start: 2023-04-04 | End: 2023-04-04

## 2023-04-04 RX ORDER — NICOTINE 21 MG/24HR
1 PATCH, TRANSDERMAL 24 HOURS TRANSDERMAL DAILY
Status: DISCONTINUED | OUTPATIENT
Start: 2023-04-04 | End: 2023-04-05 | Stop reason: HOSPADM

## 2023-04-04 RX ORDER — INSULIN GLARGINE 100 [IU]/ML
45 INJECTION, SOLUTION SUBCUTANEOUS NIGHTLY
Status: DISCONTINUED | OUTPATIENT
Start: 2023-04-05 | End: 2023-04-05

## 2023-04-04 RX ORDER — INSULIN LISPRO 100 [IU]/ML
15 INJECTION, SOLUTION INTRAVENOUS; SUBCUTANEOUS
Status: DISCONTINUED | OUTPATIENT
Start: 2023-04-05 | End: 2023-04-05 | Stop reason: HOSPADM

## 2023-04-04 RX ADMIN — Medication 1 CAPSULE: at 21:38

## 2023-04-04 RX ADMIN — INSULIN LISPRO 4 UNITS: 100 INJECTION, SOLUTION INTRAVENOUS; SUBCUTANEOUS at 20:33

## 2023-04-04 RX ADMIN — INSULIN LISPRO 10 UNITS: 100 INJECTION, SOLUTION INTRAVENOUS; SUBCUTANEOUS at 16:35

## 2023-04-04 RX ADMIN — INSULIN LISPRO 10 UNITS: 100 INJECTION, SOLUTION INTRAVENOUS; SUBCUTANEOUS at 08:34

## 2023-04-04 RX ADMIN — INSULIN LISPRO 4 UNITS: 100 INJECTION, SOLUTION INTRAVENOUS; SUBCUTANEOUS at 16:34

## 2023-04-04 RX ADMIN — BUDESONIDE 500 MCG: 0.5 SUSPENSION RESPIRATORY (INHALATION) at 09:26

## 2023-04-04 RX ADMIN — PREGABALIN 100 MG: 50 CAPSULE ORAL at 20:32

## 2023-04-04 RX ADMIN — INSULIN LISPRO 8 UNITS: 100 INJECTION, SOLUTION INTRAVENOUS; SUBCUTANEOUS at 12:18

## 2023-04-04 RX ADMIN — METOPROLOL TARTRATE 25 MG: 25 TABLET, FILM COATED ORAL at 20:32

## 2023-04-04 RX ADMIN — INSULIN LISPRO 10 UNITS: 100 INJECTION, SOLUTION INTRAVENOUS; SUBCUTANEOUS at 12:18

## 2023-04-04 RX ADMIN — ENOXAPARIN SODIUM 30 MG: 100 INJECTION SUBCUTANEOUS at 08:34

## 2023-04-04 RX ADMIN — BUDESONIDE 500 MCG: 0.5 SUSPENSION RESPIRATORY (INHALATION) at 20:54

## 2023-04-04 RX ADMIN — ATORVASTATIN CALCIUM 40 MG: 40 TABLET, FILM COATED ORAL at 20:32

## 2023-04-04 RX ADMIN — ARFORMOTEROL TARTRATE 15 MCG: 15 SOLUTION RESPIRATORY (INHALATION) at 20:55

## 2023-04-04 RX ADMIN — SERTRALINE HYDROCHLORIDE 50 MG: 50 TABLET ORAL at 08:33

## 2023-04-04 RX ADMIN — ARFORMOTEROL TARTRATE 15 MCG: 15 SOLUTION RESPIRATORY (INHALATION) at 09:26

## 2023-04-04 RX ADMIN — Medication 1 CAPSULE: at 08:33

## 2023-04-04 RX ADMIN — INSULIN GLARGINE 40 UNITS: 100 INJECTION, SOLUTION SUBCUTANEOUS at 20:35

## 2023-04-04 RX ADMIN — PREGABALIN 100 MG: 50 CAPSULE ORAL at 08:33

## 2023-04-04 RX ADMIN — INSULIN GLARGINE 5 UNITS: 100 INJECTION, SOLUTION SUBCUTANEOUS at 21:39

## 2023-04-04 RX ADMIN — LEVOTHYROXINE SODIUM 25 MCG: 25 TABLET ORAL at 06:46

## 2023-04-04 RX ADMIN — METOPROLOL TARTRATE 25 MG: 25 TABLET, FILM COATED ORAL at 08:33

## 2023-04-04 RX ADMIN — ENOXAPARIN SODIUM 30 MG: 100 INJECTION SUBCUTANEOUS at 20:32

## 2023-04-04 RX ADMIN — INSULIN LISPRO 6 UNITS: 100 INJECTION, SOLUTION INTRAVENOUS; SUBCUTANEOUS at 06:47

## 2023-04-04 ASSESSMENT — PAIN SCALES - GENERAL
PAINLEVEL_OUTOF10: 6
PAINLEVEL_OUTOF10: 4

## 2023-04-04 ASSESSMENT — PAIN DESCRIPTION - PAIN TYPE: TYPE: ACUTE PAIN

## 2023-04-04 ASSESSMENT — PAIN DESCRIPTION - LOCATION
LOCATION: FOOT
LOCATION: FOOT

## 2023-04-04 ASSESSMENT — PAIN DESCRIPTION - ORIENTATION
ORIENTATION: LEFT
ORIENTATION: LEFT

## 2023-04-04 ASSESSMENT — PAIN DESCRIPTION - DESCRIPTORS
DESCRIPTORS: SHARP
DESCRIPTORS: ACHING

## 2023-04-04 NOTE — PATIENT CARE CONFERENCE
P Quality Flow/Interdisciplinary Rounds Progress Note        Quality Flow Rounds held on April 4, 2023    Disciplines Attending:  Bedside Nurse, , , and Nursing Unit Nilson Fuentes was admitted on 4/3/2023 10:15 AM    Anticipated Discharge Date:  Expected Discharge Date: 04/05/23    Disposition:    Jose Score:  Jose Scale Score: 20    Readmission Risk              Risk of Unplanned Readmission:  0           Discussed patient goal for the day, patient clinical progression, and barriers to discharge.   The following Goal(s) of the Day/Commitment(s) have been identified:  Occupational Therapy - Obtain Consult Order and Physical Therapy - Obtain Consult Order      Selena Acharya RN  April 4, 2023

## 2023-04-04 NOTE — PLAN OF CARE
Problem: Discharge Planning  Goal: Discharge to home or other facility with appropriate resources  4/3/2023 2317 by Tanmay Tarango RN  Outcome: Progressing  Flowsheets (Taken 4/3/2023 2030)  Discharge to home or other facility with appropriate resources: Identify barriers to discharge with patient and caregiver  4/3/2023 1733 by Melinda Valdes RN  Outcome: Progressing     Problem: Pain  Goal: Verbalizes/displays adequate comfort level or baseline comfort level  4/3/2023 2317 by Tanmay Tarango RN  Outcome: Progressing  4/3/2023 1733 by Melinda Valdes RN  Outcome: Progressing     Problem: Safety - Adult  Goal: Free from fall injury  4/3/2023 2317 by Tanmay Tarango RN  Outcome: Progressing  4/3/2023 1733 by Melinda Valdes RN  Outcome: Progressing

## 2023-04-04 NOTE — CARE COORDINATION
Introduced my self and provided explanation of CM role to patient. Patient is awake, alert, and aware of current diagnosis and treatment plan including podiatry and endocrinology consults. He voices he resides at home with his niece and completes his adl's with independence. He has a walker and does not use. He has a glucometer a home. Patient is established with a pcp and denies any issue with retail pharmaceutical coverage. He plans on a return to same environment, denies new needs. Case Management Assessment  Initial Evaluation    Date/Time of Evaluation: 4/4/2023 2:09 PM  Assessment Completed by: Negro Hodges RN    If patient is discharged prior to next notation, then this note serves as note for discharge by case management. Patient Name: Elijah Francis May                   YOB: 1960  Diagnosis: Hyperglycemia [R73.9]  Diabetic foot infection ([facility] [address] [E11.628, L08.9]                   Date / Time: 4/3/2023 10:15 AM    Patient Admission Status: Observation   Readmission Risk (Low < 19, Mod (19-27), High > 27): Readmission Risk Score: 21.2    Current PCP: Mandie Ruano, DO  PCP verified by CM? Yes    Chart Reviewed: Yes      History Provided by: Patient  Patient Orientation: Alert and Oriented, Person, Place, Situation, Self    Patient Cognition: Alert    Hospitalization in the last 30 days (Readmission):  No    If yes, Readmission Assessment in CM Navigator will be completed. Advance Directives:      Code Status: Full Code   Patient's Primary Decision Maker is: Legal Next of Kin      Discharge Planning:    Patient lives with: Family Members Type of Home: House  Primary Care Giver: Self  Patient Support Systems include: Family Members   Current Financial resources:    Current community resources:    Current services prior to admission: None            Current DME:              Type of Home Care services:  None    ADLS  Prior functional level:  Independent in ADLs/IADLs  Current functional

## 2023-04-05 VITALS
RESPIRATION RATE: 18 BRPM | DIASTOLIC BLOOD PRESSURE: 79 MMHG | WEIGHT: 278 LBS | OXYGEN SATURATION: 96 % | HEIGHT: 72 IN | TEMPERATURE: 98.4 F | BODY MASS INDEX: 37.65 KG/M2 | SYSTOLIC BLOOD PRESSURE: 150 MMHG | HEART RATE: 79 BPM

## 2023-04-05 LAB
CRP SERPL HS-MCNC: 0.9 MG/DL (ref 0–0.4)
FOLATE SERPL-MCNC: 9.8 NG/ML (ref 4.8–24.2)
METER GLUCOSE: 265 MG/DL (ref 74–99)
METER GLUCOSE: 287 MG/DL (ref 74–99)
PROCALCITONIN: 0.14 NG/ML (ref 0–0.08)
TSH SERPL-MCNC: 1.79 UIU/ML (ref 0.27–4.2)
VIT B12 SERPL-MCNC: 404 PG/ML (ref 211–946)
VITAMIN D 25-HYDROXY: 13 NG/ML (ref 30–100)

## 2023-04-05 PROCEDURE — 82746 ASSAY OF FOLIC ACID SERUM: CPT

## 2023-04-05 PROCEDURE — 2580000003 HC RX 258: Performed by: FAMILY MEDICINE

## 2023-04-05 PROCEDURE — 6370000000 HC RX 637 (ALT 250 FOR IP): Performed by: INTERNAL MEDICINE

## 2023-04-05 PROCEDURE — 82607 VITAMIN B-12: CPT

## 2023-04-05 PROCEDURE — 82962 GLUCOSE BLOOD TEST: CPT

## 2023-04-05 PROCEDURE — 6370000000 HC RX 637 (ALT 250 FOR IP): Performed by: NURSE PRACTITIONER

## 2023-04-05 PROCEDURE — 99239 HOSP IP/OBS DSCHRG MGMT >30: CPT | Performed by: STUDENT IN AN ORGANIZED HEALTH CARE EDUCATION/TRAINING PROGRAM

## 2023-04-05 PROCEDURE — 82306 VITAMIN D 25 HYDROXY: CPT

## 2023-04-05 PROCEDURE — 99232 SBSQ HOSP IP/OBS MODERATE 35: CPT | Performed by: INTERNAL MEDICINE

## 2023-04-05 PROCEDURE — 96361 HYDRATE IV INFUSION ADD-ON: CPT

## 2023-04-05 PROCEDURE — 96376 TX/PRO/DX INJ SAME DRUG ADON: CPT

## 2023-04-05 PROCEDURE — 99239 HOSP IP/OBS DSCHRG MGMT >30: CPT | Performed by: INTERNAL MEDICINE

## 2023-04-05 PROCEDURE — G0378 HOSPITAL OBSERVATION PER HR: HCPCS

## 2023-04-05 PROCEDURE — 6360000002 HC RX W HCPCS: Performed by: NURSE PRACTITIONER

## 2023-04-05 PROCEDURE — 36415 COLL VENOUS BLD VENIPUNCTURE: CPT

## 2023-04-05 PROCEDURE — 94640 AIRWAY INHALATION TREATMENT: CPT

## 2023-04-05 PROCEDURE — 84443 ASSAY THYROID STIM HORMONE: CPT

## 2023-04-05 PROCEDURE — 2580000003 HC RX 258: Performed by: NURSE PRACTITIONER

## 2023-04-05 RX ORDER — INSULIN LISPRO 100 [IU]/ML
0-18 INJECTION, SOLUTION INTRAVENOUS; SUBCUTANEOUS
Qty: 1 EACH | Refills: 0 | Status: SHIPPED | OUTPATIENT
Start: 2023-04-05 | End: 2023-05-05

## 2023-04-05 RX ORDER — ERGOCALCIFEROL 1.25 MG/1
50000 CAPSULE ORAL WEEKLY
Status: DISCONTINUED | OUTPATIENT
Start: 2023-04-05 | End: 2023-04-05 | Stop reason: HOSPADM

## 2023-04-05 RX ORDER — INSULIN GLARGINE 100 [IU]/ML
52 INJECTION, SOLUTION SUBCUTANEOUS NIGHTLY
Qty: 10 ML | Refills: 3 | Status: SHIPPED | OUTPATIENT
Start: 2023-04-05

## 2023-04-05 RX ORDER — INSULIN LISPRO 100 [IU]/ML
15 INJECTION, SOLUTION INTRAVENOUS; SUBCUTANEOUS
Qty: 1 EACH | Refills: 0 | Status: SHIPPED | OUTPATIENT
Start: 2023-04-05 | End: 2023-05-05

## 2023-04-05 RX ORDER — INSULIN GLARGINE 100 [IU]/ML
52 INJECTION, SOLUTION SUBCUTANEOUS NIGHTLY
Status: DISCONTINUED | OUTPATIENT
Start: 2023-04-05 | End: 2023-04-05 | Stop reason: HOSPADM

## 2023-04-05 RX ORDER — ERGOCALCIFEROL 1.25 MG/1
50000 CAPSULE ORAL WEEKLY
Qty: 5 CAPSULE | Refills: 0 | Status: SHIPPED | OUTPATIENT
Start: 2023-04-12 | End: 2023-05-11

## 2023-04-05 RX ADMIN — ENOXAPARIN SODIUM 30 MG: 100 INJECTION SUBCUTANEOUS at 09:15

## 2023-04-05 RX ADMIN — LEVOTHYROXINE SODIUM 25 MCG: 25 TABLET ORAL at 05:58

## 2023-04-05 RX ADMIN — SODIUM CHLORIDE: 9 INJECTION, SOLUTION INTRAVENOUS at 09:24

## 2023-04-05 RX ADMIN — INSULIN LISPRO 9 UNITS: 100 INJECTION, SOLUTION INTRAVENOUS; SUBCUTANEOUS at 11:11

## 2023-04-05 RX ADMIN — METOPROLOL TARTRATE 25 MG: 25 TABLET, FILM COATED ORAL at 09:15

## 2023-04-05 RX ADMIN — PREGABALIN 100 MG: 50 CAPSULE ORAL at 09:15

## 2023-04-05 RX ADMIN — BUDESONIDE 500 MCG: 0.5 SUSPENSION RESPIRATORY (INHALATION) at 08:32

## 2023-04-05 RX ADMIN — SERTRALINE HYDROCHLORIDE 50 MG: 50 TABLET ORAL at 09:15

## 2023-04-05 RX ADMIN — INSULIN LISPRO 9 UNITS: 100 INJECTION, SOLUTION INTRAVENOUS; SUBCUTANEOUS at 06:06

## 2023-04-05 RX ADMIN — SODIUM CHLORIDE, PRESERVATIVE FREE 10 ML: 5 INJECTION INTRAVENOUS at 11:40

## 2023-04-05 RX ADMIN — Medication 1 CAPSULE: at 09:15

## 2023-04-05 RX ADMIN — ERGOCALCIFEROL 50000 UNITS: 1.25 CAPSULE ORAL at 09:36

## 2023-04-05 RX ADMIN — INSULIN LISPRO 15 UNITS: 100 INJECTION, SOLUTION INTRAVENOUS; SUBCUTANEOUS at 09:37

## 2023-04-05 RX ADMIN — INSULIN LISPRO 15 UNITS: 100 INJECTION, SOLUTION INTRAVENOUS; SUBCUTANEOUS at 11:14

## 2023-04-05 RX ADMIN — ARFORMOTEROL TARTRATE 15 MCG: 15 SOLUTION RESPIRATORY (INHALATION) at 08:32

## 2023-04-05 ASSESSMENT — PAIN SCALES - GENERAL: PAINLEVEL_OUTOF10: 0

## 2023-04-05 NOTE — PROGRESS NOTES
Department of Podiatry  Progress Note    SUBJECTIVE:    Mr. Fuentes is seen at bedside for left foot pain, small wounds to lateral aspect of 5th met with no signs of clinical infection. Negative xrays, WBC 7.7, esr 10, crp 0.9. No adverse events overnight. Patient denies any N/V/D/F/C. No plan for invasive procedure. Ok to d/c from [employer] standpoint. No other pedal complaints at this time. OBJECTIVE:    Scheduled Meds:   vitamin D  50,000 Units Oral Weekly    insulin glargine  52 Units SubCUTAneous Nightly    nicotine  1 patch TransDERmal Daily    insulin lispro  0-18 Units SubCUTAneous 4x Daily AC & HS    insulin lispro  15 Units SubCUTAneous TID WC    atorvastatin  40 mg Oral Nightly    levothyroxine  25 mcg Oral QAM    metoprolol tartrate  25 mg Oral BID    pregabalin  100 mg Oral BID    lactobacillus  1 capsule Oral BID    sertraline  50 mg Oral QAM    sodium chloride flush  5-40 mL IntraVENous 2 times per day    enoxaparin  30 mg SubCUTAneous BID    budesonide  0.5 mg Nebulization BID    arformoterol tartrate  15 mcg Nebulization BID    [Held by provider] lisinopril  20 mg Oral Daily    And    [Held by provider] hydroCHLOROthiazide  12.5 mg Oral Daily     Continuous Infusions:   sodium chloride      dextrose      sodium chloride 150 mL/hr at 04/05/23 0924     PRN Meds:.sodium chloride flush, sodium chloride, ondansetron **OR** ondansetron, polyethylene glycol, acetaminophen **OR** acetaminophen, glucose, dextrose bolus **OR** dextrose bolus, glucagon (rDNA), dextrose, albuterol, ketorolac    No Known Allergies    BP (!) 150/79   Pulse 79   Temp 98.4 °F (36.9 °C) (Oral)   Resp 18   Ht 6' (1.829 m)   Wt 278 lb (126.1 kg)   SpO2 96%   BMI 37.70 kg/m²       EXAM:    Evidence of right 2nd digit amputation - healed     LLE focused exam:   VASCULAR:  DP and PT pulses are palpable. CFT < 5 seconds B/L. Warm to warm from the tibial tuberosity to the distal aspect of the digits dorsally.       NEUROLOGIC:
ENDOCRINOLOGY PROGRESS NOTE      Date of admission: 4/3/2023  Date of service: 4/4/2023  Admitting physician: Roxana Hansen MD   Primary Care Physician: Maggie Beasley DO  Consultant physician: Jerome Samaniego MD     Reason for the consultation:  Uncontrolled DM    History of Present Illness: The history is provided by the patient. Accuracy of the patient data is excellent    Stefani Fuentes is a very pleasant 66 y.o. old male with PMH uncontrolled DM type 2, obesity, HTN, HLD and other listed below admitted to [facility] on 4/3/2023 because of bilateral leg ulcers. Endocrine service was consulted for diabetes management.      SUBJECTIVE   The patient was seen today, no acute events, BG  still above goal     Inpatient diet:   Carb Restricted diet     Point of care glucose monitoring   (Independently reviewed)   Recent Labs     04/03/23  1603 04/03/23  1814 04/03/23  2100 04/04/23  0631 04/04/23  1052 04/04/23  1625 04/04/23  1950   GLUMET 448* 417* 340* 259* 338* 223* 228*     Scheduled Meds:   nicotine  1 patch TransDERmal Daily    [START ON 4/5/2023] insulin lispro  0-18 Units SubCUTAneous 4x Daily AC & HS    [START ON 4/5/2023] insulin lispro  15 Units SubCUTAneous TID WC    [START ON 4/5/2023] insulin glargine  45 Units SubCUTAneous Nightly    insulin glargine  5 Units SubCUTAneous Once    atorvastatin  40 mg Oral Nightly    levothyroxine  25 mcg Oral QAM    metoprolol tartrate  25 mg Oral BID    pregabalin  100 mg Oral BID    lactobacillus  1 capsule Oral BID    sertraline  50 mg Oral QAM    sodium chloride flush  5-40 mL IntraVENous 2 times per day    enoxaparin  30 mg SubCUTAneous BID    budesonide  0.5 mg Nebulization BID    arformoterol tartrate  15 mcg Nebulization BID    [Held by provider] lisinopril  20 mg Oral Daily    And    [Held by provider] hydroCHLOROthiazide  12.5 mg Oral Daily       PRN Meds:   sodium chloride flush, 5-40 mL, PRN  sodium chloride, , PRN  ondansetron, 4 mg, Q8H PRN   Or  ondansetron, 4
P Quality Flow/Interdisciplinary Rounds Progress Note        Quality Flow Rounds held on April 5, 2023    Disciplines Attending:  Bedside Nurse, , , and Nursing Unit Nilson Fuentes was admitted on 4/3/2023 10:15 AM    Anticipated Discharge Date:  Expected Discharge Date: 04/05/23    Disposition:    Jose Score:  Jose Scale Score: 20    Readmission Risk              Risk of Unplanned Readmission:  0           Discussed patient goal for the day, patient clinical progression, and barriers to discharge.   The following Goal(s) of the Day/Commitment(s) have been identified:  Diagnostics - Report Results and Labs - Report Results      Kayode Butcher RN  April 5, 2023
Patient asking for pain med 8/10. Spoke to TONIE Downs NP orders placed.
Reason for consult:  Uncontrolled DM    A1C: 12.6%     [] Not available                Patient states the following concerns/barriers to diabetes self-management:     [x] None       [] Medication cost   [] Food cost/availability        [] Reading  [] Hearing   [] Vision                [] Work    [] Transportation  [] No insurance  [] Physical limitations    [] Other      Patient states the following about their diabetes/health:   [x]   Does not always take Lantus and Novolog as ordered  [x]   Does not always self monitor / test before each meal- has supplies  [x]   Drinks sweetened beverages, however, states that he has recently been drinking less and drinking more water of sugar free beverages  [x]   Does not have consistent meal times   [x]   Does not exercise    Diabetes survival packet provided to:   [x] Patient     [] Other:    Information reviewed:   Definition of diabetes   Target glucose ranges/A1C   Self-monitoring of blood glucose   Prevention/symptoms/treatment of hypo-/hyperglycemia   Medication adherence   The plate method/meal planning guidelines   The benefits of exercise and recommendations   Reducing the risk of chronic complications      Diabetes medications reviewed (use, purpose, action): Lantus and Novolog   Patient provided educational material, \"Diabetes Survival Packet\". Alternative beverages to help with glycemic control reviewed. Diabetes plate method for meal planning encouraged. Benefits of physical activity or increase activities of daily living on blood glucose control provided. Diabetes medications, mechanism of action, timing and side effects reviewed. Patient verbalizes understanding of teaching at this time. Contact information provided for future questions and concerns. Patient would likely benefit from [facility] classes after discharge.              Post-education Assessment  [x]  Attentive to teaching  [x]  Answered questions appropriately when asked   [x]  Seems able to apply
opening with no drainage or malodor. Wound does not probe to bone. No fluctuance or crepitus felt on palpation. No erythema or edema. Left foot is not clinically infected       MUSCULOSKELETAL: Muscle strength 5/5 Bilateral . ROM is full without pain or crepitation bilateral.  Moderate tenderness on palpation to lateral aspect of the left foot. Scheduled Meds:   atorvastatin  40 mg Oral Nightly    levothyroxine  25 mcg Oral QAM    metoprolol tartrate  25 mg Oral BID    pregabalin  100 mg Oral BID    lactobacillus  1 capsule Oral BID    sertraline  50 mg Oral QAM    sodium chloride flush  5-40 mL IntraVENous 2 times per day    enoxaparin  30 mg SubCUTAneous BID    budesonide  0.5 mg Nebulization BID    arformoterol tartrate  15 mcg Nebulization BID    [Held by provider] lisinopril  20 mg Oral Daily    And    [Held by provider] hydroCHLOROthiazide  12.5 mg Oral Daily    insulin lispro  10 Units SubCUTAneous TID WC    insulin glargine  40 Units SubCUTAneous Nightly    insulin lispro  0-12 Units SubCUTAneous 4x Daily AC & HS     Continuous Infusions:   sodium chloride      dextrose      sodium chloride 150 mL/hr at 04/03/23 2205     PRN Meds:.sodium chloride flush, sodium chloride, ondansetron **OR** ondansetron, polyethylene glycol, acetaminophen **OR** acetaminophen, glucose, dextrose bolus **OR** dextrose bolus, glucagon (rDNA), dextrose, albuterol, ketorolac    RADIOLOGY:  XR FOOT LEFT (MIN 3 VIEWS)   Final Result   No fracture or dislocation.          VL LOWER EXTREMITY ARTERIAL SEGMENTAL PRESSURES W PPG BILATERAL    (Results Pending)     /63   Pulse 65   Temp 98 °F (36.7 °C) (Oral)   Resp 16   Ht 6' (1.829 m)   Wt 269 lb (122 kg)   SpO2 97%   BMI 36.48 kg/m²     LABS:    Recent Labs     04/03/23  1052 04/04/23  0306   WBC 9.4 7.7   HGB 15.6 14.2   HCT 46.3 41.4    146        Recent Labs     04/04/23  0306      K 4.3      CO2 23   BUN 26*   CREATININE 1.3*        Recent Labs
management accordingly. 6.    Hypertension - Continue meds. Monitor vitals and adjust accordingly  7. Depression/anxiety -symptoms controlled. Continue antianxiolytic. 8.    Primary hypothyroidism -continue levothyroxine. Check TSH  9. Generalized weakness and fatigue, check TSH, vitamin D, B12 and folate        NOTE: This report was transcribed using voice recognition software. Every effort was made to ensure accuracy; however, inadvertent computerized transcription errors may be present.   Electronically signed by Nawaf Hya MD on 4/4/2023 at 11:47 AM
peripheral neuropathy   - Hyperglycemia 630  - Possible cellulitis with abscess    PLAN:    - Patient was examined and evaluated. Reviewed patient's recent lab results, charts and pertinent diagnostic imaging. Reviewed ancillary service notes. - Negative Xrays  - Painful plantar keratosis removed  - Cultures pending  - Arterial study pending  - No intervention for now. - Will continue to follow patient closely while in house  - Discussed patient with Dr. Tete Saunders    Thank you for the opportunity to take part in the patient's care. Please do not hesitate to call for any questions or concerns.     Negro Hodges DPM  PGY2  4/3/2023  1:39 PM
with diet   We recommend the following diabetes regimen   Lantus 52 u nightly   Humalog 15u with meals   High  dose sliding scale with meals and at night   Continue glucose check with meals and at bedtime   Will titrate insulin dose based on the blood glucose trend & insulin requirement  Pt will follow with us after discharge    Dietary noncompliance  Discussed with patient the importance of eating consistent carbohydrate meals, avoiding high glycemic index food. Also, discussed with patient the risk and negative consequences of dietary noncompliance on blood glucose control, blood pressure and weight    HLD  Lipitor 40 mg daily     Primary hypothyroidism   Continue Levothyroxine 25 mcg daily   TFT WNL in 11/2022     Bilateral LE wounds   Managed by primary service  The above issues were reviewed with the patient who understood and agreed with the plan. Thank you for allowing us to participate in the care of this patient. Please do not hesitate to contact us with any additional questions. Claudette Griggs MD  Endocrinologist, [facility]   [address] [place] [address]   Phone: [phone]  Fax: [phone]  --------------------------------------------  An electronic signature was used to authenticate this note.  Rubin Zaman MD on 4/5/2023 at 8:39 AM

## 2023-04-05 NOTE — DISCHARGE SUMMARY
from the common femoral artery through to the dorsalis pedis artery shows normal triphasic waveforms through to the posterior tibial artery. Biphasic but significantly dampened waveform in the right dorsalis pedis artery. PVR evaluation from the right thigh to the metatarsal demonstrates preserved pulsatility and relatively preserved amplitudes. Digital waveform evaluation of the remaining digits of the right foot show preserved pulsatility in all digits. Preserved amplitude in the 1st digit. Moderate decreased amplitude in the remaining digits. Left lower extremity: Arterial Doppler evaluation from the common femoral artery through to the dorsalis pedis artery demonstrates preserved multiphasic waveforms throughout. Slightly dampened waveform in the dorsalis pedis artery. PVR evaluation from the left thigh to the mid left metatarsal demonstrates preserved pulsatility with relatively preserved amplitudes. Digital waveform evaluation of the digits of the left foot demonstrate preserved pulsatility and waveform morphology. Mild decreased amplitudes throughout. 1.  Normal ankle brachial indices bilaterally at 1.0 on the right and 1.1 on the left. There are preserved multiphasic waveforms at all levels bilaterally. The dorsalis pedis artery waveforms are dampened bilaterally right worse than left. Low suspicion for the presence of a high-grade stenosis. 2.  Relatively normal PVR evaluation of the bilateral lower extremities. 3.  Digital waveform evaluation of the remaining digits of the bilateral feet show preserved pulsatility in all areas. Mild decreased amplitude in the digits on the left. Moderate decreased amplitude in the right 3rd through 5th digits. These findings probably reflect changes related to underlying microvascular disease right slightly worse than left. There is perhaps slightly decreased blood flow to digits 3 through 5 on the right.        Patient Instructions:      Medication List

## 2023-04-05 NOTE — PLAN OF CARE
Problem: Discharge Planning  Goal: Discharge to home or other facility with appropriate resources  4/5/2023 1001 by Shasta Judge RN  Outcome: Progressing  4/4/2023 5415 by George Pagan RN  Outcome: Progressing

## 2023-04-05 NOTE — CARE COORDINATION
4/5/2023  Social Work Discharge Planning:Plan is home with niece. Pt has a glucometer. No other needs. Electronically signed by MARCELLUS Pascal on 4/5/2023 at 9:58 AM

## 2023-04-05 NOTE — PLAN OF CARE
Problem: Discharge Planning  Goal: Discharge to home or other facility with appropriate resources  4/5/2023 1225 by Aishwarya Garcia RN  Outcome: Adequate for Discharge  4/5/2023 1001 by Aishwarya Garcia RN  Outcome: Progressing  4/4/2023 2255 by Stacie Mirza RN  Outcome: Progressing

## 2023-04-08 LAB
BACTERIA BLD CULT ORG #2: NORMAL
BACTERIA BLD CULT: NORMAL

## 2023-05-08 ENCOUNTER — FOLLOWUP TELEPHONE ENCOUNTER (OUTPATIENT)
Dept: ENDOCRINOLOGY | Age: 63
End: 2023-05-08

## 2023-05-08 ENCOUNTER — OFFICE VISIT (OUTPATIENT)
Dept: ENDOCRINOLOGY | Age: 63
End: 2023-05-08
Payer: COMMERCIAL

## 2023-05-08 VITALS
DIASTOLIC BLOOD PRESSURE: 66 MMHG | OXYGEN SATURATION: 97 % | HEIGHT: 72 IN | HEART RATE: 83 BPM | WEIGHT: 272 LBS | BODY MASS INDEX: 36.84 KG/M2 | SYSTOLIC BLOOD PRESSURE: 109 MMHG

## 2023-05-08 DIAGNOSIS — E66.09 CLASS 2 OBESITY DUE TO EXCESS CALORIES WITHOUT SERIOUS COMORBIDITY WITH BODY MASS INDEX (BMI) OF 36.0 TO 36.9 IN ADULT: ICD-10-CM

## 2023-05-08 DIAGNOSIS — E11.65 UNCONTROLLED TYPE 2 DIABETES MELLITUS WITH HYPERGLYCEMIA (HCC): ICD-10-CM

## 2023-05-08 DIAGNOSIS — E78.2 MIXED HYPERLIPIDEMIA: ICD-10-CM

## 2023-05-08 DIAGNOSIS — E11.65 UNCONTROLLED TYPE 2 DIABETES MELLITUS WITH HYPERGLYCEMIA (HCC): Primary | ICD-10-CM

## 2023-05-08 DIAGNOSIS — E03.9 PRIMARY HYPOTHYROIDISM: ICD-10-CM

## 2023-05-08 LAB
ANION GAP SERPL CALCULATED.3IONS-SCNC: 8 MMOL/L (ref 7–16)
BUN SERPL-MCNC: 26 MG/DL (ref 6–23)
CALCIUM SERPL-MCNC: 9.7 MG/DL (ref 8.6–10.2)
CHLORIDE SERPL-SCNC: 96 MMOL/L (ref 98–107)
CO2 SERPL-SCNC: 27 MMOL/L (ref 22–29)
CREAT SERPL-MCNC: 1.1 MG/DL (ref 0.7–1.2)
GLUCOSE SERPL-MCNC: 471 MG/DL (ref 74–99)
POTASSIUM SERPL-SCNC: 5.7 MMOL/L (ref 3.5–5)
SODIUM SERPL-SCNC: 131 MMOL/L (ref 132–146)

## 2023-05-08 PROCEDURE — 99214 OFFICE O/P EST MOD 30 MIN: CPT | Performed by: CLINICAL NURSE SPECIALIST

## 2023-05-08 PROCEDURE — 3074F SYST BP LT 130 MM HG: CPT | Performed by: CLINICAL NURSE SPECIALIST

## 2023-05-08 PROCEDURE — 3046F HEMOGLOBIN A1C LEVEL >9.0%: CPT | Performed by: CLINICAL NURSE SPECIALIST

## 2023-05-08 PROCEDURE — 3078F DIAST BP <80 MM HG: CPT | Performed by: CLINICAL NURSE SPECIALIST

## 2023-05-08 RX ORDER — PEN NEEDLE, DIABETIC 31 G X1/4"
1 NEEDLE, DISPOSABLE MISCELLANEOUS 4 TIMES DAILY
Qty: 200 EACH | Refills: 5 | Status: SHIPPED | OUTPATIENT
Start: 2023-05-08

## 2023-05-08 RX ORDER — INSULIN GLARGINE 100 [IU]/ML
52 INJECTION, SOLUTION SUBCUTANEOUS NIGHTLY
Qty: 10 ADJUSTABLE DOSE PRE-FILLED PEN SYRINGE | Refills: 3 | Status: SHIPPED
Start: 2023-05-08 | End: 2023-05-10 | Stop reason: SDUPTHER

## 2023-05-08 RX ORDER — INSULIN GLARGINE 100 [IU]/ML
52 INJECTION, SOLUTION SUBCUTANEOUS NIGHTLY
Qty: 10 ML | Refills: 3 | Status: SHIPPED | OUTPATIENT
Start: 2023-05-08

## 2023-05-08 RX ORDER — ERGOCALCIFEROL 1.25 MG/1
50000 CAPSULE ORAL WEEKLY
Qty: 12 CAPSULE | Refills: 0 | Status: SHIPPED | OUTPATIENT
Start: 2023-05-08 | End: 2023-06-06

## 2023-05-08 RX ORDER — INSULIN LISPRO 100 [IU]/ML
INJECTION, SOLUTION INTRAVENOUS; SUBCUTANEOUS
Qty: 10 ADJUSTABLE DOSE PRE-FILLED PEN SYRINGE | Refills: 5 | Status: SHIPPED
Start: 2023-05-08 | End: 2023-05-10 | Stop reason: SDUPTHER

## 2023-05-08 NOTE — PROGRESS NOTES
[address] [place] [address] Department of Endocrinology Diabetes and Metabolism   [address] [facility] [address]   Phone: [phone]  Fax: [phone]    Date of Service: 5/8/2023    Primary Care Physician: Edenilson Escamilla DO  Referring physician: No ref. provider found  Provider: CHRIST Lara     Reason for the visit:  Type 2 DM       History of Present Illness: The history is provided by the patient. No  was used. Accuracy of the patient data is excellent. Yesika Ruvalcaba is a very pleasant 66 y.o. male seen today for diabetes management     Mariama Lowry May was diagnosed with diabetes dx around 5 years ago and currently on Lantus 52 units at night, Hlog 10 units TID with meals plus ISS    Previously on jardiance, metformin, and glimepiride   The patient has been checking blood sugar 4 times per day   Misses doses frequently    . Most recent A1c results summarized below  Lab Results   Component Value Date/Time    LABA1C 12.6 04/03/2023 01:10 PM    LABA1C 8.7 11/10/2022 02:45 AM    LABA1C 7.7 04/22/2022 07:00 PM     Patient has had no hypoglycemic episodes   The patient has been mindful of what has been eating and following diabetic diet as encouraged  I reviewed current medications and the patient has no issues with diabetes medications  The patient is due for an eye exam. Last eye exam was > 1 year ago  , no h/o diabetic retinopathy  The patient seeing podiatrist every 3 months.    And also performs  own feet care  Microvascular complications:  No Retinopathy, Nephropathy or +Neuropathy   Macrovascular complications: no CAD, PVD, or Stroke      PAST MEDICAL HISTORY   Past Medical History:   Diagnosis Date    Anxiety     COPD (chronic obstructive pulmonary disease) (HCC)     Depression     DM (diabetes mellitus) ([facility] [address]     Frostbite     HLD (hyperlipidemia)     HTN (hypertension)     Sleep apnea        PAST SURGICAL HISTORY   Past Surgical History:

## 2023-05-08 NOTE — TELEPHONE ENCOUNTER
Met with patient in endo office for diabetes education. Patient was seen in hospital by diabetes educator for uncontrolled DM, A1C 12.6%. At that time, he reported missing doses of lantus and novolog, does not test blood sugars, does not eat consistent meal times or exercise regularly. Historically drank sugary beverages but has cut down. Patient interested in starting an insulin pump and met with pump rep at today's . We discussed the basics of the diabetic diet and following the plate method. Patient overeats at mealtimes and drinks 3 glasses of milk with his meals, not realizing it is a source of carbohydrate. Educated on sources of carbohydrate foods. Reviewed basics of counting carbohydrates. Patient able to correctly count carbohydrates using food list and models. He appears overwhelmed but very motivated to improve blood sugar. Will follow as needed.      Tyrell Malhotra RDN LD

## 2023-05-10 LAB — C PEPTIDE SERPL-MCNC: 7.1 NG/ML (ref 0.5–3.3)

## 2023-05-10 RX ORDER — INSULIN LISPRO 100 [IU]/ML
INJECTION, SOLUTION INTRAVENOUS; SUBCUTANEOUS
Qty: 10 ADJUSTABLE DOSE PRE-FILLED PEN SYRINGE | Refills: 5 | Status: SHIPPED | OUTPATIENT
Start: 2023-05-10

## 2023-05-10 RX ORDER — INSULIN GLARGINE 100 [IU]/ML
52 INJECTION, SOLUTION SUBCUTANEOUS NIGHTLY
Qty: 10 ADJUSTABLE DOSE PRE-FILLED PEN SYRINGE | Refills: 3 | Status: SHIPPED | OUTPATIENT
Start: 2023-05-10

## 2023-05-15 ENCOUNTER — TELEPHONE (OUTPATIENT)
Dept: ENDOCRINOLOGY | Age: 63
End: 2023-05-15

## 2023-05-15 NOTE — TELEPHONE ENCOUNTER
Called and LM with results and instructions/recommendations  Asked pt to call back and get blood work redo asap

## 2023-05-15 NOTE — TELEPHONE ENCOUNTER
----- Message from CHRIST Mena sent at 5/8/2023  2:54 PM EDT -----  Please call patient and inform him potassium level is significantly elevated. Please have patient recheck potassium ASAP. Lab ordered. Blood sugar was 471 on blood work.   I encourage compliance with insulin use

## 2023-06-19 RX ORDER — ERGOCALCIFEROL 1.25 MG/1
CAPSULE ORAL
Qty: 12 CAPSULE | Refills: 0 | OUTPATIENT
Start: 2023-06-19

## 2023-07-03 ENCOUNTER — HOSPITAL ENCOUNTER (INPATIENT)
Age: 63
LOS: 2 days | Discharge: HOME | DRG: 638 | End: 2023-07-05
Attending: EMERGENCY MEDICINE | Admitting: STUDENT IN AN ORGANIZED HEALTH CARE EDUCATION/TRAINING PROGRAM
Payer: MEDICARE

## 2023-07-03 ENCOUNTER — APPOINTMENT (OUTPATIENT)
Dept: GENERAL RADIOLOGY | Age: 63
DRG: 638 | End: 2023-07-03
Payer: MEDICARE

## 2023-07-03 DIAGNOSIS — R73.9 HYPERGLYCEMIA: ICD-10-CM

## 2023-07-03 DIAGNOSIS — M86.9 OSTEOMYELITIS OF LEFT FOOT, UNSPECIFIED TYPE (HCC): Primary | ICD-10-CM

## 2023-07-03 PROBLEM — R79.89 PSEUDOHYPONATREMIA: Status: ACTIVE | Noted: 2023-07-03

## 2023-07-03 PROBLEM — E87.1 HYPONATREMIA: Status: ACTIVE | Noted: 2023-07-03

## 2023-07-03 LAB
ANION GAP SERPL CALCULATED.3IONS-SCNC: 11 MMOL/L (ref 7–16)
BASOPHILS # BLD: 0.05 E9/L (ref 0–0.2)
BASOPHILS NFR BLD: 0.6 % (ref 0–2)
BUN SERPL-MCNC: 23 MG/DL (ref 6–23)
CALCIUM SERPL-MCNC: 9 MG/DL (ref 8.6–10.2)
CHLORIDE SERPL-SCNC: 91 MMOL/L (ref 98–107)
CO2 SERPL-SCNC: 24 MMOL/L (ref 22–29)
CREAT SERPL-MCNC: 1 MG/DL (ref 0.7–1.2)
EOSINOPHIL # BLD: 0.11 E9/L (ref 0.05–0.5)
EOSINOPHIL NFR BLD: 1.3 % (ref 0–6)
ERYTHROCYTE [DISTWIDTH] IN BLOOD BY AUTOMATED COUNT: 13.5 FL (ref 11.5–15)
GLUCOSE SERPL-MCNC: 687 MG/DL (ref 74–99)
HCT VFR BLD AUTO: 41.8 % (ref 37–54)
HGB BLD-MCNC: 14.7 G/DL (ref 12.5–16.5)
IMM GRANULOCYTES # BLD: 0.03 E9/L
IMM GRANULOCYTES NFR BLD: 0.4 % (ref 0–5)
LACTATE BLDV-SCNC: 2.4 MMOL/L (ref 0.5–1.9)
LYMPHOCYTES # BLD: 2.06 E9/L (ref 1.5–4)
LYMPHOCYTES NFR BLD: 25.1 % (ref 20–42)
MCH RBC QN AUTO: 29.2 PG (ref 26–35)
MCHC RBC AUTO-ENTMCNC: 35.2 % (ref 32–34.5)
MCV RBC AUTO: 82.9 FL (ref 80–99.9)
METER GLUCOSE: 355 MG/DL (ref 74–99)
MONOCYTES # BLD: 0.55 E9/L (ref 0.1–0.95)
MONOCYTES NFR BLD: 6.7 % (ref 2–12)
NEUTROPHILS # BLD: 5.4 E9/L (ref 1.8–7.3)
NEUTS SEG NFR BLD: 65.9 % (ref 43–80)
PLATELET # BLD AUTO: 175 E9/L (ref 130–450)
PMV BLD AUTO: 11.8 FL (ref 7–12)
POTASSIUM SERPL-SCNC: 5.5 MMOL/L (ref 3.5–5)
RBC # BLD AUTO: 5.04 E12/L (ref 3.8–5.8)
SODIUM SERPL-SCNC: 126 MMOL/L (ref 132–146)
WBC # BLD: 8.2 E9/L (ref 4.5–11.5)

## 2023-07-03 PROCEDURE — 83605 ASSAY OF LACTIC ACID: CPT

## 2023-07-03 PROCEDURE — 1200000000 HC SEMI PRIVATE

## 2023-07-03 PROCEDURE — 94664 DEMO&/EVAL PT USE INHALER: CPT

## 2023-07-03 PROCEDURE — 94640 AIRWAY INHALATION TREATMENT: CPT

## 2023-07-03 PROCEDURE — 96374 THER/PROPH/DIAG INJ IV PUSH: CPT

## 2023-07-03 PROCEDURE — 6360000002 HC RX W HCPCS: Performed by: NURSE PRACTITIONER

## 2023-07-03 PROCEDURE — 87040 BLOOD CULTURE FOR BACTERIA: CPT

## 2023-07-03 PROCEDURE — 82962 GLUCOSE BLOOD TEST: CPT

## 2023-07-03 PROCEDURE — 85025 COMPLETE CBC W/AUTO DIFF WBC: CPT

## 2023-07-03 PROCEDURE — 99222 1ST HOSP IP/OBS MODERATE 55: CPT | Performed by: STUDENT IN AN ORGANIZED HEALTH CARE EDUCATION/TRAINING PROGRAM

## 2023-07-03 PROCEDURE — 6370000000 HC RX 637 (ALT 250 FOR IP): Performed by: STUDENT IN AN ORGANIZED HEALTH CARE EDUCATION/TRAINING PROGRAM

## 2023-07-03 PROCEDURE — 2580000003 HC RX 258: Performed by: EMERGENCY MEDICINE

## 2023-07-03 PROCEDURE — 99285 EMERGENCY DEPT VISIT HI MDM: CPT

## 2023-07-03 PROCEDURE — 96375 TX/PRO/DX INJ NEW DRUG ADDON: CPT

## 2023-07-03 PROCEDURE — 2580000003 HC RX 258: Performed by: NURSE PRACTITIONER

## 2023-07-03 PROCEDURE — 2580000003 HC RX 258: Performed by: STUDENT IN AN ORGANIZED HEALTH CARE EDUCATION/TRAINING PROGRAM

## 2023-07-03 PROCEDURE — APPSS60 APP SPLIT SHARED TIME 46-60 MINUTES: Performed by: NURSE PRACTITIONER

## 2023-07-03 PROCEDURE — 80048 BASIC METABOLIC PNL TOTAL CA: CPT

## 2023-07-03 PROCEDURE — 6370000000 HC RX 637 (ALT 250 FOR IP): Performed by: NURSE PRACTITIONER

## 2023-07-03 PROCEDURE — 6360000002 HC RX W HCPCS: Performed by: EMERGENCY MEDICINE

## 2023-07-03 PROCEDURE — 73630 X-RAY EXAM OF FOOT: CPT

## 2023-07-03 PROCEDURE — 6370000000 HC RX 637 (ALT 250 FOR IP): Performed by: EMERGENCY MEDICINE

## 2023-07-03 RX ORDER — LISINOPRIL 10 MG/1
10 TABLET ORAL DAILY
COMMUNITY

## 2023-07-03 RX ORDER — ONDANSETRON 4 MG/1
4 TABLET, ORALLY DISINTEGRATING ORAL DAILY
COMMUNITY
Start: 2023-04-24

## 2023-07-03 RX ORDER — SODIUM CHLORIDE, SODIUM LACTATE, POTASSIUM CHLORIDE, CALCIUM CHLORIDE 600; 310; 30; 20 MG/100ML; MG/100ML; MG/100ML; MG/100ML
INJECTION, SOLUTION INTRAVENOUS CONTINUOUS
Status: DISCONTINUED | OUTPATIENT
Start: 2023-07-03 | End: 2023-07-05 | Stop reason: HOSPADM

## 2023-07-03 RX ORDER — ONDANSETRON 2 MG/ML
4 INJECTION INTRAMUSCULAR; INTRAVENOUS EVERY 6 HOURS PRN
Status: DISCONTINUED | OUTPATIENT
Start: 2023-07-03 | End: 2023-07-05 | Stop reason: HOSPADM

## 2023-07-03 RX ORDER — ALBUTEROL SULFATE 90 UG/1
2 AEROSOL, METERED RESPIRATORY (INHALATION) EVERY 4 HOURS PRN
Status: DISCONTINUED | OUTPATIENT
Start: 2023-07-03 | End: 2023-07-03 | Stop reason: CLARIF

## 2023-07-03 RX ORDER — HYDROCODONE BITARTRATE AND ACETAMINOPHEN 5; 325 MG/1; MG/1
1 TABLET ORAL ONCE
Status: COMPLETED | OUTPATIENT
Start: 2023-07-03 | End: 2023-07-03

## 2023-07-03 RX ORDER — FLUOXETINE HYDROCHLORIDE 20 MG/1
20 CAPSULE ORAL DAILY
Status: ON HOLD | COMMUNITY
End: 2023-07-05 | Stop reason: HOSPADM

## 2023-07-03 RX ORDER — INSULIN GLARGINE 100 [IU]/ML
45 INJECTION, SOLUTION SUBCUTANEOUS NIGHTLY
Status: DISCONTINUED | OUTPATIENT
Start: 2023-07-03 | End: 2023-07-05

## 2023-07-03 RX ORDER — LEVOTHYROXINE SODIUM 0.03 MG/1
25 TABLET ORAL DAILY
Status: DISCONTINUED | OUTPATIENT
Start: 2023-07-04 | End: 2023-07-05 | Stop reason: HOSPADM

## 2023-07-03 RX ORDER — SERTRALINE HYDROCHLORIDE 100 MG/1
50 TABLET, FILM COATED ORAL EVERY MORNING
Status: DISCONTINUED | OUTPATIENT
Start: 2023-07-04 | End: 2023-07-05 | Stop reason: HOSPADM

## 2023-07-03 RX ORDER — NICOTINE 21 MG/24HR
1 PATCH, TRANSDERMAL 24 HOURS TRANSDERMAL DAILY
Status: DISCONTINUED | OUTPATIENT
Start: 2023-07-03 | End: 2023-07-05 | Stop reason: HOSPADM

## 2023-07-03 RX ORDER — ENOXAPARIN SODIUM 100 MG/ML
30 INJECTION SUBCUTANEOUS 2 TIMES DAILY
Status: DISCONTINUED | OUTPATIENT
Start: 2023-07-03 | End: 2023-07-05 | Stop reason: HOSPADM

## 2023-07-03 RX ORDER — LEVOTHYROXINE SODIUM 0.03 MG/1
25 TABLET ORAL DAILY
COMMUNITY

## 2023-07-03 RX ORDER — PREGABALIN 50 MG/1
100 CAPSULE ORAL 2 TIMES DAILY
Status: DISCONTINUED | OUTPATIENT
Start: 2023-07-03 | End: 2023-07-05 | Stop reason: HOSPADM

## 2023-07-03 RX ORDER — POLYETHYLENE GLYCOL 3350 17 G/17G
17 POWDER, FOR SOLUTION ORAL DAILY PRN
Status: DISCONTINUED | OUTPATIENT
Start: 2023-07-03 | End: 2023-07-05 | Stop reason: HOSPADM

## 2023-07-03 RX ORDER — DEXTROSE MONOHYDRATE 100 MG/ML
INJECTION, SOLUTION INTRAVENOUS CONTINUOUS PRN
Status: DISCONTINUED | OUTPATIENT
Start: 2023-07-03 | End: 2023-07-05 | Stop reason: HOSPADM

## 2023-07-03 RX ORDER — SODIUM CHLORIDE 9 MG/ML
INJECTION, SOLUTION INTRAVENOUS PRN
Status: DISCONTINUED | OUTPATIENT
Start: 2023-07-03 | End: 2023-07-05 | Stop reason: HOSPADM

## 2023-07-03 RX ORDER — MORPHINE SULFATE 2 MG/ML
2 INJECTION, SOLUTION INTRAMUSCULAR; INTRAVENOUS EVERY 4 HOURS PRN
Status: DISCONTINUED | OUTPATIENT
Start: 2023-07-03 | End: 2023-07-05 | Stop reason: HOSPADM

## 2023-07-03 RX ORDER — BUDESONIDE AND FORMOTEROL FUMARATE DIHYDRATE 80; 4.5 UG/1; UG/1
2 AEROSOL RESPIRATORY (INHALATION) 2 TIMES DAILY
Status: DISCONTINUED | OUTPATIENT
Start: 2023-07-03 | End: 2023-07-03 | Stop reason: CLARIF

## 2023-07-03 RX ORDER — SODIUM CHLORIDE 0.9 % (FLUSH) 0.9 %
5-40 SYRINGE (ML) INJECTION EVERY 12 HOURS SCHEDULED
Status: DISCONTINUED | OUTPATIENT
Start: 2023-07-03 | End: 2023-07-05 | Stop reason: HOSPADM

## 2023-07-03 RX ORDER — INSULIN LISPRO 100 [IU]/ML
10 INJECTION, SOLUTION INTRAVENOUS; SUBCUTANEOUS
Status: DISCONTINUED | OUTPATIENT
Start: 2023-07-03 | End: 2023-07-05 | Stop reason: HOSPADM

## 2023-07-03 RX ORDER — 0.9 % SODIUM CHLORIDE 0.9 %
1000 INTRAVENOUS SOLUTION INTRAVENOUS ONCE
Status: COMPLETED | OUTPATIENT
Start: 2023-07-03 | End: 2023-07-03

## 2023-07-03 RX ORDER — MORPHINE SULFATE 4 MG/ML
4 INJECTION, SOLUTION INTRAMUSCULAR; INTRAVENOUS
Status: COMPLETED | OUTPATIENT
Start: 2023-07-03 | End: 2023-07-03

## 2023-07-03 RX ORDER — ONDANSETRON 4 MG/1
4 TABLET, ORALLY DISINTEGRATING ORAL EVERY 8 HOURS PRN
Status: DISCONTINUED | OUTPATIENT
Start: 2023-07-03 | End: 2023-07-05 | Stop reason: HOSPADM

## 2023-07-03 RX ORDER — ONDANSETRON 4 MG/1
4 TABLET, FILM COATED ORAL EVERY 8 HOURS PRN
COMMUNITY

## 2023-07-03 RX ORDER — SODIUM CHLORIDE 0.9 % (FLUSH) 0.9 %
5-40 SYRINGE (ML) INJECTION PRN
Status: DISCONTINUED | OUTPATIENT
Start: 2023-07-03 | End: 2023-07-05 | Stop reason: HOSPADM

## 2023-07-03 RX ORDER — INSULIN LISPRO 100 [IU]/ML
0-16 INJECTION, SOLUTION INTRAVENOUS; SUBCUTANEOUS
Status: DISCONTINUED | OUTPATIENT
Start: 2023-07-03 | End: 2023-07-05 | Stop reason: HOSPADM

## 2023-07-03 RX ORDER — FLUOXETINE HYDROCHLORIDE 20 MG/1
20 CAPSULE ORAL DAILY
Status: DISCONTINUED | OUTPATIENT
Start: 2023-07-04 | End: 2023-07-05 | Stop reason: HOSPADM

## 2023-07-03 RX ORDER — ERGOCALCIFEROL 1.25 MG/1
50000 CAPSULE ORAL WEEKLY
Status: DISCONTINUED | OUTPATIENT
Start: 2023-07-03 | End: 2023-07-05 | Stop reason: HOSPADM

## 2023-07-03 RX ORDER — ATORVASTATIN CALCIUM 40 MG/1
40 TABLET, FILM COATED ORAL NIGHTLY
Status: DISCONTINUED | OUTPATIENT
Start: 2023-07-03 | End: 2023-07-05 | Stop reason: HOSPADM

## 2023-07-03 RX ORDER — INSULIN LISPRO 100 [IU]/ML
0-4 INJECTION, SOLUTION INTRAVENOUS; SUBCUTANEOUS NIGHTLY
Status: DISCONTINUED | OUTPATIENT
Start: 2023-07-03 | End: 2023-07-05 | Stop reason: HOSPADM

## 2023-07-03 RX ORDER — LISINOPRIL 10 MG/1
10 TABLET ORAL DAILY
Status: DISCONTINUED | OUTPATIENT
Start: 2023-07-04 | End: 2023-07-05 | Stop reason: HOSPADM

## 2023-07-03 RX ORDER — ALBUTEROL SULFATE 2.5 MG/3ML
2.5 SOLUTION RESPIRATORY (INHALATION) EVERY 4 HOURS PRN
Status: DISCONTINUED | OUTPATIENT
Start: 2023-07-03 | End: 2023-07-05 | Stop reason: HOSPADM

## 2023-07-03 RX ORDER — SODIUM CHLORIDE 9 MG/ML
INJECTION, SOLUTION INTRAVENOUS CONTINUOUS
Status: DISCONTINUED | OUTPATIENT
Start: 2023-07-03 | End: 2023-07-03

## 2023-07-03 RX ORDER — BUDESONIDE 0.25 MG/2ML
0.25 INHALANT ORAL 2 TIMES DAILY
Status: DISCONTINUED | OUTPATIENT
Start: 2023-07-03 | End: 2023-07-05 | Stop reason: HOSPADM

## 2023-07-03 RX ORDER — ACETAMINOPHEN 650 MG/1
650 SUPPOSITORY RECTAL EVERY 6 HOURS PRN
Status: DISCONTINUED | OUTPATIENT
Start: 2023-07-03 | End: 2023-07-05 | Stop reason: HOSPADM

## 2023-07-03 RX ORDER — ACETAMINOPHEN 325 MG/1
650 TABLET ORAL EVERY 6 HOURS PRN
Status: DISCONTINUED | OUTPATIENT
Start: 2023-07-03 | End: 2023-07-05 | Stop reason: HOSPADM

## 2023-07-03 RX ORDER — FENOFIBRATE 160 MG/1
160 TABLET ORAL DAILY
Status: DISCONTINUED | OUTPATIENT
Start: 2023-07-04 | End: 2023-07-05 | Stop reason: HOSPADM

## 2023-07-03 RX ORDER — ARFORMOTEROL TARTRATE 15 UG/2ML
15 SOLUTION RESPIRATORY (INHALATION) 2 TIMES DAILY
Status: DISCONTINUED | OUTPATIENT
Start: 2023-07-03 | End: 2023-07-05 | Stop reason: HOSPADM

## 2023-07-03 RX ADMIN — MORPHINE SULFATE 4 MG: 4 INJECTION, SOLUTION INTRAMUSCULAR; INTRAVENOUS at 19:16

## 2023-07-03 RX ADMIN — ARFORMOTEROL TARTRATE 15 MCG: 15 SOLUTION RESPIRATORY (INHALATION) at 20:21

## 2023-07-03 RX ADMIN — HYDROCODONE BITARTRATE AND ACETAMINOPHEN 1 TABLET: 5; 325 TABLET ORAL at 17:26

## 2023-07-03 RX ADMIN — MORPHINE SULFATE 2 MG: 2 INJECTION, SOLUTION INTRAMUSCULAR; INTRAVENOUS at 23:15

## 2023-07-03 RX ADMIN — Medication 5 ML: at 21:25

## 2023-07-03 RX ADMIN — SODIUM ZIRCONIUM CYCLOSILICATE 10 G: 10 POWDER, FOR SUSPENSION ORAL at 21:16

## 2023-07-03 RX ADMIN — METOPROLOL TARTRATE 25 MG: 25 TABLET, FILM COATED ORAL at 22:01

## 2023-07-03 RX ADMIN — SODIUM CHLORIDE 1000 ML: 9 INJECTION, SOLUTION INTRAVENOUS at 19:15

## 2023-07-03 RX ADMIN — INSULIN LISPRO 4 UNITS: 100 INJECTION, SOLUTION INTRAVENOUS; SUBCUTANEOUS at 21:17

## 2023-07-03 RX ADMIN — SODIUM CHLORIDE, POTASSIUM CHLORIDE, SODIUM LACTATE AND CALCIUM CHLORIDE: 600; 310; 30; 20 INJECTION, SOLUTION INTRAVENOUS at 21:16

## 2023-07-03 RX ADMIN — INSULIN GLARGINE 45 UNITS: 100 INJECTION, SOLUTION SUBCUTANEOUS at 21:17

## 2023-07-03 RX ADMIN — INSULIN HUMAN 10 UNITS: 100 INJECTION, SOLUTION PARENTERAL at 19:20

## 2023-07-03 RX ADMIN — BUDESONIDE 250 MCG: 0.25 SUSPENSION RESPIRATORY (INHALATION) at 20:21

## 2023-07-03 RX ADMIN — ATORVASTATIN CALCIUM 40 MG: 40 TABLET, FILM COATED ORAL at 21:16

## 2023-07-03 RX ADMIN — ENOXAPARIN SODIUM 30 MG: 100 INJECTION SUBCUTANEOUS at 21:18

## 2023-07-03 RX ADMIN — ALBUTEROL SULFATE 2.5 MG: 2.5 SOLUTION RESPIRATORY (INHALATION) at 20:23

## 2023-07-03 RX ADMIN — PREGABALIN 100 MG: 50 CAPSULE ORAL at 22:01

## 2023-07-03 ASSESSMENT — PAIN DESCRIPTION - ORIENTATION
ORIENTATION: LEFT

## 2023-07-03 ASSESSMENT — PAIN SCALES - GENERAL
PAINLEVEL_OUTOF10: 5
PAINLEVEL_OUTOF10: 10
PAINLEVEL_OUTOF10: 9
PAINLEVEL_OUTOF10: 10

## 2023-07-03 ASSESSMENT — PAIN - FUNCTIONAL ASSESSMENT
PAIN_FUNCTIONAL_ASSESSMENT: 0-10
PAIN_FUNCTIONAL_ASSESSMENT: ACTIVITIES ARE NOT PREVENTED

## 2023-07-03 ASSESSMENT — LIFESTYLE VARIABLES
HOW MANY STANDARD DRINKS CONTAINING ALCOHOL DO YOU HAVE ON A TYPICAL DAY: PATIENT DOES NOT DRINK
HOW OFTEN DO YOU HAVE A DRINK CONTAINING ALCOHOL: NEVER

## 2023-07-03 ASSESSMENT — PAIN DESCRIPTION - DESCRIPTORS
DESCRIPTORS: SORE
DESCRIPTORS: SHARP;SORE
DESCRIPTORS: SHARP
DESCRIPTORS: ACHING;PRESSURE;SORE

## 2023-07-03 ASSESSMENT — PAIN DESCRIPTION - LOCATION
LOCATION: FOOT

## 2023-07-03 NOTE — H&P
[facility] History and Physical      CHIEF COMPLAINT:  Left foot wound    History of Present Illness: This is a 66year old male with PMH significant for COPD, DM type II uncontrolled, HLD, HTN, sleep apnea, PVD, tobacco abuse and past right toe amputation due to osteomyelitis. Patient to ED due to left foot wound. Patient reports not noticing left foot wound until yesterday. Roommate pointed out blood on the floor from patient's foot. Patient does report pain that has progressed over the last week. Rates pain 9-10/10. Pain worsened with ambulation and improved with rest.  Also reports having urinary frequency for the last 6 months, nausea on and off, and reports always being short of breath. Admits to noncompliance taking insulin and monitoring blood sugars at home. Does follow with endocrinology and podiatry. Still currently smoking cigarettes. Also reports recent sleep study being done and possibly needing CPAP at night. Denies fever, chest pain, abdominal pain, vomiting, and changes in bowel habits. Labs remarkable for sodium 126, potassium 5.5, chloride 91, lactic acid 2.4, and glucose 687. Left foot x-ray done showing fifth metatarsal head early changes of osteomyelitis and plantar soft tissue wound containing gas overlapping the fifth MTP joint. Will admit for further evaluation and treatment.     Informant(s) for H&P: Patient and chart review    REVIEW OF SYSTEMS:  A comprehensive review of systems was negative except for: what is in the HPI      PMH:  Past Medical History:   Diagnosis Date    Anxiety     COPD (chronic obstructive pulmonary disease) (HCC)     Depression     DM (diabetes mellitus) ([address])     Frostbite     HLD (hyperlipidemia)     HTN (hypertension)     Sleep apnea        Surgical History:  Past Surgical History:   Procedure Laterality Date    BACK SURGERY      CHOLECYSTECTOMY      CORONARY ARTERY BYPASS GRAFT REDO      2 vessel    FOOT DEBRIDEMENT Right

## 2023-07-03 NOTE — ED PROVIDER NOTES
components:       Result Value    Sodium 126 (*)     Potassium 5.5 (*)     Chloride 91 (*)     Glucose 687 (*)     All other components within normal limits    Narrative:     Cm Pryor tel. [phone],  Chemistry results called to and read back by Martin Abrams RN, 07/03/2023  18:35, by [address] [place] [address] results called to and read back by Martin Abrams RN, 07/03/2023  18:34, by YOLIE   CBC WITH AUTO DIFFERENTIAL - Abnormal; Notable for the following components:    MCHC 35.2 (*)     All other components within normal limits   LACTATE, SEPSIS - Abnormal; Notable for the following components:    Lactic Acid, Sepsis 2.4 (*)     All other components within normal limits   CULTURE, BLOOD 1   CULTURE, BLOOD 2   LACTATE, SEPSIS       As interpreted by me, the above displayed labs are abnormal. All other labs obtained during this visit were within normal range or not returned as of this dictation. RADIOLOGY:   Non-plain film images such as CT, Ultrasound and MRI are read by the radiologist. Plain radiographic images are visualized and preliminarily interpreted by the ED Provider with the findings documented in the ED Course. Interpretation per the Radiologist below, if available at the time of this note:    XR FOOT LEFT (MIN 3 VIEWS)   Final Result   1. Minimal cortical erosion of the 5th metatarsal head may reflect early   changes of osteomyelitis. 2.  Plantar soft tissue wound containing gas overlying the 5th MTP joint. No results found. No results found. PROCEDURES   Unless otherwise noted below, none       CRITICAL CARE TIME (.cct)   None    PAST MEDICAL HISTORY/Chronic Conditions Affecting Care      has a past medical history of Anxiety, COPD (chronic obstructive pulmonary disease) ([address]), Depression, DM (diabetes mellitus) ([address]), Frostbite, HLD (hyperlipidemia), HTN (hypertension), and Sleep apnea.      EMERGENCY DEPARTMENT COURSE    Vitals:    Vitals:    07/03/23 1528   BP:

## 2023-07-04 PROBLEM — E11.65 UNCONTROLLED TYPE 2 DIABETES MELLITUS WITH HYPERGLYCEMIA (HCC): Status: ACTIVE | Noted: 2023-07-04

## 2023-07-04 LAB
ANION GAP SERPL CALCULATED.3IONS-SCNC: 9 MMOL/L (ref 7–16)
BASOPHILS # BLD: 0.04 E9/L (ref 0–0.2)
BASOPHILS NFR BLD: 0.6 % (ref 0–2)
BUN SERPL-MCNC: 22 MG/DL (ref 6–23)
CALCIUM SERPL-MCNC: 8.7 MG/DL (ref 8.6–10.2)
CHLORIDE SERPL-SCNC: 97 MMOL/L (ref 98–107)
CO2 SERPL-SCNC: 25 MMOL/L (ref 22–29)
CREAT SERPL-MCNC: 1 MG/DL (ref 0.7–1.2)
CRP SERPL HS-MCNC: 1.1 MG/DL (ref 0–0.4)
EOSINOPHIL # BLD: 0.09 E9/L (ref 0.05–0.5)
EOSINOPHIL NFR BLD: 1.3 % (ref 0–6)
ERYTHROCYTE [DISTWIDTH] IN BLOOD BY AUTOMATED COUNT: 13.3 FL (ref 11.5–15)
ERYTHROCYTE [SEDIMENTATION RATE] IN BLOOD BY WESTERGREN METHOD: 29 MM/HR (ref 0–15)
GLUCOSE SERPL-MCNC: 409 MG/DL (ref 74–99)
HCT VFR BLD AUTO: 40.4 % (ref 37–54)
HGB BLD-MCNC: 14 G/DL (ref 12.5–16.5)
IMM GRANULOCYTES # BLD: 0.03 E9/L
IMM GRANULOCYTES NFR BLD: 0.4 % (ref 0–5)
LACTATE BLDV-SCNC: 1.6 MMOL/L (ref 0.5–2.2)
LYMPHOCYTES # BLD: 2.2 E9/L (ref 1.5–4)
LYMPHOCYTES NFR BLD: 31.2 % (ref 20–42)
MCH RBC QN AUTO: 28.7 PG (ref 26–35)
MCHC RBC AUTO-ENTMCNC: 34.7 % (ref 32–34.5)
MCV RBC AUTO: 83 FL (ref 80–99.9)
METER GLUCOSE: 260 MG/DL (ref 74–99)
METER GLUCOSE: 281 MG/DL (ref 74–99)
METER GLUCOSE: 328 MG/DL (ref 74–99)
METER GLUCOSE: 368 MG/DL (ref 74–99)
MONOCYTES # BLD: 0.41 E9/L (ref 0.1–0.95)
MONOCYTES NFR BLD: 5.8 % (ref 2–12)
NEUTROPHILS # BLD: 4.29 E9/L (ref 1.8–7.3)
NEUTS SEG NFR BLD: 60.7 % (ref 43–80)
PLATELET # BLD AUTO: 144 E9/L (ref 130–450)
PMV BLD AUTO: 11 FL (ref 7–12)
POTASSIUM SERPL-SCNC: 4.1 MMOL/L (ref 3.5–5)
RBC # BLD AUTO: 4.87 E12/L (ref 3.8–5.8)
SODIUM SERPL-SCNC: 131 MMOL/L (ref 132–146)
WBC # BLD: 7.1 E9/L (ref 4.5–11.5)

## 2023-07-04 PROCEDURE — 99232 SBSQ HOSP IP/OBS MODERATE 35: CPT | Performed by: INTERNAL MEDICINE

## 2023-07-04 PROCEDURE — 6370000000 HC RX 637 (ALT 250 FOR IP): Performed by: NURSE PRACTITIONER

## 2023-07-04 PROCEDURE — 94640 AIRWAY INHALATION TREATMENT: CPT

## 2023-07-04 PROCEDURE — 80048 BASIC METABOLIC PNL TOTAL CA: CPT

## 2023-07-04 PROCEDURE — 85025 COMPLETE CBC W/AUTO DIFF WBC: CPT

## 2023-07-04 PROCEDURE — 6360000002 HC RX W HCPCS: Performed by: NURSE PRACTITIONER

## 2023-07-04 PROCEDURE — 85651 RBC SED RATE NONAUTOMATED: CPT

## 2023-07-04 PROCEDURE — 2580000003 HC RX 258: Performed by: STUDENT IN AN ORGANIZED HEALTH CARE EDUCATION/TRAINING PROGRAM

## 2023-07-04 PROCEDURE — 82962 GLUCOSE BLOOD TEST: CPT

## 2023-07-04 PROCEDURE — 83605 ASSAY OF LACTIC ACID: CPT

## 2023-07-04 PROCEDURE — 36415 COLL VENOUS BLD VENIPUNCTURE: CPT

## 2023-07-04 PROCEDURE — 86140 C-REACTIVE PROTEIN: CPT

## 2023-07-04 PROCEDURE — 2580000003 HC RX 258: Performed by: NURSE PRACTITIONER

## 2023-07-04 PROCEDURE — 1200000000 HC SEMI PRIVATE

## 2023-07-04 PROCEDURE — 2580000003 HC RX 258: Performed by: INTERNAL MEDICINE

## 2023-07-04 RX ORDER — SODIUM CHLORIDE 9 MG/ML
INJECTION, SOLUTION INTRAVENOUS ONCE
Status: COMPLETED | OUTPATIENT
Start: 2023-07-04 | End: 2023-07-04

## 2023-07-04 RX ADMIN — INSULIN LISPRO 10 UNITS: 100 INJECTION, SOLUTION INTRAVENOUS; SUBCUTANEOUS at 08:34

## 2023-07-04 RX ADMIN — ARFORMOTEROL TARTRATE 15 MCG: 15 SOLUTION RESPIRATORY (INHALATION) at 07:36

## 2023-07-04 RX ADMIN — INSULIN LISPRO 16 UNITS: 100 INJECTION, SOLUTION INTRAVENOUS; SUBCUTANEOUS at 06:15

## 2023-07-04 RX ADMIN — INSULIN LISPRO 8 UNITS: 100 INJECTION, SOLUTION INTRAVENOUS; SUBCUTANEOUS at 15:56

## 2023-07-04 RX ADMIN — FLUOXETINE 20 MG: 20 CAPSULE ORAL at 08:35

## 2023-07-04 RX ADMIN — PREGABALIN 100 MG: 50 CAPSULE ORAL at 08:35

## 2023-07-04 RX ADMIN — INSULIN LISPRO 10 UNITS: 100 INJECTION, SOLUTION INTRAVENOUS; SUBCUTANEOUS at 11:38

## 2023-07-04 RX ADMIN — SODIUM CHLORIDE, POTASSIUM CHLORIDE, SODIUM LACTATE AND CALCIUM CHLORIDE: 600; 310; 30; 20 INJECTION, SOLUTION INTRAVENOUS at 15:16

## 2023-07-04 RX ADMIN — FENOFIBRATE 160 MG: 160 TABLET ORAL at 08:35

## 2023-07-04 RX ADMIN — BUDESONIDE 250 MCG: 0.25 SUSPENSION RESPIRATORY (INHALATION) at 20:56

## 2023-07-04 RX ADMIN — ATORVASTATIN CALCIUM 40 MG: 40 TABLET, FILM COATED ORAL at 19:51

## 2023-07-04 RX ADMIN — MORPHINE SULFATE 2 MG: 2 INJECTION, SOLUTION INTRAMUSCULAR; INTRAVENOUS at 15:51

## 2023-07-04 RX ADMIN — METOPROLOL TARTRATE 25 MG: 25 TABLET, FILM COATED ORAL at 10:36

## 2023-07-04 RX ADMIN — INSULIN LISPRO 12 UNITS: 100 INJECTION, SOLUTION INTRAVENOUS; SUBCUTANEOUS at 11:39

## 2023-07-04 RX ADMIN — ENOXAPARIN SODIUM 30 MG: 100 INJECTION SUBCUTANEOUS at 08:34

## 2023-07-04 RX ADMIN — BUDESONIDE 250 MCG: 0.25 SUSPENSION RESPIRATORY (INHALATION) at 07:36

## 2023-07-04 RX ADMIN — ARFORMOTEROL TARTRATE 15 MCG: 15 SOLUTION RESPIRATORY (INHALATION) at 20:56

## 2023-07-04 RX ADMIN — INSULIN GLARGINE 45 UNITS: 100 INJECTION, SOLUTION SUBCUTANEOUS at 20:07

## 2023-07-04 RX ADMIN — INSULIN LISPRO 10 UNITS: 100 INJECTION, SOLUTION INTRAVENOUS; SUBCUTANEOUS at 15:56

## 2023-07-04 RX ADMIN — PREGABALIN 100 MG: 50 CAPSULE ORAL at 19:51

## 2023-07-04 RX ADMIN — METOPROLOL TARTRATE 25 MG: 25 TABLET, FILM COATED ORAL at 19:51

## 2023-07-04 RX ADMIN — ENOXAPARIN SODIUM 30 MG: 100 INJECTION SUBCUTANEOUS at 19:51

## 2023-07-04 RX ADMIN — SODIUM CHLORIDE: 9 INJECTION, SOLUTION INTRAVENOUS at 10:34

## 2023-07-04 RX ADMIN — MORPHINE SULFATE 2 MG: 2 INJECTION, SOLUTION INTRAMUSCULAR; INTRAVENOUS at 19:51

## 2023-07-04 RX ADMIN — LEVOTHYROXINE SODIUM 25 MCG: 25 TABLET ORAL at 05:25

## 2023-07-04 RX ADMIN — SODIUM CHLORIDE, POTASSIUM CHLORIDE, SODIUM LACTATE AND CALCIUM CHLORIDE: 600; 310; 30; 20 INJECTION, SOLUTION INTRAVENOUS at 23:38

## 2023-07-04 RX ADMIN — MORPHINE SULFATE 2 MG: 2 INJECTION, SOLUTION INTRAMUSCULAR; INTRAVENOUS at 10:37

## 2023-07-04 RX ADMIN — SERTRALINE 50 MG: 100 TABLET, FILM COATED ORAL at 08:35

## 2023-07-04 RX ADMIN — Medication 10 ML: at 08:41

## 2023-07-04 RX ADMIN — MORPHINE SULFATE 2 MG: 2 INJECTION, SOLUTION INTRAMUSCULAR; INTRAVENOUS at 04:59

## 2023-07-04 RX ADMIN — Medication 10 ML: at 19:52

## 2023-07-04 ASSESSMENT — PAIN DESCRIPTION - LOCATION
LOCATION: FOOT

## 2023-07-04 ASSESSMENT — PAIN SCALES - GENERAL
PAINLEVEL_OUTOF10: 3
PAINLEVEL_OUTOF10: 9
PAINLEVEL_OUTOF10: 9
PAINLEVEL_OUTOF10: 2
PAINLEVEL_OUTOF10: 9
PAINLEVEL_OUTOF10: 10
PAINLEVEL_OUTOF10: 8

## 2023-07-04 ASSESSMENT — PAIN DESCRIPTION - FREQUENCY: FREQUENCY: CONTINUOUS

## 2023-07-04 ASSESSMENT — PAIN DESCRIPTION - ORIENTATION
ORIENTATION: LEFT
ORIENTATION: LEFT;RIGHT

## 2023-07-04 ASSESSMENT — PAIN DESCRIPTION - DESCRIPTORS
DESCRIPTORS: ACHING
DESCRIPTORS: DISCOMFORT
DESCRIPTORS: ACHING
DESCRIPTORS: ACHING;PRESSURE;SORE
DESCRIPTORS: ACHING;PRESSURE

## 2023-07-04 ASSESSMENT — PAIN - FUNCTIONAL ASSESSMENT
PAIN_FUNCTIONAL_ASSESSMENT: PREVENTS OR INTERFERES SOME ACTIVE ACTIVITIES AND ADLS
PAIN_FUNCTIONAL_ASSESSMENT: ACTIVITIES ARE NOT PREVENTED
PAIN_FUNCTIONAL_ASSESSMENT: PREVENTS OR INTERFERES SOME ACTIVE ACTIVITIES AND ADLS
PAIN_FUNCTIONAL_ASSESSMENT: PREVENTS OR INTERFERES WITH MANY ACTIVE NOT PASSIVE ACTIVITIES

## 2023-07-04 ASSESSMENT — PAIN DESCRIPTION - ONSET: ONSET: ON-GOING

## 2023-07-04 ASSESSMENT — PAIN DESCRIPTION - PAIN TYPE: TYPE: CHRONIC PAIN

## 2023-07-04 NOTE — CONSULTS
Department of Podiatry   Consult Note        Reason for Consult:  Left foot wound       HISTORY OF PRESENT ILLNESS:      Patient is a 66 y.o. male with significant past medical history of, COPD, depression, diabetes mellitus, hypertension, hyperlipidemia. Patient is known to Dr. Navneet Andrew and follows up with him in outpatient setting. States that he did not noticed a wound to his left foot yesterday. Patient states that his roommate to his blood coming out of the patient's foot and onto the floor. States that he has noticed that the pain to his left foot has progressed over the last week or so. States the pain today is 8/10 patient was worried about an infection to his left foot and decided to come to emergency department. Patient denies any N/V/D/F/C/SOB/CP and has no other pedal complaints at this time. Past Medical History:        Diagnosis Date    Anxiety     COPD (chronic obstructive pulmonary disease) ([address])     Depression     DM (diabetes mellitus) ([address])     Frostbite     HLD (hyperlipidemia)     HTN (hypertension)     Sleep apnea        Past Surgical History:        Procedure Laterality Date    BACK SURGERY      CHOLECYSTECTOMY      CORONARY ARTERY BYPASS GRAFT REDO      2 vessel    FOOT DEBRIDEMENT Right 4/25/2022    DELAYED PRIMARY CLOSURE RIGHT FOOT WOUND performed by Tj Alvarez DPM at [address] Right 5/7/2022    FOOT DEBRIDEMENT INCISION AND DRAINAGE performed by Mervat Willard DPM at [address] Right 5/10/2022    RIGHT FOOT DELAYED PRIMARY CLOSURE WITH POSSIBLE DEBRIDEMENT    ++CONTACT ISOLATION++   (DR AVAIL.  AT 4PM) performed by Mervat Willard DPM at [address] PICC LINE  5/9/2022         TOE AMPUTATION Right 4/22/2022    AMPUTATION RIGHT SECOND TOE performed by Tj Alvarez DPM at [facility]         Medications Prior to Admission:    Medications Prior to Admission: lisinopril (PRINIVIL;ZESTRIL) 10 MG tablet, Take 1 tablet

## 2023-07-04 NOTE — PLAN OF CARE
Problem: Pain  Goal: Verbalizes/displays adequate comfort level or baseline comfort level  Flowsheets (Taken 7/4/2023 0123)  Verbalizes/displays adequate comfort level or baseline comfort level:   Encourage patient to monitor pain and request assistance   Assess pain using appropriate pain scale   Administer analgesics based on type and severity of pain and evaluate response

## 2023-07-05 ENCOUNTER — APPOINTMENT (OUTPATIENT)
Dept: MRI IMAGING | Age: 63
DRG: 638 | End: 2023-07-05
Payer: MEDICARE

## 2023-07-05 VITALS
DIASTOLIC BLOOD PRESSURE: 69 MMHG | HEIGHT: 72 IN | BODY MASS INDEX: 38.24 KG/M2 | OXYGEN SATURATION: 92 % | RESPIRATION RATE: 16 BRPM | SYSTOLIC BLOOD PRESSURE: 115 MMHG | HEART RATE: 78 BPM | TEMPERATURE: 97.3 F | WEIGHT: 282.3 LBS

## 2023-07-05 LAB
ANION GAP SERPL CALCULATED.3IONS-SCNC: 10 MMOL/L (ref 7–16)
BASOPHILS # BLD: 0.04 E9/L (ref 0–0.2)
BASOPHILS NFR BLD: 0.6 % (ref 0–2)
BUN SERPL-MCNC: 20 MG/DL (ref 6–23)
CALCIUM SERPL-MCNC: 8.6 MG/DL (ref 8.6–10.2)
CHLORIDE SERPL-SCNC: 103 MMOL/L (ref 98–107)
CO2 SERPL-SCNC: 23 MMOL/L (ref 22–29)
CREAT SERPL-MCNC: 1 MG/DL (ref 0.7–1.2)
EOSINOPHIL # BLD: 0.13 E9/L (ref 0.05–0.5)
EOSINOPHIL NFR BLD: 2 % (ref 0–6)
ERYTHROCYTE [DISTWIDTH] IN BLOOD BY AUTOMATED COUNT: 13.5 FL (ref 11.5–15)
GLUCOSE SERPL-MCNC: 206 MG/DL (ref 74–99)
HCT VFR BLD AUTO: 39.4 % (ref 37–54)
HGB BLD-MCNC: 13.3 G/DL (ref 12.5–16.5)
IMM GRANULOCYTES # BLD: 0.02 E9/L
IMM GRANULOCYTES NFR BLD: 0.3 % (ref 0–5)
LYMPHOCYTES # BLD: 2.18 E9/L (ref 1.5–4)
LYMPHOCYTES NFR BLD: 32.9 % (ref 20–42)
MCH RBC QN AUTO: 28.6 PG (ref 26–35)
MCHC RBC AUTO-ENTMCNC: 33.8 % (ref 32–34.5)
MCV RBC AUTO: 84.7 FL (ref 80–99.9)
METER GLUCOSE: 205 MG/DL (ref 74–99)
METER GLUCOSE: 210 MG/DL (ref 74–99)
METER GLUCOSE: 221 MG/DL (ref 74–99)
MONOCYTES # BLD: 0.37 E9/L (ref 0.1–0.95)
MONOCYTES NFR BLD: 5.6 % (ref 2–12)
NEUTROPHILS # BLD: 3.89 E9/L (ref 1.8–7.3)
NEUTS SEG NFR BLD: 58.6 % (ref 43–80)
PLATELET # BLD AUTO: 149 E9/L (ref 130–450)
PMV BLD AUTO: 11.1 FL (ref 7–12)
POTASSIUM SERPL-SCNC: 4.5 MMOL/L (ref 3.5–5)
RBC # BLD AUTO: 4.65 E12/L (ref 3.8–5.8)
REASON FOR REJECTION: NORMAL
REJECTED TEST: NORMAL
SODIUM SERPL-SCNC: 136 MMOL/L (ref 132–146)
WBC # BLD: 6.6 E9/L (ref 4.5–11.5)

## 2023-07-05 PROCEDURE — 36415 COLL VENOUS BLD VENIPUNCTURE: CPT

## 2023-07-05 PROCEDURE — 99239 HOSP IP/OBS DSCHRG MGMT >30: CPT | Performed by: INTERNAL MEDICINE

## 2023-07-05 PROCEDURE — 82962 GLUCOSE BLOOD TEST: CPT

## 2023-07-05 PROCEDURE — 6370000000 HC RX 637 (ALT 250 FOR IP): Performed by: NURSE PRACTITIONER

## 2023-07-05 PROCEDURE — 2580000003 HC RX 258: Performed by: NURSE PRACTITIONER

## 2023-07-05 PROCEDURE — 6360000002 HC RX W HCPCS: Performed by: NURSE PRACTITIONER

## 2023-07-05 PROCEDURE — 80048 BASIC METABOLIC PNL TOTAL CA: CPT

## 2023-07-05 PROCEDURE — 73718 MRI LOWER EXTREMITY W/O DYE: CPT

## 2023-07-05 PROCEDURE — 85025 COMPLETE CBC W/AUTO DIFF WBC: CPT

## 2023-07-05 PROCEDURE — 94640 AIRWAY INHALATION TREATMENT: CPT

## 2023-07-05 RX ORDER — INSULIN GLARGINE 100 [IU]/ML
47 INJECTION, SOLUTION SUBCUTANEOUS NIGHTLY
Status: DISCONTINUED | OUTPATIENT
Start: 2023-07-05 | End: 2023-07-05 | Stop reason: HOSPADM

## 2023-07-05 RX ADMIN — INSULIN LISPRO 4 UNITS: 100 INJECTION, SOLUTION INTRAVENOUS; SUBCUTANEOUS at 16:49

## 2023-07-05 RX ADMIN — MORPHINE SULFATE 2 MG: 2 INJECTION, SOLUTION INTRAMUSCULAR; INTRAVENOUS at 08:41

## 2023-07-05 RX ADMIN — MORPHINE SULFATE 2 MG: 2 INJECTION, SOLUTION INTRAMUSCULAR; INTRAVENOUS at 13:47

## 2023-07-05 RX ADMIN — ENOXAPARIN SODIUM 30 MG: 100 INJECTION SUBCUTANEOUS at 08:41

## 2023-07-05 RX ADMIN — LEVOTHYROXINE SODIUM 25 MCG: 25 TABLET ORAL at 06:23

## 2023-07-05 RX ADMIN — INSULIN LISPRO 10 UNITS: 100 INJECTION, SOLUTION INTRAVENOUS; SUBCUTANEOUS at 06:52

## 2023-07-05 RX ADMIN — FENOFIBRATE 160 MG: 160 TABLET ORAL at 08:41

## 2023-07-05 RX ADMIN — METOPROLOL TARTRATE 25 MG: 25 TABLET, FILM COATED ORAL at 08:41

## 2023-07-05 RX ADMIN — ARFORMOTEROL TARTRATE 15 MCG: 15 SOLUTION RESPIRATORY (INHALATION) at 08:51

## 2023-07-05 RX ADMIN — INSULIN LISPRO 10 UNITS: 100 INJECTION, SOLUTION INTRAVENOUS; SUBCUTANEOUS at 11:15

## 2023-07-05 RX ADMIN — FLUOXETINE 20 MG: 20 CAPSULE ORAL at 08:41

## 2023-07-05 RX ADMIN — MORPHINE SULFATE 2 MG: 2 INJECTION, SOLUTION INTRAMUSCULAR; INTRAVENOUS at 00:23

## 2023-07-05 RX ADMIN — LISINOPRIL 10 MG: 10 TABLET ORAL at 08:41

## 2023-07-05 RX ADMIN — MORPHINE SULFATE 2 MG: 2 INJECTION, SOLUTION INTRAMUSCULAR; INTRAVENOUS at 04:18

## 2023-07-05 RX ADMIN — PREGABALIN 100 MG: 50 CAPSULE ORAL at 08:41

## 2023-07-05 RX ADMIN — Medication 10 ML: at 08:42

## 2023-07-05 RX ADMIN — BUDESONIDE 250 MCG: 0.25 SUSPENSION RESPIRATORY (INHALATION) at 08:51

## 2023-07-05 RX ADMIN — INSULIN LISPRO 4 UNITS: 100 INJECTION, SOLUTION INTRAVENOUS; SUBCUTANEOUS at 06:52

## 2023-07-05 RX ADMIN — MORPHINE SULFATE 2 MG: 2 INJECTION, SOLUTION INTRAMUSCULAR; INTRAVENOUS at 17:44

## 2023-07-05 RX ADMIN — SERTRALINE 50 MG: 100 TABLET, FILM COATED ORAL at 08:40

## 2023-07-05 RX ADMIN — INSULIN LISPRO 4 UNITS: 100 INJECTION, SOLUTION INTRAVENOUS; SUBCUTANEOUS at 11:16

## 2023-07-05 RX ADMIN — INSULIN LISPRO 10 UNITS: 100 INJECTION, SOLUTION INTRAVENOUS; SUBCUTANEOUS at 16:48

## 2023-07-05 ASSESSMENT — PAIN DESCRIPTION - LOCATION
LOCATION: FOOT

## 2023-07-05 ASSESSMENT — PAIN - FUNCTIONAL ASSESSMENT
PAIN_FUNCTIONAL_ASSESSMENT: ACTIVITIES ARE NOT PREVENTED

## 2023-07-05 ASSESSMENT — PAIN SCALES - GENERAL
PAINLEVEL_OUTOF10: 8
PAINLEVEL_OUTOF10: 2
PAINLEVEL_OUTOF10: 10
PAINLEVEL_OUTOF10: 10
PAINLEVEL_OUTOF10: 9
PAINLEVEL_OUTOF10: 8

## 2023-07-05 ASSESSMENT — PAIN DESCRIPTION - PAIN TYPE: TYPE: CHRONIC PAIN

## 2023-07-05 ASSESSMENT — PAIN DESCRIPTION - DESCRIPTORS
DESCRIPTORS: ACHING
DESCRIPTORS: ACHING
DESCRIPTORS: ACHING;DISCOMFORT

## 2023-07-05 ASSESSMENT — PAIN DESCRIPTION - ORIENTATION
ORIENTATION: LEFT

## 2023-07-05 ASSESSMENT — PAIN DESCRIPTION - ONSET: ONSET: ON-GOING

## 2023-07-05 ASSESSMENT — PAIN DESCRIPTION - FREQUENCY: FREQUENCY: CONTINUOUS

## 2023-07-05 NOTE — CARE COORDINATION
7/5/2023 Social Work Discharge Planning:Podiatry following for foot wounds. Hypoglycemia. MRI today. This worker met with Pt to discuss  role and transition of care/discharge planning. Pt is independent from home with his niece. Pt has a ww buts states he hasnt been needing to use it. Room air.   Electronically signed by MARCELLUS Landry on 7/5/2023 at 12:45 PM

## 2023-07-05 NOTE — PLAN OF CARE
Problem: Discharge Planning  Goal: Discharge to home or other facility with appropriate resources  7/5/2023 1011 by Ruthy Cruz RN  Outcome: Progressing  7/5/2023 0225 by Kathleen Lovell RN  Outcome: Progressing     Problem: Pain  Goal: Verbalizes/displays adequate comfort level or baseline comfort level  7/5/2023 1011 by Ruthy Cruz RN  Outcome: Progressing  Flowsheets (Taken 7/5/2023 0830)  Verbalizes/displays adequate comfort level or baseline comfort level: Encourage patient to monitor pain and request assistance  7/5/2023 0225 by Kathleen Lovell RN  Outcome: Progressing

## 2023-07-05 NOTE — DISCHARGE SUMMARY
[facility] EMERGENCY SERVICE Physician Discharge Summary       No follow-up provider specified. Activity level: as annemarie    Diet: ADULT DIET; Regular; 3 carb choices (45 gm/meal)    Dispo:home    Condition at discharge: fair        Patient ID:  Sallie Butts  [id number]  66 y.o.  1960    Admit date: 7/3/2023    Discharge date and time:  7/5/2023  4:51 PM    Admission Diagnoses: Principal Problem:    Osteomyelitis of left foot, unspecified type ([address])  Active Problems:    COPD (chronic obstructive pulmonary disease) ([address])    Type 2 diabetes mellitus with hyperglycemia, with long-term current use of insulin (HCC)    Hyperkalemia    Lactic acidosis    Pseudohyponatremia    Hyperglycemia    Uncontrolled type 2 diabetes mellitus with hyperglycemia ([address])  Resolved Problems:    * No resolved hospital problems. *      Discharge Diagnoses: Principal Problem:    Osteomyelitis of left foot, unspecified type ([address])  Active Problems:    COPD (chronic obstructive pulmonary disease) (HCC)    Type 2 diabetes mellitus with hyperglycemia, with long-term current use of insulin (HCC)    Hyperkalemia    Lactic acidosis    Pseudohyponatremia    Hyperglycemia    Uncontrolled type 2 diabetes mellitus with hyperglycemia ([address])  Resolved Problems:    * No resolved hospital problems. *    Left foot wound with acute osteomyelitis of left foot fifth metatarsal ruled out  Dm type 2 uncontrolled  Elevated lactic acid level  Copd  Hyperlipidemia  Htn          Consults:  IP CONSULT TO PODIATRY    Procedures: none    Hospital Course: Patient was admitted with Hyperglycemia [R73.9]  Osteomyelitis of left foot, unspecified type ([address]) [M86.9]. Patient is a 66year old male with PMH significant for COPD, DM type II uncontrolled, HLD, HTN, sleep apnea, PVD, tobacco abuse and past right toe amputation due to osteomyelitis. Patient to ED due to left foot wound. Patient reports not noticing left foot wound until yesterday.

## 2023-07-08 LAB
BACTERIA BLD CULT ORG #2: NORMAL
BACTERIA BLD CULT: NORMAL

## 2023-07-26 ENCOUNTER — APPOINTMENT (OUTPATIENT)
Dept: GENERAL RADIOLOGY | Age: 63
DRG: 617 | End: 2023-07-26
Payer: MEDICARE

## 2023-07-26 ENCOUNTER — HOSPITAL ENCOUNTER (INPATIENT)
Age: 63
LOS: 9 days | Discharge: NURSING, REHAB OR LONG-TERM CARE | DRG: 617 | End: 2023-08-04
Attending: STUDENT IN AN ORGANIZED HEALTH CARE EDUCATION/TRAINING PROGRAM | Admitting: STUDENT IN AN ORGANIZED HEALTH CARE EDUCATION/TRAINING PROGRAM
Payer: MEDICARE

## 2023-07-26 ENCOUNTER — APPOINTMENT (OUTPATIENT)
Dept: ULTRASOUND IMAGING | Age: 63
DRG: 617 | End: 2023-07-26
Payer: MEDICARE

## 2023-07-26 DIAGNOSIS — M86.9 OSTEOMYELITIS OF LEFT FOOT, UNSPECIFIED TYPE (HCC): Primary | ICD-10-CM

## 2023-07-26 DIAGNOSIS — R73.9 HYPERGLYCEMIA: ICD-10-CM

## 2023-07-26 DIAGNOSIS — M86.9 OSTEOMYELITIS, UNSPECIFIED SITE, UNSPECIFIED TYPE (HCC): ICD-10-CM

## 2023-07-26 DIAGNOSIS — E86.0 DEHYDRATION: ICD-10-CM

## 2023-07-26 PROBLEM — M86.172 OSTEOMYELITIS OF ANKLE OR FOOT, LEFT, ACUTE (HCC): Status: ACTIVE | Noted: 2023-07-26

## 2023-07-26 LAB
ALBUMIN SERPL-MCNC: 3.4 G/DL (ref 3.5–5.2)
ALP SERPL-CCNC: 115 U/L (ref 40–129)
ALT SERPL-CCNC: 17 U/L (ref 0–40)
ANION GAP SERPL CALCULATED.3IONS-SCNC: 10 MMOL/L (ref 7–16)
AST SERPL-CCNC: 21 U/L (ref 0–39)
B-OH-BUTYR SERPL-MCNC: 0.1 MMOL/L (ref 0.02–0.27)
BASOPHILS # BLD: 0.03 K/UL (ref 0–0.2)
BASOPHILS NFR BLD: 0 % (ref 0–2)
BILIRUB SERPL-MCNC: 0.4 MG/DL (ref 0–1.2)
BUN SERPL-MCNC: 30 MG/DL (ref 6–23)
CALCIUM SERPL-MCNC: 8.6 MG/DL (ref 8.6–10.2)
CHLORIDE SERPL-SCNC: 94 MMOL/L (ref 98–107)
CO2 SERPL-SCNC: 25 MMOL/L (ref 22–29)
CREAT SERPL-MCNC: 1.3 MG/DL (ref 0.7–1.2)
CRP SERPL HS-MCNC: 22 MG/L (ref 0–5)
EOSINOPHIL # BLD: 0.14 K/UL (ref 0.05–0.5)
EOSINOPHILS RELATIVE PERCENT: 2 % (ref 0–6)
ERYTHROCYTE [DISTWIDTH] IN BLOOD BY AUTOMATED COUNT: 13.4 % (ref 11.5–15)
ERYTHROCYTE [SEDIMENTATION RATE] IN BLOOD BY WESTERGREN METHOD: 30 MM/HR (ref 0–15)
GFR SERPL CREATININE-BSD FRML MDRD: >60 ML/MIN/1.73M2
GLUCOSE SERPL-MCNC: 704 MG/DL (ref 74–99)
HCT VFR BLD AUTO: 39.6 % (ref 37–54)
HGB BLD-MCNC: 13.8 G/DL (ref 12.5–16.5)
IMM GRANULOCYTES # BLD AUTO: 0.03 K/UL (ref 0–0.58)
IMM GRANULOCYTES NFR BLD: 0 % (ref 0–5)
INR PPP: 1
LACTATE BLDV-SCNC: 1.8 MMOL/L (ref 0.5–2.2)
LYMPHOCYTES NFR BLD: 1.85 K/UL (ref 1.5–4)
LYMPHOCYTES RELATIVE PERCENT: 27 % (ref 20–42)
MAGNESIUM SERPL-MCNC: 1.9 MG/DL (ref 1.6–2.6)
MCH RBC QN AUTO: 29.2 PG (ref 26–35)
MCHC RBC AUTO-ENTMCNC: 34.8 G/DL (ref 32–34.5)
MCV RBC AUTO: 83.7 FL (ref 80–99.9)
METER GLUCOSE: 394 MG/DL (ref 74–99)
METER GLUCOSE: 489 MG/DL (ref 74–99)
METER GLUCOSE: >500 MG/DL (ref 74–99)
MONOCYTES NFR BLD: 0.47 K/UL (ref 0.1–0.95)
MONOCYTES NFR BLD: 7 % (ref 2–12)
NEUTROPHILS NFR BLD: 63 % (ref 43–80)
NEUTS SEG NFR BLD: 4.28 K/UL (ref 1.8–7.3)
PH VENOUS: 7.3 (ref 7.35–7.45)
PLATELET # BLD AUTO: 155 K/UL (ref 130–450)
PMV BLD AUTO: 11.8 FL (ref 7–12)
POTASSIUM SERPL-SCNC: 5.3 MMOL/L (ref 3.5–5)
PROT SERPL-MCNC: 6.5 G/DL (ref 6.4–8.3)
PROTHROMBIN TIME: 10.6 SEC (ref 9.3–12.4)
RBC # BLD AUTO: 4.73 M/UL (ref 3.8–5.8)
SODIUM SERPL-SCNC: 129 MMOL/L (ref 132–146)
WBC OTHER # BLD: 6.8 K/UL (ref 4.5–11.5)

## 2023-07-26 PROCEDURE — 86140 C-REACTIVE PROTEIN: CPT

## 2023-07-26 PROCEDURE — 96375 TX/PRO/DX INJ NEW DRUG ADDON: CPT

## 2023-07-26 PROCEDURE — 71045 X-RAY EXAM CHEST 1 VIEW: CPT

## 2023-07-26 PROCEDURE — 93970 EXTREMITY STUDY: CPT

## 2023-07-26 PROCEDURE — 85652 RBC SED RATE AUTOMATED: CPT

## 2023-07-26 PROCEDURE — 6370000000 HC RX 637 (ALT 250 FOR IP): Performed by: STUDENT IN AN ORGANIZED HEALTH CARE EDUCATION/TRAINING PROGRAM

## 2023-07-26 PROCEDURE — 96376 TX/PRO/DX INJ SAME DRUG ADON: CPT

## 2023-07-26 PROCEDURE — 1200000000 HC SEMI PRIVATE

## 2023-07-26 PROCEDURE — 80053 COMPREHEN METABOLIC PANEL: CPT

## 2023-07-26 PROCEDURE — 93005 ELECTROCARDIOGRAM TRACING: CPT | Performed by: STUDENT IN AN ORGANIZED HEALTH CARE EDUCATION/TRAINING PROGRAM

## 2023-07-26 PROCEDURE — 83605 ASSAY OF LACTIC ACID: CPT

## 2023-07-26 PROCEDURE — 82947 ASSAY GLUCOSE BLOOD QUANT: CPT

## 2023-07-26 PROCEDURE — 99285 EMERGENCY DEPT VISIT HI MDM: CPT

## 2023-07-26 PROCEDURE — 85027 COMPLETE CBC AUTOMATED: CPT

## 2023-07-26 PROCEDURE — 96361 HYDRATE IV INFUSION ADD-ON: CPT

## 2023-07-26 PROCEDURE — 82010 KETONE BODYS QUAN: CPT

## 2023-07-26 PROCEDURE — 6360000002 HC RX W HCPCS: Performed by: STUDENT IN AN ORGANIZED HEALTH CARE EDUCATION/TRAINING PROGRAM

## 2023-07-26 PROCEDURE — 73630 X-RAY EXAM OF FOOT: CPT

## 2023-07-26 PROCEDURE — 82800 BLOOD PH: CPT

## 2023-07-26 PROCEDURE — 83735 ASSAY OF MAGNESIUM: CPT

## 2023-07-26 PROCEDURE — 99222 1ST HOSP IP/OBS MODERATE 55: CPT | Performed by: STUDENT IN AN ORGANIZED HEALTH CARE EDUCATION/TRAINING PROGRAM

## 2023-07-26 PROCEDURE — 85610 PROTHROMBIN TIME: CPT

## 2023-07-26 PROCEDURE — 96374 THER/PROPH/DIAG INJ IV PUSH: CPT

## 2023-07-26 PROCEDURE — 2580000003 HC RX 258: Performed by: STUDENT IN AN ORGANIZED HEALTH CARE EDUCATION/TRAINING PROGRAM

## 2023-07-26 RX ORDER — 0.9 % SODIUM CHLORIDE 0.9 %
1000 INTRAVENOUS SOLUTION INTRAVENOUS ONCE
Status: COMPLETED | OUTPATIENT
Start: 2023-07-26 | End: 2023-07-26

## 2023-07-26 RX ORDER — FENTANYL CITRATE 50 UG/ML
50 INJECTION, SOLUTION INTRAMUSCULAR; INTRAVENOUS ONCE
Status: COMPLETED | OUTPATIENT
Start: 2023-07-26 | End: 2023-07-26

## 2023-07-26 RX ORDER — SODIUM CHLORIDE 0.9 % (FLUSH) 0.9 %
5-40 SYRINGE (ML) INJECTION PRN
Status: DISCONTINUED | OUTPATIENT
Start: 2023-07-26 | End: 2023-07-31 | Stop reason: SDUPTHER

## 2023-07-26 RX ORDER — DEXTROSE MONOHYDRATE 100 MG/ML
INJECTION, SOLUTION INTRAVENOUS CONTINUOUS PRN
Status: DISCONTINUED | OUTPATIENT
Start: 2023-07-26 | End: 2023-08-04 | Stop reason: HOSPADM

## 2023-07-26 RX ORDER — POLYETHYLENE GLYCOL 3350 17 G/17G
17 POWDER, FOR SOLUTION ORAL DAILY PRN
Status: DISCONTINUED | OUTPATIENT
Start: 2023-07-26 | End: 2023-08-04 | Stop reason: HOSPADM

## 2023-07-26 RX ORDER — SODIUM CHLORIDE 9 MG/ML
INJECTION, SOLUTION INTRAVENOUS CONTINUOUS
Status: DISCONTINUED | OUTPATIENT
Start: 2023-07-26 | End: 2023-07-29

## 2023-07-26 RX ORDER — ONDANSETRON 2 MG/ML
4 INJECTION INTRAMUSCULAR; INTRAVENOUS EVERY 6 HOURS PRN
Status: DISCONTINUED | OUTPATIENT
Start: 2023-07-26 | End: 2023-08-04 | Stop reason: HOSPADM

## 2023-07-26 RX ORDER — ONDANSETRON 2 MG/ML
4 INJECTION INTRAMUSCULAR; INTRAVENOUS ONCE
Status: COMPLETED | OUTPATIENT
Start: 2023-07-26 | End: 2023-07-26

## 2023-07-26 RX ORDER — ACETAMINOPHEN 650 MG/1
650 SUPPOSITORY RECTAL EVERY 6 HOURS PRN
Status: DISCONTINUED | OUTPATIENT
Start: 2023-07-26 | End: 2023-08-04 | Stop reason: HOSPADM

## 2023-07-26 RX ORDER — INSULIN LISPRO 100 [IU]/ML
0-16 INJECTION, SOLUTION INTRAVENOUS; SUBCUTANEOUS
Status: DISCONTINUED | OUTPATIENT
Start: 2023-07-27 | End: 2023-07-27

## 2023-07-26 RX ORDER — ENOXAPARIN SODIUM 100 MG/ML
30 INJECTION SUBCUTANEOUS 2 TIMES DAILY
Status: DISCONTINUED | OUTPATIENT
Start: 2023-07-26 | End: 2023-08-04 | Stop reason: HOSPADM

## 2023-07-26 RX ORDER — ONDANSETRON 4 MG/1
4 TABLET, ORALLY DISINTEGRATING ORAL EVERY 8 HOURS PRN
Status: DISCONTINUED | OUTPATIENT
Start: 2023-07-26 | End: 2023-08-04 | Stop reason: HOSPADM

## 2023-07-26 RX ORDER — FLUOXETINE HYDROCHLORIDE 20 MG/1
20 CAPSULE ORAL DAILY
Status: ON HOLD | COMMUNITY
Start: 2023-07-10 | End: 2023-08-04 | Stop reason: HOSPADM

## 2023-07-26 RX ORDER — SULFAMETHOXAZOLE AND TRIMETHOPRIM 800; 160 MG/1; MG/1
1 TABLET ORAL EVERY 12 HOURS
Qty: 20 TABLET | Refills: 0 | Status: ON HOLD | COMMUNITY
Start: 2023-07-25 | End: 2023-08-04 | Stop reason: HOSPADM

## 2023-07-26 RX ORDER — PROMETHAZINE HYDROCHLORIDE 25 MG/ML
25 INJECTION, SOLUTION INTRAMUSCULAR; INTRAVENOUS ONCE
Status: DISCONTINUED | OUTPATIENT
Start: 2023-07-26 | End: 2023-08-04 | Stop reason: HOSPADM

## 2023-07-26 RX ORDER — ACETAMINOPHEN 325 MG/1
650 TABLET ORAL EVERY 6 HOURS PRN
Status: DISCONTINUED | OUTPATIENT
Start: 2023-07-26 | End: 2023-08-04 | Stop reason: HOSPADM

## 2023-07-26 RX ORDER — SODIUM CHLORIDE 0.9 % (FLUSH) 0.9 %
5-40 SYRINGE (ML) INJECTION EVERY 12 HOURS SCHEDULED
Status: DISCONTINUED | OUTPATIENT
Start: 2023-07-26 | End: 2023-07-31 | Stop reason: SDUPTHER

## 2023-07-26 RX ORDER — INSULIN LISPRO 100 [IU]/ML
0-4 INJECTION, SOLUTION INTRAVENOUS; SUBCUTANEOUS NIGHTLY
Status: DISCONTINUED | OUTPATIENT
Start: 2023-07-26 | End: 2023-08-04 | Stop reason: HOSPADM

## 2023-07-26 RX ORDER — LISINOPRIL AND HYDROCHLOROTHIAZIDE 20; 12.5 MG/1; MG/1
1 TABLET ORAL DAILY
Status: ON HOLD | COMMUNITY
Start: 2022-12-08 | End: 2023-08-04 | Stop reason: HOSPADM

## 2023-07-26 RX ORDER — SODIUM CHLORIDE 9 MG/ML
INJECTION, SOLUTION INTRAVENOUS PRN
Status: DISCONTINUED | OUTPATIENT
Start: 2023-07-26 | End: 2023-07-31 | Stop reason: SDUPTHER

## 2023-07-26 RX ADMIN — FENTANYL CITRATE 50 MCG: 50 INJECTION, SOLUTION INTRAMUSCULAR; INTRAVENOUS at 17:17

## 2023-07-26 RX ADMIN — Medication 6 UNITS: at 18:57

## 2023-07-26 RX ADMIN — FENTANYL CITRATE 50 MCG: 50 INJECTION, SOLUTION INTRAMUSCULAR; INTRAVENOUS at 20:17

## 2023-07-26 RX ADMIN — ONDANSETRON 4 MG: 2 INJECTION INTRAMUSCULAR; INTRAVENOUS at 17:17

## 2023-07-26 RX ADMIN — SODIUM CHLORIDE 1000 ML: 9 INJECTION, SOLUTION INTRAVENOUS at 17:15

## 2023-07-26 SDOH — SOCIAL STABILITY: SOCIAL INSECURITY
WITHIN THE LAST YEAR, HAVE YOU BEEN KICKED, HIT, SLAPPED, OR OTHERWISE PHYSICALLY HURT BY YOUR PARTNER OR EX-PARTNER?: NO

## 2023-07-26 SDOH — SOCIAL STABILITY: SOCIAL NETWORK: ARE YOU MARRIED, WIDOWED, DIVORCED, SEPARATED, NEVER MARRIED, OR LIVING WITH A PARTNER?: DIVORCED

## 2023-07-26 SDOH — SOCIAL STABILITY: SOCIAL NETWORK: HOW OFTEN DO YOU ATTENT MEETINGS OF THE CLUB OR ORGANIZATION YOU BELONG TO?: NEVER

## 2023-07-26 SDOH — HEALTH STABILITY: MENTAL HEALTH: HOW OFTEN DO YOU HAVE A DRINK CONTAINING ALCOHOL?: NEVER

## 2023-07-26 SDOH — SOCIAL STABILITY: SOCIAL INSECURITY: WITHIN THE LAST YEAR, HAVE YOU BEEN AFRAID OF YOUR PARTNER OR EX-PARTNER?: NO

## 2023-07-26 SDOH — HEALTH STABILITY: MENTAL HEALTH: HOW MANY STANDARD DRINKS CONTAINING ALCOHOL DO YOU HAVE ON A TYPICAL DAY?: PATIENT DOES NOT DRINK

## 2023-07-26 SDOH — ECONOMIC STABILITY: FOOD INSECURITY: WITHIN THE PAST 12 MONTHS, YOU WORRIED THAT YOUR FOOD WOULD RUN OUT BEFORE YOU GOT MONEY TO BUY MORE.: NEVER TRUE

## 2023-07-26 SDOH — HEALTH STABILITY: PHYSICAL HEALTH: ON AVERAGE, HOW MANY MINUTES DO YOU ENGAGE IN EXERCISE AT THIS LEVEL?: 0 MIN

## 2023-07-26 SDOH — ECONOMIC STABILITY: INCOME INSECURITY: HOW HARD IS IT FOR YOU TO PAY FOR THE VERY BASICS LIKE FOOD, HOUSING, MEDICAL CARE, AND HEATING?: NOT HARD AT ALL

## 2023-07-26 SDOH — HEALTH STABILITY: PHYSICAL HEALTH: ON AVERAGE, HOW MANY DAYS PER WEEK DO YOU ENGAGE IN MODERATE TO STRENUOUS EXERCISE (LIKE A BRISK WALK)?: 0 DAYS

## 2023-07-26 SDOH — ECONOMIC STABILITY: FOOD INSECURITY: WITHIN THE PAST 12 MONTHS, THE FOOD YOU BOUGHT JUST DIDN'T LAST AND YOU DIDN'T HAVE MONEY TO GET MORE.: NEVER TRUE

## 2023-07-26 SDOH — HEALTH STABILITY: MENTAL HEALTH
STRESS IS WHEN SOMEONE FEELS TENSE, NERVOUS, ANXIOUS, OR CAN'T SLEEP AT NIGHT BECAUSE THEIR MIND IS TROUBLED. HOW STRESSED ARE YOU?: ONLY A LITTLE

## 2023-07-26 SDOH — ECONOMIC STABILITY: TRANSPORTATION INSECURITY
IN THE PAST 12 MONTHS, HAS LACK OF TRANSPORTATION KEPT YOU FROM MEETINGS, WORK, OR FROM GETTING THINGS NEEDED FOR DAILY LIVING?: NO

## 2023-07-26 SDOH — SOCIAL STABILITY: SOCIAL NETWORK: HOW OFTEN DO YOU ATTEND CHURCH OR RELIGIOUS SERVICES?: NEVER

## 2023-07-26 SDOH — SOCIAL STABILITY: SOCIAL INSECURITY
WITHIN THE LAST YEAR, HAVE TO BEEN RAPED OR FORCED TO HAVE ANY KIND OF SEXUAL ACTIVITY BY YOUR PARTNER OR EX-PARTNER?: NO

## 2023-07-26 SDOH — ECONOMIC STABILITY: INCOME INSECURITY: IN THE LAST 12 MONTHS, WAS THERE A TIME WHEN YOU WERE NOT ABLE TO PAY THE MORTGAGE OR RENT ON TIME?: NO

## 2023-07-26 SDOH — SOCIAL STABILITY: SOCIAL NETWORK: HOW OFTEN DO YOU GET TOGETHER WITH FRIENDS OR RELATIVES?: ONCE A WEEK

## 2023-07-26 SDOH — SOCIAL STABILITY: SOCIAL NETWORK: IN A TYPICAL WEEK, HOW MANY TIMES DO YOU TALK ON THE PHONE WITH FAMILY, FRIENDS, OR NEIGHBORS?: ONCE A WEEK

## 2023-07-26 SDOH — SOCIAL STABILITY: SOCIAL INSECURITY: WITHIN THE LAST YEAR, HAVE YOU BEEN HUMILIATED OR EMOTIONALLY ABUSED IN OTHER WAYS BY YOUR PARTNER OR EX-PARTNER?: NO

## 2023-07-26 SDOH — SOCIAL STABILITY: SOCIAL NETWORK
DO YOU BELONG TO ANY CLUBS OR ORGANIZATIONS SUCH AS CHURCH GROUPS UNIONS, FRATERNAL OR ATHLETIC GROUPS, OR SCHOOL GROUPS?: NO

## 2023-07-26 SDOH — ECONOMIC STABILITY: HOUSING INSECURITY: IN THE LAST 12 MONTHS, HOW MANY PLACES HAVE YOU LIVED?: 1

## 2023-07-26 SDOH — ECONOMIC STABILITY: TRANSPORTATION INSECURITY
IN THE PAST 12 MONTHS, HAS THE LACK OF TRANSPORTATION KEPT YOU FROM MEDICAL APPOINTMENTS OR FROM GETTING MEDICATIONS?: NO

## 2023-07-26 SDOH — ECONOMIC STABILITY: HOUSING INSECURITY
IN THE LAST 12 MONTHS, WAS THERE A TIME WHEN YOU DID NOT HAVE A STEADY PLACE TO SLEEP OR SLEPT IN A SHELTER (INCLUDING NOW)?: NO

## 2023-07-26 ASSESSMENT — PAIN - FUNCTIONAL ASSESSMENT
PAIN_FUNCTIONAL_ASSESSMENT: PREVENTS OR INTERFERES SOME ACTIVE ACTIVITIES AND ADLS
PAIN_FUNCTIONAL_ASSESSMENT: NONE - DENIES PAIN

## 2023-07-26 ASSESSMENT — PAIN DESCRIPTION - LOCATION: LOCATION: FOOT

## 2023-07-26 ASSESSMENT — PAIN DESCRIPTION - PAIN TYPE: TYPE: ACUTE PAIN

## 2023-07-26 ASSESSMENT — PAIN DESCRIPTION - ORIENTATION: ORIENTATION: LEFT

## 2023-07-26 ASSESSMENT — PAIN SCALES - GENERAL: PAINLEVEL_OUTOF10: 9

## 2023-07-26 ASSESSMENT — PAIN DESCRIPTION - FREQUENCY: FREQUENCY: INTERMITTENT

## 2023-07-26 ASSESSMENT — PAIN DESCRIPTION - DESCRIPTORS: DESCRIPTORS: ACHING;JABBING;THROBBING

## 2023-07-27 ENCOUNTER — APPOINTMENT (OUTPATIENT)
Dept: ULTRASOUND IMAGING | Age: 63
DRG: 617 | End: 2023-07-27
Payer: MEDICARE

## 2023-07-27 ENCOUNTER — APPOINTMENT (OUTPATIENT)
Dept: MRI IMAGING | Age: 63
DRG: 617 | End: 2023-07-27
Payer: MEDICARE

## 2023-07-27 PROBLEM — E03.9 HYPOTHYROIDISM: Status: ACTIVE | Noted: 2023-07-27

## 2023-07-27 PROBLEM — E11.10 DM (DIABETES MELLITUS) TYPE 2, UNCONTROLLED, WITH KETOACIDOSIS (HCC): Status: ACTIVE | Noted: 2023-07-27

## 2023-07-27 LAB
ANION GAP SERPL CALCULATED.3IONS-SCNC: 8 MMOL/L (ref 7–16)
BASOPHILS # BLD: 0.06 K/UL (ref 0–0.2)
BASOPHILS NFR BLD: 1 % (ref 0–2)
BUN SERPL-MCNC: 27 MG/DL (ref 6–23)
CALCIUM SERPL-MCNC: 8.1 MG/DL (ref 8.6–10.2)
CHLORIDE SERPL-SCNC: 105 MMOL/L (ref 98–107)
CO2 SERPL-SCNC: 24 MMOL/L (ref 22–29)
CREAT SERPL-MCNC: 1.1 MG/DL (ref 0.7–1.2)
EOSINOPHIL # BLD: 0.17 K/UL (ref 0.05–0.5)
EOSINOPHILS RELATIVE PERCENT: 3 % (ref 0–6)
ERYTHROCYTE [DISTWIDTH] IN BLOOD BY AUTOMATED COUNT: 13.8 % (ref 11.5–15)
GFR SERPL CREATININE-BSD FRML MDRD: >60 ML/MIN/1.73M2
GLUCOSE SERPL-MCNC: 308 MG/DL (ref 74–99)
HCT VFR BLD AUTO: 38.2 % (ref 37–54)
HGB BLD-MCNC: 12.9 G/DL (ref 12.5–16.5)
IMM GRANULOCYTES # BLD AUTO: <0.03 K/UL (ref 0–0.58)
IMM GRANULOCYTES NFR BLD: 0 % (ref 0–5)
LYMPHOCYTES NFR BLD: 2.46 K/UL (ref 1.5–4)
LYMPHOCYTES RELATIVE PERCENT: 39 % (ref 20–42)
MCH RBC QN AUTO: 28.9 PG (ref 26–35)
MCHC RBC AUTO-ENTMCNC: 33.8 G/DL (ref 32–34.5)
MCV RBC AUTO: 85.7 FL (ref 80–99.9)
METER GLUCOSE: 284 MG/DL (ref 74–99)
METER GLUCOSE: 293 MG/DL (ref 74–99)
METER GLUCOSE: 309 MG/DL (ref 74–99)
METER GLUCOSE: 371 MG/DL (ref 74–99)
MONOCYTES NFR BLD: 0.5 K/UL (ref 0.1–0.95)
MONOCYTES NFR BLD: 8 % (ref 2–12)
NEUTROPHILS NFR BLD: 50 % (ref 43–80)
NEUTS SEG NFR BLD: 3.18 K/UL (ref 1.8–7.3)
PLATELET # BLD AUTO: 108 K/UL (ref 130–450)
PMV BLD AUTO: 11.8 FL (ref 7–12)
POTASSIUM SERPL-SCNC: 4.9 MMOL/L (ref 3.5–5)
RBC # BLD AUTO: 4.46 M/UL (ref 3.8–5.8)
SODIUM SERPL-SCNC: 137 MMOL/L (ref 132–146)
WBC OTHER # BLD: 6.4 K/UL (ref 4.5–11.5)

## 2023-07-27 PROCEDURE — 6360000002 HC RX W HCPCS: Performed by: STUDENT IN AN ORGANIZED HEALTH CARE EDUCATION/TRAINING PROGRAM

## 2023-07-27 PROCEDURE — 1200000000 HC SEMI PRIVATE

## 2023-07-27 PROCEDURE — 6370000000 HC RX 637 (ALT 250 FOR IP): Performed by: INTERNAL MEDICINE

## 2023-07-27 PROCEDURE — 99231 SBSQ HOSP IP/OBS SF/LOW 25: CPT | Performed by: NURSE PRACTITIONER

## 2023-07-27 PROCEDURE — 80048 BASIC METABOLIC PNL TOTAL CA: CPT

## 2023-07-27 PROCEDURE — 85027 COMPLETE CBC AUTOMATED: CPT

## 2023-07-27 PROCEDURE — 99222 1ST HOSP IP/OBS MODERATE 55: CPT | Performed by: INTERNAL MEDICINE

## 2023-07-27 PROCEDURE — 99232 SBSQ HOSP IP/OBS MODERATE 35: CPT | Performed by: INTERNAL MEDICINE

## 2023-07-27 PROCEDURE — 82947 ASSAY GLUCOSE BLOOD QUANT: CPT

## 2023-07-27 PROCEDURE — 6370000000 HC RX 637 (ALT 250 FOR IP): Performed by: STUDENT IN AN ORGANIZED HEALTH CARE EDUCATION/TRAINING PROGRAM

## 2023-07-27 PROCEDURE — 2580000003 HC RX 258: Performed by: STUDENT IN AN ORGANIZED HEALTH CARE EDUCATION/TRAINING PROGRAM

## 2023-07-27 PROCEDURE — 73718 MRI LOWER EXTREMITY W/O DYE: CPT

## 2023-07-27 PROCEDURE — 2580000003 HC RX 258

## 2023-07-27 PROCEDURE — 83036 HEMOGLOBIN GLYCOSYLATED A1C: CPT

## 2023-07-27 PROCEDURE — 94640 AIRWAY INHALATION TREATMENT: CPT

## 2023-07-27 PROCEDURE — 6370000000 HC RX 637 (ALT 250 FOR IP)

## 2023-07-27 PROCEDURE — 93923 UPR/LXTR ART STDY 3+ LVLS: CPT

## 2023-07-27 RX ORDER — INSULIN LISPRO 100 [IU]/ML
12 INJECTION, SOLUTION INTRAVENOUS; SUBCUTANEOUS
Status: DISCONTINUED | OUTPATIENT
Start: 2023-07-27 | End: 2023-07-27

## 2023-07-27 RX ORDER — SERTRALINE HYDROCHLORIDE 100 MG/1
50 TABLET, FILM COATED ORAL EVERY MORNING
Status: DISCONTINUED | OUTPATIENT
Start: 2023-07-27 | End: 2023-08-04 | Stop reason: HOSPADM

## 2023-07-27 RX ORDER — ALBUTEROL SULFATE 2.5 MG/3ML
2.5 SOLUTION RESPIRATORY (INHALATION) EVERY 6 HOURS PRN
Status: DISCONTINUED | OUTPATIENT
Start: 2023-07-27 | End: 2023-08-04 | Stop reason: HOSPADM

## 2023-07-27 RX ORDER — ATORVASTATIN CALCIUM 40 MG/1
40 TABLET, FILM COATED ORAL NIGHTLY
Status: DISCONTINUED | OUTPATIENT
Start: 2023-07-27 | End: 2023-08-04 | Stop reason: HOSPADM

## 2023-07-27 RX ORDER — OXYCODONE HYDROCHLORIDE AND ACETAMINOPHEN 5; 325 MG/1; MG/1
1 TABLET ORAL EVERY 6 HOURS PRN
Status: DISCONTINUED | OUTPATIENT
Start: 2023-07-27 | End: 2023-08-03

## 2023-07-27 RX ORDER — NICOTINE 21 MG/24HR
1 PATCH, TRANSDERMAL 24 HOURS TRANSDERMAL DAILY
Status: DISCONTINUED | OUTPATIENT
Start: 2023-07-27 | End: 2023-08-04 | Stop reason: HOSPADM

## 2023-07-27 RX ORDER — INSULIN GLARGINE 100 [IU]/ML
45 INJECTION, SOLUTION SUBCUTANEOUS NIGHTLY
Status: DISCONTINUED | OUTPATIENT
Start: 2023-07-27 | End: 2023-07-29

## 2023-07-27 RX ORDER — INSULIN GLARGINE 100 [IU]/ML
52 INJECTION, SOLUTION SUBCUTANEOUS NIGHTLY
Status: DISCONTINUED | OUTPATIENT
Start: 2023-07-27 | End: 2023-07-27

## 2023-07-27 RX ORDER — LISINOPRIL 10 MG/1
10 TABLET ORAL DAILY
Status: DISCONTINUED | OUTPATIENT
Start: 2023-07-27 | End: 2023-08-04 | Stop reason: HOSPADM

## 2023-07-27 RX ORDER — INSULIN LISPRO 100 [IU]/ML
15 INJECTION, SOLUTION INTRAVENOUS; SUBCUTANEOUS
Status: DISCONTINUED | OUTPATIENT
Start: 2023-07-27 | End: 2023-07-28

## 2023-07-27 RX ORDER — ALBUTEROL SULFATE 90 UG/1
2 AEROSOL, METERED RESPIRATORY (INHALATION) EVERY 4 HOURS PRN
Status: DISCONTINUED | OUTPATIENT
Start: 2023-07-27 | End: 2023-07-27 | Stop reason: CLARIF

## 2023-07-27 RX ORDER — LEVOTHYROXINE SODIUM 0.03 MG/1
25 TABLET ORAL DAILY
Status: DISCONTINUED | OUTPATIENT
Start: 2023-07-27 | End: 2023-08-04 | Stop reason: HOSPADM

## 2023-07-27 RX ORDER — INSULIN LISPRO 100 [IU]/ML
0-18 INJECTION, SOLUTION INTRAVENOUS; SUBCUTANEOUS
Status: DISCONTINUED | OUTPATIENT
Start: 2023-07-27 | End: 2023-08-04 | Stop reason: HOSPADM

## 2023-07-27 RX ORDER — NICOTINE 21 MG/24HR
1 PATCH, TRANSDERMAL 24 HOURS TRANSDERMAL DAILY
Status: DISCONTINUED | OUTPATIENT
Start: 2023-07-27 | End: 2023-07-27 | Stop reason: CLARIF

## 2023-07-27 RX ORDER — BUDESONIDE AND FORMOTEROL FUMARATE DIHYDRATE 80; 4.5 UG/1; UG/1
2 AEROSOL RESPIRATORY (INHALATION) 2 TIMES DAILY
Status: DISCONTINUED | OUTPATIENT
Start: 2023-07-27 | End: 2023-07-27 | Stop reason: CLARIF

## 2023-07-27 RX ORDER — FENOFIBRATE 160 MG/1
160 TABLET ORAL DAILY
Status: DISCONTINUED | OUTPATIENT
Start: 2023-07-27 | End: 2023-08-04 | Stop reason: HOSPADM

## 2023-07-27 RX ORDER — PREGABALIN 50 MG/1
100 CAPSULE ORAL 2 TIMES DAILY
Status: DISCONTINUED | OUTPATIENT
Start: 2023-07-27 | End: 2023-08-04 | Stop reason: HOSPADM

## 2023-07-27 RX ORDER — 0.9 % SODIUM CHLORIDE 0.9 %
250 INTRAVENOUS SOLUTION INTRAVENOUS ONCE
Status: COMPLETED | OUTPATIENT
Start: 2023-07-27 | End: 2023-07-27

## 2023-07-27 RX ORDER — ARFORMOTEROL TARTRATE 15 UG/2ML
15 SOLUTION RESPIRATORY (INHALATION)
Status: DISCONTINUED | OUTPATIENT
Start: 2023-07-27 | End: 2023-08-04 | Stop reason: HOSPADM

## 2023-07-27 RX ORDER — BUDESONIDE 0.5 MG/2ML
0.5 INHALANT ORAL
Status: DISCONTINUED | OUTPATIENT
Start: 2023-07-27 | End: 2023-08-04 | Stop reason: HOSPADM

## 2023-07-27 RX ORDER — ONDANSETRON 4 MG/1
4 TABLET, FILM COATED ORAL EVERY 8 HOURS PRN
Status: DISCONTINUED | OUTPATIENT
Start: 2023-07-27 | End: 2023-07-27 | Stop reason: SDUPTHER

## 2023-07-27 RX ADMIN — SODIUM CHLORIDE: 9 INJECTION, SOLUTION INTRAVENOUS at 23:32

## 2023-07-27 RX ADMIN — SODIUM CHLORIDE: 9 INJECTION, SOLUTION INTRAVENOUS at 10:34

## 2023-07-27 RX ADMIN — ACETAMINOPHEN 650 MG: 325 TABLET ORAL at 23:34

## 2023-07-27 RX ADMIN — INSULIN LISPRO 15 UNITS: 100 INJECTION, SOLUTION INTRAVENOUS; SUBCUTANEOUS at 17:14

## 2023-07-27 RX ADMIN — BUDESONIDE 500 MCG: 0.5 SUSPENSION RESPIRATORY (INHALATION) at 20:27

## 2023-07-27 RX ADMIN — OXYCODONE AND ACETAMINOPHEN 1 TABLET: 5; 325 TABLET ORAL at 00:18

## 2023-07-27 RX ADMIN — INSULIN GLARGINE 45 UNITS: 100 INJECTION, SOLUTION SUBCUTANEOUS at 21:00

## 2023-07-27 RX ADMIN — METOPROLOL TARTRATE 25 MG: 25 TABLET, FILM COATED ORAL at 08:36

## 2023-07-27 RX ADMIN — ARFORMOTEROL TARTRATE 15 MCG: 15 SOLUTION RESPIRATORY (INHALATION) at 09:59

## 2023-07-27 RX ADMIN — INSULIN LISPRO 8 UNITS: 100 INJECTION, SOLUTION INTRAVENOUS; SUBCUTANEOUS at 12:01

## 2023-07-27 RX ADMIN — INSULIN LISPRO 15 UNITS: 100 INJECTION, SOLUTION INTRAVENOUS; SUBCUTANEOUS at 17:13

## 2023-07-27 RX ADMIN — BUDESONIDE 500 MCG: 0.5 SUSPENSION RESPIRATORY (INHALATION) at 09:59

## 2023-07-27 RX ADMIN — ENOXAPARIN SODIUM 30 MG: 100 INJECTION SUBCUTANEOUS at 21:00

## 2023-07-27 RX ADMIN — FENOFIBRATE 160 MG: 160 TABLET ORAL at 08:36

## 2023-07-27 RX ADMIN — SERTRALINE 50 MG: 100 TABLET, FILM COATED ORAL at 08:36

## 2023-07-27 RX ADMIN — ENOXAPARIN SODIUM 30 MG: 100 INJECTION SUBCUTANEOUS at 08:36

## 2023-07-27 RX ADMIN — PREGABALIN 100 MG: 50 CAPSULE ORAL at 21:00

## 2023-07-27 RX ADMIN — LEVOTHYROXINE SODIUM 25 MCG: 25 TABLET ORAL at 08:36

## 2023-07-27 RX ADMIN — LISINOPRIL 10 MG: 10 TABLET ORAL at 08:36

## 2023-07-27 RX ADMIN — ENOXAPARIN SODIUM 30 MG: 100 INJECTION SUBCUTANEOUS at 00:25

## 2023-07-27 RX ADMIN — SODIUM CHLORIDE 250 ML: 9 INJECTION, SOLUTION INTRAVENOUS at 22:15

## 2023-07-27 RX ADMIN — PREGABALIN 100 MG: 50 CAPSULE ORAL at 08:36

## 2023-07-27 RX ADMIN — ACETAMINOPHEN 650 MG: 325 TABLET ORAL at 15:56

## 2023-07-27 RX ADMIN — SODIUM CHLORIDE, PRESERVATIVE FREE 10 ML: 5 INJECTION INTRAVENOUS at 00:28

## 2023-07-27 RX ADMIN — ATORVASTATIN CALCIUM 40 MG: 40 TABLET, FILM COATED ORAL at 20:18

## 2023-07-27 RX ADMIN — ARFORMOTEROL TARTRATE 15 MCG: 15 SOLUTION RESPIRATORY (INHALATION) at 20:27

## 2023-07-27 RX ADMIN — OXYCODONE AND ACETAMINOPHEN 1 TABLET: 5; 325 TABLET ORAL at 06:05

## 2023-07-27 RX ADMIN — INSULIN LISPRO 8 UNITS: 100 INJECTION, SOLUTION INTRAVENOUS; SUBCUTANEOUS at 08:41

## 2023-07-27 RX ADMIN — OXYCODONE AND ACETAMINOPHEN 1 TABLET: 5; 325 TABLET ORAL at 19:15

## 2023-07-27 RX ADMIN — SODIUM CHLORIDE, PRESERVATIVE FREE 10 ML: 5 INJECTION INTRAVENOUS at 20:22

## 2023-07-27 RX ADMIN — SODIUM CHLORIDE: 9 INJECTION, SOLUTION INTRAVENOUS at 00:23

## 2023-07-27 RX ADMIN — OXYCODONE AND ACETAMINOPHEN 1 TABLET: 5; 325 TABLET ORAL at 12:25

## 2023-07-27 RX ADMIN — INSULIN LISPRO 4 UNITS: 100 INJECTION, SOLUTION INTRAVENOUS; SUBCUTANEOUS at 00:24

## 2023-07-27 ASSESSMENT — PAIN SCALES - GENERAL
PAINLEVEL_OUTOF10: 9
PAINLEVEL_OUTOF10: 4
PAINLEVEL_OUTOF10: 10
PAINLEVEL_OUTOF10: 5
PAINLEVEL_OUTOF10: 10
PAINLEVEL_OUTOF10: 7
PAINLEVEL_OUTOF10: 4
PAINLEVEL_OUTOF10: 9

## 2023-07-27 ASSESSMENT — ENCOUNTER SYMPTOMS
COLOR CHANGE: 0
VOMITING: 0
COUGH: 0
NAUSEA: 0
SHORTNESS OF BREATH: 0

## 2023-07-27 ASSESSMENT — PAIN DESCRIPTION - ORIENTATION
ORIENTATION: LEFT

## 2023-07-27 ASSESSMENT — PAIN DESCRIPTION - DESCRIPTORS
DESCRIPTORS: ACHING;BURNING;THROBBING
DESCRIPTORS: STABBING;BURNING
DESCRIPTORS: ACHING;THROBBING
DESCRIPTORS: ACHING

## 2023-07-27 ASSESSMENT — PAIN DESCRIPTION - LOCATION
LOCATION: LEG;FOOT
LOCATION: FOOT;LEG
LOCATION: FOOT;LEG
LOCATION: FOOT
LOCATION: FOOT
LOCATION: FOOT;LEG

## 2023-07-27 ASSESSMENT — PAIN - FUNCTIONAL ASSESSMENT: PAIN_FUNCTIONAL_ASSESSMENT: PREVENTS OR INTERFERES SOME ACTIVE ACTIVITIES AND ADLS

## 2023-07-27 ASSESSMENT — PAIN DESCRIPTION - ONSET: ONSET: ON-GOING

## 2023-07-27 ASSESSMENT — PAIN DESCRIPTION - FREQUENCY: FREQUENCY: CONTINUOUS

## 2023-07-27 ASSESSMENT — PAIN DESCRIPTION - PAIN TYPE: TYPE: NEUROPATHIC PAIN;CHRONIC PAIN

## 2023-07-27 NOTE — CARE COORDINATION
Pt independent from home. with niece and her spouse. PCP Alo Miller. has ww at home rarely uses. Admitted for diabetic foot infection. Await podiatry /ID plan. Pt may require home needs post discharge. Offered choices. Prefers [facility]; referral made , will follow. Will need orders. pt is a diabetic; has supplies, admits to noncompliance. Will have diabetic educator see. Plan is to return home at d/c, needs TBD. Will follow. Maggie Coffman.

## 2023-07-27 NOTE — PROGRESS NOTES
Spoke with Dr. Dania Adam re: , pt NPO and per ss due for 12u insulin. Order received to give 8u insulin.

## 2023-07-27 NOTE — PROGRESS NOTES
Consult diabetic wound. Podiatry following. Will defer to them  Pravin Saleh.  Kishan Way, CNS, Wound Care

## 2023-07-27 NOTE — PLAN OF CARE
Problem: Discharge Planning  Goal: Discharge to home or other facility with appropriate resources  7/27/2023 0227 by Bruna Harada, RN  Outcome: Progressing  7/27/2023 0226 by Bruna Harada, RN  Outcome: Progressing  7/27/2023 0226 by Bruna Harada, RN  Outcome: Progressing     Problem: Pain  Goal: Verbalizes/displays adequate comfort level or baseline comfort level  7/27/2023 0227 by Bruna Harada, RN  Outcome: Progressing  7/27/2023 0226 by Bruna Harada, RN  Outcome: Progressing  7/27/2023 0226 by Bruna Harada, RN  Outcome: Progressing  Flowsheets (Taken 7/26/2023 2337)  Verbalizes/displays adequate comfort level or baseline comfort level:   Encourage patient to monitor pain and request assistance   Assess pain using appropriate pain scale   Administer analgesics based on type and severity of pain and evaluate response   Implement non-pharmacological measures as appropriate and evaluate response   Notify Licensed Independent Practitioner if interventions unsuccessful or patient reports new pain     Problem: Safety - Adult  Goal: Free from fall injury  7/27/2023 0227 by Bruna Harada, RN  Outcome: Progressing  7/27/2023 0226 by Bruna Harada, RN  Outcome: Progressing  7/27/2023 0226 by Bruna Harada, RN  Outcome: Progressing     Problem: Skin/Tissue Integrity  Goal: Absence of new skin breakdown  Description: 1. Monitor for areas of redness and/or skin breakdown  2. Assess vascular access sites hourly  3. Every 4-6 hours minimum:  Change oxygen saturation probe site  4. Every 4-6 hours:  If on nasal continuous positive airway pressure, respiratory therapy assess nares and determine need for appliance change or resting period.   7/27/2023 0227 by Bruna Harada, RN  Outcome: Progressing  7/27/2023 0226 by Bruna Harada, RN  Outcome: Progressing

## 2023-07-27 NOTE — PROGRESS NOTES
ENDOCRINOLOGY INITIAL CONSULTATION NOTE      Date of admission: 7/26/2023  Date of service: 7/27/2023  Admitting physician: August Guajardo MD   Primary Care Physician: Marciano Galvan DO  Consultant physician: Reji Omalley MD     Reason for the consultation:  Uncontrolled DM    History of Present Illness: The history is provided by the patient. Accuracy of the patient data is excellent    Kasia Fuentes is a very pleasant 66 y.o. old male with PMH uncontrolled DM type 2, obesity, HTN, HLD and other listed below admitted to [facility] on 7/26/2023 because of chills and Lt foot pain osteomyelitis. Endocrine service was consulted for diabetes management. Prior to admission  The patient was diagnosed with type 2  DM long time ago. Prior to admission patient was on Lantus 20u BID, Humalog per sliding scale but reports poor compliance with insulin therapy. Patient has had no hypoglycemic episodes. Patient has not been eating consistent carbohydrate meals, self-blood glucose monitoring has been above  goal prior to admission. In addition, patient reports both macrovascular and microvascular complications.  He is over due with the yearly diabetic eye exam.   Lab Results   Component Value Date/Time    LABA1C 12.6 04/03/2023 01:10 PM     Inpatient diet:   Carb Restricted diet     Point of care glucose monitoring   (Independently reviewed)   Recent Labs     07/26/23  1606 07/26/23 2029 07/26/23  2352 07/27/23  0607 07/27/23  1112 07/27/23  1652   GLUMET >500* 489* 394* 309* 284* 371*     Past medical history:   Past Medical History:   Diagnosis Date    Anxiety     COPD (chronic obstructive pulmonary disease) ([address])     Depression     DM (diabetes mellitus) ([address])     Frostbite     HLD (hyperlipidemia)     HTN (hypertension)     Sleep apnea      Past surgical history:  Past Surgical History:   Procedure Laterality Date    BACK SURGERY      CHOLECYSTECTOMY      CORONARY ARTERY BYPASS GRAFT REDO      2 vessel    FOOT DEBRIDEMENT Right 4/25/2022    DELAYED PRIMARY CLOSURE RIGHT FOOT WOUND performed by Valery Henry DPM at [address] Right 5/7/2022    FOOT DEBRIDEMENT INCISION AND DRAINAGE performed by Tayo Porter DPM at [address] Right 5/10/2022    RIGHT FOOT DELAYED PRIMARY CLOSURE WITH POSSIBLE DEBRIDEMENT    ++CONTACT ISOLATION++   (DR AVAIL. AT 4PM) performed by Tayo Porter DPM at [address] PICC LINE  5/9/2022         TOE AMPUTATION Right 4/22/2022    AMPUTATION RIGHT SECOND TOE performed by Valery Henry DPM at [facility]         Social history:   Tobacco:   reports that he has been smoking cigarettes. He started smoking about 43 years ago. He has a 22.50 pack-year smoking history. He does not have any smokeless tobacco history on file. Alcohol:   reports that he does not currently use alcohol. Drugs:   reports that he does not currently use drugs after having used the following drugs: Marijuana Roderick Perez).     Family history:    Family History   Problem Relation Age of Onset    Diabetes Mother     Dementia Mother     Diabetes Father     Stroke Father     Diabetes Sister     Heart Attack Brother        Allergy and drug reactions:   No Known Allergies    Scheduled Meds:   atorvastatin  40 mg Oral Nightly    fenofibrate  160 mg Oral Daily    levothyroxine  25 mcg Oral Daily    lisinopril  10 mg Oral Daily    metoprolol tartrate  25 mg Oral BID    pregabalin  100 mg Oral BID    sertraline  50 mg Oral QAM    nicotine  1 patch TransDERmal Daily    arformoterol tartrate  15 mcg Nebulization BID RT    And    budesonide  0.5 mg Nebulization BID RT    insulin glargine  45 Units SubCUTAneous Nightly    insulin lispro  0-18 Units SubCUTAneous TID WC    insulin lispro  15 Units SubCUTAneous TID WC    promethazine  25 mg IntraMUSCular Once    sodium chloride flush  5-40 mL IntraVENous 2 times per day    enoxaparin  30 mg SubCUTAneous BID    insulin lispro  0-4 Units VENOUS   Final Result   No evidence of DVT in either lower extremity. XR CHEST PORTABLE   Final Result   No acute process. Mild cardiomegaly. XR FOOT LEFT (MIN 3 VIEWS)   Final Result   1. Soft tissue ulceration of the lateral forefoot. Focal lucency in the   distal 5th metatarsal may represent early osteomyelitis. Consider   confirmation with forefoot MRI. Medical Records/Labs/Images review:   I personally reviewed and summarized previous records   All labs and imaging were reviewed independently     [address], a 66 y.o.-old male seen today for inpatient diabetes management     Diabetes Mellitus type 2   Pt admit poor compliance with diet   will change diabetes regimen to:  Lantus 45 u nightly   Humalog 15u with meals   High  dose sliding scale with meals and at night   Continue glucose check with meals and at bedtime   Will titrate insulin dose based on the blood glucose trend & insulin requirement  Pt will follow with us after discharge    Dietary noncompliance  Discussed with patient the importance of eating consistent carbohydrate meals, avoiding high glycemic index food. Also, discussed with patient the risk and negative consequences of dietary noncompliance on blood glucose control, blood pressure and weight    HLD  Lipitor 40 mg daily     Primary hypothyroidism   Continue Levothyroxine 25 mcg daily     The above issues were reviewed with the patient who understood and agreed with the plan. Thank you for allowing us to participate in the care of this patient. Please do not hesitate to contact us with any additional questions. Yuriy Burnett MD  Endocrinologist, [facility] Diabetes Care and Endocrinology   [address] [place] [address]   Phone: [phone]  Fax: [phone]  --------------------------------------------  An electronic signature was used to authenticate this note.  Garima Adhikari MD on 7/27/2023 at 7:50 PM

## 2023-07-27 NOTE — PROCEDURES
7/26/23 9:44 pm called three times for MRI screening form to be completed. Last call, spoke to Teri Nicholas and she said she would let RN Ezekiel Groves know we need it completed.

## 2023-07-27 NOTE — CONSULTS
Department of Podiatry   Consult Note        Reason for Consult:  Left foot wound  CHIEF COMPLAINT:  Infected left foot    HISTORY OF PRESENT ILLNESS:       is a 66 y.o. male with significant past medical history of COPD,DM,HTN,HLD,Depression. Podiatry consulted for evaluation of left foot wound. Patient states that he has a wound on his left foot the has been present for over a month. He says the wound is painful on occasion and now he is concerned that the wound might be infected. He would like to be treated/evaluated today. Patient admits to n/f/c and has no other pedal complaints at this time. Past Medical History:        Diagnosis Date    Anxiety     COPD (chronic obstructive pulmonary disease) ([address]     Depression     DM (diabetes mellitus) ([address])     Frostbite     HLD (hyperlipidemia)     HTN (hypertension)     Sleep apnea        Past Surgical History:        Procedure Laterality Date    BACK SURGERY      CHOLECYSTECTOMY      CORONARY ARTERY BYPASS GRAFT REDO      2 vessel    FOOT DEBRIDEMENT Right 4/25/2022    DELAYED PRIMARY CLOSURE RIGHT FOOT WOUND performed by Cheyanne Culp DPM at [address] [place] [address] Right 5/7/2022    FOOT DEBRIDEMENT INCISION AND DRAINAGE performed by Jody Hart DPM at [address] [place] [address] Right 5/10/2022    RIGHT FOOT DELAYED PRIMARY CLOSURE WITH POSSIBLE DEBRIDEMENT    ++CONTACT ISOLATION++   (DR AVAIL. AT 4PM) performed by Jody Hart DPM at [address] [facility] PICC LINE  5/9/2022         TOE AMPUTATION Right 4/22/2022    AMPUTATION RIGHT SECOND TOE performed by Cehyanne Culp DPM at [facility]         Medications Prior to Admission:    Not in a hospital admission. Allergies:  Patient has no known allergies. Social History:   TOBACCO:   reports that he has been smoking cigarettes. He started smoking about 43 years ago. He has a 22.50 pack-year smoking history. He does not have any smokeless tobacco history on file.   ETOH: reports that he does not currently use alcohol. DRUGS:   Social History     Substance and Sexual Activity   Drug Use Not Currently    Types: Marijuana (Weed)    Comment: States last use last summer       Family History:       Problem Relation Age of Onset    Diabetes Mother     Dementia Mother     Diabetes Father     Stroke Father     Diabetes Sister     Heart Attack Brother          REVIEW OF SYSTEMS:    All pertinent positives and negatives as noted in HPI       LOWER EXTREMITY EXAMINATION     VASCULAR:  DP and PT pulses are not palpable. CFT < 5 seconds B/L. Warm to warm from the tibial tuberosity to the distal aspect of the digits dorsally. Hair growth absent to the distal aspects dorsally. NEUROLOGIC:  Protective sensation is diminished by grossly intact    DERM:  Skin is xerotic to the bilateral lower extremities. Left foot has positive edema, mild erythema, hyperkeratotic tissue noted to foot b/l. There is a 2.0cm x 2.0cm x 0.4cm wound present to the lateral 5th metatarsal base of the left foot. Wound is erythematous with hyperkeratotic rim and necrotic tissue. There is mild purulence, no malodor. Toenails 1-5 b/l are abnormal in thickness, color and length. Webspaces 1-4 b/l are C/D/I. MUSCULOSKELETAL: Right 2nd digit amputation. 5/5 Gross Muscle strength in all 4 quadrants. CONSULTS:  IP CONSULT TO PODIATRY  IP CONSULT TO INFECTIOUS DISEASES    MEDICATION:  Scheduled Meds:   promethazine  25 mg IntraMUSCular Once     Continuous Infusions:    PRN Meds:. RADIOLOGY:  XR CHEST PORTABLE   Final Result   No acute process. Mild cardiomegaly. XR FOOT LEFT (MIN 3 VIEWS)   Final Result   1. Soft tissue ulceration of the lateral forefoot. Focal lucency in the   distal 5th metatarsal may represent early osteomyelitis. Consider   confirmation with forefoot MRI.          MRI FOOT LEFT WO CONTRAST    (Results Pending)   VL LOWER EXTREMITY ARTERIAL SEGMENTAL PRESSURES W PPG    (Results

## 2023-07-27 NOTE — H&P
[facility] History and Physical      CHIEF COMPLAINT: Left foot pain    History of Present Illness: 59-year-old male with a past medical history of insulin-dependent diabetes, history of left foot OM, hyperlipidemia, and hypertension presents emergency department for left foot pain. Patient saw his podiatrist yesterday which recommended him to come to the ED for concern of left osteomyelitis. Patient was sent home by his podiatrist with Bactrim. However, patient did not fill his prescription so has not taken any antibiotics. Podiatry consulted and recommend to defer antibiotics until biopsy is performed. Patient denies fevers, night was, nausea, vomiting, or diarrhea. He does state that his left foot wound has been present for over a month. Informant(s) for H&P: Patient    REVIEW OF SYSTEMS:  A comprehensive review of systems was negative except for: what is in the HPI      PMH:  Past Medical History:   Diagnosis Date    Anxiety     COPD (chronic obstructive pulmonary disease) ([address])     Depression     DM (diabetes mellitus) ([address])     Frostbite     HLD (hyperlipidemia)     HTN (hypertension)     Sleep apnea        Surgical History:  Past Surgical History:   Procedure Laterality Date    BACK SURGERY      CHOLECYSTECTOMY      CORONARY ARTERY BYPASS GRAFT REDO      2 vessel    FOOT DEBRIDEMENT Right 4/25/2022    DELAYED PRIMARY CLOSURE RIGHT FOOT WOUND performed by Alexey Howard DPM at [address] Right 5/7/2022    FOOT DEBRIDEMENT INCISION AND DRAINAGE performed by Willam Santiago DPM at [address] Right 5/10/2022    RIGHT FOOT DELAYED PRIMARY CLOSURE WITH POSSIBLE DEBRIDEMENT    ++CONTACT ISOLATION++   (DR AVAIL.  AT 4PM) performed by Willam Santiago DPM at [address] PICC LINE  5/9/2022         TOE AMPUTATION Right 4/22/2022    AMPUTATION RIGHT SECOND TOE performed by Alexey Hwoard DPM at [facility]

## 2023-07-28 PROBLEM — E11.65 UNCONTROLLED TYPE 2 DIABETES MELLITUS WITH HYPERGLYCEMIA (HCC): Status: ACTIVE | Noted: 2023-07-28

## 2023-07-28 LAB
EKG ATRIAL RATE: 64 BPM
EKG P AXIS: 66 DEGREES
EKG P-R INTERVAL: 240 MS
EKG Q-T INTERVAL: 390 MS
EKG QRS DURATION: 100 MS
EKG QTC CALCULATION (BAZETT): 402 MS
EKG R AXIS: 31 DEGREES
EKG T AXIS: 45 DEGREES
EKG VENTRICULAR RATE: 64 BPM
HBA1C MFR BLD: 12.2 % (ref 4–5.6)
METER GLUCOSE: 177 MG/DL (ref 74–99)
METER GLUCOSE: 200 MG/DL (ref 74–99)
METER GLUCOSE: 227 MG/DL (ref 74–99)
METER GLUCOSE: 258 MG/DL (ref 74–99)

## 2023-07-28 PROCEDURE — 99232 SBSQ HOSP IP/OBS MODERATE 35: CPT | Performed by: INTERNAL MEDICINE

## 2023-07-28 PROCEDURE — 6360000002 HC RX W HCPCS: Performed by: STUDENT IN AN ORGANIZED HEALTH CARE EDUCATION/TRAINING PROGRAM

## 2023-07-28 PROCEDURE — 6370000000 HC RX 637 (ALT 250 FOR IP)

## 2023-07-28 PROCEDURE — 6370000000 HC RX 637 (ALT 250 FOR IP): Performed by: INTERNAL MEDICINE

## 2023-07-28 PROCEDURE — 93010 ELECTROCARDIOGRAM REPORT: CPT | Performed by: INTERNAL MEDICINE

## 2023-07-28 PROCEDURE — 1200000000 HC SEMI PRIVATE

## 2023-07-28 PROCEDURE — 99231 SBSQ HOSP IP/OBS SF/LOW 25: CPT | Performed by: NURSE PRACTITIONER

## 2023-07-28 PROCEDURE — 94640 AIRWAY INHALATION TREATMENT: CPT

## 2023-07-28 PROCEDURE — 82947 ASSAY GLUCOSE BLOOD QUANT: CPT

## 2023-07-28 PROCEDURE — 6370000000 HC RX 637 (ALT 250 FOR IP): Performed by: STUDENT IN AN ORGANIZED HEALTH CARE EDUCATION/TRAINING PROGRAM

## 2023-07-28 PROCEDURE — 2580000003 HC RX 258: Performed by: STUDENT IN AN ORGANIZED HEALTH CARE EDUCATION/TRAINING PROGRAM

## 2023-07-28 RX ORDER — INSULIN GLARGINE 100 [IU]/ML
15 INJECTION, SOLUTION SUBCUTANEOUS EVERY MORNING
Status: DISCONTINUED | OUTPATIENT
Start: 2023-07-28 | End: 2023-08-04

## 2023-07-28 RX ORDER — INSULIN LISPRO 100 [IU]/ML
22 INJECTION, SOLUTION INTRAVENOUS; SUBCUTANEOUS
Status: DISCONTINUED | OUTPATIENT
Start: 2023-07-28 | End: 2023-08-04

## 2023-07-28 RX ORDER — INSULIN LISPRO 100 [IU]/ML
20 INJECTION, SOLUTION INTRAVENOUS; SUBCUTANEOUS
Status: DISCONTINUED | OUTPATIENT
Start: 2023-07-28 | End: 2023-07-28

## 2023-07-28 RX ADMIN — PREGABALIN 100 MG: 50 CAPSULE ORAL at 09:20

## 2023-07-28 RX ADMIN — LEVOTHYROXINE SODIUM 25 MCG: 25 TABLET ORAL at 06:20

## 2023-07-28 RX ADMIN — OXYCODONE AND ACETAMINOPHEN 1 TABLET: 5; 325 TABLET ORAL at 12:49

## 2023-07-28 RX ADMIN — INSULIN LISPRO 6 UNITS: 100 INJECTION, SOLUTION INTRAVENOUS; SUBCUTANEOUS at 06:28

## 2023-07-28 RX ADMIN — ENOXAPARIN SODIUM 30 MG: 100 INJECTION SUBCUTANEOUS at 19:52

## 2023-07-28 RX ADMIN — BUDESONIDE 500 MCG: 0.5 SUSPENSION RESPIRATORY (INHALATION) at 08:36

## 2023-07-28 RX ADMIN — METOPROLOL TARTRATE 25 MG: 25 TABLET, FILM COATED ORAL at 09:20

## 2023-07-28 RX ADMIN — INSULIN GLARGINE 45 UNITS: 100 INJECTION, SOLUTION SUBCUTANEOUS at 20:04

## 2023-07-28 RX ADMIN — FENOFIBRATE 160 MG: 160 TABLET ORAL at 09:20

## 2023-07-28 RX ADMIN — INSULIN LISPRO 22 UNITS: 100 INJECTION, SOLUTION INTRAVENOUS; SUBCUTANEOUS at 12:51

## 2023-07-28 RX ADMIN — INSULIN LISPRO 3 UNITS: 100 INJECTION, SOLUTION INTRAVENOUS; SUBCUTANEOUS at 16:13

## 2023-07-28 RX ADMIN — SODIUM CHLORIDE: 9 INJECTION, SOLUTION INTRAVENOUS at 09:34

## 2023-07-28 RX ADMIN — OXYCODONE AND ACETAMINOPHEN 1 TABLET: 5; 325 TABLET ORAL at 19:53

## 2023-07-28 RX ADMIN — INSULIN LISPRO 22 UNITS: 100 INJECTION, SOLUTION INTRAVENOUS; SUBCUTANEOUS at 16:13

## 2023-07-28 RX ADMIN — LISINOPRIL 10 MG: 10 TABLET ORAL at 09:20

## 2023-07-28 RX ADMIN — SERTRALINE 50 MG: 100 TABLET, FILM COATED ORAL at 09:20

## 2023-07-28 RX ADMIN — INSULIN LISPRO 9 UNITS: 100 INJECTION, SOLUTION INTRAVENOUS; SUBCUTANEOUS at 12:50

## 2023-07-28 RX ADMIN — INSULIN GLARGINE 15 UNITS: 100 INJECTION, SOLUTION SUBCUTANEOUS at 09:21

## 2023-07-28 RX ADMIN — PREGABALIN 100 MG: 50 CAPSULE ORAL at 19:52

## 2023-07-28 RX ADMIN — OXYCODONE AND ACETAMINOPHEN 1 TABLET: 5; 325 TABLET ORAL at 01:20

## 2023-07-28 RX ADMIN — METOPROLOL TARTRATE 25 MG: 25 TABLET, FILM COATED ORAL at 19:52

## 2023-07-28 RX ADMIN — ARFORMOTEROL TARTRATE 15 MCG: 15 SOLUTION RESPIRATORY (INHALATION) at 08:36

## 2023-07-28 RX ADMIN — SODIUM CHLORIDE, PRESERVATIVE FREE 10 ML: 5 INJECTION INTRAVENOUS at 19:56

## 2023-07-28 RX ADMIN — ATORVASTATIN CALCIUM 40 MG: 40 TABLET, FILM COATED ORAL at 19:52

## 2023-07-28 RX ADMIN — ENOXAPARIN SODIUM 30 MG: 100 INJECTION SUBCUTANEOUS at 09:20

## 2023-07-28 RX ADMIN — INSULIN LISPRO 20 UNITS: 100 INJECTION, SOLUTION INTRAVENOUS; SUBCUTANEOUS at 09:28

## 2023-07-28 ASSESSMENT — PAIN DESCRIPTION - LOCATION
LOCATION: FOOT;LEG
LOCATION: FOOT

## 2023-07-28 ASSESSMENT — PAIN DESCRIPTION - ORIENTATION
ORIENTATION: LEFT

## 2023-07-28 ASSESSMENT — PAIN SCALES - GENERAL
PAINLEVEL_OUTOF10: 7
PAINLEVEL_OUTOF10: 1
PAINLEVEL_OUTOF10: 10
PAINLEVEL_OUTOF10: 9
PAINLEVEL_OUTOF10: 9

## 2023-07-28 ASSESSMENT — PAIN DESCRIPTION - DESCRIPTORS
DESCRIPTORS: ACHING;BURNING;THROBBING
DESCRIPTORS: SHOOTING
DESCRIPTORS: ACHING;SORE

## 2023-07-28 ASSESSMENT — PAIN - FUNCTIONAL ASSESSMENT: PAIN_FUNCTIONAL_ASSESSMENT: ACTIVITIES ARE NOT PREVENTED

## 2023-07-28 NOTE — CARE COORDINATION
Pt for OR Monday, 7/31, ID following; observe off of atb's for now. [facility] following for poss d/c needs. Would need orders. Need MRI/arterial studies completed. Diab educator following. Needs at d/c TBD. Sal Sender.

## 2023-07-28 NOTE — PROGRESS NOTES
[facility] Progress Note    Admitting Date and Time: 7/26/2023  4:09 PM  Admit Dx: Dehydration [E86.0]  Osteomyelitis ([address]) [M86.9]  Hyperglycemia [R73.9]  Osteomyelitis of ankle or foot, left, acute ([address]) [M86.172]  Osteomyelitis of left foot, unspecified type ([address]) [M86.9]    Subjective:  Patient is being followed for Dehydration [E86.0]  Osteomyelitis ([address]) [M86.9]  Hyperglycemia [R73.9]  Osteomyelitis of ankle or foot, left, acute ([address]) [M86.172]  Osteomyelitis of left foot, unspecified type ([address]) [M86.9]     Patient seen Rossy Han in bed. Alert and in no distress. Patient complains of pain. Left foot is wrapped with Kerlix. No acute issues. ROS: denies fever, chills, cp, sob, n/v, HA unless stated above.       insulin glargine  15 Units SubCUTAneous QAM    insulin lispro  20 Units SubCUTAneous TID WC    atorvastatin  40 mg Oral Nightly    fenofibrate  160 mg Oral Daily    levothyroxine  25 mcg Oral Daily    lisinopril  10 mg Oral Daily    metoprolol tartrate  25 mg Oral BID    pregabalin  100 mg Oral BID    sertraline  50 mg Oral QAM    nicotine  1 patch TransDERmal Daily    arformoterol tartrate  15 mcg Nebulization BID RT    And    budesonide  0.5 mg Nebulization BID RT    insulin glargine  45 Units SubCUTAneous Nightly    insulin lispro  0-18 Units SubCUTAneous TID WC    promethazine  25 mg IntraMUSCular Once    sodium chloride flush  5-40 mL IntraVENous 2 times per day    enoxaparin  30 mg SubCUTAneous BID    insulin lispro  0-4 Units SubCUTAneous Nightly     oxyCODONE-acetaminophen, 1 tablet, Q6H PRN  albuterol, 2.5 mg, Q6H PRN  sodium chloride flush, 5-40 mL, PRN  sodium chloride, , PRN  ondansetron, 4 mg, Q8H PRN   Or  ondansetron, 4 mg, Q6H PRN  polyethylene glycol, 17 g, Daily PRN  acetaminophen, 650 mg, Q6H PRN   Or  acetaminophen, 650 mg, Q6H PRN  dextrose bolus, 125 mL, PRN   Or  dextrose bolus, 250 mL, PRN  glucagon (rDNA), 1 mg, PRN  dextrose, , Continuous PRN  Glucose, that time- MRI was completed and podiatry reviewed and ruled out OM. Repeat MRI of the left foot shows signal abnormality in the distal fifth metatarsal and proximal phalanx suggestive of early OM given adjacent soft tissue ulceration. Vascular studies ordered. Tentatively plan the left foot bone biopsy versus partial fifth ray amputation on 7/31. We will keep patient n.p.o. after midnight on Sunday evening-podiatry and ID input appreciated  DM uncontrolled with hyperglycemia:Hx non compliance with insulin and monitoring blood sugars at home. Blood sugar 704 on arrival-no clinical signs of DKA. Last Hgb a1c from April 12.6%. Continue Lantus/prandial insulin/high-dose SSI. Carb controlled diet and hypoglycemic protocol-endocrinology input appreciated  Pseudohyponatremia likely 2/2 hyperglycemia: Resolved after treatment of blood sugars. Continue to monitor BMP. CKD: baseline appears to be 1.1-1.3. Remains at baseline. Monitor BMP. Hyperkalemia: Resolved. Monitor BMP  Hypothyroidism: continue synthroid  HTN: continue ace and BB  HLD: Continue statin  Copd: No acute exacerbation. Continue breathing treatment  Tobacco use: Continue nicotine patch    In review of the EMR, evaluation, management, and diagnosis. Care plan has been discussed with attending. Time spent 25 minutes       NOTE: This report was transcribed using voice recognition software. Every effort was made to ensure accuracy; however, inadvertent computerized transcription errors may be present.   Electronically signed by CHRIST Vincent CNP on 7/28/2023 at 12:02 PM  HOSPITALIST ATTENDING PHYSICIAN NOTE 7/28/2023 1839PM:    Details of the evaluation - subjective assessment (including medication profile, past medical, family and social history when applicable), examination, review of lab and test data, diagnostic impressions and medical decision making - performed by CHRIST Vincent CNP, were discussed with me on the date of service and I agree with clinical information herein unless otherwise noted. The patient has been evaluated by me personally earlier today. Pt reports no fevers, chills,n/v.     Exam: heart reg at rate of 76, lungs cta, abd pos bs soft nt, ext neg for le edema     I agree with the assessment and plan of CHRIST Ledbetter CNP    27 min spent reviewing records, interviewing/examining pt, analyzing data, discussing with nursing, formulating treatment plan as noted in NP's note. Chronic left diabetic foot ulcer with possible osteomyelitis   Dm type 2 uncontrolled  Hyperkalemia  Hypothyroidism  Htn  Hyperlipidemia  copd        Electronically signed by Greta Busby D.O.   Hospitalist  [facility]

## 2023-07-28 NOTE — PROGRESS NOTES
Reason for consult: Hyperglycemia     A1C: 12.6%     [] Not available                     Patient states the following concerns/barriers to diabetes self-management:     [x] None       [] Medication cost   [] Food cost/availability        [] Reading  [] Hearing   [] Vision                [] Work    [] Transportation  [] No insurance  [] Physical limitations    [] Other:        Patient states the following about their diabetes/health:   []  Has had DM many years, received education multiple times as both inpatient and outpatient   [] Drinks pop and sweet tea, both diet and regular   [] Admits to missing doses of insulin   [] Eats sporadically throughout the day, 1-2 meals and snacks, overeats at meals, diet very high sugar    [] No physical activity        Diabetes survival packet provided to:   [x] Patient     [] Other:    Information reviewed:   Definition of diabetes   Target glucose ranges/A1C   Self-monitoring of blood glucose   Prevention/symptoms/treatment of hypo-/hyperglycemia   Medication adherence   The plate method/meal planning guidelines   The benefits of exercise and recommendations   Reducing the risk of chronic complications      Diabetes medications reviewed (use, purpose, action): Lantus and humalog sliding scale             Post-education Assessment  [x]  Attentive to teaching  [x]  Answered questions appropriately when asked   []  Seems able to apply concepts to daily lifestyle  []  Seems motivated to do well  []  Verbalized an understanding of the plate method for portion control   []  Verbalized an understanding of prescribed antidiabetes medications   []  Verbalized an understanding of target glucose ranges/A1C level  []  Expresses an intent to comply with treatment plan   [x]  Showed very little interest in complying with treatment plan   [x]  Seems to have trouble applying concepts to daily lifestyle       COMMENTS:  Alternative beverages to help with glycemic control reviewed.  Diabetes plate method for meal planning encouraged. Benefits of physical activity or increase activities of daily living on blood glucose control provided. Diabetes medications, mechanism of action, timing and side effects reviewed. Patient verbalizes understanding of teaching at this time. Contact information provided for future questions and concerns. Recommendations:   [x] Carbohydrate-controlled diet    [] Script for glucometer and supplies (per preference of patient's insurance)  [] Script for insulin pens and pen needles (if insulin is ordered at discharge for home use)   [] Consult to social work: patient has no insurance or has financial hardship  [] Inpatient consult to endocrinologist   [x] Follow up with endocrinologist as an outpatient   [] Home healthcare nursing to increase compliance to treatment plan   [] Script for outpatient diabetes education classes (from doctor)        Thank you for this consult.         Jaycee Shahid RDN LD

## 2023-07-29 LAB
ANION GAP SERPL CALCULATED.3IONS-SCNC: 9 MMOL/L (ref 7–16)
BUN SERPL-MCNC: 15 MG/DL (ref 6–23)
CALCIUM SERPL-MCNC: 8.2 MG/DL (ref 8.6–10.2)
CHLORIDE SERPL-SCNC: 107 MMOL/L (ref 98–107)
CO2 SERPL-SCNC: 23 MMOL/L (ref 22–29)
CREAT SERPL-MCNC: 0.9 MG/DL (ref 0.7–1.2)
ERYTHROCYTE [DISTWIDTH] IN BLOOD BY AUTOMATED COUNT: 13.4 % (ref 11.5–15)
GFR SERPL CREATININE-BSD FRML MDRD: >60 ML/MIN/1.73M2
GLUCOSE SERPL-MCNC: 172 MG/DL (ref 74–99)
HCT VFR BLD AUTO: 39.3 % (ref 37–54)
HGB BLD-MCNC: 13.2 G/DL (ref 12.5–16.5)
MCH RBC QN AUTO: 28.6 PG (ref 26–35)
MCHC RBC AUTO-ENTMCNC: 33.6 G/DL (ref 32–34.5)
MCV RBC AUTO: 85.1 FL (ref 80–99.9)
METER GLUCOSE: 155 MG/DL (ref 74–99)
METER GLUCOSE: 165 MG/DL (ref 74–99)
METER GLUCOSE: 190 MG/DL (ref 74–99)
METER GLUCOSE: 215 MG/DL (ref 74–99)
PLATELET, FLUORESCENCE: 136 K/UL (ref 130–450)
PMV BLD AUTO: 11.3 FL (ref 7–12)
POTASSIUM SERPL-SCNC: 4.4 MMOL/L (ref 3.5–5)
RBC # BLD AUTO: 4.62 M/UL (ref 3.8–5.8)
SODIUM SERPL-SCNC: 139 MMOL/L (ref 132–146)
WBC OTHER # BLD: 5.7 K/UL (ref 4.5–11.5)

## 2023-07-29 PROCEDURE — 6370000000 HC RX 637 (ALT 250 FOR IP): Performed by: INTERNAL MEDICINE

## 2023-07-29 PROCEDURE — 99232 SBSQ HOSP IP/OBS MODERATE 35: CPT | Performed by: INTERNAL MEDICINE

## 2023-07-29 PROCEDURE — 80048 BASIC METABOLIC PNL TOTAL CA: CPT

## 2023-07-29 PROCEDURE — 6370000000 HC RX 637 (ALT 250 FOR IP): Performed by: STUDENT IN AN ORGANIZED HEALTH CARE EDUCATION/TRAINING PROGRAM

## 2023-07-29 PROCEDURE — 94640 AIRWAY INHALATION TREATMENT: CPT

## 2023-07-29 PROCEDURE — 6360000002 HC RX W HCPCS: Performed by: STUDENT IN AN ORGANIZED HEALTH CARE EDUCATION/TRAINING PROGRAM

## 2023-07-29 PROCEDURE — 82947 ASSAY GLUCOSE BLOOD QUANT: CPT

## 2023-07-29 PROCEDURE — 1200000000 HC SEMI PRIVATE

## 2023-07-29 PROCEDURE — 99231 SBSQ HOSP IP/OBS SF/LOW 25: CPT | Performed by: NURSE PRACTITIONER

## 2023-07-29 PROCEDURE — 6370000000 HC RX 637 (ALT 250 FOR IP)

## 2023-07-29 PROCEDURE — 2580000003 HC RX 258: Performed by: STUDENT IN AN ORGANIZED HEALTH CARE EDUCATION/TRAINING PROGRAM

## 2023-07-29 PROCEDURE — 85027 COMPLETE CBC AUTOMATED: CPT

## 2023-07-29 RX ORDER — INSULIN GLARGINE 100 [IU]/ML
50 INJECTION, SOLUTION SUBCUTANEOUS NIGHTLY
Status: DISCONTINUED | OUTPATIENT
Start: 2023-07-29 | End: 2023-08-04 | Stop reason: HOSPADM

## 2023-07-29 RX ADMIN — INSULIN LISPRO 22 UNITS: 100 INJECTION, SOLUTION INTRAVENOUS; SUBCUTANEOUS at 16:05

## 2023-07-29 RX ADMIN — BUDESONIDE 500 MCG: 0.5 SUSPENSION RESPIRATORY (INHALATION) at 20:57

## 2023-07-29 RX ADMIN — LEVOTHYROXINE SODIUM 25 MCG: 25 TABLET ORAL at 06:09

## 2023-07-29 RX ADMIN — SODIUM CHLORIDE: 9 INJECTION, SOLUTION INTRAVENOUS at 01:10

## 2023-07-29 RX ADMIN — OXYCODONE AND ACETAMINOPHEN 1 TABLET: 5; 325 TABLET ORAL at 08:38

## 2023-07-29 RX ADMIN — METOPROLOL TARTRATE 25 MG: 25 TABLET, FILM COATED ORAL at 08:38

## 2023-07-29 RX ADMIN — SERTRALINE 50 MG: 100 TABLET, FILM COATED ORAL at 08:38

## 2023-07-29 RX ADMIN — OXYCODONE AND ACETAMINOPHEN 1 TABLET: 5; 325 TABLET ORAL at 21:54

## 2023-07-29 RX ADMIN — OXYCODONE AND ACETAMINOPHEN 1 TABLET: 5; 325 TABLET ORAL at 16:02

## 2023-07-29 RX ADMIN — INSULIN LISPRO 3 UNITS: 100 INJECTION, SOLUTION INTRAVENOUS; SUBCUTANEOUS at 16:05

## 2023-07-29 RX ADMIN — SODIUM CHLORIDE, PRESERVATIVE FREE 10 ML: 5 INJECTION INTRAVENOUS at 10:35

## 2023-07-29 RX ADMIN — INSULIN GLARGINE 15 UNITS: 100 INJECTION, SOLUTION SUBCUTANEOUS at 08:37

## 2023-07-29 RX ADMIN — LISINOPRIL 10 MG: 10 TABLET ORAL at 08:38

## 2023-07-29 RX ADMIN — ARFORMOTEROL TARTRATE 15 MCG: 15 SOLUTION RESPIRATORY (INHALATION) at 20:57

## 2023-07-29 RX ADMIN — ENOXAPARIN SODIUM 30 MG: 100 INJECTION SUBCUTANEOUS at 08:37

## 2023-07-29 RX ADMIN — SODIUM CHLORIDE, PRESERVATIVE FREE 10 ML: 5 INJECTION INTRAVENOUS at 21:46

## 2023-07-29 RX ADMIN — INSULIN LISPRO 22 UNITS: 100 INJECTION, SOLUTION INTRAVENOUS; SUBCUTANEOUS at 08:38

## 2023-07-29 RX ADMIN — FENOFIBRATE 160 MG: 160 TABLET ORAL at 08:38

## 2023-07-29 RX ADMIN — ATORVASTATIN CALCIUM 40 MG: 40 TABLET, FILM COATED ORAL at 21:43

## 2023-07-29 RX ADMIN — ENOXAPARIN SODIUM 30 MG: 100 INJECTION SUBCUTANEOUS at 21:42

## 2023-07-29 RX ADMIN — PREGABALIN 100 MG: 50 CAPSULE ORAL at 08:38

## 2023-07-29 RX ADMIN — OXYCODONE AND ACETAMINOPHEN 1 TABLET: 5; 325 TABLET ORAL at 02:07

## 2023-07-29 RX ADMIN — INSULIN LISPRO 6 UNITS: 100 INJECTION, SOLUTION INTRAVENOUS; SUBCUTANEOUS at 11:20

## 2023-07-29 RX ADMIN — PREGABALIN 100 MG: 50 CAPSULE ORAL at 21:42

## 2023-07-29 RX ADMIN — INSULIN LISPRO 22 UNITS: 100 INJECTION, SOLUTION INTRAVENOUS; SUBCUTANEOUS at 11:20

## 2023-07-29 RX ADMIN — INSULIN GLARGINE 50 UNITS: 100 INJECTION, SOLUTION SUBCUTANEOUS at 21:43

## 2023-07-29 RX ADMIN — METOPROLOL TARTRATE 25 MG: 25 TABLET, FILM COATED ORAL at 21:43

## 2023-07-29 ASSESSMENT — PAIN DESCRIPTION - DESCRIPTORS: DESCRIPTORS: ACHING;DISCOMFORT

## 2023-07-29 ASSESSMENT — PAIN SCALES - GENERAL
PAINLEVEL_OUTOF10: 9

## 2023-07-29 ASSESSMENT — PAIN DESCRIPTION - ORIENTATION: ORIENTATION: LEFT

## 2023-07-29 ASSESSMENT — PAIN DESCRIPTION - LOCATION
LOCATION: FOOT
LOCATION: LEG;BACK

## 2023-07-29 NOTE — PROGRESS NOTES
[facility] Progress Note    Admitting Date and Time: 7/26/2023  4:09 PM  Admit Dx: Dehydration [E86.0]  Osteomyelitis ([address]) [M86.9]  Hyperglycemia [R73.9]  Osteomyelitis of ankle or foot, left, acute ([address]) [M86.172]  Osteomyelitis of left foot, unspecified type ([address]) [M86.9]    Subjective:  Patient is being followed for Dehydration [E86.0]  Osteomyelitis ([address]) [M86.9]  Hyperglycemia [R73.9]  Osteomyelitis of ankle or foot, left, acute ([address]) [M86.172]  Osteomyelitis of left foot, unspecified type ([address]) [M86.9]     Patient seen Vargas Jacob in bed. He is alert and in no apparent distress. Patient has no complaints of pain at this time. No acute issues overnight. ROS: denies fever, chills, cp, sob, n/v, HA unless stated above.       insulin glargine  50 Units SubCUTAneous Nightly    insulin glargine  15 Units SubCUTAneous QAM    insulin lispro  22 Units SubCUTAneous TID WC    atorvastatin  40 mg Oral Nightly    fenofibrate  160 mg Oral Daily    levothyroxine  25 mcg Oral Daily    lisinopril  10 mg Oral Daily    metoprolol tartrate  25 mg Oral BID    pregabalin  100 mg Oral BID    sertraline  50 mg Oral QAM    nicotine  1 patch TransDERmal Daily    arformoterol tartrate  15 mcg Nebulization BID RT    And    budesonide  0.5 mg Nebulization BID RT    insulin lispro  0-18 Units SubCUTAneous TID WC    promethazine  25 mg IntraMUSCular Once    sodium chloride flush  5-40 mL IntraVENous 2 times per day    enoxaparin  30 mg SubCUTAneous BID    insulin lispro  0-4 Units SubCUTAneous Nightly     oxyCODONE-acetaminophen, 1 tablet, Q6H PRN  albuterol, 2.5 mg, Q6H PRN  sodium chloride flush, 5-40 mL, PRN  sodium chloride, , PRN  ondansetron, 4 mg, Q8H PRN   Or  ondansetron, 4 mg, Q6H PRN  polyethylene glycol, 17 g, Daily PRN  acetaminophen, 650 mg, Q6H PRN   Or  acetaminophen, 650 mg, Q6H PRN  dextrose bolus, 125 mL, PRN   Or  dextrose bolus, 250 mL, PRN  glucagon (rDNA), 1 mg, PRN  dextrose, , Continuous

## 2023-07-29 NOTE — PLAN OF CARE
Problem: Pain  Goal: Verbalizes/displays adequate comfort level or baseline comfort level  Outcome: Progressing     Problem: Safety - Adult  Goal: Free from fall injury  Outcome: Progressing  Flowsheets (Taken 7/28/2023 1039 by Obie Elliott RN)  Free From Fall Injury: Instruct family/caregiver on patient safety     Problem: Skin/Tissue Integrity  Goal: Absence of new skin breakdown  Description: 1. Monitor for areas of redness and/or skin breakdown  2. Assess vascular access sites hourly  3. Every 4-6 hours minimum:  Change oxygen saturation probe site  4. Every 4-6 hours:  If on nasal continuous positive airway pressure, respiratory therapy assess nares and determine need for appliance change or resting period.   Outcome: Progressing

## 2023-07-29 NOTE — PROGRESS NOTES
ENDOCRINOLOGY PROGRESS NOTE      Date of admission: 7/26/2023  Date of service: 7/29/2023  Admitting physician: Marylen Mix, MD   Primary Care Physician: uRth Carmona DO  Consultant physician: Calderon Glass MD     Reason for the consultation:  Uncontrolled DM    History of Present Illness: The history is provided by the patient. Accuracy of the patient data is excellent    Henrik Fuentes is a very pleasant 66 y.o. old male with PMH uncontrolled DM type 2, obesity, HTN, HLD and other listed below admitted to [facility] on 7/26/2023 because of chills and Lt foot pain osteomyelitis. Endocrine service was consulted for diabetes management. Subjective   Patient was seen and examined at bedside this morning. No acute events overnight.   Blood sugar improving but still above goal.      Inpatient diet:   Carb Restricted diet     Point of care glucose monitoring   (Independently reviewed)   Recent Labs     07/27/23  1112 07/27/23  1652 07/27/23  2017 07/28/23  0621 07/28/23  1108 07/28/23  1604 07/28/23  1944 07/29/23  0607   GLUMET 284* 371* 293* 227* 258* 177* 200* 190*     Scheduled Meds:   insulin glargine  50 Units SubCUTAneous Nightly    insulin glargine  15 Units SubCUTAneous QAM    insulin lispro  22 Units SubCUTAneous TID WC    atorvastatin  40 mg Oral Nightly    fenofibrate  160 mg Oral Daily    levothyroxine  25 mcg Oral Daily    lisinopril  10 mg Oral Daily    metoprolol tartrate  25 mg Oral BID    pregabalin  100 mg Oral BID    sertraline  50 mg Oral QAM    nicotine  1 patch TransDERmal Daily    arformoterol tartrate  15 mcg Nebulization BID RT    And    budesonide  0.5 mg Nebulization BID RT    insulin lispro  0-18 Units SubCUTAneous TID WC    promethazine  25 mg IntraMUSCular Once    sodium chloride flush  5-40 mL IntraVENous 2 times per day    enoxaparin  30 mg SubCUTAneous BID    insulin lispro  0-4 Units SubCUTAneous Nightly       PRN Meds:   oxyCODONE-acetaminophen, 1 tablet, Q6H PRN  albuterol, 2.5 ALKPHOS 115  --   --    AST 21  --   --    ALT 17  --   --      Beta-Hydroxybutyrate   Date Value Ref Range Status   07/26/2023 0.10 0.02 - 0.27 mmol/L Final   04/03/2023 0.32 (H) 0.02 - 0.27 mmol/L Final   01/30/2023 0.20 0.02 - 0.27 mmol/L Final     Lab Results   Component Value Date/Time    LABA1C 12.2 07/27/2023 06:35 AM    LABA1C 12.6 04/03/2023 01:10 PM    LABA1C 8.7 11/10/2022 02:45 AM     Lab Results   Component Value Date/Time    TSH 1.790 04/05/2023 02:05 AM    T4FREE 1.05 11/10/2022 03:25 PM     Lab Results   Component Value Date/Time    LABA1C 12.2 07/27/2023 06:35 AM    GLUCOSE 172 07/29/2023 01:59 AM     No results found for: TRIG, HDL, LDLCALC, CHOL    Blood culture   Lab Results   Component Value Date/Time    BC 5 Days no growth 07/03/2023 04:42 PM    BC 5 Days no growth 04/03/2023 10:52 AM       Radiology:  MRI FOOT LEFT WO CONTRAST   Final Result   1. Signal abnormality in the distal 5th metatarsal and 5th proximal phalanx   suggestive of early osteomyelitis given the adjacent soft tissue ulceration. VL LOWER EXTREMITY ARTERIAL SEGMENTAL PRESSURES W PPG   Final Result   1. Ankle brachial index on the right was 1.3 than on the left was 1.4. Preserved multiphasic waveforms at all levels evaluated on arterial Doppler   evaluation. This finding lower suspicion for the presence of a high-grade   stenosis. 2.  PVR evaluation of the bilateral lower extremity show preserved   pulsatility with varying degrees of mild decreased amplitude. 3.  Digital waveform evaluation of the remaining digits of the bilateral feet   demonstrate preserved pulsatility. Varying degrees of mild decreased   amplitude on the right. Mild decreased amplitude in the left 1st, 2nd, and   5th digits. Moderate decreased amplitude in the 3rd and 4th digits. These   changes probably reflect the presence of underlying microvascular disease.    There may be slightly decreased blood flow to digits 3 and 4 on the left.         US DUP LOWER EXTREMITIES BILATERAL VENOUS   Final Result   No evidence of DVT in either lower extremity. XR CHEST PORTABLE   Final Result   No acute process. Mild cardiomegaly. XR FOOT LEFT (MIN 3 VIEWS)   Final Result   1. Soft tissue ulceration of the lateral forefoot. Focal lucency in the   distal 5th metatarsal may represent early osteomyelitis. Consider   confirmation with forefoot MRI. Medical Records/Labs/Images review:   I personally reviewed and summarized previous records   All labs and imaging were reviewed independently     [address] May, a 66 y.o.-old male seen today for inpatient diabetes management     Diabetes Mellitus type 2   Pt admit poor compliance with diet   will change diabetes regimen to: Increase Lantus 15u AM, 50 u nightly   Humalog 22u with meals   High  dose sliding scale with meals and at night   Continue glucose check with meals and at bedtime   Will titrate insulin dose based on the blood glucose trend & insulin requirement  Pt will follow with us after discharge    Dietary noncompliance  Discussed with patient the importance of eating consistent carbohydrate meals, avoiding high glycemic index food. Also, discussed with patient the risk and negative consequences of dietary noncompliance on blood glucose control, blood pressure and weight    HLD  Lipitor 40 mg daily     Primary hypothyroidism   Continue Levothyroxine 25 mcg daily     The above issues were reviewed with the patient who understood and agreed with the plan. Thank you for allowing us to participate in the care of this patient. Please do not hesitate to contact us with any additional questions. Sidney Jimenez MD  Endocrinologist, [facility]   [facility] [id number]   Phone: [phone]  Fax: [phone]  --------------------------------------------  An electronic signature was used to authenticate this note. Dulce Pike MD on 7/29/2023 at 9:25 AM

## 2023-07-30 LAB
ANION GAP SERPL CALCULATED.3IONS-SCNC: 8 MMOL/L (ref 7–16)
BUN SERPL-MCNC: 14 MG/DL (ref 6–23)
CALCIUM SERPL-MCNC: 8 MG/DL (ref 8.6–10.2)
CHLORIDE SERPL-SCNC: 106 MMOL/L (ref 98–107)
CO2 SERPL-SCNC: 24 MMOL/L (ref 22–29)
CREAT SERPL-MCNC: 0.9 MG/DL (ref 0.7–1.2)
ERYTHROCYTE [DISTWIDTH] IN BLOOD BY AUTOMATED COUNT: 13.5 % (ref 11.5–15)
GFR SERPL CREATININE-BSD FRML MDRD: >60 ML/MIN/1.73M2
GLUCOSE SERPL-MCNC: 200 MG/DL (ref 74–99)
HCT VFR BLD AUTO: 38.7 % (ref 37–54)
HGB BLD-MCNC: 13.2 G/DL (ref 12.5–16.5)
MCH RBC QN AUTO: 29 PG (ref 26–35)
MCHC RBC AUTO-ENTMCNC: 34.1 G/DL (ref 32–34.5)
MCV RBC AUTO: 85.1 FL (ref 80–99.9)
METER GLUCOSE: 123 MG/DL (ref 74–99)
METER GLUCOSE: 136 MG/DL (ref 74–99)
METER GLUCOSE: 160 MG/DL (ref 74–99)
METER GLUCOSE: 168 MG/DL (ref 74–99)
PLATELET # BLD AUTO: 145 K/UL (ref 130–450)
PMV BLD AUTO: 12 FL (ref 7–12)
POTASSIUM SERPL-SCNC: 4 MMOL/L (ref 3.5–5)
RBC # BLD AUTO: 4.55 M/UL (ref 3.8–5.8)
SODIUM SERPL-SCNC: 138 MMOL/L (ref 132–146)
WBC OTHER # BLD: 6.5 K/UL (ref 4.5–11.5)

## 2023-07-30 PROCEDURE — 2580000003 HC RX 258: Performed by: STUDENT IN AN ORGANIZED HEALTH CARE EDUCATION/TRAINING PROGRAM

## 2023-07-30 PROCEDURE — 6370000000 HC RX 637 (ALT 250 FOR IP): Performed by: INTERNAL MEDICINE

## 2023-07-30 PROCEDURE — 82947 ASSAY GLUCOSE BLOOD QUANT: CPT

## 2023-07-30 PROCEDURE — 94640 AIRWAY INHALATION TREATMENT: CPT

## 2023-07-30 PROCEDURE — 99232 SBSQ HOSP IP/OBS MODERATE 35: CPT | Performed by: INTERNAL MEDICINE

## 2023-07-30 PROCEDURE — 1200000000 HC SEMI PRIVATE

## 2023-07-30 PROCEDURE — 6370000000 HC RX 637 (ALT 250 FOR IP)

## 2023-07-30 PROCEDURE — 6360000002 HC RX W HCPCS: Performed by: STUDENT IN AN ORGANIZED HEALTH CARE EDUCATION/TRAINING PROGRAM

## 2023-07-30 PROCEDURE — 85027 COMPLETE CBC AUTOMATED: CPT

## 2023-07-30 PROCEDURE — 99231 SBSQ HOSP IP/OBS SF/LOW 25: CPT | Performed by: NURSE PRACTITIONER

## 2023-07-30 PROCEDURE — 6370000000 HC RX 637 (ALT 250 FOR IP): Performed by: STUDENT IN AN ORGANIZED HEALTH CARE EDUCATION/TRAINING PROGRAM

## 2023-07-30 PROCEDURE — 80048 BASIC METABOLIC PNL TOTAL CA: CPT

## 2023-07-30 RX ADMIN — SODIUM CHLORIDE, PRESERVATIVE FREE 10 ML: 5 INJECTION INTRAVENOUS at 09:05

## 2023-07-30 RX ADMIN — FENOFIBRATE 160 MG: 160 TABLET ORAL at 09:01

## 2023-07-30 RX ADMIN — INSULIN LISPRO 3 UNITS: 100 INJECTION, SOLUTION INTRAVENOUS; SUBCUTANEOUS at 16:23

## 2023-07-30 RX ADMIN — LEVOTHYROXINE SODIUM 25 MCG: 25 TABLET ORAL at 05:16

## 2023-07-30 RX ADMIN — SODIUM CHLORIDE, PRESERVATIVE FREE 10 ML: 5 INJECTION INTRAVENOUS at 21:03

## 2023-07-30 RX ADMIN — INSULIN LISPRO 22 UNITS: 100 INJECTION, SOLUTION INTRAVENOUS; SUBCUTANEOUS at 11:56

## 2023-07-30 RX ADMIN — OXYCODONE AND ACETAMINOPHEN 1 TABLET: 5; 325 TABLET ORAL at 11:53

## 2023-07-30 RX ADMIN — SERTRALINE 50 MG: 100 TABLET, FILM COATED ORAL at 09:01

## 2023-07-30 RX ADMIN — ATORVASTATIN CALCIUM 40 MG: 40 TABLET, FILM COATED ORAL at 21:02

## 2023-07-30 RX ADMIN — PREGABALIN 100 MG: 50 CAPSULE ORAL at 09:01

## 2023-07-30 RX ADMIN — ARFORMOTEROL TARTRATE 15 MCG: 15 SOLUTION RESPIRATORY (INHALATION) at 20:49

## 2023-07-30 RX ADMIN — OXYCODONE AND ACETAMINOPHEN 1 TABLET: 5; 325 TABLET ORAL at 05:15

## 2023-07-30 RX ADMIN — METOPROLOL TARTRATE 25 MG: 25 TABLET, FILM COATED ORAL at 09:01

## 2023-07-30 RX ADMIN — PREGABALIN 100 MG: 50 CAPSULE ORAL at 21:02

## 2023-07-30 RX ADMIN — ENOXAPARIN SODIUM 30 MG: 100 INJECTION SUBCUTANEOUS at 09:01

## 2023-07-30 RX ADMIN — INSULIN GLARGINE 15 UNITS: 100 INJECTION, SOLUTION SUBCUTANEOUS at 09:02

## 2023-07-30 RX ADMIN — LISINOPRIL 10 MG: 10 TABLET ORAL at 09:01

## 2023-07-30 RX ADMIN — OXYCODONE AND ACETAMINOPHEN 1 TABLET: 5; 325 TABLET ORAL at 18:04

## 2023-07-30 RX ADMIN — INSULIN LISPRO 3 UNITS: 100 INJECTION, SOLUTION INTRAVENOUS; SUBCUTANEOUS at 06:19

## 2023-07-30 RX ADMIN — INSULIN LISPRO 22 UNITS: 100 INJECTION, SOLUTION INTRAVENOUS; SUBCUTANEOUS at 16:23

## 2023-07-30 RX ADMIN — INSULIN LISPRO 22 UNITS: 100 INJECTION, SOLUTION INTRAVENOUS; SUBCUTANEOUS at 06:20

## 2023-07-30 RX ADMIN — BUDESONIDE 500 MCG: 0.5 SUSPENSION RESPIRATORY (INHALATION) at 20:49

## 2023-07-30 RX ADMIN — METOPROLOL TARTRATE 25 MG: 25 TABLET, FILM COATED ORAL at 21:02

## 2023-07-30 RX ADMIN — INSULIN GLARGINE 50 UNITS: 100 INJECTION, SOLUTION SUBCUTANEOUS at 21:03

## 2023-07-30 ASSESSMENT — PAIN DESCRIPTION - ORIENTATION
ORIENTATION: LEFT
ORIENTATION: LEFT

## 2023-07-30 ASSESSMENT — PAIN SCALES - GENERAL
PAINLEVEL_OUTOF10: 9

## 2023-07-30 ASSESSMENT — PAIN DESCRIPTION - LOCATION
LOCATION: FOOT

## 2023-07-30 ASSESSMENT — PAIN DESCRIPTION - DESCRIPTORS: DESCRIPTORS: SHOOTING;STABBING

## 2023-07-30 NOTE — PROGRESS NOTES
ENDOCRINOLOGY PROGRESS NOTE      Date of admission: 7/26/2023  Date of service: 7/30/2023  Admitting physician: Bev Thompson MD   Primary Care Physician: Rashaad Henderson DO  Consultant physician: Chaya Peterson MD     Reason for the consultation:  Uncontrolled DM    History of Present Illness: The history is provided by the patient. Accuracy of the patient data is excellent    Teena Fuentes is a very pleasant 66 y.o. old male with PMH uncontrolled DM type 2, obesity, HTN, HLD and other listed below admitted to [facility] on 7/26/2023 because of chills and Lt foot pain osteomyelitis. Endocrine service was consulted for diabetes management. Subjective   The patient was seen and examined this morning. He feels better.   Blood sugar at goal.  No acute events overnight      Inpatient diet:   Carb Restricted diet     Point of care glucose monitoring   (Independently reviewed)   Recent Labs     07/28/23  1108 07/28/23  1604 07/28/23  1944 07/29/23  0607 07/29/23  1118 07/29/23  1603 07/29/23  2138 07/30/23  0617   GLUMET 258* 177* 200* 190* 215* 155* 165* 160*     Scheduled Meds:   insulin glargine  50 Units SubCUTAneous Nightly    insulin glargine  15 Units SubCUTAneous QAM    insulin lispro  22 Units SubCUTAneous TID WC    atorvastatin  40 mg Oral Nightly    fenofibrate  160 mg Oral Daily    levothyroxine  25 mcg Oral Daily    lisinopril  10 mg Oral Daily    metoprolol tartrate  25 mg Oral BID    pregabalin  100 mg Oral BID    sertraline  50 mg Oral QAM    nicotine  1 patch TransDERmal Daily    arformoterol tartrate  15 mcg Nebulization BID RT    And    budesonide  0.5 mg Nebulization BID RT    insulin lispro  0-18 Units SubCUTAneous TID WC    promethazine  25 mg IntraMUSCular Once    sodium chloride flush  5-40 mL IntraVENous 2 times per day    enoxaparin  30 mg SubCUTAneous BID    insulin lispro  0-4 Units SubCUTAneous Nightly       PRN Meds:   oxyCODONE-acetaminophen, 1 tablet, Q6H PRN  albuterol, 2.5 mg, Q6H 07/27/2023 06:35 AM    LABA1C 12.6 04/03/2023 01:10 PM    LABA1C 8.7 11/10/2022 02:45 AM     Lab Results   Component Value Date/Time    TSH 1.790 04/05/2023 02:05 AM    T4FREE 1.05 11/10/2022 03:25 PM     Lab Results   Component Value Date/Time    LABA1C 12.2 07/27/2023 06:35 AM    GLUCOSE 200 07/30/2023 02:06 AM     No results found for: TRIG, HDL, LDLCALC, CHOL    Blood culture   Lab Results   Component Value Date/Time    BC 5 Days no growth 07/03/2023 04:42 PM    BC 5 Days no growth 04/03/2023 10:52 AM       Radiology:  MRI FOOT LEFT WO CONTRAST   Final Result   1. Signal abnormality in the distal 5th metatarsal and 5th proximal phalanx   suggestive of early osteomyelitis given the adjacent soft tissue ulceration. VL LOWER EXTREMITY ARTERIAL SEGMENTAL PRESSURES W PPG   Final Result   1. Ankle brachial index on the right was 1.3 than on the left was 1.4. Preserved multiphasic waveforms at all levels evaluated on arterial Doppler   evaluation. This finding lower suspicion for the presence of a high-grade   stenosis. 2.  PVR evaluation of the bilateral lower extremity show preserved   pulsatility with varying degrees of mild decreased amplitude. 3.  Digital waveform evaluation of the remaining digits of the bilateral feet   demonstrate preserved pulsatility. Varying degrees of mild decreased   amplitude on the right. Mild decreased amplitude in the left 1st, 2nd, and   5th digits. Moderate decreased amplitude in the 3rd and 4th digits. These   changes probably reflect the presence of underlying microvascular disease. There may be slightly decreased blood flow to digits 3 and 4 on the left. US DUP LOWER EXTREMITIES BILATERAL VENOUS   Final Result   No evidence of DVT in either lower extremity. XR CHEST PORTABLE   Final Result   No acute process. Mild cardiomegaly. XR FOOT LEFT (MIN 3 VIEWS)   Final Result   1. Soft tissue ulceration of the lateral forefoot.

## 2023-07-30 NOTE — PROGRESS NOTES
[facility] Progress Note    Admitting Date and Time: 7/26/2023  4:09 PM  Admit Dx: Dehydration [E86.0]  Osteomyelitis ([address]) [M86.9]  Hyperglycemia [R73.9]  Osteomyelitis of ankle or foot, left, acute ([address]) [M86.172]  Osteomyelitis of left foot, unspecified type ([address]) [M86.9]    Subjective:  Patient is being followed for Dehydration [E86.0]  Osteomyelitis ([address]) [M86.9]  Hyperglycemia [R73.9]  Osteomyelitis of ankle or foot, left, acute ([address]) [M86.172]  Osteomyelitis of left foot, unspecified type ([address]) [M86.9]     Patient seen Yarely Lentz in bed. He is alert and in no apparent distress. Patient has no complaints of pain at this time. Patient was instructed that he would be NPO after midnight tonight and he verbalized understanding. No acute issues overnight. ROS: denies fever, chills, cp, sob, n/v, HA unless stated above.       insulin glargine  50 Units SubCUTAneous Nightly    insulin glargine  15 Units SubCUTAneous QAM    insulin lispro  22 Units SubCUTAneous TID WC    atorvastatin  40 mg Oral Nightly    fenofibrate  160 mg Oral Daily    levothyroxine  25 mcg Oral Daily    lisinopril  10 mg Oral Daily    metoprolol tartrate  25 mg Oral BID    pregabalin  100 mg Oral BID    sertraline  50 mg Oral QAM    nicotine  1 patch TransDERmal Daily    arformoterol tartrate  15 mcg Nebulization BID RT    And    budesonide  0.5 mg Nebulization BID RT    insulin lispro  0-18 Units SubCUTAneous TID WC    promethazine  25 mg IntraMUSCular Once    sodium chloride flush  5-40 mL IntraVENous 2 times per day    enoxaparin  30 mg SubCUTAneous BID    insulin lispro  0-4 Units SubCUTAneous Nightly     oxyCODONE-acetaminophen, 1 tablet, Q6H PRN  albuterol, 2.5 mg, Q6H PRN  sodium chloride flush, 5-40 mL, PRN  sodium chloride, , PRN  ondansetron, 4 mg, Q8H PRN   Or  ondansetron, 4 mg, Q6H PRN  polyethylene glycol, 17 g, Daily PRN  acetaminophen, 650 mg, Q6H PRN   Or  acetaminophen, 650 mg, Q6H PRN  dextrose bolus, 125 admission 7/3 to 7/5 in which there was concerns for possible OM of the left foot at that time. Repeat MRI of the left foot 7/27 shows signal abnormality in the distal fifth metatarsal and proximal phalanx suggestive of early OM given adjacent soft tissue ulceration. Vascular studies ordered. Tentatively plan for left foot bone biopsy versus partial fifth ray amputation on 7/31. N.p.o. after midnight tonight-podiatry and ID input appreciated  DM uncontrolled with hyperglycemia:Hx non compliance with insulin and monitoring blood sugars at home. BS elevated on admission. Last Hgb a1c from April 12.6%. Continue Lantus/prandial insulin/high-dose SSI. Carb controlled diet and hypoglycemic protocol-endocrinology input appreciated  Pseudohyponatremia likely 2/2 hyperglycemia: Resolved after treatment of blood sugars. Continue to monitor BMP. CKD: baseline appears to be 1.1-1.3. Remains at baseline. Monitor BMP. Hyperkalemia: Resolved. Monitor BMP  Hypothyroidism: continue synthroid  HTN: continue ace and BB  HLD: Continue statin  Copd: No acute exacerbation. Continue breathing treatment  Tobacco use: Continue nicotine patch    In review of the EMR, evaluation, management, and diagnosis. Care plan has been discussed with attending. Time spent 25 minutes       NOTE: This report was transcribed using voice recognition software. Every effort was made to ensure accuracy; however, inadvertent computerized transcription errors may be present.   Electronically signed by CHRIST Fletcher CNP on 7/30/2023 at 11:26 AM  HOSPITALIST ATTENDING PHYSICIAN NOTE 7/30/2023 1738PM:    Details of the evaluation - subjective assessment (including medication profile, past medical, family and social history when applicable), examination, review of lab and test data, diagnostic impressions and medical decision making - performed by CHRIST Fletcher CNP, were discussed with me on the date of service and I agree with clinical information herein unless otherwise noted. The patient has been evaluated by me personally earlier today. Pt reports no fevers, chills,n/v.     Exam: heart reg at rate of 60, lungs cta, abd pos bs soft nt, ext neg for le edema    I agree with the assessment and plan of CHRIST Brice CNP    27 min spent reviewing records, interviewing/examining pt, analyzing data, discussing with nursing, formulating treatment plan as noted in NP's note. Chronic left diabetic foot ulcer with possible osteomyelitis   Dm type 2 uncontrolled  Hyperkalemia  Hypothyroidism  Htn  Hyperlipidemia  copd      Electronically signed by Renetta Chavira D.O.   Hospitalist  [facility]

## 2023-07-30 NOTE — PLAN OF CARE
Problem: Discharge Planning  Goal: Discharge to home or other facility with appropriate resources  Outcome: Progressing  Flowsheets (Taken 7/30/2023 0011)  Discharge to home or other facility with appropriate resources: Identify barriers to discharge with patient and caregiver     Problem: Pain  Goal: Verbalizes/displays adequate comfort level or baseline comfort level  Outcome: Progressing     Problem: Safety - Adult  Goal: Free from fall injury  Outcome: Progressing     Problem: Skin/Tissue Integrity  Goal: Absence of new skin breakdown  Description: 1. Monitor for areas of redness and/or skin breakdown  2. Assess vascular access sites hourly  3. Every 4-6 hours minimum:  Change oxygen saturation probe site  4. Every 4-6 hours:  If on nasal continuous positive airway pressure, respiratory therapy assess nares and determine need for appliance change or resting period. Outcome: Progressing     Problem: Chronic Conditions and Co-morbidities  Goal: Patient's chronic conditions and co-morbidity symptoms are monitored and maintained or improved  Outcome: Progressing  Flowsheets (Taken 7/30/2023 0011)  Care Plan - Patient's Chronic Conditions and Co-Morbidity Symptoms are Monitored and Maintained or Improved: Monitor and assess patient's chronic conditions and comorbid symptoms for stability, deterioration, or improvement     Problem: Discharge Planning  Goal: Discharge to home or other facility with appropriate resources  Outcome: Progressing  Flowsheets (Taken 7/30/2023 0011)  Discharge to home or other facility with appropriate resources: Identify barriers to discharge with patient and caregiver     Problem: Pain  Goal: Verbalizes/displays adequate comfort level or baseline comfort level  Outcome: Progressing     Problem: Safety - Adult  Goal: Free from fall injury  Outcome: Progressing     Problem: Skin/Tissue Integrity  Goal: Absence of new skin breakdown  Description: 1.   Monitor for areas of redness and/or skin breakdown  2. Assess vascular access sites hourly  3. Every 4-6 hours minimum:  Change oxygen saturation probe site  4. Every 4-6 hours:  If on nasal continuous positive airway pressure, respiratory therapy assess nares and determine need for appliance change or resting period.   Outcome: Progressing     Problem: Chronic Conditions and Co-morbidities  Goal: Patient's chronic conditions and co-morbidity symptoms are monitored and maintained or improved  Outcome: Progressing  Flowsheets (Taken 7/30/2023 0011)  Care Plan - Patient's Chronic Conditions and Co-Morbidity Symptoms are Monitored and Maintained or Improved: Monitor and assess patient's chronic conditions and comorbid symptoms for stability, deterioration, or improvement

## 2023-07-31 ENCOUNTER — ANESTHESIA EVENT (OUTPATIENT)
Dept: OPERATING ROOM | Age: 63
End: 2023-07-31
Payer: MEDICARE

## 2023-07-31 ENCOUNTER — ANESTHESIA (OUTPATIENT)
Dept: OPERATING ROOM | Age: 63
End: 2023-07-31
Payer: MEDICARE

## 2023-07-31 LAB
ANION GAP SERPL CALCULATED.3IONS-SCNC: 9 MMOL/L (ref 7–16)
BUN SERPL-MCNC: 15 MG/DL (ref 6–23)
CALCIUM SERPL-MCNC: 8.1 MG/DL (ref 8.6–10.2)
CHLORIDE SERPL-SCNC: 106 MMOL/L (ref 98–107)
CO2 SERPL-SCNC: 22 MMOL/L (ref 22–29)
CREAT SERPL-MCNC: 0.8 MG/DL (ref 0.7–1.2)
ERYTHROCYTE [DISTWIDTH] IN BLOOD BY AUTOMATED COUNT: 13.5 % (ref 11.5–15)
GFR SERPL CREATININE-BSD FRML MDRD: >60 ML/MIN/1.73M2
GLUCOSE SERPL-MCNC: 216 MG/DL (ref 74–99)
HCT VFR BLD AUTO: 39.5 % (ref 37–54)
HGB BLD-MCNC: 13.5 G/DL (ref 12.5–16.5)
MCH RBC QN AUTO: 28.8 PG (ref 26–35)
MCHC RBC AUTO-ENTMCNC: 34.2 G/DL (ref 32–34.5)
MCV RBC AUTO: 84.4 FL (ref 80–99.9)
METER GLUCOSE: 135 MG/DL (ref 74–99)
METER GLUCOSE: 143 MG/DL (ref 74–99)
METER GLUCOSE: 155 MG/DL (ref 74–99)
METER GLUCOSE: 159 MG/DL (ref 74–99)
METER GLUCOSE: 242 MG/DL (ref 74–99)
PLATELET # BLD AUTO: 141 K/UL (ref 130–450)
PMV BLD AUTO: 11.6 FL (ref 7–12)
POTASSIUM SERPL-SCNC: 4.1 MMOL/L (ref 3.5–5)
RBC # BLD AUTO: 4.68 M/UL (ref 3.8–5.8)
SODIUM SERPL-SCNC: 137 MMOL/L (ref 132–146)
WBC OTHER # BLD: 6.7 K/UL (ref 4.5–11.5)

## 2023-07-31 PROCEDURE — 87205 SMEAR GRAM STAIN: CPT

## 2023-07-31 PROCEDURE — 88311 DECALCIFY TISSUE: CPT

## 2023-07-31 PROCEDURE — 82947 ASSAY GLUCOSE BLOOD QUANT: CPT

## 2023-07-31 PROCEDURE — 6360000002 HC RX W HCPCS: Performed by: PODIATRIST

## 2023-07-31 PROCEDURE — 6370000000 HC RX 637 (ALT 250 FOR IP)

## 2023-07-31 PROCEDURE — 87176 TISSUE HOMOGENIZATION CULTR: CPT

## 2023-07-31 PROCEDURE — 80048 BASIC METABOLIC PNL TOTAL CA: CPT

## 2023-07-31 PROCEDURE — 2580000003 HC RX 258

## 2023-07-31 PROCEDURE — 2500000003 HC RX 250 WO HCPCS

## 2023-07-31 PROCEDURE — 86403 PARTICLE AGGLUT ANTBDY SCRN: CPT

## 2023-07-31 PROCEDURE — 3700000001 HC ADD 15 MINUTES (ANESTHESIA): Performed by: PODIATRIST

## 2023-07-31 PROCEDURE — 87206 SMEAR FLUORESCENT/ACID STAI: CPT

## 2023-07-31 PROCEDURE — 87116 MYCOBACTERIA CULTURE: CPT

## 2023-07-31 PROCEDURE — 6360000002 HC RX W HCPCS

## 2023-07-31 PROCEDURE — 87077 CULTURE AEROBIC IDENTIFY: CPT

## 2023-07-31 PROCEDURE — 7100000011 HC PHASE II RECOVERY - ADDTL 15 MIN: Performed by: PODIATRIST

## 2023-07-31 PROCEDURE — 85027 COMPLETE CBC AUTOMATED: CPT

## 2023-07-31 PROCEDURE — 6370000000 HC RX 637 (ALT 250 FOR IP): Performed by: STUDENT IN AN ORGANIZED HEALTH CARE EDUCATION/TRAINING PROGRAM

## 2023-07-31 PROCEDURE — 99232 SBSQ HOSP IP/OBS MODERATE 35: CPT | Performed by: INTERNAL MEDICINE

## 2023-07-31 PROCEDURE — 88305 TISSUE EXAM BY PATHOLOGIST: CPT

## 2023-07-31 PROCEDURE — 87070 CULTURE OTHR SPECIMN AEROBIC: CPT

## 2023-07-31 PROCEDURE — 2709999900 HC NON-CHARGEABLE SUPPLY: Performed by: PODIATRIST

## 2023-07-31 PROCEDURE — 3600000012 HC SURGERY LEVEL 2 ADDTL 15MIN: Performed by: PODIATRIST

## 2023-07-31 PROCEDURE — 2580000003 HC RX 258: Performed by: SPECIALIST

## 2023-07-31 PROCEDURE — 87015 SPECIMEN INFECT AGNT CONCNTJ: CPT

## 2023-07-31 PROCEDURE — 1200000000 HC SEMI PRIVATE

## 2023-07-31 PROCEDURE — 3600000002 HC SURGERY LEVEL 2 BASE: Performed by: PODIATRIST

## 2023-07-31 PROCEDURE — 7100000010 HC PHASE II RECOVERY - FIRST 15 MIN: Performed by: PODIATRIST

## 2023-07-31 PROCEDURE — 87102 FUNGUS ISOLATION CULTURE: CPT

## 2023-07-31 PROCEDURE — 3700000000 HC ANESTHESIA ATTENDED CARE: Performed by: PODIATRIST

## 2023-07-31 PROCEDURE — 6370000000 HC RX 637 (ALT 250 FOR IP): Performed by: INTERNAL MEDICINE

## 2023-07-31 PROCEDURE — 87075 CULTR BACTERIA EXCEPT BLOOD: CPT

## 2023-07-31 PROCEDURE — 6360000002 HC RX W HCPCS: Performed by: SPECIALIST

## 2023-07-31 PROCEDURE — 2580000003 HC RX 258: Performed by: STUDENT IN AN ORGANIZED HEALTH CARE EDUCATION/TRAINING PROGRAM

## 2023-07-31 PROCEDURE — 0Y6Y0Z0 DETACHMENT AT LEFT 5TH TOE, COMPLETE, OPEN APPROACH: ICD-10-PCS | Performed by: PODIATRIST

## 2023-07-31 RX ORDER — MIDAZOLAM HYDROCHLORIDE 1 MG/ML
INJECTION INTRAMUSCULAR; INTRAVENOUS PRN
Status: DISCONTINUED | OUTPATIENT
Start: 2023-07-31 | End: 2023-07-31 | Stop reason: SDUPTHER

## 2023-07-31 RX ORDER — EPHEDRINE SULFATE/0.9% NACL/PF 50 MG/5 ML
SYRINGE (ML) INTRAVENOUS PRN
Status: DISCONTINUED | OUTPATIENT
Start: 2023-07-31 | End: 2023-07-31 | Stop reason: SDUPTHER

## 2023-07-31 RX ORDER — SODIUM CHLORIDE 9 MG/ML
INJECTION, SOLUTION INTRAVENOUS CONTINUOUS PRN
Status: DISCONTINUED | OUTPATIENT
Start: 2023-07-31 | End: 2023-07-31 | Stop reason: SDUPTHER

## 2023-07-31 RX ORDER — FENTANYL CITRATE 50 UG/ML
INJECTION, SOLUTION INTRAMUSCULAR; INTRAVENOUS PRN
Status: DISCONTINUED | OUTPATIENT
Start: 2023-07-31 | End: 2023-07-31 | Stop reason: SDUPTHER

## 2023-07-31 RX ORDER — SODIUM CHLORIDE 0.9 % (FLUSH) 0.9 %
5-40 SYRINGE (ML) INJECTION EVERY 12 HOURS SCHEDULED
Status: DISCONTINUED | OUTPATIENT
Start: 2023-07-31 | End: 2023-08-04 | Stop reason: HOSPADM

## 2023-07-31 RX ORDER — SODIUM CHLORIDE 9 MG/ML
INJECTION, SOLUTION INTRAVENOUS PRN
Status: DISCONTINUED | OUTPATIENT
Start: 2023-07-31 | End: 2023-08-04 | Stop reason: HOSPADM

## 2023-07-31 RX ORDER — PROPOFOL 10 MG/ML
INJECTION, EMULSION INTRAVENOUS CONTINUOUS PRN
Status: DISCONTINUED | OUTPATIENT
Start: 2023-07-31 | End: 2023-07-31 | Stop reason: SDUPTHER

## 2023-07-31 RX ORDER — PHENYLEPHRINE HCL IN 0.9% NACL 1 MG/10 ML
SYRINGE (ML) INTRAVENOUS PRN
Status: DISCONTINUED | OUTPATIENT
Start: 2023-07-31 | End: 2023-07-31 | Stop reason: SDUPTHER

## 2023-07-31 RX ORDER — GLYCOPYRROLATE 0.2 MG/ML
INJECTION INTRAMUSCULAR; INTRAVENOUS PRN
Status: DISCONTINUED | OUTPATIENT
Start: 2023-07-31 | End: 2023-07-31 | Stop reason: SDUPTHER

## 2023-07-31 RX ORDER — BUPIVACAINE HYDROCHLORIDE 5 MG/ML
INJECTION, SOLUTION EPIDURAL; INTRACAUDAL PRN
Status: DISCONTINUED | OUTPATIENT
Start: 2023-07-31 | End: 2023-07-31 | Stop reason: ALTCHOICE

## 2023-07-31 RX ORDER — SODIUM CHLORIDE 0.9 % (FLUSH) 0.9 %
5-40 SYRINGE (ML) INJECTION PRN
Status: DISCONTINUED | OUTPATIENT
Start: 2023-07-31 | End: 2023-08-04 | Stop reason: HOSPADM

## 2023-07-31 RX ORDER — LIDOCAINE HYDROCHLORIDE 20 MG/ML
INJECTION, SOLUTION INFILTRATION; PERINEURAL PRN
Status: DISCONTINUED | OUTPATIENT
Start: 2023-07-31 | End: 2023-07-31 | Stop reason: SDUPTHER

## 2023-07-31 RX ORDER — KETAMINE HYDROCHLORIDE 10 MG/ML
INJECTION INTRAMUSCULAR; INTRAVENOUS PRN
Status: DISCONTINUED | OUTPATIENT
Start: 2023-07-31 | End: 2023-07-31 | Stop reason: SDUPTHER

## 2023-07-31 RX ADMIN — Medication 100 MCG: at 13:27

## 2023-07-31 RX ADMIN — FENOFIBRATE 160 MG: 160 TABLET ORAL at 08:42

## 2023-07-31 RX ADMIN — Medication 10 MG: at 13:33

## 2023-07-31 RX ADMIN — OXYCODONE AND ACETAMINOPHEN 1 TABLET: 5; 325 TABLET ORAL at 08:47

## 2023-07-31 RX ADMIN — SODIUM CHLORIDE, PRESERVATIVE FREE 10 ML: 5 INJECTION INTRAVENOUS at 08:45

## 2023-07-31 RX ADMIN — PREGABALIN 100 MG: 50 CAPSULE ORAL at 20:24

## 2023-07-31 RX ADMIN — SERTRALINE 50 MG: 100 TABLET, FILM COATED ORAL at 08:42

## 2023-07-31 RX ADMIN — INSULIN GLARGINE 50 UNITS: 100 INJECTION, SOLUTION SUBCUTANEOUS at 20:37

## 2023-07-31 RX ADMIN — ATORVASTATIN CALCIUM 40 MG: 40 TABLET, FILM COATED ORAL at 20:24

## 2023-07-31 RX ADMIN — OXYCODONE AND ACETAMINOPHEN 1 TABLET: 5; 325 TABLET ORAL at 01:28

## 2023-07-31 RX ADMIN — SODIUM CHLORIDE: 9 INJECTION, SOLUTION INTRAVENOUS at 13:07

## 2023-07-31 RX ADMIN — Medication 100 MCG: at 13:20

## 2023-07-31 RX ADMIN — INSULIN LISPRO 3 UNITS: 100 INJECTION, SOLUTION INTRAVENOUS; SUBCUTANEOUS at 06:25

## 2023-07-31 RX ADMIN — OXYCODONE AND ACETAMINOPHEN 1 TABLET: 5; 325 TABLET ORAL at 15:34

## 2023-07-31 RX ADMIN — Medication 10 ML: at 20:32

## 2023-07-31 RX ADMIN — LISINOPRIL 10 MG: 10 TABLET ORAL at 08:41

## 2023-07-31 RX ADMIN — VANCOMYCIN HYDROCHLORIDE 2500 MG: 5 INJECTION, POWDER, LYOPHILIZED, FOR SOLUTION INTRAVENOUS at 15:44

## 2023-07-31 RX ADMIN — ENOXAPARIN SODIUM 30 MG: 100 INJECTION SUBCUTANEOUS at 20:24

## 2023-07-31 RX ADMIN — METOPROLOL TARTRATE 25 MG: 25 TABLET, FILM COATED ORAL at 08:41

## 2023-07-31 RX ADMIN — FENTANYL CITRATE 50 MCG: 50 INJECTION, SOLUTION INTRAMUSCULAR; INTRAVENOUS at 13:12

## 2023-07-31 RX ADMIN — PROPOFOL 100 MCG/KG/MIN: 10 INJECTION, EMULSION INTRAVENOUS at 13:12

## 2023-07-31 RX ADMIN — MIDAZOLAM 1 MG: 1 INJECTION INTRAMUSCULAR; INTRAVENOUS at 13:07

## 2023-07-31 RX ADMIN — GLYCOPYRROLATE 0.1 MG: 0.2 INJECTION, SOLUTION INTRAMUSCULAR; INTRAVENOUS at 13:12

## 2023-07-31 RX ADMIN — LEVOTHYROXINE SODIUM 25 MCG: 25 TABLET ORAL at 06:24

## 2023-07-31 RX ADMIN — VANCOMYCIN HYDROCHLORIDE 1500 MG: 10 INJECTION, POWDER, LYOPHILIZED, FOR SOLUTION INTRAVENOUS at 21:54

## 2023-07-31 RX ADMIN — LIDOCAINE HYDROCHLORIDE 40 MG: 20 INJECTION, SOLUTION INFILTRATION; PERINEURAL at 13:12

## 2023-07-31 RX ADMIN — PREGABALIN 100 MG: 50 CAPSULE ORAL at 08:42

## 2023-07-31 RX ADMIN — KETAMINE HYDROCHLORIDE 30 MG: 10 INJECTION INTRAMUSCULAR; INTRAVENOUS at 13:12

## 2023-07-31 RX ADMIN — Medication 100 MCG: at 13:18

## 2023-07-31 RX ADMIN — WATER 2000 MG: 1 INJECTION INTRAMUSCULAR; INTRAVENOUS; SUBCUTANEOUS at 14:30

## 2023-07-31 ASSESSMENT — PAIN DESCRIPTION - ORIENTATION
ORIENTATION: LEFT

## 2023-07-31 ASSESSMENT — PAIN SCALES - GENERAL
PAINLEVEL_OUTOF10: 8
PAINLEVEL_OUTOF10: 0
PAINLEVEL_OUTOF10: 9

## 2023-07-31 ASSESSMENT — LIFESTYLE VARIABLES: SMOKING_STATUS: 1

## 2023-07-31 ASSESSMENT — PAIN DESCRIPTION - DESCRIPTORS: DESCRIPTORS: ACHING;DISCOMFORT

## 2023-07-31 ASSESSMENT — ENCOUNTER SYMPTOMS: SHORTNESS OF BREATH: 0

## 2023-07-31 ASSESSMENT — PAIN DESCRIPTION - LOCATION
LOCATION: FOOT

## 2023-07-31 NOTE — PROGRESS NOTES
Physical Therapy  Facility/Department: [facility] OR    Name: Sanjay Kemp May  : 1960  MRN: [id number]  Date of Service: 2023    PT eval was attempted today and pt is in surgery. Will re-attempt another time. Elsa Mendoza, P.T.   License Number: [id number]

## 2023-07-31 NOTE — PROGRESS NOTES
[address] [place] [address] Hospitalist   Progress Note    Admitting Date and Time: 7/26/2023  4:09 PM  Admit Dx: Dehydration [E86.0]  Osteomyelitis ([address]) [M86.9]  Hyperglycemia [R73.9]  Osteomyelitis of ankle or foot, left, acute ([address]) [M86.172]  Osteomyelitis of left foot, unspecified type ([address]) [M86.9]    Subjective:    Patient was admitted with Dehydration [E86.0]  Osteomyelitis ([address]) [M86.9]  Hyperglycemia [R73.9]  Osteomyelitis of ankle or foot, left, acute ([address]) [M86.172]  Osteomyelitis of left foot, unspecified type ([address]) [M86.9].  Patient denies fever, chills, cp, sob, n/v.     cefTRIAXone (ROCEPHIN) IV  2,000 mg IntraVENous Q24H    vancomycin  2,500 mg IntraVENous Once    vancomycin  1,500 mg IntraVENous Q12H    insulin glargine  50 Units SubCUTAneous Nightly    insulin glargine  15 Units SubCUTAneous QAM    insulin lispro  22 Units SubCUTAneous TID WC    atorvastatin  40 mg Oral Nightly    fenofibrate  160 mg Oral Daily    levothyroxine  25 mcg Oral Daily    lisinopril  10 mg Oral Daily    metoprolol tartrate  25 mg Oral BID    pregabalin  100 mg Oral BID    sertraline  50 mg Oral QAM    nicotine  1 patch TransDERmal Daily    arformoterol tartrate  15 mcg Nebulization BID RT    And    budesonide  0.5 mg Nebulization BID RT    insulin lispro  0-18 Units SubCUTAneous TID WC    promethazine  25 mg IntraMUSCular Once    sodium chloride flush  5-40 mL IntraVENous 2 times per day    enoxaparin  30 mg SubCUTAneous BID    insulin lispro  0-4 Units SubCUTAneous Nightly     oxyCODONE-acetaminophen, 1 tablet, Q6H PRN  albuterol, 2.5 mg, Q6H PRN  sodium chloride flush, 5-40 mL, PRN  sodium chloride, , PRN  ondansetron, 4 mg, Q8H PRN   Or  ondansetron, 4 mg, Q6H PRN  polyethylene glycol, 17 g, Daily PRN  acetaminophen, 650 mg, Q6H PRN   Or  acetaminophen, 650 mg, Q6H PRN  dextrose bolus, 125 mL, PRN   Or  dextrose bolus, 250 mL, PRN  glucagon (rDNA), 1 mg, PRN  dextrose, , Continuous PRN  Glucose, 15 g, PRN proximal phalanx   suggestive of early osteomyelitis given the adjacent soft tissue ulceration. VL LOWER EXTREMITY ARTERIAL SEGMENTAL PRESSURES W PPG   Final Result   1. Ankle brachial index on the right was 1.3 than on the left was 1.4. Preserved multiphasic waveforms at all levels evaluated on arterial Doppler   evaluation. This finding lower suspicion for the presence of a high-grade   stenosis. 2.  PVR evaluation of the bilateral lower extremity show preserved   pulsatility with varying degrees of mild decreased amplitude. 3.  Digital waveform evaluation of the remaining digits of the bilateral feet   demonstrate preserved pulsatility. Varying degrees of mild decreased   amplitude on the right. Mild decreased amplitude in the left 1st, 2nd, and   5th digits. Moderate decreased amplitude in the 3rd and 4th digits. These   changes probably reflect the presence of underlying microvascular disease. There may be slightly decreased blood flow to digits 3 and 4 on the left. US DUP LOWER EXTREMITIES BILATERAL VENOUS   Final Result   No evidence of DVT in either lower extremity. XR CHEST PORTABLE   Final Result   No acute process. Mild cardiomegaly. XR FOOT LEFT (MIN 3 VIEWS)   Final Result   1. Soft tissue ulceration of the lateral forefoot. Focal lucency in the   distal 5th metatarsal may represent early osteomyelitis. Consider   confirmation with forefoot MRI. Assessment:    Principal Problem:    Osteomyelitis ([address])  Active Problems:    Poorly controlled diabetes mellitus ([address])    Osteomyelitis of ankle or foot, left, acute (HCC)    Hypothyroidism    DM (diabetes mellitus) type 2, uncontrolled, with ketoacidosis ([address])    Uncontrolled type 2 diabetes mellitus with hyperglycemia ([address])  Resolved Problems:    * No resolved hospital problems. *      Plan:  Chronic left diabetic foot ulcer with possible osteomyelitis  pt to or today.   Podiatry and ID following  Dm type 2 uncontrolled monitor bs and tx with insulin  Hyperkalemia monitor  Hypothyroidism continue med  Htn continue med  Hyperlipidemia  continue med  Copd continue med        Electronically signed by Foreign Mg DO on 7/31/2023 at 4:40 PM

## 2023-07-31 NOTE — BRIEF OP NOTE
Brief Postoperative Note      Patient: Kasia Busch May  YOB: 1960  MRN: [id number]    Date of Procedure: 7/31/2023    Pre-Op Diagnosis Codes:     * Osteomyelitis, unspecified site, unspecified type ([address] ) [M86.9]    Post-Op Diagnosis: Same       Procedure(s):  LEFT POSSIBLE FIFTH RAY AMPUTATION AND BONE BIOPSY    Surgeon(s):  Gideon Macias DPM    Assistant:  Resident: Richelle Coffman DPM; Nicolas Gunn DPM    Anesthesia: Monitor Anesthesia Care    Estimated Blood Loss (mL): Minimal    Complications: None    Specimens:   ID Type Source Tests Collected by Time Destination   1 : LEFT FOOT 5TH RAY SOFT TISSUE Specimen Foot CULTURE, ANAEROBIC, CULTURE, FUNGUS, GRAM STAIN, CULTURE, SURGICAL, CULTURE WITH SMEAR, ACID FAST BACILLIUS Gideon Prakash DPM 7/31/2023 1329    2 : LEFT FOOT 5TH RAY BONE Specimen Foot CULTURE, ANAEROBIC, CULTURE, FUNGUS, GRAM STAIN, CULTURE, SURGICAL, CULTURE WITH SMEAR, ACID FAST BACILLIUS Gideon Prakash DPM 7/31/2023 1332    A : LEFT FOOT 5TH RAY  Specimen Foot SURGICAL PATHOLOGY Gideon Prakash DPM 7/31/2023 1333        Implants:  * No implants in log *      Drains: * No LDAs found *    Findings: necrosis of left fifth toe and fifth met distally.       Electronically signed by Rossy Hill DPM on 7/31/2023 at 1:45 PM

## 2023-07-31 NOTE — ANESTHESIA POSTPROCEDURE EVALUATION
Department of Anesthesiology  Postprocedure Note    Patient: Jose Rafael Fuentes  MRN: [id number]  YOB: 1960  Date of evaluation: 7/31/2023      Procedure Summary     Date: 07/31/23 Room / Location: [facility] OR 03 / [facility]    Anesthesia Start: 9137 Anesthesia Stop:     Procedure: LEFT POSSIBLE FIFTH RAY AMPUTATION AND BONE BIOPSY (Left: Foot) Diagnosis:       Osteomyelitis, unspecified site, unspecified type ([address])      (Osteomyelitis, unspecified site, unspecified type ([address]) [M86.9])    Surgeons: José Luis Fajardo DPM Responsible Provider:     Anesthesia Type: MAC ASA Status: 4          Anesthesia Type: No value filed.     Bernard Phase I:      Bernard Phase II:        Anesthesia Post Evaluation    Patient location during evaluation: PACU  Patient participation: complete - patient participated  Level of consciousness: awake and alert  Pain score: 0  Airway patency: patent  Nausea & Vomiting: no vomiting and no nausea  Complications: no  Cardiovascular status: hemodynamically stable  Respiratory status: acceptable and room air  Hydration status: stable  Pain management: adequate

## 2023-07-31 NOTE — PROGRESS NOTES
Nurse to nurse report called to [phone] , [facility] called pt  on monitor, family informed to meet pt in room

## 2023-07-31 NOTE — PROGRESS NOTES
Pharmacy Consultation Note  (Antibiotic Dosing and Monitoring)    Initial consult date: 7/31/2023  Consulting physician/provider: Dr. Catia Fitzpatrick  Drug: Vancomycin  Indication: Bone and Joint Infection    Age/  Gender Height Weight IBW  Allergy Information   63 y.o./male 6' (182.9 cm) 280 lb (127 kg)     Ideal body weight: 77.6 kg (171 lb 1.2 oz)  Adjusted ideal body weight: 96.5 kg (212 lb 10.3 oz)   Patient has no known allergies. Renal Function:  Recent Labs     07/29/23  0159 07/30/23  0206 07/31/23  0240   BUN 15 14 15   CREATININE 0.9 0.9 0.8       Intake/Output Summary (Last 24 hours) at 7/31/2023 1429  Last data filed at 7/31/2023 1323  Gross per 24 hour   Intake 370 ml   Output 1710 ml   Net -1340 ml       Vancomycin Monitoring:  Trough:  No results for input(s): VANCOTROUGH in the last 72 hours. Random:  No results for input(s): VANCORANDOM in the last 72 hours. No results for input(s): Dierdre Dykes in the last 72 hours. Historical Cultures:  Organism   Date Value Ref Range Status   11/10/2022 Staphylococcus aureus (A)  Final     No results for input(s): BC in the last 72 hours. Vancomycin Administration Times:  Recent vancomycin administrations        No vancomycin IV orders with administrations found. Orders not given:            vancomycin (VANCOCIN) 2,500 mg in sodium chloride 0.9 % 500 mL IVPB    vancomycin 1500 mg in sodium chloride 0.9% 300 mL IVPB                    Assessment:  Patient is a 66 y.o. male who has been initiated on vancomycin  Estimated Creatinine Clearance: 132 mL/min (based on SCr of 0.8 mg/dL). To dose vancomycin, pharmacy will be utilizing LRN calculation software for goal AUC/NICOLE 400-600 mg/L-hr (predicted AUC/NICOLE = 540, Tr =17.4 mcg/mL)    Plan:   Will give vancomycin 2500 mg IVx 1 dose, and then will continue vancomycin 1500 mg IV every 12 hours  Will check vancomycin levels when appropriate  Will continue to monitor renal function   Pharmacy

## 2023-07-31 NOTE — PROGRESS NOTES
ENDOCRINOLOGY PROGRESS NOTE      Date of admission: 7/26/2023  Date of service: 7/31/2023  Admitting physician: Virginie Scott MD   Primary Care Physician: Tatianna Headley DO  Consultant physician: Ramakrishna Browne MD     Reason for the consultation:  Uncontrolled DM    History of Present Illness: The history is provided by the patient. Accuracy of the patient data is excellent    Minesh Fuentes is a very pleasant 66 y.o. old male with PMH uncontrolled DM type 2, obesity, HTN, HLD and other listed below admitted to [facility] on 7/26/2023 because of chills and Lt foot pain osteomyelitis. Endocrine service was consulted for diabetes management. Subjective   The patient was at the OR in the morning.  Blood sugar at goal.  No acute events overnight    Inpatient diet:   Carb Restricted diet     Point of care glucose monitoring   (Independently reviewed)   Recent Labs     07/29/23  1603 07/29/23  2138 07/30/23  0617 07/30/23  1155 07/30/23  1622 07/30/23  2102 07/31/23  0623 07/31/23  1204   GLUMET 155* 165* 160* 136* 168* 123* 155* 159*       Scheduled Meds:   insulin glargine  50 Units SubCUTAneous Nightly    insulin glargine  15 Units SubCUTAneous QAM    insulin lispro  22 Units SubCUTAneous TID WC    atorvastatin  40 mg Oral Nightly    fenofibrate  160 mg Oral Daily    levothyroxine  25 mcg Oral Daily    lisinopril  10 mg Oral Daily    metoprolol tartrate  25 mg Oral BID    pregabalin  100 mg Oral BID    sertraline  50 mg Oral QAM    nicotine  1 patch TransDERmal Daily    arformoterol tartrate  15 mcg Nebulization BID RT    And    budesonide  0.5 mg Nebulization BID RT    insulin lispro  0-18 Units SubCUTAneous TID WC    promethazine  25 mg IntraMUSCular Once    sodium chloride flush  5-40 mL IntraVENous 2 times per day    enoxaparin  30 mg SubCUTAneous BID    insulin lispro  0-4 Units SubCUTAneous Nightly       PRN Meds:   oxyCODONE-acetaminophen, 1 tablet, Q6H PRN  albuterol, 2.5 mg, Q6H PRN  sodium chloride flush, 0. 20 0.02 - 0.27 mmol/L Final     Lab Results   Component Value Date/Time    LABA1C 12.2 07/27/2023 06:35 AM    LABA1C 12.6 04/03/2023 01:10 PM    LABA1C 8.7 11/10/2022 02:45 AM     Lab Results   Component Value Date/Time    TSH 1.790 04/05/2023 02:05 AM    T4FREE 1.05 11/10/2022 03:25 PM     Lab Results   Component Value Date/Time    LABA1C 12.2 07/27/2023 06:35 AM    GLUCOSE 216 07/31/2023 02:40 AM     No results found for: TRIG, HDL, LDLCALC, CHOL    Blood culture   Lab Results   Component Value Date/Time    BC 5 Days no growth 07/03/2023 04:42 PM    BC 5 Days no growth 04/03/2023 10:52 AM       Radiology:  MRI FOOT LEFT WO CONTRAST   Final Result   1. Signal abnormality in the distal 5th metatarsal and 5th proximal phalanx   suggestive of early osteomyelitis given the adjacent soft tissue ulceration. VL LOWER EXTREMITY ARTERIAL SEGMENTAL PRESSURES W PPG   Final Result   1. Ankle brachial index on the right was 1.3 than on the left was 1.4. Preserved multiphasic waveforms at all levels evaluated on arterial Doppler   evaluation. This finding lower suspicion for the presence of a high-grade   stenosis. 2.  PVR evaluation of the bilateral lower extremity show preserved   pulsatility with varying degrees of mild decreased amplitude. 3.  Digital waveform evaluation of the remaining digits of the bilateral feet   demonstrate preserved pulsatility. Varying degrees of mild decreased   amplitude on the right. Mild decreased amplitude in the left 1st, 2nd, and   5th digits. Moderate decreased amplitude in the 3rd and 4th digits. These   changes probably reflect the presence of underlying microvascular disease. There may be slightly decreased blood flow to digits 3 and 4 on the left. US DUP LOWER EXTREMITIES BILATERAL VENOUS   Final Result   No evidence of DVT in either lower extremity. XR CHEST PORTABLE   Final Result   No acute process. Mild cardiomegaly.          XR FOOT

## 2023-07-31 NOTE — CARE COORDINATION
7/31/2023  Social Work Discharge Planning:Foot surgery today at 4:45pm. Pt is from home with his niece and spouse. Has a ww. Will await therapy evals when Pt is able. [facility] and ID are following.  Electronically signed by MARCELLUS Beckwith on 7/31/2023 at 10:03 AM

## 2023-08-01 LAB
METER GLUCOSE: 138 MG/DL (ref 74–99)
METER GLUCOSE: 173 MG/DL (ref 74–99)
METER GLUCOSE: 246 MG/DL (ref 74–99)
METER GLUCOSE: 258 MG/DL (ref 74–99)

## 2023-08-01 PROCEDURE — 82947 ASSAY GLUCOSE BLOOD QUANT: CPT

## 2023-08-01 PROCEDURE — 97165 OT EVAL LOW COMPLEX 30 MIN: CPT

## 2023-08-01 PROCEDURE — 2580000003 HC RX 258

## 2023-08-01 PROCEDURE — 1200000000 HC SEMI PRIVATE

## 2023-08-01 PROCEDURE — 6370000000 HC RX 637 (ALT 250 FOR IP)

## 2023-08-01 PROCEDURE — 6360000002 HC RX W HCPCS

## 2023-08-01 PROCEDURE — 99232 SBSQ HOSP IP/OBS MODERATE 35: CPT | Performed by: INTERNAL MEDICINE

## 2023-08-01 PROCEDURE — 2580000003 HC RX 258: Performed by: SPECIALIST

## 2023-08-01 PROCEDURE — 97161 PT EVAL LOW COMPLEX 20 MIN: CPT

## 2023-08-01 PROCEDURE — 6360000002 HC RX W HCPCS: Performed by: SPECIALIST

## 2023-08-01 PROCEDURE — 94640 AIRWAY INHALATION TREATMENT: CPT

## 2023-08-01 RX ADMIN — INSULIN LISPRO 9 UNITS: 100 INJECTION, SOLUTION INTRAVENOUS; SUBCUTANEOUS at 11:26

## 2023-08-01 RX ADMIN — FENOFIBRATE 160 MG: 160 TABLET ORAL at 08:34

## 2023-08-01 RX ADMIN — INSULIN LISPRO 22 UNITS: 100 INJECTION, SOLUTION INTRAVENOUS; SUBCUTANEOUS at 16:55

## 2023-08-01 RX ADMIN — ATORVASTATIN CALCIUM 40 MG: 40 TABLET, FILM COATED ORAL at 20:57

## 2023-08-01 RX ADMIN — LEVOTHYROXINE SODIUM 25 MCG: 25 TABLET ORAL at 06:41

## 2023-08-01 RX ADMIN — INSULIN GLARGINE 50 UNITS: 100 INJECTION, SOLUTION SUBCUTANEOUS at 21:06

## 2023-08-01 RX ADMIN — METOPROLOL TARTRATE 25 MG: 25 TABLET, FILM COATED ORAL at 21:01

## 2023-08-01 RX ADMIN — VANCOMYCIN HYDROCHLORIDE 1500 MG: 10 INJECTION, POWDER, LYOPHILIZED, FOR SOLUTION INTRAVENOUS at 22:40

## 2023-08-01 RX ADMIN — INSULIN LISPRO 22 UNITS: 100 INJECTION, SOLUTION INTRAVENOUS; SUBCUTANEOUS at 11:26

## 2023-08-01 RX ADMIN — ENOXAPARIN SODIUM 30 MG: 100 INJECTION SUBCUTANEOUS at 08:34

## 2023-08-01 RX ADMIN — VANCOMYCIN HYDROCHLORIDE 1500 MG: 10 INJECTION, POWDER, LYOPHILIZED, FOR SOLUTION INTRAVENOUS at 10:19

## 2023-08-01 RX ADMIN — SERTRALINE 50 MG: 100 TABLET, FILM COATED ORAL at 08:34

## 2023-08-01 RX ADMIN — BUDESONIDE 500 MCG: 0.5 SUSPENSION RESPIRATORY (INHALATION) at 21:16

## 2023-08-01 RX ADMIN — OXYCODONE AND ACETAMINOPHEN 1 TABLET: 5; 325 TABLET ORAL at 22:38

## 2023-08-01 RX ADMIN — OXYCODONE AND ACETAMINOPHEN 1 TABLET: 5; 325 TABLET ORAL at 16:56

## 2023-08-01 RX ADMIN — ENOXAPARIN SODIUM 30 MG: 100 INJECTION SUBCUTANEOUS at 20:59

## 2023-08-01 RX ADMIN — Medication 10 ML: at 10:19

## 2023-08-01 RX ADMIN — OXYCODONE AND ACETAMINOPHEN 1 TABLET: 5; 325 TABLET ORAL at 04:32

## 2023-08-01 RX ADMIN — OXYCODONE AND ACETAMINOPHEN 1 TABLET: 5; 325 TABLET ORAL at 10:21

## 2023-08-01 RX ADMIN — PREGABALIN 100 MG: 50 CAPSULE ORAL at 08:34

## 2023-08-01 RX ADMIN — INSULIN LISPRO 3 UNITS: 100 INJECTION, SOLUTION INTRAVENOUS; SUBCUTANEOUS at 16:54

## 2023-08-01 RX ADMIN — ARFORMOTEROL TARTRATE 15 MCG: 15 SOLUTION RESPIRATORY (INHALATION) at 21:16

## 2023-08-01 RX ADMIN — Medication 10 ML: at 21:02

## 2023-08-01 RX ADMIN — WATER 2000 MG: 1 INJECTION INTRAMUSCULAR; INTRAVENOUS; SUBCUTANEOUS at 14:32

## 2023-08-01 RX ADMIN — PREGABALIN 100 MG: 50 CAPSULE ORAL at 20:57

## 2023-08-01 ASSESSMENT — PAIN SCALES - GENERAL
PAINLEVEL_OUTOF10: 10
PAINLEVEL_OUTOF10: 9
PAINLEVEL_OUTOF10: 8
PAINLEVEL_OUTOF10: 0
PAINLEVEL_OUTOF10: 9

## 2023-08-01 ASSESSMENT — PAIN DESCRIPTION - LOCATION
LOCATION: FOOT
LOCATION: FOOT

## 2023-08-01 ASSESSMENT — PAIN DESCRIPTION - DESCRIPTORS
DESCRIPTORS: THROBBING;ACHING;DISCOMFORT
DESCRIPTORS: ACHING;BURNING

## 2023-08-01 ASSESSMENT — PAIN DESCRIPTION - ORIENTATION
ORIENTATION: LEFT

## 2023-08-01 NOTE — PROGRESS NOTES
OCCUPATIONAL THERAPY INITIAL EVALUATION    [address]   [address] [facility], [address] [place] [address] Drive        [id number]                                                  Patient Name: Fernando Fuentes    MRN: [id number]    : 1960    Room: [address] [place] [address]     Evaluating OT: Dipak Henry OTR/L #[id number]    Referring Provider: Li Whitman MD    Specific Provider Orders/Date: OT Eval and Treat, 23     Diagnosis:   1. Osteomyelitis of left foot, unspecified type (HCC)    2. Dehydration    3. Hyperglycemia    4. Osteomyelitis, unspecified site, unspecified type [facility])        Surgery:     DATE OF PROCEDURE:  2023      PROCEDURES PERFORMED:  1. Left fifth toe amputation to the level of metatarsophalangeal joint. 2.  Incision and drainage, left fifth metatarsal bone. 3.  Complex wound closure, left foot. Pertinent Medical History:       Past Medical History:   Diagnosis Date    Anxiety     COPD (chronic obstructive pulmonary disease) ([address])     Depression     DM (diabetes mellitus) ([address])     Frostbite     HLD (hyperlipidemia)     HTN (hypertension)     Sleep apnea          Past Surgical History:   Procedure Laterality Date    BACK SURGERY      CHOLECYSTECTOMY      CORONARY ARTERY BYPASS GRAFT REDO      2 vessel    FOOT DEBRIDEMENT Right 2022    DELAYED PRIMARY CLOSURE RIGHT FOOT WOUND performed by Flaquita Bridges DPM at [address] Right 2022    FOOT DEBRIDEMENT INCISION AND DRAINAGE performed by Anand Salmeron DPM at [address] Right 5/10/2022    RIGHT FOOT DELAYED PRIMARY CLOSURE WITH POSSIBLE DEBRIDEMENT    ++CONTACT ISOLATION++   (DR AVAIL.  AT 4PM) performed by Anand Salmeron DPM at [address] PICC LINE  2022         TOE AMPUTATION Right 2022    AMPUTATION RIGHT SECOND TOE performed by Flaquita Brdiges DPM at [facility] Left 2023    LEFT POSSIBLE FIFTH RAY AMPUTATION AND BONE BIOPSY performed by Gideon Evans DPM at [facility] OR    UVULOPALATOPHARYGOPLASTY        Precautions:  Fall Risk, alarm, non-weight bearing left LE      Assessment of current deficits    [x] Functional mobility  [x]ADLs  [x] Strength               []Cognition    [x] Functional transfers   [x] IADLs         [x] Safety Awareness   []Endurance    [] Fine Coordination              [x] Balance      [] Vision/perception   []Sensation     []Gross Motor Coordination  [] ROM  [] Delirium                   [] Motor Control     OT PLAN OF CARE   OT POC based on physician orders, patient diagnosis and results of clinical assessment    Frequency/Duration 2-5 days/wk for 2 weeks BID PRN     Specific OT Treatment Interventions to include:   * Instruction/training on adapted ADL techniques and AE recommendations to increase functional independence within precautions       * Training on energy conservation strategies, correct breathing pattern and techniques to improve independence/tolerance for self-care routine  * Functional transfer/mobility training/DME recommendations for increased independence, safety, and fall prevention  * Patient/Family education to increase follow through with safety techniques and functional independence  * Recommendation of environmental modifications for increased safety with functional transfers/mobility and ADLs  * Therapeutic exercise to improve motor endurance, ROM, and functional strength for ADLs/functional transfers  * Therapeutic activities to facilitate/challenge dynamic balance, stand tolerance for increased safety and independence with ADLs  * Positioning to improve skin integrity, interaction with environment and functional independence  * Manual techniques for edema management    Recommended Adaptive Equipment: TBD    Home Living: Lives with niece and niece's spouse, single family home, 1 story, 2 steps to enter without rail.    Bathroom set-up: [address]         Equipment owned: Joseluis during ADL tasks   Improve overall LUE strength  for participation in functional tasks     Hearing:  [facility]   Sensation:   No c/o numbness or tingling  Tone:  WFL   Edema: None     Comments: RN cleared patient for OT. Upon arrival patient in supine. Therapist facilitated and instructed pt on adapted  techniques & compensatory strategies to improve safety and independence with basic ADLs, bed mobility, WB restriction for functional transfers and mobility to allow pt to achieve highest level of independence and safely. Pt demonstrated fair plus understanding of education & follow through. Patient was able to maintain L LE NWB for functional transfer and shuffles along side of bed. At end of session, patient was in supine, alarm on, with call light and phone within reach, all lines and tubes intact. Overall, patient demonstrated  decreased independence and safety during completion of ADL tasks. Pt would benefit from continued skilled OT to increase safety and independence with completion of ADL tasks and functional mobility for improved quality of life. Rehab Potential: Good for established goals. Patient / Family Goal: N/A      Patient and/or family were instructed on functional diagnosis, prognosis/goals and OT plan of care. Demonstrated fair plus understanding.      Eval Complexity: Low    Time In: 11:20 AM   Time Out: 11:39 AM    Total Treatment Time: 0       Min Units   OT Eval Low 97165  X  1    OT Eval Medium 21747      OT Eval High 17201      OT Re-Eval [id number]            ADL/Self Care 98892     Therapeutic Activities 46132       Therapeutic Ex 11418       Orthotic Management 27028       Manual 40031     Neuro Re-Ed 18911       Non-Billable Time        Evaluation Time additionally includes thorough review of current medical information, gathering information on past medical history/social history and prior level of function, interpretation of standardized testing/informal observation of tasks,

## 2023-08-01 NOTE — CARE COORDINATION
POD #1 s/p left 5th toe amp I&D iv rocephin/vanco; await PT/OT; [facility] following; would need orders. Has ww at home. Plan is home with [address]. Await ID plan. Korina Leonardo.

## 2023-08-01 NOTE — PROGRESS NOTES
Pharmacy Consultation Note  (Antibiotic Dosing and Monitoring)    Initial consult date: 7/31/2023  Consulting physician/provider: Dr. Mabel Romero  Drug: Vancomycin  Indication: Bone and Joint Infection    Age/  Gender Height Weight IBW  Allergy Information   63 y.o./male 6' (182.9 cm) 280 lb (127 kg)     Ideal body weight: 77.6 kg (171 lb 1.2 oz)  Adjusted ideal body weight: 96.5 kg (212 lb 13.5 oz)   Patient has no known allergies. Renal Function:  Recent Labs     07/30/23  0206 07/31/23  0240   BUN 14 15   CREATININE 0.9 0.8         Intake/Output Summary (Last 24 hours) at 8/1/2023 1024  Last data filed at 8/1/2023 0725  Gross per 24 hour   Intake 490 ml   Output 1410 ml   Net -920 ml         Vancomycin Monitoring:  Trough:  No results for input(s): VANCOTROUGH in the last 72 hours. Random:  No results for input(s): VANCORANDOM in the last 72 hours. No results for input(s): [place] Heymann in the last 72 hours. Historical Cultures:  Organism   Date Value Ref Range Status   11/10/2022 Staphylococcus aureus (A)  Final     No results for input(s): BC in the last 72 hours. Vancomycin Administration Times:  Recent vancomycin administrations                     vancomycin 1500 mg in sodium chloride 0.9% 300 mL IVPB (mg) 1,500 mg New Bag 08/01/23 1019     1,500 mg New Bag 07/31/23 2154    vancomycin (VANCOCIN) 2,500 mg in sodium chloride 0.9 % 500 mL IVPB (mg) 2,500 mg New Bag 07/31/23 1544                  Assessment:  Patient is a 66 y.o. male who has been initiated on vancomycin  Estimated Creatinine Clearance: 132 mL/min (based on SCr of 0.8 mg/dL). To dose vancomycin, pharmacy will be utilizing [employer] calculation software for goal AUC/NICOLE 400-600 mg/L-hr (predicted AUC/NICOLE = 540, Tr =17.4 mcg/mL)    Plan:   Will continue vancomycin 1500 mg IV every 12 hours  Will check vancomycin levels when appropriate  Will continue to monitor renal function   Pharmacy to follow    Tayler Quan PharmD,

## 2023-08-01 NOTE — PROGRESS NOTES
ENDOCRINOLOGY PROGRESS NOTE      Date of admission: 7/26/2023  Date of service: 8/1/2023  Admitting physician: Itzel Lee MD   Primary Care Physician: Iram Perez DO  Consultant physician: Richar Rinaldi MD     Reason for the consultation:  Uncontrolled DM    History of Present Illness: The history is provided by the patient. Accuracy of the patient data is excellent    Sanjay Fuentes is a very pleasant 66 y.o. old male with PMH uncontrolled DM type 2, obesity, HTN, HLD and other listed below admitted to [facility] on 7/26/2023 because of chills and Lt foot pain osteomyelitis. Endocrine service was consulted for diabetes management. Subjective   The patient was at the bedside in the morning. Blood sugar at goal.  No acute events overnight. Patient reported feeling well, no complain today.      Inpatient diet:   Carb Restricted diet     Point of care glucose monitoring   (Independently reviewed)   Recent Labs     07/30/23  2102 07/31/23  0623 07/31/23  1204 07/31/23  1350 07/31/23  1611 07/31/23  2023 08/01/23  0643 08/01/23  1123   GLUMET 123* 155* 159* 143* 135* 242* 138* 258*       Scheduled Meds:   sodium chloride flush  5-40 mL IntraVENous 2 times per day    cefTRIAXone (ROCEPHIN) IV  2,000 mg IntraVENous Q24H    vancomycin  1,500 mg IntraVENous Q12H    insulin glargine  50 Units SubCUTAneous Nightly    insulin glargine  15 Units SubCUTAneous QAM    insulin lispro  22 Units SubCUTAneous TID WC    atorvastatin  40 mg Oral Nightly    fenofibrate  160 mg Oral Daily    levothyroxine  25 mcg Oral Daily    lisinopril  10 mg Oral Daily    metoprolol tartrate  25 mg Oral BID    pregabalin  100 mg Oral BID    sertraline  50 mg Oral QAM    nicotine  1 patch TransDERmal Daily    arformoterol tartrate  15 mcg Nebulization BID RT    And    budesonide  0.5 mg Nebulization BID RT    insulin lispro  0-18 Units SubCUTAneous TID WC    promethazine  25 mg IntraMUSCular Once    enoxaparin  30 mg SubCUTAneous BID    insulin Ghislaine Dobbins MD on 8/1/2023 at 1:49 PM

## 2023-08-01 NOTE — PLAN OF CARE
Problem: Discharge Planning  Goal: Discharge to home or other facility with appropriate resources  Outcome: Progressing  Flowsheets (Taken 7/31/2023 2159)  Discharge to home or other facility with appropriate resources: Identify barriers to discharge with patient and caregiver     Problem: Pain  Goal: Verbalizes/displays adequate comfort level or baseline comfort level  7/31/2023 2232 by Ford Valdivia RN  Outcome: Progressing  Flowsheets (Taken 7/31/2023 2100 by Anaya Marin)  Verbalizes/displays adequate comfort level or baseline comfort level: Encourage patient to monitor pain and request assistance  7/31/2023 1630 by Tomer Zamora RN  Outcome: Progressing     Problem: Safety - Adult  Goal: Free from fall injury  7/31/2023 2232 by Ford Valdivia RN  Outcome: Progressing     Problem: Skin/Tissue Integrity  Goal: Absence of new skin breakdown  Description: 1. Monitor for areas of redness and/or skin breakdown  2. Assess vascular access sites hourly  3. Every 4-6 hours minimum:  Change oxygen saturation probe site  4. Every 4-6 hours:  If on nasal continuous positive airway pressure, respiratory therapy assess nares and determine need for appliance change or resting period.   7/31/2023 2232 by Ford Valdivia RN  Outcome: Progressing     Problem: Chronic Conditions and Co-morbidities  Goal: Patient's chronic conditions and co-morbidity symptoms are monitored and maintained or improved  Recent Flowsheet Documentation  Taken 7/31/2023 2159 by Teri Conti - Patient's Chronic Conditions and Co-Morbidity Symptoms are Monitored and Maintained or Improved: Monitor and assess patient's chronic conditions and comorbid symptoms for stability, deterioration, or improvement

## 2023-08-01 NOTE — PROGRESS NOTES
Physical Therapy  Facility/Department: [facility] SURG/TELE  Physical Therapy Initial Assessment    Name: Kasia Fuentes  : 1960  MRN: [id number]  Date of Service: 2023      Attending Provider:  So Escobar DO    Evaluating PT:  Edward Bhat. Tamy Neves, P.T. Room #:  1644/9057-X  Diagnosis:  Dehydration [E86.0]  Osteomyelitis (HCC) [M86.9]  Hyperglycemia [R73.9]  Osteomyelitis of ankle or foot, left, acute (HCC) [M86.172]  Osteomyelitis of left foot, unspecified type ([address]) [M86.9]  Procedure/Surgery:  23 L 5th toe amp with I and D  Precautions:  NWB L foot, falls, bed/chair alarm    SUBJECTIVE:    Pt lives with his niece in a 1 story home with 2 stairs and a post he can grab to enter. Pt ambulated with no AD PTA. He has a ww. OBJECTIVE:   Initial Evaluation  Date: 23 Treatment Short Term/ Long Term   Goals   Was pt agreeable to Eval/treatment? yes     Does pt have pain? C/o pain in L foot 8-9/10     Bed Mobility  Rolling: supervision  Supine to sit: supervision  Sit to supine: supervision  Scooting: supervision  Independent    Transfers Sit to stand: MOD A  Stand to sit: MIN A  Stand pivot: NA  Independent   Ambulation   2 feet lateral scoot shuffle on RLE with ww NWB LLE MIN A  15 feet with ww NWB LLE supervision   Stair negotiation: ascended and descended NA  2 steps attempting to sit on a chair on the top step with SBA   AM-PAC 6 Clicks 87/08       BLE ROM is WFL. BLE strength is grossly 4-/5 to 4/5.    Sensation:  Pt c/o numbness and tingling to R foot  Balance: sitting is Independent and standing with ww while maintaining NWB LLE was MIN A  Endurance: fair-    Patient education  Pt educated on NWB LLE    Patient response to education:   Pt verbalized understanding Pt demonstrated skill Pt requires further education in this area   yes yes yes     ASSESSMENT:    Conditions Requiring Skilled Therapeutic Intervention:    [x]Decreased strength     []Decreased ROM  [x]Decreased functional mobility  [x]Decreased balance   [x]Decreased endurance   []Decreased posture  []Decreased sensation  []Decreased coordination   []Decreased vision  []Decreased safety awareness   [x]Increased pain       Comments:  Pt was able to stand up with assist from bed and maintain NWB LLE while standing with ww. He was unable to hop on RLE at this time, but was able to lateral scoot shuffle using R foot with ww. Pt has decreased strength and endurance limiting functional mobility. Pt was left supine in bed with call light left by patient. Chair/bed alarm: bed alarm was activated. Pt's/ family goals   1. To get better and go home. Patient and or family understand(s) diagnosis, prognosis, and plan of care. PHYSICAL THERAPY PLAN OF CARE:    PT POC is established based on physician order and patient diagnosis     Referring provider/PT Order:  PT eval and treat  Diagnosis:  Dehydration [E86.0]  Osteomyelitis ([address]) [M86.9]  Hyperglycemia [R73.9]  Osteomyelitis of ankle or foot, left, acute ([address]) [M86.172]  Osteomyelitis of left foot, unspecified type ([address]) [M86.9]  Specific instructions for next treatment: To progress functional mobility as pt is able.       Current Treatment Recommendations:     [x] Strengthening to improve independence with functional mobility   [] ROM to improve ROM and decrease spasm and pain which will help promote independence with functional mobility   [x] Balance Training to improve static/dynamic balance and to reduce fall risk  [x] Endurance Training to improve activity tolerance during functional mobility   [x] Transfer Training to improve safety and independence with all functional transfers   [x] Gait Training to improve gait mechanics, endurance and assess need for appropriate assistive device  [x] Stair Training in preparation for safe discharge home and/or into the community   [] Positioning to prevent skin breakdown and contractures  [] Safety and Education Training   [x]

## 2023-08-01 NOTE — OP NOTE
[address]                  [address] [facility], [address] [place] [address]                                OPERATIVE REPORT    PATIENT NAME: Mir Cowan                      :        1960  MED REC NO:   [id number]                            ROOM:       0532  ACCOUNT NO:   [de-identified]                           ADMIT DATE: 2023  PROVIDER:     Ericka Barton DPM    DATE OF PROCEDURE:  2023    SURGEON:  Gideon Prakash DPM.    PREOPERATIVE DIAGNOSES:  1. Left foot heel osteomyelitis. 2.  Open wound, left foot. 3.  Ulceration, left foot with necrosis of muscle. 4.  Insulin-dependent diabetes mellitus with neuropathy. POSTOPERATIVE DIAGNOSES:  1. Left foot heel osteomyelitis. 2.  Open wound, left foot. 3.  Ulceration, left foot with necrosis of muscle. 4.  Insulin-dependent diabetes mellitus with neuropathy. PROCEDURES PERFORMED:  1. Left fifth toe amputation to the level of metatarsophalangeal joint. 2.  Incision and drainage, left fifth metatarsal bone. 3.  Complex wound closure, left foot. ANESTHESIA:  Monitored anesthesia care. INJECTABLES:  20 mL of 0.5% Marcaine plain. ESTIMATED BLOOD LOSS:  Minimal.    COMPLICATIONS:  None. SPECIMEN OBTAINED:  Left fifth toe, left fifth metatarsal head, _____  left foot wound. INTRAOPERATIVE FINDINGS:  Necrosis of left toe, left fifth metatarsal  distally. I could not appreciate any further necrosis proximally. INDICATIONS:  A 82-year-old male presents today for left partial fifth  ray resection. I counseled the patient on the nature of the problem,  proposed course of procedure, potential benefits, risks, complications,  and convalescence in detail. All questions were answered to the  patient's satisfaction. No guarantees were given as to the outcome of  procedure.     DESCRIPTION OF PROCEDURE:  Under mild sedation, the patient was brought  to the operating room and placed on the operating table in supine  position. The left lower extremity was scrubbed, prepped, and draped in  the usual fashion. At this time, using a #10 blade, I performed a  curvilinear incision on the lateral aspect of the left foot encompassing  the plantar aspect of the toe open wound in a semilunar fashion  connecting all the way to the digit laterally _____ medially. A  full-thickness dissection was then performed. I was able to then  disarticulate the toe at the metatarsophalangeal joint. Next, I was  able to then sharply excise the wound to and through level of deep  fascia and muscle. All necrosed tissue was removed from the surgical  site in toto. At this point, I was able to then, using a periosteal  elevator, isolate the fifth metatarsal bone distally, resect it  proximally, sent off for pathological and microbiological examination. Prior to doing so, I did incise through the fifth metatarsal head using  the rongeur. Seropurulent abscess was drained and I was able to culture  the abscess. At this point, I was able to then rotate the pedicle from  dorsal to plantar lateral without placing any tension on the flap. This  was done primarily using 3-0 nylon suture. Excellent closure noted at  this time. The patient overall tolerated the procedure and Anesthesia  well in apparent satisfactory condition with vital signs stable and  vascular status intact to the left lower extremity. The patient was  transferred to the PACU for further monitoring prior to readmission to  the nursing floor. I should also note that the patient had brisk  capillary refill to the flap.         Valencia Boudreaux DPM    D: 07/31/2023 13:49:46       T: 07/31/2023 16:26:26     [email or web address]  Job#: [id number]     Doc#: [id number]    CC:

## 2023-08-01 NOTE — PLAN OF CARE
Problem: Pain  Goal: Verbalizes/displays adequate comfort level or baseline comfort level  7/31/2023 2232 by Ignacio Crawley RN  Outcome: Progressing  Flowsheets (Taken 7/31/2023 2100 by Mikaela Mendiola)  Verbalizes/displays adequate comfort level or baseline comfort level: Encourage patient to monitor pain and request assistance  7/31/2023 1630 by Jermaine Polk RN  Outcome: Progressing     Problem: Safety - Adult  Goal: Free from fall injury  Outcome: Progressing     Problem: Skin/Tissue Integrity  Goal: Absence of new skin breakdown  Description: 1. Monitor for areas of redness and/or skin breakdown  2. Assess vascular access sites hourly  3. Every 4-6 hours minimum:  Change oxygen saturation probe site  4. Every 4-6 hours:  If on nasal continuous positive airway pressure, respiratory therapy assess nares and determine need for appliance change or resting period.   Outcome: Progressing

## 2023-08-02 LAB
ANION GAP SERPL CALCULATED.3IONS-SCNC: 11 MMOL/L (ref 7–16)
BASOPHILS # BLD: 0.05 K/UL (ref 0–0.2)
BASOPHILS NFR BLD: 1 % (ref 0–2)
BUN SERPL-MCNC: 17 MG/DL (ref 6–23)
CALCIUM SERPL-MCNC: 8.3 MG/DL (ref 8.6–10.2)
CHLORIDE SERPL-SCNC: 105 MMOL/L (ref 98–107)
CO2 SERPL-SCNC: 21 MMOL/L (ref 22–29)
CREAT SERPL-MCNC: 0.9 MG/DL (ref 0.7–1.2)
EOSINOPHIL # BLD: 0.17 K/UL (ref 0.05–0.5)
EOSINOPHILS RELATIVE PERCENT: 2 % (ref 0–6)
ERYTHROCYTE [DISTWIDTH] IN BLOOD BY AUTOMATED COUNT: 13.2 % (ref 11.5–15)
GFR SERPL CREATININE-BSD FRML MDRD: >60 ML/MIN/1.73M2
GLUCOSE SERPL-MCNC: 163 MG/DL (ref 74–99)
HCT VFR BLD AUTO: 40.9 % (ref 37–54)
HGB BLD-MCNC: 13.9 G/DL (ref 12.5–16.5)
IMM GRANULOCYTES # BLD AUTO: <0.03 K/UL (ref 0–0.58)
IMM GRANULOCYTES NFR BLD: 0 % (ref 0–5)
LYMPHOCYTES NFR BLD: 2.47 K/UL (ref 1.5–4)
LYMPHOCYTES RELATIVE PERCENT: 33 % (ref 20–42)
MAGNESIUM SERPL-MCNC: 1.7 MG/DL (ref 1.6–2.6)
MCH RBC QN AUTO: 28.5 PG (ref 26–35)
MCHC RBC AUTO-ENTMCNC: 34 G/DL (ref 32–34.5)
MCV RBC AUTO: 83.8 FL (ref 80–99.9)
METER GLUCOSE: 133 MG/DL (ref 74–99)
METER GLUCOSE: 186 MG/DL (ref 74–99)
METER GLUCOSE: 206 MG/DL (ref 74–99)
METER GLUCOSE: 306 MG/DL (ref 74–99)
MONOCYTES NFR BLD: 0.51 K/UL (ref 0.1–0.95)
MONOCYTES NFR BLD: 7 % (ref 2–12)
NEUTROPHILS NFR BLD: 57 % (ref 43–80)
NEUTS SEG NFR BLD: 4.32 K/UL (ref 1.8–7.3)
PHOSPHATE SERPL-MCNC: 2.9 MG/DL (ref 2.5–4.5)
PLATELET # BLD AUTO: 144 K/UL (ref 130–450)
PMV BLD AUTO: 11.1 FL (ref 7–12)
POTASSIUM SERPL-SCNC: 4.1 MMOL/L (ref 3.5–5)
RBC # BLD AUTO: 4.88 M/UL (ref 3.8–5.8)
SODIUM SERPL-SCNC: 137 MMOL/L (ref 132–146)
VANCOMYCIN SERPL-MCNC: 21.9 UG/ML (ref 5–40)
WBC OTHER # BLD: 7.5 K/UL (ref 4.5–11.5)

## 2023-08-02 PROCEDURE — 85025 COMPLETE CBC W/AUTO DIFF WBC: CPT

## 2023-08-02 PROCEDURE — 6370000000 HC RX 637 (ALT 250 FOR IP)

## 2023-08-02 PROCEDURE — 80202 ASSAY OF VANCOMYCIN: CPT

## 2023-08-02 PROCEDURE — 83735 ASSAY OF MAGNESIUM: CPT

## 2023-08-02 PROCEDURE — 1200000000 HC SEMI PRIVATE

## 2023-08-02 PROCEDURE — 84100 ASSAY OF PHOSPHORUS: CPT

## 2023-08-02 PROCEDURE — 99232 SBSQ HOSP IP/OBS MODERATE 35: CPT | Performed by: INTERNAL MEDICINE

## 2023-08-02 PROCEDURE — 82947 ASSAY GLUCOSE BLOOD QUANT: CPT

## 2023-08-02 PROCEDURE — 2580000003 HC RX 258

## 2023-08-02 PROCEDURE — 80048 BASIC METABOLIC PNL TOTAL CA: CPT

## 2023-08-02 PROCEDURE — 2580000003 HC RX 258: Performed by: SPECIALIST

## 2023-08-02 PROCEDURE — 6360000002 HC RX W HCPCS

## 2023-08-02 PROCEDURE — 36415 COLL VENOUS BLD VENIPUNCTURE: CPT

## 2023-08-02 PROCEDURE — 6360000002 HC RX W HCPCS: Performed by: SPECIALIST

## 2023-08-02 RX ADMIN — METOPROLOL TARTRATE 25 MG: 25 TABLET, FILM COATED ORAL at 09:23

## 2023-08-02 RX ADMIN — ENOXAPARIN SODIUM 30 MG: 100 INJECTION SUBCUTANEOUS at 09:23

## 2023-08-02 RX ADMIN — INSULIN GLARGINE 50 UNITS: 100 INJECTION, SOLUTION SUBCUTANEOUS at 20:38

## 2023-08-02 RX ADMIN — VANCOMYCIN HYDROCHLORIDE 1500 MG: 10 INJECTION, POWDER, LYOPHILIZED, FOR SOLUTION INTRAVENOUS at 21:45

## 2023-08-02 RX ADMIN — FENOFIBRATE 160 MG: 160 TABLET ORAL at 09:22

## 2023-08-02 RX ADMIN — INSULIN LISPRO 22 UNITS: 100 INJECTION, SOLUTION INTRAVENOUS; SUBCUTANEOUS at 11:41

## 2023-08-02 RX ADMIN — SERTRALINE 50 MG: 100 TABLET, FILM COATED ORAL at 09:22

## 2023-08-02 RX ADMIN — SODIUM CHLORIDE, PRESERVATIVE FREE 10 ML: 5 INJECTION INTRAVENOUS at 01:24

## 2023-08-02 RX ADMIN — VANCOMYCIN HYDROCHLORIDE 1500 MG: 10 INJECTION, POWDER, LYOPHILIZED, FOR SOLUTION INTRAVENOUS at 09:30

## 2023-08-02 RX ADMIN — OXYCODONE AND ACETAMINOPHEN 1 TABLET: 5; 325 TABLET ORAL at 20:29

## 2023-08-02 RX ADMIN — LEVOTHYROXINE SODIUM 25 MCG: 25 TABLET ORAL at 06:10

## 2023-08-02 RX ADMIN — INSULIN LISPRO 3 UNITS: 100 INJECTION, SOLUTION INTRAVENOUS; SUBCUTANEOUS at 09:21

## 2023-08-02 RX ADMIN — ATORVASTATIN CALCIUM 40 MG: 40 TABLET, FILM COATED ORAL at 20:29

## 2023-08-02 RX ADMIN — PREGABALIN 100 MG: 50 CAPSULE ORAL at 09:22

## 2023-08-02 RX ADMIN — PREGABALIN 100 MG: 50 CAPSULE ORAL at 20:30

## 2023-08-02 RX ADMIN — ACETAMINOPHEN 650 MG: 325 TABLET ORAL at 01:30

## 2023-08-02 RX ADMIN — Medication 10 ML: at 20:30

## 2023-08-02 RX ADMIN — ENOXAPARIN SODIUM 30 MG: 100 INJECTION SUBCUTANEOUS at 20:30

## 2023-08-02 RX ADMIN — OXYCODONE AND ACETAMINOPHEN 1 TABLET: 5; 325 TABLET ORAL at 06:10

## 2023-08-02 RX ADMIN — INSULIN LISPRO 6 UNITS: 100 INJECTION, SOLUTION INTRAVENOUS; SUBCUTANEOUS at 11:41

## 2023-08-02 RX ADMIN — OXYCODONE AND ACETAMINOPHEN 1 TABLET: 5; 325 TABLET ORAL at 14:16

## 2023-08-02 RX ADMIN — METOPROLOL TARTRATE 25 MG: 25 TABLET, FILM COATED ORAL at 20:30

## 2023-08-02 RX ADMIN — ACETAMINOPHEN 650 MG: 325 TABLET ORAL at 19:14

## 2023-08-02 RX ADMIN — INSULIN LISPRO 4 UNITS: 100 INJECTION, SOLUTION INTRAVENOUS; SUBCUTANEOUS at 20:39

## 2023-08-02 RX ADMIN — Medication 10 ML: at 09:00

## 2023-08-02 RX ADMIN — WATER 2000 MG: 1 INJECTION INTRAMUSCULAR; INTRAVENOUS; SUBCUTANEOUS at 14:15

## 2023-08-02 RX ADMIN — INSULIN LISPRO 22 UNITS: 100 INJECTION, SOLUTION INTRAVENOUS; SUBCUTANEOUS at 09:20

## 2023-08-02 RX ADMIN — LISINOPRIL 10 MG: 10 TABLET ORAL at 09:22

## 2023-08-02 ASSESSMENT — PAIN SCALES - GENERAL
PAINLEVEL_OUTOF10: 6
PAINLEVEL_OUTOF10: 9
PAINLEVEL_OUTOF10: 8
PAINLEVEL_OUTOF10: 9
PAINLEVEL_OUTOF10: 9

## 2023-08-02 ASSESSMENT — PAIN DESCRIPTION - ORIENTATION
ORIENTATION: MID
ORIENTATION: MID
ORIENTATION: LEFT

## 2023-08-02 ASSESSMENT — PAIN DESCRIPTION - DESCRIPTORS
DESCRIPTORS: ACHING;BURNING
DESCRIPTORS: SHARP
DESCRIPTORS: SHOOTING;CRUSHING
DESCRIPTORS: GNAWING;SHOOTING

## 2023-08-02 ASSESSMENT — PAIN DESCRIPTION - LOCATION
LOCATION: FOOT

## 2023-08-02 NOTE — PLAN OF CARE
Problem: Pain  Goal: Verbalizes/displays adequate comfort level or baseline comfort level  Outcome: Progressing     Problem: Skin/Tissue Integrity  Goal: Absence of new skin breakdown  Outcome: Progressing     Problem: Chronic Conditions and Co-morbidities  Goal: Patient's chronic conditions and co-morbidity symptoms are monitored and maintained or improved  Recent Flowsheet Documentation  Taken 8/2/2023 0930 by Kiet Barrios [facility] - Patient's Chronic Conditions and Co-Morbidity Symptoms are Monitored and Maintained or Improved: Monitor and assess patient's chronic conditions and comorbid symptoms for stability, deterioration, or improvement

## 2023-08-02 NOTE — PLAN OF CARE
Problem: Discharge Planning  Goal: Discharge to home or other facility with appropriate resources  Outcome: Progressing  Flowsheets  Taken 8/1/2023 2228 by Zeeshan Dinh RN  Discharge to home or other facility with appropriate resources: Identify barriers to discharge with patient and caregiver  Taken 8/1/2023 1001 by Domenic Leger RN  Discharge to home or other facility with appropriate resources:   Identify discharge learning needs (meds, wound care, etc)   Identify barriers to discharge with patient and caregiver     Problem: Pain  Goal: Verbalizes/displays adequate comfort level or baseline comfort level  8/1/2023 2247 by Zeeshan Dinh RN  Outcome: Progressing  8/1/2023 1711 by Domenic Leger RN  Outcome: Progressing     Problem: Safety - Adult  Goal: Free from fall injury  Outcome: Progressing     Problem: Skin/Tissue Integrity  Goal: Absence of new skin breakdown  Description: 1. Monitor for areas of redness and/or skin breakdown  2. Assess vascular access sites hourly  3. Every 4-6 hours minimum:  Change oxygen saturation probe site  4. Every 4-6 hours:  If on nasal continuous positive airway pressure, respiratory therapy assess nares and determine need for appliance change or resting period.   8/1/2023 2247 by Zeeshan Dinh RN  Outcome: Progressing  8/1/2023 1711 by Domenic Leger RN  Outcome: Progressing     Problem: Chronic Conditions and Co-morbidities  Goal: Patient's chronic conditions and co-morbidity symptoms are monitored and maintained or improved  Outcome: Progressing  Flowsheets  Taken 8/1/2023 2228 by Zeeshan Dinh RN  Care Plan - Patient's Chronic Conditions and Co-Morbidity Symptoms are Monitored and Maintained or Improved: Monitor and assess patient's chronic conditions and comorbid symptoms for stability, deterioration, or improvement  Taken 8/1/2023 1001 by Domenic Leger [facility] - Patient's Chronic Conditions and Co-Morbidity Symptoms are Monitored and Maintained

## 2023-08-02 NOTE — PROGRESS NOTES
Comprehensive Nutrition Assessment    Type and Reason for Visit:  Initial, RD Nutrition Re-Screen/LOS    Nutrition Recommendations/Plan:   Continue current diet & monitor  Will add Silverio BID and Ensure HP BID     Malnutrition Assessment:  Malnutrition Status: At risk for malnutrition (Comment) (r/t wound) (08/02/23 1113)    Context:  Acute Illness     Findings of the 6 clinical characteristics of malnutrition:  Energy Intake:  No significant decrease in energy intake  Weight Loss:  No significant weight loss     Body Fat Loss:  No significant body fat loss     Muscle Mass Loss:  No significant muscle mass loss    Fluid Accumulation:  Unable to assess (d/t edema likely multifactorial)     Strength:  Not Performed    Nutrition Assessment:    Pt w/ chonic L foot DM ulcer w/ OM s/p L 5th partial ray amputation 7/31. Hx DM, CKD, COPD. Pt consuming >75% of meals, will add ONS to promote wound healing & monitor. Nutrition Related Findings:    A&O X4, -3.5 L, trace edema, abd obese/rounded, +BS, A1C 12.2 Wound Type: Surgical Incision, Diabetic Ulcer       Current Nutrition Intake & Therapies:    Average Meal Intake: %  Average Supplements Intake: None Ordered  ADULT DIET; Regular; 4 carb choices (60 gm/meal)    Anthropometric Measures:  Height: 6' (182.9 cm)  Ideal Body Weight (IBW): 178 lbs (81 kg)    Admission Body Weight: 273 lb 6.4 oz (124 kg) (7/26 bed)  Current Body Weight: 274 lb 8 oz (124.5 kg), 154.2 % IBW.  Weight Source: Bed Scale (8/2)  Current BMI (kg/m2): 37.2  Usual Body Weight: 265 lb (120.2 kg) (actual X 3 months per EMR)  % Weight Change (Calculated): 3.6                    BMI Categories: Obese Class 2 (BMI 35.0 -39.9)    Estimated Daily Nutrient Needs:  Energy Requirements Based On: Kcal/kg  Weight Used for Energy Requirements: Current  Energy (kcal/day):   Weight Used for Protein Requirements: Ideal  Protein (g/day): 105-115 (1.3-1.4)  Method Used for Fluid Requirements: 1

## 2023-08-02 NOTE — PROGRESS NOTES
[address] [place] [address] Hospitalist   Progress Note    Admitting Date and Time: 7/26/2023  4:09 PM  Admit Dx: Dehydration [E86.0]  Osteomyelitis ([address]) [M86.9]  Hyperglycemia [R73.9]  Osteomyelitis of ankle or foot, left, acute ([address]) [M86.172]  Osteomyelitis of left foot, unspecified type ([address]) [M86.9]    Subjective:    Patient was admitted with Dehydration [E86.0]  Osteomyelitis ([address]) [M86.9]  Hyperglycemia [R73.9]  Osteomyelitis of ankle or foot, left, acute ([address]) [M86.172]  Osteomyelitis of left foot, unspecified type ([address]) [M86.9].  Patient denies fever, chills, cp, sob, n/v.     sodium chloride flush  5-40 mL IntraVENous 2 times per day    cefTRIAXone (ROCEPHIN) IV  2,000 mg IntraVENous Q24H    vancomycin  1,500 mg IntraVENous Q12H    insulin glargine  50 Units SubCUTAneous Nightly    insulin glargine  15 Units SubCUTAneous QAM    insulin lispro  22 Units SubCUTAneous TID WC    atorvastatin  40 mg Oral Nightly    fenofibrate  160 mg Oral Daily    levothyroxine  25 mcg Oral Daily    lisinopril  10 mg Oral Daily    metoprolol tartrate  25 mg Oral BID    pregabalin  100 mg Oral BID    sertraline  50 mg Oral QAM    nicotine  1 patch TransDERmal Daily    arformoterol tartrate  15 mcg Nebulization BID RT    And    budesonide  0.5 mg Nebulization BID RT    insulin lispro  0-18 Units SubCUTAneous TID WC    promethazine  25 mg IntraMUSCular Once    enoxaparin  30 mg SubCUTAneous BID    insulin lispro  0-4 Units SubCUTAneous Nightly     sodium chloride flush, 5-40 mL, PRN  sodium chloride, , PRN  oxyCODONE-acetaminophen, 1 tablet, Q6H PRN  albuterol, 2.5 mg, Q6H PRN  ondansetron, 4 mg, Q8H PRN   Or  ondansetron, 4 mg, Q6H PRN  polyethylene glycol, 17 g, Daily PRN  acetaminophen, 650 mg, Q6H PRN   Or  acetaminophen, 650 mg, Q6H PRN  dextrose bolus, 125 mL, PRN   Or  dextrose bolus, 250 mL, PRN  glucagon (rDNA), 1 mg, PRN  dextrose, , Continuous PRN  Glucose, 15 g, PRN         Objective:    /65   Pulse 71   Temp Lab Results   Component Value Date/Time    MG 1.7 08/02/2023 05:56 AM     Phosphorus:    Lab Results   Component Value Date/Time    PHOS 2.9 08/02/2023 05:56 AM        Radiology:   MRI FOOT LEFT WO CONTRAST   Final Result   1. Signal abnormality in the distal 5th metatarsal and 5th proximal phalanx   suggestive of early osteomyelitis given the adjacent soft tissue ulceration. VL LOWER EXTREMITY ARTERIAL SEGMENTAL PRESSURES W PPG   Final Result   1. Ankle brachial index on the right was 1.3 than on the left was 1.4. Preserved multiphasic waveforms at all levels evaluated on arterial Doppler   evaluation. This finding lower suspicion for the presence of a high-grade   stenosis. 2.  PVR evaluation of the bilateral lower extremity show preserved   pulsatility with varying degrees of mild decreased amplitude. 3.  Digital waveform evaluation of the remaining digits of the bilateral feet   demonstrate preserved pulsatility. Varying degrees of mild decreased   amplitude on the right. Mild decreased amplitude in the left 1st, 2nd, and   5th digits. Moderate decreased amplitude in the 3rd and 4th digits. These   changes probably reflect the presence of underlying microvascular disease. There may be slightly decreased blood flow to digits 3 and 4 on the left. US DUP LOWER EXTREMITIES BILATERAL VENOUS   Final Result   No evidence of DVT in either lower extremity. XR CHEST PORTABLE   Final Result   No acute process. Mild cardiomegaly. XR FOOT LEFT (MIN 3 VIEWS)   Final Result   1. Soft tissue ulceration of the lateral forefoot. Focal lucency in the   distal 5th metatarsal may represent early osteomyelitis. Consider   confirmation with forefoot MRI.              Assessment:    Principal Problem:    Osteomyelitis ([address])  Active Problems:    Poorly controlled diabetes mellitus ([address])    Osteomyelitis of ankle or foot, left, acute (HCC)    Hypothyroidism    DM (diabetes mellitus) type 2, uncontrolled, with ketoacidosis ([address])    Uncontrolled type 2 diabetes mellitus with hyperglycemia ([address])  Resolved Problems:    * No resolved hospital problems. *      Plan:  Chronic left diabetic foot ulcer with osteomyelitis  pt s/p OR. Continue abx.   Podiatry and ID following  Dm type 2 uncontrolled monitor bs and tx with insulin  Hyperkalemia monitor  Hypothyroidism continue med  Htn continue med  Hyperlipidemia  continue med  Copd continue med  Acidosis monitor    Monitor cultures    Electronically signed by Jessica Quesada DO on 8/2/2023 at 4:47 PM

## 2023-08-02 NOTE — CARE COORDINATION
S/p left 5th toe amp. Iv rocephin/vanco/ [facility] following. Dressing orders on chart. Await id plan. Has ww. [facility] 15/24; pt would like skilled rehab if possible. Pt deferred to maikel Jimenez; offered [facility] choices #1 [facility]; #2 [facility] ; #3 [facility]. Referral called to [facility]; @ await call back. Transport forms initiated. Will need [phone]. Advised to start precert if accepted. Cand[facility] person,RN,CM. [facility]; [facility]; referral to [facility] at [address] [facility]; reviewing; advised to initiate precert if accepted. Transport forms initiated on chart; will need [phone]. Spoke to maikel Rojas; agreeable to plan. Tati Baxter.

## 2023-08-02 NOTE — PROGRESS NOTES
No answer left VM to call back. Patient will get orders if needed at day of appointment    Pharmacy Consultation Note  (Antibiotic Dosing and Monitoring)    Initial consult date: 7/31/2023  Consulting physician/provider: Dr. Magy Perera  Drug: Vancomycin  Indication: Bone and Joint Infection    Age/  Gender Height Weight IBW  Allergy Information   63 y.o./male 6' (182.9 cm) 280 lb (127 kg)     Ideal body weight: 77.6 kg (171 lb 1.2 oz)  Adjusted ideal body weight: 96.4 kg (212 lb 7.1 oz)   Patient has no known allergies. Renal Function:  Recent Labs     07/31/23  0240 08/02/23  0556   BUN 15 17   CREATININE 0.8 0.9         Intake/Output Summary (Last 24 hours) at 8/2/2023 1155  Last data filed at 8/2/2023 6902  Gross per 24 hour   Intake 1606.53 ml   Output 800 ml   Net 806.53 ml         Vancomycin Monitoring:  Trough:  No results for input(s): VANCOTROUGH in the last 72 hours. Random:    Recent Labs     08/02/23  0556   VANCORANDOM 21.9       No results for input(s): Geovanny Haque in the last 72 hours. Historical Cultures:  Organism   Date Value Ref Range Status   11/10/2022 Staphylococcus aureus (A)  Final     No results for input(s): BC in the last 72 hours. Vancomycin Administration Times:  Recent vancomycin administrations                     vancomycin 1500 mg in sodium chloride 0.9% 300 mL IVPB (mg) 1,500 mg New Bag 08/02/23 0930     1,500 mg New Bag 08/01/23 2240     1,500 mg New Bag  1019     1,500 mg New Bag 07/31/23 2154    vancomycin (VANCOCIN) 2,500 mg in sodium chloride 0.9 % 500 mL IVPB (mg) 2,500 mg New Bag 07/31/23 1544                  Assessment:  Patient is a 66 y.o. male who has been initiated on vancomycin  Estimated Creatinine Clearance: 118 mL/min (based on SCr of 0.9 mg/dL). To dose vancomycin, pharmacy will be utilizing Pivot calculation software for goal AUC/NICOLE 400-600 mg/L-hr (predicted AUC/NICOLE = 570, Tr =18.5 mcg/mL)    Plan:   Will continue vancomycin 1500 mg IV every 12 hours  Will check vancomycin levels when appropriate  Will continue to monitor renal

## 2023-08-03 ENCOUNTER — APPOINTMENT (OUTPATIENT)
Dept: GENERAL RADIOLOGY | Age: 63
DRG: 617 | End: 2023-08-03
Payer: MEDICARE

## 2023-08-03 LAB
ANION GAP SERPL CALCULATED.3IONS-SCNC: 7 MMOL/L (ref 7–16)
BASOPHILS # BLD: 0.04 K/UL (ref 0–0.2)
BASOPHILS NFR BLD: 1 % (ref 0–2)
BUN SERPL-MCNC: 18 MG/DL (ref 6–23)
CALCIUM SERPL-MCNC: 8.6 MG/DL (ref 8.6–10.2)
CHLORIDE SERPL-SCNC: 103 MMOL/L (ref 98–107)
CO2 SERPL-SCNC: 26 MMOL/L (ref 22–29)
CREAT SERPL-MCNC: 0.9 MG/DL (ref 0.7–1.2)
EOSINOPHIL # BLD: 0.21 K/UL (ref 0.05–0.5)
EOSINOPHILS RELATIVE PERCENT: 3 % (ref 0–6)
ERYTHROCYTE [DISTWIDTH] IN BLOOD BY AUTOMATED COUNT: 13.4 % (ref 11.5–15)
GFR SERPL CREATININE-BSD FRML MDRD: >60 ML/MIN/1.73M2
GLUCOSE SERPL-MCNC: 233 MG/DL (ref 74–99)
HCT VFR BLD AUTO: 40.4 % (ref 37–54)
HGB BLD-MCNC: 13.5 G/DL (ref 12.5–16.5)
IMM GRANULOCYTES # BLD AUTO: 0.03 K/UL (ref 0–0.58)
IMM GRANULOCYTES NFR BLD: 0 % (ref 0–5)
LYMPHOCYTES NFR BLD: 2.13 K/UL (ref 1.5–4)
LYMPHOCYTES RELATIVE PERCENT: 28 % (ref 20–42)
MCH RBC QN AUTO: 28.7 PG (ref 26–35)
MCHC RBC AUTO-ENTMCNC: 33.4 G/DL (ref 32–34.5)
MCV RBC AUTO: 85.8 FL (ref 80–99.9)
METER GLUCOSE: 152 MG/DL (ref 74–99)
METER GLUCOSE: 170 MG/DL (ref 74–99)
METER GLUCOSE: 173 MG/DL (ref 74–99)
METER GLUCOSE: 312 MG/DL (ref 74–99)
MICROORGANISM SPEC CULT: ABNORMAL
MICROORGANISM SPEC CULT: ABNORMAL
MICROORGANISM/AGENT SPEC: ABNORMAL
MONOCYTES NFR BLD: 0.49 K/UL (ref 0.1–0.95)
MONOCYTES NFR BLD: 6 % (ref 2–12)
NEUTROPHILS NFR BLD: 63 % (ref 43–80)
NEUTS SEG NFR BLD: 4.83 K/UL (ref 1.8–7.3)
PLATELET # BLD AUTO: 153 K/UL (ref 130–450)
PMV BLD AUTO: 11.5 FL (ref 7–12)
POTASSIUM SERPL-SCNC: 4.3 MMOL/L (ref 3.5–5)
RBC # BLD AUTO: 4.71 M/UL (ref 3.8–5.8)
SODIUM SERPL-SCNC: 136 MMOL/L (ref 132–146)
SPECIMEN DESCRIPTION: ABNORMAL
WBC OTHER # BLD: 7.7 K/UL (ref 4.5–11.5)

## 2023-08-03 PROCEDURE — 2580000003 HC RX 258: Performed by: SPECIALIST

## 2023-08-03 PROCEDURE — 6370000000 HC RX 637 (ALT 250 FOR IP)

## 2023-08-03 PROCEDURE — 97530 THERAPEUTIC ACTIVITIES: CPT

## 2023-08-03 PROCEDURE — 80048 BASIC METABOLIC PNL TOTAL CA: CPT

## 2023-08-03 PROCEDURE — 2580000003 HC RX 258

## 2023-08-03 PROCEDURE — 92526 ORAL FUNCTION THERAPY: CPT | Performed by: SPEECH-LANGUAGE PATHOLOGIST

## 2023-08-03 PROCEDURE — 82947 ASSAY GLUCOSE BLOOD QUANT: CPT

## 2023-08-03 PROCEDURE — 6360000002 HC RX W HCPCS

## 2023-08-03 PROCEDURE — 74230 X-RAY XM SWLNG FUNCJ C+: CPT

## 2023-08-03 PROCEDURE — 85025 COMPLETE CBC W/AUTO DIFF WBC: CPT

## 2023-08-03 PROCEDURE — 92611 MOTION FLUOROSCOPY/SWALLOW: CPT | Performed by: SPEECH-LANGUAGE PATHOLOGIST

## 2023-08-03 PROCEDURE — 99231 SBSQ HOSP IP/OBS SF/LOW 25: CPT | Performed by: INTERNAL MEDICINE

## 2023-08-03 PROCEDURE — 6360000002 HC RX W HCPCS: Performed by: SPECIALIST

## 2023-08-03 PROCEDURE — 1200000000 HC SEMI PRIVATE

## 2023-08-03 PROCEDURE — 99233 SBSQ HOSP IP/OBS HIGH 50: CPT | Performed by: INTERNAL MEDICINE

## 2023-08-03 PROCEDURE — 36415 COLL VENOUS BLD VENIPUNCTURE: CPT

## 2023-08-03 PROCEDURE — 2500000003 HC RX 250 WO HCPCS: Performed by: RADIOLOGY

## 2023-08-03 RX ORDER — OXYCODONE HYDROCHLORIDE AND ACETAMINOPHEN 5; 325 MG/1; MG/1
1 TABLET ORAL EVERY 4 HOURS PRN
Status: DISCONTINUED | OUTPATIENT
Start: 2023-08-03 | End: 2023-08-04 | Stop reason: HOSPADM

## 2023-08-03 RX ORDER — FLUCONAZOLE 2 MG/ML
400 INJECTION, SOLUTION INTRAVENOUS ONCE
Status: COMPLETED | OUTPATIENT
Start: 2023-08-03 | End: 2023-08-03

## 2023-08-03 RX ORDER — FLUCONAZOLE 100 MG/1
200 TABLET ORAL DAILY
Status: DISCONTINUED | OUTPATIENT
Start: 2023-08-04 | End: 2023-08-04 | Stop reason: HOSPADM

## 2023-08-03 RX ADMIN — WATER 2000 MG: 1 INJECTION INTRAMUSCULAR; INTRAVENOUS; SUBCUTANEOUS at 14:28

## 2023-08-03 RX ADMIN — Medication 10 ML: at 21:00

## 2023-08-03 RX ADMIN — PREGABALIN 100 MG: 50 CAPSULE ORAL at 20:11

## 2023-08-03 RX ADMIN — INSULIN GLARGINE 15 UNITS: 100 INJECTION, SOLUTION SUBCUTANEOUS at 09:02

## 2023-08-03 RX ADMIN — OXYCODONE AND ACETAMINOPHEN 1 TABLET: 5; 325 TABLET ORAL at 20:11

## 2023-08-03 RX ADMIN — METOPROLOL TARTRATE 25 MG: 25 TABLET, FILM COATED ORAL at 20:11

## 2023-08-03 RX ADMIN — INSULIN LISPRO 22 UNITS: 100 INJECTION, SOLUTION INTRAVENOUS; SUBCUTANEOUS at 09:03

## 2023-08-03 RX ADMIN — VANCOMYCIN HYDROCHLORIDE 1500 MG: 10 INJECTION, POWDER, LYOPHILIZED, FOR SOLUTION INTRAVENOUS at 10:28

## 2023-08-03 RX ADMIN — INSULIN LISPRO 4 UNITS: 100 INJECTION, SOLUTION INTRAVENOUS; SUBCUTANEOUS at 20:12

## 2023-08-03 RX ADMIN — ENOXAPARIN SODIUM 30 MG: 100 INJECTION SUBCUTANEOUS at 20:11

## 2023-08-03 RX ADMIN — Medication 10 ML: at 09:00

## 2023-08-03 RX ADMIN — FLUCONAZOLE 400 MG: 2 INJECTION, SOLUTION INTRAVENOUS at 14:28

## 2023-08-03 RX ADMIN — OXYCODONE AND ACETAMINOPHEN 1 TABLET: 5; 325 TABLET ORAL at 07:18

## 2023-08-03 RX ADMIN — OXYCODONE AND ACETAMINOPHEN 1 TABLET: 5; 325 TABLET ORAL at 16:23

## 2023-08-03 RX ADMIN — INSULIN LISPRO 3 UNITS: 100 INJECTION, SOLUTION INTRAVENOUS; SUBCUTANEOUS at 09:03

## 2023-08-03 RX ADMIN — INSULIN LISPRO 22 UNITS: 100 INJECTION, SOLUTION INTRAVENOUS; SUBCUTANEOUS at 11:13

## 2023-08-03 RX ADMIN — OXYCODONE AND ACETAMINOPHEN 1 TABLET: 5; 325 TABLET ORAL at 12:18

## 2023-08-03 RX ADMIN — BARIUM SULFATE 120 ML: 400 PASTE ORAL at 14:48

## 2023-08-03 RX ADMIN — SODIUM CHLORIDE, PRESERVATIVE FREE 10 ML: 5 INJECTION INTRAVENOUS at 14:28

## 2023-08-03 RX ADMIN — BARIUM SULFATE 120 ML: 400 SUSPENSION ORAL at 14:47

## 2023-08-03 RX ADMIN — ATORVASTATIN CALCIUM 40 MG: 40 TABLET, FILM COATED ORAL at 20:11

## 2023-08-03 RX ADMIN — INSULIN GLARGINE 50 UNITS: 100 INJECTION, SOLUTION SUBCUTANEOUS at 20:12

## 2023-08-03 RX ADMIN — ENOXAPARIN SODIUM 30 MG: 100 INJECTION SUBCUTANEOUS at 08:58

## 2023-08-03 RX ADMIN — LEVOTHYROXINE SODIUM 25 MCG: 25 TABLET ORAL at 05:30

## 2023-08-03 RX ADMIN — SERTRALINE 50 MG: 100 TABLET, FILM COATED ORAL at 08:57

## 2023-08-03 RX ADMIN — VANCOMYCIN HYDROCHLORIDE 1500 MG: 10 INJECTION, POWDER, LYOPHILIZED, FOR SOLUTION INTRAVENOUS at 22:18

## 2023-08-03 RX ADMIN — FENOFIBRATE 160 MG: 160 TABLET ORAL at 09:00

## 2023-08-03 RX ADMIN — PREGABALIN 100 MG: 50 CAPSULE ORAL at 08:57

## 2023-08-03 RX ADMIN — LISINOPRIL 10 MG: 10 TABLET ORAL at 08:57

## 2023-08-03 RX ADMIN — BARIUM SULFATE 1 TABLET: 700 TABLET ORAL at 14:48

## 2023-08-03 RX ADMIN — INSULIN LISPRO 3 UNITS: 100 INJECTION, SOLUTION INTRAVENOUS; SUBCUTANEOUS at 11:14

## 2023-08-03 RX ADMIN — BARIUM SULFATE 70 G: 0.81 POWDER, FOR SUSPENSION ORAL at 14:47

## 2023-08-03 RX ADMIN — INSULIN LISPRO 22 UNITS: 100 INJECTION, SOLUTION INTRAVENOUS; SUBCUTANEOUS at 16:25

## 2023-08-03 RX ADMIN — INSULIN LISPRO 3 UNITS: 100 INJECTION, SOLUTION INTRAVENOUS; SUBCUTANEOUS at 16:26

## 2023-08-03 RX ADMIN — METOPROLOL TARTRATE 25 MG: 25 TABLET, FILM COATED ORAL at 08:57

## 2023-08-03 ASSESSMENT — PAIN SCALES - GENERAL
PAINLEVEL_OUTOF10: 8
PAINLEVEL_OUTOF10: 4
PAINLEVEL_OUTOF10: 8

## 2023-08-03 ASSESSMENT — PAIN DESCRIPTION - LOCATION
LOCATION: FOOT
LOCATION: LEG
LOCATION: FOOT

## 2023-08-03 ASSESSMENT — PAIN DESCRIPTION - ORIENTATION
ORIENTATION: LEFT
ORIENTATION: ANTERIOR;MID

## 2023-08-03 ASSESSMENT — PAIN DESCRIPTION - DESCRIPTORS
DESCRIPTORS: GNAWING;ACHING
DESCRIPTORS: DISCOMFORT;THROBBING
DESCRIPTORS: ACHING;DISCOMFORT;THROBBING
DESCRIPTORS: ACHING;DISCOMFORT

## 2023-08-03 NOTE — PLAN OF CARE
Problem: Discharge Planning  Goal: Discharge to home or other facility with appropriate resources  Outcome: Progressing  Flowsheets  Taken 8/2/2023 2044 by Steve Clemens RN  Discharge to home or other facility with appropriate resources: Identify barriers to discharge with patient and caregiver  Taken 8/2/2023 0930 by Krystian Osei RN  Discharge to home or other facility with appropriate resources: Identify barriers to discharge with patient and caregiver     Problem: Pain  Goal: Verbalizes/displays adequate comfort level or baseline comfort level  8/2/2023 2102 by Steve Clemens RN  Outcome: Progressing  Flowsheets (Taken 8/2/2023 2015 by Mirta Bridges)  Verbalizes/displays adequate comfort level or baseline comfort level: Encourage patient to monitor pain and request assistance  8/2/2023 1519 by Krystian Osei RN  Outcome: Progressing     Problem: Safety - Adult  Goal: Free from fall injury  Outcome: Progressing     Problem: Skin/Tissue Integrity  Goal: Absence of new skin breakdown  Description: 1. Monitor for areas of redness and/or skin breakdown  2. Assess vascular access sites hourly  3. Every 4-6 hours minimum:  Change oxygen saturation probe site  4. Every 4-6 hours:  If on nasal continuous positive airway pressure, respiratory therapy assess nares and determine need for appliance change or resting period.   8/2/2023 2102 by Steve Clemens RN  Outcome: Progressing  8/2/2023 1519 by Krystian Osei RN  Outcome: Progressing     Problem: Chronic Conditions and Co-morbidities  Goal: Patient's chronic conditions and co-morbidity symptoms are monitored and maintained or improved  Outcome: Progressing  Flowsheets  Taken 8/2/2023 2044 by Steve Clemens RN  Care Plan - Patient's Chronic Conditions and Co-Morbidity Symptoms are Monitored and Maintained or Improved: Monitor and assess patient's chronic conditions and comorbid symptoms for stability, deterioration, or improvement  Taken 8/2/2023

## 2023-08-03 NOTE — CARE COORDINATION
S/p I&D left foot. Await final ID plan. [facility] following; orders for wound care on chart. [facility] 15/24; [facility] cornelius declined pt. Re-referred to [facility] at [facility] review and advise. Await ID final cx. Per note, may need PICC. Will follow. Sigrid Armando. [facility] will follow. Needs updated PT/OT notes for precert. Still awaiting final ID plan. Transport forms initiated . [address] completed. will advise precert once updated PT/OT notes in. Lennie Ac updated. Sigrid Armando.

## 2023-08-03 NOTE — DISCHARGE INSTR - COC
Continuity of Care Form    Patient Name: Yi Chun   :  1960  MRN:  [id number]    Admit date:  2023  Discharge date:  23    Code Status Order: Full Code   Advance Directives:     Admitting Physician:  Jovani Segundo MD  PCP: Pedro Comer DO    Discharging Nurse: [facility] Unit/Room#: 0256/4661-A  Discharging Unit Phone Number: [phone]    Emergency Contact:   Extended Emergency Contact Information  Primary Emergency Contact: [facility]  Address: [address] [facility], [address] [facility] [place] [address] Massimo Marinelli Phone: [phone]  Mobile Phone: [phone]  Relation: Niece/Nephew  Preferred language: English   needed? No    Past Surgical History:  Past Surgical History:   Procedure Laterality Date    BACK SURGERY      CHOLECYSTECTOMY      CORONARY ARTERY BYPASS GRAFT REDO      2 vessel    FOOT DEBRIDEMENT Right 2022    DELAYED PRIMARY CLOSURE RIGHT FOOT WOUND performed by Tory Lamb DPM at [address] Right 2022    FOOT DEBRIDEMENT INCISION AND DRAINAGE performed by Breezy Kinsey DPM at [address] Right 5/10/2022    RIGHT FOOT DELAYED PRIMARY CLOSURE WITH POSSIBLE DEBRIDEMENT    ++CONTACT ISOLATION++   (DR AVAIL. AT 4PM) performed by Breezy Kinsey DPM at [address] PICC LINE  2022         TOE AMPUTATION Right 2022    AMPUTATION RIGHT SECOND TOE performed by Tory Lamb DPM at [facility] Left 2023    LEFT POSSIBLE FIFTH RAY AMPUTATION AND BONE BIOPSY performed by Breezy Kinsey DPM at [facility]         Immunization History: There is no immunization history on file for this patient.     Active Problems:  Patient Active Problem List   Diagnosis Code    Open wound of second toe of right foot S91.104A    Toe trauma, right, initial encounter U62.934F    DM (diabetes mellitus) with peripheral vascular complication (HCC) T87.61 (1800kcals/day)    Routes of Feeding: Oral  Liquids: Thin Liquids  Daily Fluid Restriction: no  Last Modified Barium Swallow with Video (Video Swallowing Test): not done    Treatments at the Time of Hospital Discharge:   Respiratory Treatments: ***  Oxygen Therapy:  is not on home oxygen therapy. Ventilator:    - No ventilator support    Rehab Therapies: Physical Therapy and Occupational Therapy  Weight Bearing Status/Restrictions: NWB Left foot  Other Medical Equipment (for information only, NOT a DME order):  walker  Other Treatments: ***    Patient's personal belongings (please select all that are sent with patient):  {[facility] DME Belongings:915145094}    RN SIGNATURE:  Electronically signed by Dragan Crouch RN on 8/4/23 at 3:36 PM EDT    CASE MANAGEMENT/SOCIAL WORK SECTION    Inpatient Status Date: ***    Readmission Risk Assessment Score:  Readmission Risk              Risk of Unplanned Readmission:  30           Discharging to Facility/ Agency   Name: [address] [address]  Phone:[phone]  GOZ:[phone]    Dialysis Facility (if applicable)   Name:  Address:  Dialysis Schedule:  Phone:  Fax:    / signature: Electronically signed by MARCELLUS Campbell on 8/3/2023 at 11:11 AM      PHYSICIAN SECTION    Prognosis: {Prognosis:3387292577}    Condition at Discharge: [address] Patient Condition:019103285}    Rehab Potential (if transferring to Rehab): {Prognosis:8161958643}    Recommended Labs or Other Treatments After Discharge: ***    Physician Certification: I certify the above information and transfer of Christopher Fuentes  is necessary for the continuing treatment of the diagnosis listed and that he requires [address] for less 30 days.      Update Admission H&P: {[facility] DME Changes in [facility]:517297879}    PHYSICIAN SIGNATURE:  {Esignature:[id number]}

## 2023-08-03 NOTE — CARE COORDINATION
POD #3 I&D left foot. Await final ID plan. [facility] following; orders for wound care on chart. [facility] 15/24; referral to [facility]; await decision. Advised to initiate precert if accepted; await call back. Rebeka Bui.

## 2023-08-03 NOTE — CARE COORDINATION
8/3/2023  Social Work Discharge Planning:SW completed 7000 for Pt to go to [address] [place] [address] at discharge.  Electronically signed by MARCELLUS Beckwith on 8/3/2023 at 11:11 AM

## 2023-08-03 NOTE — PROGRESS NOTES
ENDOCRINOLOGY PROGRESS NOTE      Date of admission: 7/26/2023  Date of service: 8/2/2023  Admitting physician: Brisa Navas MD   Primary Care Physician: Elizabeth Loving DO  Consultant physician: Dean Flores MD     Reason for the consultation:  Uncontrolled DM    History of Present Illness: The history is provided by the patient. Accuracy of the patient data is excellent    Rochelle Fuentes is a very pleasant 66 y.o. old male with PMH uncontrolled DM type 2, obesity, HTN, HLD and other listed below admitted to [facility] on 7/26/2023 because of chills and Lt foot pain osteomyelitis. Endocrine service was consulted for diabetes management. Subjective   The patient was at the bedside in the morning. Blood sugar at goal.  No acute events overnight.     Inpatient diet:   Carb Restricted diet     Point of care glucose monitoring   (Independently reviewed)   Recent Labs     08/01/23  0643 08/01/23  1123 08/01/23  1652 08/01/23  2055 08/02/23  0603 08/02/23  1136 08/02/23  1621 08/02/23  2034   GLUMET 138* 258* 173* 246* 186* 206* 133* 306*     Scheduled Meds:   sodium chloride flush  5-40 mL IntraVENous 2 times per day    cefTRIAXone (ROCEPHIN) IV  2,000 mg IntraVENous Q24H    vancomycin  1,500 mg IntraVENous Q12H    insulin glargine  50 Units SubCUTAneous Nightly    insulin glargine  15 Units SubCUTAneous QAM    insulin lispro  22 Units SubCUTAneous TID WC    atorvastatin  40 mg Oral Nightly    fenofibrate  160 mg Oral Daily    levothyroxine  25 mcg Oral Daily    lisinopril  10 mg Oral Daily    metoprolol tartrate  25 mg Oral BID    pregabalin  100 mg Oral BID    sertraline  50 mg Oral QAM    nicotine  1 patch TransDERmal Daily    arformoterol tartrate  15 mcg Nebulization BID RT    And    budesonide  0.5 mg Nebulization BID RT    insulin lispro  0-18 Units SubCUTAneous TID WC    promethazine  25 mg IntraMUSCular Once    enoxaparin  30 mg SubCUTAneous BID    insulin lispro  0-4 Units SubCUTAneous Nightly       PRN Meds: Final     Lab Results   Component Value Date/Time    LABA1C 12.2 07/27/2023 06:35 AM    LABA1C 12.6 04/03/2023 01:10 PM    LABA1C 8.7 11/10/2022 02:45 AM     Lab Results   Component Value Date/Time    TSH 1.790 04/05/2023 02:05 AM    T4FREE 1.05 11/10/2022 03:25 PM     Lab Results   Component Value Date/Time    LABA1C 12.2 07/27/2023 06:35 AM    GLUCOSE 163 08/02/2023 05:56 AM     No results found for: TRIG, HDL, LDLCALC, CHOL    Blood culture   Lab Results   Component Value Date/Time    BC 5 Days no growth 07/03/2023 04:42 PM    BC 5 Days no growth 04/03/2023 10:52 AM       Radiology:  MRI FOOT LEFT WO CONTRAST   Final Result   1. Signal abnormality in the distal 5th metatarsal and 5th proximal phalanx   suggestive of early osteomyelitis given the adjacent soft tissue ulceration. VL LOWER EXTREMITY ARTERIAL SEGMENTAL PRESSURES W PPG   Final Result   1. Ankle brachial index on the right was 1.3 than on the left was 1.4. Preserved multiphasic waveforms at all levels evaluated on arterial Doppler   evaluation. This finding lower suspicion for the presence of a high-grade   stenosis. 2.  PVR evaluation of the bilateral lower extremity show preserved   pulsatility with varying degrees of mild decreased amplitude. 3.  Digital waveform evaluation of the remaining digits of the bilateral feet   demonstrate preserved pulsatility. Varying degrees of mild decreased   amplitude on the right. Mild decreased amplitude in the left 1st, 2nd, and   5th digits. Moderate decreased amplitude in the 3rd and 4th digits. These   changes probably reflect the presence of underlying microvascular disease. There may be slightly decreased blood flow to digits 3 and 4 on the left. US DUP LOWER EXTREMITIES BILATERAL VENOUS   Final Result   No evidence of DVT in either lower extremity. XR CHEST PORTABLE   Final Result   No acute process. Mild cardiomegaly.          XR FOOT LEFT (MIN 3 VIEWS) Final Result   1. Soft tissue ulceration of the lateral forefoot. Focal lucency in the   distal 5th metatarsal may represent early osteomyelitis. Consider   confirmation with forefoot MRI. Medical Records/Labs/Images review:   I personally reviewed and summarized previous records   All labs and imaging were reviewed independently     [address], a 66 y.o.-old male seen today for inpatient diabetes management. Diabetes Mellitus type 2   Uncontrolled with A1c 12.2%  Continue on the following diabetes regimen  Lantus 15u AM, 50 u nightly   Humalog 22u with meals   High dose sliding scale with meals and at night   Continue glucose check with meals and at bedtime   Will titrate insulin dose based on the blood glucose trend & insulin requirement  Pt will follow with us after discharge    Dietary noncompliance  Discussed with patient the importance of eating consistent carbohydrate meals, avoiding high glycemic index food. Also, discussed with patient the risk and negative consequences of dietary noncompliance on blood glucose control, blood pressure and weight    HLD  Lipitor 40 mg daily     Primary hypothyroidism   Continue Levothyroxine 25 mcg daily     The above issues were reviewed with the patient who understood and agreed with the plan. Thank you for allowing us to participate in the care of this patient. Please do not hesitate to contact us with any additional questions. Chaya Peterson MD  Endocrinologist, [facility]   [facility] [facility] [address]   Phone: [phone]  Fax: [phone]  --------------------------------------------  An electronic signature was used to authenticate this note.  Ward Leger MD on 8/2/2023 at 8:38 PM

## 2023-08-03 NOTE — PROGRESS NOTES
ENDOCRINOLOGY PROGRESS NOTE      Date of admission: 7/26/2023  Date of service: 8/3/2023  Admitting physician: Samantha Patton MD   Primary Care Physician: Preston Maurice DO  Consultant physician: Camellia Bosworth MD     Reason for the consultation:  Uncontrolled DM    History of Present Illness: The history is provided by the patient. Accuracy of the patient data is excellent    Tonny Fuentes is a very pleasant 66 y.o. old male with PMH uncontrolled DM type 2, obesity, HTN, HLD and other listed below admitted to [facility] on 7/26/2023 because of chills and Lt foot pain osteomyelitis. Endocrine service was consulted for diabetes management.     Subjective   No acute events, BS improving , no hypoglycemia     Inpatient diet:   Carb Restricted diet     Point of care glucose monitoring   (Independently reviewed)   Recent Labs     08/01/23 2055 08/02/23  0603 08/02/23  1136 08/02/23  1621 08/02/23  2034 08/03/23  0609 08/03/23  1112 08/03/23  1625   GLUMET 246* 186* 206* 133* 306* 152* 170* 173*     Scheduled Meds:   [START ON 8/4/2023] fluconazole  200 mg Oral Daily    sodium chloride flush  5-40 mL IntraVENous 2 times per day    cefTRIAXone (ROCEPHIN) IV  2,000 mg IntraVENous Q24H    vancomycin  1,500 mg IntraVENous Q12H    insulin glargine  50 Units SubCUTAneous Nightly    insulin glargine  15 Units SubCUTAneous QAM    insulin lispro  22 Units SubCUTAneous TID WC    atorvastatin  40 mg Oral Nightly    fenofibrate  160 mg Oral Daily    levothyroxine  25 mcg Oral Daily    lisinopril  10 mg Oral Daily    metoprolol tartrate  25 mg Oral BID    pregabalin  100 mg Oral BID    sertraline  50 mg Oral QAM    nicotine  1 patch TransDERmal Daily    arformoterol tartrate  15 mcg Nebulization BID RT    And    budesonide  0.5 mg Nebulization BID RT    insulin lispro  0-18 Units SubCUTAneous TID WC    promethazine  25 mg IntraMUSCular Once    enoxaparin  30 mg SubCUTAneous BID    insulin lispro  0-4 Units SubCUTAneous Nightly       PRN 0.02 - 0.27 mmol/L Final     Lab Results   Component Value Date/Time    LABA1C 12.2 07/27/2023 06:35 AM    LABA1C 12.6 04/03/2023 01:10 PM    LABA1C 8.7 11/10/2022 02:45 AM     Lab Results   Component Value Date/Time    TSH 1.790 04/05/2023 02:05 AM    T4FREE 1.05 11/10/2022 03:25 PM     Lab Results   Component Value Date/Time    LABA1C 12.2 07/27/2023 06:35 AM    GLUCOSE 233 08/03/2023 10:03 AM     No results found for: TRIG, HDL, LDLCALC, CHOL    Blood culture   Lab Results   Component Value Date/Time    BC 5 Days no growth 07/03/2023 04:42 PM    BC 5 Days no growth 04/03/2023 10:52 AM       Radiology:  Fluoroscopy modified barium swallow with video   Final Result   1. Pharyngeal phase delay with penetration followed by aspiration of thin   liquid barium. Please see separate speech pathology report for full discussion of findings   and recommendations. MRI FOOT LEFT WO CONTRAST   Final Result   1. Signal abnormality in the distal 5th metatarsal and 5th proximal phalanx   suggestive of early osteomyelitis given the adjacent soft tissue ulceration. VL LOWER EXTREMITY ARTERIAL SEGMENTAL PRESSURES W PPG   Final Result   1. Ankle brachial index on the right was 1.3 than on the left was 1.4. Preserved multiphasic waveforms at all levels evaluated on arterial Doppler   evaluation. This finding lower suspicion for the presence of a high-grade   stenosis. 2.  PVR evaluation of the bilateral lower extremity show preserved   pulsatility with varying degrees of mild decreased amplitude. 3.  Digital waveform evaluation of the remaining digits of the bilateral feet   demonstrate preserved pulsatility. Varying degrees of mild decreased   amplitude on the right. Mild decreased amplitude in the left 1st, 2nd, and   5th digits. Moderate decreased amplitude in the 3rd and 4th digits. These   changes probably reflect the presence of underlying microvascular disease.    There may be slightly decreased blood flow to digits 3 and 4 on the left. US DUP LOWER EXTREMITIES BILATERAL VENOUS   Final Result   No evidence of DVT in either lower extremity. XR CHEST PORTABLE   Final Result   No acute process. Mild cardiomegaly. XR FOOT LEFT (MIN 3 VIEWS)   Final Result   1. Soft tissue ulceration of the lateral forefoot. Focal lucency in the   distal 5th metatarsal may represent early osteomyelitis. Consider   confirmation with forefoot MRI. Medical Records/Labs/Images review:   I personally reviewed and summarized previous records   All labs and imaging were reviewed independently     [address], a 66 y.o.-old male seen today for inpatient diabetes management. Diabetes Mellitus type 2   Uncontrolled with A1c 12.2%  Continue on the following diabetes regimen  Lantus 15u AM, 50 u nightly   Humalog 22u with meals   High dose sliding scale with meals and at night   Continue glucose check with meals and at bedtime   Will titrate insulin dose based on the blood glucose trend & insulin requirement  Pt will follow with us after discharge    Dietary noncompliance  Discussed with patient the importance of eating consistent carbohydrate meals, avoiding high glycemic index food. Also, discussed with patient the risk and negative consequences of dietary noncompliance on blood glucose control, blood pressure and weight    HLD  Lipitor 40 mg daily     Primary hypothyroidism   Continue Levothyroxine 25 mcg daily     Thank you for allowing us to participate in the care of this patient. Please do not hesitate to contact us with any additional questions. Janice Higuera MD  Endocrinologist, [facility]   [address] [place] [address]   Phone: [phone]  Fax: [phone]  --------------------------------------------  An electronic signature was used to authenticate this note.  Cherylene Fleet, MD on 8/3/2023 at 6:57 PM

## 2023-08-03 NOTE — PROGRESS NOTES
Physical Therapy  Facility/Department: [address] MED SURG/TELE  Treatment Note  Name: Elvia Fuentes  : 1960  MRN: [id number]  Date of Service: 8/3/2023    Attending Provider:  Jefferson Crabtree MD    Evaluating PT:  Conchis Cabral. Patrick Borjas., P.T. Room #:  5452/4530-T  Diagnosis:  Dehydration [E86.0]  Osteomyelitis (HCC) [M86.9]  Hyperglycemia [R73.9]  Osteomyelitis of ankle or foot, left, acute (HCC) [M86.172]  Osteomyelitis of left foot, unspecified type ([address]) [M86.9]  Procedure/Surgery:  23 L 5th toe amp with I and D  Precautions:  NWB L foot, falls, bed/chair alarm    SUBJECTIVE:    Pt lives with his niece in a 1 story home with 2 stairs and a post he can grab to enter. Pt ambulated with no AD PTA. He has a ww. OBJECTIVE:   Initial Evaluation  Date: 23 Treatment Short Term/ Long Term   Goals   Was pt agreeable to Eval/treatment? yes yes    Does pt have pain? C/o pain in L foot 8-9/10 No c/o pain at this time. Bed Mobility  Rolling: supervision  Supine to sit: supervision  Sit to supine: supervision  Scooting: supervision Rolling: supervision  Supine to sit: supervision  Sit to supine: supervision  Scooting: supervision Independent    Transfers Sit to stand: MOD A  Stand to sit: MIN A  Stand pivot: NA Sit to stand: MIN A  Stand to sit: MIN A  Stand pivot: NA Independent   Ambulation   2 feet lateral scoot shuffle on RLE with ww NWB LLE MIN A 5 feet L and R using ww and lateral scoot shuffle technique with MIN A 15 feet with ww NWB LLE supervision   Stair negotiation: ascended and descended NA NA, pt unable to hop at this time up stairs. 2 steps attempting to sit on a chair on the top step with SBA   AM-PAC 6 Clicks /78      BLE ROM is WFL. BLE strength is grossly 4-/5 to 4/5.    Sensation:  Pt c/o numbness and tingling to R foot  Balance: sitting is Independent and standing with ww while maintaining NWB LLE was MIN A  Endurance: fair-    Patient education  Pt educated on NWB LLE    Patient response to education:   Pt verbalized understanding Pt demonstrated skill Pt requires further education in this area   yes yes yes     ASSESSMENT:    Comments:  Pt was able to stand up with assist from bed and maintain NWB LLE while standing with ww. He was unable to hop consistently on RLE at this time, but was able to perform lateral scoot shuffle on R foot using ww. Pt has decreased strength and endurance limiting functional mobility. Pt still remains NWB LLE which makes amb more difficult for pt at this time and he reports his RLE was always his weaker leg and has had surgery on R foot in past so this makes it difficult to hop on his RLE. Pt was left supine in bed with call light left by patient. Chair/bed alarm: bed alarm was re-activated. PLAN:    Pt is making good progress toward established Physical Therapy goals. Continue with physical therapy current plan of care. Time in  13:00  Time out  13:10    Total Treatment Time 10 minutes     Evaluation Time includes thorough review of current medical information, gathering information on past medical history/social history and prior level of function, completion of standardized testing/informal observation of tasks, assessment of data and education on plan of care and goals. CPT codes:  [] Low Complexity PT evaluation 53942  [] Moderate Complexity PT evaluation 80544  [] High Complexity PT evaluation 68342  [] PT Re-evaluation 25104  [] Gait training 55345 ** minutes  [] Manual therapy 44740 ** minutes  [x] Therapeutic activities 26118 10 minutes  [] Therapeutic exercises 22168 ** minutes  [] Neuromuscular reeducation 28210 ** minutes     Tre Silvestre, P.T.   License Number: [id number]

## 2023-08-03 NOTE — PROGRESS NOTES
Pharmacy Consultation Note  (Antibiotic Dosing and Monitoring)    Initial consult date: 7/31/2023  Consulting physician/provider: Dr. Michael Ye  Drug: Vancomycin  Indication: Bone and Joint Infection    Age/  Gender Height Weight IBW  Allergy Information   63 y.o./male 6' (182.9 cm) 280 lb (127 kg)     Ideal body weight: 77.6 kg (171 lb 1.2 oz)  Adjusted ideal body weight: 95.4 kg (210 lb 5.9 oz)   Patient has no known allergies. Renal Function:  Recent Labs     08/02/23  0556 08/03/23  1003   BUN 17 18   CREATININE 0.9 0.9         Intake/Output Summary (Last 24 hours) at 8/3/2023 1153  Last data filed at 8/3/2023 0953  Gross per 24 hour   Intake 900 ml   Output 1875 ml   Net -975 ml         Vancomycin Monitoring:  Trough:  No results for input(s): VANCOTROUGH in the last 72 hours. Random:    Recent Labs     08/02/23  0556   VANCORANDOM 21.9         No results for input(s): Childs Coins in the last 72 hours. Historical Cultures:  Organism   Date Value Ref Range Status   11/10/2022 Staphylococcus aureus (A)  Final     No results for input(s): BC in the last 72 hours. Vancomycin Administration Times:  Recent vancomycin administrations                     vancomycin 1500 mg in sodium chloride 0.9% 300 mL IVPB (mg) 1,500 mg New Bag 08/03/23 1028     1,500 mg New Bag 08/02/23 2145     1,500 mg New Bag  0930     1,500 mg New Bag 08/01/23 2240     1,500 mg New Bag  1019     1,500 mg New Bag 07/31/23 2154    vancomycin (VANCOCIN) 2,500 mg in sodium chloride 0.9 % 500 mL IVPB (mg) 2,500 mg New Bag 07/31/23 1544                  Assessment:  Patient is a 66 y.o. male who has been initiated on vancomycin  Estimated Creatinine Clearance: 116 mL/min (based on SCr of 0.9 mg/dL). To dose vancomycin, pharmacy will be utilizing [employer] calculation software for goal AUC/NICOLE 400-600 mg/L-hr (predicted AUC/NICOLE = 570, Tr =18.5 mcg/mL)    Plan:   Will continue vancomycin 1500 mg IV every 12 hours  Will check vancomycin levels when appropriate  Will continue to monitor renal function   Pharmacy to follow    Miguel JohnsonD, [facility]  8/3/2023  11:53 AM

## 2023-08-03 NOTE — PROGRESS NOTES
Occupational Therapy  OT BEDSIDE TREATMENT NOTE      Date:8/3/2023  Patient Name: Rosa Elena Fuentes  MRN: [id number]  : 1960  Room: [address] [place] [address]      Evaluating OT: Dax Acuña OTR/L #[id number]     Referring Provider: Lena Ryder MD     Specific Provider Orders/Date: OT Eval and Treat, 23      Diagnosis:   1. Osteomyelitis of left foot, unspecified type (HCC)    2. Dehydration    3. Hyperglycemia    4. Osteomyelitis, unspecified site, unspecified type [facility])        Surgery:      DATE OF PROCEDURE:  2023       PROCEDURES PERFORMED:  1. Left fifth toe amputation to the level of metatarsophalangeal joint. 2.  Incision and drainage, left fifth metatarsal bone. 3.  Complex wound closure, left foot. Pertinent Medical History:       Past Medical History        Past Medical History:   Diagnosis Date    Anxiety      COPD (chronic obstructive pulmonary disease) ([address])      Depression      DM (diabetes mellitus) ([address])      Frostbite      HLD (hyperlipidemia)      HTN (hypertension)      Sleep apnea              Past Surgical History         Past Surgical History:   Procedure Laterality Date    BACK SURGERY        CHOLECYSTECTOMY        CORONARY ARTERY BYPASS GRAFT REDO         2 vessel    FOOT DEBRIDEMENT Right 2022     DELAYED PRIMARY CLOSURE RIGHT FOOT WOUND performed by Reba Elizabeth DPM at [address] Right 2022     FOOT DEBRIDEMENT INCISION AND DRAINAGE performed by Rod Pulido DPM at [address] Right 5/10/2022     RIGHT FOOT DELAYED PRIMARY CLOSURE WITH POSSIBLE DEBRIDEMENT    ++CONTACT ISOLATION++   (DR AVAIL.  AT 4PM) performed by Rod Pulido DPM at [address] PICC LINE   2022          TOE AMPUTATION Right 2022     AMPUTATION RIGHT SECOND TOE performed by Reba Elizabeth DPM at [facility] Left 2023     LEFT POSSIBLE FIFTH RAY AMPUTATION AND BONE BIOPSY performed by Rod Pulido DPM at [facility] OR <> stands performed Sitting:     Static: good    Dynamic: good  Standing: good with walker    Activity Tolerance fair   Fair, reports fatigue  Increase standing tolerance >3 minutes for improved engagement with functional transfers and indep in ADLs     Comments:  patient cleared with nursing and agreeable to session. Good effort and participation despite fatigue. Education provided on importance of increasing activity within tolerance and precautions in order to maximize independence during functional activity. Education/treatment: ADL and functional transfer/activity performed to increase safety and independence during self care tasks. Education provided on safety awareness, adl reeducation, functional transfer training    Pt has made progress towards set goals.      Time In: 10:52  Time Out: 11:20     Min Units   Therapeutic Ex 65204     Therapeutic Activities 49082 94 6   ADL/Self Care 67283     Orthotic Management 50933     Neuro Re-Ed 10287     Non-Billable Time     TOTAL TIMED TREATMENT 28 [facility] ARGUELLES/L [id number]

## 2023-08-03 NOTE — PROGRESS NOTES
SPEECH/LANGUAGE PATHOLOGY  VIDEOFLUOROSCOPIC STUDY OF SWALLOWING (MBS)   and PLAN OF CARE    PATIENT NAME:  Cande Fuentes  (male)     MRN:  [id number]    :  1960  (66 y.o.)  STATUS:  Inpatient: Room 0532/0532-A    TODAY'S DATE:  8/3/2023  REFERRING PROVIDER:  23 0945    SLP video swallow  Start:  23,   End:  23,   ONE TIME,   Standing Count:  1 Occurrences,   R         Carlyle Hollis MD     REASON FOR REFERRAL: pt c/o of food getting stuck   EVALUATING THERAPIST: DALE Ferro      RESULTS:      DYSPHAGIA DIAGNOSIS:  mild-moderate pharyngeal phase dysphagia including mild silent aspiration of consecutive sips of thin liquids-NO STRAWS    Pt denies recent pneumonia    Pt's complaint was food getting stuck in chest. If this continues, pt may benefit from an esophagram to further assess. This test does not assess esophageal function. DIET RECOMMENDATIONS:  Regular consistency solids (IDDSI level 7) with SMALL, SINGLE SIP of thin liquids (IDDSI level 0)    FEEDING RECOMMENDATIONS:    Assistance level:  No assistance needed     Compensatory strategies recommended: Small bites/sips and NO STRAW     Discussed recommendations with nursing and/or faxed report to referring provider: Yes    Laryngeal Penetration and Aspiration:  Penetration WITH aspiration was observed in today's study with  thin liquid    SPEECH THERAPY  PLAN OF CARE   The dysphagia POC is established based on physician order and dysphagia diagnosis    Skilled SLP intervention for dysphagia management on acute care up to 5 x per week until goals met, pt plateaus in function and/or discharged from hospital      Conditions Requiring Skilled Therapeutic Intervention for dysphagia:    Patient is performing below functional baseline d/t  current acute condition, Multiple diagnoses, multiple medications, and increased dependency upon caregivers.     SPECIFIC DYSPHAGIA INTERVENTIONS TO INCLUDE:     compensatory swallowing for speech production  Quality:   Within functional limits  Intensity: Within functional limits    Pain: No pain reported. EDUCATION:   The Speech Language Pathologist (SLP) completed education regarding results of evaluation and that intervention is warranted at this time. Learner: Patient  Education: Reviewed results and recommendations of this evaluation  Evaluation of Education:  Alberto Jensen understanding    This plan may be re-evaluated and revised as warranted. Evaluation Time includes thorough review of current medical information, gathering information on past medical history/social history and prior level of function, completion of standardized testing/informal observation of tasks, assessment of data and education on plan of care and goals. [x]The admitting diagnosis and active problem list, have been reviewed prior to initiation of this evaluation.     CPT Code: 29221  dysphagia study, 75915 dysphagia therapy    ACTIVE PROBLEM LIST:   Patient Active Problem List   Diagnosis    Open wound of second toe of right foot    Toe trauma, right, initial encounter    DM (diabetes mellitus) with peripheral vascular complication (HCC)    COPD (chronic obstructive pulmonary disease) (HCC)    Sleep apnea    HTN (hypertension)    Septic shock ([facility])    Osteomyelitis of right foot ([facility])    ALVINO (acute kidney injury) ([address])    Elevated lactic acid level    Type 2 diabetes mellitus with hyperglycemia, with long-term current use of insulin (HCC)    Cellulitis    Duodenal diverticulum    Hyperkalemia    Anxiety and depression    Hyperlipidemia    Thyroid disease    PVD (peripheral vascular disease) ([facility])    Tobacco use    Lactic acidosis    Thrombocytopenia (HCC)    Diabetic foot infection ([facility])    Dietary noncompliance    Primary hypothyroidism    Osteomyelitis of left foot, unspecified type ([address])    Pseudohyponatremia    Hyperglycemia    Poorly controlled diabetes mellitus ([address])    Osteomyelitis ([address]) Osteomyelitis of ankle or foot, left, acute ([address])    Hypothyroidism    DM (diabetes mellitus) type 2, uncontrolled, with ketoacidosis ([address])    Uncontrolled type 2 diabetes mellitus with hyperglycemia ([address])       Raina STEPHEN. RUBEN/SLP [id number]  Speech-Language Pathologist

## 2023-08-04 ENCOUNTER — APPOINTMENT (OUTPATIENT)
Dept: GENERAL RADIOLOGY | Age: 63
DRG: 617 | End: 2023-08-04
Payer: MEDICARE

## 2023-08-04 ENCOUNTER — HOSPITAL ENCOUNTER (INPATIENT)
Age: 63
LOS: 7 days | Discharge: NURSING, REHAB OR LONG-TERM CARE | DRG: 617 | End: 2023-08-11
Attending: STUDENT IN AN ORGANIZED HEALTH CARE EDUCATION/TRAINING PROGRAM | Admitting: STUDENT IN AN ORGANIZED HEALTH CARE EDUCATION/TRAINING PROGRAM
Payer: MEDICARE

## 2023-08-04 VITALS
HEART RATE: 55 BPM | OXYGEN SATURATION: 94 % | HEIGHT: 72 IN | WEIGHT: 271.2 LBS | SYSTOLIC BLOOD PRESSURE: 96 MMHG | DIASTOLIC BLOOD PRESSURE: 61 MMHG | TEMPERATURE: 97.6 F | BODY MASS INDEX: 36.73 KG/M2 | RESPIRATION RATE: 18 BRPM

## 2023-08-04 DIAGNOSIS — M86.00 ACUTE HEMATOGENOUS OSTEOMYELITIS, UNSPECIFIED SITE (HCC): ICD-10-CM

## 2023-08-04 DIAGNOSIS — R10.9 ABDOMINAL PAIN, UNSPECIFIED ABDOMINAL LOCATION: ICD-10-CM

## 2023-08-04 DIAGNOSIS — M86.9 OSTEOMYELITIS OF LEFT LOWER EXTREMITY (HCC): Primary | ICD-10-CM

## 2023-08-04 PROBLEM — M86.8X7: Status: ACTIVE | Noted: 2023-08-04

## 2023-08-04 LAB
ANION GAP SERPL CALCULATED.3IONS-SCNC: 11 MMOL/L (ref 7–16)
BASOPHILS # BLD: 0.07 K/UL (ref 0–0.2)
BASOPHILS NFR BLD: 1 % (ref 0–2)
BUN SERPL-MCNC: 28 MG/DL (ref 6–23)
CALCIUM SERPL-MCNC: 8.9 MG/DL (ref 8.6–10.2)
CHLORIDE SERPL-SCNC: 104 MMOL/L (ref 98–107)
CO2 SERPL-SCNC: 20 MMOL/L (ref 22–29)
CREAT SERPL-MCNC: 1.1 MG/DL (ref 0.7–1.2)
EOSINOPHIL # BLD: 0.19 K/UL (ref 0.05–0.5)
EOSINOPHILS RELATIVE PERCENT: 2 % (ref 0–6)
ERYTHROCYTE [DISTWIDTH] IN BLOOD BY AUTOMATED COUNT: 13.6 % (ref 11.5–15)
GFR SERPL CREATININE-BSD FRML MDRD: >60 ML/MIN/1.73M2
GLUCOSE SERPL-MCNC: 193 MG/DL (ref 74–99)
HCT VFR BLD AUTO: 41.6 % (ref 37–54)
HGB BLD-MCNC: 13.8 G/DL (ref 12.5–16.5)
IMM GRANULOCYTES # BLD AUTO: 0.05 K/UL (ref 0–0.58)
IMM GRANULOCYTES NFR BLD: 1 % (ref 0–5)
LYMPHOCYTES NFR BLD: 2.57 K/UL (ref 1.5–4)
LYMPHOCYTES RELATIVE PERCENT: 24 % (ref 20–42)
MCH RBC QN AUTO: 28.6 PG (ref 26–35)
MCHC RBC AUTO-ENTMCNC: 33.2 G/DL (ref 32–34.5)
MCV RBC AUTO: 86.3 FL (ref 80–99.9)
METER GLUCOSE: 154 MG/DL (ref 74–99)
METER GLUCOSE: 190 MG/DL (ref 74–99)
MICROORGANISM SPEC CULT: ABNORMAL
MICROORGANISM SPEC CULT: NORMAL
MICROORGANISM/AGENT SPEC: ABNORMAL
MICROORGANISM/AGENT SPEC: NORMAL
MONOCYTES NFR BLD: 0.77 K/UL (ref 0.1–0.95)
MONOCYTES NFR BLD: 7 % (ref 2–12)
NEUTROPHILS NFR BLD: 67 % (ref 43–80)
NEUTS SEG NFR BLD: 7.26 K/UL (ref 1.8–7.3)
PLATELET # BLD AUTO: 180 K/UL (ref 130–450)
PMV BLD AUTO: 11.8 FL (ref 7–12)
POTASSIUM SERPL-SCNC: 4.5 MMOL/L (ref 3.5–5)
RBC # BLD AUTO: 4.82 M/UL (ref 3.8–5.8)
REASON FOR REJECTION: NORMAL
SODIUM SERPL-SCNC: 135 MMOL/L (ref 132–146)
SPECIMEN DESCRIPTION: ABNORMAL
SPECIMEN DESCRIPTION: NORMAL
SPECIMEN SOURCE: NORMAL
SURGICAL PATHOLOGY REPORT: NORMAL
WBC OTHER # BLD: 10.9 K/UL (ref 4.5–11.5)
ZZ NTE CLEAN UP: ORDERED TEST: NORMAL

## 2023-08-04 PROCEDURE — 6370000000 HC RX 637 (ALT 250 FOR IP): Performed by: STUDENT IN AN ORGANIZED HEALTH CARE EDUCATION/TRAINING PROGRAM

## 2023-08-04 PROCEDURE — 6360000002 HC RX W HCPCS

## 2023-08-04 PROCEDURE — 6370000000 HC RX 637 (ALT 250 FOR IP)

## 2023-08-04 PROCEDURE — APPSS45 APP SPLIT SHARED TIME 31-45 MINUTES

## 2023-08-04 PROCEDURE — 02HV33Z INSERTION OF INFUSION DEVICE INTO SUPERIOR VENA CAVA, PERCUTANEOUS APPROACH: ICD-10-PCS | Performed by: INTERNAL MEDICINE

## 2023-08-04 PROCEDURE — 2580000003 HC RX 258: Performed by: SPECIALIST

## 2023-08-04 PROCEDURE — C1751 CATH, INF, PER/CENT/MIDLINE: HCPCS

## 2023-08-04 PROCEDURE — 6370000000 HC RX 637 (ALT 250 FOR IP): Performed by: SPECIALIST

## 2023-08-04 PROCEDURE — 2500000003 HC RX 250 WO HCPCS: Performed by: REGISTERED NURSE

## 2023-08-04 PROCEDURE — 6360000002 HC RX W HCPCS: Performed by: SPECIALIST

## 2023-08-04 PROCEDURE — 36569 INSJ PICC 5 YR+ W/O IMAGING: CPT

## 2023-08-04 PROCEDURE — 2500000003 HC RX 250 WO HCPCS: Performed by: RADIOLOGY

## 2023-08-04 PROCEDURE — 99222 1ST HOSP IP/OBS MODERATE 55: CPT | Performed by: STUDENT IN AN ORGANIZED HEALTH CARE EDUCATION/TRAINING PROGRAM

## 2023-08-04 PROCEDURE — 99232 SBSQ HOSP IP/OBS MODERATE 35: CPT | Performed by: INTERNAL MEDICINE

## 2023-08-04 PROCEDURE — 74220 X-RAY XM ESOPHAGUS 1CNTRST: CPT

## 2023-08-04 PROCEDURE — 99239 HOSP IP/OBS DSCHRG MGMT >30: CPT | Performed by: INTERNAL MEDICINE

## 2023-08-04 PROCEDURE — 1200000000 HC SEMI PRIVATE

## 2023-08-04 PROCEDURE — 76937 US GUIDE VASCULAR ACCESS: CPT

## 2023-08-04 PROCEDURE — 99285 EMERGENCY DEPT VISIT HI MDM: CPT

## 2023-08-04 PROCEDURE — 85025 COMPLETE CBC W/AUTO DIFF WBC: CPT

## 2023-08-04 PROCEDURE — 80048 BASIC METABOLIC PNL TOTAL CA: CPT

## 2023-08-04 PROCEDURE — 82947 ASSAY GLUCOSE BLOOD QUANT: CPT

## 2023-08-04 RX ORDER — PANTOPRAZOLE SODIUM 40 MG/1
40 TABLET, DELAYED RELEASE ORAL
Status: DISCONTINUED | OUTPATIENT
Start: 2023-08-05 | End: 2023-08-04 | Stop reason: HOSPADM

## 2023-08-04 RX ORDER — SODIUM CHLORIDE 0.9 % (FLUSH) 0.9 %
5-40 SYRINGE (ML) INJECTION EVERY 12 HOURS SCHEDULED
Status: DISCONTINUED | OUTPATIENT
Start: 2023-08-04 | End: 2023-08-04 | Stop reason: HOSPADM

## 2023-08-04 RX ORDER — INSULIN GLARGINE 100 [IU]/ML
20 INJECTION, SOLUTION SUBCUTANEOUS EVERY MORNING
Status: DISCONTINUED | OUTPATIENT
Start: 2023-08-05 | End: 2023-08-04 | Stop reason: HOSPADM

## 2023-08-04 RX ORDER — PANTOPRAZOLE SODIUM 40 MG/1
40 TABLET, DELAYED RELEASE ORAL
Status: DISCONTINUED | OUTPATIENT
Start: 2023-08-04 | End: 2023-08-04

## 2023-08-04 RX ORDER — SODIUM CHLORIDE 9 MG/ML
INJECTION, SOLUTION INTRAVENOUS PRN
Status: DISCONTINUED | OUTPATIENT
Start: 2023-08-04 | End: 2023-08-04 | Stop reason: HOSPADM

## 2023-08-04 RX ORDER — FLUCONAZOLE 200 MG/1
200 TABLET ORAL DAILY
Qty: 5 TABLET | Refills: 0 | Status: ON HOLD | DISCHARGE
Start: 2023-08-05 | End: 2023-08-11 | Stop reason: HOSPADM

## 2023-08-04 RX ORDER — HEPARIN 100 UNIT/ML
3 SYRINGE INTRAVENOUS PRN
Status: DISCONTINUED | OUTPATIENT
Start: 2023-08-04 | End: 2023-08-04 | Stop reason: HOSPADM

## 2023-08-04 RX ORDER — HEPARIN 100 UNIT/ML
3 SYRINGE INTRAVENOUS EVERY 12 HOURS SCHEDULED
Status: DISCONTINUED | OUTPATIENT
Start: 2023-08-04 | End: 2023-08-04 | Stop reason: HOSPADM

## 2023-08-04 RX ORDER — SODIUM CHLORIDE 0.9 % (FLUSH) 0.9 %
5-40 SYRINGE (ML) INJECTION PRN
Status: DISCONTINUED | OUTPATIENT
Start: 2023-08-04 | End: 2023-08-04 | Stop reason: HOSPADM

## 2023-08-04 RX ORDER — INSULIN LISPRO 100 [IU]/ML
26 INJECTION, SOLUTION INTRAVENOUS; SUBCUTANEOUS
Status: DISCONTINUED | OUTPATIENT
Start: 2023-08-04 | End: 2023-08-04 | Stop reason: HOSPADM

## 2023-08-04 RX ORDER — LIDOCAINE HYDROCHLORIDE 10 MG/ML
5 INJECTION, SOLUTION EPIDURAL; INFILTRATION; INTRACAUDAL; PERINEURAL ONCE
Status: COMPLETED | OUTPATIENT
Start: 2023-08-04 | End: 2023-08-04

## 2023-08-04 RX ADMIN — PREGABALIN 100 MG: 50 CAPSULE ORAL at 10:30

## 2023-08-04 RX ADMIN — METOPROLOL TARTRATE 25 MG: 25 TABLET, FILM COATED ORAL at 10:30

## 2023-08-04 RX ADMIN — OXYCODONE AND ACETAMINOPHEN 1 TABLET: 5; 325 TABLET ORAL at 02:24

## 2023-08-04 RX ADMIN — INSULIN LISPRO 26 UNITS: 100 INJECTION, SOLUTION INTRAVENOUS; SUBCUTANEOUS at 12:22

## 2023-08-04 RX ADMIN — INSULIN LISPRO 22 UNITS: 100 INJECTION, SOLUTION INTRAVENOUS; SUBCUTANEOUS at 06:22

## 2023-08-04 RX ADMIN — OXYCODONE AND ACETAMINOPHEN 1 TABLET: 5; 325 TABLET ORAL at 06:20

## 2023-08-04 RX ADMIN — OXYCODONE AND ACETAMINOPHEN 1 TABLET: 5; 325 TABLET ORAL at 14:48

## 2023-08-04 RX ADMIN — FENOFIBRATE 160 MG: 160 TABLET ORAL at 10:30

## 2023-08-04 RX ADMIN — LISINOPRIL 10 MG: 10 TABLET ORAL at 10:30

## 2023-08-04 RX ADMIN — FLUCONAZOLE 200 MG: 100 TABLET ORAL at 10:30

## 2023-08-04 RX ADMIN — SERTRALINE 50 MG: 100 TABLET, FILM COATED ORAL at 10:30

## 2023-08-04 RX ADMIN — LEVOTHYROXINE SODIUM 25 MCG: 25 TABLET ORAL at 06:20

## 2023-08-04 RX ADMIN — VANCOMYCIN HYDROCHLORIDE 1500 MG: 10 INJECTION, POWDER, LYOPHILIZED, FOR SOLUTION INTRAVENOUS at 10:40

## 2023-08-04 RX ADMIN — INSULIN LISPRO 3 UNITS: 100 INJECTION, SOLUTION INTRAVENOUS; SUBCUTANEOUS at 06:22

## 2023-08-04 RX ADMIN — ENOXAPARIN SODIUM 30 MG: 100 INJECTION SUBCUTANEOUS at 10:31

## 2023-08-04 RX ADMIN — BARIUM SULFATE 140 ML: 980 POWDER, FOR SUSPENSION ORAL at 09:45

## 2023-08-04 RX ADMIN — LIDOCAINE HYDROCHLORIDE 0.5 ML: 10 SOLUTION INTRAVENOUS at 14:06

## 2023-08-04 RX ADMIN — INSULIN LISPRO 3 UNITS: 100 INJECTION, SOLUTION INTRAVENOUS; SUBCUTANEOUS at 12:21

## 2023-08-04 RX ADMIN — OXYCODONE AND ACETAMINOPHEN 1 TABLET: 5; 325 TABLET ORAL at 10:30

## 2023-08-04 ASSESSMENT — PAIN SCALES - GENERAL
PAINLEVEL_OUTOF10: 7
PAINLEVEL_OUTOF10: 9
PAINLEVEL_OUTOF10: 7

## 2023-08-04 ASSESSMENT — PAIN - FUNCTIONAL ASSESSMENT
PAIN_FUNCTIONAL_ASSESSMENT: ACTIVITIES ARE NOT PREVENTED
PAIN_FUNCTIONAL_ASSESSMENT: NONE - DENIES PAIN

## 2023-08-04 ASSESSMENT — PAIN DESCRIPTION - LOCATION
LOCATION: LEG
LOCATION: FOOT
LOCATION: LEG

## 2023-08-04 ASSESSMENT — PAIN DESCRIPTION - DESCRIPTORS
DESCRIPTORS: ACHING;DISCOMFORT;STABBING
DESCRIPTORS: ACHING;DISCOMFORT
DESCRIPTORS: ACHING;DISCOMFORT;SORE

## 2023-08-04 ASSESSMENT — PAIN DESCRIPTION - ORIENTATION
ORIENTATION: LEFT
ORIENTATION: LEFT

## 2023-08-04 NOTE — CONSULTS
Gastroenterology Consult Note   Priscilla POLO NP-C with Stephen Sims M.D. Consult Note        Date of Service: 2023  Reason for Consult: odynophagia/dysphagia, food getting stuck in this chest  Requesting Physician: An Sloan MD    CHIEF COMPLAINT:  dysphagia    History Obtained From:  patient, family member - Bob- patient's niece, whom he lives with     HISTORY OF PRESENT ILLNESS:       Melissa Fuentes is a 66 y.o. male with significant past medical history of anxiety, COPD, depression, DM, frostbite, HLD, HTN, and sleep apnea admitted via ED for wound check. Pt off the unit in Xray department. Spoke with niece Bob at [facility]. Patient has been having trouble swallowing solids & liquids for the last several months. Some nausea with vomiting of \"chunks, food\". No black/blood appearance. With some slight abdominal pain at times. Niece states the patient does have a history of aspiration pneumonia. No weight loss, with a good appetite. Prior EGD & colon completed in [facility]. Denies any FMHx of colon cancer. Admission labs , calcium 8.0.  US BLE: No evidence of DVT in either lower extremity. VL LOWER EXTREMITY ARTERIAL SEGMENTAL PRESSURES W PP. Ankle brachial index on the right was 1.3 than on the left was 1.4. Preserved multiphasic waveforms at all levels evaluated on arterial Doppler evaluation. This finding lower suspicion for the presence of a high-grade stenosis. 2.  PVR evaluation of the bilateral lower extremity show preserved pulsatility with varying degrees of mild decreased amplitude. 3.  Digital waveform evaluation of the remaining digits of the bilateral feet demonstrate preserved pulsatility. Varying degrees of mild decreased amplitude on the right. Mild decreased amplitude in the left 1st, 2nd, and 5th digits. Moderate decreased amplitude in the 3rd and 4th digits. These changes probably reflect the presence of underlying microvascular disease.  There may be slightly decreased blood flow to digits 3 and 4 on the left. MRI L foot: 1. Signal abnormality in the distal 5th metatarsal and 5th proximal phalanx suggestive of early osteomyelitis given the adjacent soft tissue ulceration. CXR: No acute process. Mild cardiomegaly. L foot Xray: 1. Soft tissue ulceration of the lateral forefoot. Focal lucency in the distal 5th metatarsal may represent early osteomyelitis. Consider confirmation with forefoot MRI. Consultation for odynophagia/dysphagia, food getting stuck in this chest.  Currently, pt off unit for esophogram.  Labs today CO2 20, BUN 28, ,     Past Medical History:        Diagnosis Date    Anxiety     COPD (chronic obstructive pulmonary disease) ([facility])     Depression     DM (diabetes mellitus) ([address])     Frostbite     HLD (hyperlipidemia)     HTN (hypertension)     Sleep apnea      Past Surgical History:        Procedure Laterality Date    BACK SURGERY      CHOLECYSTECTOMY      CORONARY ARTERY BYPASS GRAFT REDO      2 vessel    FOOT DEBRIDEMENT Right 4/25/2022    DELAYED PRIMARY CLOSURE RIGHT FOOT WOUND performed by Yakelin Garcia DPM at [address] Right 5/7/2022    FOOT DEBRIDEMENT INCISION AND DRAINAGE performed by Jair Barajas DPM at [address] Right 5/10/2022    RIGHT FOOT DELAYED PRIMARY CLOSURE WITH POSSIBLE DEBRIDEMENT    ++CONTACT ISOLATION++   (DR AVAIL.  AT 4PM) performed by Jair Barajas DPM at [address] [facility] PICC LINE  5/9/2022         INSERT PICC LINE  8/4/2023    TOE AMPUTATION Right 4/22/2022    AMPUTATION RIGHT SECOND TOE performed by Yakelin Garcia DPM at [facility] Left 7/31/2023    LEFT POSSIBLE FIFTH RAY AMPUTATION AND BONE BIOPSY performed by Jair Barajas DPM at [facility] OR    UVULOPALATOPHARYGOPLASTY       Current Medications:    Current Facility-Administered Medications: [START ON 8/5/2023] insulin glargine (LANTUS) injection vial 20 Units, 20 Units, SubCUTAneous, QAM  insulin lispro (HUMALOG) multiphasic waveforms at all levels evaluated on arterial Doppler evaluation. This finding lower suspicion for the presence of a high-grade stenosis. 2.  PVR evaluation of the bilateral lower extremity show preserved pulsatility with varying degrees of mild decreased amplitude. 3.  Digital waveform evaluation of the remaining digits of the bilateral feet demonstrate preserved pulsatility. Varying degrees of mild decreased amplitude on the right. Mild decreased amplitude in the left 1st, 2nd, and 5th digits. Moderate decreased amplitude in the 3rd and 4th digits. These changes probably reflect the presence of underlying microvascular disease. There may be slightly decreased blood flow to digits 3 and 4 on the left. US DUP LOWER EXTREMITIES BILATERAL VENOUS    Result Date: 7/26/2023  EXAMINATION: DUPLEX VENOUS ULTRASOUND OF THE BILATERAL LOWER EXTREMITIES7/26/2023 7:47 pm TECHNIQUE: Duplex ultrasound using B-mode/gray scaled imaging, Doppler spectral analysis and color flow Doppler was obtained of the deep venous structures of the lower bilateral extremities. COMPARISON: None. HISTORY: ORDERING SYSTEM PROVIDED HISTORY: DVT TECHNOLOGIST PROVIDED HISTORY: Reason for exam:->DVT What reading provider will be dictating this exam?->CRC FINDINGS: The visualized veins of the bilateral lower extremities are patent and free of echogenic thrombus. The veins demonstrate good compressibility with normal color flow study and spectral analysis. No evidence of DVT in either lower extremity. IMPRESSION:    Dysphagia  LLE diabetic ulcer  DM2  Electrolyte abnormalities- defer  COPD   Osteomyelitis      RECOMMENDATIONS:      Esophogram- pending  Diet as tolerated for now  Protonix 40 mg daily  Medical management per Primary care; including pain  ID following the patient  Supportive care  Trend labs  EGD with dilation too follow. Can be completed outpatient if discharged before Monday.  If not then Monday  Will follow    Thank you very much for your consultation. We will follow closely with you.     Discussed with Dr. Liliya Zhou developed by Dr. Deandre Cutler, NP-C 8/4/2023 10:30 AM for Dr. Malathi Aiken

## 2023-08-04 NOTE — PROGRESS NOTES
Pharmacy Consultation Note  (Antibiotic Dosing and Monitoring)    Initial consult date: 7/31/2023  Consulting physician/provider: Dr. Shawanda Forde  Drug: Vancomycin  Indication: Bone and Joint Infection    Age/  Gender Height Weight IBW  Allergy Information   63 y.o./male 6' (182.9 cm) 280 lb (127 kg)     Ideal body weight: 77.6 kg (171 lb 1.2 oz)  Adjusted ideal body weight: 95.8 kg (211 lb 2 oz)   Patient has no known allergies. Renal Function:  Recent Labs     08/02/23  0556 08/03/23  1003 08/04/23  0348   BUN 17 18 28*   CREATININE 0.9 0.9 1.1         Intake/Output Summary (Last 24 hours) at 8/4/2023 1243  Last data filed at 8/4/2023 0800  Gross per 24 hour   Intake 1802.62 ml   Output 800 ml   Net 1002.62 ml         Vancomycin Monitoring:  Trough:  No results for input(s): VANCOTROUGH in the last 72 hours. Random:    Recent Labs     08/02/23  0556   VANCORANDOM 21.9         No results for input(s): Osvaldo Mir in the last 72 hours. Historical Cultures:  Organism   Date Value Ref Range Status   11/10/2022 Staphylococcus aureus (A)  Final     No results for input(s): BC in the last 72 hours. Vancomycin Administration Times:  Recent vancomycin administrations                     vancomycin 1500 mg in sodium chloride 0.9% 300 mL IVPB (mg) 1,500 mg New Bag 08/04/23 1040     1,500 mg New Bag 08/03/23 2218     1,500 mg New Bag  1028     1,500 mg New Bag 08/02/23 2145     1,500 mg New Bag  0930     1,500 mg New Bag 08/01/23 2240                  Assessment:  Patient is a 66 y.o. male who has been initiated on vancomycin  Estimated Creatinine Clearance: 96 mL/min (based on SCr of 1.1 mg/dL). To dose vancomycin, pharmacy will be utilizing [employer] calculation software for goal AUC/NICOLE 400-600 mg/L-hr (predicted AUC/NICOLE = 570, Tr =18.5 mcg/mL)    Plan:   Will continue vancomycin 1500 mg IV every 12 hours  Will check vancomycin levels when appropriate  Will continue to monitor renal function   Pharmacy to follow    Rashid Olivera, PharmD, BCCCP  8/4/2023  12:43 PM

## 2023-08-04 NOTE — PLAN OF CARE
Problem: Pain  Goal: Verbalizes/displays adequate comfort level or baseline comfort level  8/3/2023 2123 by Chalo Florez RN  Outcome: Progressing  8/3/2023 1443 by Rocio Perez RN  Outcome: Progressing     Problem: Safety - Adult  Goal: Free from fall injury  8/3/2023 2123 by Chalo Florez RN  Outcome: Progressing  8/3/2023 1443 by Rocio Perez RN  Outcome: Progressing     Problem: Skin/Tissue Integrity  Goal: Absence of new skin breakdown  Description: 1. Monitor for areas of redness and/or skin breakdown  2. Assess vascular access sites hourly  3. Every 4-6 hours minimum:  Change oxygen saturation probe site  4. Every 4-6 hours:  If on nasal continuous positive airway pressure, respiratory therapy assess nares and determine need for appliance change or resting period.   Outcome: Progressing     Problem: Chronic Conditions and Co-morbidities  Goal: Patient's chronic conditions and co-morbidity symptoms are monitored and maintained or improved  Outcome: Progressing  Flowsheets (Taken 8/3/2023 0930 by Rocio Perez RN)  Care Plan - Patient's Chronic Conditions and Co-Morbidity Symptoms are Monitored and Maintained or Improved: Monitor and assess patient's chronic conditions and comorbid symptoms for stability, deterioration, or improvement

## 2023-08-04 NOTE — PROGRESS NOTES
Speech Language Pathology      NAME:  Rochelle Fuentes  :  1960  DATE: 2023  ROOM:  4246/4445-D         Chart reviewed. Attempted to complete dysphagia therapy in PM.      Pt unavailable at this time due to:  [] HOLD per RN  [] Off unit for testing/ procedure    [x] With medical staff-sterile procedure   [] Declined intervention  [] Sleeping/ Lethargic   [] Other:       Will re-attempt as able. Thank you. Dehydration [E86.0]  Osteomyelitis ([address]) [M86.9]  Hyperglycemia [R73.9]  Osteomyelitis of ankle or foot, left, acute ([address]) [M86.172]  Osteomyelitis of left foot, unspecified type ([address]) [M86.9]            Mariann ZAMARRIPA CCC/SLP [id number]  Speech-Language Pathologist

## 2023-08-04 NOTE — PROCEDURES
PICC    Catheter insertion date: 8/4/2023     Product Number:  [id number]   Lot No: [id number]   Gauge: 17   Lumen: single, 4.5 Fr   Right brachial    Vein Diameter: 0.43 cm   Arm circumference at insertion site: 35 cm   Catheter Length: 40   Internal Length: 39 cm   External Catheter Length: 1 cm   Ultrasound Used: yes  VPS [employer] [place] confirms PICC tip is placed in the lower 1/3 of the SVC or at the Cavoatrial junction. Floor nurse notified PICC is okay to use.    : Disha Douglass RN

## 2023-08-04 NOTE — CARE COORDINATION
8/4/2023  Social Work Discharge Planning:SW set up [employer] transport via [address] for Pt to go to [facility]. They will be here no later than 7pm. Trip number is [id number]. They will need to use a hospital wc and then return it to the hospital. They will call the floor when they arrive at the entrance of the hospital.Nurse here, ZEN raymondison and maikel (voicemail)  were notified.  Electronically signed by MARCELLUS Cary on 8/4/2023 at 2:35 PM

## 2023-08-04 NOTE — PROGRESS NOTES
ENDOCRINOLOGY PROGRESS NOTE      Date of admission: 7/26/2023  Date of service: 8/4/2023  Admitting physician: Ruba Lopez MD   Primary Care Physician: Ann-Marie Ang DO  Consultant physician: Elizabeth Ricardo MD     Reason for the consultation:  Uncontrolled DM    History of Present Illness: The history is provided by the patient. Accuracy of the patient data is excellent    Melissa Fuentes is a very pleasant 66 y.o. old male with PMH uncontrolled DM type 2, obesity, HTN, HLD and other listed below admitted to [facility] on 7/26/2023 because of chills and Lt foot pain osteomyelitis. Endocrine service was consulted for diabetes management.     Subjective   No acute events, BS improving , no hypoglycemia     Inpatient diet:   Carb Restricted diet     Point of care glucose monitoring   (Independently reviewed)   Recent Labs     08/02/23  1136 08/02/23  1621 08/02/23 2034 08/03/23  0609 08/03/23  1112 08/03/23  1625 08/03/23 2009 08/04/23  0619   GLUMET 206* 133* 306* 152* 170* 173* 312* 190*       Scheduled Meds:   fluconazole  200 mg Oral Daily    sodium chloride flush  5-40 mL IntraVENous 2 times per day    cefTRIAXone (ROCEPHIN) IV  2,000 mg IntraVENous Q24H    vancomycin  1,500 mg IntraVENous Q12H    insulin glargine  50 Units SubCUTAneous Nightly    insulin glargine  15 Units SubCUTAneous QAM    insulin lispro  22 Units SubCUTAneous TID WC    atorvastatin  40 mg Oral Nightly    fenofibrate  160 mg Oral Daily    levothyroxine  25 mcg Oral Daily    lisinopril  10 mg Oral Daily    metoprolol tartrate  25 mg Oral BID    pregabalin  100 mg Oral BID    sertraline  50 mg Oral QAM    nicotine  1 patch TransDERmal Daily    arformoterol tartrate  15 mcg Nebulization BID RT    And    budesonide  0.5 mg Nebulization BID RT    insulin lispro  0-18 Units SubCUTAneous TID WC    promethazine  25 mg IntraMUSCular Once    enoxaparin  30 mg SubCUTAneous BID    insulin lispro  0-4 Units SubCUTAneous Nightly       PRN Meds: note    Anna Ramey MD  Endocrinologist, [facility] Endocrinology   [address] [facility] [address]   Phone: [phone]  Fax: [phone]  --------------------------------------------  An electronic signature was used to authenticate this note.  Max Li MD on 8/4/2023 at 8:09 AM

## 2023-08-04 NOTE — CARE COORDINATION
[facility] accepted; precert initiated today. Transport forms/07000 on chart. Await final ID plan re; [facility] also following. Will need completed orders if home along with signed service agreement. Will follow. Terence Pineda. Per li Harvey received for [facility]. Auth expires midnite Sunday 8/6. If not discharged by then will need to re- precert. can accept over the weekend if ready. GI consult for dysphagia. Terence Pineda.

## 2023-08-04 NOTE — DISCHARGE SUMMARY
[facility] Physician Discharge Summary       Gideon Madrid, [address] [facility] [id number]    Call in 1 week(s)  For wound re-check    [facility]  [address] [facility]. [address] [facility]  [phone]        [place] [facility] [address]  [phone]    Schedule an appointment as soon as possible for a visit in 2 week(s)  For outpatient follow up      Activity level: As tolerated     Dispo: [facility]    Condition on discharge: Stable     Patient ID:  Tamika Fitzgerald  [id number]  66 y.o.  1960    Admit date: 7/26/2023    Discharge date and time:  8/4/2023  3:37 PM    Admission Diagnoses: Principal Problem:    Osteomyelitis ([address])  Active Problems:    Poorly controlled diabetes mellitus ([address])    Osteomyelitis of ankle or foot, left, acute ([address])    Hypothyroidism    DM (diabetes mellitus) type 2, uncontrolled, with ketoacidosis ([address])    Uncontrolled type 2 diabetes mellitus with hyperglycemia ([address])  Resolved Problems:    * No resolved hospital problems. *      Discharge Diagnoses: Principal Problem:    Osteomyelitis ([address])  Active Problems:    Poorly controlled diabetes mellitus ([address])    Osteomyelitis of ankle or foot, left, acute ([address])    Hypothyroidism    DM (diabetes mellitus) type 2, uncontrolled, with ketoacidosis ([address])    Uncontrolled type 2 diabetes mellitus with hyperglycemia ([address])  Resolved Problems:    * No resolved hospital problems.  *      Consults:  IP CONSULT TO PODIATRY  IP CONSULT TO INFECTIOUS DISEASES  IP CONSULT TO PODIATRY  IP CONSULT TO DIABETES EDUCATOR  IP CONSULT TO ENDOCRINOLOGY  IP CONSULT TO IV TEAM  IP CONSULT TO PHARMACY  IP CONSULT TO IV TEAM  IP CONSULT TO HOME CARE NEEDS  IP CONSULT TO GI  IP CONSULT TO IV TEAM  IP CONSULT TO IV TEAM    Procedures: Left fifth partial ray amputation 7/31    Hospital Course:   Patient Tamika Fitzgerald is a 66 y.o. presented with Dehydration [E86.0]  Osteomyelitis ([address] [facility]) [M86.9]  Hyperglycemia [R73.9]  Osteomyelitis of ankle or foot, left, acute ([address] [facility]) [M86.172]  Osteomyelitis of left foot, unspecified type [facility]) [M86.9]    Patient is a 68-year-old male who was admitted patient is a 68-year-old male who was admitted due to chronic left fifth toe diabetic foot ulcer with osteomyelitis. Podiatry was consulted and he underwent left fifth partial ray amputation on 7/31. ID also following, initially placed on vancomycin and Rocephin, PICC line placed and vancomycin will be continued. Patient also complained of sensation of food getting stuck in his throat, barium swallow and SLP evaluation noted mild silent aspiration and recommended single sips of thin liquids. Also an esophagram was obtained which was unremarkable. GI consulted, outpatient follow-up for possible EGD. Also, ID felt his symptoms could be consistent with Candida esophagitis and he was started on fluconazole. Patient will be discharged to [facility] in stable condition to continue IV vancomycin and fluconazole. Discharge Exam:    General Appearance: alert and oriented to person, place and time and in no acute distress  Skin: warm and dry  Head: normocephalic and atraumatic  Eyes: pupils equal, round, and reactive to light, extraocular eye movements intact, conjunctivae normal  Neck: neck supple and non tender without mass   Pulmonary/Chest: clear to auscultation bilaterally- no wheezes, rales or rhonchi, normal air movement, no respiratory distress  Cardiovascular: normal rate, normal S1 and S2 and no carotid bruits  Abdomen: soft, non-tender, non-distended, normal bowel sounds, no masses or organomegaly  Extremities: no cyanosis, no clubbing and no edema, left foot wrapped  Neurologic: no cranial nerve deficit and speech normal    I/O last 3 completed shifts:   In: 2522.6 [P.O.:1080; IV Piggyback:1442.6]  Out: 1556 [Urine:6175]  I/O this shift:  In: 360 [P.O.:360]  Out: 650 HISTORY: DVT TECHNOLOGIST PROVIDED HISTORY: Reason for exam:->DVT What reading provider will be dictating this exam?->CRC FINDINGS: The visualized veins of the bilateral lower extremities are patent and free of echogenic thrombus. The veins demonstrate good compressibility with normal color flow study and spectral analysis. No evidence of DVT in either lower extremity. Patient Instructions:      Medication List        START taking these medications      fluconazole 200 MG tablet  Commonly known as: DIFLUCAN  Take 1 tablet by mouth daily for 5 days  Start taking on: August 5, 2023     vancomycin  infusion  Commonly known as: VANCOCIN  Infuse 1,500 mg intravenously in the morning and 1,500 mg in the evening. Compound per protocol. .            Almaz Murrell taking these medications      albuterol sulfate  (90 Base) MCG/ACT inhaler  Commonly known as: PROVENTIL;VENTOLIN;PROAIR     atorvastatin 40 MG tablet  Commonly known as: LIPITOR  Take 1 tablet by mouth at bedtime     budesonide-formoterol 80-4.5 MCG/ACT Aero  Commonly known as: SYMBICORT     empagliflozin 10 MG tablet  Commonly known as: JARDIANCE     fenofibrate 160 MG tablet  Commonly known as: TRIGLIDE     * insulin lispro 100 UNIT/ML Soln injection vial  Commonly known as: HUMALOG  Inject 15 Units into the skin 3 times daily (with meals)     * insulin lispro 100 UNIT/ML Soln injection vial  Commonly known as: HUMALOG  Inject 0-18 Units into the skin 4 times daily (before meals and nightly)     [employer] Pen Needles 31G X 6 MM Misc  Generic drug: Insulin Pen Needle  1 each by Does not apply route 4 times daily     Lantus SoloStar 100 UNIT/ML injection pen  Generic drug: insulin glargine  Inject 52 Units into the skin nightly     levothyroxine 25 MCG tablet  Commonly known as: SYNTHROID     lisinopril 10 MG tablet  Commonly known as: PRINIVIL;ZESTRIL     metoprolol tartrate 25 MG tablet  Commonly known as: LOPRESSOR     ondansetron 4 MG disintegrating tablet  Commonly known as: ZOFRAN-ODT     ondansetron 4 MG tablet  Commonly known as: ZOFRAN     pregabalin 100 MG capsule  Commonly known as: LYRICA     sertraline 50 MG tablet  Commonly known as: ZOLOFT     Vitamin D (Ergocalciferol) 86551 units Caps  Take 50,000 Units by mouth once a week for 5 doses           * This list has 2 medication(s) that are the same as other medications prescribed for you. Read the directions carefully, and ask your doctor or other care provider to review them with you.                 STOP taking these medications      FLUoxetine 20 MG capsule  Commonly known as: PROZAC     lisinopril-hydroCHLOROthiazide 20-12.5 MG per tablet  Commonly known as: PRINZIDE;ZESTORETIC     sulfamethoxazole-trimethoprim 800-160 MG per tablet  Commonly known as: BACTRIM DS;SEPTRA DS               Where to Get Your Medications        Information about where to get these medications is not yet available    Ask your nurse or doctor about these medications  fluconazole 200 MG tablet  vancomycin  infusion           Note that more than 30 minutes was spent in preparing discharge papers, discussing discharge with patient, medication review, etc.    Signed:  Electronically signed by Domingo Felder MD on 8/4/2023 at 3:37 PM

## 2023-08-05 ENCOUNTER — APPOINTMENT (OUTPATIENT)
Dept: GENERAL RADIOLOGY | Age: 63
DRG: 617 | End: 2023-08-05
Payer: MEDICARE

## 2023-08-05 PROBLEM — B37.81 CANDIDAL ESOPHAGITIS (HCC): Status: ACTIVE | Noted: 2023-08-05

## 2023-08-05 LAB
METER GLUCOSE: 146 MG/DL (ref 74–99)
METER GLUCOSE: 176 MG/DL (ref 74–99)
METER GLUCOSE: 177 MG/DL (ref 74–99)
METER GLUCOSE: 186 MG/DL (ref 74–99)
METER GLUCOSE: 200 MG/DL (ref 74–99)

## 2023-08-05 PROCEDURE — 71045 X-RAY EXAM CHEST 1 VIEW: CPT

## 2023-08-05 PROCEDURE — 99233 SBSQ HOSP IP/OBS HIGH 50: CPT | Performed by: INTERNAL MEDICINE

## 2023-08-05 PROCEDURE — 6360000002 HC RX W HCPCS

## 2023-08-05 PROCEDURE — 6370000000 HC RX 637 (ALT 250 FOR IP)

## 2023-08-05 PROCEDURE — 6360000002 HC RX W HCPCS: Performed by: STUDENT IN AN ORGANIZED HEALTH CARE EDUCATION/TRAINING PROGRAM

## 2023-08-05 PROCEDURE — 1200000000 HC SEMI PRIVATE

## 2023-08-05 PROCEDURE — 6370000000 HC RX 637 (ALT 250 FOR IP): Performed by: STUDENT IN AN ORGANIZED HEALTH CARE EDUCATION/TRAINING PROGRAM

## 2023-08-05 PROCEDURE — 2580000003 HC RX 258

## 2023-08-05 PROCEDURE — 94640 AIRWAY INHALATION TREATMENT: CPT

## 2023-08-05 PROCEDURE — 82947 ASSAY GLUCOSE BLOOD QUANT: CPT

## 2023-08-05 RX ORDER — ONDANSETRON 4 MG/1
4 TABLET, ORALLY DISINTEGRATING ORAL EVERY 8 HOURS PRN
Status: DISCONTINUED | OUTPATIENT
Start: 2023-08-05 | End: 2023-08-11 | Stop reason: HOSPADM

## 2023-08-05 RX ORDER — INSULIN LISPRO 100 [IU]/ML
0-8 INJECTION, SOLUTION INTRAVENOUS; SUBCUTANEOUS
Status: DISCONTINUED | OUTPATIENT
Start: 2023-08-05 | End: 2023-08-11 | Stop reason: HOSPADM

## 2023-08-05 RX ORDER — BUDESONIDE 0.25 MG/2ML
250 INHALANT ORAL 2 TIMES DAILY
Status: DISCONTINUED | OUTPATIENT
Start: 2023-08-05 | End: 2023-08-11 | Stop reason: HOSPADM

## 2023-08-05 RX ORDER — LEVOTHYROXINE SODIUM 0.03 MG/1
25 TABLET ORAL DAILY
Status: DISCONTINUED | OUTPATIENT
Start: 2023-08-05 | End: 2023-08-11 | Stop reason: HOSPADM

## 2023-08-05 RX ORDER — ENOXAPARIN SODIUM 100 MG/ML
30 INJECTION SUBCUTANEOUS 2 TIMES DAILY
Status: DISCONTINUED | OUTPATIENT
Start: 2023-08-05 | End: 2023-08-11 | Stop reason: HOSPADM

## 2023-08-05 RX ORDER — FENOFIBRATE 160 MG/1
160 TABLET ORAL DAILY
Status: DISCONTINUED | OUTPATIENT
Start: 2023-08-05 | End: 2023-08-11 | Stop reason: HOSPADM

## 2023-08-05 RX ORDER — INSULIN GLARGINE 100 [IU]/ML
52 INJECTION, SOLUTION SUBCUTANEOUS NIGHTLY
Status: DISCONTINUED | OUTPATIENT
Start: 2023-08-05 | End: 2023-08-11 | Stop reason: HOSPADM

## 2023-08-05 RX ORDER — ACETAMINOPHEN 325 MG/1
650 TABLET ORAL EVERY 6 HOURS PRN
Status: DISCONTINUED | OUTPATIENT
Start: 2023-08-05 | End: 2023-08-11 | Stop reason: HOSPADM

## 2023-08-05 RX ORDER — SODIUM CHLORIDE 0.9 % (FLUSH) 0.9 %
5-40 SYRINGE (ML) INJECTION PRN
Status: DISCONTINUED | OUTPATIENT
Start: 2023-08-05 | End: 2023-08-11 | Stop reason: HOSPADM

## 2023-08-05 RX ORDER — SODIUM CHLORIDE 0.9 % (FLUSH) 0.9 %
5-40 SYRINGE (ML) INJECTION EVERY 12 HOURS SCHEDULED
Status: DISCONTINUED | OUTPATIENT
Start: 2023-08-05 | End: 2023-08-11 | Stop reason: HOSPADM

## 2023-08-05 RX ORDER — NICOTINE 21 MG/24HR
1 PATCH, TRANSDERMAL 24 HOURS TRANSDERMAL DAILY
Status: DISCONTINUED | OUTPATIENT
Start: 2023-08-05 | End: 2023-08-11 | Stop reason: HOSPADM

## 2023-08-05 RX ORDER — LISINOPRIL 10 MG/1
10 TABLET ORAL DAILY
Status: DISCONTINUED | OUTPATIENT
Start: 2023-08-05 | End: 2023-08-11 | Stop reason: HOSPADM

## 2023-08-05 RX ORDER — POLYETHYLENE GLYCOL 3350 17 G/17G
17 POWDER, FOR SOLUTION ORAL DAILY PRN
Status: DISCONTINUED | OUTPATIENT
Start: 2023-08-05 | End: 2023-08-11 | Stop reason: HOSPADM

## 2023-08-05 RX ORDER — HEPARIN 100 UNIT/ML
300 SYRINGE INTRAVENOUS 2 TIMES DAILY
Status: DISCONTINUED | OUTPATIENT
Start: 2023-08-05 | End: 2023-08-11 | Stop reason: HOSPADM

## 2023-08-05 RX ORDER — BUDESONIDE AND FORMOTEROL FUMARATE DIHYDRATE 80; 4.5 UG/1; UG/1
2 AEROSOL RESPIRATORY (INHALATION) 2 TIMES DAILY
Status: DISCONTINUED | OUTPATIENT
Start: 2023-08-05 | End: 2023-08-05 | Stop reason: CLARIF

## 2023-08-05 RX ORDER — ONDANSETRON 2 MG/ML
4 INJECTION INTRAMUSCULAR; INTRAVENOUS EVERY 6 HOURS PRN
Status: DISCONTINUED | OUTPATIENT
Start: 2023-08-05 | End: 2023-08-11 | Stop reason: HOSPADM

## 2023-08-05 RX ORDER — ACETAMINOPHEN 650 MG/1
650 SUPPOSITORY RECTAL EVERY 6 HOURS PRN
Status: DISCONTINUED | OUTPATIENT
Start: 2023-08-05 | End: 2023-08-11 | Stop reason: HOSPADM

## 2023-08-05 RX ORDER — SODIUM CHLORIDE 9 MG/ML
INJECTION, SOLUTION INTRAVENOUS PRN
Status: DISCONTINUED | OUTPATIENT
Start: 2023-08-05 | End: 2023-08-11 | Stop reason: HOSPADM

## 2023-08-05 RX ORDER — FLUCONAZOLE 100 MG/1
200 TABLET ORAL DAILY
Status: COMPLETED | OUTPATIENT
Start: 2023-08-05 | End: 2023-08-09

## 2023-08-05 RX ORDER — PREGABALIN 50 MG/1
100 CAPSULE ORAL 2 TIMES DAILY
Status: DISCONTINUED | OUTPATIENT
Start: 2023-08-05 | End: 2023-08-11 | Stop reason: HOSPADM

## 2023-08-05 RX ORDER — INSULIN LISPRO 100 [IU]/ML
0-4 INJECTION, SOLUTION INTRAVENOUS; SUBCUTANEOUS NIGHTLY
Status: DISCONTINUED | OUTPATIENT
Start: 2023-08-05 | End: 2023-08-11 | Stop reason: HOSPADM

## 2023-08-05 RX ORDER — ALBUTEROL SULFATE 2.5 MG/3ML
2.5 SOLUTION RESPIRATORY (INHALATION) EVERY 6 HOURS PRN
Status: DISCONTINUED | OUTPATIENT
Start: 2023-08-05 | End: 2023-08-11 | Stop reason: HOSPADM

## 2023-08-05 RX ORDER — ALBUTEROL SULFATE 90 UG/1
2 AEROSOL, METERED RESPIRATORY (INHALATION) EVERY 6 HOURS PRN
Status: DISCONTINUED | OUTPATIENT
Start: 2023-08-05 | End: 2023-08-05 | Stop reason: CLARIF

## 2023-08-05 RX ORDER — ATORVASTATIN CALCIUM 40 MG/1
40 TABLET, FILM COATED ORAL NIGHTLY
Status: DISCONTINUED | OUTPATIENT
Start: 2023-08-05 | End: 2023-08-11 | Stop reason: HOSPADM

## 2023-08-05 RX ORDER — ARFORMOTEROL TARTRATE 15 UG/2ML
15 SOLUTION RESPIRATORY (INHALATION)
Status: DISCONTINUED | OUTPATIENT
Start: 2023-08-05 | End: 2023-08-11 | Stop reason: HOSPADM

## 2023-08-05 RX ORDER — INSULIN LISPRO 100 [IU]/ML
15 INJECTION, SOLUTION INTRAVENOUS; SUBCUTANEOUS
Status: DISCONTINUED | OUTPATIENT
Start: 2023-08-05 | End: 2023-08-11 | Stop reason: HOSPADM

## 2023-08-05 RX ORDER — HYDROCODONE BITARTRATE AND ACETAMINOPHEN 5; 325 MG/1; MG/1
1 TABLET ORAL EVERY 6 HOURS PRN
Status: DISCONTINUED | OUTPATIENT
Start: 2023-08-05 | End: 2023-08-11 | Stop reason: HOSPADM

## 2023-08-05 RX ORDER — DEXTROSE MONOHYDRATE 100 MG/ML
INJECTION, SOLUTION INTRAVENOUS CONTINUOUS PRN
Status: DISCONTINUED | OUTPATIENT
Start: 2023-08-05 | End: 2023-08-11 | Stop reason: HOSPADM

## 2023-08-05 RX ADMIN — FENOFIBRATE 160 MG: 160 TABLET ORAL at 09:25

## 2023-08-05 RX ADMIN — ARFORMOTEROL TARTRATE 15 MCG: 15 SOLUTION RESPIRATORY (INHALATION) at 07:50

## 2023-08-05 RX ADMIN — FLUCONAZOLE 200 MG: 100 TABLET ORAL at 09:25

## 2023-08-05 RX ADMIN — HYDROCODONE BITARTRATE AND ACETAMINOPHEN 1 TABLET: 5; 325 TABLET ORAL at 16:59

## 2023-08-05 RX ADMIN — INSULIN GLARGINE 52 UNITS: 100 INJECTION, SOLUTION SUBCUTANEOUS at 21:42

## 2023-08-05 RX ADMIN — VANCOMYCIN HYDROCHLORIDE 1500 MG: 10 INJECTION, POWDER, LYOPHILIZED, FOR SOLUTION INTRAVENOUS at 05:10

## 2023-08-05 RX ADMIN — METOPROLOL TARTRATE 25 MG: 25 TABLET, FILM COATED ORAL at 21:40

## 2023-08-05 RX ADMIN — SERTRALINE HYDROCHLORIDE 50 MG: 50 TABLET ORAL at 09:24

## 2023-08-05 RX ADMIN — INSULIN LISPRO 15 UNITS: 100 INJECTION, SOLUTION INTRAVENOUS; SUBCUTANEOUS at 09:25

## 2023-08-05 RX ADMIN — INSULIN LISPRO 15 UNITS: 100 INJECTION, SOLUTION INTRAVENOUS; SUBCUTANEOUS at 16:14

## 2023-08-05 RX ADMIN — PREGABALIN 100 MG: 50 CAPSULE ORAL at 21:39

## 2023-08-05 RX ADMIN — Medication 10 ML: at 09:25

## 2023-08-05 RX ADMIN — LEVOTHYROXINE SODIUM 25 MCG: 25 TABLET ORAL at 06:36

## 2023-08-05 RX ADMIN — VANCOMYCIN HYDROCHLORIDE 1500 MG: 10 INJECTION, POWDER, LYOPHILIZED, FOR SOLUTION INTRAVENOUS at 17:03

## 2023-08-05 RX ADMIN — PREGABALIN 100 MG: 50 CAPSULE ORAL at 03:51

## 2023-08-05 RX ADMIN — ATORVASTATIN CALCIUM 40 MG: 40 TABLET, FILM COATED ORAL at 03:51

## 2023-08-05 RX ADMIN — ENOXAPARIN SODIUM 30 MG: 100 INJECTION SUBCUTANEOUS at 03:50

## 2023-08-05 RX ADMIN — HYDROCODONE BITARTRATE AND ACETAMINOPHEN 1 TABLET: 5; 325 TABLET ORAL at 03:51

## 2023-08-05 RX ADMIN — ATORVASTATIN CALCIUM 40 MG: 40 TABLET, FILM COATED ORAL at 21:39

## 2023-08-05 RX ADMIN — ENOXAPARIN SODIUM 30 MG: 100 INJECTION SUBCUTANEOUS at 16:14

## 2023-08-05 RX ADMIN — BUDESONIDE 250 MCG: 0.25 SUSPENSION RESPIRATORY (INHALATION) at 07:50

## 2023-08-05 RX ADMIN — INSULIN LISPRO 15 UNITS: 100 INJECTION, SOLUTION INTRAVENOUS; SUBCUTANEOUS at 12:45

## 2023-08-05 RX ADMIN — Medication 10 ML: at 21:44

## 2023-08-05 RX ADMIN — HYDROCODONE BITARTRATE AND ACETAMINOPHEN 1 TABLET: 5; 325 TABLET ORAL at 23:41

## 2023-08-05 RX ADMIN — Medication 300 UNITS: at 21:42

## 2023-08-05 ASSESSMENT — PAIN SCALES - GENERAL
PAINLEVEL_OUTOF10: 8

## 2023-08-05 ASSESSMENT — PAIN DESCRIPTION - ONSET
ONSET: ON-GOING
ONSET: ON-GOING

## 2023-08-05 ASSESSMENT — PAIN DESCRIPTION - ORIENTATION
ORIENTATION: LEFT

## 2023-08-05 ASSESSMENT — PAIN DESCRIPTION - DESCRIPTORS
DESCRIPTORS: SHARP
DESCRIPTORS: ACHING
DESCRIPTORS: ACHING;SHARP
DESCRIPTORS: SHARP

## 2023-08-05 ASSESSMENT — PAIN DESCRIPTION - LOCATION
LOCATION: FOOT

## 2023-08-05 ASSESSMENT — PAIN - FUNCTIONAL ASSESSMENT
PAIN_FUNCTIONAL_ASSESSMENT: PREVENTS OR INTERFERES SOME ACTIVE ACTIVITIES AND ADLS

## 2023-08-05 ASSESSMENT — PAIN DESCRIPTION - PAIN TYPE
TYPE: SURGICAL PAIN
TYPE: ACUTE PAIN;SURGICAL PAIN

## 2023-08-05 ASSESSMENT — PAIN DESCRIPTION - FREQUENCY
FREQUENCY: INTERMITTENT
FREQUENCY: CONTINUOUS

## 2023-08-05 NOTE — ED NOTES
Spoke with staff at [facility]. Patient arrived to their facility. Was accepted there and they had made arrangements for ordered IV abx. Per staff at facility patient signed AMA from their facility because they did not have pain medicine available for him and they would not let him smoke cigarettes. Patient questioned about this since he told triage RN that [facility] did not know he was coming to their facility. Patient did admit to signing AMA because \"he just didn't like it there\". Dr. aMrcus Adams aware.        July Oconnor RN  08/04/23 [id number]

## 2023-08-05 NOTE — ED NOTES
Patient presents to ED for antibiotics and admission for placement to a new facility. Signed out AMA from other rehab facility because he did not receive his antibiotics. Patient was admitted here 7/26/23-8/4/23 for left foot OM- left fifth toe amputation. Patient was followed by ID and discharged on Fluconazole 200 mg PO daily for 5 days to begin on 8/5/23 and vancomycin 1500 mg BID. Patient was NWB to left foot.       Natalie Méndez RN  08/05/23 2903

## 2023-08-05 NOTE — ACP (ADVANCE CARE PLANNING)
Advance Care Planning   Healthcare Decision Maker:    Primary Decision Maker: Rabia Fraire - [phone]      Today we documented Decision Maker(s) consistent with Legal Next of Kin hierarchy.

## 2023-08-05 NOTE — CARE COORDINATION
Social work / Discharge Planning:         Social work consult noted. Chart reviewed. Patient discharged to [facility] yesterday and signed out AMA due to inability to smoke at the facility. [facility] had been following during his hospitalization prior to discharge to SNF. Social work contacted on call weekend staff at [facility]. They will not be able to run insurance benefits to determine out of pocket responsibility until Monday and cannot start services until next week. Social work attempted to reach patient's niece Cheyenne Osei [phone] to determine if she will be able to assist patient at home if needed but she did not answer. PT/OT evaluations ordered.     Electronically signed by MARCELLUS Fernández on 8/5/2023 at 10:38 AM

## 2023-08-05 NOTE — ED PROVIDER NOTES
Department of Emergency Medicine   ED  Provider Note  Admit Date/RoomTime: 8/4/2023  8:34 PM  ED Room: 37/37              [facility]     Roland Fuentes is a 66 y.o. male with a PMHx significant for  COPD, DM, HTN, Osteomyelitis,   who presents for evaluation of no antibiotics and history of osteomyelitis , beginning prior to arrival.  The complaint has been persistent, moderate in severity, and worsened by nothing. The patient states that he was just discharged from the hospital to facility. Notes that upon getting there they did not let him smoke or give him pain medication so he signed himself out from the facility. Initially stated that they did not know he was coming. Denies any fevers, chills, cough, congestion, chest pain, shortness of breath. .     Review of Systems   Constitutional:  Negative for chills and fever. HENT:  Negative for ear pain, sinus pressure and sore throat. Eyes:  Negative for pain, discharge and redness. Respiratory:  Negative for cough, shortness of breath and wheezing. Cardiovascular:  Negative for chest pain. Gastrointestinal:  Negative for abdominal pain, diarrhea, nausea and vomiting. Genitourinary:  Negative for dysuria and frequency. Musculoskeletal:  Negative for arthralgias and back pain. Skin:  Positive for wound. Negative for rash. Neurological:  Negative for weakness and headaches. Hematological:  Negative for adenopathy. All other systems reviewed and are negative. Physical Exam  Vitals and nursing note reviewed. Constitutional:       General: He is not in acute distress. Appearance: Normal appearance. He is well-developed. He is not ill-appearing. HENT:      Head: Normocephalic and atraumatic. Right Ear: External ear normal.      Left Ear: External ear normal.   Eyes:      Extraocular Movements: Extraocular movements intact.       Conjunctiva/sclera: Conjunctivae normal.   Cardiovascular:      Rate and Rhythm: Normal rate and regular g/dL    RDW 13.6 11.5 - 15.0 %    Platelets 420 973 - 276 k/uL    MPV 11.8 7.0 - 12.0 fL    Neutrophils % 67 43.0 - 80.0 %    Lymphocytes % 24 20.0 - 42.0 %    Monocytes % 7 2.0 - 12.0 %    Eosinophils % 2 0 - 6 %    Basophils % 1 0.0 - 2.0 %    Immature Granulocytes 1 0.0 - 5.0 %    Neutrophils Absolute 7.26 1.80 - 7.30 k/uL    Lymphocytes Absolute 2.57 1.50 - 4.00 k/uL    Monocytes Absolute 0.77 0.10 - 0.95 k/uL    Eosinophils Absolute 0.19 0.05 - 0.50 k/uL    Basophils Absolute 0.07 0.00 - 0.20 k/uL    Absolute Immature Granulocyte 0.05 0.00 - 0.58 k/uL   Comprehensive Metabolic Panel w/ Reflex to MG   Result Value Ref Range    Glucose 186 (H) 74 - 99 mg/dL    BUN 28 (H) 6 - 23 mg/dL    Creatinine 1.2 0.70 - 1.20 mg/dL    Est, Glom Filt Rate >60 >60 mL/min/1.73m2    Calcium 8.7 8.6 - 10.2 mg/dL    Sodium 140 132 - 146 mmol/L    Potassium 4.4 3.5 - 5.0 mmol/L    Chloride 107 98 - 107 mmol/L    CO2 26 22 - 29 mmol/L    Anion Gap 7 7 - 16 mmol/L    Alkaline Phosphatase 91 40 - 129 U/L    ALT 24 0 - 40 U/L    AST <5 0 - 39 U/L    Total Bilirubin 0.3 0.0 - 1.2 mg/dL    Total Protein 6.4 6.4 - 8.3 g/dL    Albumin 3.7 3.5 - 5.2 g/dL   CBC with Auto Differential   Result Value Ref Range    WBC 5.9 4.5 - 11.5 k/uL    RBC 4.47 3.80 - 5.80 m/uL    Hemoglobin 13.0 12.5 - 16.5 g/dL    Hematocrit 38.7 37.0 - 54.0 %    MCV 86.6 80.0 - 99.9 fL    MCH 29.1 26.0 - 35.0 pg    MCHC 33.6 32.0 - 34.5 g/dL    RDW 13.3 11.5 - 15.0 %    Platelets 493 564 - 480 k/uL    MPV 12.0 7.0 - 12.0 fL    Neutrophils % 52 43.0 - 80.0 %    Lymphocytes % 36 20.0 - 42.0 %    Monocytes % 9 2.0 - 12.0 %    Eosinophils % 3 0 - 6 %    Basophils % 1 0.0 - 2.0 %    Immature Granulocytes 0 0.0 - 5.0 %    Neutrophils Absolute 3.06 1.80 - 7.30 k/uL    Lymphocytes Absolute 2.13 1.50 - 4.00 k/uL    Monocytes Absolute 0.53 0.10 - 0.95 k/uL    Eosinophils Absolute 0.16 0.05 - 0.50 k/uL    Basophils Absolute 0.04 0.00 - 0.20 k/uL    Absolute Immature Granulocyte <0.03 0.00 - 0.58 k/uL   Comprehensive Metabolic Panel w/ Reflex to MG   Result Value Ref Range    Glucose 176 (H) 74 - 99 mg/dL    BUN 26 (H) 6 - 23 mg/dL    Creatinine 1.2 0.70 - 1.20 mg/dL    Est, Glom Filt Rate >60 >60 mL/min/1.73m2    Calcium 8.7 8.6 - 10.2 mg/dL    Sodium 140 132 - 146 mmol/L    Potassium 4.5 3.5 - 5.0 mmol/L    Chloride 107 98 - 107 mmol/L    CO2 25 22 - 29 mmol/L    Anion Gap 8 7 - 16 mmol/L    Alkaline Phosphatase 87 40 - 129 U/L    ALT 23 0 - 40 U/L    AST 25 0 - 39 U/L    Total Bilirubin 0.3 0.0 - 1.2 mg/dL    Total Protein 6.5 6.4 - 8.3 g/dL    Albumin 3.6 3.5 - 5.2 g/dL   CBC with Auto Differential   Result Value Ref Range    WBC 7.6 4.5 - 11.5 k/uL    RBC 4.69 3.80 - 5.80 m/uL    Hemoglobin 13.4 12.5 - 16.5 g/dL    Hematocrit 40.7 37.0 - 54.0 %    MCV 86.8 80.0 - 99.9 fL    MCH 28.6 26.0 - 35.0 pg    MCHC 32.9 32.0 - 34.5 g/dL    RDW 13.5 11.5 - 15.0 %    Platelets 640 448 - 566 k/uL    MPV 11.9 7.0 - 12.0 fL    Neutrophils % 52 43.0 - 80.0 %    Lymphocytes % 36 20.0 - 42.0 %    Monocytes % 8 2.0 - 12.0 %    Eosinophils % 2 0 - 6 %    Basophils % 1 0.0 - 2.0 %    Immature Granulocytes 0 0.0 - 5.0 %    Neutrophils Absolute 3.97 1.80 - 7.30 k/uL    Lymphocytes Absolute 2.76 1.50 - 4.00 k/uL    Monocytes Absolute 0.64 0.10 - 0.95 k/uL    Eosinophils Absolute 0.13 0.05 - 0.50 k/uL    Basophils Absolute 0.06 0.00 - 0.20 k/uL    Absolute Immature Granulocyte 0.03 0.00 - 0.58 k/uL   Protime-INR   Result Value Ref Range    Protime 11.7 9.3 - 12.4 sec    INR 1.0    POCT Glucose   Result Value Ref Range    Meter Glucose 200 (H) 74 - 99 mg/dL   POCT Glucose   Result Value Ref Range    Meter Glucose 176 (H) 74 - 99 mg/dL   POCT Glucose   Result Value Ref Range    Meter Glucose 186 (H) 74 - 99 mg/dL   POCT Glucose   Result Value Ref Range    Meter Glucose 177 (H) 74 - 99 mg/dL   POCT Glucose   Result Value Ref Range    Meter Glucose 146 (H) 74 - 99 mg/dL   POCT Glucose   Result Value Ref Range

## 2023-08-06 LAB
ALBUMIN SERPL-MCNC: 3.7 G/DL (ref 3.5–5.2)
ALP SERPL-CCNC: 91 U/L (ref 40–129)
ALT SERPL-CCNC: 24 U/L (ref 0–40)
ANION GAP SERPL CALCULATED.3IONS-SCNC: 7 MMOL/L (ref 7–16)
AST SERPL-CCNC: <5 U/L (ref 0–39)
BASOPHILS # BLD: 0.04 K/UL (ref 0–0.2)
BASOPHILS NFR BLD: 1 % (ref 0–2)
BILIRUB SERPL-MCNC: 0.3 MG/DL (ref 0–1.2)
BUN SERPL-MCNC: 28 MG/DL (ref 6–23)
CALCIUM SERPL-MCNC: 8.7 MG/DL (ref 8.6–10.2)
CHLORIDE SERPL-SCNC: 107 MMOL/L (ref 98–107)
CO2 SERPL-SCNC: 26 MMOL/L (ref 22–29)
CREAT SERPL-MCNC: 1.2 MG/DL (ref 0.7–1.2)
EOSINOPHIL # BLD: 0.16 K/UL (ref 0.05–0.5)
EOSINOPHILS RELATIVE PERCENT: 3 % (ref 0–6)
ERYTHROCYTE [DISTWIDTH] IN BLOOD BY AUTOMATED COUNT: 13.3 % (ref 11.5–15)
GFR SERPL CREATININE-BSD FRML MDRD: >60 ML/MIN/1.73M2
GLUCOSE SERPL-MCNC: 186 MG/DL (ref 74–99)
HCT VFR BLD AUTO: 38.7 % (ref 37–54)
HGB BLD-MCNC: 13 G/DL (ref 12.5–16.5)
IMM GRANULOCYTES # BLD AUTO: <0.03 K/UL (ref 0–0.58)
IMM GRANULOCYTES NFR BLD: 0 % (ref 0–5)
LYMPHOCYTES NFR BLD: 2.13 K/UL (ref 1.5–4)
LYMPHOCYTES RELATIVE PERCENT: 36 % (ref 20–42)
MCH RBC QN AUTO: 29.1 PG (ref 26–35)
MCHC RBC AUTO-ENTMCNC: 33.6 G/DL (ref 32–34.5)
MCV RBC AUTO: 86.6 FL (ref 80–99.9)
METER GLUCOSE: 129 MG/DL (ref 74–99)
METER GLUCOSE: 136 MG/DL (ref 74–99)
METER GLUCOSE: 198 MG/DL (ref 74–99)
MONOCYTES NFR BLD: 0.53 K/UL (ref 0.1–0.95)
MONOCYTES NFR BLD: 9 % (ref 2–12)
NEUTROPHILS NFR BLD: 52 % (ref 43–80)
NEUTS SEG NFR BLD: 3.06 K/UL (ref 1.8–7.3)
PLATELET # BLD AUTO: 130 K/UL (ref 130–450)
PMV BLD AUTO: 12 FL (ref 7–12)
POTASSIUM SERPL-SCNC: 4.4 MMOL/L (ref 3.5–5)
PROT SERPL-MCNC: 6.4 G/DL (ref 6.4–8.3)
RBC # BLD AUTO: 4.47 M/UL (ref 3.8–5.8)
SODIUM SERPL-SCNC: 140 MMOL/L (ref 132–146)
WBC OTHER # BLD: 5.9 K/UL (ref 4.5–11.5)

## 2023-08-06 PROCEDURE — 6360000002 HC RX W HCPCS: Performed by: STUDENT IN AN ORGANIZED HEALTH CARE EDUCATION/TRAINING PROGRAM

## 2023-08-06 PROCEDURE — 36592 COLLECT BLOOD FROM PICC: CPT

## 2023-08-06 PROCEDURE — 85025 COMPLETE CBC W/AUTO DIFF WBC: CPT

## 2023-08-06 PROCEDURE — 1200000000 HC SEMI PRIVATE

## 2023-08-06 PROCEDURE — 6360000002 HC RX W HCPCS

## 2023-08-06 PROCEDURE — 6370000000 HC RX 637 (ALT 250 FOR IP)

## 2023-08-06 PROCEDURE — 6370000000 HC RX 637 (ALT 250 FOR IP): Performed by: STUDENT IN AN ORGANIZED HEALTH CARE EDUCATION/TRAINING PROGRAM

## 2023-08-06 PROCEDURE — 99232 SBSQ HOSP IP/OBS MODERATE 35: CPT | Performed by: INTERNAL MEDICINE

## 2023-08-06 PROCEDURE — 80053 COMPREHEN METABOLIC PANEL: CPT

## 2023-08-06 PROCEDURE — 82947 ASSAY GLUCOSE BLOOD QUANT: CPT

## 2023-08-06 PROCEDURE — 94640 AIRWAY INHALATION TREATMENT: CPT

## 2023-08-06 PROCEDURE — 2580000003 HC RX 258

## 2023-08-06 RX ADMIN — METOPROLOL TARTRATE 25 MG: 25 TABLET, FILM COATED ORAL at 08:54

## 2023-08-06 RX ADMIN — PREGABALIN 100 MG: 50 CAPSULE ORAL at 22:17

## 2023-08-06 RX ADMIN — LISINOPRIL 10 MG: 10 TABLET ORAL at 08:53

## 2023-08-06 RX ADMIN — ATORVASTATIN CALCIUM 40 MG: 40 TABLET, FILM COATED ORAL at 22:17

## 2023-08-06 RX ADMIN — Medication 10 ML: at 08:39

## 2023-08-06 RX ADMIN — LEVOTHYROXINE SODIUM 25 MCG: 25 TABLET ORAL at 06:20

## 2023-08-06 RX ADMIN — Medication 300 UNITS: at 08:39

## 2023-08-06 RX ADMIN — BUDESONIDE 250 MCG: 0.25 SUSPENSION RESPIRATORY (INHALATION) at 06:21

## 2023-08-06 RX ADMIN — Medication 10 ML: at 22:18

## 2023-08-06 RX ADMIN — ENOXAPARIN SODIUM 30 MG: 100 INJECTION SUBCUTANEOUS at 04:41

## 2023-08-06 RX ADMIN — SERTRALINE HYDROCHLORIDE 50 MG: 50 TABLET ORAL at 08:39

## 2023-08-06 RX ADMIN — ARFORMOTEROL TARTRATE 15 MCG: 15 SOLUTION RESPIRATORY (INHALATION) at 06:21

## 2023-08-06 RX ADMIN — HYDROCODONE BITARTRATE AND ACETAMINOPHEN 1 TABLET: 5; 325 TABLET ORAL at 06:20

## 2023-08-06 RX ADMIN — VANCOMYCIN HYDROCHLORIDE 1500 MG: 10 INJECTION, POWDER, LYOPHILIZED, FOR SOLUTION INTRAVENOUS at 04:40

## 2023-08-06 RX ADMIN — PREGABALIN 100 MG: 50 CAPSULE ORAL at 08:54

## 2023-08-06 RX ADMIN — FLUCONAZOLE 200 MG: 100 TABLET ORAL at 08:39

## 2023-08-06 RX ADMIN — HYDROCODONE BITARTRATE AND ACETAMINOPHEN 1 TABLET: 5; 325 TABLET ORAL at 12:34

## 2023-08-06 RX ADMIN — SODIUM CHLORIDE, PRESERVATIVE FREE 10 ML: 5 INJECTION INTRAVENOUS at 17:04

## 2023-08-06 RX ADMIN — FENOFIBRATE 160 MG: 160 TABLET ORAL at 08:39

## 2023-08-06 RX ADMIN — INSULIN LISPRO 15 UNITS: 100 INJECTION, SOLUTION INTRAVENOUS; SUBCUTANEOUS at 17:00

## 2023-08-06 RX ADMIN — METOPROLOL TARTRATE 25 MG: 25 TABLET, FILM COATED ORAL at 22:17

## 2023-08-06 RX ADMIN — INSULIN LISPRO 15 UNITS: 100 INJECTION, SOLUTION INTRAVENOUS; SUBCUTANEOUS at 11:20

## 2023-08-06 RX ADMIN — INSULIN LISPRO 15 UNITS: 100 INJECTION, SOLUTION INTRAVENOUS; SUBCUTANEOUS at 08:40

## 2023-08-06 RX ADMIN — HYDROCODONE BITARTRATE AND ACETAMINOPHEN 1 TABLET: 5; 325 TABLET ORAL at 19:07

## 2023-08-06 RX ADMIN — VANCOMYCIN HYDROCHLORIDE 1500 MG: 10 INJECTION, POWDER, LYOPHILIZED, FOR SOLUTION INTRAVENOUS at 17:05

## 2023-08-06 RX ADMIN — Medication 300 UNITS: at 22:17

## 2023-08-06 ASSESSMENT — PAIN SCALES - GENERAL
PAINLEVEL_OUTOF10: 6
PAINLEVEL_OUTOF10: 8
PAINLEVEL_OUTOF10: 6
PAINLEVEL_OUTOF10: 8

## 2023-08-06 ASSESSMENT — PAIN DESCRIPTION - LOCATION
LOCATION: FOOT

## 2023-08-06 ASSESSMENT — PAIN DESCRIPTION - DESCRIPTORS
DESCRIPTORS: ACHING;DISCOMFORT
DESCRIPTORS: ACHING;DISCOMFORT

## 2023-08-06 ASSESSMENT — PAIN DESCRIPTION - PAIN TYPE: TYPE: ACUTE PAIN;SURGICAL PAIN

## 2023-08-06 ASSESSMENT — PAIN DESCRIPTION - ORIENTATION
ORIENTATION: LEFT

## 2023-08-06 ASSESSMENT — PAIN - FUNCTIONAL ASSESSMENT: PAIN_FUNCTIONAL_ASSESSMENT: PREVENTS OR INTERFERES SOME ACTIVE ACTIVITIES AND ADLS

## 2023-08-06 NOTE — PROGRESS NOTES
Notified clinical manager gee of orders for EGD for tomorrow.     Electronically signed by Pritesh Powers RN on 8/6/2023 at 3:56 PM

## 2023-08-06 NOTE — CONSULTS
Gastroenterology Consult Note   Priscilla POLO NP-C with Sudha Cruz M.D. Consult Note        Date of Service: 8/6/2023  Reason for Consult: possible EGD with dilatation  Requesting Physician: Aby Chappell MD    CHIEF COMPLAINT:  dysphagia    History Obtained From:  patient, EMR    HISTORY OF PRESENT ILLNESS:       Adam Fuentes is a 66 y.o. male with significant past medical history of  anxiety, COPD, depression, DM, frostbite, HLD, HTN, and sleep apnea readmitted w/I 12 hours of discharge. [facility], and returned to the hospital. Patient was discharged w a PICC line for continued IV antibiotic for osteomyelitis. See original consult note for more details. CXR:  1. No acute disease. 2. Right arm PICC tip with good positioning at the cavoatrial junction. RECOMMENDATION: Careful clinical correlation and follow up recommended. Esophogram: Significantly limited esophagram. Moderate tertiary esophageal contractions were noted. Otherwise, no obvious abnormality  Currently, pt reports he feels the food boluses getting lodged in his throat. Labs today BU 28, ,. Past Medical History:        Diagnosis Date    Anxiety     COPD (chronic obstructive pulmonary disease) ([address])     Depression     DM (diabetes mellitus) ([address])     Frostbite     HLD (hyperlipidemia)     HTN (hypertension)     Sleep apnea      Past Surgical History:        Procedure Laterality Date    BACK SURGERY      CHOLECYSTECTOMY      CORONARY ARTERY BYPASS GRAFT REDO      2 vessel    FOOT DEBRIDEMENT Right 4/25/2022    DELAYED PRIMARY CLOSURE RIGHT FOOT WOUND performed by Jian Carvalho DPM at [address] Right 5/7/2022    FOOT DEBRIDEMENT INCISION AND DRAINAGE performed by Maria Elena Adame DPM at [address] Right 5/10/2022    RIGHT FOOT DELAYED PRIMARY CLOSURE WITH POSSIBLE DEBRIDEMENT    ++CONTACT ISOLATION++   (DR AVAIL.  AT 4PM) performed by Maria Elena Adame DPM at [facility] OR    INSERT bowel sounds,   NEUROLOGIC:  Mental Status Exam:  Level of Alertness:   awake  SKIN:  drsg intact to LE, texture, turgor  PICC- RUE    DATA:    CBC with Differential:    Lab Results   Component Value Date/Time    WBC 5.9 08/06/2023 05:45 AM    RBC 4.47 08/06/2023 05:45 AM    HGB 13.0 08/06/2023 05:45 AM    HCT 38.7 08/06/2023 05:45 AM     08/06/2023 05:45 AM    MCV 86.6 08/06/2023 05:45 AM    MCH 29.1 08/06/2023 05:45 AM    MCHC 33.6 08/06/2023 05:45 AM    RDW 13.3 08/06/2023 05:45 AM    NRBC 0.0 05/26/2022 04:05 PM    LYMPHOPCT 36 08/06/2023 05:45 AM    MONOPCT 9 08/06/2023 05:45 AM    BASOPCT 1 08/06/2023 05:45 AM    ATYLYMREL 2.6 05/07/2022 05:50 AM    MONOSABS 0.53 08/06/2023 05:45 AM    LYMPHSABS 2.13 08/06/2023 05:45 AM    EOSABS 0.16 08/06/2023 05:45 AM    BASOSABS 0.04 08/06/2023 05:45 AM     CMP:    Lab Results   Component Value Date/Time     08/06/2023 05:45 AM    K 4.4 08/06/2023 05:45 AM    K 4.5 07/05/2023 03:54 AM     08/06/2023 05:45 AM    CO2 26 08/06/2023 05:45 AM    BUN 28 08/06/2023 05:45 AM    CREATININE 1.2 08/06/2023 05:45 AM    GFRAA >60 06/06/2022 10:30 AM    LABGLOM >60 08/06/2023 05:45 AM    GLUCOSE 186 08/06/2023 05:45 AM    PROT 6.4 08/06/2023 05:45 AM    LABALBU 3.7 08/06/2023 05:45 AM    CALCIUM 8.7 08/06/2023 05:45 AM    BILITOT 0.3 08/06/2023 05:45 AM    ALKPHOS 91 08/06/2023 05:45 AM    AST <5 08/06/2023 05:45 AM    ALT 24 08/06/2023 05:45 AM     Hepatic Function Panel:    Lab Results   Component Value Date/Time    ALKPHOS 91 08/06/2023 05:45 AM    ALT 24 08/06/2023 05:45 AM    AST <5 08/06/2023 05:45 AM    PROT 6.4 08/06/2023 05:45 AM    BILITOT 0.3 08/06/2023 05:45 AM    BILIDIR <0.2 04/03/2023 10:52 AM    IBILI see below 04/03/2023 10:52 AM    LABALBU 3.7 08/06/2023 05:45 AM     PT/INR:    Lab Results   Component Value Date/Time    PROTIME 10.6 07/26/2023 05:04 PM    INR 1.0 07/26/2023 05:04 PM     PTT:  No results found for: APTT, PTT[APTT}  Last 3 Troponin: No results found for: TROPONINI  TSH:    Lab Results   Component Value Date/Time    TSH 1.790 04/05/2023 02:05 AM     VITAMIN B12:   Lab Results   Component Value Date/Time    PDKBWYAT58 404 04/05/2023 02:05 AM     FOLATE:    Lab Results   Component Value Date/Time    FOLATE 9.8 04/05/2023 02:05 AM       FL ESOPHAGRAM    Result Date: 8/4/2023  EXAMINATION: SINGLE CONTRAST ESOPHAGRAM 8/4/2023 HISTORY: ORDERING SYSTEM PROVIDED HISTORY:  Dysphagia TECHNOLOGIST PROVIDED HISTORY: Reason for Exam:  Dysphagia Should a barium tablet be used, if available? Yes COMPARISON: None TECHNIQUE: Multiple single contrast images of the esophagus and gastroesophageal junction were obtained following the oral administration of water soluble contrast FLUOROSCOPY DOSE AND TYPE: Fluoroscopy time 0.7 minutes, Air Kerma 67 mGy. 10 fluoroscopic images were saved. FINDINGS: The examination is significantly limited, as the patient is unable to stand is only assume an oblique position to a certain degree. The study was therefore done in the semi upright position. The esophagus is normal in course and caliber. Moderate tertiary esophageal contractions are appreciated. No evidence of an esophageal stricture. No hiatal hernia is identified. There is no aspiration during the procedure. Evaluation for gastroesophageal reflux is significantly limited due to positioning limitations. Significantly limited esophagram. Moderate tertiary esophageal contractions were noted. Otherwise, no obvious abnormality of the esophagus was appreciated. XR CHEST PORTABLE    Result Date: 8/5/2023  EXAMINATION: ONE XRAY VIEW OF THE CHEST 8/5/2023 4:15 am COMPARISON: None. HISTORY: ORDERING SYSTEM PROVIDED HISTORY: verify PICC line placement TECHNOLOGIST PROVIDED HISTORY: Reason for exam:->verify PICC line placement FINDINGS: Normal cardiomediastinal silhouette. Lungs clear. No pneumothorax or effusion. Body wall soft tissues unremarkable.  Osseous thorax intact. Right arm PICC tip with good positioning at the cavoatrial junction. 1. No acute disease. 2. Right arm PICC tip with good positioning at the cavoatrial junction. RECOMMENDATION: Careful clinical correlation and follow up recommended. IMPRESSION:    Dysphagia  LLE diabetic ulcer- s/p left fifth partial ray amputation on 7/31,  DM2  Electrolyte abnormalities- defer  COPD   Osteomyelitis    RECOMMENDATIONS:        Diet as tolerated for now  Protonix 40 mg daily  Medical management per Primary care; including pain  Supportive care  Trend labs  EGD tomorrow with Dr. Julia Madrid. Procedure details for EGD discussed in detail. Complications including but not limited to, perforation, bleeding and infection were discussed in great detail. Risks, benefits, and alternatives explained. Pt has understood the information and has agreed to proceed. EGD orders have been placed  NPO P MN  Will follow       Thank you very much for your consultation. We will follow closely with you.     Discussed with Dr. Derek Roche developed by Dr. Julia Shabazz, NP-C 8/6/2023 11:00 AM for Dr. Julia Madrid

## 2023-08-07 ENCOUNTER — ANESTHESIA EVENT (OUTPATIENT)
Dept: ENDOSCOPY | Age: 63
End: 2023-08-07
Payer: MEDICARE

## 2023-08-07 ENCOUNTER — ANESTHESIA (OUTPATIENT)
Dept: ENDOSCOPY | Age: 63
End: 2023-08-07
Payer: MEDICARE

## 2023-08-07 LAB
ALBUMIN SERPL-MCNC: 3.6 G/DL (ref 3.5–5.2)
ALP SERPL-CCNC: 87 U/L (ref 40–129)
ALT SERPL-CCNC: 23 U/L (ref 0–40)
ANION GAP SERPL CALCULATED.3IONS-SCNC: 8 MMOL/L (ref 7–16)
AST SERPL-CCNC: 25 U/L (ref 0–39)
BASOPHILS # BLD: 0.06 K/UL (ref 0–0.2)
BASOPHILS NFR BLD: 1 % (ref 0–2)
BILIRUB SERPL-MCNC: 0.3 MG/DL (ref 0–1.2)
BUN SERPL-MCNC: 26 MG/DL (ref 6–23)
CALCIUM SERPL-MCNC: 8.7 MG/DL (ref 8.6–10.2)
CHLORIDE SERPL-SCNC: 107 MMOL/L (ref 98–107)
CO2 SERPL-SCNC: 25 MMOL/L (ref 22–29)
CREAT SERPL-MCNC: 1.2 MG/DL (ref 0.7–1.2)
EOSINOPHIL # BLD: 0.13 K/UL (ref 0.05–0.5)
EOSINOPHILS RELATIVE PERCENT: 2 % (ref 0–6)
ERYTHROCYTE [DISTWIDTH] IN BLOOD BY AUTOMATED COUNT: 13.5 % (ref 11.5–15)
GFR SERPL CREATININE-BSD FRML MDRD: >60 ML/MIN/1.73M2
GLUCOSE SERPL-MCNC: 176 MG/DL (ref 74–99)
HCT VFR BLD AUTO: 40.7 % (ref 37–54)
HGB BLD-MCNC: 13.4 G/DL (ref 12.5–16.5)
IMM GRANULOCYTES # BLD AUTO: 0.03 K/UL (ref 0–0.58)
IMM GRANULOCYTES NFR BLD: 0 % (ref 0–5)
INR PPP: 1
LYMPHOCYTES NFR BLD: 2.76 K/UL (ref 1.5–4)
LYMPHOCYTES RELATIVE PERCENT: 36 % (ref 20–42)
MCH RBC QN AUTO: 28.6 PG (ref 26–35)
MCHC RBC AUTO-ENTMCNC: 32.9 G/DL (ref 32–34.5)
MCV RBC AUTO: 86.8 FL (ref 80–99.9)
METER GLUCOSE: 167 MG/DL (ref 74–99)
METER GLUCOSE: 178 MG/DL (ref 74–99)
METER GLUCOSE: 180 MG/DL (ref 74–99)
MONOCYTES NFR BLD: 0.64 K/UL (ref 0.1–0.95)
MONOCYTES NFR BLD: 8 % (ref 2–12)
NEUTROPHILS NFR BLD: 52 % (ref 43–80)
NEUTS SEG NFR BLD: 3.97 K/UL (ref 1.8–7.3)
PLATELET # BLD AUTO: 159 K/UL (ref 130–450)
PMV BLD AUTO: 11.9 FL (ref 7–12)
POTASSIUM SERPL-SCNC: 4.5 MMOL/L (ref 3.5–5)
PROT SERPL-MCNC: 6.5 G/DL (ref 6.4–8.3)
PROTHROMBIN TIME: 11.7 SEC (ref 9.3–12.4)
RBC # BLD AUTO: 4.69 M/UL (ref 3.8–5.8)
SODIUM SERPL-SCNC: 140 MMOL/L (ref 132–146)
WBC OTHER # BLD: 7.6 K/UL (ref 4.5–11.5)

## 2023-08-07 PROCEDURE — 7100000010 HC PHASE II RECOVERY - FIRST 15 MIN: Performed by: INTERNAL MEDICINE

## 2023-08-07 PROCEDURE — 0DB68ZX EXCISION OF STOMACH, VIA NATURAL OR ARTIFICIAL OPENING ENDOSCOPIC, DIAGNOSTIC: ICD-10-PCS | Performed by: INTERNAL MEDICINE

## 2023-08-07 PROCEDURE — 6360000002 HC RX W HCPCS

## 2023-08-07 PROCEDURE — 2709999900 HC NON-CHARGEABLE SUPPLY: Performed by: INTERNAL MEDICINE

## 2023-08-07 PROCEDURE — 3700000001 HC ADD 15 MINUTES (ANESTHESIA): Performed by: INTERNAL MEDICINE

## 2023-08-07 PROCEDURE — 6360000002 HC RX W HCPCS: Performed by: NURSE ANESTHETIST, CERTIFIED REGISTERED

## 2023-08-07 PROCEDURE — 94640 AIRWAY INHALATION TREATMENT: CPT

## 2023-08-07 PROCEDURE — 2580000003 HC RX 258: Performed by: NURSE ANESTHETIST, CERTIFIED REGISTERED

## 2023-08-07 PROCEDURE — 3700000000 HC ANESTHESIA ATTENDED CARE: Performed by: INTERNAL MEDICINE

## 2023-08-07 PROCEDURE — 6370000000 HC RX 637 (ALT 250 FOR IP)

## 2023-08-07 PROCEDURE — 1200000000 HC SEMI PRIVATE

## 2023-08-07 PROCEDURE — 97165 OT EVAL LOW COMPLEX 30 MIN: CPT

## 2023-08-07 PROCEDURE — 7100000011 HC PHASE II RECOVERY - ADDTL 15 MIN: Performed by: INTERNAL MEDICINE

## 2023-08-07 PROCEDURE — 0DB98ZX EXCISION OF DUODENUM, VIA NATURAL OR ARTIFICIAL OPENING ENDOSCOPIC, DIAGNOSTIC: ICD-10-PCS | Performed by: INTERNAL MEDICINE

## 2023-08-07 PROCEDURE — 85610 PROTHROMBIN TIME: CPT

## 2023-08-07 PROCEDURE — 82947 ASSAY GLUCOSE BLOOD QUANT: CPT

## 2023-08-07 PROCEDURE — 36592 COLLECT BLOOD FROM PICC: CPT

## 2023-08-07 PROCEDURE — 87077 CULTURE AEROBIC IDENTIFY: CPT

## 2023-08-07 PROCEDURE — 88305 TISSUE EXAM BY PATHOLOGIST: CPT

## 2023-08-07 PROCEDURE — 97161 PT EVAL LOW COMPLEX 20 MIN: CPT

## 2023-08-07 PROCEDURE — 80053 COMPREHEN METABOLIC PANEL: CPT

## 2023-08-07 PROCEDURE — 99232 SBSQ HOSP IP/OBS MODERATE 35: CPT | Performed by: INTERNAL MEDICINE

## 2023-08-07 PROCEDURE — 85025 COMPLETE CBC W/AUTO DIFF WBC: CPT

## 2023-08-07 PROCEDURE — 3609017700 HC EGD DILATION GASTRIC/DUODENAL STRICTURE: Performed by: INTERNAL MEDICINE

## 2023-08-07 PROCEDURE — 6360000002 HC RX W HCPCS: Performed by: STUDENT IN AN ORGANIZED HEALTH CARE EDUCATION/TRAINING PROGRAM

## 2023-08-07 PROCEDURE — 6360000002 HC RX W HCPCS: Performed by: INTERNAL MEDICINE

## 2023-08-07 PROCEDURE — 2580000003 HC RX 258

## 2023-08-07 RX ORDER — PROPOFOL 10 MG/ML
INJECTION, EMULSION INTRAVENOUS PRN
Status: DISCONTINUED | OUTPATIENT
Start: 2023-08-07 | End: 2023-08-07 | Stop reason: SDUPTHER

## 2023-08-07 RX ORDER — SODIUM CHLORIDE 9 MG/ML
INJECTION, SOLUTION INTRAVENOUS CONTINUOUS PRN
Status: DISCONTINUED | OUTPATIENT
Start: 2023-08-07 | End: 2023-08-07 | Stop reason: SDUPTHER

## 2023-08-07 RX ORDER — MORPHINE SULFATE 2 MG/ML
1 INJECTION, SOLUTION INTRAMUSCULAR; INTRAVENOUS ONCE
Status: COMPLETED | OUTPATIENT
Start: 2023-08-07 | End: 2023-08-07

## 2023-08-07 RX ADMIN — Medication 300 UNITS: at 21:00

## 2023-08-07 RX ADMIN — SERTRALINE HYDROCHLORIDE 50 MG: 50 TABLET ORAL at 17:04

## 2023-08-07 RX ADMIN — BUDESONIDE 250 MCG: 0.25 SUSPENSION RESPIRATORY (INHALATION) at 07:41

## 2023-08-07 RX ADMIN — INSULIN GLARGINE 52 UNITS: 100 INJECTION, SOLUTION SUBCUTANEOUS at 21:00

## 2023-08-07 RX ADMIN — Medication 10 ML: at 20:28

## 2023-08-07 RX ADMIN — LISINOPRIL 10 MG: 10 TABLET ORAL at 17:03

## 2023-08-07 RX ADMIN — PREGABALIN 100 MG: 50 CAPSULE ORAL at 21:00

## 2023-08-07 RX ADMIN — ARFORMOTEROL TARTRATE 15 MCG: 15 SOLUTION RESPIRATORY (INHALATION) at 07:41

## 2023-08-07 RX ADMIN — VANCOMYCIN HYDROCHLORIDE 1500 MG: 10 INJECTION, POWDER, LYOPHILIZED, FOR SOLUTION INTRAVENOUS at 17:11

## 2023-08-07 RX ADMIN — FLUCONAZOLE 200 MG: 100 TABLET ORAL at 17:03

## 2023-08-07 RX ADMIN — ENOXAPARIN SODIUM 30 MG: 100 INJECTION SUBCUTANEOUS at 17:04

## 2023-08-07 RX ADMIN — Medication 300 UNITS: at 10:10

## 2023-08-07 RX ADMIN — PROPOFOL 150 MG: 10 INJECTION, EMULSION INTRAVENOUS at 14:55

## 2023-08-07 RX ADMIN — MORPHINE SULFATE 1 MG: 2 INJECTION, SOLUTION INTRAMUSCULAR; INTRAVENOUS at 10:00

## 2023-08-07 RX ADMIN — VANCOMYCIN HYDROCHLORIDE 1500 MG: 10 INJECTION, POWDER, LYOPHILIZED, FOR SOLUTION INTRAVENOUS at 05:42

## 2023-08-07 RX ADMIN — ARFORMOTEROL TARTRATE 15 MCG: 15 SOLUTION RESPIRATORY (INHALATION) at 21:07

## 2023-08-07 RX ADMIN — INSULIN LISPRO 15 UNITS: 100 INJECTION, SOLUTION INTRAVENOUS; SUBCUTANEOUS at 17:05

## 2023-08-07 RX ADMIN — FENOFIBRATE 160 MG: 160 TABLET ORAL at 17:03

## 2023-08-07 RX ADMIN — HYDROCODONE BITARTRATE AND ACETAMINOPHEN 1 TABLET: 5; 325 TABLET ORAL at 01:17

## 2023-08-07 RX ADMIN — HYDROCODONE BITARTRATE AND ACETAMINOPHEN 1 TABLET: 5; 325 TABLET ORAL at 17:04

## 2023-08-07 RX ADMIN — ATORVASTATIN CALCIUM 40 MG: 40 TABLET, FILM COATED ORAL at 20:26

## 2023-08-07 RX ADMIN — SODIUM CHLORIDE: 9 INJECTION, SOLUTION INTRAVENOUS at 14:50

## 2023-08-07 RX ADMIN — Medication 10 ML: at 10:10

## 2023-08-07 RX ADMIN — METOPROLOL TARTRATE 25 MG: 25 TABLET, FILM COATED ORAL at 20:26

## 2023-08-07 RX ADMIN — BUDESONIDE 250 MCG: 0.25 SUSPENSION RESPIRATORY (INHALATION) at 21:07

## 2023-08-07 ASSESSMENT — ENCOUNTER SYMPTOMS
WHEEZING: 0
EYE PAIN: 0
DIARRHEA: 0
NAUSEA: 0
ABDOMINAL PAIN: 0
SHORTNESS OF BREATH: 0
SINUS PRESSURE: 0
EYE REDNESS: 0
COUGH: 0
SHORTNESS OF BREATH: 0
EYE DISCHARGE: 0
BACK PAIN: 0
SORE THROAT: 0
VOMITING: 0

## 2023-08-07 ASSESSMENT — LIFESTYLE VARIABLES: SMOKING_STATUS: 1

## 2023-08-07 ASSESSMENT — PAIN DESCRIPTION - LOCATION: LOCATION: FOOT

## 2023-08-07 ASSESSMENT — PAIN DESCRIPTION - ORIENTATION: ORIENTATION: LEFT

## 2023-08-07 ASSESSMENT — PAIN SCALES - GENERAL: PAINLEVEL_OUTOF10: 8

## 2023-08-07 ASSESSMENT — PAIN DESCRIPTION - DESCRIPTORS: DESCRIPTORS: ACHING;DISCOMFORT

## 2023-08-07 NOTE — PROGRESS NOTES
Patient requesting pain medication but is npo for EGD this afternoon.  Called Dr. John Wagner, see [facility] for orders

## 2023-08-07 NOTE — ANESTHESIA POSTPROCEDURE EVALUATION
Department of Anesthesiology  Postprocedure Note    Patient: Adam Fuentes  MRN: [id number]  YOB: 1960  Date of evaluation: 8/7/2023      Procedure Summary     Date: 08/07/23 Room / Location: [place] [address] / [facility]    Anesthesia Start: 1450 Anesthesia Stop: 9953    Procedures:       EGD ESOPHAGOGASTRODUODENOSCOPY WITH DILATION      EGD BIOPSY Diagnosis:       Abdominal pain, unspecified abdominal location      (Abdominal pain, unspecified abdominal location [R10.9])    Surgeons: Kia Crain MD Responsible Provider: Michele Sewell DO    Anesthesia Type: MAC ASA Status: 4          Anesthesia Type: No value filed.     Bernard Phase I:      Bernard Phase II:        Anesthesia Post Evaluation    Patient location during evaluation: bedside  Patient participation: complete - patient participated  Level of consciousness: awake and alert  Airway patency: patent  Nausea & Vomiting: no nausea and no vomiting  Complications: no  Cardiovascular status: blood pressure returned to baseline  Respiratory status: acceptable  Hydration status: euvolemic

## 2023-08-07 NOTE — PROGRESS NOTES
Patient for EGD with dilation today with Dr. Dm Ryan. Has been NPO since MN. Denies any discomfort at this time. AM labs reviewed.  PT/INR pending

## 2023-08-07 NOTE — PROGRESS NOTES
OCCUPATIONAL THERAPY INITIAL EVALUATION    [address]   [address] [facility], [address] [place] [address]        [id number]                                                  Patient Name: Roland Fuentes    MRN: [id number]    : 1960    Room: [address] [place] [address]-A     Evaluating OT: Judd Restrepo OTR/L #[id number]    Referring Provider: CHRIST Vitale CNP     Specific Provider Orders/Date: OT Eval and Treat, 23     Diagnosis:   1. Osteomyelitis of left lower extremity (HCC)         Surgery: None       Pertinent Medical History:       Past Medical History:   Diagnosis Date    Anxiety     COPD (chronic obstructive pulmonary disease) (HCC)     Depression     DM (diabetes mellitus) ([address] )     Frostbite     HLD (hyperlipidemia)     HTN (hypertension)     Sleep apnea          Past Surgical History:   Procedure Laterality Date    BACK SURGERY      CHOLECYSTECTOMY      CORONARY ARTERY BYPASS GRAFT REDO      2 vessel    FOOT DEBRIDEMENT Right 2022    DELAYED PRIMARY CLOSURE RIGHT FOOT WOUND performed by Will Blackwell DPM at [address] Right 2022    FOOT DEBRIDEMENT INCISION AND DRAINAGE performed by Emily Ceja DPM at [address] Right 5/10/2022    RIGHT FOOT DELAYED PRIMARY CLOSURE WITH POSSIBLE DEBRIDEMENT    ++CONTACT ISOLATION++   (DR AVAIL.  AT 4PM) performed by Emily Ceja DPM at [address] PICC LINE  2022         INSERT PICC LINE  2023    TOE AMPUTATION Right 2022    AMPUTATION RIGHT SECOND TOE performed by Will Blackwell DPM at [facility] Left 2023    LEFT POSSIBLE FIFTH RAY AMPUTATION AND BONE BIOPSY performed by Emily Ceja DPM at [facility] OR    UVULOPALATOPHARYGOPLASTY        Precautions:  Fall Risk, alarm, non-weight bearing left LE     Assessment of current deficits    [x] Functional mobility  [x]ADLs  [x] Strength               []Cognition    [x] Functional transfers   [x] IADLs solving: fair   Judgement/safety: fair      Functional Assessment: AM-PAC Daily Activity Raw Score: 17/24   Initial Eval Status  Date: 8/7/23   Treatment Status  Date:  STGs = LTGs  Time frame: 10-14 days   Feeding Independent         Grooming Stand by Assist    Modified [place]    UB Dressing Stand by Assist    Modified [place]    LB Dressing Minimal Assist     Modified [place]    Bathing Moderate Assist     Modified [place]    Toileting Moderate Assist     Modified [place]    Bed Mobility  Supine to sit: Supervision   Sit to supine: Supervision     Supine to sit: Independent   Sit to supine: Independent    Functional Transfers Sit to stand: SBA  Stand to sit: SBA  Stand pivot: SBA to/from [facility]     Transfer training with verbal cues for hand placement and technique to improve safety. Independent, NWB L LE    Functional Mobility Minimal Assist with wheeled walker to improve balance for heel-toe-shuffles while pivoting to/from bedside commode. Supervision with wheeled walker, NWB L LE    Balance Sitting:     Static: good    Dynamic: fair plus   Standing: fair with walker      Sitting:     Static: good    Dynamic: good  Standing: good with walker    Activity Tolerance fair  plus   Increase standing tolerance >3 minutes for improved engagement with functional transfers and indep in ADLs     Visual/  Perceptual WFL     NA      Hand Dominance:      AROM (PROM) Strength Additional Info:  Goal:   RUE  WFL 4-/5 good  and wfl FMC/dexterity noted during ADL tasks   Improve overall RUE strength  for participation in functional tasks   LUE WFL 4-/5 good  and wfl FMC/dexterity noted during ADL tasks   Improve overall LUE strength  for participation in functional tasks     Hearing:  [facility]   Sensation:   No c/o numbness or tingling  Tone:  WFL   Edema: None     Comments: RN cleared patient for OT. Upon arrival patient in supine.   Therapist facilitated and instructed pt on adapted techniques & compensatory strategies to improve safety and independence with basic ADLs, bed mobility, functional transfers and mobility to allow pt to achieve highest level of independence and safely. Pt demonstrated fair plus understanding of education & follow through. At end of session, patient was in supine, alarm on, with call light and phone within reach, all lines and tubes intact. Overall, patient demonstrated  decreased independence and safety during completion of ADL tasks. Pt would benefit from continued skilled OT to increase safety and independence with completion of ADL tasks and functional mobility for improved quality of life. Rehab Potential: Good for established goals. Patient / Family Goal: pt in agreement with POC      Patient and/or family were instructed on functional diagnosis, prognosis/goals and OT plan of care. Demonstrated fair plus understanding. Eval Complexity: Low    Time In: 12:20 PM   Time Out: 12:37 PM    Total Treatment Time: 0       Min Units   OT Eval Low 97165  X  1    OT Eval Medium 24532      OT Eval High 88524      OT Re-Eval [id number]            ADL/Self Care 07618     Therapeutic Activities 77580       Therapeutic Ex 70655       Orthotic Management 35690       Manual 28486     Neuro Re-Ed 24559       Non-Billable Time        Evaluation Time additionally includes thorough review of current medical information, gathering information on past medical history/social history and prior level of function, interpretation of standardized testing/informal observation of tasks, assessment of data and development of plan of care and goals.         Evaluating OT: Clance Mini OTR/L #[id number]

## 2023-08-07 NOTE — CARE COORDINATION
Met with patient and had phone conference call with his niece Nico Laurent. Explained that review of insurance benefits reveals a $244/week responsibility for home atb. Patient indicated he will continue with dc plan for snf, but not Armando Lo. Review of SNF list conducted - choices named as [facility] or [facility]. Call to [address] is out of network  Call to [facility] with [employer]. Referral initiated. Will await her review and response regarding bed availability/acceptance. Ethan Cutler.  Cony, MSN RN  [facility] Case Management  [phone]

## 2023-08-07 NOTE — PROGRESS NOTES
Physical Therapy  Facility/Department: [facility] SURG  Physical Therapy Initial Assessment    Name: Annalisa George May  : 1960  MRN: [id number]  Date of Service: 2023    Attending Provider:  Rhina Guevara MD    Evaluating PT:  Flaca Canales. Kylee Núñez, P.T. Room #:  12/[facility]  Diagnosis:  Osteomyelitis of left lower extremity (HCC) [M86.9]  Other osteomyelitis of left foot ([address]) [I18.3H5]  Pertinent PMHx/PSHx:  s/p L 5th toe amputation with I and D 23  Procedure/Surgery:  EGD with dilation scheduled for today 23  Precautions:  NWB LLE, falls, bed/chair alarm    SUBJECTIVE:    Pt lives with his niece in a 1 story home with 2 stairs and a post he can grab onto to enter. Pt ambulated with no AD prior to his surgery, but has a ww PTA. Pt has past history of R foot wounds and amputation of R 2nd toe and unable to hop at this time on R foot. OBJECTIVE:   Initial Evaluation  Date: 23 Treatment Short Term/ Long Term   Goals   Was pt agreeable to Eval/treatment? yes     Does pt have pain? No c/o pain at this time. Bed Mobility  Rolling: supervision  Supine to sit: supervision  Sit to supine: supervision  Scooting: supervision  Independent    Transfers Sit to stand: SBA  Stand to sit: SBA  Stand pivot: NA  Independent    Ambulation   Lateral scoot shuffle L and R 5 feet each direction with ww MIN A  When able to WB on LLE: 100 feet with ww supervision  Until then just transfers as to not re-injure R foot with hopping. Stair negotiation: ascended and descended NA  2 steps with 1 rail SBA   AM-PAC 6 Clicks 61/30       BLE ROM is WFL. BLE strength is grossly 4/5 to 4+/5.    Balance: sitting is Independent and standing with ww is SBA  Endurance: fair-    Patient education  Pt educated on NWB LLE    Patient response to education:   Pt verbalized understanding Pt demonstrated skill Pt requires further education in this area   yes yes yes     ASSESSMENT:    Conditions Requiring Skilled Therapeutic Intervention:    [x]Decreased strength     []Decreased ROM  [x]Decreased functional mobility  [x]Decreased balance   [x]Decreased endurance   []Decreased posture  []Decreased sensation  []Decreased coordination   []Decreased vision  []Decreased safety awareness   []Increased pain       Comments:  Pt was in bed and agreeable to PT. He was able to maintain NWB to L foot throughout PT session. He was able to perform lateral scoot shuffle using ww along EOB and had 1 episode LOB and was able to recover with MIN A. Pt would be able to do better using ww for amb when he is able to WB through LLE, even just his heel. If able to WB through LLE I anticipate he would be safe to go home, but not while he is NWB LLE. Pt was left supine in bed with call light left by patient. Chair/bed alarm: bed alarm was re-activated. Pt's/ family goals   1. To go home. Patient and or family understand(s) diagnosis, prognosis, and plan of care. PHYSICAL THERAPY PLAN OF CARE:    PT POC is established based on physician order and patient diagnosis     Referring provider/PT Order:  PT eval and treat  Diagnosis:  Osteomyelitis of left lower extremity ([address]) [M86.9]  Other osteomyelitis of left foot ([address]) [N76.9N9]  Specific instructions for next treatment:  progress amb as pt is able.     Current Treatment Recommendations:     [x] Strengthening to improve independence with functional mobility   [] ROM to improve ROM and decrease spasm and pain which will help promote independence with functional mobility   [x] Balance Training to improve static/dynamic balance and to reduce fall risk  [x] Endurance Training to improve activity tolerance during functional mobility   [x] Transfer Training to improve safety and independence with all functional transfers   [x] Gait Training to improve gait mechanics, endurance and assess need for appropriate assistive device  [x] Stair Training in preparation for safe discharge home and/or into the community   [] Positioning to prevent skin breakdown and contractures  [] Safety and Education Training   [x] Patient/Caregiver Education   [] HEP  [] Other     PT long term treatment goals are located in above grid    Frequency of treatments: 2-5x/week x 1-2 weeks. Time in  08:50  Time out  09:05    Evaluation Time includes thorough review of current medical information, gathering information on past medical history/social history and prior level of function, completion of standardized testing/informal observation of tasks, assessment of data and education on plan of care and goals. CPT codes:  [x] Low Complexity PT evaluation 35179  [] Moderate Complexity PT evaluation 34988  [] High Complexity PT evaluation 59658  [] PT Re-evaluation 79611  [] Gait training 47552 ** minutes  [] Manual therapy 17087 ** minutes  [] Therapeutic activities 35258 ** minutes  [] Therapeutic exercises 55946 ** minutes  [] Neuromuscular reeducation 37792 ** minutes     Timothy B. Mendel Pipes., P.T.   License Number: [id number]

## 2023-08-08 LAB
ALBUMIN SERPL-MCNC: 3.6 G/DL (ref 3.5–5.2)
ALP SERPL-CCNC: 88 U/L (ref 40–129)
ALT SERPL-CCNC: 25 U/L (ref 0–40)
ANION GAP SERPL CALCULATED.3IONS-SCNC: 13 MMOL/L (ref 7–16)
AST SERPL-CCNC: 27 U/L (ref 0–39)
BASOPHILS # BLD: 0.05 K/UL (ref 0–0.2)
BASOPHILS NFR BLD: 1 % (ref 0–2)
BILIRUB SERPL-MCNC: 0.3 MG/DL (ref 0–1.2)
BUN SERPL-MCNC: 25 MG/DL (ref 6–23)
CALCIUM SERPL-MCNC: 8.8 MG/DL (ref 8.6–10.2)
CHLORIDE SERPL-SCNC: 105 MMOL/L (ref 98–107)
CO2 SERPL-SCNC: 23 MMOL/L (ref 22–29)
CREAT SERPL-MCNC: 1.3 MG/DL (ref 0.7–1.2)
EOSINOPHIL # BLD: 0.16 K/UL (ref 0.05–0.5)
EOSINOPHILS RELATIVE PERCENT: 2 % (ref 0–6)
ERYTHROCYTE [DISTWIDTH] IN BLOOD BY AUTOMATED COUNT: 13.5 % (ref 11.5–15)
GFR SERPL CREATININE-BSD FRML MDRD: >60 ML/MIN/1.73M2
GLUCOSE SERPL-MCNC: 105 MG/DL (ref 74–99)
HCT VFR BLD AUTO: 40.4 % (ref 37–54)
HGB BLD-MCNC: 13.4 G/DL (ref 12.5–16.5)
IMM GRANULOCYTES # BLD AUTO: <0.03 K/UL (ref 0–0.58)
IMM GRANULOCYTES NFR BLD: 0 % (ref 0–5)
LYMPHOCYTES NFR BLD: 2.61 K/UL (ref 1.5–4)
LYMPHOCYTES RELATIVE PERCENT: 35 % (ref 20–42)
MCH RBC QN AUTO: 28.9 PG (ref 26–35)
MCHC RBC AUTO-ENTMCNC: 33.2 G/DL (ref 32–34.5)
MCV RBC AUTO: 87.1 FL (ref 80–99.9)
METER GLUCOSE: 118 MG/DL (ref 74–99)
METER GLUCOSE: 119 MG/DL (ref 74–99)
METER GLUCOSE: 136 MG/DL (ref 74–99)
METER GLUCOSE: 195 MG/DL (ref 74–99)
MONOCYTES NFR BLD: 0.54 K/UL (ref 0.1–0.95)
MONOCYTES NFR BLD: 7 % (ref 2–12)
NEUTROPHILS NFR BLD: 55 % (ref 43–80)
NEUTS SEG NFR BLD: 4.17 K/UL (ref 1.8–7.3)
PLATELET # BLD AUTO: 162 K/UL (ref 130–450)
PMV BLD AUTO: 11.8 FL (ref 7–12)
POTASSIUM SERPL-SCNC: 4.1 MMOL/L (ref 3.5–5)
PROT SERPL-MCNC: 6.6 G/DL (ref 6.4–8.3)
RBC # BLD AUTO: 4.64 M/UL (ref 3.8–5.8)
SODIUM SERPL-SCNC: 141 MMOL/L (ref 132–146)
WBC OTHER # BLD: 7.6 K/UL (ref 4.5–11.5)

## 2023-08-08 PROCEDURE — 80053 COMPREHEN METABOLIC PANEL: CPT

## 2023-08-08 PROCEDURE — 6360000002 HC RX W HCPCS: Performed by: STUDENT IN AN ORGANIZED HEALTH CARE EDUCATION/TRAINING PROGRAM

## 2023-08-08 PROCEDURE — 6370000000 HC RX 637 (ALT 250 FOR IP)

## 2023-08-08 PROCEDURE — 6360000002 HC RX W HCPCS

## 2023-08-08 PROCEDURE — 2580000003 HC RX 258

## 2023-08-08 PROCEDURE — 94640 AIRWAY INHALATION TREATMENT: CPT

## 2023-08-08 PROCEDURE — 1200000000 HC SEMI PRIVATE

## 2023-08-08 PROCEDURE — 99233 SBSQ HOSP IP/OBS HIGH 50: CPT | Performed by: INTERNAL MEDICINE

## 2023-08-08 PROCEDURE — 2580000003 HC RX 258: Performed by: INTERNAL MEDICINE

## 2023-08-08 PROCEDURE — 85025 COMPLETE CBC W/AUTO DIFF WBC: CPT

## 2023-08-08 PROCEDURE — 82962 GLUCOSE BLOOD TEST: CPT

## 2023-08-08 PROCEDURE — 6370000000 HC RX 637 (ALT 250 FOR IP): Performed by: STUDENT IN AN ORGANIZED HEALTH CARE EDUCATION/TRAINING PROGRAM

## 2023-08-08 PROCEDURE — 97530 THERAPEUTIC ACTIVITIES: CPT

## 2023-08-08 RX ORDER — 0.9 % SODIUM CHLORIDE 0.9 %
1000 INTRAVENOUS SOLUTION INTRAVENOUS ONCE
Status: COMPLETED | OUTPATIENT
Start: 2023-08-08 | End: 2023-08-08

## 2023-08-08 RX ADMIN — INSULIN LISPRO 15 UNITS: 100 INJECTION, SOLUTION INTRAVENOUS; SUBCUTANEOUS at 16:26

## 2023-08-08 RX ADMIN — FLUCONAZOLE 200 MG: 100 TABLET ORAL at 09:19

## 2023-08-08 RX ADMIN — ATORVASTATIN CALCIUM 40 MG: 40 TABLET, FILM COATED ORAL at 20:42

## 2023-08-08 RX ADMIN — HYDROCODONE BITARTRATE AND ACETAMINOPHEN 1 TABLET: 5; 325 TABLET ORAL at 20:42

## 2023-08-08 RX ADMIN — Medication 10 ML: at 09:19

## 2023-08-08 RX ADMIN — LEVOTHYROXINE SODIUM 25 MCG: 25 TABLET ORAL at 05:56

## 2023-08-08 RX ADMIN — ARFORMOTEROL TARTRATE 15 MCG: 15 SOLUTION RESPIRATORY (INHALATION) at 20:24

## 2023-08-08 RX ADMIN — METOPROLOL TARTRATE 25 MG: 25 TABLET, FILM COATED ORAL at 20:41

## 2023-08-08 RX ADMIN — ENOXAPARIN SODIUM 30 MG: 100 INJECTION SUBCUTANEOUS at 04:00

## 2023-08-08 RX ADMIN — HYDROCODONE BITARTRATE AND ACETAMINOPHEN 1 TABLET: 5; 325 TABLET ORAL at 10:48

## 2023-08-08 RX ADMIN — SODIUM CHLORIDE 1000 ML: 9 INJECTION, SOLUTION INTRAVENOUS at 09:27

## 2023-08-08 RX ADMIN — HYDROCODONE BITARTRATE AND ACETAMINOPHEN 1 TABLET: 5; 325 TABLET ORAL at 03:53

## 2023-08-08 RX ADMIN — Medication 300 UNITS: at 20:43

## 2023-08-08 RX ADMIN — PREGABALIN 100 MG: 50 CAPSULE ORAL at 09:19

## 2023-08-08 RX ADMIN — PREGABALIN 100 MG: 50 CAPSULE ORAL at 20:42

## 2023-08-08 RX ADMIN — BUDESONIDE 250 MCG: 0.25 SUSPENSION RESPIRATORY (INHALATION) at 20:24

## 2023-08-08 RX ADMIN — METOPROLOL TARTRATE 25 MG: 25 TABLET, FILM COATED ORAL at 09:19

## 2023-08-08 RX ADMIN — LISINOPRIL 10 MG: 10 TABLET ORAL at 09:19

## 2023-08-08 RX ADMIN — VANCOMYCIN HYDROCHLORIDE 1500 MG: 10 INJECTION, POWDER, LYOPHILIZED, FOR SOLUTION INTRAVENOUS at 16:25

## 2023-08-08 RX ADMIN — ENOXAPARIN SODIUM 30 MG: 100 INJECTION SUBCUTANEOUS at 16:25

## 2023-08-08 RX ADMIN — Medication 10 ML: at 20:43

## 2023-08-08 RX ADMIN — FENOFIBRATE 160 MG: 160 TABLET ORAL at 09:18

## 2023-08-08 RX ADMIN — SERTRALINE HYDROCHLORIDE 50 MG: 50 TABLET ORAL at 09:19

## 2023-08-08 RX ADMIN — VANCOMYCIN HYDROCHLORIDE 1500 MG: 10 INJECTION, POWDER, LYOPHILIZED, FOR SOLUTION INTRAVENOUS at 05:54

## 2023-08-08 RX ADMIN — INSULIN LISPRO 15 UNITS: 100 INJECTION, SOLUTION INTRAVENOUS; SUBCUTANEOUS at 10:50

## 2023-08-08 RX ADMIN — INSULIN LISPRO 15 UNITS: 100 INJECTION, SOLUTION INTRAVENOUS; SUBCUTANEOUS at 07:27

## 2023-08-08 RX ADMIN — INSULIN GLARGINE 52 UNITS: 100 INJECTION, SOLUTION SUBCUTANEOUS at 20:44

## 2023-08-08 ASSESSMENT — PAIN DESCRIPTION - LOCATION
LOCATION: FOOT
LOCATION: FOOT

## 2023-08-08 ASSESSMENT — PAIN SCALES - GENERAL
PAINLEVEL_OUTOF10: 9
PAINLEVEL_OUTOF10: 2
PAINLEVEL_OUTOF10: 2
PAINLEVEL_OUTOF10: 8

## 2023-08-08 ASSESSMENT — PAIN DESCRIPTION - DESCRIPTORS
DESCRIPTORS: ACHING;SHARP;SORE
DESCRIPTORS: ACHING;SORE;SHARP

## 2023-08-08 ASSESSMENT — PAIN DESCRIPTION - ORIENTATION
ORIENTATION: LEFT
ORIENTATION: LEFT

## 2023-08-08 NOTE — OP NOTE
[address]                  301 [facility], [address] [facility]                                OPERATIVE REPORT    PATIENT NAME: Luis A Devlin                      :        1960  MED REC NO:   [id number]                            ROOM:       5569  ACCOUNT NO:   [de-identified]                           ADMIT DATE: 2023  PROVIDER:     Violette Cowden, MD    DATE OF PROCEDURE:  2023    PROCEDURE PERFORMED:  Upper endoscopy with biopsy and Todd  dilatation. PREOPERATIVE DIAGNOSES:  Dysphagia, epigastric pain. POSTOPERATIVE DIAGNOSES:  Minimal peptic stricture, dilated with  52-Setswana Eilleen Alyce. Grade ________ LA classification GERD. Stomach  showed gastritis, biopsied to rule out H. pylori infection. Duodenum  biopsied to rule out sprue. ANESTHESIA:  LMAC. NOTE:  Prior to the procedure an informed consent was obtained from the  patient after explaining the benefits as well as the risks,  alternatives, and complications of the procedure to the patient, who  understood and agreed. PROCEDURE:  With the patient in the left lateral decubitus position, the  Olympus GIF-100 forward-viewing videoscope was introduced into the  esophagus, the evaluation of which showed minimal peptic stricture and  grade ______-__ LA classification GERD and no hiatal hernia was seen. The scope was then advanced through the gastroesophageal junction into  the gastric body, along the greater curvature. Evaluation of the body  of the stomach showed moderate gastritis, biopsied to rule out H. pylori  infection. The scope was then advanced through the pylorus into the duodenal bulb  and second portion of the duodenum, both of which appeared to be  unremarkable. Duodenum biopsied to rule out sprue.   The scope was then  retrieved and retroflexed in the prepyloric antrum, with thorough  evaluation of the cardiac and fundal portions of the stomach, which  appeared of the stomach, which  appeared to be within normal limits. The scope was then straightened, the area deflated, and the procedure  was terminated by withdrawing the scope and conducting a second look on  the way out, which was essentially the same. A #52-Dutch Hailey [place] was introduced with dilatation carried out without  difficulty. The patient tolerated the procedure well.         Dmitri Aguilar MD    D: 08/07/2023 15:44:06       T: 08/07/2023 15:47:52     [email or web address]  Job#: [id number]     Doc#: [id number]    CC:  Denise Nicolas MD

## 2023-08-08 NOTE — PROGRESS NOTES
Occupational Therapy  OT BEDSIDE TREATMENT NOTE      Date:2023  Patient Name: Elvia Fuentes  MRN: [id number]  : 1960  Room: [address] [place], [place] [address]     Evaluating OT: Marcelle Gusman OTR/L #[id number]     Referring Provider: CHRIST Chua CNP      Specific Provider Orders/Date: OT Eval and Treat, 23      Diagnosis:   1. Osteomyelitis of left lower extremity [facility])         Surgery: None        Pertinent Medical History:       Past Medical History        Past Medical History:   Diagnosis Date    Anxiety      COPD (chronic obstructive pulmonary disease) (HCC)      Depression      DM (diabetes mellitus) ([address])      Frostbite      HLD (hyperlipidemia)      HTN (hypertension)      Sleep apnea              Past Surgical History         Past Surgical History:   Procedure Laterality Date    BACK SURGERY        CHOLECYSTECTOMY        CORONARY ARTERY BYPASS GRAFT REDO         2 vessel    FOOT DEBRIDEMENT Right 2022     DELAYED PRIMARY CLOSURE RIGHT FOOT WOUND performed by Tory Lamb DPM at [address] Right 2022     FOOT DEBRIDEMENT INCISION AND DRAINAGE performed by Breezy Kinsey DPM at [address] Right 5/10/2022     RIGHT FOOT DELAYED PRIMARY CLOSURE WITH POSSIBLE DEBRIDEMENT    ++CONTACT ISOLATION++   (DR AVAIL.  AT 4PM) performed by Breezy Kinsey DPM at [address] PICC LINE   2022          INSERT PICC LINE   2023    TOE AMPUTATION Right 2022     AMPUTATION RIGHT SECOND TOE performed by Tory Lamb DPM at [facility] Left 2023     LEFT POSSIBLE FIFTH RAY AMPUTATION AND BONE BIOPSY performed by Breezy Kinsey DPM at [facility] OR    UVULOPALATOPHARYGOPLASTY              Precautions:  Fall Risk, alarm, non-weight bearing left LE      Assessment of current deficits    [x] Functional mobility            [x]ADLs           [x] Strength                   []Cognition    [x] Functional transfers          [x] IADLs          [] Safety Awareness [x]Endurance    [] Fine Coordination              [x] Balance      [] Vision/perception    []Sensation      []Gross Motor Coordination  [] ROM           [] Delirium                   [] Motor Control      OT PLAN OF CARE   OT POC based on physician orders, patient diagnosis and results of clinical assessment     Frequency/Duration 1-3 days/wk for 2 weeks PRN      Specific OT Treatment Interventions to include:   * Instruction/training on adapted ADL techniques and AE recommendations to increase functional independence within precautions       * Training on energy conservation strategies, correct breathing pattern and techniques to improve independence/tolerance for self-care routine  * Functional transfer/mobility training/DME recommendations for increased independence, safety, and fall prevention  * Patient/Family education to increase follow through with safety techniques and functional independence  * Recommendation of environmental modifications for increased safety with functional transfers/mobility and ADLs  * Therapeutic exercise to improve motor endurance, ROM, and functional strength for ADLs/functional transfers  * Therapeutic activities to facilitate/challenge dynamic balance, stand tolerance for increased safety and independence with ADLs  * Manual techniques for edema management     Recommended Adaptive Equipment: TBD       Home Living: Lives with niece and niece's spouse, single family home, 1 story, 2 steps to enter without rail. Bathroom set-up: [address]          Equipment owned: [employer]      Prior Level of Function: Independent with ADLs , has assist with IADLs; ambulated independently. Pt was just discharged from this hospital to [facility] but patient returned back to hospital same day due to not receiving his antibiotics - per chart.       Pain Level: no c/o pain         Cognition: A&O: 4/4; Follows 3 step directions              Memory: intact              Sequencing: intact

## 2023-08-08 NOTE — DISCHARGE INSTRUCTIONS
CALL DR. Nasra Greene OFFICE FOR FOLLOW UP APPOINTMENT.   [phone]  [address] [facility] [address], [address] [facility]

## 2023-08-08 NOTE — PROGRESS NOTES
[facility] Quality Flow/Interdisciplinary Rounds Progress Note        Quality Flow Rounds held on August 8, 2023    Disciplines Attending:  Bedside Nurse, , , and Nursing Unit [facility] [address] May was admitted on 8/4/2023  8:34 PM    Anticipated Discharge Date:  Expected Discharge Date: 08/09/23    Disposition:    Jose Score:  Jose Scale Score: 20    Readmission Risk              Risk of Unplanned Readmission:  29           Discussed patient goal for the day, patient clinical progression, and barriers to discharge.   The following Goal(s) of the Day/Commitment(s) have been identified:  Diagnostics - Report Results and Labs - Report Results      Mateus Santizo RN  August 8, 2023

## 2023-08-08 NOTE — CARE COORDINATION
Received communication from [facility] that neither [facility] nor [facility] can accept patient. Spoke with patient's niece (per his request) for additional facility selections. List reviewed. Per choice, referral made to Lydia Robert with [address] (either [facility] or [facility]). Will await her review and response regarding bed availability/acceptance. If neither can accept, next facility preferred is [facility]. DANN Donnelly RN  [facility] Case Management  [phone]    Per Lydia Robert with [facility], facilities are out of network. Subsequent referral called to [facility] with [facility].  Will await her review and response regarding bed availability/acceptance. Dandy Banegas.  DANN Donnelly RN  [facility] Case Management  [phone]

## 2023-08-09 LAB
HELIOBACTER PYLORI ID: NEGATIVE
METER GLUCOSE: 130 MG/DL (ref 74–99)
METER GLUCOSE: 144 MG/DL (ref 74–99)
METER GLUCOSE: 164 MG/DL (ref 74–99)
METER GLUCOSE: 183 MG/DL (ref 74–99)
MICROORGANISM SPEC CULT: NORMAL
MICROORGANISM SPEC CULT: NORMAL
MICROORGANISM/AGENT SPEC: NORMAL
MICROORGANISM/AGENT SPEC: NORMAL
SOURCE, 60200063: NORMAL
SPECIMEN DESCRIPTION: NORMAL
SPECIMEN DESCRIPTION: NORMAL

## 2023-08-09 PROCEDURE — 6360000002 HC RX W HCPCS

## 2023-08-09 PROCEDURE — 1200000000 HC SEMI PRIVATE

## 2023-08-09 PROCEDURE — 6370000000 HC RX 637 (ALT 250 FOR IP): Performed by: STUDENT IN AN ORGANIZED HEALTH CARE EDUCATION/TRAINING PROGRAM

## 2023-08-09 PROCEDURE — 99232 SBSQ HOSP IP/OBS MODERATE 35: CPT | Performed by: INTERNAL MEDICINE

## 2023-08-09 PROCEDURE — 2580000003 HC RX 258

## 2023-08-09 PROCEDURE — 6370000000 HC RX 637 (ALT 250 FOR IP)

## 2023-08-09 PROCEDURE — 6360000002 HC RX W HCPCS: Performed by: STUDENT IN AN ORGANIZED HEALTH CARE EDUCATION/TRAINING PROGRAM

## 2023-08-09 PROCEDURE — 82962 GLUCOSE BLOOD TEST: CPT

## 2023-08-09 RX ADMIN — ACETAMINOPHEN 650 MG: 325 TABLET ORAL at 06:16

## 2023-08-09 RX ADMIN — Medication 300 UNITS: at 09:10

## 2023-08-09 RX ADMIN — LEVOTHYROXINE SODIUM 25 MCG: 25 TABLET ORAL at 06:16

## 2023-08-09 RX ADMIN — PREGABALIN 100 MG: 50 CAPSULE ORAL at 09:09

## 2023-08-09 RX ADMIN — HYDROCODONE BITARTRATE AND ACETAMINOPHEN 1 TABLET: 5; 325 TABLET ORAL at 22:43

## 2023-08-09 RX ADMIN — INSULIN LISPRO 15 UNITS: 100 INJECTION, SOLUTION INTRAVENOUS; SUBCUTANEOUS at 06:18

## 2023-08-09 RX ADMIN — Medication 10 ML: at 21:50

## 2023-08-09 RX ADMIN — Medication 10 ML: at 09:09

## 2023-08-09 RX ADMIN — VANCOMYCIN HYDROCHLORIDE 1500 MG: 10 INJECTION, POWDER, LYOPHILIZED, FOR SOLUTION INTRAVENOUS at 06:21

## 2023-08-09 RX ADMIN — METOPROLOL TARTRATE 25 MG: 25 TABLET, FILM COATED ORAL at 10:27

## 2023-08-09 RX ADMIN — HYDROCODONE BITARTRATE AND ACETAMINOPHEN 1 TABLET: 5; 325 TABLET ORAL at 16:36

## 2023-08-09 RX ADMIN — FENOFIBRATE 160 MG: 160 TABLET ORAL at 09:09

## 2023-08-09 RX ADMIN — ENOXAPARIN SODIUM 30 MG: 100 INJECTION SUBCUTANEOUS at 04:11

## 2023-08-09 RX ADMIN — Medication 300 UNITS: at 21:50

## 2023-08-09 RX ADMIN — HYDROCODONE BITARTRATE AND ACETAMINOPHEN 1 TABLET: 5; 325 TABLET ORAL at 04:11

## 2023-08-09 RX ADMIN — INSULIN LISPRO 15 UNITS: 100 INJECTION, SOLUTION INTRAVENOUS; SUBCUTANEOUS at 11:15

## 2023-08-09 RX ADMIN — FLUCONAZOLE 200 MG: 100 TABLET ORAL at 09:09

## 2023-08-09 RX ADMIN — INSULIN GLARGINE 52 UNITS: 100 INJECTION, SOLUTION SUBCUTANEOUS at 21:56

## 2023-08-09 RX ADMIN — PREGABALIN 100 MG: 50 CAPSULE ORAL at 21:50

## 2023-08-09 RX ADMIN — VANCOMYCIN HYDROCHLORIDE 1500 MG: 10 INJECTION, POWDER, LYOPHILIZED, FOR SOLUTION INTRAVENOUS at 17:25

## 2023-08-09 RX ADMIN — SERTRALINE HYDROCHLORIDE 50 MG: 50 TABLET ORAL at 09:09

## 2023-08-09 RX ADMIN — HYDROCODONE BITARTRATE AND ACETAMINOPHEN 1 TABLET: 5; 325 TABLET ORAL at 10:27

## 2023-08-09 RX ADMIN — METOPROLOL TARTRATE 25 MG: 25 TABLET, FILM COATED ORAL at 21:50

## 2023-08-09 RX ADMIN — ATORVASTATIN CALCIUM 40 MG: 40 TABLET, FILM COATED ORAL at 21:50

## 2023-08-09 RX ADMIN — INSULIN LISPRO 15 UNITS: 100 INJECTION, SOLUTION INTRAVENOUS; SUBCUTANEOUS at 16:31

## 2023-08-09 RX ADMIN — LISINOPRIL 10 MG: 10 TABLET ORAL at 10:27

## 2023-08-09 RX ADMIN — ENOXAPARIN SODIUM 30 MG: 100 INJECTION SUBCUTANEOUS at 17:19

## 2023-08-09 ASSESSMENT — PAIN - FUNCTIONAL ASSESSMENT
PAIN_FUNCTIONAL_ASSESSMENT: ACTIVITIES ARE NOT PREVENTED
PAIN_FUNCTIONAL_ASSESSMENT: PREVENTS OR INTERFERES WITH MANY ACTIVE NOT PASSIVE ACTIVITIES
PAIN_FUNCTIONAL_ASSESSMENT: PREVENTS OR INTERFERES SOME ACTIVE ACTIVITIES AND ADLS
PAIN_FUNCTIONAL_ASSESSMENT: ACTIVITIES ARE NOT PREVENTED
PAIN_FUNCTIONAL_ASSESSMENT: ACTIVITIES ARE NOT PREVENTED

## 2023-08-09 ASSESSMENT — PAIN SCALES - GENERAL
PAINLEVEL_OUTOF10: 8
PAINLEVEL_OUTOF10: 8
PAINLEVEL_OUTOF10: 9
PAINLEVEL_OUTOF10: 8
PAINLEVEL_OUTOF10: 2
PAINLEVEL_OUTOF10: 4
PAINLEVEL_OUTOF10: 8
PAINLEVEL_OUTOF10: 10
PAINLEVEL_OUTOF10: 7

## 2023-08-09 ASSESSMENT — PAIN DESCRIPTION - LOCATION
LOCATION: FOOT

## 2023-08-09 ASSESSMENT — PAIN DESCRIPTION - DESCRIPTORS
DESCRIPTORS: SHARP;BURNING
DESCRIPTORS: SQUEEZING
DESCRIPTORS: ACHING;DISCOMFORT;SORE
DESCRIPTORS: THROBBING
DESCRIPTORS: ACHING;SHARP;SORE

## 2023-08-09 ASSESSMENT — PAIN DESCRIPTION - ORIENTATION
ORIENTATION: LEFT

## 2023-08-09 NOTE — CONSULTS
[facility]  [facility]  Consultation Note     Admit Date: 8/4/2023  8:34 PM    Reason for Consult:     Vancomycin from facility    Attending Physician:  Carlyle Hollis MD    HISTORY OF PRESENT ILLNESS:             The history is obtained from extensive review of available past medical records. The patient is a 66 y.o. male who is usually known to the ID service. The patient was recently admitted to [facility] on 7/27/2023 with a diabetic foot ulcer and osteomyelitis of the left fifth metatarsal by MRI. Underwent left fifth ray amputation on 7/31/2023. Seen by ID. Treated empirically with Vancomycin and Ceftriaxone. Bone and tissue culture grew MSSA, Streptococcus agalactiae and VSE faecalis. He developed odynophagia, dysphagia and nausea and was treated empirically with Econazole for Candida esophagitis. He was discharged on oral Fluconazole and Vancomycin on 8/4/2023. The patient was readmitted to [facility] the same day after he signed himself out from the nursing facility because they did not allow him to smoke or give him pain medication. Cefazolin was restarted. He was seen by gastroenterology and underwent an EGD with dilation. He has been here for 5 days. Today we were informed that he was here and was asked to handle antibiotics. Past Medical History:        Diagnosis Date    Anxiety     COPD (chronic obstructive pulmonary disease) ([address] [facility])     Depression     DM (diabetes mellitus) ([address])     Frostbite     HLD (hyperlipidemia)     HTN (hypertension)     Sleep apnea      July 2023. Admitted to [facility] with a diabetic foot ulcer and osteomyelitis of the left fifth metatarsal by MRI. Underwent left fifth ray amputation on 7/31/2023. Seen by ID. Treated empirically with Vancomycin and Ceftriaxone. Bone and tissue culture grew MSSA, Streptococcus agalactiae and VSE faecalis.   He developed odynophagia, dysphagia and nausea and was treated empirically Day     Packs/day: 0.50     Years: 45.00     Pack years: 22.50     Types: Cigarettes     Start date: 38   Substance and Sexual Activity    Alcohol use: Not Currently     Comment: Drinks socially    Drug use: Not Currently     Types: Marijuana (Weed)     Comment: States last use last summer     Social Determinants of Health     Financial Resource Strain: Low Risk     Difficulty of Paying Living Expenses: Not hard at all   Food Insecurity: No Food Insecurity    Worried About Running Out of Food in the Last Year: Never true    Ran Out of Food in the Last Year: Never true   Transportation Needs: No Transportation Needs    Lack of Transportation (Medical): No    Lack of Transportation (Non-Medical): No   Physical Activity: Inactive    Days of Exercise per Week: 0 days    Minutes of Exercise per Session: 0 min   Stress: No Stress Concern Present    Feeling of Stress : Only a little   Social Connections: Socially Isolated    Frequency of Communication with Friends and Family: Once a week    Frequency of Social Gatherings with Friends and Family: Once a week    Attends Restorationist Services: Never    Active Member of Clubs or Organizations: No    Attends Club or Organization Meetings: Never    Marital Status:    Intimate Partner Violence: Not At Risk    Fear of Current or Ex-Partner: No    Emotionally Abused: No    Physically Abused: No    Sexually Abused: No   Housing Stability: Low Risk     Unable to Pay for Housing in the Last Year: No    Number of [address] in the Last Year: 1    Unstable Housing in the Last Year: No      Pets: Dog and cat  Travel: No  The patient lives at his nieces home with their extended family. He retired from driving trucks. Family History:       Problem Relation Age of Onset    Diabetes Mother     Dementia Mother     Diabetes Father     Stroke Father     Diabetes Sister     Heart Attack Brother    . Otherwise non-pertinent to the chief complaint.     REVIEW OF SYSTEMS: Continue Vancomycin until 9/12/2023. Dosing per pharmacy  Check ESR, CRP tomorrow  The patient can be discharged from ID standpoint  Will follow with you    Thank you for having us see this patient in consultation. I will be discussing this case with the treating physicians. Spoke with nursing.     Carlos Gray MD  10:54 AM  8/9/2023

## 2023-08-09 NOTE — PROGRESS NOTES
Pharmacy Consultation Note  (Antibiotic Dosing and Monitoring)    Initial consult date: 8/9  Consulting physician/provider: Vy Lopez  Drug: Vancomycin  Indication: Bon and Joint infection    Age/  Gender Height Weight IBW  Allergy Information   63 y.o./male 6' (182.9 cm) 270 lb (122.5 kg)     Ideal body weight: 77.6 kg (171 lb 1.2 oz)  Adjusted ideal body weight: 97 kg (213 lb 13.5 oz)   Patient has no known allergies. Renal Function:  Recent Labs     08/07/23  0520 08/08/23  0400   BUN 26* 25*   CREATININE 1.2 1.3*       Intake/Output Summary (Last 24 hours) at 8/9/2023 1353  Last data filed at 8/9/2023 0643  Gross per 24 hour   Intake 243 ml   Output 1750 ml   Net -1507 ml       Vancomycin Monitoring:  Trough:  No results for input(s): VANCOTROUGH in the last 72 hours. Random:  No results for input(s): VANCORANDOM in the last 72 hours. No results for input(s): Hyun Hernandezy in the last 72 hours. Historical Cultures:  Organism   Date Value Ref Range Status   11/10/2022 Staphylococcus aureus (A)  Final     No results for input(s): BC in the last 72 hours. Vancomycin Administration Times:  Recent vancomycin administrations                     vancomycin (VANCOCIN) 1500 mg in sodium chloride 0.9 % 250 mL IVPB (mg) 1,500 mg New Bag 08/09/23 0621     1,500 mg New Bag 08/08/23 1625     1,500 mg New Bag  0554     1,500 mg New Bag 08/07/23 1711     1,500 mg New Bag  0542     1,500 mg New Bag 08/06/23 1705                    Assessment:  Patient is a 66 y.o. male who has been initiated on vancomycin  Estimated Creatinine Clearance: 82 mL/min (A) (based on SCr of 1.3 mg/dL (H)). To dose vancomycin, pharmacy will be utilizing [employer] calculation software for goal AUC/INCOLE 400-600 mg/L-hr (predicted AUC/NICOLE = 570, Tr =18.5)    Plan:   Will continue vancomycin 1500 mg IV every 12 hours  Per ID, Vanco until 9/12/23  Will check vancomycin levels when appropriate-level tomorrow AM  Will continue to monitor renal

## 2023-08-09 NOTE — CARE COORDINATION
Referral called and information faxed to Evita Chaevz at [facility] [phone]  Fax [phone]  Will await her review and response regarding bed availability/acceptance. Taran Donnelly, MSN RN  [facility] Case Management  [phone]

## 2023-08-09 NOTE — PATIENT CARE CONFERENCE
P Quality Flow/Interdisciplinary Rounds Progress Note        Quality Flow Rounds held on August 9, 2023    Disciplines Attending:  Bedside Nurse, , and Nursing Unit Leadership    Jaswinder Fuentes was admitted on 8/4/2023  8:34 PM    Anticipated Discharge Date:  Expected Discharge Date: 08/09/23    Disposition:    Jose Score:  Jose Scale Score: 21    Readmission Risk              Risk of Unplanned Readmission:  29           Discussed patient goal for the day, patient clinical progression, and barriers to discharge.   The following Goal(s) of the Day/Commitment(s) have been identified:  Diagnostics - Report Results      Deirdre Mathis RN  August 9, 2023

## 2023-08-09 NOTE — CARE COORDINATION
Social Work discharge planning    Per Ryan Record with [facility], they are NOT in network with pt's [facility]. Sw called referral to Jae Joaquin with [facility] now. Electronically signed by Tati Guerrero on 8/9/2023 at 9:30 AM     Addendum-    Updated with pt and maikel Ac. They said  IF [facility] can not accept pt, then next choices are [facility] in [place], [place], [facility] in [place], or [facility] near [place], [place]. Await [facility] owens.  Electronically signed by Tati Guerrero on 8/9/2023 at 9:36 AM     Addendum- [facility] advised they can not accept pt due to pt smoking. Pt called Sw and asked to try [facility] in [place] next, and then to snfs that maikel gave earlier.   Electronically signed by Tati Guerrero on 8/9/2023 at 3:10 PM

## 2023-08-10 PROBLEM — M86.9 OSTEOMYELITIS (HCC): Status: ACTIVE | Noted: 2023-08-10

## 2023-08-10 PROBLEM — E11.9 CONTROLLED TYPE 2 DIABETES MELLITUS WITHOUT COMPLICATION (HCC): Status: ACTIVE | Noted: 2023-08-10

## 2023-08-10 LAB
ANION GAP SERPL CALCULATED.3IONS-SCNC: 16 MMOL/L (ref 7–16)
BUN SERPL-MCNC: 19 MG/DL (ref 6–23)
CALCIUM SERPL-MCNC: 8.4 MG/DL (ref 8.6–10.2)
CHLORIDE SERPL-SCNC: 105 MMOL/L (ref 98–107)
CO2 SERPL-SCNC: 25 MMOL/L (ref 22–29)
CREAT SERPL-MCNC: 1.2 MG/DL (ref 0.7–1.2)
CRP SERPL HS-MCNC: 8 MG/L (ref 0–5)
DATE LAST DOSE: NORMAL
ERYTHROCYTE [DISTWIDTH] IN BLOOD BY AUTOMATED COUNT: 13.2 % (ref 11.5–15)
ERYTHROCYTE [SEDIMENTATION RATE] IN BLOOD BY WESTERGREN METHOD: 28 MM/HR (ref 0–15)
GFR SERPL CREATININE-BSD FRML MDRD: >60 ML/MIN/1.73M2
GLUCOSE BLD-MCNC: 101 MG/DL (ref 74–99)
GLUCOSE BLD-MCNC: 113 MG/DL (ref 74–99)
GLUCOSE BLD-MCNC: 150 MG/DL (ref 74–99)
GLUCOSE SERPL-MCNC: 130 MG/DL (ref 74–99)
HCT VFR BLD AUTO: 37.2 % (ref 37–54)
HGB BLD-MCNC: 12.6 G/DL (ref 12.5–16.5)
MCH RBC QN AUTO: 29.1 PG (ref 26–35)
MCHC RBC AUTO-ENTMCNC: 33.9 G/DL (ref 32–34.5)
MCV RBC AUTO: 85.9 FL (ref 80–99.9)
PLATELET # BLD AUTO: 150 K/UL (ref 130–450)
PMV BLD AUTO: 11.6 FL (ref 7–12)
POTASSIUM SERPL-SCNC: 4.3 MMOL/L (ref 3.5–5)
RBC # BLD AUTO: 4.33 M/UL (ref 3.8–5.8)
SODIUM SERPL-SCNC: 146 MMOL/L (ref 132–146)
SURGICAL PATHOLOGY REPORT: NORMAL
TME LAST DOSE: NORMAL H
VANCOMYCIN DOSE: NORMAL MG
VANCOMYCIN SERPL-MCNC: 24.9 UG/ML (ref 5–40)
WBC OTHER # BLD: 7 K/UL (ref 4.5–11.5)

## 2023-08-10 PROCEDURE — 6360000002 HC RX W HCPCS

## 2023-08-10 PROCEDURE — 6370000000 HC RX 637 (ALT 250 FOR IP)

## 2023-08-10 PROCEDURE — 80048 BASIC METABOLIC PNL TOTAL CA: CPT

## 2023-08-10 PROCEDURE — 86140 C-REACTIVE PROTEIN: CPT

## 2023-08-10 PROCEDURE — 6360000002 HC RX W HCPCS: Performed by: STUDENT IN AN ORGANIZED HEALTH CARE EDUCATION/TRAINING PROGRAM

## 2023-08-10 PROCEDURE — 2580000003 HC RX 258: Performed by: SPECIALIST

## 2023-08-10 PROCEDURE — 97530 THERAPEUTIC ACTIVITIES: CPT

## 2023-08-10 PROCEDURE — 85652 RBC SED RATE AUTOMATED: CPT

## 2023-08-10 PROCEDURE — 6370000000 HC RX 637 (ALT 250 FOR IP): Performed by: STUDENT IN AN ORGANIZED HEALTH CARE EDUCATION/TRAINING PROGRAM

## 2023-08-10 PROCEDURE — 6360000002 HC RX W HCPCS: Performed by: SPECIALIST

## 2023-08-10 PROCEDURE — 99232 SBSQ HOSP IP/OBS MODERATE 35: CPT | Performed by: INTERNAL MEDICINE

## 2023-08-10 PROCEDURE — 85027 COMPLETE CBC AUTOMATED: CPT

## 2023-08-10 PROCEDURE — 2580000003 HC RX 258

## 2023-08-10 PROCEDURE — 1200000000 HC SEMI PRIVATE

## 2023-08-10 PROCEDURE — 36592 COLLECT BLOOD FROM PICC: CPT

## 2023-08-10 PROCEDURE — 82962 GLUCOSE BLOOD TEST: CPT

## 2023-08-10 PROCEDURE — 80202 ASSAY OF VANCOMYCIN: CPT

## 2023-08-10 RX ADMIN — LISINOPRIL 10 MG: 10 TABLET ORAL at 08:24

## 2023-08-10 RX ADMIN — HYDROCODONE BITARTRATE AND ACETAMINOPHEN 1 TABLET: 5; 325 TABLET ORAL at 05:26

## 2023-08-10 RX ADMIN — ENOXAPARIN SODIUM 30 MG: 100 INJECTION SUBCUTANEOUS at 05:09

## 2023-08-10 RX ADMIN — PREGABALIN 100 MG: 50 CAPSULE ORAL at 21:33

## 2023-08-10 RX ADMIN — Medication 300 UNITS: at 21:35

## 2023-08-10 RX ADMIN — INSULIN LISPRO 15 UNITS: 100 INJECTION, SOLUTION INTRAVENOUS; SUBCUTANEOUS at 11:25

## 2023-08-10 RX ADMIN — FENOFIBRATE 160 MG: 160 TABLET ORAL at 08:24

## 2023-08-10 RX ADMIN — VANCOMYCIN HYDROCHLORIDE 1500 MG: 10 INJECTION, POWDER, LYOPHILIZED, FOR SOLUTION INTRAVENOUS at 05:09

## 2023-08-10 RX ADMIN — LEVOTHYROXINE SODIUM 25 MCG: 25 TABLET ORAL at 05:19

## 2023-08-10 RX ADMIN — HYDROCODONE BITARTRATE AND ACETAMINOPHEN 1 TABLET: 5; 325 TABLET ORAL at 23:34

## 2023-08-10 RX ADMIN — Medication 10 ML: at 08:22

## 2023-08-10 RX ADMIN — ATORVASTATIN CALCIUM 40 MG: 40 TABLET, FILM COATED ORAL at 21:33

## 2023-08-10 RX ADMIN — INSULIN GLARGINE 52 UNITS: 100 INJECTION, SOLUTION SUBCUTANEOUS at 21:33

## 2023-08-10 RX ADMIN — HYDROCODONE BITARTRATE AND ACETAMINOPHEN 1 TABLET: 5; 325 TABLET ORAL at 17:33

## 2023-08-10 RX ADMIN — ENOXAPARIN SODIUM 30 MG: 100 INJECTION SUBCUTANEOUS at 16:44

## 2023-08-10 RX ADMIN — METOPROLOL TARTRATE 25 MG: 25 TABLET, FILM COATED ORAL at 08:24

## 2023-08-10 RX ADMIN — Medication 10 ML: at 21:40

## 2023-08-10 RX ADMIN — SERTRALINE HYDROCHLORIDE 50 MG: 50 TABLET ORAL at 08:24

## 2023-08-10 RX ADMIN — VANCOMYCIN HYDROCHLORIDE 1500 MG: 10 INJECTION, POWDER, LYOPHILIZED, FOR SOLUTION INTRAVENOUS at 23:33

## 2023-08-10 RX ADMIN — PREGABALIN 100 MG: 50 CAPSULE ORAL at 08:23

## 2023-08-10 RX ADMIN — METOPROLOL TARTRATE 25 MG: 25 TABLET, FILM COATED ORAL at 21:33

## 2023-08-10 RX ADMIN — INSULIN LISPRO 15 UNITS: 100 INJECTION, SOLUTION INTRAVENOUS; SUBCUTANEOUS at 16:43

## 2023-08-10 RX ADMIN — INSULIN LISPRO 15 UNITS: 100 INJECTION, SOLUTION INTRAVENOUS; SUBCUTANEOUS at 08:18

## 2023-08-10 RX ADMIN — Medication 300 UNITS: at 08:23

## 2023-08-10 ASSESSMENT — PAIN DESCRIPTION - LOCATION
LOCATION: FOOT

## 2023-08-10 ASSESSMENT — PAIN DESCRIPTION - ORIENTATION
ORIENTATION: LEFT

## 2023-08-10 ASSESSMENT — PAIN SCALES - GENERAL
PAINLEVEL_OUTOF10: 7

## 2023-08-10 ASSESSMENT — PAIN - FUNCTIONAL ASSESSMENT: PAIN_FUNCTIONAL_ASSESSMENT: ACTIVITIES ARE NOT PREVENTED

## 2023-08-10 ASSESSMENT — PAIN DESCRIPTION - DESCRIPTORS
DESCRIPTORS: THROBBING
DESCRIPTORS: DISCOMFORT
DESCRIPTORS: SHARP

## 2023-08-10 NOTE — CARE COORDINATION
Response from Jovana Jones at [facility] - they are considering accepting patient for admission, however, wanted to ensure family is aware that facility cannot accomodate or facilitate transport to any out patient physician appointments in [facility]. Spoke with niece/STARR Ac - she is willing to assist with transport as necessary and understands facility's position on this matter. Left VM for Bea at [facility] to respond with formal acceptance or denial.  Will follow along with  and assist with discharge planning as necessary. Rush Hatchet. Semer, MSN RN  [facility] Case Management  [phone]    Jovana Jones has communicated that [facility] can accept patient and will submit prior auth request at this time. Rush Hatchet. Semer, MSN RN  [facility] Case Management  [phone]

## 2023-08-10 NOTE — PROGRESS NOTES
Occupational Therapy  OT BEDSIDE TREATMENT NOTE      Date:8/10/2023  Patient Name: Shirlene Fuentes  MRN: [id number]  : 1960  Room: [address] [place], [place] [address]     Evaluating OT: Diego Hogue OTR/L #[id number]     Referring Provider: CHRIST Johnson CNP      Specific Provider Orders/Date: OT Eval and Treat, 23      Diagnosis:   1. Osteomyelitis of left lower extremity [facility])         Surgery: None        Pertinent Medical History:       Past Medical History        Past Medical History:   Diagnosis Date    Anxiety      COPD (chronic obstructive pulmonary disease) (HCC)      Depression      DM (diabetes mellitus) ([address])      Frostbite      HLD (hyperlipidemia)      HTN (hypertension)      Sleep apnea              Past Surgical History         Past Surgical History:   Procedure Laterality Date    BACK SURGERY        CHOLECYSTECTOMY        CORONARY ARTERY BYPASS GRAFT REDO         2 vessel    FOOT DEBRIDEMENT Right 2022     DELAYED PRIMARY CLOSURE RIGHT FOOT WOUND performed by Consuelo Acevedo DPM at [address] Right 2022     FOOT DEBRIDEMENT INCISION AND DRAINAGE performed by Stefanie Martin DPM at [address] Right 5/10/2022     RIGHT FOOT DELAYED PRIMARY CLOSURE WITH POSSIBLE DEBRIDEMENT    ++CONTACT ISOLATION++   (DR AVAIL.  AT 4PM) performed by Stefanie Martin DPM at [address] PICC LINE   2022          INSERT PICC LINE   2023    TOE AMPUTATION Right 2022     AMPUTATION RIGHT SECOND TOE performed by Consuelo Acevedo DPM at [facility] Left 2023     LEFT POSSIBLE FIFTH RAY AMPUTATION AND BONE BIOPSY performed by Stefanie Martin DPM at [facility] OR    UVULOPALATOPHARYGOPLASTY              Precautions:  Fall Risk, alarm, non-weight bearing left LE      Assessment of current deficits    [x] Functional mobility            [x]ADLs           [x] Strength                   []Cognition    [x] Functional transfers          [x] IADLs          [] Safety Awareness Problem solving: fair              Judgement/safety: fair                 Functional Assessment: AM-PAC Daily Activity Raw Score: 16/24    Initial Eval Status  Date: 8/7/23    Treatment Status  Date: 8/10/23 STGs = LTGs  Time frame: 10-14 days   Feeding Independent           Grooming Stand by Assist    SBA  Modified [place]    UB Dressing Stand by Assist    SBA  Modified [place]    LB Dressing Minimal Assist      mod A, increased assist required to pull up pants around hips while standing. SBA to pull up right sock. Modified [place]    Bathing Moderate Assist       Modified [place]    Toileting Moderate Assist       Modified [place]    Bed Mobility  Supine to sit: Supervision   Sit to supine: Supervision     Supine <> sit supervision  Supine to sit: Independent   Sit to supine: Independent    Functional Transfers Sit to stand: SBA  Stand to sit: SBA  Stand pivot: SBA to/from [facility]      Transfer training with verbal cues for hand placement and technique to improve safety. Sit <> stand SBA  Independent, NWB L LE    Functional Mobility Minimal Assist with wheeled walker to improve balance for heel-toe-shuffles while pivoting to/from bedside commode. Min A in room [employer], able to maintain NWB Supervision with wheeled walker, NWB L LE    Balance Sitting:     Static: good    Dynamic: fair plus   Standing: fair with walker      Sitting independent  Standing balance SBA with WW static, min A dynamic  Sitting:     Static: good    Dynamic: good  Standing: good with walker    Activity Tolerance fair  plus   fair+ Increase standing tolerance >3 minutes for improved engagement with functional transfers and indep in ADLs        Comments: Patient cleared with nursing and agreeable to session. Good effort and participation again today, however quick to fatigue with dynamic standing activity. Rest breaks required.  Patient turned to bed with call light in reach    Education/treatment: ADL and functional

## 2023-08-10 NOTE — PROGRESS NOTES
Pharmacy Consultation Note  (Antibiotic Dosing and Monitoring)    Initial consult date: 8/9  Consulting physician/provider: Marvin Candelaria  Drug: Vancomycin  Indication: Bon and Joint infection    Age/  Gender Height Weight IBW  Allergy Information   63 y.o./male 6' (182.9 cm) 270 lb (122.5 kg)     Ideal body weight: 77.6 kg (171 lb 1.2 oz)  Adjusted ideal body weight: 97 kg (213 lb 13.5 oz)   Patient has no known allergies. Renal Function:  Recent Labs     08/08/23  0400 08/10/23  0509   BUN 25* 19   CREATININE 1.3* 1.2         Intake/Output Summary (Last 24 hours) at 8/10/2023 1149  Last data filed at 8/10/2023 1108  Gross per 24 hour   Intake 240 ml   Output 1700 ml   Net -1460 ml         Vancomycin Monitoring:  Trough:  No results for input(s): VANCOTROUGH in the last 72 hours. Random:    Recent Labs     08/10/23  0509   VANCORANDOM 24.9       No results for input(s): Timbolester Wlakeralds in the last 72 hours. Historical Cultures:  Organism   Date Value Ref Range Status   11/10/2022 Staphylococcus aureus (A)  Final     No results for input(s): BC in the last 72 hours. Vancomycin Administration Times:  Recent vancomycin administrations                     vancomycin (VANCOCIN) 1500 mg in sodium chloride 0.9 % 250 mL IVPB (mg) 1,500 mg New Bag 08/09/23 0621     1,500 mg New Bag 08/08/23 1625     1,500 mg New Bag  0554     1,500 mg New Bag 08/07/23 1711     1,500 mg New Bag  0542     1,500 mg New Bag 08/06/23 1705                    Assessment:  Patient is a 66 y.o. male who has been initiated on vancomycin  Estimated Creatinine Clearance: 89 mL/min (based on SCr of 1.2 mg/dL). To dose vancomycin, pharmacy will be utilizing [employer] calculation software for goal AUC/NICOLE 400-600 mg/L-hr (predicted AUC/NICOLE = 570, Tr =18.5)  Rm 24.9 @ 0509 (predicted AUC/NICOLE = 683, Tr =23.3)      Plan:   Will adjust to vancomycin 1500 mg IV every 18 hours  Per ID, Vanco until 9/12/23  (predicted AUC/NICOLE = 477, Tr =14.8)  Will check vancomycin levels when appropriate  Will continue to monitor renal function   Pharmacy to follow      KVNG [facility] 8/10/2023 11:49 AM

## 2023-08-10 NOTE — PATIENT CARE CONFERENCE
[facility] Quality Flow/Interdisciplinary Rounds Progress Note        Quality Flow Rounds held on August 10, 2023    Disciplines Attending:  Bedside Nurse, , and Nursing Unit Leadership    Ashleigh Fuentes was admitted on 8/4/2023  8:34 PM    Anticipated Discharge Date:  Expected Discharge Date: 08/09/23    Disposition:    Jose Score:  Jose Scale Score: 19    Readmission Risk              Risk of Unplanned Readmission:  28           Discussed patient goal for the day, patient clinical progression, and barriers to discharge.   The following Goal(s) of the Day/Commitment(s) have been identified:  Diagnostics - Report Results      Crystal Cruz RN  August 10, 2023

## 2023-08-11 VITALS
HEIGHT: 72 IN | DIASTOLIC BLOOD PRESSURE: 67 MMHG | OXYGEN SATURATION: 97 % | HEART RATE: 63 BPM | WEIGHT: 280.1 LBS | SYSTOLIC BLOOD PRESSURE: 115 MMHG | BODY MASS INDEX: 37.94 KG/M2 | RESPIRATION RATE: 18 BRPM | TEMPERATURE: 98.5 F

## 2023-08-11 LAB
GLUCOSE BLD-MCNC: 116 MG/DL (ref 74–99)
GLUCOSE BLD-MCNC: 123 MG/DL (ref 74–99)
GLUCOSE BLD-MCNC: 133 MG/DL (ref 74–99)

## 2023-08-11 PROCEDURE — 2580000003 HC RX 258

## 2023-08-11 PROCEDURE — 82962 GLUCOSE BLOOD TEST: CPT

## 2023-08-11 PROCEDURE — 6360000002 HC RX W HCPCS

## 2023-08-11 PROCEDURE — 6370000000 HC RX 637 (ALT 250 FOR IP): Performed by: STUDENT IN AN ORGANIZED HEALTH CARE EDUCATION/TRAINING PROGRAM

## 2023-08-11 PROCEDURE — 6370000000 HC RX 637 (ALT 250 FOR IP)

## 2023-08-11 PROCEDURE — 6360000002 HC RX W HCPCS: Performed by: STUDENT IN AN ORGANIZED HEALTH CARE EDUCATION/TRAINING PROGRAM

## 2023-08-11 PROCEDURE — 6360000002 HC RX W HCPCS: Performed by: SPECIALIST

## 2023-08-11 PROCEDURE — 99239 HOSP IP/OBS DSCHRG MGMT >30: CPT | Performed by: INTERNAL MEDICINE

## 2023-08-11 PROCEDURE — 2580000003 HC RX 258: Performed by: SPECIALIST

## 2023-08-11 PROCEDURE — 97530 THERAPEUTIC ACTIVITIES: CPT

## 2023-08-11 RX ORDER — HYDROCODONE BITARTRATE AND ACETAMINOPHEN 5; 325 MG/1; MG/1
1 TABLET ORAL EVERY 6 HOURS PRN
Qty: 21 TABLET | Refills: 0 | Status: SHIPPED | OUTPATIENT
Start: 2023-08-11 | End: 2023-08-18

## 2023-08-11 RX ORDER — HYDROCODONE BITARTRATE AND ACETAMINOPHEN 5; 325 MG/1; MG/1
1 TABLET ORAL EVERY 6 HOURS PRN
Qty: 21 TABLET | Refills: 0 | Status: SHIPPED | OUTPATIENT
Start: 2023-08-11 | End: 2023-08-11 | Stop reason: SDUPTHER

## 2023-08-11 RX ADMIN — ENOXAPARIN SODIUM 30 MG: 100 INJECTION SUBCUTANEOUS at 06:10

## 2023-08-11 RX ADMIN — VANCOMYCIN HYDROCHLORIDE 1500 MG: 10 INJECTION, POWDER, LYOPHILIZED, FOR SOLUTION INTRAVENOUS at 16:10

## 2023-08-11 RX ADMIN — SERTRALINE HYDROCHLORIDE 50 MG: 50 TABLET ORAL at 08:37

## 2023-08-11 RX ADMIN — FENOFIBRATE 160 MG: 160 TABLET ORAL at 08:36

## 2023-08-11 RX ADMIN — METOPROLOL TARTRATE 25 MG: 25 TABLET, FILM COATED ORAL at 08:37

## 2023-08-11 RX ADMIN — HYDROCODONE BITARTRATE AND ACETAMINOPHEN 1 TABLET: 5; 325 TABLET ORAL at 06:09

## 2023-08-11 RX ADMIN — PREGABALIN 100 MG: 50 CAPSULE ORAL at 08:37

## 2023-08-11 RX ADMIN — INSULIN LISPRO 15 UNITS: 100 INJECTION, SOLUTION INTRAVENOUS; SUBCUTANEOUS at 07:19

## 2023-08-11 RX ADMIN — Medication 10 ML: at 08:37

## 2023-08-11 RX ADMIN — LISINOPRIL 10 MG: 10 TABLET ORAL at 08:37

## 2023-08-11 RX ADMIN — INSULIN LISPRO 15 UNITS: 100 INJECTION, SOLUTION INTRAVENOUS; SUBCUTANEOUS at 16:07

## 2023-08-11 RX ADMIN — LEVOTHYROXINE SODIUM 25 MCG: 25 TABLET ORAL at 06:10

## 2023-08-11 RX ADMIN — HYDROCODONE BITARTRATE AND ACETAMINOPHEN 1 TABLET: 5; 325 TABLET ORAL at 15:55

## 2023-08-11 RX ADMIN — INSULIN LISPRO 15 UNITS: 100 INJECTION, SOLUTION INTRAVENOUS; SUBCUTANEOUS at 11:39

## 2023-08-11 RX ADMIN — Medication 300 UNITS: at 08:42

## 2023-08-11 ASSESSMENT — PAIN SCALES - GENERAL
PAINLEVEL_OUTOF10: 7
PAINLEVEL_OUTOF10: 8

## 2023-08-11 ASSESSMENT — PAIN DESCRIPTION - LOCATION: LOCATION: FOOT

## 2023-08-11 ASSESSMENT — PAIN DESCRIPTION - ORIENTATION: ORIENTATION: LEFT

## 2023-08-11 ASSESSMENT — PAIN DESCRIPTION - DESCRIPTORS: DESCRIPTORS: DISCOMFORT;ACHING

## 2023-08-11 NOTE — PROGRESS NOTES
Received call from [facility], stating that they have received Auth for DC. Nurse to nurse number [phone]. Fax number [phone].     Electronically signed by Ulises Osorio RN on 8/11/2023 at 2:51 PM

## 2023-08-11 NOTE — PATIENT CARE CONFERENCE
P Quality Flow/Interdisciplinary Rounds Progress Note        Quality Flow Rounds held on August 11, 2023    Disciplines Attending:  Bedside Nurse, , , and Nursing Unit [facility] [address] was admitted on 8/4/2023  8:34 PM    Anticipated Discharge Date:  Expected Discharge Date: 08/09/23    Disposition:    Jose Score:  Jose Scale Score: 19    Readmission Risk              Risk of Unplanned Readmission:  28           Discussed patient goal for the day, patient clinical progression, and barriers to discharge.   The following Goal(s) of the Day/Commitment(s) have been identified:  Discharge - Obtain Order      Ba Carr RN  August 11, 2023

## 2023-08-11 NOTE — PROGRESS NOTES
Physical Therapy  Facility/Department: [facility]  Physical Therapy Initial Assessment    Name: Jose Rafael Fuentes  : 1960  MRN: [id number]  Date of Service: 2023      Attending Provider:  Donell Blair DO    Evaluating PT:  Linda Dumas. Kaley Henriquez, P.T. Room #:  69/6649-  Diagnosis:  Osteomyelitis of left lower extremity (HCC) [M86.9]  Other osteomyelitis of left foot ([address]) [J81.7F4]  Procedure/Surgery:  23 L 5th toe amp with I and D  Precautions:  NWB L foot, falls, bed/chair alarm    SUBJECTIVE:    Pt lives with his niece in a 1 story home with 2 stairs and a post he can grab to enter. Pt ambulated with no AD PTA. He has a ww. OBJECTIVE:   Initial Evaluation  Date: 23 Treatment Short Term/ Long Term   Goals   Was pt agreeable to Eval/treatment? yes Yes     Does pt have pain? C/o pain in L foot 8-9/10 8/10 L foot     Bed Mobility  Rolling: supervision  Supine to sit: supervision  Sit to supine: supervision  Scooting: supervision Rolling supervision  Supine <> supervision   Scooting supervision   Independent    Transfers Sit to stand: MOD A  Stand to sit: MIN A  Stand pivot: NA Sit to stand min  Stand to sit min  Stand pivot min Independent   Ambulation   2 feet lateral scoot shuffle on RLE with ww NWB LLE MIN A 12 feet ww nwb min assist  15 feet with ww NWB LLE supervision   Stair negotiation: ascended and descended NA NA 2 steps attempting to sit on a chair on the top step with SBA   AM-PAC 6 Clicks 65/35  16        Patient education  Pt educated on NWB LLE, hopping technique, transfer technique.       Patient response to education:   Pt verbalized understanding Pt demonstrated skill Pt requires further education in this area   yes yes yes     ASSESSMENT:    Conditions Requiring Skilled Therapeutic Intervention:    [x]Decreased strength     []Decreased ROM  [x]Decreased functional mobility  [x]Decreased balance   [x]Decreased endurance   []Decreased posture  []Decreased sensation  []Decreased coordination   []Decreased vision  []Decreased safety awareness   [x]Increased pain       Comments:  Pt performed function as per above. Pt required light assist with sit to stand and was unsteady and required cues for technique and feet placement. .   Once pt standing was able to maintain nwb l le. Pt educated on hopping technique. Pt abov e to hop approx 12 feet with ww. Hop not real smooth and decrease control on L LEg . Pt not able to hop long distance. Pt did stand pivot to chair with 1 hop and lateral scooting. Pt has decreased strength and endurance limiting functional mobility. Discussed safety with pt and possible need for wheel chair for long distances. Discussed using a mirror to check his feet due to his neuropathy. Pt performed seated  laq andankle pumps. Pt was left supine in bed with call light left by patient. Chair/bed alarm: bed alarm was activated. Pt's/ family goals   1. To get better and go home. Patient and or family understand(s) diagnosis, prognosis, and plan of care. PHYSICAL THERAPY PLAN OF CARE:    PT POC is established based on physician order and patient diagnosis     Referring provider/PT Order:  PT eval and treat  Diagnosis:  Osteomyelitis of left lower extremity ([address]) [M86.9]  Other osteomyelitis of left foot ([address]) [W25.6H2]  Specific instructions for next treatment: To progress functional mobility as pt is able.       Current Treatment Recommendations:     [x] Strengthening to improve independence with functional mobility   [] ROM to improve ROM and decrease spasm and pain which will help promote independence with functional mobility   [x] Balance Training to improve static/dynamic balance and to reduce fall risk  [x] Endurance Training to improve activity tolerance during functional mobility   [x] Transfer Training to improve safety and independence with all functional transfers   [x] Gait Training to improve gait mechanics, endurance and assess need for appropriate assistive device  [x] Stair Training in preparation for safe discharge home and/or into the community   [] Positioning to prevent skin breakdown and contractures  [] Safety and Education Training   [x] Patient/Caregiver Education   [] HEP  [] Other     PT long term treatment goals are located in above grid    Frequency of treatments: 2-5x/week x 1-2 weeks.     Time in  1135  Time out  1200  CPT codes:  [] Low Complexity PT evaluation 47684  [] Moderate Complexity PT evaluation 98310  [] High Complexity PT evaluation 57583  [] PT Re-evaluation 17671  [] Gait training 00250 ** minutes  [] Manual therapy 00436 ** minutes  [x] Therapeutic activities 81535  25 minutes  [] Therapeutic exercises 81275 ** minutes  [] Neuromuscular reeducation 58073 ** minutes   Jose Carlos Siu, PTA  [id number]

## 2023-08-11 NOTE — CARE COORDINATION
Social Work discharge planning    Notified PT OT of snf need for updates asap today for precert. Sw will foreword to Michelle Mera at [employer] [facility] [employer] [phone] Fax [phone]  Electronically signed by Lesley Seen on 8/11/2023 at 9:59 AM     Addendum- SW faxed PT OT updates to Michelle Mera at [facility] now. LVM for Bea with snf admits to ensure she gets fax. Await ehr call back./  Awaiting precert. Electronically signed by Lesley Seen on 8/11/2023 at 12:46 PM     Addendum- Pt to [facility] skilled today at 330-4p via maikel Valera. [facility] called [facility] and said they have precert approval. All aware. HENS 7000 done.   Electronically signed by Lesley Seen on 8/11/2023 at 2:48 PM

## 2023-08-11 NOTE — CARE COORDINATION
Social Work discharge planning addendum-    Bull spoke to nurse Cris at [facility] and faxed her [place]. Bull asked RN Tiffanie Narvaez to fax AVS to snf now fax [phone] per SNF's request as well. Lennie Valera transporting pt. RN aware. HENS 7000 done.   Electronically signed by iWnston Gardner on 8/11/2023 at 3:48 PM

## 2023-08-11 NOTE — DISCHARGE SUMMARY
[facility] Physician Discharge Summary       [address]  [phone]    Schedule an appointment as soon as possible for a visit in 3 week(s)  For follow-up    Dorian Crews [facility] [address]  [phone]    Call in 1 week(s)        Activity level: As tolerated     Dispo: [facility]    Condition on discharge: Stable     Patient ID:  Lorraine Hernandez  [id number]  66 y.o.  1960    Admit date: 8/4/2023    Discharge date and time:  8/11/2023  2:53 PM    Admission Diagnoses: Principal Problem:    Osteomyelitis of ankle or foot, left, acute ([address])  Active Problems:    HTN (hypertension)    Type 2 diabetes mellitus with hyperglycemia, with long-term current use of insulin (HCC)    Tobacco use    Osteomyelitis of left lower extremity (HCC)    Hypothyroidism    Other osteomyelitis of left foot (HCC)    Candidal esophagitis (HCC)    Controlled type 2 diabetes mellitus without complication ([address])    Osteomyelitis ([address])  Resolved Problems:    * No resolved hospital problems. *      Discharge Diagnoses: Principal Problem:    Osteomyelitis of ankle or foot, left, acute (HCC)  Active Problems:    HTN (hypertension)    Type 2 diabetes mellitus with hyperglycemia, with long-term current use of insulin (HCC)    Tobacco use    Osteomyelitis of left lower extremity (HCC)    Hypothyroidism    Other osteomyelitis of left foot (HCC)    Candidal esophagitis (HCC)    Controlled type 2 diabetes mellitus without complication ([address])    Osteomyelitis ([address])  Resolved Problems:    * No resolved hospital problems.  *      Consults:  IP CONSULT TO CASE MANAGEMENT  IP CONSULT TO SOCIAL WORK  IP CONSULT TO GI  IP CONSULT TO INFECTIOUS DISEASES  IP CONSULT TO Cheryle Sanders Dr Course:   Patient Lorraine Hernandez is a 66 y.o. presented with Osteomyelitis of left lower extremity ([address]) [M86.9]  Other osteomyelitis of left foot ([address]) [O72.2D4] in the semi upright position. The esophagus is normal in course and caliber. Moderate tertiary esophageal contractions are appreciated. No evidence of an esophageal stricture. No hiatal hernia is identified. There is no aspiration during the procedure. Evaluation for gastroesophageal reflux is significantly limited due to positioning limitations. Significantly limited esophagram. Moderate tertiary esophageal contractions were noted. Otherwise, no obvious abnormality of the esophagus was appreciated. XR CHEST PORTABLE    Result Date: 8/5/2023  EXAMINATION: ONE XRAY VIEW OF THE CHEST 8/5/2023 4:15 am COMPARISON: None. HISTORY: ORDERING SYSTEM PROVIDED HISTORY: verify PICC line placement TECHNOLOGIST PROVIDED HISTORY: Reason for exam:->verify PICC line placement FINDINGS: Normal cardiomediastinal silhouette. Lungs clear. No pneumothorax or effusion. Body wall soft tissues unremarkable. Osseous thorax intact. Right arm PICC tip with good positioning at the cavoatrial junction. 1. No acute disease. 2. Right arm PICC tip with good positioning at the cavoatrial junction. RECOMMENDATION: Careful clinical correlation and follow up recommended. Patient Instructions:      Medication List        START taking these medications      HYDROcodone-acetaminophen 5-325 MG per tablet  Commonly known as: NORCO  Take 1 tablet by mouth every 6 hours as needed for Pain for up to 7 days.  Max Daily Amount: 4 tablets            CHANGE how you take these medications      vancomycin  infusion  Commonly known as: VANCOCIN  Infuse 1,500 mg intravenously See Admin Instructions Vancomycin 1500 mg IV every 18 hours  What changed:   when to take this  additional instructions            CONTINUE taking these medications      albuterol sulfate  (90 Base) MCG/ACT inhaler  Commonly known as: PROVENTIL;VENTOLIN;PROAIR     atorvastatin 40 MG tablet  Commonly known as: LIPITOR  Take 1 tablet by mouth at bedtime preparing discharge papers, discussing discharge with patient, medication review, etc.    Signed:  Electronically signed by CHRIST Bennett NP on 8/11/2023 at 2:53 PM   HOSPITALIST ATTENDING PHYSICIAN NOTE 8/11/2023 1914PM:    Details of the evaluation - subjective assessment (including medication profile, past medical, family and social history when applicable), examination, review of lab and test data, diagnostic impressions and medical decision making - performed by CHRIST Bennett NP, were discussed with me on the date of service and I agree with clinical information herein unless otherwise noted. The patient has been evaluated by me personally earlier today. Pt reports no fevers, chills,n/v.     Exam: heart reg at rate of 62, lungs cta, abd pos bs soft nt, ext neg for le edema    I agree with the assessment and plan of CHRIST Bennett NP    37 min spent reviewing records, interviewing/examining pt, analyzing data, discussing with nursing, formulating treatment plan as noted by NP's note. Discharge planning d/w pt. Time given for questions and all questions answered. Osteomyelitis of left 5th metatarsal   Hypothyroidism  Dm type 2 controlled  Hyperlipidemia  htn  Copd  Peptic stricture s/p dilation      Electronically signed by Sheree Izaguirre D.O.   Hospitalist  [facility] at [facility]

## 2023-08-11 NOTE — PROGRESS NOTES
Pharmacy Consultation Note  (Antibiotic Dosing and Monitoring)    Initial consult date: 8/9  Consulting physician/provider: Ewa Carrero  Drug: Vancomycin  Indication: Bon and Joint infection    Age/  Gender Height Weight IBW  Allergy Information   63 y.o./male 6' (182.9 cm) 270 lb (122.5 kg)     Ideal body weight: 77.6 kg (171 lb 1.2 oz)  Adjusted ideal body weight: 97.4 kg (214 lb 11 oz)   Patient has no known allergies. Renal Function:  Recent Labs     08/10/23  0509   BUN 19   CREATININE 1.2         Intake/Output Summary (Last 24 hours) at 8/11/2023 0915  Last data filed at 8/10/2023 2335  Gross per 24 hour   Intake 600 ml   Output 2350 ml   Net -1750 ml         Vancomycin Monitoring:  Trough:  No results for input(s): VANCOTROUGH in the last 72 hours. Random:    Recent Labs     08/10/23  0509   VANCORANDOM 24.9         No results for input(s): Medina Pedroza in the last 72 hours. Historical Cultures:  Organism   Date Value Ref Range Status   11/10/2022 Staphylococcus aureus (A)  Final     No results for input(s): BC in the last 72 hours. Vancomycin Administration Times:  Recent vancomycin administrations                     vancomycin (VANCOCIN) 1500 mg in sodium chloride 0.9 % 250 mL IVPB (mg) 1,500 mg New Bag 08/09/23 0621     1,500 mg New Bag 08/08/23 1625     1,500 mg New Bag  0554     1,500 mg New Bag 08/07/23 1711     1,500 mg New Bag  0542     1,500 mg New Bag 08/06/23 1705                    Assessment:  Patient is a 66 y.o. male who has been initiated on vancomycin  Estimated Creatinine Clearance: 89 mL/min (based on SCr of 1.2 mg/dL). To dose vancomycin, pharmacy will be utilizing Echo[employer] calculation software for goal AUC/NICOLE 400-600 mg/L-hr (predicted AUC/NICOLE = 570, Tr =18.5)  Rm 24.9 @ 0509 (predicted AUC/NICOLE = 683, Tr =23.3)      Plan:   Will adjust to vancomycin 1500 mg IV every 18 hours  Per ID, Vanco until 9/12/23  (predicted AUC/NICOLE = 477, Tr =14.8)  Will check vancomycin levels when appropriate-trough prior to 4th dose tomorrow  Will continue to monitor renal function   Pharmacy to follow      Nando Lomeli, [address] [place] [address] 8/11/2023 9:15 AM

## 2023-08-13 LAB
MICROORGANISM SPEC CULT: NORMAL
MICROORGANISM SPEC CULT: NORMAL
MICROORGANISM/AGENT SPEC: NORMAL
MICROORGANISM/AGENT SPEC: NORMAL
SPECIMEN DESCRIPTION: NORMAL
SPECIMEN DESCRIPTION: NORMAL

## 2023-08-30 LAB
MICROORGANISM SPEC CULT: NORMAL
MICROORGANISM/AGENT SPEC: NORMAL
SPECIMEN DESCRIPTION: NORMAL

## 2023-09-13 ENCOUNTER — OFFICE VISIT (OUTPATIENT)
Dept: ENDOCRINOLOGY | Age: 63
End: 2023-09-13
Payer: MEDICARE

## 2023-09-13 VITALS
BODY MASS INDEX: 38.38 KG/M2 | HEART RATE: 92 BPM | SYSTOLIC BLOOD PRESSURE: 166 MMHG | OXYGEN SATURATION: 98 % | DIASTOLIC BLOOD PRESSURE: 83 MMHG | WEIGHT: 283 LBS

## 2023-09-13 DIAGNOSIS — E66.09 CLASS 2 OBESITY DUE TO EXCESS CALORIES WITHOUT SERIOUS COMORBIDITY WITH BODY MASS INDEX (BMI) OF 36.0 TO 36.9 IN ADULT: ICD-10-CM

## 2023-09-13 DIAGNOSIS — E78.2 MIXED HYPERLIPIDEMIA: ICD-10-CM

## 2023-09-13 DIAGNOSIS — E11.65 UNCONTROLLED TYPE 2 DIABETES MELLITUS WITH HYPERGLYCEMIA (HCC): Primary | ICD-10-CM

## 2023-09-13 DIAGNOSIS — E03.9 PRIMARY HYPOTHYROIDISM: ICD-10-CM

## 2023-09-13 PROCEDURE — 4004F PT TOBACCO SCREEN RCVD TLK: CPT | Performed by: CLINICAL NURSE SPECIALIST

## 2023-09-13 PROCEDURE — 3077F SYST BP >= 140 MM HG: CPT | Performed by: CLINICAL NURSE SPECIALIST

## 2023-09-13 PROCEDURE — G8417 CALC BMI ABV UP PARAM F/U: HCPCS | Performed by: CLINICAL NURSE SPECIALIST

## 2023-09-13 PROCEDURE — 3079F DIAST BP 80-89 MM HG: CPT | Performed by: CLINICAL NURSE SPECIALIST

## 2023-09-13 PROCEDURE — 2022F DILAT RTA XM EVC RTNOPTHY: CPT | Performed by: CLINICAL NURSE SPECIALIST

## 2023-09-13 PROCEDURE — 3017F COLORECTAL CA SCREEN DOC REV: CPT | Performed by: CLINICAL NURSE SPECIALIST

## 2023-09-13 PROCEDURE — 3046F HEMOGLOBIN A1C LEVEL >9.0%: CPT | Performed by: CLINICAL NURSE SPECIALIST

## 2023-09-13 PROCEDURE — G8427 DOCREV CUR MEDS BY ELIG CLIN: HCPCS | Performed by: CLINICAL NURSE SPECIALIST

## 2023-09-13 PROCEDURE — 99214 OFFICE O/P EST MOD 30 MIN: CPT | Performed by: CLINICAL NURSE SPECIALIST

## 2023-09-13 RX ORDER — INSULIN LISPRO 100 [IU]/ML
INJECTION, SOLUTION INTRAVENOUS; SUBCUTANEOUS
Qty: 15 ADJUSTABLE DOSE PRE-FILLED PEN SYRINGE | Refills: 5 | Status: SHIPPED | OUTPATIENT
Start: 2023-09-13

## 2023-09-13 RX ORDER — DULAGLUTIDE 0.75 MG/.5ML
INJECTION, SOLUTION SUBCUTANEOUS
Qty: 4 ADJUSTABLE DOSE PRE-FILLED PEN SYRINGE | Refills: 5 | Status: SHIPPED | OUTPATIENT
Start: 2023-09-13

## 2023-09-13 RX ORDER — INSULIN GLARGINE 100 [IU]/ML
70 INJECTION, SOLUTION SUBCUTANEOUS EVERY MORNING
Qty: 10 ADJUSTABLE DOSE PRE-FILLED PEN SYRINGE | Refills: 5 | Status: SHIPPED | OUTPATIENT
Start: 2023-09-13

## 2023-09-13 NOTE — PROGRESS NOTES
[facility] Department of Endocrinology Diabetes and Metabolism   [facility],[address] [address]   Phone: [phone]  Fax: [phone]    Date of Service: 9/13/2023    Primary Care Physician: Stormy Abdalla DO  Referring physician: No ref. provider found  Provider: CHRIST Drummond - PERLA     Reason for the visit:  Type 2 DM       History of Present Illness: The history is provided by the patient. No  was used. Accuracy of the patient data is excellent. Alice Cr is a very pleasant 66 y.o. male seen today for diabetes management     Saranya Fuentes was diagnosed with diabetes dx around 5 years ago and currently on Lantus 60 units at night, Hlog 20 units TID with meals plus mod dose  ISS    Previously on jardiance, metformin, and glimepiride   The patient has been checking blood sugar 4 times per day   Misses doses at times  . Most recent A1c results summarized below  Lab Results   Component Value Date/Time    LABA1C 12.2 07/27/2023 06:35 AM    LABA1C 12.6 04/03/2023 01:10 PM    LABA1C 8.7 11/10/2022 02:45 AM     Patient has had no hypoglycemic episodes   The patient has been mindful of what has been eating and following diabetic diet as encouraged  I reviewed current medications and the patient has no issues with diabetes medications  The patient is due for an eye exam. Last eye exam was > 1 year ago  , no h/o diabetic retinopathy  The patient seeing podiatrist every 3 months.    And also performs  own feet care  Microvascular complications:  No Retinopathy, Nephropathy or +Neuropathy  Recent left 5th toe amputated d/t infection osteomyelitis   Right second toe in the past amputated       Macrovascular complications: no CAD, PVD, or Stroke      PAST MEDICAL HISTORY   Past Medical History:   Diagnosis Date    Anxiety     COPD (chronic obstructive pulmonary disease) (HCC)     Depression     DM (diabetes mellitus) ([address])     Frostbite     HLD

## 2023-09-15 RX ORDER — ERGOCALCIFEROL 1.25 MG/1
CAPSULE ORAL
Qty: 12 CAPSULE | Refills: 0 | OUTPATIENT
Start: 2023-09-15

## 2023-09-21 RX ORDER — INSULIN GLARGINE 100 [IU]/ML
INJECTION, SOLUTION SUBCUTANEOUS
Refills: 3 | OUTPATIENT
Start: 2023-09-21

## 2023-09-27 DIAGNOSIS — E11.65 UNCONTROLLED TYPE 2 DIABETES MELLITUS WITH HYPERGLYCEMIA (HCC): Primary | ICD-10-CM

## 2023-09-27 RX ORDER — INSULIN LISPRO 100 [IU]/ML
INJECTION, SOLUTION INTRAVENOUS; SUBCUTANEOUS
Qty: 15 ADJUSTABLE DOSE PRE-FILLED PEN SYRINGE | Refills: 5 | Status: SHIPPED | OUTPATIENT
Start: 2023-09-27

## 2023-09-29 ENCOUNTER — HOSPITAL ENCOUNTER (EMERGENCY)
Age: 63
Discharge: HOME | End: 2023-09-30
Attending: STUDENT IN AN ORGANIZED HEALTH CARE EDUCATION/TRAINING PROGRAM
Payer: MEDICARE

## 2023-09-29 DIAGNOSIS — J98.4 PNEUMONITIS: Primary | ICD-10-CM

## 2023-09-29 PROCEDURE — 99284 EMERGENCY DEPT VISIT MOD MDM: CPT

## 2023-09-29 PROCEDURE — 93005 ELECTROCARDIOGRAM TRACING: CPT

## 2023-09-29 PROCEDURE — 96375 TX/PRO/DX INJ NEW DRUG ADDON: CPT

## 2023-09-29 PROCEDURE — 96374 THER/PROPH/DIAG INJ IV PUSH: CPT

## 2023-09-29 RX ORDER — METOCLOPRAMIDE HYDROCHLORIDE 5 MG/ML
10 INJECTION INTRAMUSCULAR; INTRAVENOUS ONCE
Status: COMPLETED | OUTPATIENT
Start: 2023-09-30 | End: 2023-09-30

## 2023-09-29 RX ORDER — 0.9 % SODIUM CHLORIDE 0.9 %
1000 INTRAVENOUS SOLUTION INTRAVENOUS ONCE
Status: COMPLETED | OUTPATIENT
Start: 2023-09-30 | End: 2023-09-30

## 2023-09-29 RX ORDER — DIPHENHYDRAMINE HYDROCHLORIDE 50 MG/ML
25 INJECTION INTRAMUSCULAR; INTRAVENOUS ONCE
Status: COMPLETED | OUTPATIENT
Start: 2023-09-30 | End: 2023-09-30

## 2023-09-29 ASSESSMENT — PAIN SCALES - GENERAL: PAINLEVEL_OUTOF10: 10

## 2023-09-29 ASSESSMENT — PAIN - FUNCTIONAL ASSESSMENT: PAIN_FUNCTIONAL_ASSESSMENT: 0-10

## 2023-09-29 ASSESSMENT — PAIN DESCRIPTION - DESCRIPTORS: DESCRIPTORS: SHARP;DULL;STABBING

## 2023-09-30 ENCOUNTER — APPOINTMENT (OUTPATIENT)
Dept: CT IMAGING | Age: 63
End: 2023-09-30
Payer: MEDICARE

## 2023-09-30 VITALS
OXYGEN SATURATION: 97 % | HEART RATE: 83 BPM | DIASTOLIC BLOOD PRESSURE: 65 MMHG | WEIGHT: 280 LBS | RESPIRATION RATE: 20 BRPM | SYSTOLIC BLOOD PRESSURE: 143 MMHG | TEMPERATURE: 97.4 F | BODY MASS INDEX: 37.93 KG/M2 | HEIGHT: 72 IN

## 2023-09-30 LAB
ALBUMIN SERPL-MCNC: 3.9 G/DL (ref 3.5–5.2)
ALP SERPL-CCNC: 95 U/L (ref 40–129)
ALT SERPL-CCNC: 21 U/L (ref 0–40)
ANION GAP SERPL CALCULATED.3IONS-SCNC: 10 MMOL/L (ref 7–16)
AST SERPL-CCNC: 21 U/L (ref 0–39)
BASOPHILS # BLD: 0.04 K/UL (ref 0–0.2)
BASOPHILS NFR BLD: 0 % (ref 0–2)
BILIRUB SERPL-MCNC: 0.3 MG/DL (ref 0–1.2)
BNP SERPL-MCNC: 132 PG/ML (ref 0–125)
BUN SERPL-MCNC: 18 MG/DL (ref 6–23)
CALCIUM SERPL-MCNC: 9.2 MG/DL (ref 8.6–10.2)
CHLORIDE SERPL-SCNC: 100 MMOL/L (ref 98–107)
CO2 SERPL-SCNC: 23 MMOL/L (ref 22–29)
CREAT SERPL-MCNC: 1 MG/DL (ref 0.7–1.2)
EKG ATRIAL RATE: 101 BPM
EKG P AXIS: 55 DEGREES
EKG P-R INTERVAL: 242 MS
EKG Q-T INTERVAL: 324 MS
EKG QRS DURATION: 90 MS
EKG QTC CALCULATION (BAZETT): 420 MS
EKG R AXIS: 21 DEGREES
EKG T AXIS: 57 DEGREES
EKG VENTRICULAR RATE: 101 BPM
EOSINOPHIL # BLD: 0.11 K/UL (ref 0.05–0.5)
EOSINOPHILS RELATIVE PERCENT: 1 % (ref 0–6)
ERYTHROCYTE [DISTWIDTH] IN BLOOD BY AUTOMATED COUNT: 13.4 % (ref 11.5–15)
GFR SERPL CREATININE-BSD FRML MDRD: >60 ML/MIN/1.73M2
GLUCOSE SERPL-MCNC: 263 MG/DL (ref 74–99)
HCT VFR BLD AUTO: 41.3 % (ref 37–54)
HGB BLD-MCNC: 14.2 G/DL (ref 12.5–16.5)
IMM GRANULOCYTES # BLD AUTO: 0.05 K/UL (ref 0–0.58)
IMM GRANULOCYTES NFR BLD: 0 % (ref 0–5)
LYMPHOCYTES NFR BLD: 2.01 K/UL (ref 1.5–4)
LYMPHOCYTES RELATIVE PERCENT: 18 % (ref 20–42)
MCH RBC QN AUTO: 28.9 PG (ref 26–35)
MCHC RBC AUTO-ENTMCNC: 34.4 G/DL (ref 32–34.5)
MCV RBC AUTO: 84.1 FL (ref 80–99.9)
MONOCYTES NFR BLD: 0.73 K/UL (ref 0.1–0.95)
MONOCYTES NFR BLD: 6 % (ref 2–12)
NEUTROPHILS NFR BLD: 74 % (ref 43–80)
NEUTS SEG NFR BLD: 8.41 K/UL (ref 1.8–7.3)
PLATELET # BLD AUTO: 161 K/UL (ref 130–450)
PMV BLD AUTO: 11.6 FL (ref 7–12)
POTASSIUM SERPL-SCNC: 4.7 MMOL/L (ref 3.5–5)
PROT SERPL-MCNC: 7.4 G/DL (ref 6.4–8.3)
RBC # BLD AUTO: 4.91 M/UL (ref 3.8–5.8)
SARS-COV-2 RDRP RESP QL NAA+PROBE: NOT DETECTED
SODIUM SERPL-SCNC: 133 MMOL/L (ref 132–146)
SPECIMEN DESCRIPTION: NORMAL
TROPONIN I SERPL HS-MCNC: 13 NG/L (ref 0–11)
TROPONIN I SERPL HS-MCNC: 14 NG/L (ref 0–11)
WBC OTHER # BLD: 11.4 K/UL (ref 4.5–11.5)

## 2023-09-30 PROCEDURE — 85025 COMPLETE CBC W/AUTO DIFF WBC: CPT

## 2023-09-30 PROCEDURE — 70450 CT HEAD/BRAIN W/O DYE: CPT

## 2023-09-30 PROCEDURE — 71275 CT ANGIOGRAPHY CHEST: CPT

## 2023-09-30 PROCEDURE — 96375 TX/PRO/DX INJ NEW DRUG ADDON: CPT

## 2023-09-30 PROCEDURE — 6360000002 HC RX W HCPCS: Performed by: STUDENT IN AN ORGANIZED HEALTH CARE EDUCATION/TRAINING PROGRAM

## 2023-09-30 PROCEDURE — 83880 ASSAY OF NATRIURETIC PEPTIDE: CPT

## 2023-09-30 PROCEDURE — 6360000002 HC RX W HCPCS

## 2023-09-30 PROCEDURE — 6370000000 HC RX 637 (ALT 250 FOR IP): Performed by: STUDENT IN AN ORGANIZED HEALTH CARE EDUCATION/TRAINING PROGRAM

## 2023-09-30 PROCEDURE — 87635 SARS-COV-2 COVID-19 AMP PRB: CPT

## 2023-09-30 PROCEDURE — 93010 ELECTROCARDIOGRAM REPORT: CPT | Performed by: INTERNAL MEDICINE

## 2023-09-30 PROCEDURE — 80053 COMPREHEN METABOLIC PANEL: CPT

## 2023-09-30 PROCEDURE — 2580000003 HC RX 258: Performed by: STUDENT IN AN ORGANIZED HEALTH CARE EDUCATION/TRAINING PROGRAM

## 2023-09-30 PROCEDURE — 96374 THER/PROPH/DIAG INJ IV PUSH: CPT

## 2023-09-30 PROCEDURE — 84484 ASSAY OF TROPONIN QUANT: CPT

## 2023-09-30 RX ORDER — CEFDINIR 300 MG/1
300 CAPSULE ORAL 2 TIMES DAILY
Qty: 14 CAPSULE | Refills: 0 | Status: SHIPPED | OUTPATIENT
Start: 2023-09-30 | End: 2023-10-07

## 2023-09-30 RX ORDER — DOXYCYCLINE HYCLATE 100 MG/1
100 CAPSULE ORAL ONCE
Status: COMPLETED | OUTPATIENT
Start: 2023-09-30 | End: 2023-09-30

## 2023-09-30 RX ORDER — DOXYCYCLINE HYCLATE 100 MG
100 TABLET ORAL 2 TIMES DAILY
Qty: 14 TABLET | Refills: 0 | Status: SHIPPED | OUTPATIENT
Start: 2023-09-30 | End: 2023-10-07

## 2023-09-30 RX ORDER — CEFDINIR 300 MG/1
300 CAPSULE ORAL ONCE
Status: COMPLETED | OUTPATIENT
Start: 2023-09-30 | End: 2023-09-30

## 2023-09-30 RX ORDER — ACETAMINOPHEN 325 MG/1
650 TABLET ORAL ONCE
Status: COMPLETED | OUTPATIENT
Start: 2023-09-30 | End: 2023-09-30

## 2023-09-30 RX ORDER — KETOROLAC TROMETHAMINE 30 MG/ML
15 INJECTION, SOLUTION INTRAMUSCULAR; INTRAVENOUS ONCE
Status: COMPLETED | OUTPATIENT
Start: 2023-09-30 | End: 2023-09-30

## 2023-09-30 RX ADMIN — CEFDINIR 300 MG: 300 CAPSULE ORAL at 06:01

## 2023-09-30 RX ADMIN — HYDROMORPHONE HYDROCHLORIDE 0.5 MG: 0.5 INJECTION, SOLUTION INTRAMUSCULAR; INTRAVENOUS; SUBCUTANEOUS at 01:10

## 2023-09-30 RX ADMIN — KETOROLAC TROMETHAMINE 15 MG: 30 INJECTION, SOLUTION INTRAMUSCULAR at 06:01

## 2023-09-30 RX ADMIN — SODIUM CHLORIDE 1000 ML: 9 INJECTION, SOLUTION INTRAVENOUS at 01:17

## 2023-09-30 RX ADMIN — DIPHENHYDRAMINE HYDROCHLORIDE 25 MG: 50 INJECTION INTRAMUSCULAR; INTRAVENOUS at 04:31

## 2023-09-30 RX ADMIN — DOXYCYCLINE HYCLATE 100 MG: 100 CAPSULE ORAL at 06:01

## 2023-09-30 RX ADMIN — METOCLOPRAMIDE 10 MG: 5 INJECTION, SOLUTION INTRAMUSCULAR; INTRAVENOUS at 04:33

## 2023-09-30 RX ADMIN — ACETAMINOPHEN 650 MG: 325 TABLET ORAL at 06:01

## 2023-09-30 ASSESSMENT — ENCOUNTER SYMPTOMS
DIARRHEA: 0
NAUSEA: 0
ABDOMINAL DISTENTION: 0
ABDOMINAL PAIN: 0
VOMITING: 0
CHEST TIGHTNESS: 1
PHOTOPHOBIA: 0
BACK PAIN: 0
SHORTNESS OF BREATH: 1
COUGH: 1

## 2023-09-30 ASSESSMENT — PAIN SCALES - GENERAL
PAINLEVEL_OUTOF10: 8
PAINLEVEL_OUTOF10: 7

## 2023-09-30 ASSESSMENT — PAIN DESCRIPTION - DESCRIPTORS: DESCRIPTORS: STABBING;THROBBING;SHARP

## 2023-09-30 ASSESSMENT — PAIN DESCRIPTION - LOCATION
LOCATION: FOOT;LEG;CHEST
LOCATION: CHEST;LEG;HEAD
LOCATION: LEG;HEAD

## 2023-09-30 NOTE — ED NOTES
Niece states that patients mental status has become altered since patient has arrived. Physician notified of changes.       Poppy Briones RN  09/30/23 0758

## 2023-09-30 NOTE — ED NOTES
Patient was taken to CT but could not sit still for scan. Physician notified.       Satish Rivas, RN  09/30/23 9724

## 2023-09-30 NOTE — ED NOTES
Discharge instructions given. Patient verbalizes understanding. No other noted or stated problems at this time. Patient will follow up with primary care.       Estephanie Siegel RN  09/30/23 4358

## 2023-09-30 NOTE — PROGRESS NOTES
Name: Joyce Aguilar  : 1960  MRN: [id number]    Date: 2023    Benefits of immediately proceeding with Radiology exam outweigh the risks and therefore the following is being waived:      [] Pregnancy test    [] Protocol for Iodine allergy    [] MRI questionnaire    [x] Bhavana Kay MD

## 2023-09-30 NOTE — ED NOTES
Department of Emergency Medicine  FIRST PROVIDER TRIAGE NOTE             Independent CHIDI           9/29/23  9:38 PM EDT    Date of Encounter: 9/29/23   MRN: [id number]      HPI: Amanda Fuentes is a 66 y.o. male who presents to the ED for Leg Pain (Bilat leg pain, left arm pain, headache x 3 days. H/o PE/DVT's. On Eliquis.  ) and Chest Pain (Triple bypass in April. )  Patient complains of chest pain and left arm pain and swelling that have been ongoing for the past 3 days. States that he does have a history of DVTs as well as PEs. Patient is currently on Eliquis. Patient states that he also has a diabetic ulcer to the right lower leg. Denies fevers or chills. ROS: Negative for abd pain or back pain. PE: Gen Appearance/Constitutional: alert  CV: tachycardia     Initial Plan of Care: All treatment areas with department are currently occupied. Plan to order/Initiate the following while awaiting opening in ED: EKG.   Initiate Treatment-Testing, Proceed toTreatment Area When Bed Available for ED Attending/HUGOP to Continue Care    Electronically signed by CHRIST Cueto CNP   DD: 9/29/23       CHRIST Cueto CNP  09/29/23 6001

## 2023-09-30 NOTE — ED NOTES
Patients mental status has changed considerably since this nurse has been with patient. Patient stated he was rolling cigarettes and was seeing bubbles and continues to slur words. Physician notified. Meds to be held until CT is completed. Patient to be taken to CT for scans.                     Mackenzie Adame RN  09/30/23 0357

## 2023-10-10 ENCOUNTER — OFFICE VISIT (OUTPATIENT)
Dept: ENDOCRINOLOGY | Age: 63
End: 2023-10-10

## 2023-10-10 VITALS
HEART RATE: 78 BPM | BODY MASS INDEX: 38.06 KG/M2 | HEIGHT: 72 IN | SYSTOLIC BLOOD PRESSURE: 131 MMHG | DIASTOLIC BLOOD PRESSURE: 66 MMHG | WEIGHT: 281 LBS

## 2023-10-10 DIAGNOSIS — E03.9 PRIMARY HYPOTHYROIDISM: ICD-10-CM

## 2023-10-10 DIAGNOSIS — E78.2 MIXED HYPERLIPIDEMIA: ICD-10-CM

## 2023-10-10 DIAGNOSIS — E11.65 UNCONTROLLED TYPE 2 DIABETES MELLITUS WITH HYPERGLYCEMIA (HCC): Primary | ICD-10-CM

## 2023-10-10 DIAGNOSIS — E66.09 CLASS 2 OBESITY DUE TO EXCESS CALORIES WITHOUT SERIOUS COMORBIDITY WITH BODY MASS INDEX (BMI) OF 36.0 TO 36.9 IN ADULT: ICD-10-CM

## 2023-10-10 LAB — HBA1C MFR BLD: 9.2 %

## 2023-10-10 RX ORDER — SEMAGLUTIDE 0.68 MG/ML
INJECTION, SOLUTION SUBCUTANEOUS
Qty: 9 ML | Refills: 1 | Status: SHIPPED
Start: 2023-10-10 | End: 2023-10-10 | Stop reason: SDUPTHER

## 2023-10-10 RX ORDER — SEMAGLUTIDE 0.68 MG/ML
INJECTION, SOLUTION SUBCUTANEOUS
Qty: 9 ML | Refills: 1 | Status: SHIPPED | OUTPATIENT
Start: 2023-10-10

## 2023-10-10 RX ORDER — BLOOD-GLUCOSE SENSOR
EACH MISCELLANEOUS
Qty: 6 EACH | Refills: 2 | Status: SHIPPED | OUTPATIENT
Start: 2023-10-10

## 2023-10-10 NOTE — PROGRESS NOTES
intact, Monofilament sensation decreased bilateral , muscle power normal  Psych: normal mood, and affect      Review of Laboratory Data:  I personally reviewed the following lab:  Lab Results   Component Value Date/Time    WBC 11.4 09/30/2023 12:26 AM    RBC 4.91 09/30/2023 12:26 AM    HGB 14.2 09/30/2023 12:26 AM    HCT 41.3 09/30/2023 12:26 AM    MCV 84.1 09/30/2023 12:26 AM    MCH 28.9 09/30/2023 12:26 AM    MCHC 34.4 09/30/2023 12:26 AM    RDW 13.4 09/30/2023 12:26 AM     09/30/2023 12:26 AM    MPV 11.6 09/30/2023 12:26 AM      Lab Results   Component Value Date/Time     09/30/2023 12:26 AM    K 4.7 09/30/2023 12:26 AM    K 4.5 07/05/2023 03:54 AM    CO2 23 09/30/2023 12:26 AM    BUN 18 09/30/2023 12:26 AM    CREATININE 1.0 09/30/2023 12:26 AM    CALCIUM 9.2 09/30/2023 12:26 AM    LABGLOM >60 09/30/2023 12:26 AM    GFRAA >60 06/06/2022 10:30 AM      Lab Results   Component Value Date/Time    TSH 1.790 04/05/2023 02:05 AM    T4FREE 1.05 11/10/2022 03:25 PM     Lab Results   Component Value Date/Time    LABA1C 9.2 10/10/2023 02:20 PM    GLUCOSE 263 09/30/2023 12:26 AM     Lab Results   Component Value Date/Time    LABA1C 9.2 10/10/2023 02:20 PM    LABA1C 12.2 07/27/2023 06:35 AM    LABA1C 12.6 04/03/2023 01:10 PM     No results found for: \"TRIG\", \"HDL\", \"LDLCALC\", \"CHOL\"  Lab Results   Component Value Date/Time    VITD25 13 04/05/2023 02:05 AM       ASSESSMENT & RECOMMENDATIONS   Fernando Adlerstevan Fuentes, a 66 y.o.-old male seen in for the following issues       Assessment:      Diagnosis Orders   1. Uncontrolled type 2 diabetes mellitus with hyperglycemia (HCC)  POCT glycosylated hemoglobin (Hb A1C)    blood glucose test strips (ASCENSIA AUTODISC VI;ONE TOUCH ULTRA TEST VI) strip    blood glucose test strips (ASCENSIA AUTODISC VI;ONE TOUCH ULTRA TEST VI) strip      2. Mixed hyperlipidemia        3.  Class 2 obesity due to excess calories without serious comorbidity with body mass index (BMI) of 36.0 to 36.9 in

## 2023-10-18 ENCOUNTER — OFFICE VISIT (OUTPATIENT)
Dept: VASCULAR SURGERY | Age: 63
End: 2023-10-18
Payer: MEDICARE

## 2023-10-18 DIAGNOSIS — Z86.718 HISTORY OF DVT (DEEP VEIN THROMBOSIS): Primary | ICD-10-CM

## 2023-10-18 DIAGNOSIS — I73.9 PVD (PERIPHERAL VASCULAR DISEASE) (HCC): ICD-10-CM

## 2023-10-18 PROCEDURE — G8427 DOCREV CUR MEDS BY ELIG CLIN: HCPCS | Performed by: NURSE PRACTITIONER

## 2023-10-18 PROCEDURE — 99203 OFFICE O/P NEW LOW 30 MIN: CPT | Performed by: NURSE PRACTITIONER

## 2023-10-18 PROCEDURE — G8484 FLU IMMUNIZE NO ADMIN: HCPCS | Performed by: NURSE PRACTITIONER

## 2023-10-18 PROCEDURE — 4004F PT TOBACCO SCREEN RCVD TLK: CPT | Performed by: NURSE PRACTITIONER

## 2023-10-18 PROCEDURE — G8417 CALC BMI ABV UP PARAM F/U: HCPCS | Performed by: NURSE PRACTITIONER

## 2023-10-18 PROCEDURE — 3017F COLORECTAL CA SCREEN DOC REV: CPT | Performed by: NURSE PRACTITIONER

## 2023-10-18 RX ORDER — TRAZODONE HYDROCHLORIDE 100 MG/1
100 TABLET ORAL NIGHTLY
COMMUNITY
Start: 2023-10-06

## 2023-10-18 RX ORDER — DULOXETIN HYDROCHLORIDE 60 MG/1
60 CAPSULE, DELAYED RELEASE ORAL DAILY
COMMUNITY
Start: 2023-10-05

## 2023-10-18 NOTE — PROGRESS NOTES
Vascular Surgery Outpatient Consultation      Chief Complaint   Patient presents with    Surgical Consult     History of blood clots in legs. Reason for Consult: history of blood clots, PVD    Requesting Physician:  Dr. Rene Henry:                The patient is a 66 y.o. male who is referred for evaluation of history of blood clots and PVD. The patient is a poor historian. The patient has a history of uncontrolled DM and is status post toe amputations on both feet. The patient most recently had the 5th toe of left foot amputated for osteomyelitis this summer. The incision healed without any significant issues. He previously had his left second toe that healed without any significant issues. He has multiple superficial open areas of both anterior calves due to scratching. He denies any drainage, erythema, or pain from these. He follows with Dr. Carlos Griggs regarding this. He denies any symptoms of claudication or rest pain. He gets cramps in his thighs at night. His most recent arterial studies 7/2023 reported DK 1.3 on the right and 1.4 on the left with preserved multiphasic waveforms at all levels and varying degrees of mildly decreased amplitudes of the bilateral toes. The patient reports that over the summer while in a nursing home he had a PICC line in his right arm for IV antibiotics due to osteomyelitis. During that time his arm became acutely swollen. According to the patient, an ultrasound was done at the facility that was positive for DVT. He was started on eliquis at that time. The line was removed and switched to the other side to complete the IV antibiotic treatment. The right arm swelling completely resolved. He was later taken off blood thinners. He denies any previous history of DVT or PE. No records in epic available to state he has any history of prior DVT or PE, although it is in his history.  This was all confirmed by his niece over the phone, who handles his

## 2023-10-23 ENCOUNTER — TELEPHONE (OUTPATIENT)
Dept: ENDOCRINOLOGY | Age: 63
End: 2023-10-23

## 2023-10-23 NOTE — TELEPHONE ENCOUNTER
Pt lm on clinical line requesting refills of raquel 3 sensors be sent to [facility].     [facility]oke w/ pt [facility] - sensors should be mailed to his home. Discussed using glucometer, if current sensor runs out before new ones arrive. Pt verbalized understanding.

## 2023-12-20 ENCOUNTER — HOSPITAL ENCOUNTER (EMERGENCY)
Age: 63
Discharge: HOME | End: 2023-12-21
Attending: STUDENT IN AN ORGANIZED HEALTH CARE EDUCATION/TRAINING PROGRAM
Payer: MEDICARE

## 2023-12-20 ENCOUNTER — APPOINTMENT (OUTPATIENT)
Dept: GENERAL RADIOLOGY | Age: 63
End: 2023-12-20
Payer: MEDICARE

## 2023-12-20 VITALS
TEMPERATURE: 98.1 F | WEIGHT: 269 LBS | RESPIRATION RATE: 15 BRPM | SYSTOLIC BLOOD PRESSURE: 101 MMHG | OXYGEN SATURATION: 97 % | BODY MASS INDEX: 36.48 KG/M2 | DIASTOLIC BLOOD PRESSURE: 81 MMHG | HEART RATE: 81 BPM

## 2023-12-20 DIAGNOSIS — L08.9 WOUND INFECTION: Primary | ICD-10-CM

## 2023-12-20 DIAGNOSIS — R73.9 HYPERGLYCEMIA: ICD-10-CM

## 2023-12-20 DIAGNOSIS — T14.8XXA WOUND INFECTION: Primary | ICD-10-CM

## 2023-12-20 LAB
ALBUMIN SERPL-MCNC: 3.9 G/DL (ref 3.5–5.2)
ALP SERPL-CCNC: 109 U/L (ref 40–129)
ALT SERPL-CCNC: 18 U/L (ref 0–40)
ANION GAP SERPL CALCULATED.3IONS-SCNC: 10 MMOL/L (ref 7–16)
AST SERPL-CCNC: 15 U/L (ref 0–39)
BASOPHILS # BLD: 0.05 K/UL (ref 0–0.2)
BASOPHILS NFR BLD: 1 % (ref 0–2)
BILIRUB SERPL-MCNC: 0.2 MG/DL (ref 0–1.2)
BUN SERPL-MCNC: 26 MG/DL (ref 6–23)
CALCIUM SERPL-MCNC: 9 MG/DL (ref 8.6–10.2)
CHLORIDE SERPL-SCNC: 95 MMOL/L (ref 98–107)
CO2 SERPL-SCNC: 24 MMOL/L (ref 22–29)
CREAT SERPL-MCNC: 1.1 MG/DL (ref 0.7–1.2)
CRP SERPL HS-MCNC: 7 MG/L (ref 0–5)
EOSINOPHIL # BLD: 0.11 K/UL (ref 0.05–0.5)
EOSINOPHILS RELATIVE PERCENT: 1 % (ref 0–6)
ERYTHROCYTE [DISTWIDTH] IN BLOOD BY AUTOMATED COUNT: 13.2 % (ref 11.5–15)
ERYTHROCYTE [SEDIMENTATION RATE] IN BLOOD BY WESTERGREN METHOD: 17 MM/HR (ref 0–15)
GFR SERPL CREATININE-BSD FRML MDRD: >60 ML/MIN/1.73M2
GLUCOSE SERPL-MCNC: 469 MG/DL (ref 74–99)
HCT VFR BLD AUTO: 42.5 % (ref 37–54)
HGB BLD-MCNC: 14.6 G/DL (ref 12.5–16.5)
IMM GRANULOCYTES # BLD AUTO: 0.03 K/UL (ref 0–0.58)
IMM GRANULOCYTES NFR BLD: 0 % (ref 0–5)
LACTATE BLDV-SCNC: 1.3 MMOL/L (ref 0.5–1.9)
LYMPHOCYTES NFR BLD: 2.12 K/UL (ref 1.5–4)
LYMPHOCYTES RELATIVE PERCENT: 26 % (ref 20–42)
MCH RBC QN AUTO: 28.6 PG (ref 26–35)
MCHC RBC AUTO-ENTMCNC: 34.4 G/DL (ref 32–34.5)
MCV RBC AUTO: 83.3 FL (ref 80–99.9)
MONOCYTES NFR BLD: 0.47 K/UL (ref 0.1–0.95)
MONOCYTES NFR BLD: 6 % (ref 2–12)
NEUTROPHILS NFR BLD: 66 % (ref 43–80)
NEUTS SEG NFR BLD: 5.47 K/UL (ref 1.8–7.3)
PLATELET # BLD AUTO: 143 K/UL (ref 130–450)
PMV BLD AUTO: 11.6 FL (ref 7–12)
POTASSIUM SERPL-SCNC: 4.3 MMOL/L (ref 3.5–5)
PROT SERPL-MCNC: 7.1 G/DL (ref 6.4–8.3)
RBC # BLD AUTO: 5.1 M/UL (ref 3.8–5.8)
SODIUM SERPL-SCNC: 129 MMOL/L (ref 132–146)
WBC OTHER # BLD: 8.3 K/UL (ref 4.5–11.5)

## 2023-12-20 PROCEDURE — 96374 THER/PROPH/DIAG INJ IV PUSH: CPT

## 2023-12-20 PROCEDURE — 6370000000 HC RX 637 (ALT 250 FOR IP): Performed by: STUDENT IN AN ORGANIZED HEALTH CARE EDUCATION/TRAINING PROGRAM

## 2023-12-20 PROCEDURE — 83605 ASSAY OF LACTIC ACID: CPT

## 2023-12-20 PROCEDURE — 87040 BLOOD CULTURE FOR BACTERIA: CPT

## 2023-12-20 PROCEDURE — 96361 HYDRATE IV INFUSION ADD-ON: CPT

## 2023-12-20 PROCEDURE — 86140 C-REACTIVE PROTEIN: CPT

## 2023-12-20 PROCEDURE — 2580000003 HC RX 258: Performed by: STUDENT IN AN ORGANIZED HEALTH CARE EDUCATION/TRAINING PROGRAM

## 2023-12-20 PROCEDURE — 80053 COMPREHEN METABOLIC PANEL: CPT

## 2023-12-20 PROCEDURE — 99284 EMERGENCY DEPT VISIT MOD MDM: CPT

## 2023-12-20 PROCEDURE — 85025 COMPLETE CBC W/AUTO DIFF WBC: CPT

## 2023-12-20 PROCEDURE — 85652 RBC SED RATE AUTOMATED: CPT

## 2023-12-20 PROCEDURE — 73590 X-RAY EXAM OF LOWER LEG: CPT

## 2023-12-20 RX ORDER — 0.9 % SODIUM CHLORIDE 0.9 %
1000 INTRAVENOUS SOLUTION INTRAVENOUS ONCE
Status: COMPLETED | OUTPATIENT
Start: 2023-12-20 | End: 2023-12-21

## 2023-12-20 RX ADMIN — INSULIN HUMAN 6 UNITS: 100 INJECTION, SOLUTION PARENTERAL at 21:55

## 2023-12-20 RX ADMIN — SODIUM CHLORIDE 1000 ML: 9 INJECTION, SOLUTION INTRAVENOUS at 21:42

## 2023-12-20 ASSESSMENT — PAIN DESCRIPTION - PAIN TYPE: TYPE: ACUTE PAIN

## 2023-12-20 ASSESSMENT — PAIN SCALES - GENERAL: PAINLEVEL_OUTOF10: 9

## 2023-12-20 ASSESSMENT — PAIN DESCRIPTION - LOCATION: LOCATION: LEG

## 2023-12-20 ASSESSMENT — PAIN - FUNCTIONAL ASSESSMENT: PAIN_FUNCTIONAL_ASSESSMENT: 0-10

## 2023-12-20 ASSESSMENT — PAIN DESCRIPTION - DESCRIPTORS: DESCRIPTORS: BURNING;ACHING

## 2023-12-20 ASSESSMENT — PAIN DESCRIPTION - ORIENTATION: ORIENTATION: RIGHT

## 2023-12-20 NOTE — ED NOTES
Department of Emergency Medicine  FIRST PROVIDER TRIAGE NOTE             Independent MLP           12/20/23  5:46 PM EST    Date of Encounter: 12/20/23   MRN: [id number]      HPI: Amanda Fuentes is a 66 y.o. male who presents to the ED for Wound Check (Right lower leg wound, pt diabetic. Started 2 months ago. )     HAS BEEN WORSENING. HAS FELT FEBRILE, EXPERIENCING N/V.  ADMITS TO FEELING CONFUSED AT TIMES AND \"LETHARGIC\"    ROS: Negative for cp or sob. PE: Gen Appearance/Constitutional: alert  HEENT: NC/NT. PERRLA,  Airway patent. Initial Plan of Care: All treatment areas with department are currently occupied. Plan to order/Initiate the following while awaiting opening in ED.   Initiate Treatment-Testing, Proceed toTreatment Area When Bed Available for ED Attending/MLP to Continue Care    Electronically signed by CHRIST Rutherford CNP   DD: 12/20/23      CHRIST Rutherford CNP  12/20/23 0386       CHRIST Rutherford CNP  12/20/23 1632

## 2023-12-21 LAB
BILIRUB UR QL STRIP: NEGATIVE
CHP ED QC CHECK: NORMAL
CLARITY UR: CLEAR
COLOR UR: YELLOW
EPI CELLS #/AREA URNS HPF: ABNORMAL /HPF
GLUCOSE BLD-MCNC: 264 MG/DL
GLUCOSE BLD-MCNC: 264 MG/DL (ref 74–99)
GLUCOSE UR STRIP-MCNC: >=1000 MG/DL
HGB UR QL STRIP.AUTO: ABNORMAL
KETONES UR STRIP-MCNC: NEGATIVE MG/DL
LEUKOCYTE ESTERASE UR QL STRIP: NEGATIVE
NITRITE UR QL STRIP: NEGATIVE
PH UR STRIP: 6 [PH] (ref 5–9)
PROT UR STRIP-MCNC: 100 MG/DL
RBC #/AREA URNS HPF: ABNORMAL /HPF
SP GR UR STRIP: >1.03 (ref 1–1.03)
UROBILINOGEN UR STRIP-ACNC: 0.2 EU/DL (ref 0–1)
WBC #/AREA URNS HPF: ABNORMAL /HPF

## 2023-12-21 PROCEDURE — 6370000000 HC RX 637 (ALT 250 FOR IP): Performed by: STUDENT IN AN ORGANIZED HEALTH CARE EDUCATION/TRAINING PROGRAM

## 2023-12-21 PROCEDURE — 96361 HYDRATE IV INFUSION ADD-ON: CPT

## 2023-12-21 PROCEDURE — 87086 URINE CULTURE/COLONY COUNT: CPT

## 2023-12-21 PROCEDURE — 82962 GLUCOSE BLOOD TEST: CPT

## 2023-12-21 PROCEDURE — 81001 URINALYSIS AUTO W/SCOPE: CPT

## 2023-12-21 RX ORDER — CEPHALEXIN 500 MG/1
500 CAPSULE ORAL 4 TIMES DAILY
Qty: 40 CAPSULE | Refills: 0 | Status: SHIPPED | OUTPATIENT
Start: 2023-12-21 | End: 2023-12-31

## 2023-12-21 RX ORDER — DOXYCYCLINE HYCLATE 100 MG
100 TABLET ORAL 2 TIMES DAILY
Qty: 20 TABLET | Refills: 0 | Status: SHIPPED | OUTPATIENT
Start: 2023-12-21 | End: 2023-12-31

## 2023-12-21 RX ORDER — DOXYCYCLINE HYCLATE 100 MG/1
100 CAPSULE ORAL ONCE
Status: COMPLETED | OUTPATIENT
Start: 2023-12-21 | End: 2023-12-21

## 2023-12-21 RX ORDER — CEPHALEXIN 500 MG/1
500 CAPSULE ORAL ONCE
Status: COMPLETED | OUTPATIENT
Start: 2023-12-21 | End: 2023-12-21

## 2023-12-21 RX ADMIN — CEPHALEXIN 500 MG: 500 CAPSULE ORAL at 01:19

## 2023-12-21 RX ADMIN — DOXYCYCLINE 100 MG: 100 CAPSULE ORAL at 01:18

## 2023-12-21 NOTE — DISCHARGE INSTRUCTIONS
Please return to the ER for any new or worsening symptoms including but not limited to Fever or Change in/worsening of your WOUND  If prescribed, please be sure to  your prescriptions from the pharmacy  Please follow-up with  PODIATRY  as instructed

## 2023-12-23 LAB
MICROORGANISM SPEC CULT: ABNORMAL
SPECIMEN DESCRIPTION: ABNORMAL

## 2023-12-25 LAB
MICROORGANISM SPEC CULT: NORMAL
MICROORGANISM SPEC CULT: NORMAL
SERVICE CMNT-IMP: NORMAL
SERVICE CMNT-IMP: NORMAL
SPECIMEN DESCRIPTION: NORMAL
SPECIMEN DESCRIPTION: NORMAL

## 2024-03-05 RX ORDER — BLOOD-GLUCOSE SENSOR
EACH MISCELLANEOUS
Qty: 6 EACH | Refills: 4 | Status: SHIPPED | OUTPATIENT
Start: 2024-03-05

## 2024-03-08 RX ORDER — ERGOCALCIFEROL 1.25 MG/1
CAPSULE ORAL
Qty: 12 CAPSULE | Refills: 0 | OUTPATIENT
Start: 2024-03-08

## 2024-03-08 RX ORDER — SEMAGLUTIDE 0.68 MG/ML
INJECTION, SOLUTION SUBCUTANEOUS
Qty: 2 ML | Refills: 1 | OUTPATIENT
Start: 2024-03-08

## 2024-03-26 ENCOUNTER — HOSPITAL ENCOUNTER (EMERGENCY)
Age: 64
Discharge: HOME | DRG: 638 | End: 2024-03-26
Attending: EMERGENCY MEDICINE
Payer: MEDICARE

## 2024-03-26 VITALS
HEART RATE: 88 BPM | DIASTOLIC BLOOD PRESSURE: 76 MMHG | BODY MASS INDEX: 36.57 KG/M2 | SYSTOLIC BLOOD PRESSURE: 115 MMHG | OXYGEN SATURATION: 94 % | TEMPERATURE: 98.2 F | HEIGHT: 72 IN | WEIGHT: 270 LBS | RESPIRATION RATE: 21 BRPM

## 2024-03-26 DIAGNOSIS — L03.113 CELLULITIS OF RIGHT UPPER EXTREMITY: Primary | ICD-10-CM

## 2024-03-26 LAB
ALBUMIN SERPL-MCNC: 3.9 G/DL (ref 3.5–5.2)
ALP SERPL-CCNC: 108 U/L (ref 40–129)
ALT SERPL-CCNC: 19 U/L (ref 0–40)
ANION GAP SERPL CALCULATED.3IONS-SCNC: 12 MMOL/L (ref 7–16)
AST SERPL-CCNC: 23 U/L (ref 0–39)
BASOPHILS # BLD: 0.04 K/UL (ref 0–0.2)
BASOPHILS NFR BLD: 0 % (ref 0–2)
BILIRUB SERPL-MCNC: 0.4 MG/DL (ref 0–1.2)
BUN SERPL-MCNC: 22 MG/DL (ref 6–23)
CALCIUM SERPL-MCNC: 9.4 MG/DL (ref 8.6–10.2)
CHLORIDE SERPL-SCNC: 102 MMOL/L (ref 98–107)
CO2 SERPL-SCNC: 24 MMOL/L (ref 22–29)
CREAT SERPL-MCNC: 1.1 MG/DL (ref 0.7–1.2)
CRP SERPL HS-MCNC: 32 MG/L (ref 0–5)
EOSINOPHIL # BLD: 1.2 K/UL (ref 0.05–0.5)
EOSINOPHILS RELATIVE PERCENT: 9 % (ref 0–6)
ERYTHROCYTE [DISTWIDTH] IN BLOOD BY AUTOMATED COUNT: 13.8 % (ref 11.5–15)
ERYTHROCYTE [SEDIMENTATION RATE] IN BLOOD BY WESTERGREN METHOD: 31 MM/HR (ref 0–15)
GFR SERPL CREATININE-BSD FRML MDRD: 74 ML/MIN/1.73M2
GLUCOSE SERPL-MCNC: 167 MG/DL (ref 74–99)
HCT VFR BLD AUTO: 44.3 % (ref 37–54)
HGB BLD-MCNC: 14.8 G/DL (ref 12.5–16.5)
IMM GRANULOCYTES # BLD AUTO: 0.06 K/UL (ref 0–0.58)
IMM GRANULOCYTES NFR BLD: 0 % (ref 0–5)
LACTATE BLDV-SCNC: 1.4 MMOL/L (ref 0.5–1.9)
LYMPHOCYTES NFR BLD: 2.7 K/UL (ref 1.5–4)
LYMPHOCYTES RELATIVE PERCENT: 19 % (ref 20–42)
MCH RBC QN AUTO: 29 PG (ref 26–35)
MCHC RBC AUTO-ENTMCNC: 33.4 G/DL (ref 32–34.5)
MCV RBC AUTO: 86.7 FL (ref 80–99.9)
MONOCYTES NFR BLD: 0.99 K/UL (ref 0.1–0.95)
MONOCYTES NFR BLD: 7 % (ref 2–12)
NEUTROPHILS NFR BLD: 65 % (ref 43–80)
NEUTS SEG NFR BLD: 9.15 K/UL (ref 1.8–7.3)
PLATELET # BLD AUTO: 173 K/UL (ref 130–450)
PMV BLD AUTO: 11.5 FL (ref 7–12)
POTASSIUM SERPL-SCNC: 4.8 MMOL/L (ref 3.5–5)
PROT SERPL-MCNC: 7.5 G/DL (ref 6.4–8.3)
RBC # BLD AUTO: 5.11 M/UL (ref 3.8–5.8)
SODIUM SERPL-SCNC: 138 MMOL/L (ref 132–146)
WBC OTHER # BLD: 14.1 K/UL (ref 4.5–11.5)

## 2024-03-26 PROCEDURE — 85025 COMPLETE CBC W/AUTO DIFF WBC: CPT

## 2024-03-26 PROCEDURE — 85652 RBC SED RATE AUTOMATED: CPT

## 2024-03-26 PROCEDURE — 87154 CUL TYP ID BLD PTHGN 6+ TRGT: CPT

## 2024-03-26 PROCEDURE — 87077 CULTURE AEROBIC IDENTIFY: CPT

## 2024-03-26 PROCEDURE — 2580000003 HC RX 258

## 2024-03-26 PROCEDURE — 83605 ASSAY OF LACTIC ACID: CPT

## 2024-03-26 PROCEDURE — 6370000000 HC RX 637 (ALT 250 FOR IP)

## 2024-03-26 PROCEDURE — 99284 EMERGENCY DEPT VISIT MOD MDM: CPT

## 2024-03-26 PROCEDURE — 87040 BLOOD CULTURE FOR BACTERIA: CPT

## 2024-03-26 PROCEDURE — 86140 C-REACTIVE PROTEIN: CPT

## 2024-03-26 PROCEDURE — 80053 COMPREHEN METABOLIC PANEL: CPT

## 2024-03-26 RX ORDER — DOXYCYCLINE HYCLATE 100 MG/1
100 CAPSULE ORAL 2 TIMES DAILY
Qty: 20 CAPSULE | Refills: 0 | Status: ON HOLD | OUTPATIENT
Start: 2024-03-26 | End: 2024-03-28 | Stop reason: HOSPADM

## 2024-03-26 RX ORDER — HYDROCODONE BITARTRATE AND ACETAMINOPHEN 5; 325 MG/1; MG/1
1 TABLET ORAL EVERY 6 HOURS PRN
Qty: 12 TABLET | Refills: 0 | Status: SHIPPED | OUTPATIENT
Start: 2024-03-26 | End: 2024-03-29

## 2024-03-26 RX ORDER — HYDROCODONE BITARTRATE AND ACETAMINOPHEN 5; 325 MG/1; MG/1
1 TABLET ORAL ONCE
Status: COMPLETED | OUTPATIENT
Start: 2024-03-26 | End: 2024-03-26

## 2024-03-26 RX ORDER — 0.9 % SODIUM CHLORIDE 0.9 %
1000 INTRAVENOUS SOLUTION INTRAVENOUS ONCE
Status: COMPLETED | OUTPATIENT
Start: 2024-03-26 | End: 2024-03-26

## 2024-03-26 RX ADMIN — SODIUM CHLORIDE 1000 ML: 9 INJECTION, SOLUTION INTRAVENOUS at 08:10

## 2024-03-26 RX ADMIN — HYDROCODONE BITARTRATE AND ACETAMINOPHEN 1 TABLET: 5; 325 TABLET ORAL at 12:50

## 2024-03-26 RX ADMIN — HYDROCODONE BITARTRATE AND ACETAMINOPHEN 1 TABLET: 5; 325 TABLET ORAL at 08:07

## 2024-03-26 ASSESSMENT — PAIN SCALES - GENERAL: PAINLEVEL_OUTOF10: 8

## 2024-03-26 ASSESSMENT — PAIN - FUNCTIONAL ASSESSMENT: PAIN_FUNCTIONAL_ASSESSMENT: NONE - DENIES PAIN

## 2024-03-26 NOTE — DISCHARGE INSTRUCTIONS
Continue taking your Augmentin at home.  Follow-up with your primary care doctor in couple of days.

## 2024-03-26 NOTE — ED PROVIDER NOTES
[facility] EMERGENCY DEPARTMENT  EMERGENCY DEPARTMENT ENCOUNTER        Pt Name: Henry Fuentes  MRN: [id number]  Birthdate 1960  Date of evaluation: 3/26/2024  Provider: Yves Hartmann MD  PCP: Jason Brandt DO  Note Started: 6:52 AM EDT 3/26/24    CHIEF COMPLAINT       Chief Complaint   Patient presents with    Abscess     Upper right arm. States that the pain goes into his shoulder and down to his elbow       HISTORY OF PRESENT ILLNESS: 1 or more Elements   History From: Patient.    Limitations to history : None    Henry Fuentes is a 64 y.o. male who presents to the ED with complaints of right upper arm abscess.  Patient states that he started noticing a rash on his right arm 1 week ago, patient is not sure whether he got bit by an insect.  Patient states that his rash was initially painless, started getting painful couple of days ago.  Patient also complains of tenderness on his right arm around the abscess. Patient does have a history of recurrent osteomyelitis.  Patient is diabetic.  Patient does not complain of chest pain, shortness of breath, abdominal pain, fever, chills, headache, nausea, constipation, diarrhea, urinary symptoms or any other active complaints.    Nursing Notes were all reviewed and agreed with or any disagreements were addressed in the HPI.    ROS:   Pertinent positives and negatives are stated within HPI, all other systems reviewed and are negative.    --------------------------------------------- PAST HISTORY ---------------------------------------------  Past Medical History:  has a past medical history of Anxiety, COPD (chronic obstructive pulmonary disease) (HCC), Depression, DM (diabetes mellitus) (HCC), DVT of leg (deep venous thrombosis) (HCC), Frostbite, HLD (hyperlipidemia), HTN (hypertension), and Sleep apnea.    Past Surgical History:  has a past surgical history that includes Cholecystectomy; back surgery; Coronary artery bypass    C-Reactive Protein   Result Value Ref Range    CRP 32.0 (H) 0 - 5 mg/L       RADIOLOGY:   Interpretation per the Radiologist below, if available at the time of this note:    No orders to display     No results found.    No results found.    See preliminary interpretation by me below.       ------------------------- NURSING NOTES AND VITALS REVIEWED ---------------------------   The nursing notes within the ED encounter and vital signs as below have been reviewed by myself.  /76   Pulse 88   Temp 98.2 °F (36.8 °C)   Resp 21   Ht 1.829 m (6')   Wt 122.5 kg (270 lb)   SpO2 94%   BMI 36.62 kg/m²   Oxygen Saturation Interpretation: Normal    The patient’s available past medical records and past encounters were reviewed.        ------------------------------ ED COURSE/MEDICAL DECISION MAKING----------------------  Medications   sodium chloride 0.9 % bolus 1,000 mL (0 mLs IntraVENous Stopped 3/26/24 0919)   HYDROcodone-acetaminophen (NORCO) 5-325 MG per tablet 1 tablet (1 tablet Oral Given 3/26/24 0807)   HYDROcodone-acetaminophen (NORCO) 5-325 MG per tablet 1 tablet (1 tablet Oral Given 3/26/24 1250)       Medical Decision Making/Differential Diagnosis:    CC/HPI Summary, Pertinent Physical Exam Findings, Social Determinants of health, Records Reviewed, DDx, testing done/not done, ED Course, Reassessment, disposition considerations/shared decision making with patient, consults, disposition:        Medical Decision Making:   I, Dr. Yves Hartmann am the resident physician of record.      History From: Patient.    Limitations to history : None    Henry Fuentes is a 64 y.o. male who presents to the ED with complaints of right upper arm abscess.  Patient states that he started noticing a rash on his right arm 1 week ago, patient is not sure whether he got bit by an insect.  Patient states that his rash was initially painless, started getting painful couple of days ago.  Patient also complains of tenderness on his

## 2024-03-27 ENCOUNTER — APPOINTMENT (OUTPATIENT)
Dept: GENERAL RADIOLOGY | Age: 64
DRG: 638 | End: 2024-03-27
Payer: MEDICARE

## 2024-03-27 ENCOUNTER — HOSPITAL ENCOUNTER (INPATIENT)
Age: 64
LOS: 1 days | Discharge: HOME | DRG: 638 | End: 2024-03-28
Attending: EMERGENCY MEDICINE | Admitting: INTERNAL MEDICINE
Payer: MEDICARE

## 2024-03-27 DIAGNOSIS — R78.81 POSITIVE BLOOD CULTURE: ICD-10-CM

## 2024-03-27 DIAGNOSIS — L03.113 CELLULITIS OF RIGHT UPPER EXTREMITY: Primary | ICD-10-CM

## 2024-03-27 LAB
ALBUMIN SERPL-MCNC: 3.6 G/DL (ref 3.5–5.2)
ALP SERPL-CCNC: 109 U/L (ref 40–129)
ALT SERPL-CCNC: 20 U/L (ref 0–40)
ANION GAP SERPL CALCULATED.3IONS-SCNC: 10 MMOL/L (ref 7–16)
AST SERPL-CCNC: 23 U/L (ref 0–39)
B-OH-BUTYR SERPL-MCNC: 0.16 MMOL/L (ref 0.02–0.27)
BASOPHILS # BLD: 0.04 K/UL (ref 0–0.2)
BASOPHILS NFR BLD: 0 % (ref 0–2)
BILIRUB SERPL-MCNC: 0.3 MG/DL (ref 0–1.2)
BUN SERPL-MCNC: 21 MG/DL (ref 6–23)
CALCIUM SERPL-MCNC: 9 MG/DL (ref 8.6–10.2)
CHLORIDE SERPL-SCNC: 102 MMOL/L (ref 98–107)
CO2 SERPL-SCNC: 23 MMOL/L (ref 22–29)
CREAT SERPL-MCNC: 1 MG/DL (ref 0.7–1.2)
EOSINOPHIL # BLD: 0.97 K/UL (ref 0.05–0.5)
EOSINOPHILS RELATIVE PERCENT: 10 % (ref 0–6)
ERYTHROCYTE [DISTWIDTH] IN BLOOD BY AUTOMATED COUNT: 13.8 % (ref 11.5–15)
GFR SERPL CREATININE-BSD FRML MDRD: 82 ML/MIN/1.73M2
GLUCOSE BLD-MCNC: 138 MG/DL (ref 74–99)
GLUCOSE SERPL-MCNC: 190 MG/DL (ref 74–99)
HCT VFR BLD AUTO: 41.3 % (ref 37–54)
HGB BLD-MCNC: 13.9 G/DL (ref 12.5–16.5)
IMM GRANULOCYTES # BLD AUTO: 0.03 K/UL (ref 0–0.58)
IMM GRANULOCYTES NFR BLD: 0 % (ref 0–5)
LACTATE BLDV-SCNC: 1 MMOL/L (ref 0.5–2.2)
LYMPHOCYTES NFR BLD: 2.33 K/UL (ref 1.5–4)
LYMPHOCYTES RELATIVE PERCENT: 25 % (ref 20–42)
MCH RBC QN AUTO: 29 PG (ref 26–35)
MCHC RBC AUTO-ENTMCNC: 33.7 G/DL (ref 32–34.5)
MCV RBC AUTO: 86.2 FL (ref 80–99.9)
MONOCYTES NFR BLD: 0.59 K/UL (ref 0.1–0.95)
MONOCYTES NFR BLD: 6 % (ref 2–12)
NEUTROPHILS NFR BLD: 58 % (ref 43–80)
NEUTS SEG NFR BLD: 5.51 K/UL (ref 1.8–7.3)
PH VENOUS: 7.4 (ref 7.35–7.45)
PLATELET # BLD AUTO: 159 K/UL (ref 130–450)
PMV BLD AUTO: 11.4 FL (ref 7–12)
POTASSIUM SERPL-SCNC: 4.5 MMOL/L (ref 3.5–5)
PROT SERPL-MCNC: 6.6 G/DL (ref 6.4–8.3)
RBC # BLD AUTO: 4.79 M/UL (ref 3.8–5.8)
SODIUM SERPL-SCNC: 135 MMOL/L (ref 132–146)
WBC OTHER # BLD: 9.5 K/UL (ref 4.5–11.5)

## 2024-03-27 PROCEDURE — 73060 X-RAY EXAM OF HUMERUS: CPT

## 2024-03-27 PROCEDURE — 2580000003 HC RX 258: Performed by: EMERGENCY MEDICINE

## 2024-03-27 PROCEDURE — 6360000002 HC RX W HCPCS: Performed by: INTERNAL MEDICINE

## 2024-03-27 PROCEDURE — 99222 1ST HOSP IP/OBS MODERATE 55: CPT | Performed by: INTERNAL MEDICINE

## 2024-03-27 PROCEDURE — 99285 EMERGENCY DEPT VISIT HI MDM: CPT

## 2024-03-27 PROCEDURE — 6370000000 HC RX 637 (ALT 250 FOR IP): Performed by: INTERNAL MEDICINE

## 2024-03-27 PROCEDURE — 87040 BLOOD CULTURE FOR BACTERIA: CPT

## 2024-03-27 PROCEDURE — 82010 KETONE BODYS QUAN: CPT

## 2024-03-27 PROCEDURE — 83605 ASSAY OF LACTIC ACID: CPT

## 2024-03-27 PROCEDURE — 96365 THER/PROPH/DIAG IV INF INIT: CPT

## 2024-03-27 PROCEDURE — 6360000002 HC RX W HCPCS: Performed by: EMERGENCY MEDICINE

## 2024-03-27 PROCEDURE — 94640 AIRWAY INHALATION TREATMENT: CPT

## 2024-03-27 PROCEDURE — 85025 COMPLETE CBC W/AUTO DIFF WBC: CPT

## 2024-03-27 PROCEDURE — 80053 COMPREHEN METABOLIC PANEL: CPT

## 2024-03-27 PROCEDURE — 6370000000 HC RX 637 (ALT 250 FOR IP)

## 2024-03-27 PROCEDURE — 1200000000 HC SEMI PRIVATE

## 2024-03-27 PROCEDURE — 96375 TX/PRO/DX INJ NEW DRUG ADDON: CPT

## 2024-03-27 PROCEDURE — 82962 GLUCOSE BLOOD TEST: CPT

## 2024-03-27 PROCEDURE — 82800 BLOOD PH: CPT

## 2024-03-27 RX ORDER — HYDROCODONE BITARTRATE AND ACETAMINOPHEN 5; 325 MG/1; MG/1
1 TABLET ORAL EVERY 6 HOURS PRN
Status: DISCONTINUED | OUTPATIENT
Start: 2024-03-27 | End: 2024-03-28 | Stop reason: HOSPADM

## 2024-03-27 RX ORDER — IPRATROPIUM BROMIDE AND ALBUTEROL SULFATE 2.5; .5 MG/3ML; MG/3ML
1 SOLUTION RESPIRATORY (INHALATION) EVERY 4 HOURS PRN
Status: DISCONTINUED | OUTPATIENT
Start: 2024-03-27 | End: 2024-03-28 | Stop reason: HOSPADM

## 2024-03-27 RX ORDER — BUDESONIDE AND FORMOTEROL FUMARATE DIHYDRATE 80; 4.5 UG/1; UG/1
2 AEROSOL RESPIRATORY (INHALATION)
Status: DISCONTINUED | OUTPATIENT
Start: 2024-03-27 | End: 2024-03-27 | Stop reason: CLARIF

## 2024-03-27 RX ORDER — INSULIN LISPRO 100 [IU]/ML
0-4 INJECTION, SOLUTION INTRAVENOUS; SUBCUTANEOUS NIGHTLY
Status: DISCONTINUED | OUTPATIENT
Start: 2024-03-27 | End: 2024-03-28 | Stop reason: HOSPADM

## 2024-03-27 RX ORDER — AMOXICILLIN AND CLAVULANATE POTASSIUM 500; 125 MG/1; MG/1
1 TABLET, FILM COATED ORAL 2 TIMES DAILY
Status: ON HOLD | COMMUNITY
Start: 2024-03-25 | End: 2024-03-28 | Stop reason: HOSPADM

## 2024-03-27 RX ORDER — SODIUM CHLORIDE 0.9 % (FLUSH) 0.9 %
5-40 SYRINGE (ML) INJECTION PRN
Status: DISCONTINUED | OUTPATIENT
Start: 2024-03-27 | End: 2024-03-28 | Stop reason: HOSPADM

## 2024-03-27 RX ORDER — FENTANYL CITRATE 50 UG/ML
25 INJECTION, SOLUTION INTRAMUSCULAR; INTRAVENOUS ONCE
Status: COMPLETED | OUTPATIENT
Start: 2024-03-27 | End: 2024-03-27

## 2024-03-27 RX ORDER — SODIUM CHLORIDE 0.9 % (FLUSH) 0.9 %
5-40 SYRINGE (ML) INJECTION EVERY 12 HOURS SCHEDULED
Status: DISCONTINUED | OUTPATIENT
Start: 2024-03-27 | End: 2024-03-28 | Stop reason: HOSPADM

## 2024-03-27 RX ORDER — ARFORMOTEROL TARTRATE 15 UG/2ML
15 SOLUTION RESPIRATORY (INHALATION)
Status: DISCONTINUED | OUTPATIENT
Start: 2024-03-27 | End: 2024-03-28 | Stop reason: HOSPADM

## 2024-03-27 RX ORDER — DILTIAZEM HYDROCHLORIDE 60 MG/1
2 TABLET, FILM COATED ORAL
COMMUNITY
Start: 2024-03-26

## 2024-03-27 RX ORDER — ACETAMINOPHEN 650 MG/1
650 SUPPOSITORY RECTAL EVERY 6 HOURS PRN
Status: DISCONTINUED | OUTPATIENT
Start: 2024-03-27 | End: 2024-03-28 | Stop reason: HOSPADM

## 2024-03-27 RX ORDER — ONDANSETRON 2 MG/ML
4 INJECTION INTRAMUSCULAR; INTRAVENOUS EVERY 6 HOURS PRN
Status: DISCONTINUED | OUTPATIENT
Start: 2024-03-27 | End: 2024-03-28 | Stop reason: HOSPADM

## 2024-03-27 RX ORDER — HYDROCODONE BITARTRATE AND ACETAMINOPHEN 5; 325 MG/1; MG/1
1 TABLET ORAL ONCE
Status: COMPLETED | OUTPATIENT
Start: 2024-03-27 | End: 2024-03-27

## 2024-03-27 RX ORDER — BUDESONIDE 0.5 MG/2ML
0.5 INHALANT ORAL
Status: DISCONTINUED | OUTPATIENT
Start: 2024-03-27 | End: 2024-03-28 | Stop reason: HOSPADM

## 2024-03-27 RX ORDER — TRAZODONE HYDROCHLORIDE 50 MG/1
100 TABLET ORAL NIGHTLY
Status: DISCONTINUED | OUTPATIENT
Start: 2024-03-27 | End: 2024-03-28 | Stop reason: HOSPADM

## 2024-03-27 RX ORDER — DEXTROSE MONOHYDRATE 100 MG/ML
INJECTION, SOLUTION INTRAVENOUS CONTINUOUS PRN
Status: DISCONTINUED | OUTPATIENT
Start: 2024-03-27 | End: 2024-03-28 | Stop reason: HOSPADM

## 2024-03-27 RX ORDER — POLYETHYLENE GLYCOL 3350 17 G/17G
17 POWDER, FOR SOLUTION ORAL DAILY PRN
Status: DISCONTINUED | OUTPATIENT
Start: 2024-03-27 | End: 2024-03-28 | Stop reason: HOSPADM

## 2024-03-27 RX ORDER — ROSUVASTATIN CALCIUM 20 MG/1
20 TABLET, COATED ORAL NIGHTLY
COMMUNITY
Start: 2024-02-23

## 2024-03-27 RX ORDER — SODIUM CHLORIDE 9 MG/ML
INJECTION, SOLUTION INTRAVENOUS PRN
Status: DISCONTINUED | OUTPATIENT
Start: 2024-03-27 | End: 2024-03-28 | Stop reason: HOSPADM

## 2024-03-27 RX ORDER — INSULIN LISPRO 100 [IU]/ML
18 INJECTION, SOLUTION INTRAVENOUS; SUBCUTANEOUS
Status: DISCONTINUED | OUTPATIENT
Start: 2024-03-27 | End: 2024-03-28 | Stop reason: HOSPADM

## 2024-03-27 RX ORDER — MORPHINE SULFATE 4 MG/ML
4 INJECTION, SOLUTION INTRAMUSCULAR; INTRAVENOUS
Status: DISCONTINUED | OUTPATIENT
Start: 2024-03-27 | End: 2024-03-28 | Stop reason: HOSPADM

## 2024-03-27 RX ORDER — ONDANSETRON 4 MG/1
4 TABLET, FILM COATED ORAL EVERY 12 HOURS PRN
COMMUNITY
Start: 2024-02-26

## 2024-03-27 RX ORDER — DULOXETIN HYDROCHLORIDE 60 MG/1
60 CAPSULE, DELAYED RELEASE ORAL NIGHTLY
Status: DISCONTINUED | OUTPATIENT
Start: 2024-03-27 | End: 2024-03-28 | Stop reason: CLARIF

## 2024-03-27 RX ORDER — INSULIN LISPRO 100 [IU]/ML
0-16 INJECTION, SOLUTION INTRAVENOUS; SUBCUTANEOUS
Status: DISCONTINUED | OUTPATIENT
Start: 2024-03-27 | End: 2024-03-27

## 2024-03-27 RX ORDER — ACETAMINOPHEN 325 MG/1
650 TABLET ORAL EVERY 6 HOURS PRN
Status: DISCONTINUED | OUTPATIENT
Start: 2024-03-27 | End: 2024-03-28 | Stop reason: HOSPADM

## 2024-03-27 RX ORDER — PREGABALIN 50 MG/1
100 CAPSULE ORAL 3 TIMES DAILY
Status: DISCONTINUED | OUTPATIENT
Start: 2024-03-27 | End: 2024-03-28 | Stop reason: HOSPADM

## 2024-03-27 RX ORDER — ENOXAPARIN SODIUM 100 MG/ML
30 INJECTION SUBCUTANEOUS 2 TIMES DAILY
Status: DISCONTINUED | OUTPATIENT
Start: 2024-03-27 | End: 2024-03-28 | Stop reason: HOSPADM

## 2024-03-27 RX ORDER — SEMAGLUTIDE 1.34 MG/ML
0.5 INJECTION, SOLUTION SUBCUTANEOUS WEEKLY
COMMUNITY
End: 2024-03-28 | Stop reason: SDUPTHER

## 2024-03-27 RX ORDER — FENOFIBRATE 160 MG/1
160 TABLET ORAL DAILY
Status: DISCONTINUED | OUTPATIENT
Start: 2024-03-27 | End: 2024-03-28 | Stop reason: HOSPADM

## 2024-03-27 RX ORDER — ROSUVASTATIN CALCIUM 20 MG/1
20 TABLET, COATED ORAL NIGHTLY
Status: DISCONTINUED | OUTPATIENT
Start: 2024-03-27 | End: 2024-03-28 | Stop reason: HOSPADM

## 2024-03-27 RX ORDER — LEVOTHYROXINE SODIUM 0.03 MG/1
25 TABLET ORAL NIGHTLY
Status: DISCONTINUED | OUTPATIENT
Start: 2024-03-27 | End: 2024-03-28 | Stop reason: HOSPADM

## 2024-03-27 RX ORDER — ENOXAPARIN SODIUM 100 MG/ML
40 INJECTION SUBCUTANEOUS DAILY
Status: DISCONTINUED | OUTPATIENT
Start: 2024-03-27 | End: 2024-03-27 | Stop reason: DRUGHIGH

## 2024-03-27 RX ORDER — NICOTINE 21 MG/24HR
1 PATCH, TRANSDERMAL 24 HOURS TRANSDERMAL DAILY
Status: DISCONTINUED | OUTPATIENT
Start: 2024-03-28 | End: 2024-03-28 | Stop reason: HOSPADM

## 2024-03-27 RX ORDER — LISINOPRIL 10 MG/1
10 TABLET ORAL EVERY MORNING
Status: DISCONTINUED | OUTPATIENT
Start: 2024-03-28 | End: 2024-03-28 | Stop reason: HOSPADM

## 2024-03-27 RX ORDER — INSULIN GLARGINE 100 [IU]/ML
50 INJECTION, SOLUTION SUBCUTANEOUS EVERY MORNING
Status: DISCONTINUED | OUTPATIENT
Start: 2024-03-28 | End: 2024-03-27

## 2024-03-27 RX ORDER — ONDANSETRON 4 MG/1
4 TABLET, ORALLY DISINTEGRATING ORAL EVERY 8 HOURS PRN
Status: DISCONTINUED | OUTPATIENT
Start: 2024-03-27 | End: 2024-03-28 | Stop reason: HOSPADM

## 2024-03-27 RX ORDER — GLUCAGON 1 MG/ML
1 KIT INJECTION PRN
Status: DISCONTINUED | OUTPATIENT
Start: 2024-03-27 | End: 2024-03-28 | Stop reason: HOSPADM

## 2024-03-27 RX ADMIN — IPRATROPIUM BROMIDE AND ALBUTEROL SULFATE 1 DOSE: 2.5; .5 SOLUTION RESPIRATORY (INHALATION) at 21:54

## 2024-03-27 RX ADMIN — BUDESONIDE INHALATION 500 MCG: 0.5 SUSPENSION RESPIRATORY (INHALATION) at 21:54

## 2024-03-27 RX ADMIN — HYDROCODONE BITARTRATE AND ACETAMINOPHEN 1 TABLET: 5; 325 TABLET ORAL at 22:43

## 2024-03-27 RX ADMIN — ARFORMOTEROL TARTRATE 15 MCG: 15 SOLUTION RESPIRATORY (INHALATION) at 21:54

## 2024-03-27 RX ADMIN — PREGABALIN 100 MG: 50 CAPSULE ORAL at 23:59

## 2024-03-27 RX ADMIN — FENTANYL CITRATE 25 MCG: 50 INJECTION INTRAMUSCULAR; INTRAVENOUS at 18:33

## 2024-03-27 RX ADMIN — VANCOMYCIN HYDROCHLORIDE 3000 MG: 10 INJECTION, POWDER, LYOPHILIZED, FOR SOLUTION INTRAVENOUS at 17:38

## 2024-03-27 RX ADMIN — HYDROCODONE BITARTRATE AND ACETAMINOPHEN 1 TABLET: 5; 325 TABLET ORAL at 15:33

## 2024-03-27 RX ADMIN — ENOXAPARIN SODIUM 30 MG: 100 INJECTION SUBCUTANEOUS at 23:58

## 2024-03-27 RX ADMIN — PIPERACILLIN AND TAZOBACTAM 4500 MG: 4; .5 INJECTION, POWDER, FOR SOLUTION INTRAVENOUS at 16:37

## 2024-03-27 RX ADMIN — INSULIN HUMAN 20 UNITS: 100 INJECTION, SOLUTION PARENTERAL at 23:42

## 2024-03-27 ASSESSMENT — PAIN DESCRIPTION - ORIENTATION
ORIENTATION: RIGHT

## 2024-03-27 ASSESSMENT — PAIN DESCRIPTION - LOCATION
LOCATION: ARM

## 2024-03-27 ASSESSMENT — PAIN SCALES - GENERAL
PAINLEVEL_OUTOF10: 9
PAINLEVEL_OUTOF10: 8
PAINLEVEL_OUTOF10: 9
PAINLEVEL_OUTOF10: 4
PAINLEVEL_OUTOF10: 10

## 2024-03-27 ASSESSMENT — PAIN - FUNCTIONAL ASSESSMENT: PAIN_FUNCTIONAL_ASSESSMENT: ACTIVITIES ARE NOT PREVENTED

## 2024-03-27 ASSESSMENT — PAIN DESCRIPTION - DESCRIPTORS
DESCRIPTORS: ACHING;DISCOMFORT;SORE;TENDER
DESCRIPTORS: BURNING

## 2024-03-27 ASSESSMENT — LIFESTYLE VARIABLES: HOW OFTEN DO YOU HAVE A DRINK CONTAINING ALCOHOL: NEVER

## 2024-03-27 NOTE — ED TRIAGE NOTES
Department of Emergency Medicine    FIRST PROVIDER TRIAGE NOTE             Independent MLP           3/27/24  1:35 PM EDT    Date of Encounter: 3/27/24   MRN: [id number]    Vitals:    03/27/24 1335   BP: 122/84   Pulse: (!) 101   Resp: 16   Temp: 98.6 °F (37 °C)   TempSrc: Oral   SpO2: 96%      HPI: Henry Fuentes is a 64 y.o. male who presents to the ED for Abnormal Lab (Positive blood cultures)     Wound to right upper arm  Also reports fevers    ROS: Negative for cp or sob.    Physical Exam:   Gen Appearance/Constitutional: alert  CV: regular rate     Initial Plan of Care: All treatment areas with department are currently occupied.     Plan to order/Initiate the following while awaiting opening in ED: Triage evaluation .    Initial Plan of Care: Initiate Treatment-Testing, Proceed toTreatment Area When Bed Available for ED Attending/MLP to Continue Care    Electronically signed by Pippa Palomino PA-C   DD: 3/27/24

## 2024-03-27 NOTE — PROGRESS NOTES
Calderon Burgos MD,    Your patient is on a medication that requires a weight dose adjustment.    Body Weight Function Assessment:    Date Body Weight IBW  Adjusted BW SCr  CrCl Dialysis status   3/27/2024    Ideal body weight: 77.6 kg (171 lb 1.2 oz)  Adjusted ideal body weight: 95.5 kg (210 lb 10.3 oz) Serum creatinine: 1 mg/dL 03/27/24 1601  Estimated creatinine clearance: 104 mL/min N/A       Pharmacy has weight dose-adjusted the following medication(s):    Date Previous Order Adjusted Order   3/27/2024 Lovenox 40 mg Sub-Q daily Lovenox 30 mg Sub-Q bid       These changes were made per protocol according to the [facility] Weight Based Dose Adjustment Policy.     *Please note this dose may need readjusted if your patient's condition changes.    Please contact pharmacy with any questions regarding these changes.    Calderon Means RPH  3/27/2024  6:56 PM

## 2024-03-27 NOTE — H&P
[facility] History and Physical        Chief Complaint:  cellulitis/abscess  History of Present Illness   The patient is a 64 y.o. male  PCP: Jason Brandt DO   Dm2- insulin 20 tid short acting +once weekly victoza  Claims BS were 180s in past week, till recently  Hx of osteomyelitis feet in ast  Inc bmi hx of ALCON, doesn't use CPap regulary  Hx of DVT    Onset R arm humerus lateral +swelling/erythema/pain;/ ?onset with ?insect bite one week,progressively painful/swelling/head of wound/abscess forming- but not draining--over past few days  Seen by PCP given augment and doxy  Bc of Pain seen in ER-- yesterday  Bedside US didn't show \"pocket of fluid\"- no I and D done  Wbc 14.1  Given norco and told to stop Augmentin and start doxycycline    Blood Cx drawn yesterday, though returned +for staph species  And told by ER to come back in today  To be admitted for IV abx    - hx taken from the patient  REVIEW OF SYSTEMS:  No  cp, sob    Past Medical History:      Diagnosis Date    Anxiety     COPD (chronic obstructive pulmonary disease) (HCC)     Depression     DM (diabetes mellitus) (HCC)     DVT of leg (deep venous thrombosis) (HCC)     Frostbite     HLD (hyperlipidemia)     HTN (hypertension)     Sleep apnea        Past Surgical History:        Procedure Laterality Date    BACK SURGERY      CHOLECYSTECTOMY      CORONARY ARTERY BYPASS GRAFT REDO      2 vessel    FOOT DEBRIDEMENT Right 4/25/2022    DELAYED PRIMARY CLOSURE RIGHT FOOT WOUND performed by Ignacio Bolden DPM at [facility] OR    FOOT DEBRIDEMENT Right 5/7/2022    FOOT DEBRIDEMENT INCISION AND DRAINAGE performed by Gideon Prakash DPM at [facility] OR    FOOT DEBRIDEMENT Right 5/10/2022    RIGHT FOOT DELAYED PRIMARY CLOSURE WITH POSSIBLE DEBRIDEMENT    ++CONTACT ISOLATION++   (DR AVAIL. AT 4PM) performed by Gideon Prakash DPM at [facility] OR    INSERT PICC LINE  5/9/2022         INSERT PICC LINE  8/4/2023    TOE AMPUTATION Right 4/22/2022    AMPUTATION  RIGHT SECOND TOE performed by Ignacio Bolden DPM at [facility] OR    TOE AMPUTATION Left 7/31/2023    LEFT POSSIBLE FIFTH RAY AMPUTATION AND BONE BIOPSY performed by Gideon Prakash DPM at [facility] OR    UPPER GASTROINTESTINAL ENDOSCOPY N/A 8/7/2023    EGD ESOPHAGOGASTRODUODENOSCOPY WITH DILATION performed by Patria Talley MD at [facility] ENDOSCOPY    UVULOPALATOPHARYGOPLASTY         Home Medications:  Prior to Admission medications    Medication Sig Start Date End Date Taking? Authorizing Provider   ondansetron (ZOFRAN) 4 MG tablet Take 1 tablet by mouth every 12 hours as needed for Nausea or Vomiting 2/26/24  Yes Iris Frances MD   SYMBICORT 80-4.5 MCG/ACT AERO Inhale 2 puffs into the lungs in the morning and 2 puffs in the evening. 3/26/24  Yes Iris Frances MD   amoxicillin-clavulanate (AUGMENTIN) 500-125 MG per tablet Take 1 tablet by mouth 2 times daily 3/25/24 4/3/24 Yes Iris Frances MD   rosuvastatin (CRESTOR) 20 MG tablet Take 1 tablet by mouth nightly 2/23/24  Yes Iris Frances MD   doxycycline hyclate (VIBRAMYCIN) 100 MG capsule Take 1 capsule by mouth 2 times daily for 10 days 3/26/24 4/5/24  Yves Hartmnan MD   HYDROcodone-acetaminophen (NORCO) 5-325 MG per tablet Take 1 tablet by mouth every 6 hours as needed for Pain for up to 3 days. Intended supply: 3 days. Take lowest dose possible to manage pain Max Daily Amount: 4 tablets 3/26/24 3/29/24  Yves Hartmann MD   DULoxetine (CYMBALTA) 60 MG extended release capsule Take 1 capsule by mouth nightly    Iris Frances MD   traZODone (DESYREL) 100 MG tablet Take 1 tablet by mouth nightly e 10/6/23   Iris Frances MD   insulin lispro, 1 Unit Dial, (HUMALOG KWIKPEN) 100 UNIT/ML SOPN 26 units TID plus high dose ISS max dose per day max dose per day  125 9/27/23   Jen Gonzalez APRN - CNP   insulin glargine (LANTUS SOLOSTAR) 100 UNIT/ML injection pen Inject 70 Units into the skin every morning 9/13/23   Ignacio Candelaria, APRN

## 2024-03-28 ENCOUNTER — OFFICE VISIT (OUTPATIENT)
Dept: ENDOCRINOLOGY | Age: 64
End: 2024-03-28
Payer: MEDICARE

## 2024-03-28 VITALS
DIASTOLIC BLOOD PRESSURE: 59 MMHG | WEIGHT: 268.5 LBS | SYSTOLIC BLOOD PRESSURE: 108 MMHG | HEIGHT: 72 IN | HEART RATE: 77 BPM | RESPIRATION RATE: 16 BRPM | TEMPERATURE: 97.7 F | OXYGEN SATURATION: 94 % | BODY MASS INDEX: 36.37 KG/M2

## 2024-03-28 VITALS
BODY MASS INDEX: 36.84 KG/M2 | HEIGHT: 72 IN | SYSTOLIC BLOOD PRESSURE: 108 MMHG | WEIGHT: 272 LBS | HEART RATE: 100 BPM | OXYGEN SATURATION: 86 % | DIASTOLIC BLOOD PRESSURE: 63 MMHG

## 2024-03-28 DIAGNOSIS — E03.9 PRIMARY HYPOTHYROIDISM: ICD-10-CM

## 2024-03-28 DIAGNOSIS — E66.09 CLASS 2 OBESITY DUE TO EXCESS CALORIES WITHOUT SERIOUS COMORBIDITY WITH BODY MASS INDEX (BMI) OF 36.0 TO 36.9 IN ADULT: ICD-10-CM

## 2024-03-28 DIAGNOSIS — E11.65 UNCONTROLLED TYPE 2 DIABETES MELLITUS WITH HYPERGLYCEMIA (HCC): Primary | ICD-10-CM

## 2024-03-28 DIAGNOSIS — E78.2 MIXED HYPERLIPIDEMIA: ICD-10-CM

## 2024-03-28 LAB
ALBUMIN SERPL-MCNC: 3.2 G/DL (ref 3.5–5.2)
ALP SERPL-CCNC: 116 U/L (ref 40–129)
ALT SERPL-CCNC: 27 U/L (ref 0–40)
ANION GAP SERPL CALCULATED.3IONS-SCNC: 9 MMOL/L (ref 7–16)
ASO AB SERPL-ACNC: 90 IU/ML (ref 0–200)
AST SERPL-CCNC: 30 U/L (ref 0–39)
BASOPHILS # BLD: 0.03 K/UL (ref 0–0.2)
BASOPHILS NFR BLD: 1 % (ref 0–2)
BILIRUB SERPL-MCNC: 0.3 MG/DL (ref 0–1.2)
BUN SERPL-MCNC: 21 MG/DL (ref 6–23)
CALCIUM SERPL-MCNC: 8.4 MG/DL (ref 8.6–10.2)
CHLORIDE SERPL-SCNC: 105 MMOL/L (ref 98–107)
CO2 SERPL-SCNC: 22 MMOL/L (ref 22–29)
CREAT SERPL-MCNC: 1.1 MG/DL (ref 0.7–1.2)
CRP SERPL HS-MCNC: 30 MG/L (ref 0–5)
EOSINOPHIL # BLD: 0.71 K/UL (ref 0.05–0.5)
EOSINOPHILS RELATIVE PERCENT: 12 % (ref 0–6)
ERYTHROCYTE [DISTWIDTH] IN BLOOD BY AUTOMATED COUNT: 14.1 % (ref 11.5–15)
GFR SERPL CREATININE-BSD FRML MDRD: 76 ML/MIN/1.73M2
GLUCOSE SERPL-MCNC: 219 MG/DL (ref 74–99)
HBA1C MFR BLD: 10.7 %
HCT VFR BLD AUTO: 36 % (ref 37–54)
HGB BLD-MCNC: 12 G/DL (ref 12.5–16.5)
IMM GRANULOCYTES # BLD AUTO: <0.03 K/UL (ref 0–0.58)
IMM GRANULOCYTES NFR BLD: 0 % (ref 0–5)
LYMPHOCYTES NFR BLD: 1.54 K/UL (ref 1.5–4)
LYMPHOCYTES RELATIVE PERCENT: 27 % (ref 20–42)
MCH RBC QN AUTO: 29 PG (ref 26–35)
MCHC RBC AUTO-ENTMCNC: 33.3 G/DL (ref 32–34.5)
MCV RBC AUTO: 87 FL (ref 80–99.9)
MONOCYTES NFR BLD: 0.52 K/UL (ref 0.1–0.95)
MONOCYTES NFR BLD: 9 % (ref 2–12)
NEUTROPHILS NFR BLD: 51 % (ref 43–80)
NEUTS SEG NFR BLD: 2.93 K/UL (ref 1.8–7.3)
PLATELET, FLUORESCENCE: 140 K/UL (ref 130–450)
PMV BLD AUTO: 11.9 FL (ref 7–12)
POTASSIUM SERPL-SCNC: 3.8 MMOL/L (ref 3.5–5)
PROCALCITONIN SERPL-MCNC: 0.13 NG/ML (ref 0–0.08)
PROT SERPL-MCNC: 6 G/DL (ref 6.4–8.3)
RBC # BLD AUTO: 4.14 M/UL (ref 3.8–5.8)
SODIUM SERPL-SCNC: 136 MMOL/L (ref 132–146)
WBC OTHER # BLD: 5.7 K/UL (ref 4.5–11.5)

## 2024-03-28 PROCEDURE — 94660 CPAP INITIATION&MGMT: CPT

## 2024-03-28 PROCEDURE — 99239 HOSP IP/OBS DSCHRG MGMT >30: CPT | Performed by: INTERNAL MEDICINE

## 2024-03-28 PROCEDURE — 80053 COMPREHEN METABOLIC PANEL: CPT

## 2024-03-28 PROCEDURE — 1111F DSCHRG MED/CURRENT MED MERGE: CPT | Performed by: CLINICAL NURSE SPECIALIST

## 2024-03-28 PROCEDURE — G8417 CALC BMI ABV UP PARAM F/U: HCPCS | Performed by: CLINICAL NURSE SPECIALIST

## 2024-03-28 PROCEDURE — 3046F HEMOGLOBIN A1C LEVEL >9.0%: CPT | Performed by: CLINICAL NURSE SPECIALIST

## 2024-03-28 PROCEDURE — 86140 C-REACTIVE PROTEIN: CPT

## 2024-03-28 PROCEDURE — 6370000000 HC RX 637 (ALT 250 FOR IP): Performed by: INTERNAL MEDICINE

## 2024-03-28 PROCEDURE — 4004F PT TOBACCO SCREEN RCVD TLK: CPT | Performed by: CLINICAL NURSE SPECIALIST

## 2024-03-28 PROCEDURE — 85025 COMPLETE CBC W/AUTO DIFF WBC: CPT

## 2024-03-28 PROCEDURE — G8427 DOCREV CUR MEDS BY ELIG CLIN: HCPCS | Performed by: CLINICAL NURSE SPECIALIST

## 2024-03-28 PROCEDURE — 3078F DIAST BP <80 MM HG: CPT | Performed by: CLINICAL NURSE SPECIALIST

## 2024-03-28 PROCEDURE — 99214 OFFICE O/P EST MOD 30 MIN: CPT | Performed by: CLINICAL NURSE SPECIALIST

## 2024-03-28 PROCEDURE — 84145 PROCALCITONIN (PCT): CPT

## 2024-03-28 PROCEDURE — 83036 HEMOGLOBIN GLYCOSYLATED A1C: CPT | Performed by: CLINICAL NURSE SPECIALIST

## 2024-03-28 PROCEDURE — 86063 ANTISTREPTOLYSIN O SCREEN: CPT

## 2024-03-28 PROCEDURE — 3074F SYST BP LT 130 MM HG: CPT | Performed by: CLINICAL NURSE SPECIALIST

## 2024-03-28 PROCEDURE — 3017F COLORECTAL CA SCREEN DOC REV: CPT | Performed by: CLINICAL NURSE SPECIALIST

## 2024-03-28 PROCEDURE — 95251 CONT GLUC MNTR ANALYSIS I&R: CPT | Performed by: CLINICAL NURSE SPECIALIST

## 2024-03-28 PROCEDURE — 2022F DILAT RTA XM EVC RTNOPTHY: CPT | Performed by: CLINICAL NURSE SPECIALIST

## 2024-03-28 PROCEDURE — 6370000000 HC RX 637 (ALT 250 FOR IP): Performed by: NURSE PRACTITIONER

## 2024-03-28 PROCEDURE — 6360000002 HC RX W HCPCS: Performed by: INTERNAL MEDICINE

## 2024-03-28 PROCEDURE — 2580000003 HC RX 258: Performed by: INTERNAL MEDICINE

## 2024-03-28 PROCEDURE — 36415 COLL VENOUS BLD VENIPUNCTURE: CPT

## 2024-03-28 PROCEDURE — 6370000000 HC RX 637 (ALT 250 FOR IP): Performed by: SPECIALIST

## 2024-03-28 PROCEDURE — 94640 AIRWAY INHALATION TREATMENT: CPT

## 2024-03-28 PROCEDURE — G8484 FLU IMMUNIZE NO ADMIN: HCPCS | Performed by: CLINICAL NURSE SPECIALIST

## 2024-03-28 RX ORDER — INSULIN LISPRO 100 [IU]/ML
INJECTION, SOLUTION INTRAVENOUS; SUBCUTANEOUS
Qty: 10 ADJUSTABLE DOSE PRE-FILLED PEN SYRINGE | Refills: 5 | Status: SHIPPED | OUTPATIENT
Start: 2024-03-28

## 2024-03-28 RX ORDER — INSULIN GLARGINE 100 [IU]/ML
50 INJECTION, SOLUTION SUBCUTANEOUS NIGHTLY
Qty: 5 ADJUSTABLE DOSE PRE-FILLED PEN SYRINGE | Refills: 5 | Status: SHIPPED | OUTPATIENT
Start: 2024-03-28

## 2024-03-28 RX ORDER — SEMAGLUTIDE 1.34 MG/ML
0.5 INJECTION, SOLUTION SUBCUTANEOUS WEEKLY
Qty: 1 ADJUSTABLE DOSE PRE-FILLED PEN SYRINGE | Refills: 5 | Status: SHIPPED | OUTPATIENT
Start: 2024-03-28

## 2024-03-28 RX ORDER — LINEZOLID 600 MG/1
600 TABLET, FILM COATED ORAL EVERY 12 HOURS SCHEDULED
Qty: 18 TABLET | Refills: 0 | Status: SHIPPED | OUTPATIENT
Start: 2024-03-28 | End: 2024-04-06

## 2024-03-28 RX ORDER — LINEZOLID 600 MG/1
600 TABLET, FILM COATED ORAL EVERY 12 HOURS SCHEDULED
Status: DISCONTINUED | OUTPATIENT
Start: 2024-03-28 | End: 2024-03-28 | Stop reason: HOSPADM

## 2024-03-28 RX ORDER — DULOXETIN HYDROCHLORIDE 60 MG/1
60 CAPSULE, DELAYED RELEASE ORAL DAILY
Status: DISCONTINUED | OUTPATIENT
Start: 2024-03-28 | End: 2024-03-28 | Stop reason: HOSPADM

## 2024-03-28 RX ADMIN — SODIUM CHLORIDE, PRESERVATIVE FREE 10 ML: 5 INJECTION INTRAVENOUS at 08:02

## 2024-03-28 RX ADMIN — SODIUM CHLORIDE, PRESERVATIVE FREE 10 ML: 5 INJECTION INTRAVENOUS at 00:06

## 2024-03-28 RX ADMIN — METOPROLOL TARTRATE 25 MG: 25 TABLET, FILM COATED ORAL at 00:03

## 2024-03-28 RX ADMIN — INSULIN HUMAN 20 UNITS: 100 INJECTION, SOLUTION PARENTERAL at 11:58

## 2024-03-28 RX ADMIN — FENOFIBRATE 160 MG: 160 TABLET ORAL at 08:01

## 2024-03-28 RX ADMIN — LISINOPRIL 10 MG: 10 TABLET ORAL at 08:02

## 2024-03-28 RX ADMIN — LEVOTHYROXINE SODIUM 25 MCG: 25 TABLET ORAL at 00:02

## 2024-03-28 RX ADMIN — ARFORMOTEROL TARTRATE 15 MCG: 15 SOLUTION RESPIRATORY (INHALATION) at 08:12

## 2024-03-28 RX ADMIN — INSULIN HUMAN 20 UNITS: 100 INJECTION, SOLUTION PARENTERAL at 07:04

## 2024-03-28 RX ADMIN — BUDESONIDE INHALATION 500 MCG: 0.5 SUSPENSION RESPIRATORY (INHALATION) at 08:12

## 2024-03-28 RX ADMIN — PREGABALIN 100 MG: 50 CAPSULE ORAL at 08:01

## 2024-03-28 RX ADMIN — LINEZOLID 600 MG: 600 TABLET, FILM COATED ORAL at 10:11

## 2024-03-28 RX ADMIN — METOPROLOL TARTRATE 25 MG: 25 TABLET, FILM COATED ORAL at 08:02

## 2024-03-28 RX ADMIN — FENOFIBRATE 160 MG: 160 TABLET ORAL at 00:01

## 2024-03-28 RX ADMIN — ENOXAPARIN SODIUM 30 MG: 100 INJECTION SUBCUTANEOUS at 08:01

## 2024-03-28 RX ADMIN — DULOXETINE HYDROCHLORIDE 60 MG: 60 CAPSULE, DELAYED RELEASE ORAL at 08:02

## 2024-03-28 RX ADMIN — PIPERACILLIN AND TAZOBACTAM 3375 MG: 3; .375 INJECTION, POWDER, FOR SOLUTION INTRAVENOUS at 00:36

## 2024-03-28 RX ADMIN — TRAZODONE HYDROCHLORIDE 100 MG: 50 TABLET ORAL at 00:00

## 2024-03-28 RX ADMIN — ROSUVASTATIN CALCIUM 20 MG: 20 TABLET, FILM COATED ORAL at 00:01

## 2024-03-28 NOTE — PROGRESS NOTES
Patient refusing bed alarm, education provided, and alarm encouraged. Alarm waiver signed and in chart, pt still refusing. Nahed Perry RN

## 2024-03-28 NOTE — CARE COORDINATION
Zyvox Script sent to pharmacy, no prior auth required. No co pay. Pharmacy is delivering.   Electronically signed by Su Sloan RN on 3/28/2024 at 10:44 AM

## 2024-03-28 NOTE — PROGRESS NOTES
CLINICAL PHARMACY NOTE: MEDS TO BEDS    Total # of Prescriptions Filled: 1   The following medications were delivered to the patient:  Linezolid 600 mg     Additional Documentation:

## 2024-03-28 NOTE — PLAN OF CARE
Problem: Pain  Goal: Verbalizes/displays adequate comfort level or baseline comfort level  Outcome: Progressing     Problem: Discharge Planning  Goal: Discharge to home or other facility with appropriate resources  Outcome: Progressing     Problem: Skin/Tissue Integrity  Goal: Absence of new skin breakdown  Description: 1.  Monitor for areas of redness and/or skin breakdown  2.  Assess vascular access sites hourly  3.  Every 4-6 hours minimum:  Change oxygen saturation probe site  4.  Every 4-6 hours:  If on nasal continuous positive airway pressure, respiratory therapy assess nares and determine need for appliance change or resting period.  Outcome: Progressing     Problem: Respiratory - Adult  Goal: Achieves optimal ventilation and oxygenation  Outcome: Progressing     Problem: Skin/Tissue Integrity - Adult  Goal: Skin integrity remains intact  Outcome: Progressing     Problem: Skin/Tissue Integrity - Adult  Goal: Incisions, wounds, or drain sites healing without S/S of infection  Outcome: Progressing     Problem: Gastrointestinal - Adult  Goal: Maintains adequate nutritional intake  Outcome: Progressing

## 2024-03-28 NOTE — PROGRESS NOTES
4 Eyes Skin Assessment     NAME:  Henry Fuentes  YOB: 1960  MEDICAL RECORD NUMBER:  [id number]    The patient is being assessed for  Admission    I agree that at least one RN has performed a thorough Head to Toe Skin Assessment on the patient. ALL assessment sites listed below have been assessed.      Areas assessed by both nurses:    Head, Face, Ears, Shoulders, Back, Chest, Arms, Elbows, Hands, Sacrum. Buttock, Coccyx, Ischium, Legs. Feet and Heels, and Under Medical Devices         Does the Patient have a Wound? Yes wound(s) were present on assessment. LDA wound assessment was Initiated and completed by RN       Jose Prevention initiated by RN: No  Wound Care Orders initiated by RN: Yes    Pressure Injury (Stage 3,4, Unstageable, DTI, NWPT, and Complex wounds) if present, place Wound referral order by RN under : No    New Ostomies, if present place, Ostomy referral order under : No     Nurse 1 eSignature: Electronically signed by Nahed Perry RN on 3/28/24 at 5:20 AM EDT    **SHARE this note so that the co-signing nurse can place an eSignature**    Nurse 2 eSignature: Electronically signed by Sarita Thorpe RN on 3/28/24 at 5:26 AM EDT

## 2024-03-28 NOTE — PROGRESS NOTES
[facility] Quality Flow/Interdisciplinary Rounds Progress Note        Quality Flow Rounds held on March 28, 2024    Disciplines Attending:  Bedside Nurse, , , and Nursing Unit Leadership    Henry Fuentes was admitted on 3/27/2024  1:37 PM    Anticipated Discharge Date:       Disposition:    Jose Score:  Jose Scale Score: 23    Readmission Risk              Risk of Unplanned Readmission:  23           Discussed patient goal for the day, patient clinical progression, and barriers to discharge.  The following Goal(s) of the Day/Commitment(s) have been identified:  Diagnostics - Report Results and Labs - Report Results, ID consulted      Rachael Mcginnis RN  March 28, 2024

## 2024-03-28 NOTE — CONSULTS
[facility]  [facility]  Consultation Note     Admit Date: 3/27/2024  1:37 PM    Reason for Consult:   Right upper extremity cellulitis/abscess-in diabetic/history of osteo    Attending Physician:  Janice Craven MD    HISTORY OF PRESENT ILLNESS:             The history is obtained from extensive review of available past medical records. The patient is a 64 y.o. male who is previously known to the ID service.  The patient presented to the ED at [facility] on 3/26/2024 with right upper extremity abscesses that he had noted the week prior.  On presentation he was afebrile and vital signs were unremarkable.  White count was 14.1.  CMP was unremarkable except for slightly elevated glucose.  He was discharged on doxycycline and Norco.  For some reason a urinalysis was done and blood cultures were also ordered.  Blood cultures turn positive for Staphylococcus species so he was brought back in back to the hospital.  The patient does admit to having some discomfort over the area where he had the cellulitis.  At 1 point he said that it was feeling worse and was spreading.  He did not have any fevers or chills.    Past Medical History:        Diagnosis Date    Anxiety     COPD (chronic obstructive pulmonary disease) ([facility])     Depression     DM (diabetes mellitus) ([facility])     DVT of leg (deep venous thrombosis) ([facility])     Frostbite     HLD (hyperlipidemia)     HTN (hypertension)     Sleep apnea      August 2023.  The patient had recently been admitted to [facility] and treated for osteomyelitis of the left fifth metatarsal with Vancomycin and Ceftriaxone.  He was also being treated for Candida esophagitis with Fluconazole.  He signed himself out from the nursing home because they would not allow him to smoke and were not giving him pain medications so he went back to the hospital and was readmitted.  Antibiotics were restarted.  Completed Fluconazole.  Ceftriaxone was discontinued and he  use of accessory muscles to breathe. Symmetrical expansion. Auscultation reveals no wheezing, crackles, or rhonchi.   Cardiovascular: S1 and S2 are rhythmic and regular. No murmurs appreciated.   Abdomen: Positive bowel sounds to auscultation. Benign to palpation. No masses felt. No hepatosplenomegaly.  Extremities: No edema.  Left fifth toe is missing.  There is an area of erythema with induration that measures approximately 4 x 2 cm.  There is minimal surrounding erythema.  No fluctuance.  No drainage.  It is better demarcated and smaller in size compared to the picture below.  Lines: Peripheral.      Lab Results   Component Value Date    CRP 32.0 (H) 03/26/2024    CRP 7.0 (H) 12/20/2023    CRP 8.0 (H) 08/10/2023      No results found for: \"CRPHS\"  Lab Results   Component Value Date    SEDRATE 31 (H) 03/26/2024    SEDRATE 17 (H) 12/20/2023    SEDRATE 28 (H) 08/10/2023       Radiology:  Noted    Microbiology:  Blood cultures 3/26/2024: Staphylococcus species in 2 of 4 bottles  Blood cultures 3/27/2024: Negative so far    Assessment:  Complicated skin soft tissue infection right upper extremity-improving  Staphylococcus bacteremia.  This is a contaminant    Plan:    Start Linezolid 600 mg p.o. twice daily x 10 days  The patient can be discharged from ID standpoint  Follow-up with PCP    Thank you for having us see this patient in consultation. I will be discussing this case with the treating physicians.  Spoke with nursing.    Mark Rodriguez MD  8:37 AM  3/28/2024

## 2024-03-28 NOTE — DISCHARGE INSTRUCTIONS
Take Linezolid as prescribed     As Discussed earlier, DO NOT take Cymbalta or Trazodone while taking the antibiotic (Linezolid)   Per order, last dose of the antibiotic is expected to be on 4/6.  You can restart taking the medications (Cymbalta and trazodone) 24 hours after the last dose of Linezolid

## 2024-03-28 NOTE — DISCHARGE SUMMARY
[facility] Hospitalist Physician Discharge Summary       No follow-up provider specified.    Activity level: As tolerated     Dispo: Home      Condition on discharge: Stable     Patient ID:  Henry Fuentes  [id number]  64 y.o.  1960    Admit date: 3/27/2024    Discharge date and time:  3/28/2024  12:30 PM    Admission Diagnoses: Principal Problem:    Cellulitis of right arm  Active Problems:    DM (diabetes mellitus) with peripheral vascular complication (HCC)    COPD (chronic obstructive pulmonary disease) (HCC)    Sleep apnea    HTN (hypertension)    Tobacco use  Resolved Problems:    * No resolved hospital problems. *      Discharge Diagnoses: Principal Problem:    Cellulitis of right arm  Active Problems:    DM (diabetes mellitus) with peripheral vascular complication (HCC)    COPD (chronic obstructive pulmonary disease) (HCC)    Sleep apnea    HTN (hypertension)    Tobacco use  Resolved Problems:    * No resolved hospital problems. *      Consults:  IP CONSULT TO INFECTIOUS DISEASES    Hospital Course:   Patient Henry Fuentes is a 64 y.o. presented with Cellulitis of right arm [L03.113]  Positive blood culture [R78.81]  Cellulitis of right upper extremity [L03.113]    Patient is a 64-year-old male who was admitted due to bacteremia.  He presented to the ED on 3/26 due to right upper extremity cellulitis after failing outpatient Augmentin.  Blood cultures were obtained and doxycycline was added.  On 3/27 blood cultures came back positive so patient was called by ED staff and was asked to come back.  He was seen by ID who reconciled his antibiotics to Zyvox.  Cellulitis/erythema is much improved today.  Patient will be discharged home in stable condition.  Discussed and instructions given to hold Cymbalta and trazodone while taking the linezolid and for 24 hours after the last dose.  Patient understands.    Discharge Exam:    General Appearance: alert and oriented to person, place and time and in no  soft tissues of the upper arm.       Patient Instructions:      Medication List        START taking these medications      linezolid 600 MG tablet  Commonly known as: ZYVOX  Take 1 tablet by mouth every 12 hours for 9 days            CONTINUE taking these medications      albuterol sulfate  (90 Base) MCG/ACT inhaler  Commonly known as: PROVENTIL;VENTOLIN;PROAIR     DULoxetine 60 MG extended release capsule  Commonly known as: CYMBALTA     fenofibrate 160 MG tablet  Commonly known as: TRIGLIDE     HYDROcodone-acetaminophen 5-325 MG per tablet  Commonly known as: Norco  Take 1 tablet by mouth every 6 hours as needed for Pain for up to 3 days. Intended supply: 3 days. Take lowest dose possible to manage pain Max Daily Amount: 4 tablets     insulin lispro protamine & lispro (75-25) 100 UNIT per ML Susp injection vial  Commonly known as: HumaLOG MIX     levothyroxine 25 MCG tablet  Commonly known as: SYNTHROID     lisinopril 10 MG tablet  Commonly known as: PRINIVIL;ZESTRIL     metoprolol tartrate 25 MG tablet  Commonly known as: LOPRESSOR     ondansetron 4 MG tablet  Commonly known as: ZOFRAN     Ozempic (0.25 or 0.5 MG/DOSE) 2 MG/1.5ML Sopn  Generic drug: Semaglutide(0.25 or 0.5MG/DOS)     pregabalin 100 MG capsule  Commonly known as: LYRICA     rosuvastatin 20 MG tablet  Commonly known as: CRESTOR     Symbicort 80-4.5 MCG/ACT Aero  Generic drug: budesonide-formoterol     traZODone 100 MG tablet  Commonly known as: DESYREL            STOP taking these medications      amoxicillin-clavulanate 500-125 MG per tablet  Commonly known as: AUGMENTIN     doxycycline hyclate 100 MG capsule  Commonly known as: VIBRAMYCIN               Where to Get Your Medications        These medications were sent to [address] -  [phone] - F [phone]  [address] [place] [address]      Phone: [phone]   linezolid 600 MG tablet           Note that more than 30 minutes

## 2024-03-28 NOTE — PROGRESS NOTES
[facility] [employer]  [facility] Department of Endocrinology Diabetes and Metabolism   [address], [place], [place], [address]   Phone: [phone]  Fax: [phone]    Date of Service: 3/28/2024    Primary Care Physician: Jason Brandt DO  Referring physician: No ref. provider found  Provider: CHRIST Zeng - CNS     Reason for the visit:  Type 2 DM       History of Present Illness:  The history is provided by the patient. No  was used. Accuracy of the patient data is excellent.  Henry Fuentes is a very pleasant 64 y.o. male seen today for diabetes management     Henry Fuentes was diagnosed with diabetes dx around 5 years ago and currently on Lantus 70 units at in am (has not taken in some time ) , Hlog 25 units TID with meals plus mod dose, ozempic 0.5 mg once weekly   Has insulin pump but did not like so he stopped   Previously on jardiance, metformin, and glimepiride   Trulicity was not covered  The patient has been checking blood sugar 4 times per day   Uses CGM  Ambulatory continuous glucose monitoring of interstitial tissue fluid via a subcutaneous sensor for a minimum of 72 hours; analysis, interpretation and report  Average sensor glucose 209  Time in range 42%  Hyperglycemia 58%  Hypoglycemia 0%      Most recent A1c results summarized below  Lab Results   Component Value Date/Time    LABA1C 10.7 03/28/2024 03:52 PM    LABA1C 9.2 10/10/2023 02:20 PM    LABA1C 12.2 07/27/2023 06:35 AM     Patient has had no hypoglycemic episodes   The patient has been mindful of what has been eating and following diabetic diet as encouraged  I reviewed current medications and the patient has no issues with diabetes medications  The patient is due for an eye exam. Last eye exam was > 1 year ago  , no h/o diabetic retinopathy  The patient seeing podiatrist every 3 months.   And also performs  own feet care  Microvascular complications:  No Retinopathy, Nephropathy or

## 2024-03-28 NOTE — CARE COORDINATION
Social Work discharge planning  Attempted to meet with pt, but he had his bipap on and appeared asleep. Will try again at later time.  Electronically signed by MARCELL Suárez on 3/28/2024 at 9:23 AM     ADDENDUM  Met with pt. He was dressed and ready to discharge. He said he needs help with housekeeping, but he does not have Medicaid anymore. SW offered to call [facility] to make Passport or [employer] program referral. Pt declined, saying he is not homebound. SW offered to have financial aide call him to help with applying for Medicaid again, but he declined and said he will go to [facility]. Pt denied needs for discharge planning.   Electronically signed by MARCELL Suárez on 3/28/2024 at 1:30 PM

## 2024-03-28 NOTE — PROGRESS NOTES
Clarified Zosyn order with Dr. Sunshine, he does want him to have a 2nd one time dose. Nahed Perry RN

## 2024-03-28 NOTE — ED NOTES
ED to Inpatient Handoff Report    Notified magdy that electronic handoff available and patient ready for transport to room 512.    Safety Risks: None identified    Patient in Restraints: no    Constant Observer or Patient : no    Telemetry Monitoring Ordered: No          Order to transfer to unit without monitor: NO    Last MEWS: 1   Time completed: 2015    Deterioration Index: 21.52    Vitals:    03/27/24 1335 03/27/24 1635 03/27/24 1740 03/27/24 2015   BP: 122/84 (!) 117/100 121/81 (!) 133/91   Pulse: (!) 101 94 88 82   Resp: 16 18 18 16   Temp: 98.6 °F (37 °C)   98.7 °F (37.1 °C)   TempSrc: Oral      SpO2: 96% 94% 93% 94%       Opportunity for questions and clarification was provided.

## 2024-03-29 LAB
ACB COMPLEX DNA BLD POS QL NAA+NON-PROBE: NOT DETECTED
B FRAGILIS DNA BLD POS QL NAA+NON-PROBE: NOT DETECTED
BIOFIRE TEST COMMENT: ABNORMAL
C ALBICANS DNA BLD POS QL NAA+NON-PROBE: NOT DETECTED
C AURIS DNA BLD POS QL NAA+NON-PROBE: NOT DETECTED
C GATTII+NEOFOR DNA BLD POS QL NAA+N-PRB: NOT DETECTED
C GLABRATA DNA BLD POS QL NAA+NON-PROBE: NOT DETECTED
C KRUSEI DNA BLD POS QL NAA+NON-PROBE: NOT DETECTED
C PARAP DNA BLD POS QL NAA+NON-PROBE: NOT DETECTED
C TROPICLS DNA BLD POS QL NAA+NON-PROBE: NOT DETECTED
E CLOAC COMP DNA BLD POS NAA+NON-PROBE: NOT DETECTED
E COLI DNA BLD POS QL NAA+NON-PROBE: NOT DETECTED
E FAECALIS DNA BLD POS QL NAA+NON-PROBE: NOT DETECTED
E FAECIUM DNA BLD POS QL NAA+NON-PROBE: NOT DETECTED
ENTEROBACTERALES DNA BLD POS NAA+N-PRB: NOT DETECTED
GP B STREP DNA BLD POS QL NAA+NON-PROBE: NOT DETECTED
HAEM INFLU DNA BLD POS QL NAA+NON-PROBE: NOT DETECTED
K OXYTOCA DNA BLD POS QL NAA+NON-PROBE: NOT DETECTED
KLEBSIELLA SP DNA BLD POS QL NAA+NON-PRB: NOT DETECTED
KLEBSIELLA SP DNA BLD POS QL NAA+NON-PRB: NOT DETECTED
L MONOCYTOG DNA BLD POS QL NAA+NON-PROBE: NOT DETECTED
MICROORGANISM SPEC CULT: ABNORMAL
MICROORGANISM SPEC CULT: ABNORMAL
MICROORGANISM/AGENT SPEC: ABNORMAL
MICROORGANISM/AGENT SPEC: ABNORMAL
N MEN DNA BLD POS QL NAA+NON-PROBE: NOT DETECTED
P AERUGINOSA DNA BLD POS NAA+NON-PROBE: NOT DETECTED
PROTEUS SP DNA BLD POS QL NAA+NON-PROBE: NOT DETECTED
S AUREUS DNA BLD POS QL NAA+NON-PROBE: NOT DETECTED
S AUREUS+CONS DNA BLD POS NAA+NON-PROBE: DETECTED
S EPIDERMIDIS DNA BLD POS QL NAA+NON-PRB: NOT DETECTED
S LUGDUNENSIS DNA BLD POS QL NAA+NON-PRB: NOT DETECTED
S MALTOPHILIA DNA BLD POS QL NAA+NON-PRB: NOT DETECTED
S MARCESCENS DNA BLD POS NAA+NON-PROBE: NOT DETECTED
S PNEUM DNA BLD POS QL NAA+NON-PROBE: NOT DETECTED
S PYO DNA BLD POS QL NAA+NON-PROBE: NOT DETECTED
SALMONELLA DNA BLD POS QL NAA+NON-PROBE: NOT DETECTED
SERVICE CMNT-IMP: ABNORMAL
SERVICE CMNT-IMP: ABNORMAL
SPECIMEN DESCRIPTION: ABNORMAL
SPECIMEN DESCRIPTION: ABNORMAL
STREPTOCOCCUS DNA BLD POS NAA+NON-PROBE: NOT DETECTED

## 2024-06-13 ENCOUNTER — HOSPITAL ENCOUNTER (INPATIENT)
Age: 64
LOS: 8 days | Discharge: NURSING, REHAB OR LONG-TERM CARE | DRG: 617 | End: 2024-06-21
Attending: EMERGENCY MEDICINE | Admitting: INTERNAL MEDICINE
Payer: MEDICARE

## 2024-06-13 ENCOUNTER — APPOINTMENT (OUTPATIENT)
Dept: CT IMAGING | Age: 64
DRG: 617 | End: 2024-06-13
Payer: MEDICARE

## 2024-06-13 ENCOUNTER — APPOINTMENT (OUTPATIENT)
Dept: GENERAL RADIOLOGY | Age: 64
DRG: 617 | End: 2024-06-13
Payer: MEDICARE

## 2024-06-13 DIAGNOSIS — Z89.439 PARTIAL NONTRAUMATIC AMPUTATION OF FOOT, UNSPECIFIED LATERALITY (HCC): ICD-10-CM

## 2024-06-13 DIAGNOSIS — E11.621 DIABETIC ULCER OF RIGHT FOOT DUE TO TYPE 2 DIABETES MELLITUS (HCC): ICD-10-CM

## 2024-06-13 DIAGNOSIS — E13.621 DIABETIC ULCER OF RIGHT FOOT ASSOCIATED WITH OTHER SPECIFIED DIABETES MELLITUS, UNSPECIFIED PART OF FOOT, UNSPECIFIED ULCER STAGE (HCC): ICD-10-CM

## 2024-06-13 DIAGNOSIS — E11.65 HYPERGLYCEMIA DUE TO DIABETES MELLITUS (HCC): ICD-10-CM

## 2024-06-13 DIAGNOSIS — L97.519 DIABETIC ULCER OF RIGHT FOOT DUE TO TYPE 2 DIABETES MELLITUS (HCC): ICD-10-CM

## 2024-06-13 DIAGNOSIS — L97.519 DIABETIC ULCER OF RIGHT FOOT ASSOCIATED WITH OTHER SPECIFIED DIABETES MELLITUS, UNSPECIFIED PART OF FOOT, UNSPECIFIED ULCER STAGE (HCC): ICD-10-CM

## 2024-06-13 DIAGNOSIS — S91.309A OPEN WOUND OF FOOT EXCLUDING TOES: Primary | ICD-10-CM

## 2024-06-13 DIAGNOSIS — N17.9 AKI (ACUTE KIDNEY INJURY) (HCC): ICD-10-CM

## 2024-06-13 LAB
ALBUMIN SERPL-MCNC: 3.7 G/DL (ref 3.5–5.2)
ALP SERPL-CCNC: 105 U/L (ref 40–129)
ALT SERPL-CCNC: 17 U/L (ref 0–40)
ANION GAP SERPL CALCULATED.3IONS-SCNC: 10 MMOL/L (ref 7–16)
AST SERPL-CCNC: 28 U/L (ref 0–39)
B-OH-BUTYR SERPL-MCNC: 0.08 MMOL/L (ref 0.02–0.27)
BASOPHILS # BLD: 0.04 K/UL (ref 0–0.2)
BASOPHILS NFR BLD: 1 % (ref 0–2)
BILIRUB SERPL-MCNC: 0.5 MG/DL (ref 0–1.2)
BUN SERPL-MCNC: 24 MG/DL (ref 6–23)
CALCIUM SERPL-MCNC: 9 MG/DL (ref 8.6–10.2)
CHLORIDE SERPL-SCNC: 97 MMOL/L (ref 98–107)
CO2 SERPL-SCNC: 27 MMOL/L (ref 22–29)
CREAT SERPL-MCNC: 1.5 MG/DL (ref 0.7–1.2)
EOSINOPHIL # BLD: 0.14 K/UL (ref 0.05–0.5)
EOSINOPHILS RELATIVE PERCENT: 2 % (ref 0–6)
ERYTHROCYTE [DISTWIDTH] IN BLOOD BY AUTOMATED COUNT: 12.8 % (ref 11.5–15)
GFR, ESTIMATED: 54 ML/MIN/1.73M2
GLUCOSE SERPL-MCNC: 346 MG/DL (ref 74–99)
HCT VFR BLD AUTO: 38.2 % (ref 37–54)
HGB BLD-MCNC: 12.9 G/DL (ref 12.5–16.5)
IMM GRANULOCYTES # BLD AUTO: 0.03 K/UL (ref 0–0.58)
IMM GRANULOCYTES NFR BLD: 0 % (ref 0–5)
LACTATE BLDV-SCNC: 1.8 MMOL/L (ref 0.5–2.2)
LYMPHOCYTES NFR BLD: 2.39 K/UL (ref 1.5–4)
LYMPHOCYTES RELATIVE PERCENT: 30 % (ref 20–42)
MCH RBC QN AUTO: 28.9 PG (ref 26–35)
MCHC RBC AUTO-ENTMCNC: 33.8 G/DL (ref 32–34.5)
MCV RBC AUTO: 85.7 FL (ref 80–99.9)
MONOCYTES NFR BLD: 0.52 K/UL (ref 0.1–0.95)
MONOCYTES NFR BLD: 7 % (ref 2–12)
NEUTROPHILS NFR BLD: 61 % (ref 43–80)
NEUTS SEG NFR BLD: 4.85 K/UL (ref 1.8–7.3)
PH VENOUS: 7.42 (ref 7.35–7.45)
PLATELET # BLD AUTO: 231 K/UL (ref 130–450)
PMV BLD AUTO: 11.1 FL (ref 7–12)
POTASSIUM SERPL-SCNC: 4.5 MMOL/L (ref 3.5–5)
PROT SERPL-MCNC: 7.7 G/DL (ref 6.4–8.3)
RBC # BLD AUTO: 4.46 M/UL (ref 3.8–5.8)
SODIUM SERPL-SCNC: 134 MMOL/L (ref 132–146)
WBC OTHER # BLD: 8 K/UL (ref 4.5–11.5)

## 2024-06-13 PROCEDURE — 87040 BLOOD CULTURE FOR BACTERIA: CPT

## 2024-06-13 PROCEDURE — 99285 EMERGENCY DEPT VISIT HI MDM: CPT

## 2024-06-13 PROCEDURE — 82010 KETONE BODYS QUAN: CPT

## 2024-06-13 PROCEDURE — 99222 1ST HOSP IP/OBS MODERATE 55: CPT | Performed by: INTERNAL MEDICINE

## 2024-06-13 PROCEDURE — 73700 CT LOWER EXTREMITY W/O DYE: CPT

## 2024-06-13 PROCEDURE — 87070 CULTURE OTHR SPECIMN AEROBIC: CPT

## 2024-06-13 PROCEDURE — 6370000000 HC RX 637 (ALT 250 FOR IP): Performed by: EMERGENCY MEDICINE

## 2024-06-13 PROCEDURE — 1200000000 HC SEMI PRIVATE

## 2024-06-13 PROCEDURE — 82800 BLOOD PH: CPT

## 2024-06-13 PROCEDURE — 73630 X-RAY EXAM OF FOOT: CPT

## 2024-06-13 PROCEDURE — 85025 COMPLETE CBC W/AUTO DIFF WBC: CPT

## 2024-06-13 PROCEDURE — 87205 SMEAR GRAM STAIN: CPT

## 2024-06-13 PROCEDURE — APPSS45 APP SPLIT SHARED TIME 31-45 MINUTES

## 2024-06-13 PROCEDURE — 2580000003 HC RX 258: Performed by: EMERGENCY MEDICINE

## 2024-06-13 PROCEDURE — 83605 ASSAY OF LACTIC ACID: CPT

## 2024-06-13 PROCEDURE — 87075 CULTR BACTERIA EXCEPT BLOOD: CPT

## 2024-06-13 PROCEDURE — 80053 COMPREHEN METABOLIC PANEL: CPT

## 2024-06-13 RX ORDER — FENOFIBRATE 160 MG/1
160 TABLET ORAL DAILY
Status: DISCONTINUED | OUTPATIENT
Start: 2024-06-14 | End: 2024-06-21 | Stop reason: DRUGHIGH

## 2024-06-13 RX ORDER — SODIUM CHLORIDE 0.9 % (FLUSH) 0.9 %
5-40 SYRINGE (ML) INJECTION EVERY 12 HOURS SCHEDULED
Status: DISCONTINUED | OUTPATIENT
Start: 2024-06-13 | End: 2024-06-21 | Stop reason: HOSPADM

## 2024-06-13 RX ORDER — INSULIN GLARGINE 100 [IU]/ML
30 INJECTION, SOLUTION SUBCUTANEOUS NIGHTLY
Status: DISCONTINUED | OUTPATIENT
Start: 2024-06-13 | End: 2024-06-21 | Stop reason: HOSPADM

## 2024-06-13 RX ORDER — MAGNESIUM SULFATE IN WATER 40 MG/ML
2000 INJECTION, SOLUTION INTRAVENOUS PRN
Status: DISCONTINUED | OUTPATIENT
Start: 2024-06-13 | End: 2024-06-21 | Stop reason: HOSPADM

## 2024-06-13 RX ORDER — 0.9 % SODIUM CHLORIDE 0.9 %
1000 INTRAVENOUS SOLUTION INTRAVENOUS ONCE
Status: COMPLETED | OUTPATIENT
Start: 2024-06-13 | End: 2024-06-13

## 2024-06-13 RX ORDER — INSULIN LISPRO 100 [IU]/ML
0-4 INJECTION, SOLUTION INTRAVENOUS; SUBCUTANEOUS NIGHTLY
Status: DISCONTINUED | OUTPATIENT
Start: 2024-06-13 | End: 2024-06-21 | Stop reason: HOSPADM

## 2024-06-13 RX ORDER — ALBUTEROL SULFATE 90 UG/1
2 AEROSOL, METERED RESPIRATORY (INHALATION) EVERY 6 HOURS PRN
Status: DISCONTINUED | OUTPATIENT
Start: 2024-06-13 | End: 2024-06-13 | Stop reason: CLARIF

## 2024-06-13 RX ORDER — TRAMADOL HYDROCHLORIDE 50 MG/1
25 TABLET ORAL EVERY 6 HOURS PRN
Status: DISCONTINUED | OUTPATIENT
Start: 2024-06-13 | End: 2024-06-16

## 2024-06-13 RX ORDER — TRAZODONE HYDROCHLORIDE 50 MG/1
100 TABLET ORAL NIGHTLY
Status: DISCONTINUED | OUTPATIENT
Start: 2024-06-13 | End: 2024-06-21 | Stop reason: HOSPADM

## 2024-06-13 RX ORDER — GLUCAGON 1 MG/ML
1 KIT INJECTION PRN
Status: DISCONTINUED | OUTPATIENT
Start: 2024-06-13 | End: 2024-06-21 | Stop reason: HOSPADM

## 2024-06-13 RX ORDER — ACETAMINOPHEN 325 MG/1
650 TABLET ORAL EVERY 6 HOURS PRN
Status: DISCONTINUED | OUTPATIENT
Start: 2024-06-13 | End: 2024-06-21 | Stop reason: HOSPADM

## 2024-06-13 RX ORDER — POTASSIUM CHLORIDE 7.45 MG/ML
10 INJECTION INTRAVENOUS PRN
Status: DISCONTINUED | OUTPATIENT
Start: 2024-06-13 | End: 2024-06-21 | Stop reason: HOSPADM

## 2024-06-13 RX ORDER — DEXTROSE MONOHYDRATE 100 MG/ML
INJECTION, SOLUTION INTRAVENOUS CONTINUOUS PRN
Status: DISCONTINUED | OUTPATIENT
Start: 2024-06-13 | End: 2024-06-21 | Stop reason: HOSPADM

## 2024-06-13 RX ORDER — SODIUM CHLORIDE 9 MG/ML
INJECTION, SOLUTION INTRAVENOUS PRN
Status: DISCONTINUED | OUTPATIENT
Start: 2024-06-13 | End: 2024-06-21 | Stop reason: HOSPADM

## 2024-06-13 RX ORDER — ROSUVASTATIN CALCIUM 20 MG/1
20 TABLET, COATED ORAL NIGHTLY
Status: DISCONTINUED | OUTPATIENT
Start: 2024-06-13 | End: 2024-06-21 | Stop reason: HOSPADM

## 2024-06-13 RX ORDER — POTASSIUM CHLORIDE 20 MEQ/1
40 TABLET, EXTENDED RELEASE ORAL PRN
Status: DISCONTINUED | OUTPATIENT
Start: 2024-06-13 | End: 2024-06-21 | Stop reason: HOSPADM

## 2024-06-13 RX ORDER — PREGABALIN 50 MG/1
100 CAPSULE ORAL 3 TIMES DAILY
Status: DISCONTINUED | OUTPATIENT
Start: 2024-06-13 | End: 2024-06-21 | Stop reason: HOSPADM

## 2024-06-13 RX ORDER — BUDESONIDE AND FORMOTEROL FUMARATE DIHYDRATE 80; 4.5 UG/1; UG/1
2 AEROSOL RESPIRATORY (INHALATION)
Status: DISCONTINUED | OUTPATIENT
Start: 2024-06-13 | End: 2024-06-13 | Stop reason: CLARIF

## 2024-06-13 RX ORDER — ALBUTEROL SULFATE 2.5 MG/3ML
2.5 SOLUTION RESPIRATORY (INHALATION) EVERY 6 HOURS PRN
Status: DISCONTINUED | OUTPATIENT
Start: 2024-06-13 | End: 2024-06-21 | Stop reason: HOSPADM

## 2024-06-13 RX ORDER — POLYETHYLENE GLYCOL 3350 17 G/17G
17 POWDER, FOR SOLUTION ORAL DAILY PRN
Status: DISCONTINUED | OUTPATIENT
Start: 2024-06-13 | End: 2024-06-21 | Stop reason: HOSPADM

## 2024-06-13 RX ORDER — DULOXETIN HYDROCHLORIDE 60 MG/1
60 CAPSULE, DELAYED RELEASE ORAL NIGHTLY
Status: DISCONTINUED | OUTPATIENT
Start: 2024-06-13 | End: 2024-06-14

## 2024-06-13 RX ORDER — ARFORMOTEROL TARTRATE 15 UG/2ML
15 SOLUTION RESPIRATORY (INHALATION)
Status: DISCONTINUED | OUTPATIENT
Start: 2024-06-13 | End: 2024-06-21 | Stop reason: HOSPADM

## 2024-06-13 RX ORDER — ACETAMINOPHEN 650 MG/1
650 SUPPOSITORY RECTAL EVERY 6 HOURS PRN
Status: DISCONTINUED | OUTPATIENT
Start: 2024-06-13 | End: 2024-06-21 | Stop reason: HOSPADM

## 2024-06-13 RX ORDER — LEVOTHYROXINE SODIUM 0.03 MG/1
25 TABLET ORAL NIGHTLY
Status: DISCONTINUED | OUTPATIENT
Start: 2024-06-13 | End: 2024-06-14

## 2024-06-13 RX ORDER — BUDESONIDE 0.25 MG/2ML
0.25 INHALANT ORAL
Status: DISCONTINUED | OUTPATIENT
Start: 2024-06-13 | End: 2024-06-21 | Stop reason: HOSPADM

## 2024-06-13 RX ORDER — SODIUM CHLORIDE 0.9 % (FLUSH) 0.9 %
5-40 SYRINGE (ML) INJECTION PRN
Status: DISCONTINUED | OUTPATIENT
Start: 2024-06-13 | End: 2024-06-21 | Stop reason: HOSPADM

## 2024-06-13 RX ORDER — SODIUM CHLORIDE 9 MG/ML
INJECTION, SOLUTION INTRAVENOUS CONTINUOUS
Status: ACTIVE | OUTPATIENT
Start: 2024-06-13 | End: 2024-06-15

## 2024-06-13 RX ORDER — ENOXAPARIN SODIUM 100 MG/ML
30 INJECTION SUBCUTANEOUS 2 TIMES DAILY
Status: DISCONTINUED | OUTPATIENT
Start: 2024-06-14 | End: 2024-06-15 | Stop reason: DRUGHIGH

## 2024-06-13 RX ORDER — INSULIN LISPRO 100 [IU]/ML
0-8 INJECTION, SOLUTION INTRAVENOUS; SUBCUTANEOUS
Status: DISCONTINUED | OUTPATIENT
Start: 2024-06-14 | End: 2024-06-21 | Stop reason: HOSPADM

## 2024-06-13 RX ADMIN — SODIUM CHLORIDE 1000 ML: 9 INJECTION, SOLUTION INTRAVENOUS at 22:16

## 2024-06-13 RX ADMIN — INSULIN HUMAN 5 UNITS: 100 INJECTION, SOLUTION PARENTERAL at 22:16

## 2024-06-13 ASSESSMENT — PAIN - FUNCTIONAL ASSESSMENT: PAIN_FUNCTIONAL_ASSESSMENT: NONE - DENIES PAIN

## 2024-06-13 NOTE — ED PROVIDER NOTES
[facility] EMERGENCY DEPARTMENT  EMERGENCY DEPARTMENT ENCOUNTER        Pt Name: Henry Fuentes  MRN: [id number]  Birthdate 1960  Date of evaluation: 6/13/2024  Provider: Maddi Alvarenga MD  PCP: Jason Brandt DO  Note Started: 7:14 PM EDT 6/13/24    CHIEF COMPLAINT       Chief Complaint   Patient presents with    Wound Check     Sent in by dr prakash for partial foot amputation on right side, hx of dm type 2       HISTORY OF PRESENT ILLNESS: 1 or more Elements   History From:  patient    Limitations to history : None    Henry Fuentes is a 64 y.o. male who presents to the emergency department with acute on chronic worsening of right diabetic foot wound.  Patient states the wound has been there for over a month.  He is currently on Bactrim size podiatry Dr. Prakash yesterday and was told to come into the hospital for partial amputation and treatment for osteomyelitis.  No fevers no chills he is an insulin-dependent diabetic.  Patient has not checked his sugars recently.  No nausea no vomiting.  No increased pain or purulence from the wound.    Nursing Notes were all reviewed and agreed with or any disagreements were addressed in the HPI.      REVIEW OF EXTERNAL NOTE :       Reviewed podiatry outpatient note from Dr. Prakash from yesterday.  Patient was there for follow-up of ulcer to the right foot.  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 Bactrim the symptoms have worsened.  Recommended admission for IV antibiotics and partial fifth resection.    REVIEW OF SYSTEMS :           Positives and Pertinent negatives as per HPI.     SURGICAL HISTORY     Past Surgical History:   Procedure Laterality Date    BACK SURGERY      CHOLECYSTECTOMY      CORONARY ARTERY BYPASS GRAFT REDO      2 vessel    FOOT DEBRIDEMENT Right 4/25/2022    DELAYED PRIMARY CLOSURE RIGHT FOOT WOUND performed by Ignacio  User Index  [KK] Maddi Alvarenga MD          CONSULTS: (Who and What was discussed)  See ED course      I am the Primary Clinician of Record.    FINAL IMPRESSION      1. Open wound of foot excluding toes    2. ALVINO (acute kidney injury) (HCC)    3. Hyperglycemia due to diabetes mellitus (HCC)          DISPOSITION/PLAN     DISPOSITION Admitted 06/13/2024 09:29:16 PM      PATIENT REFERRED TO:  No follow-up provider specified.    DISCHARGE MEDICATIONS:  New Prescriptions    No medications on file       DISCONTINUED MEDICATIONS:  Discontinued Medications    No medications on file              (Please note that portions of this note were completed with a voice recognition program.  Efforts were made to edit the dictations but occasionally words are mis-transcribed.)    Maddi Alvarenga MD (electronically signed)            Maddi Alavrenga MD  06/14/24 3249

## 2024-06-13 NOTE — ED NOTES
Department of Emergency Medicine  FIRST PROVIDER TRIAGE NOTE             [facility]           6/13/24  4:32 PM EDT    Date of Encounter: 6/13/24   MRN: [id number]      HPI: Henry Fuentes is a 64 y.o. male who presents to the ED for Wound Check (Sent in by dr david for partial foot amputation on right side, hx of dm type 2)       ROS: Negative for cp or sob.    PE: Gen Appearance/Constitutional: alert  HEENT: NC/NT. PERRLA,  Airway patent.    Provider-Patient relationship only established for Provider In Triage (PIT)  Full assessment, HPI and examination not performed, therefore, it is not yet possible to state whether or not an emergency medical condition exists   Focused assessment only during triage. This is not a comprehensive evaluation due to no physical ER space, staff shortage and high numbers of boarding patients in the ER.       Initial Plan of Care: All treatment areas with department are currently occupied. Plan to order/Initiate the following while awaiting opening in ED: .  Initiate Treatment-Testing, Proceed toTreatment Area When Bed Available for ED Attending/MLP to Continue Care    Electronically signed by CHRIST Jarquin CNP   DD: 6/13/24      Brian Lujan APRN - CNP  06/13/24 3216

## 2024-06-14 ENCOUNTER — APPOINTMENT (OUTPATIENT)
Dept: MRI IMAGING | Age: 64
DRG: 617 | End: 2024-06-14
Payer: MEDICARE

## 2024-06-14 ENCOUNTER — ANESTHESIA (OUTPATIENT)
Dept: OPERATING ROOM | Age: 64
End: 2024-06-14
Payer: MEDICARE

## 2024-06-14 ENCOUNTER — ANESTHESIA EVENT (OUTPATIENT)
Dept: OPERATING ROOM | Age: 64
End: 2024-06-14
Payer: MEDICARE

## 2024-06-14 LAB
ANION GAP SERPL CALCULATED.3IONS-SCNC: 9 MMOL/L (ref 7–16)
BASOPHILS # BLD: 0.05 K/UL (ref 0–0.2)
BASOPHILS NFR BLD: 1 % (ref 0–2)
BUN SERPL-MCNC: 23 MG/DL (ref 6–23)
CALCIUM SERPL-MCNC: 8.4 MG/DL (ref 8.6–10.2)
CHLORIDE SERPL-SCNC: 101 MMOL/L (ref 98–107)
CO2 SERPL-SCNC: 25 MMOL/L (ref 22–29)
CREAT SERPL-MCNC: 1.3 MG/DL (ref 0.7–1.2)
CRP SERPL HS-MCNC: 8 MG/L (ref 0–5)
EOSINOPHIL # BLD: 0.14 K/UL (ref 0.05–0.5)
EOSINOPHILS RELATIVE PERCENT: 2 % (ref 0–6)
ERYTHROCYTE [DISTWIDTH] IN BLOOD BY AUTOMATED COUNT: 12.8 % (ref 11.5–15)
ERYTHROCYTE [SEDIMENTATION RATE] IN BLOOD BY WESTERGREN METHOD: 45 MM/HR (ref 0–15)
GFR, ESTIMATED: 60 ML/MIN/1.73M2
GLUCOSE BLD-MCNC: 243 MG/DL (ref 74–99)
GLUCOSE BLD-MCNC: 247 MG/DL (ref 74–99)
GLUCOSE BLD-MCNC: 254 MG/DL (ref 74–99)
GLUCOSE BLD-MCNC: 271 MG/DL (ref 74–99)
GLUCOSE BLD-MCNC: 277 MG/DL (ref 74–99)
GLUCOSE BLD-MCNC: 304 MG/DL (ref 74–99)
GLUCOSE SERPL-MCNC: 369 MG/DL (ref 74–99)
HBA1C MFR BLD: 11.9 % (ref 4–5.6)
HCT VFR BLD AUTO: 35.3 % (ref 37–54)
HGB BLD-MCNC: 11.7 G/DL (ref 12.5–16.5)
IMM GRANULOCYTES # BLD AUTO: 0.03 K/UL (ref 0–0.58)
IMM GRANULOCYTES NFR BLD: 0 % (ref 0–5)
INR PPP: 1
LYMPHOCYTES NFR BLD: 2.51 K/UL (ref 1.5–4)
LYMPHOCYTES RELATIVE PERCENT: 36 % (ref 20–42)
MCH RBC QN AUTO: 28.3 PG (ref 26–35)
MCHC RBC AUTO-ENTMCNC: 33.1 G/DL (ref 32–34.5)
MCV RBC AUTO: 85.3 FL (ref 80–99.9)
MONOCYTES NFR BLD: 0.57 K/UL (ref 0.1–0.95)
MONOCYTES NFR BLD: 8 % (ref 2–12)
NEUTROPHILS NFR BLD: 53 % (ref 43–80)
NEUTS SEG NFR BLD: 3.71 K/UL (ref 1.8–7.3)
PLATELET # BLD AUTO: 185 K/UL (ref 130–450)
PMV BLD AUTO: 11.1 FL (ref 7–12)
POTASSIUM SERPL-SCNC: 4.7 MMOL/L (ref 3.5–5)
PROTHROMBIN TIME: 11.7 SEC (ref 9.3–12.4)
RBC # BLD AUTO: 4.14 M/UL (ref 3.8–5.8)
SODIUM SERPL-SCNC: 135 MMOL/L (ref 132–146)
WBC OTHER # BLD: 7 K/UL (ref 4.5–11.5)

## 2024-06-14 PROCEDURE — 87070 CULTURE OTHR SPECIMN AEROBIC: CPT

## 2024-06-14 PROCEDURE — 87015 SPECIMEN INFECT AGNT CONCNTJ: CPT

## 2024-06-14 PROCEDURE — 85652 RBC SED RATE AUTOMATED: CPT

## 2024-06-14 PROCEDURE — 7100000011 HC PHASE II RECOVERY - ADDTL 15 MIN: Performed by: PODIATRIST

## 2024-06-14 PROCEDURE — 87116 MYCOBACTERIA CULTURE: CPT

## 2024-06-14 PROCEDURE — 86140 C-REACTIVE PROTEIN: CPT

## 2024-06-14 PROCEDURE — 2580000003 HC RX 258

## 2024-06-14 PROCEDURE — 3700000000 HC ANESTHESIA ATTENDED CARE: Performed by: PODIATRIST

## 2024-06-14 PROCEDURE — 3600000002 HC SURGERY LEVEL 2 BASE: Performed by: PODIATRIST

## 2024-06-14 PROCEDURE — 3700000001 HC ADD 15 MINUTES (ANESTHESIA): Performed by: PODIATRIST

## 2024-06-14 PROCEDURE — 6370000000 HC RX 637 (ALT 250 FOR IP)

## 2024-06-14 PROCEDURE — 87176 TISSUE HOMOGENIZATION CULTR: CPT

## 2024-06-14 PROCEDURE — 73718 MRI LOWER EXTREMITY W/O DYE: CPT

## 2024-06-14 PROCEDURE — 6360000002 HC RX W HCPCS

## 2024-06-14 PROCEDURE — 87206 SMEAR FLUORESCENT/ACID STAI: CPT

## 2024-06-14 PROCEDURE — 88305 TISSUE EXAM BY PATHOLOGIST: CPT

## 2024-06-14 PROCEDURE — 6360000002 HC RX W HCPCS: Performed by: REGISTERED NURSE

## 2024-06-14 PROCEDURE — 94664 DEMO&/EVAL PT USE INHALER: CPT

## 2024-06-14 PROCEDURE — 2580000003 HC RX 258: Performed by: REGISTERED NURSE

## 2024-06-14 PROCEDURE — 87205 SMEAR GRAM STAIN: CPT

## 2024-06-14 PROCEDURE — 87075 CULTR BACTERIA EXCEPT BLOOD: CPT

## 2024-06-14 PROCEDURE — 6360000002 HC RX W HCPCS: Performed by: PODIATRIST

## 2024-06-14 PROCEDURE — 85610 PROTHROMBIN TIME: CPT

## 2024-06-14 PROCEDURE — 87077 CULTURE AEROBIC IDENTIFY: CPT

## 2024-06-14 PROCEDURE — 82962 GLUCOSE BLOOD TEST: CPT

## 2024-06-14 PROCEDURE — 88311 DECALCIFY TISSUE: CPT

## 2024-06-14 PROCEDURE — 2500000003 HC RX 250 WO HCPCS: Performed by: PODIATRIST

## 2024-06-14 PROCEDURE — 0Y6X0Z0 DETACHMENT AT RIGHT 5TH TOE, COMPLETE, OPEN APPROACH: ICD-10-PCS | Performed by: PODIATRIST

## 2024-06-14 PROCEDURE — 7100000010 HC PHASE II RECOVERY - FIRST 15 MIN: Performed by: PODIATRIST

## 2024-06-14 PROCEDURE — 87102 FUNGUS ISOLATION CULTURE: CPT

## 2024-06-14 PROCEDURE — 85025 COMPLETE CBC W/AUTO DIFF WBC: CPT

## 2024-06-14 PROCEDURE — 3600000012 HC SURGERY LEVEL 2 ADDTL 15MIN: Performed by: PODIATRIST

## 2024-06-14 PROCEDURE — 99233 SBSQ HOSP IP/OBS HIGH 50: CPT | Performed by: INTERNAL MEDICINE

## 2024-06-14 PROCEDURE — 0QBN0ZZ EXCISION OF RIGHT METATARSAL, OPEN APPROACH: ICD-10-PCS | Performed by: PODIATRIST

## 2024-06-14 PROCEDURE — 80048 BASIC METABOLIC PNL TOTAL CA: CPT

## 2024-06-14 PROCEDURE — 1200000000 HC SEMI PRIVATE

## 2024-06-14 PROCEDURE — 2709999900 HC NON-CHARGEABLE SUPPLY: Performed by: PODIATRIST

## 2024-06-14 PROCEDURE — 88304 TISSUE EXAM BY PATHOLOGIST: CPT

## 2024-06-14 PROCEDURE — 94640 AIRWAY INHALATION TREATMENT: CPT

## 2024-06-14 PROCEDURE — 83036 HEMOGLOBIN GLYCOSYLATED A1C: CPT

## 2024-06-14 RX ORDER — FENTANYL CITRATE 50 UG/ML
50 INJECTION, SOLUTION INTRAMUSCULAR; INTRAVENOUS ONCE
Status: DISCONTINUED | OUTPATIENT
Start: 2024-06-14 | End: 2024-06-21 | Stop reason: HOSPADM

## 2024-06-14 RX ORDER — SODIUM CHLORIDE 9 MG/ML
INJECTION, SOLUTION INTRAVENOUS PRN
Status: DISCONTINUED | OUTPATIENT
Start: 2024-06-14 | End: 2024-06-14 | Stop reason: HOSPADM

## 2024-06-14 RX ORDER — PROCHLORPERAZINE EDISYLATE 5 MG/ML
5 INJECTION INTRAMUSCULAR; INTRAVENOUS
Status: DISCONTINUED | OUTPATIENT
Start: 2024-06-14 | End: 2024-06-14 | Stop reason: HOSPADM

## 2024-06-14 RX ORDER — LABETALOL HYDROCHLORIDE 5 MG/ML
5 INJECTION, SOLUTION INTRAVENOUS
Status: DISCONTINUED | OUTPATIENT
Start: 2024-06-14 | End: 2024-06-14 | Stop reason: HOSPADM

## 2024-06-14 RX ORDER — SODIUM CHLORIDE 9 MG/ML
INJECTION, SOLUTION INTRAVENOUS CONTINUOUS PRN
Status: DISCONTINUED | OUTPATIENT
Start: 2024-06-14 | End: 2024-06-14 | Stop reason: SDUPTHER

## 2024-06-14 RX ORDER — DIPHENHYDRAMINE HYDROCHLORIDE 50 MG/ML
12.5 INJECTION INTRAMUSCULAR; INTRAVENOUS
Status: DISCONTINUED | OUTPATIENT
Start: 2024-06-14 | End: 2024-06-14 | Stop reason: HOSPADM

## 2024-06-14 RX ORDER — NALOXONE HYDROCHLORIDE 0.4 MG/ML
INJECTION, SOLUTION INTRAMUSCULAR; INTRAVENOUS; SUBCUTANEOUS PRN
Status: DISCONTINUED | OUTPATIENT
Start: 2024-06-14 | End: 2024-06-14 | Stop reason: HOSPADM

## 2024-06-14 RX ORDER — HYDRALAZINE HYDROCHLORIDE 20 MG/ML
5 INJECTION INTRAMUSCULAR; INTRAVENOUS
Status: DISCONTINUED | OUTPATIENT
Start: 2024-06-14 | End: 2024-06-14 | Stop reason: HOSPADM

## 2024-06-14 RX ORDER — DULOXETIN HYDROCHLORIDE 60 MG/1
60 CAPSULE, DELAYED RELEASE ORAL EVERY MORNING
Status: DISCONTINUED | OUTPATIENT
Start: 2024-06-14 | End: 2024-06-21 | Stop reason: HOSPADM

## 2024-06-14 RX ORDER — LEVOTHYROXINE SODIUM 0.03 MG/1
25 TABLET ORAL
Status: DISCONTINUED | OUTPATIENT
Start: 2024-06-14 | End: 2024-06-21 | Stop reason: HOSPADM

## 2024-06-14 RX ORDER — PROPOFOL 10 MG/ML
INJECTION, EMULSION INTRAVENOUS CONTINUOUS PRN
Status: DISCONTINUED | OUTPATIENT
Start: 2024-06-14 | End: 2024-06-14 | Stop reason: SDUPTHER

## 2024-06-14 RX ORDER — FENTANYL CITRATE 50 UG/ML
25 INJECTION, SOLUTION INTRAMUSCULAR; INTRAVENOUS EVERY 5 MIN PRN
Status: DISCONTINUED | OUTPATIENT
Start: 2024-06-14 | End: 2024-06-14 | Stop reason: HOSPADM

## 2024-06-14 RX ORDER — SODIUM CHLORIDE 0.9 % (FLUSH) 0.9 %
5-40 SYRINGE (ML) INJECTION EVERY 12 HOURS SCHEDULED
Status: DISCONTINUED | OUTPATIENT
Start: 2024-06-14 | End: 2024-06-14 | Stop reason: HOSPADM

## 2024-06-14 RX ORDER — SODIUM CHLORIDE 0.9 % (FLUSH) 0.9 %
5-40 SYRINGE (ML) INJECTION PRN
Status: DISCONTINUED | OUTPATIENT
Start: 2024-06-14 | End: 2024-06-14 | Stop reason: HOSPADM

## 2024-06-14 RX ORDER — MEPERIDINE HYDROCHLORIDE 25 MG/ML
12.5 INJECTION INTRAMUSCULAR; INTRAVENOUS; SUBCUTANEOUS ONCE
Status: DISCONTINUED | OUTPATIENT
Start: 2024-06-14 | End: 2024-06-14 | Stop reason: HOSPADM

## 2024-06-14 RX ADMIN — ENOXAPARIN SODIUM 30 MG: 100 INJECTION SUBCUTANEOUS at 19:57

## 2024-06-14 RX ADMIN — PREGABALIN 100 MG: 50 CAPSULE ORAL at 14:10

## 2024-06-14 RX ADMIN — METOPROLOL TARTRATE 25 MG: 25 TABLET, FILM COATED ORAL at 19:57

## 2024-06-14 RX ADMIN — SODIUM CHLORIDE: 9 INJECTION, SOLUTION INTRAVENOUS at 12:05

## 2024-06-14 RX ADMIN — INSULIN LISPRO 4 UNITS: 100 INJECTION, SOLUTION INTRAVENOUS; SUBCUTANEOUS at 11:02

## 2024-06-14 RX ADMIN — TRAMADOL HYDROCHLORIDE 25 MG: 50 TABLET, COATED ORAL at 14:10

## 2024-06-14 RX ADMIN — INSULIN GLARGINE 30 UNITS: 100 INJECTION, SOLUTION SUBCUTANEOUS at 19:58

## 2024-06-14 RX ADMIN — INSULIN LISPRO 2 UNITS: 100 INJECTION, SOLUTION INTRAVENOUS; SUBCUTANEOUS at 07:04

## 2024-06-14 RX ADMIN — PIPERACILLIN AND TAZOBACTAM 3375 MG: 3; .375 INJECTION, POWDER, LYOPHILIZED, FOR SOLUTION INTRAVENOUS at 20:01

## 2024-06-14 RX ADMIN — TRAZODONE HYDROCHLORIDE 100 MG: 50 TABLET ORAL at 19:57

## 2024-06-14 RX ADMIN — PROPOFOL 100 MCG/KG/MIN: 10 INJECTION, EMULSION INTRAVENOUS at 12:12

## 2024-06-14 RX ADMIN — BUDESONIDE 250 MCG: 0.25 SUSPENSION RESPIRATORY (INHALATION) at 21:07

## 2024-06-14 RX ADMIN — ROSUVASTATIN CALCIUM 20 MG: 20 TABLET, FILM COATED ORAL at 19:57

## 2024-06-14 RX ADMIN — DULOXETINE HYDROCHLORIDE 60 MG: 60 CAPSULE, DELAYED RELEASE ORAL at 09:04

## 2024-06-14 RX ADMIN — BUDESONIDE 250 MCG: 0.25 SUSPENSION RESPIRATORY (INHALATION) at 06:01

## 2024-06-14 RX ADMIN — SODIUM CHLORIDE: 9 INJECTION, SOLUTION INTRAVENOUS at 00:24

## 2024-06-14 RX ADMIN — PREGABALIN 100 MG: 50 CAPSULE ORAL at 09:04

## 2024-06-14 RX ADMIN — ARFORMOTEROL TARTRATE 15 MCG: 15 SOLUTION RESPIRATORY (INHALATION) at 21:07

## 2024-06-14 RX ADMIN — ARFORMOTEROL TARTRATE 15 MCG: 15 SOLUTION RESPIRATORY (INHALATION) at 06:01

## 2024-06-14 RX ADMIN — PREGABALIN 100 MG: 50 CAPSULE ORAL at 00:18

## 2024-06-14 RX ADMIN — METOPROLOL TARTRATE 25 MG: 25 TABLET, FILM COATED ORAL at 09:04

## 2024-06-14 RX ADMIN — PREGABALIN 100 MG: 50 CAPSULE ORAL at 19:57

## 2024-06-14 RX ADMIN — PIPERACILLIN AND TAZOBACTAM 4500 MG: 4; .5 INJECTION, POWDER, LYOPHILIZED, FOR SOLUTION INTRAVENOUS at 16:07

## 2024-06-14 RX ADMIN — SODIUM CHLORIDE, PRESERVATIVE FREE 10 ML: 5 INJECTION INTRAVENOUS at 09:06

## 2024-06-14 RX ADMIN — TRAMADOL HYDROCHLORIDE 25 MG: 50 TABLET, COATED ORAL at 00:18

## 2024-06-14 RX ADMIN — TRAZODONE HYDROCHLORIDE 100 MG: 50 TABLET ORAL at 01:26

## 2024-06-14 RX ADMIN — FENOFIBRATE 160 MG: 160 TABLET ORAL at 09:04

## 2024-06-14 RX ADMIN — INSULIN LISPRO 4 UNITS: 100 INJECTION, SOLUTION INTRAVENOUS; SUBCUTANEOUS at 16:09

## 2024-06-14 ASSESSMENT — PAIN DESCRIPTION - DESCRIPTORS
DESCRIPTORS: ACHING
DESCRIPTORS: ACHING;DISCOMFORT

## 2024-06-14 ASSESSMENT — PAIN SCALES - GENERAL
PAINLEVEL_OUTOF10: 9

## 2024-06-14 ASSESSMENT — PAIN - FUNCTIONAL ASSESSMENT
PAIN_FUNCTIONAL_ASSESSMENT: 0-10
PAIN_FUNCTIONAL_ASSESSMENT: PREVENTS OR INTERFERES SOME ACTIVE ACTIVITIES AND ADLS
PAIN_FUNCTIONAL_ASSESSMENT: NONE - DENIES PAIN
PAIN_FUNCTIONAL_ASSESSMENT: PREVENTS OR INTERFERES SOME ACTIVE ACTIVITIES AND ADLS

## 2024-06-14 ASSESSMENT — PAIN DESCRIPTION - ORIENTATION
ORIENTATION: RIGHT

## 2024-06-14 ASSESSMENT — COPD QUESTIONNAIRES: CAT_SEVERITY: MODERATE

## 2024-06-14 ASSESSMENT — LIFESTYLE VARIABLES: SMOKING_STATUS: 1

## 2024-06-14 ASSESSMENT — PAIN DESCRIPTION - LOCATION
LOCATION: FOOT

## 2024-06-14 ASSESSMENT — ENCOUNTER SYMPTOMS: DYSPNEA ACTIVITY LEVEL: NO INTERVAL CHANGE

## 2024-06-14 NOTE — PROGRESS NOTES
Call placed to Dr Prakash's answering service regarding order for MRI.   Patient still remains in ER and MRI not likely to be complete til after midnight.   Will plan to obtain MRI first thing tomorrow am.

## 2024-06-14 NOTE — PROGRESS NOTES
[facility] Hospitalist Progress Note    Admitting Date and Time: 6/13/2024  6:35 PM  Admit Dx: ALVINO (acute kidney injury) (HCC) [N17.9]  Diabetic ulcer of right foot due to type 2 diabetes mellitus (HCC) [E11.621, L97.519]  Open wound of foot excluding toes [S91.309A]  Hyperglycemia due to diabetes mellitus (HCC) [E11.65]    Subjective:  Patient is being followed for ALVINO (acute kidney injury) (HCC) [N17.9]  Diabetic ulcer of right foot due to type 2 diabetes mellitus (HCC) [E11.621, L97.519]  Open wound of foot excluding toes [S91.309A]  Hyperglycemia due to diabetes mellitus (HCC) [E11.65]   Patient seen and examined.  N.p.o. for debridement today  pain 5/10    ROS: denies fever, chills, cp, sob, n/v, HA unless stated above.      DULoxetine  60 mg Oral QAM    levothyroxine  25 mcg Oral QAM AC    sodium chloride flush  5-40 mL IntraVENous 2 times per day    enoxaparin  30 mg SubCUTAneous BID    insulin lispro  0-8 Units SubCUTAneous TID WC    insulin lispro  0-4 Units SubCUTAneous Nightly    insulin glargine  30 Units SubCUTAneous Nightly    fenofibrate  160 mg Oral Daily    metoprolol tartrate  25 mg Oral BID    pregabalin  100 mg Oral TID    rosuvastatin  20 mg Oral Nightly    traZODone  100 mg Oral Nightly    budesonide  0.25 mg Nebulization BID RT    And    arformoterol tartrate  15 mcg Nebulization BID RT     sodium chloride flush, 5-40 mL, PRN  sodium chloride, , PRN  potassium chloride, 40 mEq, PRN   Or  potassium alternative oral replacement, 40 mEq, PRN   Or  potassium chloride, 10 mEq, PRN  magnesium sulfate, 2,000 mg, PRN  polyethylene glycol, 17 g, Daily PRN  acetaminophen, 650 mg, Q6H PRN   Or  acetaminophen, 650 mg, Q6H PRN  dextrose bolus, 125 mL, PRN   Or  dextrose bolus, 250 mL, PRN  glucagon (rDNA), 1 mg, PRN  dextrose, , Continuous PRN  Glucose, 15 g, PRN  traMADol, 25 mg, Q6H PRN  albuterol, 2.5 mg, Q6H PRN         Objective:    BP (!) 112/56   Pulse 80   Temp 98.3 °F (36.8 °C) (Oral)   was made to ensure accuracy; however, inadvertent computerized transcription errors may be present.  Electronically signed by Nikki Lees MD on 6/14/2024 at 9:49 AM

## 2024-06-14 NOTE — H&P
[facility] Hospitalist Group History and Physical      CHIEF COMPLAINT: Wound check    History of Present Illness:  This is a 63 year old male with a past medical history of COPD, depression, diabetes mellitus, HLD, and HTN who presents to the ED with worsening of his diabetic foot wound.  Patient is unsure how long he has had this wound, but he was recently admitted on 6/3 for sepsis/cellulitis. MRI during the admission was negative for acute osteomyelitis. He was discharged on 6/6 on a complex 7-day course of Keflex. Patient then followed with Dr. Prakash and was started on a course of Bactrim.  Patient states he has been compliant with his antibiotics.  Patient was sent in by Dr. Prakash for possible surgery and IV antibiotics. Reports a history of uncontrolled insulin-dependent diabetes mellitus.  Patient does complain of constant, burning pain in his right foot and is requesting a social work consult as he lives with his niece, but would like to consider rehab on discharge.  Denies nausea, vomiting, fever, chills, diarrhea, or urinary symptoms.  Workup in ED was notable for BUN 24, creatinine 1.5, glucose 346.  X-ray of right foot was negative for osteomyelitis or soft tissue gas.  Patient was treated with 5 units regular insulin and 1 L NS. Patient will be admitted for further evaluation and treatment.  Of note, patient has signed out AMA in the past and stated he is considering signing out AMA this admission.      Informant(s) for H&P: Patient and chart review    REVIEW OF SYSTEMS:  A comprehensive review of systems was negative except for: what is in the HPI      PMH:  Past Medical History:   Diagnosis Date    Anxiety     COPD (chronic obstructive pulmonary disease) (HCC)     Depression     DM (diabetes mellitus) (HCC)     DVT of leg (deep venous thrombosis) (HCC)     Frostbite     HLD (hyperlipidemia)     HTN (hypertension)     Sleep apnea        Surgical History:  Past Surgical History:   Procedure    rosuvastatin (CRESTOR) 20 MG tablet Take 1 tablet by mouth nightly 2/23/24   Iris Frances MD   DULoxetine (CYMBALTA) 60 MG extended release capsule Take 1 capsule by mouth nightly    Iris Frances MD   traZODone (DESYREL) 100 MG tablet Take 1 tablet by mouth nightly e 10/6/23   Iris Frances MD   lisinopril (PRINIVIL;ZESTRIL) 10 MG tablet Take 1 tablet by mouth every morning    Iris Frances MD   levothyroxine (SYNTHROID) 25 MCG tablet Take 1 tablet by mouth nightly    Iris Frances MD   fenofibrate (TRIGLIDE) 160 MG tablet Take 1 tablet by mouth every morning    Iris Frances MD   metoprolol tartrate (LOPRESSOR) 25 MG tablet Take 1 tablet by mouth 2 times daily    Iris Frances MD   pregabalin (LYRICA) 100 MG capsule Take 1 capsule by mouth 3 times daily.    Iris Frances MD   albuterol sulfate HFA (PROVENTIL;VENTOLIN;PROAIR) 108 (90 Base) MCG/ACT inhaler Inhale 2 puffs into the lungs every 4-6 hours as needed for Wheezing or Shortness of Breath    Iris Frances MD       Allergies:    Patient has no known allergies.    Social History:    reports that he has been smoking cigarettes. He started smoking about 44 years ago. He has a 33.7 pack-year smoking history. He has never used smokeless tobacco. He reports that he does not currently use alcohol. He reports that he does not currently use drugs after having used the following drugs: Marijuana (Weed).    Family History:   family history includes Dementia in his mother; Diabetes in his father, mother, and sister; Heart Attack in his brother; Stroke in his father.       PHYSICAL EXAM:  Vitals:  BP (!) 132/59   Pulse 88   Temp 97.9 °F (36.6 °C) (Oral)   Resp 18   Ht 1.829 m (6')   Wt 122.5 kg (270 lb)   SpO2 96%   BMI 36.62 kg/m²     General Appearance: alert and oriented to person, place and time and in no acute distress, obese  Skin: warm and dry  Head: normocephalic and

## 2024-06-14 NOTE — ANESTHESIA PRE PROCEDURE
Department of Anesthesiology  Preprocedure Note       Name:  Henry Fuentes   Age:  64 y.o.  :  1960                                          MRN:  [id number]         Date:  2024      Surgeon: Surgeon(s):  Gideon Prakash DPM    Procedure: Procedure(s):  RIGHT FOOT DEBRIDEMENT PARTIAL FIFTH RAY RESECTION    Medications prior to admission:   Prior to Admission medications    Medication Sig Start Date End Date Taking? Authorizing Provider   insulin lispro, 1 Unit Dial, (HUMALOG KWIKPEN) 100 UNIT/ML SOPN 18 units TID with meals plus mod dose ISS max dose per day 100 units  Patient taking differently: Inject 20 Units into the skin 3 times daily (before meals) 20 units TID with meals plus mod dose ISS max dose per day 100 units 3/28/24   Ignacio Candelaria APRN - CNS   insulin glargine (LANTUS SOLOSTAR) 100 UNIT/ML injection pen Inject 50 Units into the skin nightly 3/28/24   Ignacio Candelaria APRN - CNS   Semaglutide,0.25 or 0.5MG/DOS, (OZEMPIC, 0.25 OR 0.5 MG/DOSE,) 2 MG/1.5ML SOPN Inject 0.5 Doses into the skin Once a week at 5 PM Takes on Saturday 3/28/24   Ignacio Candelaria APRN - CNS   ondansetron (ZOFRAN) 4 MG tablet Take 1 tablet by mouth every 12 hours as needed for Nausea or Vomiting 24   Iris Frances MD   SYMBICORT 80-4.5 MCG/ACT AERO Inhale 2 puffs into the lungs in the morning and 2 puffs in the evening. 3/26/24   Iris Frances MD   rosuvastatin (CRESTOR) 20 MG tablet Take 1 tablet by mouth nightly 24   Iris Frances MD   DULoxetine (CYMBALTA) 60 MG extended release capsule Take 1 capsule by mouth every morning    Iris Frances MD   traZODone (DESYREL) 100 MG tablet Take 1 tablet by mouth nightly e 10/6/23   Iris Frances MD   lisinopril (PRINIVIL;ZESTRIL) 10 MG tablet Take 1 tablet by mouth every morning    Iris Frances MD   levothyroxine (SYNTHROID) 25 MCG tablet Take 1 tablet by mouth every morning    Provider, MD Iris

## 2024-06-14 NOTE — PROGRESS NOTES
Patient has a history of being claustrophobic during MRI but states that if he is able to have his arms out of the machine that he will be fine for the MRI of right foot.    Electronically signed by Ritu Mar RN on 6/14/2024 at 6:35 AM

## 2024-06-14 NOTE — PROGRESS NOTES
Called Dr. Lees to notify of patients 9/10 pain following his foot debridement. Left message, awaiting call back. Electronically signed by Arik Medina RN on 6/14/2024 at 1:53 PM

## 2024-06-14 NOTE — CARE COORDINATION
Introduced my self and provided explanation of CM role to patient. Patient is awake, alert, and aware of current diagnosis and discharge planning. Patient is from home with his niece  independently. Patient has a walker and a cpap at home. PCP is Dr. Brandt. Preferred pharmacy is [facility]. Patient states he has had [facility] in the past and would like them to follow for wound care at home. Referral made to Priscilla from [facility]. Await input. Will need [facility] orders once needs are know.   Electronically signed by Su Sloan RN on 6/14/2024 at 2:11 PM    UPDATE: [facility] can accept   Electronically signed by Su Sloan RN on 6/14/2024 at 3:19 PM

## 2024-06-14 NOTE — ED NOTES
ED to Inpatient Handoff Report    Notified Ritu RN that electronic handoff available and patient ready for transport to room 0519.    Safety Risks: Risk of falls    Patient in Restraints: no    Constant Observer or Patient : no    Telemetry Monitoring Ordered :No           Order to transfer to unit without monitor:N/A    Last MEWS: 1 Time completed: 2233    Deterioration Index Score:   Predictive Model Details          22 (Normal)  Factor Value    Calculated 6/13/2024 22:33 54% Age 64 years old    Deterioration Index Model 15% Potassium 4.5 mmol/L     12% Respiratory rate 18     5% Systolic 132     5% BUN abnormal (24 mg/dL)     4% Sodium 134 mmol/L     2% Pulse 88     2% Blood pH 7.420     1% WBC count 8.0 k/uL     0% Temperature 97.9 °F (36.6 °C)     0% Hematocrit 38.2 %     0% Pulse oximetry 96 %        Vitals:    06/13/24 1632 06/13/24 2233   BP: (!) 125/56 (!) 132/59   Pulse: 93 88   Resp: 16 18   Temp: 97.7 °F (36.5 °C) 97.9 °F (36.6 °C)   TempSrc: Oral Oral   SpO2: 96% 96%   Weight: 122.5 kg (270 lb)    Height: 1.829 m (6')          Opportunity for questions and clarification was provided.

## 2024-06-14 NOTE — OP NOTE
[place] [id number]                            OPERATIVE REPORT      PATIENT NAME: BRAYAN BARRAZA                : 1960  MED REC NO: [id number]                        ROOM: [facility]  ACCOUNT NO: [id number]                       ADMIT DATE: 2024  PROVIDER: Gideon Prakash DPM      DATE OF PROCEDURE:  24    SURGEON:  Gideon Prakash DPM    PREOPERATIVE DIAGNOSES:    1. Acute osteomyelitis, right foot.  2. Cellulitis with abscess, right foot.  3. Ulceration, right foot with necrosis of muscle.  4. Insulin-dependent diabetes mellitus with neuropathy.    POSTOPERATIVE DIAGNOSES:    1. Acute osteomyelitis, right foot.  2. Cellulitis with abscess, right foot.  3. Ulceration, right foot with necrosis of muscle.  4. Insulin-dependent diabetes mellitus with neuropathy.    PROCEDURES:    1. Incision and drainage, right fifth metatarsal bone.  2. Amputation, right fifth toe to the level of metatarsophalangeal joint.    ANESTHESIA:  Monitored anesthesia care.    INJECTABLES:  20 mL of 0.5% Marcaine plain.    COMPLICATIONS:  None.    HEMOSTASIS:  On the field.    ESTIMATED BLOOD LOSS:  Minimal.    SPECIMENS:  Right fifth toe, right fifth metatarsal bone for pathological and microbiological examination, and right foot abscess.    INTRAOPERATIVE FINDINGS:  Necrosis, soft tissue and bone.  Over 3 mL of seropurulent abscess evacuated.    INDICATIONS:  A 64-year-old male presenting for right foot incision and drainage and partial fifth ray resection.  I counseled the patient on the nature of the problem, proposed course of procedure, potential benefits, risks, complications, and convalescence in detail.  All questions were answered to the patient's satisfaction.  No guarantees were given as to the outcome of procedure.    DESCRIPTION OF PROCEDURE:  Under mild sedation, the patient was brought to the operating room and laid on the

## 2024-06-14 NOTE — BRIEF OP NOTE
Brief Postoperative Note      Patient: Henry ARMSTRONG May  YOB: 1960  MRN: [id number]    Date of Procedure: 6/14/2024    Pre-Op Diagnosis Codes:     * Diabetic ulcer of right foot associated with other specified diabetes mellitus, unspecified part of foot, unspecified ulcer stage (HCC) [E13.621, L97.519]    Post-Op Diagnosis: Same       Procedure(s):  RIGHT FOOT DEBRIDEMENT PARTIAL FIFTH RAY RESECTION    Surgeon(s):  Gideon Prakash DPM    Assistant:  * No surgical staff found *    Anesthesia: Monitor Anesthesia Care    Estimated Blood Loss (mL): Minimal    Complications: None    Specimens:   ID Type Source Tests Collected by Time Destination   1 : RIGHT 5TH RAY RESECTION Tissue Foot CULTURE, ANAEROBIC, CULTURE, FUNGUS, CULTURE, SURGICAL, CULTURE WITH SMEAR, ACID FAST BACILLIUS Gideon Prakash DPM 6/14/2024 1228    2 : RIGHT FOOT WOUND Tissue Foot CULTURE, ANAEROBIC, CULTURE, FUNGUS, CULTURE, SURGICAL, CULTURE WITH SMEAR, ACID FAST BACILLIUS Gideon Prakash DPM 6/14/2024 1229    A : RIGHT 5TH RAY RESECTION Specimen Foot SURGICAL PATHOLOGY Gideon Prakash DPM 6/14/2024 1227        Implants:  * No implants in log *      Drains: * No LDAs found *    Findings:  Infection Present At Time Of Surgery (PATOS) (choose all levels that have infection present):  - Deep Infection (muscle/fascia) present as evidenced by abscess  Other Findings: necrosis of soft tissue and bone.    Electronically signed by Gideon Prakash DPM on 6/14/2024 at 12:36 PM

## 2024-06-14 NOTE — PATIENT CARE CONFERENCE
[facility] Quality Flow/Interdisciplinary Rounds Progress Note        Quality Flow Rounds held on June 14, 2024    Disciplines Attending:  Bedside Nurse, , , and Nursing Unit Leadership    Henry Fuentes was admitted on 6/13/2024  6:35 PM    Anticipated Discharge Date:       Disposition:    Jose Score:  Jose Scale Score: 20    Readmission Risk              Risk of Unplanned Readmission:  24           Discussed patient goal for the day, patient clinical progression, and barriers to discharge.  The following Goal(s) of the Day/Commitment(s) have been identified:  Diagnostics - Report Results      Cris Darnell RN  June 14, 2024

## 2024-06-14 NOTE — ANESTHESIA POSTPROCEDURE EVALUATION
Department of Anesthesiology  Postprocedure Note    Patient: Henry Fuentes  MRN: [id number]  YOB: 1960  Date of evaluation: 6/14/2024    Procedure Summary       Date: 06/14/24 Room / Location: [address]    Anesthesia Start: 1205 Anesthesia Stop: 1239    Procedure: RIGHT FOOT DEBRIDEMENT PARTIAL FIFTH RAY RESECTION (Right: Foot) Diagnosis:       Diabetic ulcer of right foot associated with other specified diabetes mellitus, unspecified part of foot, unspecified ulcer stage (HCC)      (Diabetic ulcer of right foot associated with other specified diabetes mellitus, unspecified part of foot, unspecified ulcer stage (HCC) [E13.621, L97.519])    Surgeons: Gideon Prakash DPM Responsible Provider: Ramesh Murphy DO    Anesthesia Type: MAC ASA Status: 4 - Emergent            Anesthesia Type: No value filed.    Bernard Phase I: Bernard Score: 10    Bernard Phase II:      Anesthesia Post Evaluation    Patient location during evaluation: PACU  Patient participation: complete - patient participated  Level of consciousness: awake and alert  Pain score: 0  Airway patency: patent  Nausea & Vomiting: no nausea and no vomiting  Cardiovascular status: hemodynamically stable  Respiratory status: acceptable  Pain management: adequate        No notable events documented.

## 2024-06-14 NOTE — PROGRESS NOTES
Physician Progress Note      PATIENT:               BRAYAN BARRAZA  CSN #:                  [id number]  :                       1960  ADMIT DATE:       2024 6:35 PM  DISCH DATE:  RESPONDING  PROVIDER #:        Nikki Leon MD          QUERY TEXT:    Pt admitted with diabetic foot ulcer of right foot with podiatry noting   cellulitis in their consult noted dated . Pt noted to have DM,   uncontrolled insulin-dependent. If possible, please document in progress notes   and discharge summary the relationship, if any, between cellulitis and DM.    The medical record reflects the following:  Risk Factors: DM, foot ulcer, cellulitis  Clinical Indicators: per Pod, noted full thickness wound sub right 5th met   head...full thickness wound lateral to right 5th met head with erythema and   edema; DM uncontrolled  Treatment: Pod consult, plans for right foot debridement and partial 5th ray   resection    Thank you,  Isaac RN, CCDS  [phone]  Options provided:  -- Right foot cellulitis associated with Diabetes  -- Right foot cellulitis unrelated to Diabetes  -- Other - I will add my own diagnosis  -- Disagree - Not applicable / Not valid  -- Disagree - Clinically unable to determine / Unknown  -- Refer to Clinical Documentation Reviewer    PROVIDER RESPONSE TEXT:    *** (site/location) cellulitis associated with Diabetes.    Query created by: Isaac Nino on 2024 11:24 AM      Electronically signed by:  Nikki Leon MD 2024 12:26 PM

## 2024-06-14 NOTE — CONSULTS
Department of Podiatry   Consult Note        Reason for Consult:  right foot wound    CHIEF COMPLAINT:  right foot wound    HISTORY OF PRESENT ILLNESS:      Patient is a 64 y.o. male with significant past medical history of diabetes mellitus, DVT, HLD, HTN, COPD, sleep apnea. Patient was sent to ED for worsening wound by Dr Prakash. Patient states wound was present for weeks or maybe months. Has had amputations in the past on both feet, the last one was in July 2023 for a left partial 5th ray resection by Dr. Prakash. Discussed surgical options and patient agrees to surgery. Patient states that he doesn't want to lose his toe but understands that there is a possibility that it may be amputated. Patient denies any N/V/D/F/C/SOB/CP and has no other pedal complaints at this time.     Past Medical History:        Diagnosis Date    Anxiety     COPD (chronic obstructive pulmonary disease) (HCC)     Depression     DM (diabetes mellitus) (HCC)     DVT of leg (deep venous thrombosis) (HCC)     Frostbite     HLD (hyperlipidemia)     HTN (hypertension)     Sleep apnea        Past Surgical History:        Procedure Laterality Date    BACK SURGERY      CHOLECYSTECTOMY      CORONARY ARTERY BYPASS GRAFT REDO      2 vessel    FOOT DEBRIDEMENT Right 4/25/2022    DELAYED PRIMARY CLOSURE RIGHT FOOT WOUND performed by Ignacio Bolden DPM at [facility] OR    FOOT DEBRIDEMENT Right 5/7/2022    FOOT DEBRIDEMENT INCISION AND DRAINAGE performed by Gideon Prakash DPM at [facility] OR    FOOT DEBRIDEMENT Right 5/10/2022    RIGHT FOOT DELAYED PRIMARY CLOSURE WITH POSSIBLE DEBRIDEMENT    ++CONTACT ISOLATION++   (DR AVAIL. AT 4PM) performed by Gideon Prakash DPM at [facility] OR    INSERT PICC LINE  5/9/2022         INSERT PICC LINE  8/4/2023    TOE AMPUTATION Right 4/22/2022    AMPUTATION RIGHT SECOND TOE performed by Ignacio Bolden DPM at [facility] OR    TOE AMPUTATION Left 7/31/2023    LEFT POSSIBLE FIFTH RAY AMPUTATION AND BONE BIOPSY performed by Gideon Prakash DPM at  Result   1. Changes of amputation of the 2nd toe at the level of the MTP joint.   2. No sign of osteomyelitis.   3. Mild swelling of the plantar and dorsal regions of the distal foot with no   sign of any soft tissue gas or radiopaque foreign body.         CT FOOT RIGHT WO CONTRAST    (Results Pending)       Vitals:    BP (!) 125/56   Pulse 93   Temp 97.7 °F (36.5 °C) (Oral)   Resp 16   Ht 1.829 m (6')   Wt 122.5 kg (270 lb)   SpO2 96%   BMI 36.62 kg/m²     LABS:   Recent Labs     06/13/24 1929   WBC 8.0   HGB 12.9   HCT 38.2        Recent Labs     06/13/24 1929      K 4.5   CL 97*   CO2 27   BUN 24*   CREATININE 1.5*     No results for input(s): \"PROT\", \"INR\", \"APTT\" in the last 72 hours.    ASSESSMENTS:   - Wound, right foot, POA   - cellulitis  - diabetes mellitus         PLAN:    - Patient was examined and evaluated.Reviewed patient's recent lab results, charts and pertinent diagnostic imaging. Reviewed ancillary service notes.  - WBC:  8   - Antibiotics as per ID  - X-rays: no signs of OM, no gas. Swelling plantar and dorsal distal foot.  - CT: pending  - MRI: pending  - Dressings: xeroform DSD ACE  - Surgical plan: Right foot debridement, possible partial 5th ray resection tomorrow 6/14/24  - Patient to be NPO at 0015 on 6/14/24   - Will continue to follow patient while they are in-house.  - Discussed patient with Dr. Prakash      Thank you for the opportunity to take part in the patient's care. Please do not hesitate to call for any questions or concerns.

## 2024-06-14 NOTE — CONSULTS
[facility]  [facility]    Consultation Note     Admit Date: 6/13/2024  6:35 PM    Reason for Consult:   Diabetic foot wound, possible osteomyelitis    Attending Physician:  Nikki Lees MD     Chief Complaint: right foot wound.    HISTORY OF PRESENT ILLNESS:   The patient is a 64 y.o.  male known to the Infectious Diseases service.  Extensive chart review was obtained as mentioned below.  Patient presented to the ER yesterday for wound check.  Patient was recently admitted at [facility] was treated for cellulitis with IV ceftriaxone and was discharged on oral Keflex. He started having drainage and open wound on the right foot so he came in. No fever or chills or abdominal pain or diarrhea. Left foot is ok.      Since admission patient is afebrile hemodynamically stable saturating well on room air.  Labs showed white count is 7 hemoglobin is 11.7 platelet count is 187 LFTs are normal creatinine is 1.5 improved to 1.3 today/BUN of 23.  Lactic acid was normal blood cultures are in process wound culture is in process.  CT right foot showed There may be slight lucency about the 5th metatarsal head and cannot exclude  osteomyelitis.  There is adjacent skin/tissue thickening.  No soft tissue  gas.  Would advise correlation with MRI. Prior amputation of the 2nd digit at the level of the MTP.  Patient is currently not on any antibiotics and I got consult further recommendations.    Past Medical History:      6/4/2024.  Patient was admitted at [facility] for right leg cellulitis was improving on ceftriaxone patient was discharged on Keflex for 7 days.    3/28/2024.  Patient was treated for complicated skin and soft tissue infection of the right upper extremity.  Patient was discharged on linezolid for 10 days.    August 2023.  The patient had recently been admitted to [facility] and treated for osteomyelitis of the left fifth metatarsal with Vancomycin and Ceftriaxone.         Radiology:  MRI FOOT RIGHT WO CONTRAST   Final Result   1. Findings concerning for early osteomyelitis of the 5th metatarsal head as   above.   2. Soft tissue ulceration and subcutaneous edema adjacent to the 5th   metatarsal head and adjacent lateral plantar aspect without focal fluid   collection or abscess.         CT FOOT RIGHT WO CONTRAST   Final Result      XR FOOT RIGHT (MIN 3 VIEWS)   Final Result   1. Changes of amputation of the 2nd toe at the level of the MTP joint.   2. No sign of osteomyelitis.   3. Mild swelling of the plantar and dorsal regions of the distal foot with no   sign of any soft tissue gas or radiopaque foreign body.               Assessment:  Right Diabetic foot infection/5th MT osteomyelitis:   S/p I&D: pending op note.     Plan:    Started on IV Zosyn  Will follow cx and adjust antibiotics.  Monitor labs  Will follow with you    Thank you for having us see this patient in consultation. I will be discussing this case with the treating physicians.      Electronically signed by CARMEN CEDILLO MD on 6/14/2024 at 3:45 PM

## 2024-06-15 LAB
ANION GAP SERPL CALCULATED.3IONS-SCNC: 8 MMOL/L (ref 7–16)
BASOPHILS # BLD: 0.07 K/UL (ref 0–0.2)
BASOPHILS NFR BLD: 1 % (ref 0–2)
BUN SERPL-MCNC: 19 MG/DL (ref 6–23)
CALCIUM SERPL-MCNC: 8 MG/DL (ref 8.6–10.2)
CHLORIDE SERPL-SCNC: 103 MMOL/L (ref 98–107)
CO2 SERPL-SCNC: 25 MMOL/L (ref 22–29)
CREAT SERPL-MCNC: 1.2 MG/DL (ref 0.7–1.2)
EOSINOPHIL # BLD: 0.16 K/UL (ref 0.05–0.5)
EOSINOPHILS RELATIVE PERCENT: 2 % (ref 0–6)
ERYTHROCYTE [DISTWIDTH] IN BLOOD BY AUTOMATED COUNT: 12.7 % (ref 11.5–15)
GFR, ESTIMATED: 66 ML/MIN/1.73M2
GLUCOSE BLD-MCNC: 194 MG/DL (ref 74–99)
GLUCOSE BLD-MCNC: 247 MG/DL (ref 74–99)
GLUCOSE BLD-MCNC: 346 MG/DL (ref 74–99)
GLUCOSE BLD-MCNC: 352 MG/DL (ref 74–99)
GLUCOSE SERPL-MCNC: 221 MG/DL (ref 74–99)
HCT VFR BLD AUTO: 33.1 % (ref 37–54)
HGB BLD-MCNC: 11 G/DL (ref 12.5–16.5)
IMM GRANULOCYTES # BLD AUTO: <0.03 K/UL (ref 0–0.58)
IMM GRANULOCYTES NFR BLD: 0 % (ref 0–5)
LYMPHOCYTES NFR BLD: 2.07 K/UL (ref 1.5–4)
LYMPHOCYTES RELATIVE PERCENT: 30 % (ref 20–42)
MCH RBC QN AUTO: 28 PG (ref 26–35)
MCHC RBC AUTO-ENTMCNC: 33.2 G/DL (ref 32–34.5)
MCV RBC AUTO: 84.2 FL (ref 80–99.9)
MONOCYTES NFR BLD: 0.45 K/UL (ref 0.1–0.95)
MONOCYTES NFR BLD: 7 % (ref 2–12)
NEUTROPHILS NFR BLD: 60 % (ref 43–80)
NEUTS SEG NFR BLD: 4.07 K/UL (ref 1.8–7.3)
PLATELET # BLD AUTO: 173 K/UL (ref 130–450)
PMV BLD AUTO: 11 FL (ref 7–12)
POTASSIUM SERPL-SCNC: 4.4 MMOL/L (ref 3.5–5)
RBC # BLD AUTO: 3.93 M/UL (ref 3.8–5.8)
SODIUM SERPL-SCNC: 136 MMOL/L (ref 132–146)
WBC OTHER # BLD: 6.8 K/UL (ref 4.5–11.5)

## 2024-06-15 PROCEDURE — 80048 BASIC METABOLIC PNL TOTAL CA: CPT

## 2024-06-15 PROCEDURE — 82962 GLUCOSE BLOOD TEST: CPT

## 2024-06-15 PROCEDURE — 2580000003 HC RX 258: Performed by: REGISTERED NURSE

## 2024-06-15 PROCEDURE — 6360000002 HC RX W HCPCS: Performed by: REGISTERED NURSE

## 2024-06-15 PROCEDURE — 1200000000 HC SEMI PRIVATE

## 2024-06-15 PROCEDURE — 85025 COMPLETE CBC W/AUTO DIFF WBC: CPT

## 2024-06-15 PROCEDURE — 2580000003 HC RX 258

## 2024-06-15 PROCEDURE — 6370000000 HC RX 637 (ALT 250 FOR IP)

## 2024-06-15 PROCEDURE — 94640 AIRWAY INHALATION TREATMENT: CPT

## 2024-06-15 PROCEDURE — 6360000002 HC RX W HCPCS

## 2024-06-15 PROCEDURE — 99232 SBSQ HOSP IP/OBS MODERATE 35: CPT | Performed by: STUDENT IN AN ORGANIZED HEALTH CARE EDUCATION/TRAINING PROGRAM

## 2024-06-15 RX ORDER — ENOXAPARIN SODIUM 100 MG/ML
40 INJECTION SUBCUTANEOUS DAILY
Status: DISCONTINUED | OUTPATIENT
Start: 2024-06-15 | End: 2024-06-17

## 2024-06-15 RX ADMIN — TRAMADOL HYDROCHLORIDE 25 MG: 50 TABLET, COATED ORAL at 05:36

## 2024-06-15 RX ADMIN — INSULIN LISPRO 2 UNITS: 100 INJECTION, SOLUTION INTRAVENOUS; SUBCUTANEOUS at 16:19

## 2024-06-15 RX ADMIN — INSULIN GLARGINE 30 UNITS: 100 INJECTION, SOLUTION SUBCUTANEOUS at 20:07

## 2024-06-15 RX ADMIN — DULOXETINE HYDROCHLORIDE 60 MG: 60 CAPSULE, DELAYED RELEASE ORAL at 08:05

## 2024-06-15 RX ADMIN — PREGABALIN 100 MG: 50 CAPSULE ORAL at 20:06

## 2024-06-15 RX ADMIN — PREGABALIN 100 MG: 50 CAPSULE ORAL at 08:05

## 2024-06-15 RX ADMIN — ROSUVASTATIN CALCIUM 20 MG: 20 TABLET, FILM COATED ORAL at 20:06

## 2024-06-15 RX ADMIN — INSULIN LISPRO 6 UNITS: 100 INJECTION, SOLUTION INTRAVENOUS; SUBCUTANEOUS at 11:25

## 2024-06-15 RX ADMIN — PREGABALIN 100 MG: 50 CAPSULE ORAL at 14:05

## 2024-06-15 RX ADMIN — PIPERACILLIN AND TAZOBACTAM 3375 MG: 3; .375 INJECTION, POWDER, LYOPHILIZED, FOR SOLUTION INTRAVENOUS at 05:33

## 2024-06-15 RX ADMIN — METOPROLOL TARTRATE 25 MG: 25 TABLET, FILM COATED ORAL at 20:06

## 2024-06-15 RX ADMIN — METOPROLOL TARTRATE 25 MG: 25 TABLET, FILM COATED ORAL at 08:05

## 2024-06-15 RX ADMIN — SODIUM CHLORIDE: 9 INJECTION, SOLUTION INTRAVENOUS at 14:07

## 2024-06-15 RX ADMIN — ENOXAPARIN SODIUM 40 MG: 100 INJECTION SUBCUTANEOUS at 08:05

## 2024-06-15 RX ADMIN — BUDESONIDE 250 MCG: 0.25 SUSPENSION RESPIRATORY (INHALATION) at 20:13

## 2024-06-15 RX ADMIN — TRAMADOL HYDROCHLORIDE 25 MG: 50 TABLET, COATED ORAL at 20:06

## 2024-06-15 RX ADMIN — ARFORMOTEROL TARTRATE 15 MCG: 15 SOLUTION RESPIRATORY (INHALATION) at 08:35

## 2024-06-15 RX ADMIN — TRAZODONE HYDROCHLORIDE 100 MG: 50 TABLET ORAL at 20:06

## 2024-06-15 RX ADMIN — FENOFIBRATE 160 MG: 160 TABLET ORAL at 08:05

## 2024-06-15 RX ADMIN — PIPERACILLIN AND TAZOBACTAM 3375 MG: 3; .375 INJECTION, POWDER, LYOPHILIZED, FOR SOLUTION INTRAVENOUS at 20:31

## 2024-06-15 RX ADMIN — BUDESONIDE 250 MCG: 0.25 SUSPENSION RESPIRATORY (INHALATION) at 08:35

## 2024-06-15 RX ADMIN — INSULIN LISPRO 4 UNITS: 100 INJECTION, SOLUTION INTRAVENOUS; SUBCUTANEOUS at 20:07

## 2024-06-15 RX ADMIN — PIPERACILLIN AND TAZOBACTAM 3375 MG: 3; .375 INJECTION, POWDER, LYOPHILIZED, FOR SOLUTION INTRAVENOUS at 14:09

## 2024-06-15 RX ADMIN — LEVOTHYROXINE SODIUM 25 MCG: 25 TABLET ORAL at 05:32

## 2024-06-15 RX ADMIN — ARFORMOTEROL TARTRATE 15 MCG: 15 SOLUTION RESPIRATORY (INHALATION) at 20:13

## 2024-06-15 ASSESSMENT — PAIN DESCRIPTION - LOCATION
LOCATION: FOOT
LOCATION: FOOT

## 2024-06-15 ASSESSMENT — PAIN SCALES - GENERAL
PAINLEVEL_OUTOF10: 8
PAINLEVEL_OUTOF10: 0
PAINLEVEL_OUTOF10: 8
PAINLEVEL_OUTOF10: 0
PAINLEVEL_OUTOF10: 2
PAINLEVEL_OUTOF10: 0
PAINLEVEL_OUTOF10: 8

## 2024-06-15 ASSESSMENT — PAIN DESCRIPTION - ORIENTATION
ORIENTATION: RIGHT
ORIENTATION: RIGHT

## 2024-06-15 ASSESSMENT — PAIN DESCRIPTION - ONSET: ONSET: ON-GOING

## 2024-06-15 ASSESSMENT — PAIN DESCRIPTION - DESCRIPTORS
DESCRIPTORS: ACHING;DISCOMFORT
DESCRIPTORS: ACHING;DISCOMFORT

## 2024-06-15 ASSESSMENT — PAIN DESCRIPTION - FREQUENCY: FREQUENCY: INTERMITTENT

## 2024-06-15 ASSESSMENT — PAIN DESCRIPTION - PAIN TYPE: TYPE: ACUTE PAIN

## 2024-06-15 NOTE — PLAN OF CARE
Problem: Safety - Adult  Goal: Free from fall injury  6/15/2024 0941 by Celestina Garcia RN  Outcome: Progressing  6/14/2024 2230 by Bridgett Hansen RN  Outcome: Progressing     Problem: Chronic Conditions and Co-morbidities  Goal: Patient's chronic conditions and co-morbidity symptoms are monitored and maintained or improved  6/15/2024 0941 by Celestina Gacria RN  Outcome: Progressing  6/14/2024 2230 by Bridgett Hansen RN  Outcome: Progressing     Problem: Discharge Planning  Goal: Discharge to home or other facility with appropriate resources  6/15/2024 0941 by Celestina Garcia RN  Outcome: Progressing  6/14/2024 2230 by Bridgett Hansen RN  Outcome: Progressing     Problem: Pain  Goal: Verbalizes/displays adequate comfort level or baseline comfort level  6/15/2024 0941 by Celestina Garcia RN  Outcome: Progressing  6/14/2024 2230 by Bridgett Hansen RN  Outcome: Progressing

## 2024-06-15 NOTE — PROGRESS NOTES
[facility] Hospitalist Progress Note    SYNOPSIS: Patient admitted on 2024 for Diabetic ulcer of right foot due to type 2 diabetes mellitus (HCC)     63 year old male with a past medical history of COPD, depression, diabetes mellitus, HLD, and HTN who presents to the ED with worsening of his diabetic foot wound.  Patient is unsure how long he has had this wound, but he was recently admitted on 6/3 for sepsis/cellulitis. MRI during the admission was negative for acute osteomyelitis. He was discharged on  on a complex 7-day course of Keflex. Patient then followed with Dr. Prakash and was started on a course of Bactrim.  Patient states he has been compliant with his antibiotics.  Patient was sent in by Dr. Prakash for possible surgery and IV antibiotics. Reports a history of uncontrolled insulin-dependent diabetes mellitus   he lives with his niece, but would like to consider rehab on discharge   Workup in ED was notable for BUN 24, creatinine 1.5, glucose 346   CT scan of foot revealed- Findings concerning for early osteomyelitis of the 5th metatarsal head as above. 2. Soft tissue ulceration and subcutaneous edema adjacent to the 5th metatarsal head and adjacent lateral plantar aspect...  Patient is s/p S/p partial fifth toe resection of right foot POD 1; currently on zosyn per ID  SUBJECTIVE:  Stable overnight. No other overnight issues reported.   Patient seen and examined  Records reviewed.           Temp (24hrs), Av.6 °F (36.4 °C), Min:96.2 °F (35.7 °C), Max:98 °F (36.7 °C)    DIET: ADULT DIET; Regular; 4 carb choices (60 gm/meal)  CODE: Full Code    Intake/Output Summary (Last 24 hours) at 6/15/2024 1237  Last data filed at 6/15/2024 1042  Gross per 24 hour   Intake 360 ml   Output 2150 ml   Net -1790 ml       Review of Systems  All bolded are positive; please see HPI  General:  Fever, chills, diaphoresis, fatigue, malaise, night sweats, weight loss  Psychological:  Anxiety, disorientation,

## 2024-06-15 NOTE — PLAN OF CARE
Problem: Safety - Adult  Goal: Free from fall injury  6/14/2024 2230 by Bridgett Hansen RN  Outcome: Progressing    Problem: Chronic Conditions and Co-morbidities  Goal: Patient's chronic conditions and co-morbidity symptoms are monitored and maintained or improved  6/14/2024 2230 by Bridgett Hansen RN  Outcome: Progressing     Problem: Discharge Planning  Goal: Discharge to home or other facility with appropriate resources  6/14/2024 2230 by Bridgett Hansen RN  Outcome: Progressing    Problem: Pain  Goal: Verbalizes/displays adequate comfort level or baseline comfort level  6/14/2024 2230 by Bridgett Hansen RN  Outcome: Progressing

## 2024-06-15 NOTE — PROGRESS NOTES
Spoke with Podiatry resident, Dr. Blair, pt is to remain NWB to right leg. See order. Electronically signed by Umu Champion RN on 6/15/2024 at 12:43 PM

## 2024-06-15 NOTE — PROGRESS NOTES
DVT Prophylaxis Adjustment Policy (DVT Prophylaxis)     This patient is on DVT Prophylaxis medication that requires a dose adjustment      Date Body Weight IBW  Adjusted BW SCr  CrCl Dialysis status   6/15/2024 91.6 kg (202 lb) Ideal body weight: 77.6 kg (171 lb 1.2 oz)  Adjusted ideal body weight: 83.2 kg (183 lb 7.1 oz) Serum creatinine: 1.2 mg/dL 06/15/24 0327  Estimated creatinine clearance: 70 mL/min N/a       Pharmacy has dose-adjusted the DVT Prophylaxis regimen to match   the recommendations from the following table        Ordered Medication:Lovenox 30mg BID    Order Changed/converted to: Lovenox 40mg daily      These changes were made per protocol according to the [facility] Pharmacist   Review for Appropriate Use and Automatic Dose Adjustments of   Subcutaneous Anticoagulants Policy     *Please note this dose may need readjusted if patient's condition changes.    Please contact pharmacy with any questions regarding these changes.    ferny fernandez RPH  6/15/2024  6:05 AM

## 2024-06-15 NOTE — PROGRESS NOTES
Department of Podiatry  Progress Note    SUBJECTIVE:  Patient seen bedside s/p right foot I&D, amputation right 5th digit. Patient feeling well today. Patient states that pain is manageable with medication. No acute events overnight. Patient denies any N/V/D/F/C/SOB/CP and has no other pedal complaints at this time.     OBJECTIVE:    Scheduled Meds:   enoxaparin  40 mg SubCUTAneous Daily    DULoxetine  60 mg Oral QAM    levothyroxine  25 mcg Oral QAM AC    fentanNYL  50 mcg IntraVENous Once    piperacillin-tazobactam  3,375 mg IntraVENous Q8H    sodium chloride flush  5-40 mL IntraVENous 2 times per day    insulin lispro  0-8 Units SubCUTAneous TID WC    insulin lispro  0-4 Units SubCUTAneous Nightly    insulin glargine  30 Units SubCUTAneous Nightly    fenofibrate  160 mg Oral Daily    metoprolol tartrate  25 mg Oral BID    pregabalin  100 mg Oral TID    rosuvastatin  20 mg Oral Nightly    traZODone  100 mg Oral Nightly    budesonide  0.25 mg Nebulization BID RT    And    arformoterol tartrate  15 mcg Nebulization BID RT     Continuous Infusions:   sodium chloride      sodium chloride 75 mL/hr at 06/14/24 0024    dextrose       PRN Meds:.sodium chloride flush, sodium chloride, potassium chloride **OR** potassium alternative oral replacement **OR** potassium chloride, magnesium sulfate, polyethylene glycol, acetaminophen **OR** acetaminophen, dextrose bolus **OR** dextrose bolus, glucagon (rDNA), dextrose, Glucose, traMADol, albuterol    No Known Allergies    /62   Pulse 79   Temp 98 °F (36.7 °C) (Oral)   Resp 18   Ht 1.829 m (6')   Wt 91.6 kg (202 lb)   SpO2 95%   BMI 27.40 kg/m²       EXAM:    VASCULAR:  Right DP pulse is palpable. Right PT pulse is palpable. CFT < 5 seconds right foot.  Skin temperature is warm to warm from the tibial tuberosity to the distal aspect of the digits dorsally. Increase in warmth noted to distal right foot.      NEUROLOGIC:  Protective sensation is diminished     DERM:

## 2024-06-16 LAB
ANION GAP SERPL CALCULATED.3IONS-SCNC: 8 MMOL/L (ref 7–16)
BASOPHILS # BLD: 0.05 K/UL (ref 0–0.2)
BASOPHILS NFR BLD: 1 % (ref 0–2)
BUN SERPL-MCNC: 16 MG/DL (ref 6–23)
CALCIUM SERPL-MCNC: 8.1 MG/DL (ref 8.6–10.2)
CHLORIDE SERPL-SCNC: 103 MMOL/L (ref 98–107)
CO2 SERPL-SCNC: 25 MMOL/L (ref 22–29)
CREAT SERPL-MCNC: 1.3 MG/DL (ref 0.7–1.2)
EOSINOPHIL # BLD: 0.14 K/UL (ref 0.05–0.5)
EOSINOPHILS RELATIVE PERCENT: 2 % (ref 0–6)
ERYTHROCYTE [DISTWIDTH] IN BLOOD BY AUTOMATED COUNT: 12.9 % (ref 11.5–15)
GFR, ESTIMATED: 65 ML/MIN/1.73M2
GLUCOSE BLD-MCNC: 174 MG/DL (ref 74–99)
GLUCOSE BLD-MCNC: 233 MG/DL (ref 74–99)
GLUCOSE BLD-MCNC: 265 MG/DL (ref 74–99)
GLUCOSE BLD-MCNC: 293 MG/DL (ref 74–99)
GLUCOSE SERPL-MCNC: 179 MG/DL (ref 74–99)
HCT VFR BLD AUTO: 33.4 % (ref 37–54)
HGB BLD-MCNC: 11.1 G/DL (ref 12.5–16.5)
IMM GRANULOCYTES # BLD AUTO: <0.03 K/UL (ref 0–0.58)
IMM GRANULOCYTES NFR BLD: 0 % (ref 0–5)
LYMPHOCYTES NFR BLD: 2.33 K/UL (ref 1.5–4)
LYMPHOCYTES RELATIVE PERCENT: 36 % (ref 20–42)
MCH RBC QN AUTO: 28.3 PG (ref 26–35)
MCHC RBC AUTO-ENTMCNC: 33.2 G/DL (ref 32–34.5)
MCV RBC AUTO: 85.2 FL (ref 80–99.9)
MICROORGANISM SPEC CULT: ABNORMAL
MICROORGANISM SPEC CULT: NORMAL
MICROORGANISM/AGENT SPEC: ABNORMAL
MONOCYTES NFR BLD: 0.41 K/UL (ref 0.1–0.95)
MONOCYTES NFR BLD: 6 % (ref 2–12)
NEUTROPHILS NFR BLD: 54 % (ref 43–80)
NEUTS SEG NFR BLD: 3.52 K/UL (ref 1.8–7.3)
PLATELET # BLD AUTO: 180 K/UL (ref 130–450)
PMV BLD AUTO: 11.1 FL (ref 7–12)
POTASSIUM SERPL-SCNC: 4 MMOL/L (ref 3.5–5)
RBC # BLD AUTO: 3.92 M/UL (ref 3.8–5.8)
SODIUM SERPL-SCNC: 136 MMOL/L (ref 132–146)
SPECIMEN DESCRIPTION: ABNORMAL
SPECIMEN DESCRIPTION: NORMAL
WBC OTHER # BLD: 6.5 K/UL (ref 4.5–11.5)

## 2024-06-16 PROCEDURE — 82962 GLUCOSE BLOOD TEST: CPT

## 2024-06-16 PROCEDURE — 94640 AIRWAY INHALATION TREATMENT: CPT

## 2024-06-16 PROCEDURE — 2580000003 HC RX 258: Performed by: REGISTERED NURSE

## 2024-06-16 PROCEDURE — 6360000002 HC RX W HCPCS

## 2024-06-16 PROCEDURE — 99233 SBSQ HOSP IP/OBS HIGH 50: CPT | Performed by: HOSPITALIST

## 2024-06-16 PROCEDURE — 6360000002 HC RX W HCPCS: Performed by: HOSPITALIST

## 2024-06-16 PROCEDURE — 6370000000 HC RX 637 (ALT 250 FOR IP)

## 2024-06-16 PROCEDURE — 80048 BASIC METABOLIC PNL TOTAL CA: CPT

## 2024-06-16 PROCEDURE — 6370000000 HC RX 637 (ALT 250 FOR IP): Performed by: HOSPITALIST

## 2024-06-16 PROCEDURE — 36415 COLL VENOUS BLD VENIPUNCTURE: CPT

## 2024-06-16 PROCEDURE — 2580000003 HC RX 258

## 2024-06-16 PROCEDURE — 6360000002 HC RX W HCPCS: Performed by: REGISTERED NURSE

## 2024-06-16 PROCEDURE — 1200000000 HC SEMI PRIVATE

## 2024-06-16 PROCEDURE — 85025 COMPLETE CBC W/AUTO DIFF WBC: CPT

## 2024-06-16 RX ORDER — NICOTINE 21 MG/24HR
1 PATCH, TRANSDERMAL 24 HOURS TRANSDERMAL DAILY
Status: DISCONTINUED | OUTPATIENT
Start: 2024-06-16 | End: 2024-06-21 | Stop reason: HOSPADM

## 2024-06-16 RX ORDER — TRAMADOL HYDROCHLORIDE 50 MG/1
50 TABLET ORAL EVERY 6 HOURS PRN
Status: DISCONTINUED | OUTPATIENT
Start: 2024-06-16 | End: 2024-06-21 | Stop reason: HOSPADM

## 2024-06-16 RX ADMIN — PREGABALIN 100 MG: 50 CAPSULE ORAL at 14:21

## 2024-06-16 RX ADMIN — TRAMADOL HYDROCHLORIDE 25 MG: 50 TABLET, COATED ORAL at 08:27

## 2024-06-16 RX ADMIN — INSULIN LISPRO 4 UNITS: 100 INJECTION, SOLUTION INTRAVENOUS; SUBCUTANEOUS at 16:22

## 2024-06-16 RX ADMIN — ARFORMOTEROL TARTRATE 15 MCG: 15 SOLUTION RESPIRATORY (INHALATION) at 20:42

## 2024-06-16 RX ADMIN — INSULIN GLARGINE 30 UNITS: 100 INJECTION, SOLUTION SUBCUTANEOUS at 20:27

## 2024-06-16 RX ADMIN — PIPERACILLIN AND TAZOBACTAM 3375 MG: 3; .375 INJECTION, POWDER, LYOPHILIZED, FOR SOLUTION INTRAVENOUS at 20:36

## 2024-06-16 RX ADMIN — BUDESONIDE 250 MCG: 0.25 SUSPENSION RESPIRATORY (INHALATION) at 20:42

## 2024-06-16 RX ADMIN — METOPROLOL TARTRATE 25 MG: 25 TABLET, FILM COATED ORAL at 08:27

## 2024-06-16 RX ADMIN — METOPROLOL TARTRATE 25 MG: 25 TABLET, FILM COATED ORAL at 20:26

## 2024-06-16 RX ADMIN — PREGABALIN 100 MG: 50 CAPSULE ORAL at 08:27

## 2024-06-16 RX ADMIN — ROSUVASTATIN CALCIUM 20 MG: 20 TABLET, FILM COATED ORAL at 20:26

## 2024-06-16 RX ADMIN — LEVOTHYROXINE SODIUM 25 MCG: 25 TABLET ORAL at 06:05

## 2024-06-16 RX ADMIN — HYDROMORPHONE HYDROCHLORIDE 1 MG: 1 INJECTION, SOLUTION INTRAMUSCULAR; INTRAVENOUS; SUBCUTANEOUS at 20:27

## 2024-06-16 RX ADMIN — DULOXETINE HYDROCHLORIDE 60 MG: 60 CAPSULE, DELAYED RELEASE ORAL at 08:27

## 2024-06-16 RX ADMIN — PREGABALIN 100 MG: 50 CAPSULE ORAL at 20:26

## 2024-06-16 RX ADMIN — SODIUM CHLORIDE, PRESERVATIVE FREE 10 ML: 5 INJECTION INTRAVENOUS at 14:37

## 2024-06-16 RX ADMIN — PIPERACILLIN AND TAZOBACTAM 3375 MG: 3; .375 INJECTION, POWDER, LYOPHILIZED, FOR SOLUTION INTRAVENOUS at 04:59

## 2024-06-16 RX ADMIN — PIPERACILLIN AND TAZOBACTAM 3375 MG: 3; .375 INJECTION, POWDER, LYOPHILIZED, FOR SOLUTION INTRAVENOUS at 14:37

## 2024-06-16 RX ADMIN — SODIUM CHLORIDE, PRESERVATIVE FREE 10 ML: 5 INJECTION INTRAVENOUS at 08:28

## 2024-06-16 RX ADMIN — SODIUM CHLORIDE, PRESERVATIVE FREE 10 ML: 5 INJECTION INTRAVENOUS at 20:27

## 2024-06-16 RX ADMIN — HYDROMORPHONE HYDROCHLORIDE 1 MG: 1 INJECTION, SOLUTION INTRAMUSCULAR; INTRAVENOUS; SUBCUTANEOUS at 12:50

## 2024-06-16 RX ADMIN — TRAZODONE HYDROCHLORIDE 100 MG: 50 TABLET ORAL at 20:26

## 2024-06-16 RX ADMIN — FENOFIBRATE 160 MG: 160 TABLET ORAL at 08:27

## 2024-06-16 RX ADMIN — BUDESONIDE 250 MCG: 0.25 SUSPENSION RESPIRATORY (INHALATION) at 08:01

## 2024-06-16 RX ADMIN — TRAMADOL HYDROCHLORIDE 50 MG: 50 TABLET ORAL at 18:31

## 2024-06-16 RX ADMIN — INSULIN LISPRO 2 UNITS: 100 INJECTION, SOLUTION INTRAVENOUS; SUBCUTANEOUS at 11:14

## 2024-06-16 RX ADMIN — ENOXAPARIN SODIUM 40 MG: 100 INJECTION SUBCUTANEOUS at 08:27

## 2024-06-16 RX ADMIN — ARFORMOTEROL TARTRATE 15 MCG: 15 SOLUTION RESPIRATORY (INHALATION) at 08:01

## 2024-06-16 ASSESSMENT — PAIN DESCRIPTION - PAIN TYPE
TYPE: ACUTE PAIN;SURGICAL PAIN
TYPE: ACUTE PAIN;SURGICAL PAIN

## 2024-06-16 ASSESSMENT — PAIN DESCRIPTION - ORIENTATION
ORIENTATION: RIGHT

## 2024-06-16 ASSESSMENT — PAIN DESCRIPTION - LOCATION
LOCATION: FOOT

## 2024-06-16 ASSESSMENT — PAIN SCALES - GENERAL
PAINLEVEL_OUTOF10: 9
PAINLEVEL_OUTOF10: 0
PAINLEVEL_OUTOF10: 0
PAINLEVEL_OUTOF10: 7
PAINLEVEL_OUTOF10: 6
PAINLEVEL_OUTOF10: 9

## 2024-06-16 ASSESSMENT — PAIN DESCRIPTION - ONSET: ONSET: ON-GOING

## 2024-06-16 ASSESSMENT — PAIN DESCRIPTION - DESCRIPTORS
DESCRIPTORS: ACHING;DISCOMFORT
DESCRIPTORS: ACHING;DISCOMFORT
DESCRIPTORS: STABBING;ACHING;BURNING

## 2024-06-16 ASSESSMENT — PAIN - FUNCTIONAL ASSESSMENT
PAIN_FUNCTIONAL_ASSESSMENT: PREVENTS OR INTERFERES SOME ACTIVE ACTIVITIES AND ADLS
PAIN_FUNCTIONAL_ASSESSMENT: ACTIVITIES ARE NOT PREVENTED

## 2024-06-16 ASSESSMENT — PAIN DESCRIPTION - FREQUENCY: FREQUENCY: INTERMITTENT

## 2024-06-16 NOTE — PROGRESS NOTES
[facility]  [facility]  Progress Note    SUBJECTIVE:  Chief Complaint   Patient presents with    Wound Check     Sent in by dr david for partial foot amputation on right side, hx of dm type 2     Patient is tolerating medications. No reported adverse drug reactions.  No nausea, vomiting, diarrhea.  Resting in the room.  Stated he just had his wound VAC changed.  No complaints no nausea vomiting or diarrhea no fever chills    Review of systems:  As stated above in the chief complaint, otherwise negative.    Medications:  Scheduled Meds:   enoxaparin  40 mg SubCUTAneous Daily    DULoxetine  60 mg Oral QAM    levothyroxine  25 mcg Oral QAM AC    fentanNYL  50 mcg IntraVENous Once    piperacillin-tazobactam  3,375 mg IntraVENous Q8H    sodium chloride flush  5-40 mL IntraVENous 2 times per day    insulin lispro  0-8 Units SubCUTAneous TID WC    insulin lispro  0-4 Units SubCUTAneous Nightly    insulin glargine  30 Units SubCUTAneous Nightly    fenofibrate  160 mg Oral Daily    metoprolol tartrate  25 mg Oral BID    pregabalin  100 mg Oral TID    rosuvastatin  20 mg Oral Nightly    traZODone  100 mg Oral Nightly    budesonide  0.25 mg Nebulization BID RT    And    arformoterol tartrate  15 mcg Nebulization BID RT     Continuous Infusions:   sodium chloride      dextrose       PRN Meds:traMADol, HYDROmorphone, sodium chloride flush, sodium chloride, potassium chloride **OR** potassium alternative oral replacement **OR** potassium chloride, magnesium sulfate, polyethylene glycol, acetaminophen **OR** acetaminophen, dextrose bolus **OR** dextrose bolus, glucagon (rDNA), dextrose, Glucose, albuterol    OBJECTIVE:  BP (!) 119/58   Pulse 76   Temp 97.9 °F (36.6 °C) (Oral)   Resp 18   Ht 1.829 m (6')   Wt 122.5 kg (270 lb)   SpO2 97%   BMI 36.62 kg/m²   Temp  Av.1 °F (36.7 °C)  Min: 97.9 °F (36.6 °C)  Max: 98.2 °F (36.8 °C)  Constitutional: The patient is awake, alert, and oriented.

## 2024-06-16 NOTE — PROGRESS NOTES
Internal Medicine Progress Note    Patient's name: Henry Fuentes  : 1960  Admission date: 2024  Date of service: 2024   Room: [address] MED SURG  Primary care physician: Jason Brandt DO  Reason for visit: Patient 63-year-old male with medical history of COPD, T2DM, HTN, HLD admitted for diabetic foot infection on the right.  MRi concerning for early osteomyelitis.  Patient is s/p amputation of right fifth digit and I&D.  Wound care following, PT/OT to evaluate    Subjective  Henry was seen and examined at bedside.  Patient was seen on room air, in a lot of pain involving his right foot, denies fever, chills, chest pain.  He is having some loose stool.    I have personally reviewed and interpreted the most recent labs and imaging studies as summarized below:     WBC normal, H/H stable 11.1/33.4, platelet normal  Renal function creatinine improved to 1.3 close to baseline, was 1.5 admission  Hepatic function   normal     EKG last EKG was 2023 reviewed sinus rhythm with first-degree block with no acute ST/T wave ischemic change.    MRI right foot on 2024  IMPRESSION:  1. Findings concerning for early osteomyelitis of the 5th metatarsal head as  above.  2. Soft tissue ulceration and subcutaneous edema adjacent to the 5th  metatarsal head and adjacent lateral plantar aspect without focal fluid  collection or abscess.    Review of Systems  There are no new complaints of chest pain, chest tightness, shortness of breath, cough, abdominal pain, vision change, nausea, vomiting, diarrhea, constipation.    Hospital Medications  Current Facility-Administered Medications   Medication Dose Route Frequency Provider Last Rate Last Admin    enoxaparin (LOVENOX) injection 40 mg  40 mg SubCUTAneous Daily Alyssa Downs APRN - CNP   40 mg at 24    DULoxetine (CYMBALTA) extended release capsule 60 mg  60 mg Oral QAM Alyssa Downs APRN - CNP   60 mg at 24     levothyroxine (SYNTHROID) tablet 25 mcg  25 mcg Oral QAM AC Alyssa Downs APRN - CNP   25 mcg at 06/16/24 0605    fentaNYL (SUBLIMAZE) injection 50 mcg  50 mcg IntraVENous Once Nikki Lees MD        piperacillin-tazobactam (ZOSYN) 3,375 mg in sodium chloride 0.9 % 50 mL IVPB  3,375 mg IntraVENous Q8H Alyson Garner APRN - CNP   Stopped at 06/16/24 0900    sodium chloride flush 0.9 % injection 5-40 mL  5-40 mL IntraVENous 2 times per day Alyssa Downs APRN - CNP   10 mL at 06/16/24 0828    sodium chloride flush 0.9 % injection 5-40 mL  5-40 mL IntraVENous PRN Alyssa Downs APRN - CNP        0.9 % sodium chloride infusion   IntraVENous PRN Alyssa Downs APRN - CNP        potassium chloride (KLOR-CON M) extended release tablet 40 mEq  40 mEq Oral PRN Alyssa Downs APRN - CASPER        Or    potassium bicarb-citric acid (EFFER-K) effervescent tablet 40 mEq  40 mEq Oral PRN Alyssa Downs APRN - CASPER        Or    potassium chloride 10 mEq/100 mL IVPB (Peripheral Line)  10 mEq IntraVENous PRN Alyssa Downs APRN - CASPER        magnesium sulfate 2000 mg in 50 mL IVPB premix  2,000 mg IntraVENous PRN Alyssa Downs APRN - CNP        polyethylene glycol (GLYCOLAX) packet 17 g  17 g Oral Daily PRN Alyssa Downs APRN - CASPER        acetaminophen (TYLENOL) tablet 650 mg  650 mg Oral Q6H PRN Alyssa Downs APRN - CASPER        Or    acetaminophen (TYLENOL) suppository 650 mg  650 mg Rectal Q6H PRN Alyssa Downs APRN - CASPER        dextrose bolus 10% 125 mL  125 mL IntraVENous PRN Alyssa Downs APRN - CNP        Or    dextrose bolus 10% 250 mL  250 mL IntraVENous PRN Alyssa Downs APRN - CNP        glucagon injection 1 mg  1 mg SubCUTAneous PRN Alyssa Downs APRN - CNP        dextrose 10 % infusion   IntraVENous Continuous PRN Alyssa Downs APRN - CNP        insulin lispro (HUMALOG,ADMELOG) injection vial 0-8 Units  0-8 Units SubCUTAneous TID WC

## 2024-06-16 NOTE — PROGRESS NOTES
Department of Podiatry  Progress Note    SUBJECTIVE:  Patient seen bedside s/p right foot I&D, amputation right 5th digit. Patient feeling well today. Patient states that pain is manageable with medication.Wound vac placed today. All questions answered. No acute events overnight. Patient denies any N/V/D/F/C/SOB/CP and has no other pedal complaints at this time.     OBJECTIVE:    Scheduled Meds:   enoxaparin  40 mg SubCUTAneous Daily    DULoxetine  60 mg Oral QAM    levothyroxine  25 mcg Oral QAM AC    fentanNYL  50 mcg IntraVENous Once    piperacillin-tazobactam  3,375 mg IntraVENous Q8H    sodium chloride flush  5-40 mL IntraVENous 2 times per day    insulin lispro  0-8 Units SubCUTAneous TID WC    insulin lispro  0-4 Units SubCUTAneous Nightly    insulin glargine  30 Units SubCUTAneous Nightly    fenofibrate  160 mg Oral Daily    metoprolol tartrate  25 mg Oral BID    pregabalin  100 mg Oral TID    rosuvastatin  20 mg Oral Nightly    traZODone  100 mg Oral Nightly    budesonide  0.25 mg Nebulization BID RT    And    arformoterol tartrate  15 mcg Nebulization BID RT     Continuous Infusions:   sodium chloride      dextrose       PRN Meds:.traMADol, HYDROmorphone, sodium chloride flush, sodium chloride, potassium chloride **OR** potassium alternative oral replacement **OR** potassium chloride, magnesium sulfate, polyethylene glycol, acetaminophen **OR** acetaminophen, dextrose bolus **OR** dextrose bolus, glucagon (rDNA), dextrose, Glucose, albuterol    No Known Allergies    BP (!) 119/58   Pulse 76   Temp 97.9 °F (36.6 °C) (Oral)   Resp 18   Ht 1.829 m (6')   Wt 122.5 kg (270 lb)   SpO2 97%   BMI 36.62 kg/m²       EXAM:    VASCULAR:  Right DP pulse is palpable. Right PT pulse is palpable. CFT < 5 seconds right foot.  Skin temperature is warm to warm from the tibial tuberosity to the distal aspect of the digits dorsally. Increase in warmth noted to distal right foot.      NEUROLOGIC:  Protective sensation is

## 2024-06-16 NOTE — PROGRESS NOTES
[facility]  [facility]  Progress Note    SUBJECTIVE:  Chief Complaint   Patient presents with    Wound Check     Sent in by dr david for partial foot amputation on right side, hx of dm type 2     Patient is tolerating medications. No reported adverse drug reactions.  No nausea, vomiting, diarrhea.    Review of systems:  As stated above in the chief complaint, otherwise negative.    Medications:  Scheduled Meds:   enoxaparin  40 mg SubCUTAneous Daily    DULoxetine  60 mg Oral QAM    levothyroxine  25 mcg Oral QAM AC    fentanNYL  50 mcg IntraVENous Once    piperacillin-tazobactam  3,375 mg IntraVENous Q8H    sodium chloride flush  5-40 mL IntraVENous 2 times per day    insulin lispro  0-8 Units SubCUTAneous TID WC    insulin lispro  0-4 Units SubCUTAneous Nightly    insulin glargine  30 Units SubCUTAneous Nightly    fenofibrate  160 mg Oral Daily    metoprolol tartrate  25 mg Oral BID    pregabalin  100 mg Oral TID    rosuvastatin  20 mg Oral Nightly    traZODone  100 mg Oral Nightly    budesonide  0.25 mg Nebulization BID RT    And    arformoterol tartrate  15 mcg Nebulization BID RT     Continuous Infusions:   sodium chloride      dextrose       PRN Meds:traMADol, HYDROmorphone, sodium chloride flush, sodium chloride, potassium chloride **OR** potassium alternative oral replacement **OR** potassium chloride, magnesium sulfate, polyethylene glycol, acetaminophen **OR** acetaminophen, dextrose bolus **OR** dextrose bolus, glucagon (rDNA), dextrose, Glucose, albuterol    OBJECTIVE:  BP (!) 119/58   Pulse 76   Temp 97.9 °F (36.6 °C) (Oral)   Resp 18   Ht 1.829 m (6')   Wt 122.5 kg (270 lb)   SpO2 97%   BMI 36.62 kg/m²   Temp  Av.1 °F (36.7 °C)  Min: 97.9 °F (36.6 °C)  Max: 98.2 °F (36.8 °C)  Constitutional: The patient is awake, alert, and oriented.   Skin: Warm and dry. No rashes were noted.   HEENT: Round and reactive pupils.  Moist mucous membranes.  No ulcerations or

## 2024-06-16 NOTE — PROGRESS NOTES
The combination of duloxetine and tramadol may cause serotonin syndrome.  Please monitor patient for signs/symptoms of serotonin syndrome.  Uriel Britt RPh 6/16/2024 11:32 AM

## 2024-06-17 ENCOUNTER — APPOINTMENT (OUTPATIENT)
Dept: GENERAL RADIOLOGY | Age: 64
DRG: 617 | End: 2024-06-17
Payer: MEDICARE

## 2024-06-17 LAB
ANION GAP SERPL CALCULATED.3IONS-SCNC: 8 MMOL/L (ref 7–16)
BASOPHILS # BLD: 0.06 K/UL (ref 0–0.2)
BASOPHILS NFR BLD: 1 % (ref 0–2)
BUN SERPL-MCNC: 16 MG/DL (ref 6–23)
CALCIUM SERPL-MCNC: 8.6 MG/DL (ref 8.6–10.2)
CHLORIDE SERPL-SCNC: 103 MMOL/L (ref 98–107)
CO2 SERPL-SCNC: 27 MMOL/L (ref 22–29)
CREAT SERPL-MCNC: 1.4 MG/DL (ref 0.7–1.2)
EOSINOPHIL # BLD: 0.13 K/UL (ref 0.05–0.5)
EOSINOPHILS RELATIVE PERCENT: 2 % (ref 0–6)
ERYTHROCYTE [DISTWIDTH] IN BLOOD BY AUTOMATED COUNT: 13.1 % (ref 11.5–15)
GFR, ESTIMATED: 58 ML/MIN/1.73M2
GLUCOSE BLD-MCNC: 179 MG/DL (ref 74–99)
GLUCOSE BLD-MCNC: 239 MG/DL (ref 74–99)
GLUCOSE BLD-MCNC: 281 MG/DL (ref 74–99)
GLUCOSE BLD-MCNC: 389 MG/DL (ref 74–99)
GLUCOSE BLD-MCNC: 436 MG/DL (ref 74–99)
GLUCOSE SERPL-MCNC: 200 MG/DL (ref 74–99)
HCT VFR BLD AUTO: 36.3 % (ref 37–54)
HGB BLD-MCNC: 11.8 G/DL (ref 12.5–16.5)
IMM GRANULOCYTES # BLD AUTO: 0.03 K/UL (ref 0–0.58)
IMM GRANULOCYTES NFR BLD: 1 % (ref 0–5)
LYMPHOCYTES NFR BLD: 2.04 K/UL (ref 1.5–4)
LYMPHOCYTES RELATIVE PERCENT: 34 % (ref 20–42)
MCH RBC QN AUTO: 27.8 PG (ref 26–35)
MCHC RBC AUTO-ENTMCNC: 32.5 G/DL (ref 32–34.5)
MCV RBC AUTO: 85.4 FL (ref 80–99.9)
MICROORGANISM SPEC CULT: ABNORMAL
MICROORGANISM/AGENT SPEC: ABNORMAL
MONOCYTES NFR BLD: 0.35 K/UL (ref 0.1–0.95)
MONOCYTES NFR BLD: 6 % (ref 2–12)
NEUTROPHILS NFR BLD: 57 % (ref 43–80)
NEUTS SEG NFR BLD: 3.42 K/UL (ref 1.8–7.3)
PLATELET # BLD AUTO: 185 K/UL (ref 130–450)
PMV BLD AUTO: 11.1 FL (ref 7–12)
POTASSIUM SERPL-SCNC: 4.2 MMOL/L (ref 3.5–5)
RBC # BLD AUTO: 4.25 M/UL (ref 3.8–5.8)
SERVICE CMNT-IMP: ABNORMAL
SERVICE CMNT-IMP: ABNORMAL
SODIUM SERPL-SCNC: 138 MMOL/L (ref 132–146)
SPECIMEN DESCRIPTION: ABNORMAL
SPECIMEN DESCRIPTION: ABNORMAL
WBC OTHER # BLD: 6 K/UL (ref 4.5–11.5)

## 2024-06-17 PROCEDURE — 73630 X-RAY EXAM OF FOOT: CPT

## 2024-06-17 PROCEDURE — 6370000000 HC RX 637 (ALT 250 FOR IP): Performed by: HOSPITALIST

## 2024-06-17 PROCEDURE — 6370000000 HC RX 637 (ALT 250 FOR IP): Performed by: INTERNAL MEDICINE

## 2024-06-17 PROCEDURE — 97165 OT EVAL LOW COMPLEX 30 MIN: CPT

## 2024-06-17 PROCEDURE — 6360000002 HC RX W HCPCS

## 2024-06-17 PROCEDURE — 85025 COMPLETE CBC W/AUTO DIFF WBC: CPT

## 2024-06-17 PROCEDURE — 6370000000 HC RX 637 (ALT 250 FOR IP)

## 2024-06-17 PROCEDURE — 97530 THERAPEUTIC ACTIVITIES: CPT

## 2024-06-17 PROCEDURE — 2580000003 HC RX 258

## 2024-06-17 PROCEDURE — 82962 GLUCOSE BLOOD TEST: CPT

## 2024-06-17 PROCEDURE — 6360000002 HC RX W HCPCS: Performed by: REGISTERED NURSE

## 2024-06-17 PROCEDURE — 80048 BASIC METABOLIC PNL TOTAL CA: CPT

## 2024-06-17 PROCEDURE — 36415 COLL VENOUS BLD VENIPUNCTURE: CPT

## 2024-06-17 PROCEDURE — 2580000003 HC RX 258: Performed by: REGISTERED NURSE

## 2024-06-17 PROCEDURE — 1200000000 HC SEMI PRIVATE

## 2024-06-17 PROCEDURE — 99232 SBSQ HOSP IP/OBS MODERATE 35: CPT | Performed by: INTERNAL MEDICINE

## 2024-06-17 PROCEDURE — 94640 AIRWAY INHALATION TREATMENT: CPT

## 2024-06-17 RX ORDER — OXYCODONE AND ACETAMINOPHEN 10; 325 MG/1; MG/1
1 TABLET ORAL 4 TIMES DAILY
Status: DISCONTINUED | OUTPATIENT
Start: 2024-06-17 | End: 2024-06-19

## 2024-06-17 RX ORDER — ENOXAPARIN SODIUM 100 MG/ML
30 INJECTION SUBCUTANEOUS 2 TIMES DAILY
Status: DISCONTINUED | OUTPATIENT
Start: 2024-06-17 | End: 2024-06-21 | Stop reason: HOSPADM

## 2024-06-17 RX ADMIN — INSULIN LISPRO 4 UNITS: 100 INJECTION, SOLUTION INTRAVENOUS; SUBCUTANEOUS at 16:29

## 2024-06-17 RX ADMIN — OXYCODONE AND ACETAMINOPHEN 1 TABLET: 10; 325 TABLET ORAL at 16:22

## 2024-06-17 RX ADMIN — ROSUVASTATIN CALCIUM 20 MG: 20 TABLET, FILM COATED ORAL at 22:07

## 2024-06-17 RX ADMIN — METOPROLOL TARTRATE 25 MG: 25 TABLET, FILM COATED ORAL at 22:08

## 2024-06-17 RX ADMIN — PREGABALIN 100 MG: 50 CAPSULE ORAL at 08:05

## 2024-06-17 RX ADMIN — PIPERACILLIN AND TAZOBACTAM 3375 MG: 3; .375 INJECTION, POWDER, LYOPHILIZED, FOR SOLUTION INTRAVENOUS at 13:21

## 2024-06-17 RX ADMIN — ENOXAPARIN SODIUM 30 MG: 100 INJECTION SUBCUTANEOUS at 22:06

## 2024-06-17 RX ADMIN — OXYCODONE AND ACETAMINOPHEN 1 TABLET: 10; 325 TABLET ORAL at 10:00

## 2024-06-17 RX ADMIN — TRAZODONE HYDROCHLORIDE 100 MG: 50 TABLET ORAL at 22:08

## 2024-06-17 RX ADMIN — BUDESONIDE 250 MCG: 0.25 SUSPENSION RESPIRATORY (INHALATION) at 06:16

## 2024-06-17 RX ADMIN — PIPERACILLIN AND TAZOBACTAM 3375 MG: 3; .375 INJECTION, POWDER, LYOPHILIZED, FOR SOLUTION INTRAVENOUS at 22:13

## 2024-06-17 RX ADMIN — METOPROLOL TARTRATE 25 MG: 25 TABLET, FILM COATED ORAL at 08:05

## 2024-06-17 RX ADMIN — SODIUM CHLORIDE, PRESERVATIVE FREE 10 ML: 5 INJECTION INTRAVENOUS at 22:05

## 2024-06-17 RX ADMIN — PREGABALIN 100 MG: 50 CAPSULE ORAL at 22:07

## 2024-06-17 RX ADMIN — ARFORMOTEROL TARTRATE 15 MCG: 15 SOLUTION RESPIRATORY (INHALATION) at 06:16

## 2024-06-17 RX ADMIN — LEVOTHYROXINE SODIUM 25 MCG: 25 TABLET ORAL at 06:09

## 2024-06-17 RX ADMIN — OXYCODONE AND ACETAMINOPHEN 1 TABLET: 10; 325 TABLET ORAL at 22:06

## 2024-06-17 RX ADMIN — ALBUTEROL SULFATE 2.5 MG: 2.5 SOLUTION RESPIRATORY (INHALATION) at 06:16

## 2024-06-17 RX ADMIN — FENOFIBRATE 160 MG: 160 TABLET ORAL at 08:05

## 2024-06-17 RX ADMIN — INSULIN HUMAN 5 UNITS: 100 INJECTION, SUSPENSION SUBCUTANEOUS at 12:38

## 2024-06-17 RX ADMIN — TRAMADOL HYDROCHLORIDE 50 MG: 50 TABLET ORAL at 08:06

## 2024-06-17 RX ADMIN — TRAMADOL HYDROCHLORIDE 50 MG: 50 TABLET ORAL at 00:32

## 2024-06-17 RX ADMIN — DULOXETINE HYDROCHLORIDE 60 MG: 60 CAPSULE, DELAYED RELEASE ORAL at 08:05

## 2024-06-17 RX ADMIN — PREGABALIN 100 MG: 50 CAPSULE ORAL at 13:16

## 2024-06-17 RX ADMIN — ENOXAPARIN SODIUM 30 MG: 100 INJECTION SUBCUTANEOUS at 08:05

## 2024-06-17 RX ADMIN — INSULIN GLARGINE 30 UNITS: 100 INJECTION, SOLUTION SUBCUTANEOUS at 22:04

## 2024-06-17 RX ADMIN — PIPERACILLIN AND TAZOBACTAM 3375 MG: 3; .375 INJECTION, POWDER, LYOPHILIZED, FOR SOLUTION INTRAVENOUS at 04:54

## 2024-06-17 RX ADMIN — INSULIN HUMAN 10 UNITS: 100 INJECTION, SOLUTION PARENTERAL at 12:38

## 2024-06-17 ASSESSMENT — PAIN DESCRIPTION - FREQUENCY
FREQUENCY: INTERMITTENT

## 2024-06-17 ASSESSMENT — PAIN SCALES - GENERAL
PAINLEVEL_OUTOF10: 5
PAINLEVEL_OUTOF10: 1
PAINLEVEL_OUTOF10: 9
PAINLEVEL_OUTOF10: 4
PAINLEVEL_OUTOF10: 9
PAINLEVEL_OUTOF10: 9
PAINLEVEL_OUTOF10: 0
PAINLEVEL_OUTOF10: 6
PAINLEVEL_OUTOF10: 4
PAINLEVEL_OUTOF10: 9

## 2024-06-17 ASSESSMENT — PAIN DESCRIPTION - DESCRIPTORS
DESCRIPTORS: DISCOMFORT;ACHING
DESCRIPTORS: ACHING;DISCOMFORT;SHARP
DESCRIPTORS: DISCOMFORT;ACHING;SHARP
DESCRIPTORS: ACHING;DISCOMFORT;SHARP

## 2024-06-17 ASSESSMENT — PAIN DESCRIPTION - LOCATION
LOCATION: FOOT

## 2024-06-17 ASSESSMENT — PAIN DESCRIPTION - ONSET
ONSET: ON-GOING

## 2024-06-17 ASSESSMENT — PAIN DESCRIPTION - ORIENTATION
ORIENTATION: RIGHT

## 2024-06-17 ASSESSMENT — PAIN DESCRIPTION - PAIN TYPE
TYPE: SURGICAL PAIN

## 2024-06-17 NOTE — ACP (ADVANCE CARE PLANNING)
Advance Care Planning   Healthcare Decision Maker:    Primary Decision Maker: NigelYashira - Niece/Nephew - [phone]    Click here to complete Healthcare Decision Makers including selection of the Healthcare Decision Maker Relationship (ie \"Primary\").  Today we documented Decision Maker(s) consistent with Legal Next of Kin hierarchy.

## 2024-06-17 NOTE — PATIENT CARE CONFERENCE
[facility] Quality Flow/Interdisciplinary Rounds Progress Note        Quality Flow Rounds held on June 17, 2024    Disciplines Attending:  Bedside Nurse, , , and Nursing Unit Leadership    Henry Fuentes was admitted on 6/13/2024  6:35 PM    Anticipated Discharge Date:       Disposition:    Jose Score:  Jose Scale Score: 18    Readmission Risk              Risk of Unplanned Readmission:  24           Discussed patient goal for the day, patient clinical progression, and barriers to discharge.  The following Goal(s) of the Day/Commitment(s) have been identified:  Diagnostics - Report Results      Paula Kumari RN  June 17, 2024

## 2024-06-17 NOTE — PROGRESS NOTES
Department of Podiatry  Progress Note    SUBJECTIVE:  Patient seen bedside s/p right foot I&D, amputation right 5th digit. Patient feeling well today. Patient states that pain is manageable with medication.Wound vac placed today. All questions answered. No acute events overnight. Patient denies any N/V/D/F/C/SOB/CP and has no other pedal complaints at this time.     OBJECTIVE:    Scheduled Meds:   enoxaparin  30 mg SubCUTAneous BID    oxyCODONE-acetaminophen  1 tablet Oral 4x daily    nicotine  1 patch TransDERmal Daily    DULoxetine  60 mg Oral QAM    levothyroxine  25 mcg Oral QAM AC    fentanNYL  50 mcg IntraVENous Once    piperacillin-tazobactam  3,375 mg IntraVENous Q8H    sodium chloride flush  5-40 mL IntraVENous 2 times per day    insulin lispro  0-8 Units SubCUTAneous TID WC    insulin lispro  0-4 Units SubCUTAneous Nightly    insulin glargine  30 Units SubCUTAneous Nightly    fenofibrate  160 mg Oral Daily    metoprolol tartrate  25 mg Oral BID    pregabalin  100 mg Oral TID    rosuvastatin  20 mg Oral Nightly    traZODone  100 mg Oral Nightly    budesonide  0.25 mg Nebulization BID RT    And    arformoterol tartrate  15 mcg Nebulization BID RT     Continuous Infusions:   sodium chloride      dextrose       PRN Meds:.traMADol, sodium chloride flush, sodium chloride, potassium chloride **OR** potassium alternative oral replacement **OR** potassium chloride, magnesium sulfate, polyethylene glycol, acetaminophen **OR** acetaminophen, dextrose bolus **OR** dextrose bolus, glucagon (rDNA), dextrose, Glucose, albuterol    No Known Allergies    /63   Pulse 76   Temp 97.8 °F (36.6 °C) (Oral)   Resp 16   Ht 1.829 m (6')   Wt 122.5 kg (270 lb)   SpO2 98%   BMI 36.62 kg/m²       EXAM:    VASCULAR:  Right DP pulse is palpable. Right PT pulse is palpable. CFT < 5 seconds right foot.  Skin temperature is warm to warm from the tibial tuberosity to the distal aspect of the digits dorsally. Increase in warmth

## 2024-06-17 NOTE — CARE COORDINATION
Social Work discharge planning  SW met with pt per his request. He said his second choice is [facility] in [place], [place] [id number] [phone] if [facility] can not accept him. Pt aware [facility] still assessing.  Electronically signed by MARCELL Suárez on 6/17/2024 at 3:38 PM

## 2024-06-17 NOTE — PLAN OF CARE
Problem: Safety - Adult  Goal: Free from fall injury  6/17/2024 0950 by Cris Darnell, RN  Outcome: Progressing  6/16/2024 2156 by Munira Land, RN  Outcome: Progressing

## 2024-06-17 NOTE — PROGRESS NOTES
Progress  Note  Chief Complaint   Patient presents with    Wound Check     Sent in by dr david for partial foot amputation on right side, hx of dm type 2     Historical Issues:  Current Facility-Administered Medications   Medication Dose Route Frequency Provider Last Rate Last Admin    enoxaparin Sodium (LOVENOX) injection 30 mg  30 mg SubCUTAneous BID Alyssa Downs APRN - CNP   30 mg at 06/17/24 0805    oxyCODONE-acetaminophen (PERCOCET)  MG per tablet 1 tablet  1 tablet Oral 4x daily Brian Pryor MD   1 tablet at 06/17/24 1000    traMADol (ULTRAM) tablet 50 mg  50 mg Oral Q6H PRN Santana Wilson MD   50 mg at 06/17/24 0806    nicotine (NICODERM CQ) 14 MG/24HR 1 patch  1 patch TransDERmal Daily Santana Wilson MD   1 patch at 06/17/24 0808    DULoxetine (CYMBALTA) extended release capsule 60 mg  60 mg Oral QAM Alyssa Downs APRN - CNP   60 mg at 06/17/24 0805    levothyroxine (SYNTHROID) tablet 25 mcg  25 mcg Oral QAM AC Alyssa Downs APRN - CNP   25 mcg at 06/17/24 0609    fentaNYL (SUBLIMAZE) injection 50 mcg  50 mcg IntraVENous Once Nikki Lees MD        piperacillin-tazobactam (ZOSYN) 3,375 mg in sodium chloride 0.9 % 50 mL IVPB  3,375 mg IntraVENous Q8H Alyson Garner APRN - CNP   Stopped at 06/17/24 0854    sodium chloride flush 0.9 % injection 5-40 mL  5-40 mL IntraVENous 2 times per day Alyssa Downs APRN - CNP   10 mL at 06/16/24 2027    sodium chloride flush 0.9 % injection 5-40 mL  5-40 mL IntraVENous PRN Alyssa Downs APRN - CNP   10 mL at 06/16/24 1437    0.9 % sodium chloride infusion   IntraVENous PRN Alyssa Downs APRN - CNP        potassium chloride (KLOR-CON M) extended release tablet 40 mEq  40 mEq Oral PRN Alyssa Downs APRN - CNP        Or    potassium bicarb-citric acid (EFFER-K) effervescent tablet 40 mEq  40 mEq Oral PRN Alyssa Downs, APRN - CNP        Or    potassium chloride 10 mEq/100 mL IVPB (Peripheral Line)  10 mEq  (PROVENTIL) (2.5 MG/3ML) 0.083% nebulizer solution 2.5 mg  2.5 mg Nebulization Q6H PRN Alyssa Downs APRN - CNP   2.5 mg at 06/17/24 0616    budesonide (PULMICORT) nebulizer suspension 250 mcg  0.25 mg Nebulization BID RT Alyssa Downs APRN - CNP   250 mcg at 06/17/24 0616    And    arformoterol tartrate (BROVANA) nebulizer solution 15 mcg  15 mcg Nebulization BID RT Alyssa Downs APRN - CNP   15 mcg at 06/17/24 0616     Recent Complaints:  Review of Systems  Vitals:    06/17/24 0836   BP:    Pulse:    Resp: 16   Temp:    SpO2:      Physical Exam  Cor RRR  Lungs CTA  Foot w wound vac  Recent Labs     06/15/24  0327 06/16/24  0456 06/17/24  0530    136 138   K 4.4 4.0 4.2    103 103   CO2 25 25 27   BUN 19 16 16   CREATININE 1.2 1.3* 1.4*   GLUCOSE 221* 179* 200*   CALCIUM 8.0* 8.1* 8.6     Recent Labs     06/15/24  0327 06/16/24  0456 06/17/24  0530   WBC 6.8 6.5 6.0   RBC 3.93 3.92 4.25   HGB 11.0* 11.1* 11.8*   HCT 33.1* 33.4* 36.3*   MCV 84.2 85.2 85.4   MCH 28.0 28.3 27.8   MCHC 33.2 33.2 32.5   RDW 12.7 12.9 13.1    180 185   MPV 11.0 11.1 11.1           Principal Problem:    Diabetic ulcer of right foot due to type 2 diabetes mellitus (HCC)  Active Problems:    COPD (chronic obstructive pulmonary disease) (HCC)    HTN (hypertension)    Type 2 diabetes mellitus with hyperglycemia, with long-term current use of insulin (HCC)    Hyperlipidemia    Thyroid disease    Open wound of foot excluding toes  Resolved Problems:    * No resolved hospital problems. *      Plan:  Not on scheduled pain meds. Requiring dilaudid. Start Percocet 10 mg 4x day.  Tramadol available for breakthrough.  Await cultures ID on case.      Case Discussed with:      Electronically signed by Brian Pryor MD on 6/17/2024 at 10:02 AM

## 2024-06-17 NOTE — CARE COORDINATION
Updated plan of care. Patient is from home with his niece. Patient POD #3 of I&D with podiatry. Patient now has a wound vac in place. Spoke to the patient at  bedside. [facility] was following for [facility] needs. Patient states he now wishes to go to rehab in [facility] and his first choice is [facility]. Called [facility] and spoke to Bren with Admissions. Referral to be faxed to Bren at F: [phone]. Await input.   Electronically signed by Su Sloan RN on 6/17/2024 at 10:06 AM

## 2024-06-17 NOTE — PROGRESS NOTES
[facility]  [facility]  Progress Note    SUBJECTIVE:  Chief Complaint   Patient presents with    Wound Check     Sent in by dr david for partial foot amputation on right side, hx of dm type 2     Patient is tolerating medications. No reported adverse drug reactions.  No diarrhea or vomiting, reports some nausea that passes quickly  No fevers       Review of systems:  As stated above in the chief complaint, otherwise negative.    Medications:  Scheduled Meds:   enoxaparin  30 mg SubCUTAneous BID    oxyCODONE-acetaminophen  1 tablet Oral 4x daily    nicotine  1 patch TransDERmal Daily    DULoxetine  60 mg Oral QAM    levothyroxine  25 mcg Oral QAM AC    fentanNYL  50 mcg IntraVENous Once    piperacillin-tazobactam  3,375 mg IntraVENous Q8H    sodium chloride flush  5-40 mL IntraVENous 2 times per day    insulin lispro  0-8 Units SubCUTAneous TID WC    insulin lispro  0-4 Units SubCUTAneous Nightly    insulin glargine  30 Units SubCUTAneous Nightly    fenofibrate  160 mg Oral Daily    metoprolol tartrate  25 mg Oral BID    pregabalin  100 mg Oral TID    rosuvastatin  20 mg Oral Nightly    traZODone  100 mg Oral Nightly    budesonide  0.25 mg Nebulization BID RT    And    arformoterol tartrate  15 mcg Nebulization BID RT     Continuous Infusions:   sodium chloride      dextrose       PRN Meds:traMADol, sodium chloride flush, sodium chloride, potassium chloride **OR** potassium alternative oral replacement **OR** potassium chloride, magnesium sulfate, polyethylene glycol, acetaminophen **OR** acetaminophen, dextrose bolus **OR** dextrose bolus, glucagon (rDNA), dextrose, Glucose, albuterol    OBJECTIVE:  /63   Pulse 76   Temp 97.8 °F (36.6 °C) (Oral)   Resp 16   Ht 1.829 m (6')   Wt 122.5 kg (270 lb)   SpO2 98%   BMI 36.62 kg/m²   Temp  Av.8 °F (36.6 °C)  Min: 97.7 °F (36.5 °C)  Max: 97.8 °F (36.6 °C)  Constitutional: The patient is awake, alert, and oriented. Lying in

## 2024-06-17 NOTE — PROGRESS NOTES
DVT Prophylaxis Adjustment Policy (DVT Prophylaxis)     This patient is on DVT Prophylaxis medication that requires a dose adjustment      Date Body Weight IBW  Adjusted BW SCr  CrCl Dialysis status   6/17/2024 122.5 kg (270 lb) Ideal body weight: 77.6 kg (171 lb 1.2 oz)  Adjusted ideal body weight: 95.5 kg (210 lb 10.3 oz) Serum creatinine: 1.4 mg/dL (H) 06/17/24 0530  Estimated creatinine clearance: 74 mL/min (A) N/a       Pharmacy has dose-adjusted the DVT Prophylaxis regimen to match   the recommendations from the following table        Ordered Medication:Lovenox 40mg daily    Order Changed/converted to: Lovenox 30mg BID      These changes were made per protocol according to the [facility] Pharmacist   Review for Appropriate Use and Automatic Dose Adjustments of   Subcutaneous Anticoagulants Policy     *Please note this dose may need readjusted if patient's condition changes.    Please contact pharmacy with any questions regarding these changes.    Low De Jesus RPH  6/17/2024  7:19 AM

## 2024-06-17 NOTE — PROGRESS NOTES
OCCUPATIONAL THERAPY INITIAL EVALUATION    [facility]   [address] [facility] [address]        Date:2024                                                  Patient Name: Henry Fuentes    MRN: [id number]    : 1960    Room: [address] [place] [address]    Evaluating OT: Tuyet Rivera OTR/L #[id number]    Referring Provider:  Alyssa Downs APRN - CNP     Specific Provider Orders/Date:  OT Eval and Treat , 2024     Diagnosis:   1. Open wound of foot excluding toes    2. ALVINO (acute kidney injury) ([facility])    3. Hyperglycemia due to diabetes mellitus (HCC)    4. Diabetic ulcer of right foot associated with other specified diabetes mellitus, unspecified part of foot, unspecified ulcer stage (HCC)         Surgery: S/p 1. Incision and drainage, right fifth metatarsal bone 2. 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      Precautions:  Fall Risk, alarm, NWB R LE, wound vac      Assessment of current deficits    [x] Functional mobility  [x]ADLs  [x] Strength               []Cognition    [x] Functional transfers   [x] IADLs         [x] Safety Awareness   [x]Endurance    [] Fine Coordination              [x] Balance      [] Vision/perception   []Sensation     []Gross Motor Coordination  [] ROM  [] Delirium                   [] Motor Control     OT PLAN OF CARE   OT POC based on physician orders, patient diagnosis and results of clinical assessment    Frequency/Duration 2-5 days/wk for 2-4 weeks PRN     Specific OT Treatment Interventions to include:   * Instruction/training on adapted ADL techniques and AE recommendations to increase functional independence within precautions       * Training on energy conservation strategies, correct breathing pattern and techniques to improve independence/tolerance

## 2024-06-18 LAB
ANION GAP SERPL CALCULATED.3IONS-SCNC: 8 MMOL/L (ref 7–16)
BASOPHILS # BLD: 0.08 K/UL (ref 0–0.2)
BASOPHILS NFR BLD: 1 % (ref 0–2)
BUN SERPL-MCNC: 17 MG/DL (ref 6–23)
CALCIUM SERPL-MCNC: 8.4 MG/DL (ref 8.6–10.2)
CHLORIDE SERPL-SCNC: 101 MMOL/L (ref 98–107)
CO2 SERPL-SCNC: 25 MMOL/L (ref 22–29)
CREAT SERPL-MCNC: 1.6 MG/DL (ref 0.7–1.2)
EOSINOPHIL # BLD: 0.13 K/UL (ref 0.05–0.5)
EOSINOPHILS RELATIVE PERCENT: 2 % (ref 0–6)
ERYTHROCYTE [DISTWIDTH] IN BLOOD BY AUTOMATED COUNT: 13.2 % (ref 11.5–15)
GFR, ESTIMATED: 47 ML/MIN/1.73M2
GLUCOSE BLD-MCNC: 203 MG/DL (ref 74–99)
GLUCOSE BLD-MCNC: 219 MG/DL (ref 74–99)
GLUCOSE BLD-MCNC: 225 MG/DL (ref 74–99)
GLUCOSE BLD-MCNC: 270 MG/DL (ref 74–99)
GLUCOSE SERPL-MCNC: 188 MG/DL (ref 74–99)
HCT VFR BLD AUTO: 36.4 % (ref 37–54)
HGB BLD-MCNC: 11.8 G/DL (ref 12.5–16.5)
IMM GRANULOCYTES # BLD AUTO: <0.03 K/UL (ref 0–0.58)
IMM GRANULOCYTES NFR BLD: 0 % (ref 0–5)
LYMPHOCYTES NFR BLD: 2.48 K/UL (ref 1.5–4)
LYMPHOCYTES RELATIVE PERCENT: 38 % (ref 20–42)
MCH RBC QN AUTO: 28.3 PG (ref 26–35)
MCHC RBC AUTO-ENTMCNC: 32.4 G/DL (ref 32–34.5)
MCV RBC AUTO: 87.3 FL (ref 80–99.9)
MICROORGANISM SPEC CULT: NORMAL
MONOCYTES NFR BLD: 0.41 K/UL (ref 0.1–0.95)
MONOCYTES NFR BLD: 6 % (ref 2–12)
NEUTROPHILS NFR BLD: 52 % (ref 43–80)
NEUTS SEG NFR BLD: 3.44 K/UL (ref 1.8–7.3)
PLATELET # BLD AUTO: 186 K/UL (ref 130–450)
PMV BLD AUTO: 11.4 FL (ref 7–12)
POTASSIUM SERPL-SCNC: 4.6 MMOL/L (ref 3.5–5)
RBC # BLD AUTO: 4.17 M/UL (ref 3.8–5.8)
SERVICE CMNT-IMP: NORMAL
SODIUM SERPL-SCNC: 134 MMOL/L (ref 132–146)
SPECIMEN DESCRIPTION: NORMAL
WBC OTHER # BLD: 6.6 K/UL (ref 4.5–11.5)

## 2024-06-18 PROCEDURE — 6360000002 HC RX W HCPCS: Performed by: REGISTERED NURSE

## 2024-06-18 PROCEDURE — 85025 COMPLETE CBC W/AUTO DIFF WBC: CPT

## 2024-06-18 PROCEDURE — 2580000003 HC RX 258: Performed by: REGISTERED NURSE

## 2024-06-18 PROCEDURE — 6370000000 HC RX 637 (ALT 250 FOR IP)

## 2024-06-18 PROCEDURE — 97161 PT EVAL LOW COMPLEX 20 MIN: CPT

## 2024-06-18 PROCEDURE — P9047 ALBUMIN (HUMAN), 25%, 50ML: HCPCS | Performed by: INTERNAL MEDICINE

## 2024-06-18 PROCEDURE — 82962 GLUCOSE BLOOD TEST: CPT

## 2024-06-18 PROCEDURE — 36415 COLL VENOUS BLD VENIPUNCTURE: CPT

## 2024-06-18 PROCEDURE — 99232 SBSQ HOSP IP/OBS MODERATE 35: CPT | Performed by: INTERNAL MEDICINE

## 2024-06-18 PROCEDURE — 6360000002 HC RX W HCPCS: Performed by: INTERNAL MEDICINE

## 2024-06-18 PROCEDURE — 94640 AIRWAY INHALATION TREATMENT: CPT

## 2024-06-18 PROCEDURE — 2580000003 HC RX 258: Performed by: INTERNAL MEDICINE

## 2024-06-18 PROCEDURE — 6370000000 HC RX 637 (ALT 250 FOR IP): Performed by: HOSPITALIST

## 2024-06-18 PROCEDURE — 6360000002 HC RX W HCPCS

## 2024-06-18 PROCEDURE — 80048 BASIC METABOLIC PNL TOTAL CA: CPT

## 2024-06-18 PROCEDURE — 6370000000 HC RX 637 (ALT 250 FOR IP): Performed by: INTERNAL MEDICINE

## 2024-06-18 PROCEDURE — 1200000000 HC SEMI PRIVATE

## 2024-06-18 RX ORDER — LACTULOSE 10 G/15ML
30 SOLUTION ORAL 2 TIMES DAILY
Status: DISPENSED | OUTPATIENT
Start: 2024-06-18 | End: 2024-06-20

## 2024-06-18 RX ORDER — SENNOSIDES A AND B 8.6 MG/1
2 TABLET, FILM COATED ORAL 2 TIMES DAILY
Status: COMPLETED | OUTPATIENT
Start: 2024-06-18 | End: 2024-06-18

## 2024-06-18 RX ORDER — 0.9 % SODIUM CHLORIDE 0.9 %
250 INTRAVENOUS SOLUTION INTRAVENOUS ONCE
Status: COMPLETED | OUTPATIENT
Start: 2024-06-18 | End: 2024-06-18

## 2024-06-18 RX ORDER — ALBUMIN (HUMAN) 12.5 G/50ML
25 SOLUTION INTRAVENOUS ONCE
Status: COMPLETED | OUTPATIENT
Start: 2024-06-18 | End: 2024-06-18

## 2024-06-18 RX ORDER — SODIUM CHLORIDE 9 MG/ML
INJECTION, SOLUTION INTRAVENOUS CONTINUOUS
Status: DISCONTINUED | OUTPATIENT
Start: 2024-06-18 | End: 2024-06-21 | Stop reason: HOSPADM

## 2024-06-18 RX ORDER — METHOCARBAMOL 500 MG/1
1000 TABLET, FILM COATED ORAL 4 TIMES DAILY
Status: DISCONTINUED | OUTPATIENT
Start: 2024-06-18 | End: 2024-06-21 | Stop reason: HOSPADM

## 2024-06-18 RX ADMIN — SODIUM CHLORIDE: 9 INJECTION, SOLUTION INTRAVENOUS at 09:21

## 2024-06-18 RX ADMIN — METHOCARBAMOL TABLETS 1000 MG: 500 TABLET, COATED ORAL at 21:43

## 2024-06-18 RX ADMIN — PREGABALIN 100 MG: 50 CAPSULE ORAL at 13:58

## 2024-06-18 RX ADMIN — ENOXAPARIN SODIUM 30 MG: 100 INJECTION SUBCUTANEOUS at 10:27

## 2024-06-18 RX ADMIN — LACTULOSE 30 G: 20 SOLUTION ORAL at 21:47

## 2024-06-18 RX ADMIN — INSULIN GLARGINE 30 UNITS: 100 INJECTION, SOLUTION SUBCUTANEOUS at 21:53

## 2024-06-18 RX ADMIN — INSULIN LISPRO 4 UNITS: 100 INJECTION, SOLUTION INTRAVENOUS; SUBCUTANEOUS at 16:23

## 2024-06-18 RX ADMIN — ALBUMIN (HUMAN) 25 G: 0.25 INJECTION, SOLUTION INTRAVENOUS at 09:20

## 2024-06-18 RX ADMIN — FENOFIBRATE 160 MG: 160 TABLET ORAL at 10:27

## 2024-06-18 RX ADMIN — ACETAMINOPHEN 650 MG: 325 TABLET ORAL at 01:58

## 2024-06-18 RX ADMIN — PIPERACILLIN AND TAZOBACTAM 3375 MG: 3; .375 INJECTION, POWDER, LYOPHILIZED, FOR SOLUTION INTRAVENOUS at 21:58

## 2024-06-18 RX ADMIN — DULOXETINE HYDROCHLORIDE 60 MG: 60 CAPSULE, DELAYED RELEASE ORAL at 10:28

## 2024-06-18 RX ADMIN — TRAZODONE HYDROCHLORIDE 100 MG: 50 TABLET ORAL at 21:43

## 2024-06-18 RX ADMIN — BUDESONIDE 250 MCG: 0.25 SUSPENSION RESPIRATORY (INHALATION) at 08:47

## 2024-06-18 RX ADMIN — INSULIN LISPRO 2 UNITS: 100 INJECTION, SOLUTION INTRAVENOUS; SUBCUTANEOUS at 06:33

## 2024-06-18 RX ADMIN — INSULIN LISPRO 2 UNITS: 100 INJECTION, SOLUTION INTRAVENOUS; SUBCUTANEOUS at 11:01

## 2024-06-18 RX ADMIN — SENNOSIDES 17.2 MG: 8.6 TABLET, COATED ORAL at 11:01

## 2024-06-18 RX ADMIN — ARFORMOTEROL TARTRATE 15 MCG: 15 SOLUTION RESPIRATORY (INHALATION) at 08:47

## 2024-06-18 RX ADMIN — PREGABALIN 100 MG: 50 CAPSULE ORAL at 21:42

## 2024-06-18 RX ADMIN — ROSUVASTATIN CALCIUM 20 MG: 20 TABLET, FILM COATED ORAL at 21:42

## 2024-06-18 RX ADMIN — OXYCODONE AND ACETAMINOPHEN 1 TABLET: 10; 325 TABLET ORAL at 10:28

## 2024-06-18 RX ADMIN — LACTULOSE 30 G: 20 SOLUTION ORAL at 11:01

## 2024-06-18 RX ADMIN — ARFORMOTEROL TARTRATE 15 MCG: 15 SOLUTION RESPIRATORY (INHALATION) at 19:47

## 2024-06-18 RX ADMIN — BUDESONIDE 250 MCG: 0.25 SUSPENSION RESPIRATORY (INHALATION) at 19:47

## 2024-06-18 RX ADMIN — SODIUM CHLORIDE: 9 INJECTION, SOLUTION INTRAVENOUS at 16:20

## 2024-06-18 RX ADMIN — OXYCODONE AND ACETAMINOPHEN 1 TABLET: 10; 325 TABLET ORAL at 04:16

## 2024-06-18 RX ADMIN — SENNOSIDES 17.2 MG: 8.6 TABLET, COATED ORAL at 21:42

## 2024-06-18 RX ADMIN — METHOCARBAMOL TABLETS 1000 MG: 500 TABLET, COATED ORAL at 18:38

## 2024-06-18 RX ADMIN — PIPERACILLIN AND TAZOBACTAM 3375 MG: 3; .375 INJECTION, POWDER, LYOPHILIZED, FOR SOLUTION INTRAVENOUS at 14:00

## 2024-06-18 RX ADMIN — OXYCODONE AND ACETAMINOPHEN 1 TABLET: 10; 325 TABLET ORAL at 16:24

## 2024-06-18 RX ADMIN — LEVOTHYROXINE SODIUM 25 MCG: 25 TABLET ORAL at 06:28

## 2024-06-18 RX ADMIN — METHOCARBAMOL TABLETS 1000 MG: 500 TABLET, COATED ORAL at 12:06

## 2024-06-18 RX ADMIN — ENOXAPARIN SODIUM 30 MG: 100 INJECTION SUBCUTANEOUS at 21:43

## 2024-06-18 RX ADMIN — SODIUM CHLORIDE 250 ML: 9 INJECTION, SOLUTION INTRAVENOUS at 07:58

## 2024-06-18 RX ADMIN — PIPERACILLIN AND TAZOBACTAM 3375 MG: 3; .375 INJECTION, POWDER, LYOPHILIZED, FOR SOLUTION INTRAVENOUS at 05:43

## 2024-06-18 RX ADMIN — OXYCODONE AND ACETAMINOPHEN 1 TABLET: 10; 325 TABLET ORAL at 21:45

## 2024-06-18 ASSESSMENT — PAIN DESCRIPTION - LOCATION
LOCATION: FOOT

## 2024-06-18 ASSESSMENT — PAIN DESCRIPTION - ORIENTATION
ORIENTATION: RIGHT

## 2024-06-18 ASSESSMENT — PAIN SCALES - GENERAL
PAINLEVEL_OUTOF10: 8
PAINLEVEL_OUTOF10: 9
PAINLEVEL_OUTOF10: 7
PAINLEVEL_OUTOF10: 8
PAINLEVEL_OUTOF10: 8
PAINLEVEL_OUTOF10: 5
PAINLEVEL_OUTOF10: 8
PAINLEVEL_OUTOF10: 4
PAINLEVEL_OUTOF10: 9

## 2024-06-18 ASSESSMENT — PAIN DESCRIPTION - DESCRIPTORS
DESCRIPTORS: DISCOMFORT
DESCRIPTORS: SHARP
DESCRIPTORS: THROBBING
DESCRIPTORS: STABBING
DESCRIPTORS: DISCOMFORT;ACHING

## 2024-06-18 NOTE — CARE COORDINATION
Updated plan of care. Discharge plan is to go to a SNF. Patient wishes to go to a SNF near the [facility]. [facility] is not able to accept. Next choice is [facility]. Spoke to Loreto from [facility] [phone]. Referral faxed to F: [phone]. Await input.   Electronically signed by Su Sloan RN on 6/18/2024 at 1:19 PM

## 2024-06-18 NOTE — PROGRESS NOTES
Progress  Note  Chief Complaint   Patient presents with    Wound Check     Sent in by dr david for partial foot amputation on right side, hx of dm type 2     Historical Issues:  Current Facility-Administered Medications   Medication Dose Route Frequency Provider Last Rate Last Admin    0.9 % sodium chloride infusion   IntraVENous Continuous Brian Pryor  mL/hr at 06/18/24 0921 New Bag at 06/18/24 0921    methocarbamol (ROBAXIN) tablet 1,000 mg  1,000 mg Oral 4x Daily Brian Pryor MD   1,000 mg at 06/18/24 1206    lactulose (CHRONULAC) 10 GM/15ML solution 30 g  30 g Oral BID Brian Pryor MD   30 g at 06/18/24 1101    senna (SENOKOT) tablet 17.2 mg  2 tablet Oral BID Brian Pryor MD   17.2 mg at 06/18/24 1101    enoxaparin Sodium (LOVENOX) injection 30 mg  30 mg SubCUTAneous BID Alyssa Downs APRN - CNP   30 mg at 06/18/24 1027    oxyCODONE-acetaminophen (PERCOCET)  MG per tablet 1 tablet  1 tablet Oral 4x daily Brian Pryor MD   1 tablet at 06/18/24 1028    traMADol (ULTRAM) tablet 50 mg  50 mg Oral Q6H PRN Santana Wilson MD   50 mg at 06/17/24 0806    nicotine (NICODERM CQ) 14 MG/24HR 1 patch  1 patch TransDERmal Daily Santana Wilson MD   1 patch at 06/18/24 1029    DULoxetine (CYMBALTA) extended release capsule 60 mg  60 mg Oral QAM Alyssa Downs APRN - CNP   60 mg at 06/18/24 1028    levothyroxine (SYNTHROID) tablet 25 mcg  25 mcg Oral QAM AC Alyssa Downs APRN - CNP   25 mcg at 06/18/24 0628    fentaNYL (SUBLIMAZE) injection 50 mcg  50 mcg IntraVENous Once Nikki Lees MD        piperacillin-tazobactam (ZOSYN) 3,375 mg in sodium chloride 0.9 % 50 mL IVPB  3,375 mg IntraVENous Q8H Alyson Garner APRN - CNP   Stopped at 06/18/24 0954    sodium chloride flush 0.9 % injection 5-40 mL  5-40 mL IntraVENous 2 times per day Alyssa Downs, APRN - CNP   10 mL at 06/17/24 4958    sodium chloride flush 0.9 % injection 5-40 mL  5-40 mL IntraVENous PRN Alyssa Downs, APRN  CHRIST - CNP   25 mg at 06/17/24 2208    pregabalin (LYRICA) capsule 100 mg  100 mg Oral TID Alyssa Downs APRN - CNP   100 mg at 06/17/24 2207    rosuvastatin (CRESTOR) tablet 20 mg  20 mg Oral Nightly Alyssa Downs APRN - CNP   20 mg at 06/17/24 2207    traZODone (DESYREL) tablet 100 mg  100 mg Oral Nightly Alyssa Downs APRN - CNP   100 mg at 06/17/24 2208    Glucose (TRUEPLUS) oral gel 15 g  15 g Oral PRN Alyssa Downs APRN - CNP        albuterol (PROVENTIL) (2.5 MG/3ML) 0.083% nebulizer solution 2.5 mg  2.5 mg Nebulization Q6H PRN Alyssa Downs APRN - CNP   2.5 mg at 06/17/24 0616    budesonide (PULMICORT) nebulizer suspension 250 mcg  0.25 mg Nebulization BID RT Alyssa Downs APRN - CNP   250 mcg at 06/18/24 0847    And    arformoterol tartrate (BROVANA) nebulizer solution 15 mcg  15 mcg Nebulization BID RT Alyssa Downs APRN - CNP   15 mcg at 06/18/24 0847     Recent Complaints:  Review of Systems  Vitals:    06/18/24 1022   BP: (!) 98/58   Pulse: 65   Resp:    Temp:    SpO2:      Physical Exam  Heart regular  Lungs clear  Wound VAC on foot  Recent Labs     06/16/24  0456 06/17/24  0530 06/18/24  0751    138 134   K 4.0 4.2 4.6    103 101   CO2 25 27 25   BUN 16 16 17   CREATININE 1.3* 1.4* 1.6*   GLUCOSE 179* 200* 188*   CALCIUM 8.1* 8.6 8.4*     Recent Labs     06/16/24  0456 06/17/24  0530 06/18/24  0751   WBC 6.5 6.0 6.6   RBC 3.92 4.25 4.17   HGB 11.1* 11.8* 11.8*   HCT 33.4* 36.3* 36.4*   MCV 85.2 85.4 87.3   MCH 28.3 27.8 28.3   MCHC 33.2 32.5 32.4   RDW 12.9 13.1 13.2    185 186   MPV 11.1 11.1 11.4           Principal Problem:    Diabetic ulcer of right foot due to type 2 diabetes mellitus (HCC)  Active Problems:    COPD (chronic obstructive pulmonary disease) (HCC)    HTN (hypertension)    Type 2 diabetes mellitus with hyperglycemia, with long-term current use of insulin (HCC)    Hyperlipidemia    Thyroid disease    Open wound of foot

## 2024-06-18 NOTE — PROGRESS NOTES
Department of Podiatry  Progress Note    SUBJECTIVE:  Patient seen bedside s/p right foot I&D, amputation right 5th digit. Patient feeling well today. Patient states that pain is manageable with medication.Wound vac placed today. All questions answered. No acute events overnight. Patient denies any N/V/D/F/C/SOB/CP and has no other pedal complaints at this time.     OBJECTIVE:    Scheduled Meds:   methocarbamol  1,000 mg Oral 4x Daily    lactulose  30 g Oral BID    senna  2 tablet Oral BID    enoxaparin  30 mg SubCUTAneous BID    oxyCODONE-acetaminophen  1 tablet Oral 4x daily    nicotine  1 patch TransDERmal Daily    DULoxetine  60 mg Oral QAM    levothyroxine  25 mcg Oral QAM AC    fentanNYL  50 mcg IntraVENous Once    piperacillin-tazobactam  3,375 mg IntraVENous Q8H    sodium chloride flush  5-40 mL IntraVENous 2 times per day    insulin lispro  0-8 Units SubCUTAneous TID WC    insulin lispro  0-4 Units SubCUTAneous Nightly    insulin glargine  30 Units SubCUTAneous Nightly    fenofibrate  160 mg Oral Daily    [Held by provider] metoprolol tartrate  25 mg Oral BID    pregabalin  100 mg Oral TID    rosuvastatin  20 mg Oral Nightly    traZODone  100 mg Oral Nightly    budesonide  0.25 mg Nebulization BID RT    And    arformoterol tartrate  15 mcg Nebulization BID RT     Continuous Infusions:   sodium chloride 100 mL/hr at 06/18/24 0921    sodium chloride      dextrose       PRN Meds:.traMADol, sodium chloride flush, sodium chloride, potassium chloride **OR** potassium alternative oral replacement **OR** potassium chloride, magnesium sulfate, polyethylene glycol, acetaminophen **OR** acetaminophen, dextrose bolus **OR** dextrose bolus, glucagon (rDNA), dextrose, Glucose, albuterol    No Known Allergies    BP (!) 98/58   Pulse 65   Temp 97.4 °F (36.3 °C) (Oral)   Resp 18   Ht 1.829 m (6')   Wt 120.2 kg (265 lb)   SpO2 92%   BMI 35.94 kg/m²       EXAM:    VASCULAR:  Right DP pulse is palpable. Right PT pulse  poly foam, 125mmHg, Low, Continuous. MWF changes per podiatry.  - Will continue to follow patient while they are in-house.  - Discussed patient with Dr. Prakash

## 2024-06-18 NOTE — PLAN OF CARE
Problem: Safety - Adult  Goal: Free from fall injury  6/18/2024 1142 by Chanelle Malik RN  Outcome: Progressing  6/18/2024 0036 by Bridgett Hansen RN  Outcome: Progressing     Problem: Chronic Conditions and Co-morbidities  Goal: Patient's chronic conditions and co-morbidity symptoms are monitored and maintained or improved  6/18/2024 1142 by Chanelle Malik RN  Outcome: Progressing  6/18/2024 0036 by Bridgett Hansen RN  Outcome: Progressing     Problem: Discharge Planning  Goal: Discharge to home or other facility with appropriate resources  6/18/2024 1142 by Chanelle Malik RN  Outcome: Progressing  6/18/2024 0036 by Bridgett Hansen RN  Outcome: Progressing     Problem: Pain  Goal: Verbalizes/displays adequate comfort level or baseline comfort level  6/18/2024 1142 by Chanelle Malik RN  Outcome: Progressing  6/18/2024 0036 by Bridgett Hansen RN  Outcome: Progressing     Problem: ABCDS Injury Assessment  Goal: Absence of physical injury  6/18/2024 1142 by Chanelle Malik RN  Outcome: Progressing  6/18/2024 0036 by Bridgett Hansen RN  Outcome: Progressing     Problem: Skin/Tissue Integrity  Goal: Absence of new skin breakdown  Description: 1.  Monitor for areas of redness and/or skin breakdown  2.  Assess vascular access sites hourly  3.  Every 4-6 hours minimum:  Change oxygen saturation probe site  4.  Every 4-6 hours:  If on nasal continuous positive airway pressure, respiratory therapy assess nares and determine need for appliance change or resting period.  6/18/2024 1142 by Chanelle Malik RN  Outcome: Progressing  6/18/2024 0036 by Bridgett Hansen RN  Outcome: Progressing

## 2024-06-18 NOTE — PROGRESS NOTES
[facility]  [facility]  Progress Note    SUBJECTIVE:  Chief Complaint   Patient presents with    Wound Check     Sent in by dr david for partial foot amputation on right side, hx of dm type 2     Patient is tolerating medications. No reported adverse drug reactions.  No nausea or diarrhea  No fevers  Feeling ok, pain is controlled    Review of systems:  As stated above in the chief complaint, otherwise negative.    Medications:  Scheduled Meds:   methocarbamol  1,000 mg Oral 4x Daily    lactulose  30 g Oral BID    senna  2 tablet Oral BID    enoxaparin  30 mg SubCUTAneous BID    oxyCODONE-acetaminophen  1 tablet Oral 4x daily    nicotine  1 patch TransDERmal Daily    DULoxetine  60 mg Oral QAM    levothyroxine  25 mcg Oral QAM AC    fentanNYL  50 mcg IntraVENous Once    piperacillin-tazobactam  3,375 mg IntraVENous Q8H    sodium chloride flush  5-40 mL IntraVENous 2 times per day    insulin lispro  0-8 Units SubCUTAneous TID WC    insulin lispro  0-4 Units SubCUTAneous Nightly    insulin glargine  30 Units SubCUTAneous Nightly    fenofibrate  160 mg Oral Daily    [Held by provider] metoprolol tartrate  25 mg Oral BID    pregabalin  100 mg Oral TID    rosuvastatin  20 mg Oral Nightly    traZODone  100 mg Oral Nightly    budesonide  0.25 mg Nebulization BID RT    And    arformoterol tartrate  15 mcg Nebulization BID RT     Continuous Infusions:   sodium chloride 100 mL/hr at 06/18/24 0921    sodium chloride      dextrose       PRN Meds:traMADol, sodium chloride flush, sodium chloride, potassium chloride **OR** potassium alternative oral replacement **OR** potassium chloride, magnesium sulfate, polyethylene glycol, acetaminophen **OR** acetaminophen, dextrose bolus **OR** dextrose bolus, glucagon (rDNA), dextrose, Glucose, albuterol    OBJECTIVE:  BP (!) 98/58   Pulse 65   Temp 97.4 °F (36.3 °C) (Oral)   Resp 18   Ht 1.829 m (6')   Wt 120.2 kg (265 lb)   SpO2 92%   BMI 35.94 kg/m²  toe at the level of the metatarsal pharyngeal joint     Plan:    Continue IV Zosyn while inpatient  Planning discharge on oral antibiotics - Augmentin XR and Levofloxacin for 10 days - planning SNF, will reconcile closer to dc  Xray right foot reviewed   Labs and cultures reviewed   Will follow with you    Alyson Garner APRN - CNP  1:54 PM  6/18/2024    Pt seen and examined. Above discussed agree with advanced practice nurse. Labs, cultures, and radiographs reviewed.  Face to Face encounter occurred. Changes made as necessary.     Nirali Roque MD

## 2024-06-18 NOTE — PROGRESS NOTES
Physical Therapy  Facility/Department: [address] MED SURG  Physical Therapy Initial Assessment    Name: Henry Fuentes \"Adam\"  : 1960  MRN: [id number]  Date of Service: 2024      Attending Provider:  Brian Pryor MD    Evaluating PT:  Tre Yan Jr., P.T.    Room #:  19/[facility]  Diagnosis:  ALVINO (acute kidney injury) ([facility]) [N17.9]  Diabetic ulcer of right foot due to type 2 diabetes mellitus (HCC) [E11.621, L97.519]  Open wound of foot excluding toes [S91.309A]  Hyperglycemia due to diabetes mellitus (HCC) [E11.65]  Pertinent PMHx/PSHx:  anxiety, R 2nd toe amp, L 5th ray amp  Procedure/Surgery:  24 R foot debridement with partial 5th ray resection  Precautions:  NWB RLE, wound vac, falls, bed/chair alarm    SUBJECTIVE:    Pt lives with his niece in a 1 story home with 2 stairs and a post he can hold onto to enter. Pt ambulated with no AD, but has a ww.    OBJECTIVE:   Initial Evaluation  Date: 24 Treatment Short Term/ Long Term   Goals   Was pt agreeable to Eval/treatment? yes     Does pt have pain? 8/10 R foot pain     Bed Mobility  Rolling: SBA  Supine to sit: SBA  Sit to supine: SBA  Scooting: SBA  Independent    Transfers Sit to stand: MIN A  Stand to sit: MIN A  Stand pivot: NA  Independent    Ambulation   3 feet with ww laterally toward HOB with MIN A and NWB RLE  50 feet with ww NWB RLE supervision   Stair negotiation: ascended and descended NA  2 steps with 1 rail and AAD if needed, or 2 steps attempting to sit on a chair on the top step with SBA     AM-PAC 6 Clicks        BLE ROM is WFL.   BLE strength is grossly 4-/5 to 4/5.   Sensation:  Pt reports chronic numbness and tingling to B feet and fingers.  Balance: sitting is supervision and standing with ww is MIN A while NWB RLE  Endurance: fair-    Vitals:  Pt's BP this am was 78/52 and 84/52 mmHg.  He was agreeable to PT and while sitting and standing c/o light headedness that worsened with standing and this limited amb  distance.      Patient education  Pt educated on NWB RLE    Patient response to education:   Pt verbalized understanding Pt demonstrated skill Pt requires further education in this area   yes yes yes     ASSESSMENT:    Conditions Requiring Skilled Therapeutic Intervention:    [x]Decreased strength     []Decreased ROM  [x]Decreased functional mobility  [x]Decreased balance   [x]Decreased endurance   []Decreased posture  []Decreased sensation  []Decreased coordination   []Decreased vision  []Decreased safety awareness   [x]Increased pain       Comments:  Pt was in bed asleep and easy to wake up.  He was agreeable to PT, but reported his BP was running low today.  He sat up to EOB and c/o mild light headedness and it became worse with standing.  Pt was able to side hop on LLE using ww and maintain NWB RLE to get closer to HOB and then sat down.  He asked to lie back in bed and then light headedness subsided.  Pt is a risk for falling.      Pt was left supine in bed as found with call light left by patient.    Pt's/ family goals   1. To get stronger and go home.     Patient and or family understand(s) diagnosis, prognosis, and plan of care.    PHYSICAL THERAPY PLAN OF CARE:    PT POC is established based on physician order and patient diagnosis     Referring provider/PT Order:  PT eval and treat  Diagnosis:  ALVINO (acute kidney injury) ([facility]) [N17.9]  Diabetic ulcer of right foot due to type 2 diabetes mellitus ([facility]) [E11.621, L97.519]  Open wound of foot excluding toes [S91.309A]  Hyperglycemia due to diabetes mellitus ([facility]) [E11.65]  Specific instructions for next treatment:  progress functional mobility as pt is able.     Current Treatment Recommendations:     [x] Strengthening to improve independence with functional mobility   [] ROM to improve ROM and decrease spasm and pain which will help promote independence with functional mobility   [x] Balance Training to improve static/dynamic balance and to reduce fall

## 2024-06-18 NOTE — PLAN OF CARE
Problem: Safety - Adult  Goal: Free from fall injury  Outcome: Progressing     Problem: Chronic Conditions and Co-morbidities  Goal: Patient's chronic conditions and co-morbidity symptoms are monitored and maintained or improved  Outcome: Progressing     Problem: Discharge Planning  Goal: Discharge to home or other facility with appropriate resources  Outcome: Progressing     Problem: Pain  Goal: Verbalizes/displays adequate comfort level or baseline comfort level  Outcome: Progressing     Problem: ABCDS Injury Assessment  Goal: Absence of physical injury  Outcome: Progressing     Problem: Skin/Tissue Integrity  Goal: Absence of new skin breakdown  Description: 1.  Monitor for areas of redness and/or skin breakdown  2.  Assess vascular access sites hourly  3.  Every 4-6 hours minimum:  Change oxygen saturation probe site  4.  Every 4-6 hours:  If on nasal continuous positive airway pressure, respiratory therapy assess nares and determine need for appliance change or resting period.  Outcome: Progressing

## 2024-06-18 NOTE — PATIENT CARE CONFERENCE
[facility] Quality Flow/Interdisciplinary Rounds Progress Note        Quality Flow Rounds held on June 18, 2024    Disciplines Attending:  Bedside Nurse, , , and Nursing Unit Leadership    Henry Fuentes was admitted on 6/13/2024  6:35 PM    Anticipated Discharge Date:       Disposition:    Jose Score:  Jose Scale Score: 19    Readmission Risk              Risk of Unplanned Readmission:  28           Discussed patient goal for the day, patient clinical progression, and barriers to discharge.  The following Goal(s) of the Day/Commitment(s) have been identified:  Diagnostics - Report Results and Labs - Report Results      Paula Kumari RN  June 18, 2024

## 2024-06-19 LAB
ANION GAP SERPL CALCULATED.3IONS-SCNC: 9 MMOL/L (ref 7–16)
BASOPHILS # BLD: 0.07 K/UL (ref 0–0.2)
BASOPHILS NFR BLD: 1 % (ref 0–2)
BUN SERPL-MCNC: 15 MG/DL (ref 6–23)
CALCIUM SERPL-MCNC: 8.1 MG/DL (ref 8.6–10.2)
CHLORIDE SERPL-SCNC: 106 MMOL/L (ref 98–107)
CO2 SERPL-SCNC: 23 MMOL/L (ref 22–29)
CREAT SERPL-MCNC: 1.4 MG/DL (ref 0.7–1.2)
EOSINOPHIL # BLD: 0.14 K/UL (ref 0.05–0.5)
EOSINOPHILS RELATIVE PERCENT: 2 % (ref 0–6)
ERYTHROCYTE [DISTWIDTH] IN BLOOD BY AUTOMATED COUNT: 13.1 % (ref 11.5–15)
GFR, ESTIMATED: 57 ML/MIN/1.73M2
GLUCOSE BLD-MCNC: 139 MG/DL (ref 74–99)
GLUCOSE BLD-MCNC: 141 MG/DL (ref 74–99)
GLUCOSE BLD-MCNC: 159 MG/DL (ref 74–99)
GLUCOSE BLD-MCNC: 164 MG/DL (ref 74–99)
GLUCOSE SERPL-MCNC: 132 MG/DL (ref 74–99)
HCT VFR BLD AUTO: 33.2 % (ref 37–54)
HGB BLD-MCNC: 10.9 G/DL (ref 12.5–16.5)
IMM GRANULOCYTES # BLD AUTO: <0.03 K/UL (ref 0–0.58)
IMM GRANULOCYTES NFR BLD: 0 % (ref 0–5)
LYMPHOCYTES NFR BLD: 2.13 K/UL (ref 1.5–4)
LYMPHOCYTES RELATIVE PERCENT: 37 % (ref 20–42)
MCH RBC QN AUTO: 28.3 PG (ref 26–35)
MCHC RBC AUTO-ENTMCNC: 32.8 G/DL (ref 32–34.5)
MCV RBC AUTO: 86.2 FL (ref 80–99.9)
MONOCYTES NFR BLD: 0.39 K/UL (ref 0.1–0.95)
MONOCYTES NFR BLD: 7 % (ref 2–12)
NEUTROPHILS NFR BLD: 52 % (ref 43–80)
NEUTS SEG NFR BLD: 3.01 K/UL (ref 1.8–7.3)
PLATELET # BLD AUTO: 154 K/UL (ref 130–450)
PMV BLD AUTO: 11.3 FL (ref 7–12)
POTASSIUM SERPL-SCNC: 4.3 MMOL/L (ref 3.5–5)
RBC # BLD AUTO: 3.85 M/UL (ref 3.8–5.8)
SODIUM SERPL-SCNC: 138 MMOL/L (ref 132–146)
WBC OTHER # BLD: 5.8 K/UL (ref 4.5–11.5)

## 2024-06-19 PROCEDURE — 6370000000 HC RX 637 (ALT 250 FOR IP)

## 2024-06-19 PROCEDURE — 6370000000 HC RX 637 (ALT 250 FOR IP): Performed by: INTERNAL MEDICINE

## 2024-06-19 PROCEDURE — 85025 COMPLETE CBC W/AUTO DIFF WBC: CPT

## 2024-06-19 PROCEDURE — 2580000003 HC RX 258

## 2024-06-19 PROCEDURE — 1200000000 HC SEMI PRIVATE

## 2024-06-19 PROCEDURE — 2580000003 HC RX 258: Performed by: REGISTERED NURSE

## 2024-06-19 PROCEDURE — 6370000000 HC RX 637 (ALT 250 FOR IP): Performed by: HOSPITALIST

## 2024-06-19 PROCEDURE — 82962 GLUCOSE BLOOD TEST: CPT

## 2024-06-19 PROCEDURE — 94640 AIRWAY INHALATION TREATMENT: CPT

## 2024-06-19 PROCEDURE — 97530 THERAPEUTIC ACTIVITIES: CPT

## 2024-06-19 PROCEDURE — 6360000002 HC RX W HCPCS: Performed by: REGISTERED NURSE

## 2024-06-19 PROCEDURE — 2580000003 HC RX 258: Performed by: INTERNAL MEDICINE

## 2024-06-19 PROCEDURE — 97535 SELF CARE MNGMENT TRAINING: CPT

## 2024-06-19 PROCEDURE — 6360000002 HC RX W HCPCS

## 2024-06-19 PROCEDURE — 80048 BASIC METABOLIC PNL TOTAL CA: CPT

## 2024-06-19 PROCEDURE — 99232 SBSQ HOSP IP/OBS MODERATE 35: CPT | Performed by: INTERNAL MEDICINE

## 2024-06-19 PROCEDURE — 36415 COLL VENOUS BLD VENIPUNCTURE: CPT

## 2024-06-19 RX ORDER — OXYCODONE HYDROCHLORIDE AND ACETAMINOPHEN 5; 325 MG/1; MG/1
1 TABLET ORAL EVERY 6 HOURS
Status: DISCONTINUED | OUTPATIENT
Start: 2024-06-19 | End: 2024-06-21 | Stop reason: HOSPADM

## 2024-06-19 RX ADMIN — PREGABALIN 100 MG: 50 CAPSULE ORAL at 21:00

## 2024-06-19 RX ADMIN — PREGABALIN 100 MG: 50 CAPSULE ORAL at 09:38

## 2024-06-19 RX ADMIN — SODIUM CHLORIDE, PRESERVATIVE FREE 10 ML: 5 INJECTION INTRAVENOUS at 20:46

## 2024-06-19 RX ADMIN — INSULIN GLARGINE 30 UNITS: 100 INJECTION, SOLUTION SUBCUTANEOUS at 21:00

## 2024-06-19 RX ADMIN — ARFORMOTEROL TARTRATE 15 MCG: 15 SOLUTION RESPIRATORY (INHALATION) at 21:39

## 2024-06-19 RX ADMIN — METHOCARBAMOL TABLETS 1000 MG: 500 TABLET, COATED ORAL at 20:23

## 2024-06-19 RX ADMIN — BUDESONIDE 250 MCG: 0.25 SUSPENSION RESPIRATORY (INHALATION) at 21:39

## 2024-06-19 RX ADMIN — ACETAMINOPHEN 650 MG: 325 TABLET ORAL at 01:27

## 2024-06-19 RX ADMIN — DULOXETINE HYDROCHLORIDE 60 MG: 60 CAPSULE, DELAYED RELEASE ORAL at 09:38

## 2024-06-19 RX ADMIN — BUDESONIDE 250 MCG: 0.25 SUSPENSION RESPIRATORY (INHALATION) at 08:07

## 2024-06-19 RX ADMIN — METHOCARBAMOL TABLETS 1000 MG: 500 TABLET, COATED ORAL at 16:12

## 2024-06-19 RX ADMIN — SODIUM CHLORIDE: 9 INJECTION, SOLUTION INTRAVENOUS at 02:55

## 2024-06-19 RX ADMIN — METHOCARBAMOL TABLETS 1000 MG: 500 TABLET, COATED ORAL at 12:56

## 2024-06-19 RX ADMIN — OXYCODONE AND ACETAMINOPHEN 1 TABLET: 10; 325 TABLET ORAL at 09:38

## 2024-06-19 RX ADMIN — SODIUM CHLORIDE: 9 INJECTION, SOLUTION INTRAVENOUS at 23:05

## 2024-06-19 RX ADMIN — ARFORMOTEROL TARTRATE 15 MCG: 15 SOLUTION RESPIRATORY (INHALATION) at 08:07

## 2024-06-19 RX ADMIN — FENOFIBRATE 160 MG: 160 TABLET ORAL at 09:38

## 2024-06-19 RX ADMIN — OXYCODONE AND ACETAMINOPHEN 1 TABLET: 10; 325 TABLET ORAL at 03:54

## 2024-06-19 RX ADMIN — PIPERACILLIN AND TAZOBACTAM 3375 MG: 3; .375 INJECTION, POWDER, LYOPHILIZED, FOR SOLUTION INTRAVENOUS at 20:40

## 2024-06-19 RX ADMIN — ROSUVASTATIN CALCIUM 20 MG: 20 TABLET, FILM COATED ORAL at 20:23

## 2024-06-19 RX ADMIN — PIPERACILLIN AND TAZOBACTAM 3375 MG: 3; .375 INJECTION, POWDER, LYOPHILIZED, FOR SOLUTION INTRAVENOUS at 05:26

## 2024-06-19 RX ADMIN — OXYCODONE HYDROCHLORIDE AND ACETAMINOPHEN 1 TABLET: 5; 325 TABLET ORAL at 16:12

## 2024-06-19 RX ADMIN — LACTULOSE 30 G: 20 SOLUTION ORAL at 09:40

## 2024-06-19 RX ADMIN — METHOCARBAMOL TABLETS 1000 MG: 500 TABLET, COATED ORAL at 09:38

## 2024-06-19 RX ADMIN — PIPERACILLIN AND TAZOBACTAM 3375 MG: 3; .375 INJECTION, POWDER, LYOPHILIZED, FOR SOLUTION INTRAVENOUS at 13:00

## 2024-06-19 RX ADMIN — PREGABALIN 100 MG: 50 CAPSULE ORAL at 12:56

## 2024-06-19 RX ADMIN — ENOXAPARIN SODIUM 30 MG: 100 INJECTION SUBCUTANEOUS at 09:39

## 2024-06-19 RX ADMIN — TRAZODONE HYDROCHLORIDE 100 MG: 50 TABLET ORAL at 20:23

## 2024-06-19 RX ADMIN — ENOXAPARIN SODIUM 30 MG: 100 INJECTION SUBCUTANEOUS at 21:00

## 2024-06-19 RX ADMIN — LEVOTHYROXINE SODIUM 25 MCG: 25 TABLET ORAL at 06:22

## 2024-06-19 RX ADMIN — OXYCODONE HYDROCHLORIDE AND ACETAMINOPHEN 1 TABLET: 5; 325 TABLET ORAL at 23:03

## 2024-06-19 RX ADMIN — SODIUM CHLORIDE: 9 INJECTION, SOLUTION INTRAVENOUS at 12:58

## 2024-06-19 ASSESSMENT — PAIN - FUNCTIONAL ASSESSMENT
PAIN_FUNCTIONAL_ASSESSMENT: PREVENTS OR INTERFERES SOME ACTIVE ACTIVITIES AND ADLS
PAIN_FUNCTIONAL_ASSESSMENT: ACTIVITIES ARE NOT PREVENTED
PAIN_FUNCTIONAL_ASSESSMENT: PREVENTS OR INTERFERES SOME ACTIVE ACTIVITIES AND ADLS

## 2024-06-19 ASSESSMENT — PAIN SCALES - GENERAL
PAINLEVEL_OUTOF10: 2
PAINLEVEL_OUTOF10: 8
PAINLEVEL_OUTOF10: 8
PAINLEVEL_OUTOF10: 9
PAINLEVEL_OUTOF10: 2
PAINLEVEL_OUTOF10: 5

## 2024-06-19 ASSESSMENT — PAIN DESCRIPTION - LOCATION
LOCATION: FOOT

## 2024-06-19 ASSESSMENT — PAIN DESCRIPTION - ORIENTATION
ORIENTATION: RIGHT

## 2024-06-19 ASSESSMENT — PAIN DESCRIPTION - DESCRIPTORS
DESCRIPTORS: ACHING;STABBING
DESCRIPTORS: ACHING;DISCOMFORT
DESCRIPTORS: DISCOMFORT;ACHING

## 2024-06-19 NOTE — PLAN OF CARE
Problem: Safety - Adult  Goal: Free from fall injury  6/19/2024 0046 by Bridgett Hansen RN  Outcome: Progressing     Problem: Chronic Conditions and Co-morbidities  Goal: Patient's chronic conditions and co-morbidity symptoms are monitored and maintained or improved  6/19/2024 0046 by Bridgett Hansen RN  Outcome: Progressing     Problem: Discharge Planning  Goal: Discharge to home or other facility with appropriate resources  6/19/2024 0046 by Bridgett Hansen RN  Outcome: Progressing     Problem: Pain  Goal: Verbalizes/displays adequate comfort level or baseline comfort level  6/19/2024 0046 by Bridgett Hansen RN  Outcome: Progressing     Problem: ABCDS Injury Assessment  Goal: Absence of physical injury  6/19/2024 0046 by Bridgett Hansen RN  Outcome: Progressing     Problem: Skin/Tissue Integrity  Goal: Absence of new skin breakdown  Description: 1.  Monitor for areas of redness and/or skin breakdown  2.  Assess vascular access sites hourly  3.  Every 4-6 hours minimum:  Change oxygen saturation probe site  4.  Every 4-6 hours:  If on nasal continuous positive airway pressure, respiratory therapy assess nares and determine need for appliance change or resting period.  6/19/2024 0046 by Bridgett Hansen RN  Outcome: Progressing

## 2024-06-19 NOTE — PROGRESS NOTES
Reason for consult:  Needs new Gildardo sensor/general diabetes education     A1C: 11.9%            Patient states the following concerns/barriers to diabetes self-management:     [] None       [] Medication cost   [] Food cost/availability        [] Reading  [] Hearing   [] Vision                [] Work    [] Transportation  [] No insurance  [x] Physical limitations    [] Other:        Patient states the following about their diabetes/health:   [x] He follows with L[facility] Hartley at the endocrine clinic.    [x] He takes Lantus 50 units at HS and is prescribed Humalog 18 units plus s.s. prior to meals, but has been taking 30 units across the board prior to meals.   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 He also does not wear diabetic socks, so he tends to end up with a lot of moisture in his shoes from the heat. This has likely contributed to his foot ulcer.    [x]  He smokes, but currently is wearing a Nicotine patch and will try to use this as an opportunity to quit.   [x]  He drinks a lot of sweetened beverages and eats fast food, junk food most days.    [x]  He wears a FreeStyle Gildardo 3 CGM for glucose monitoring.    Diabetes survival packet provided to:   [x] Patient     [] Other:    Information given:   Definition of diabetes   Target glucose ranges/A1C   Self-monitoring of blood glucose   Prevention/symptoms/treatment of hypo-/hyperglycemia   Medication adherence             The plate method/meal planning guidelines             The benefits of exercise and recommendations             Reducing the risk of chronic complications     Diabetes medications reviewed (use, purpose, action): Humalog, Lantus. Encouraged him to follow his prescribed orders and not just \"guess\" how much Humalog to take prior to meals.             Post-education Assessment  [x]  Attentive to teaching  [x]  Answered questions appropriately when

## 2024-06-19 NOTE — PROGRESS NOTES
Department of Podiatry  Progress Note    SUBJECTIVE:  Patient seen bedside s/p right foot I&D, amputation right 5th digit. Patient feeling well today. Patient states that pain is manageable with medication.Wound vac placed today. All questions answered. No acute events overnight. Patient denies any N/V/D/F/C/SOB/CP and has no other pedal complaints at this time.     OBJECTIVE:    Scheduled Meds:   oxyCODONE-acetaminophen  1 tablet Oral Q6H    methocarbamol  1,000 mg Oral 4x Daily    lactulose  30 g Oral BID    enoxaparin  30 mg SubCUTAneous BID    nicotine  1 patch TransDERmal Daily    DULoxetine  60 mg Oral QAM    levothyroxine  25 mcg Oral QAM AC    fentanNYL  50 mcg IntraVENous Once    piperacillin-tazobactam  3,375 mg IntraVENous Q8H    sodium chloride flush  5-40 mL IntraVENous 2 times per day    insulin lispro  0-8 Units SubCUTAneous TID WC    insulin lispro  0-4 Units SubCUTAneous Nightly    insulin glargine  30 Units SubCUTAneous Nightly    fenofibrate  160 mg Oral Daily    [Held by provider] metoprolol tartrate  25 mg Oral BID    pregabalin  100 mg Oral TID    rosuvastatin  20 mg Oral Nightly    traZODone  100 mg Oral Nightly    budesonide  0.25 mg Nebulization BID RT    And    arformoterol tartrate  15 mcg Nebulization BID RT     Continuous Infusions:   sodium chloride 100 mL/hr at 06/19/24 1258    sodium chloride      dextrose       PRN Meds:.traMADol, sodium chloride flush, sodium chloride, potassium chloride **OR** potassium alternative oral replacement **OR** potassium chloride, magnesium sulfate, polyethylene glycol, acetaminophen **OR** acetaminophen, dextrose bolus **OR** dextrose bolus, glucagon (rDNA), dextrose, Glucose, albuterol    No Known Allergies    /68   Pulse 69   Temp 97.8 °F (36.6 °C) (Oral)   Resp 16   Ht 1.829 m (6')   Wt 120 kg (264 lb 8.8 oz)   SpO2 94%   BMI 35.88 kg/m²       EXAM:    VASCULAR:  Right DP pulse is palpable. Right PT pulse is palpable. CFT < 5 seconds

## 2024-06-19 NOTE — PROGRESS NOTES
Occupational Therapy  OT BEDSIDE TREATMENT NOTE      Date:2024  Patient Name: Henry Fuentes  MRN: [id number]  : 1960  Room: [address] [place], [place] [address]       Evaluating OT: Tuyet Rivera OTR/L #[id number]     Referring Provider:  Alyssa Downs APRN - CNP      Specific Provider Orders/Date:  OT Eval and Treat , 2024      Diagnosis:   1. Open wound of foot excluding toes    2. ALVINO (acute kidney injury) (HCC)    3. Hyperglycemia due to diabetes mellitus (HCC)    4. Diabetic ulcer of right foot associated with other specified diabetes mellitus, unspecified part of foot, unspecified ulcer stage (HCC)         Surgery: S/p 1. Incision and drainage, right fifth metatarsal bone 2. Amputation, right fifth toe to the level of metatarsophalangeal joint 24      Pertinent Medical History: Anxiety, COPD, depression, HTN, sleep apnea, back surgery, R second toe amputation, L toe 5th ray amputation      Precautions:  Fall Risk, NWB R LE, wound vac       Assessment of current deficits    [x] Functional mobility            [x]ADLs           [x] Strength                   []Cognition    [x] Functional transfers          [x] IADLs          [x] Safety Awareness   [x]Endurance    [] Fine Coordination              [x] Balance      [] Vision/perception    []Sensation      []Gross Motor Coordination  [] ROM           [] Delirium                   [] Motor Control      OT PLAN OF CARE   OT POC based on physician orders, patient diagnosis and results of clinical assessment     Frequency/Duration 2-5 days/wk for 2-4 weeks PRN      Specific OT Treatment Interventions to include:   * Instruction/training on adapted ADL techniques and AE recommendations to increase functional independence within precautions       * Training on energy conservation strategies, correct breathing pattern and techniques to improve independence/tolerance for self-care routine  * Functional transfer/mobility training/DME recommendations for increased independence,

## 2024-06-19 NOTE — PLAN OF CARE
Problem: Safety - Adult  Goal: Free from fall injury  6/19/2024 1043 by Umu Champion RN  Outcome: Progressing  6/19/2024 0046 by Bridgett Hansen RN  Outcome: Progressing     Problem: Chronic Conditions and Co-morbidities  Goal: Patient's chronic conditions and co-morbidity symptoms are monitored and maintained or improved  6/19/2024 1043 by mUu Champion RN  Outcome: Progressing  6/19/2024 0046 by Bridgett Hansen RN  Outcome: Progressing     Problem: Discharge Planning  Goal: Discharge to home or other facility with appropriate resources  6/19/2024 1043 by Umu Champion RN  Outcome: Progressing  6/19/2024 0046 by Bridgett Hansen RN  Outcome: Progressing     Problem: Pain  Goal: Verbalizes/displays adequate comfort level or baseline comfort level  6/19/2024 1043 by Umu Champion RN  Outcome: Progressing  6/19/2024 0046 by Bridgett Hansen RN  Outcome: Progressing     Problem: ABCDS Injury Assessment  Goal: Absence of physical injury  6/19/2024 1043 by Umu Champion RN  Outcome: Progressing  6/19/2024 0046 by Bridgett Hansen RN  Outcome: Progressing     Problem: Skin/Tissue Integrity  Goal: Absence of new skin breakdown  Description: 1.  Monitor for areas of redness and/or skin breakdown  2.  Assess vascular access sites hourly  3.  Every 4-6 hours minimum:  Change oxygen saturation probe site  4.  Every 4-6 hours:  If on nasal continuous positive airway pressure, respiratory therapy assess nares and determine need for appliance change or resting period.  6/19/2024 1043 by Umu Champion RN  Outcome: Progressing  6/19/2024 0046 by Bridgett Hansen RN  Outcome: Progressing

## 2024-06-19 NOTE — PATIENT CARE CONFERENCE
P Quality Flow/Interdisciplinary Rounds Progress Note        Quality Flow Rounds held on June 19, 2024    Disciplines Attending:  Bedside Nurse, , , and Nursing Unit Leadership    Henry Fuentes was admitted on 6/13/2024  6:35 PM    Anticipated Discharge Date:       Disposition:    Jose Score:  Jose Scale Score: 20    Readmission Risk              Risk of Unplanned Readmission:  29           Discussed patient goal for the day, patient clinical progression, and barriers to discharge.  The following Goal(s) of the Day/Commitment(s) have been identified:  Discharge - Obtain Order      Cris Darnell RN  June 19, 2024

## 2024-06-19 NOTE — CARE COORDINATION
Social Work discharge planning   Received message from [employer] that [facility] accepted pt and started precert. Pt needs precert before going to snf.  Electronically signed by MARCELL Suárez on 6/19/2024 at 7:24 AM     Addendum  MILDRED spoke to [facility], and spoke to  who advised they are awaiting precert. She said Bea from their snf will call SW IF they get precert back today.  Electronically signed by MARCELL Suárez on 6/19/2024 at 11:23 AM     Addendum HENS 7000 done. Pt aware his insurance will not cover cost of transport to snf. He said he is calling family to see if any can drive him.  Electronically signed by MARCELL Suárez on 6/19/2024 at 1:07 PM

## 2024-06-19 NOTE — PROGRESS NOTES
Progress  Note  Chief Complaint   Patient presents with    Wound Check     Sent in by dr david for partial foot amputation on right side, hx of dm type 2     Historical Issues:  Current Facility-Administered Medications   Medication Dose Route Frequency Provider Last Rate Last Admin    0.9 % sodium chloride infusion   IntraVENous Continuous Brian Pryor  mL/hr at 06/19/24 1258 New Bag at 06/19/24 1258    methocarbamol (ROBAXIN) tablet 1,000 mg  1,000 mg Oral 4x Daily Brian Pryor MD   1,000 mg at 06/19/24 1256    lactulose (CHRONULAC) 10 GM/15ML solution 30 g  30 g Oral BID Brian Pryor MD   30 g at 06/19/24 0940    enoxaparin Sodium (LOVENOX) injection 30 mg  30 mg SubCUTAneous BID Alyssa Downs APRN - CNP   30 mg at 06/19/24 0939    oxyCODONE-acetaminophen (PERCOCET)  MG per tablet 1 tablet  1 tablet Oral 4x daily Brian Pryor MD   1 tablet at 06/19/24 0938    traMADol (ULTRAM) tablet 50 mg  50 mg Oral Q6H PRN Santana Wilson MD   50 mg at 06/17/24 0806    nicotine (NICODERM CQ) 14 MG/24HR 1 patch  1 patch TransDERmal Daily Santana Wilson MD   1 patch at 06/19/24 0939    DULoxetine (CYMBALTA) extended release capsule 60 mg  60 mg Oral QAM Alyssa Downs APRN - CNP   60 mg at 06/19/24 0938    levothyroxine (SYNTHROID) tablet 25 mcg  25 mcg Oral QAM AC Alyssa Downs APRN - CNP   25 mcg at 06/19/24 0622    fentaNYL (SUBLIMAZE) injection 50 mcg  50 mcg IntraVENous Once Nikki Lees MD        piperacillin-tazobactam (ZOSYN) 3,375 mg in sodium chloride 0.9 % 50 mL IVPB  3,375 mg IntraVENous Q8H Alyson Garner APRN - CNP 12.5 mL/hr at 06/19/24 1300 3,375 mg at 06/19/24 1300    sodium chloride flush 0.9 % injection 5-40 mL  5-40 mL IntraVENous 2 times per day Alyssa Downs APRN - CNP   10 mL at 06/17/24 2205    sodium chloride flush 0.9 % injection 5-40 mL  5-40 mL IntraVENous PRN Alyssa Downs APRN - CNP   10 mL at 06/16/24 1437    0.9 % sodium chloride infusion    IntraVENous PRN Alyssa Downs APRN - CNP        potassium chloride (KLOR-CON M) extended release tablet 40 mEq  40 mEq Oral PRN Aylssa Downs APRN - CNP        Or    potassium bicarb-citric acid (EFFER-K) effervescent tablet 40 mEq  40 mEq Oral PRN Alyssa Downs APRN - CNP        Or    potassium chloride 10 mEq/100 mL IVPB (Peripheral Line)  10 mEq IntraVENous PRN Alyssa Downs APRN - CNP        magnesium sulfate 2000 mg in 50 mL IVPB premix  2,000 mg IntraVENous PRN Alyssa Downs APRN - CNP        polyethylene glycol (GLYCOLAX) packet 17 g  17 g Oral Daily PRN Alyssa Downs APRN - CNP        acetaminophen (TYLENOL) tablet 650 mg  650 mg Oral Q6H PRN Alyssa Downs APRN - CNP   650 mg at 06/19/24 0127    Or    acetaminophen (TYLENOL) suppository 650 mg  650 mg Rectal Q6H PRN Alyssa Downs APRN - CNP        dextrose bolus 10% 125 mL  125 mL IntraVENous PRN Alyssa Downs APRN - CNP        Or    dextrose bolus 10% 250 mL  250 mL IntraVENous PRN Alyssa Downs APRN - CNP        glucagon injection 1 mg  1 mg SubCUTAneous PRN Alyssa Downs APRN - CNP        dextrose 10 % infusion   IntraVENous Continuous PRN Alyssa Downs APRN - CNP        insulin lispro (HUMALOG,ADMELOG) injection vial 0-8 Units  0-8 Units SubCUTAneous TID WC Alyssa Downs APRN - CNP   4 Units at 06/18/24 1623    insulin lispro (HUMALOG,ADMELOG) injection vial 0-4 Units  0-4 Units SubCUTAneous Nightly Alyssa Downs APRN - CNP   4 Units at 06/15/24 2007    insulin glargine (LANTUS) injection vial 30 Units  30 Units SubCUTAneous Nightly Alyssa Downs APRN - CNP   30 Units at 06/18/24 2153    fenofibrate (TRIGLIDE) tablet 160 mg  160 mg Oral Daily Alyssa Downs APRN - CNP   160 mg at 06/19/24 0938    [Held by provider] metoprolol tartrate (LOPRESSOR) tablet 25 mg  25 mg Oral BID Alyssa Downs APRN - CNP   25 mg at 06/17/24 220    pregabalin (LYRICA) capsule

## 2024-06-19 NOTE — PROGRESS NOTES
[facility]  [facility]  Progress Note    SUBJECTIVE:  Chief Complaint   Patient presents with    Wound Check     Sent in by dr david for partial foot amputation on right side, hx of dm type 2     Patient is tolerating medications. No reported adverse drug reactions.  No diarrhea or vomiting, reports some nausea that passes quickly  No fevers       Review of systems:  As stated above in the chief complaint, otherwise negative.    Medications:  Scheduled Meds:   methocarbamol  1,000 mg Oral 4x Daily    lactulose  30 g Oral BID    enoxaparin  30 mg SubCUTAneous BID    oxyCODONE-acetaminophen  1 tablet Oral 4x daily    nicotine  1 patch TransDERmal Daily    DULoxetine  60 mg Oral QAM    levothyroxine  25 mcg Oral QAM AC    fentanNYL  50 mcg IntraVENous Once    piperacillin-tazobactam  3,375 mg IntraVENous Q8H    sodium chloride flush  5-40 mL IntraVENous 2 times per day    insulin lispro  0-8 Units SubCUTAneous TID WC    insulin lispro  0-4 Units SubCUTAneous Nightly    insulin glargine  30 Units SubCUTAneous Nightly    fenofibrate  160 mg Oral Daily    [Held by provider] metoprolol tartrate  25 mg Oral BID    pregabalin  100 mg Oral TID    rosuvastatin  20 mg Oral Nightly    traZODone  100 mg Oral Nightly    budesonide  0.25 mg Nebulization BID RT    And    arformoterol tartrate  15 mcg Nebulization BID RT     Continuous Infusions:   sodium chloride 100 mL/hr at 06/19/24 0255    sodium chloride      dextrose       PRN Meds:traMADol, sodium chloride flush, sodium chloride, potassium chloride **OR** potassium alternative oral replacement **OR** potassium chloride, magnesium sulfate, polyethylene glycol, acetaminophen **OR** acetaminophen, dextrose bolus **OR** dextrose bolus, glucagon (rDNA), dextrose, Glucose, albuterol    OBJECTIVE:  /68   Pulse 69   Temp 97.8 °F (36.6 °C) (Oral)   Resp 16   Ht 1.829 m (6')   Wt 120 kg (264 lb 8.8 oz)   SpO2 94%   BMI 35.88 kg/m²   Temp   Av.7 °F (36.5 °C)  Min: 97.5 °F (36.4 °C)  Max: 97.8 °F (36.6 °C)  Constitutional: The patient is awake, alert, and oriented. Lying in bed, in no distress.   Skin: Warm and dry. No rashes were noted.   HEENT: Round and reactive pupils.  Moist mucous membranes.  No ulcerations or thrush.  Neck: Supple to movements.   Chest: No use of accessory muscles to breathe. Symmetrical expansion.  No wheezing, crackles or rhonchi.  Cardiovascular: S1 and S2 are rhythmic and regular. No murmurs appreciated.   Abdomen: Positive bowel sounds to auscultation. Benign to palpation. No masses felt.  Extremities: No edema.  -Right foot wrapped, picture as below   Lines: Peripheral.        Laboratory and Tests:  Lab Results   Component Value Date    CRP 8.0 (H) 2024    CRP 30.0 (H) 2024    CRP 32.0 (H) 2024     Lab Results   Component Value Date    SEDRATE 45 (H) 2024    SEDRATE 31 (H) 2024    SEDRATE 17 (H) 2023         Radiology:    Microbiology  Wound culture 2024: Mixed ox + GNR, GNR, nonhemolytic Streptococcus  Blood cultures 2024: negative so far  Surgical culture 2024: Enterococcus faecalis, Ochrobactrum anthropi, pansensitive Pseudomonas aeruginosa    Assessment:  Right fifth metatarsal diabetic foot infection with osteomyelitis  Post incision and drainage, amputation of the right fifth metatarsal ray amputation 2024     Plan:    Continue IV Zosyn  Labs and cultures reviewed   Will follow with you    Mark Rodriguez MD  11:06 AM  2024

## 2024-06-20 LAB
GLUCOSE BLD-MCNC: 141 MG/DL (ref 74–99)
GLUCOSE BLD-MCNC: 145 MG/DL (ref 74–99)
GLUCOSE BLD-MCNC: 210 MG/DL (ref 74–99)
GLUCOSE BLD-MCNC: 214 MG/DL (ref 74–99)
MICROORGANISM SPEC CULT: NORMAL
MICROORGANISM/AGENT SPEC: NORMAL
SERVICE CMNT-IMP: NORMAL
SPECIMEN DESCRIPTION: NORMAL

## 2024-06-20 PROCEDURE — 99239 HOSP IP/OBS DSCHRG MGMT >30: CPT | Performed by: INTERNAL MEDICINE

## 2024-06-20 PROCEDURE — 6370000000 HC RX 637 (ALT 250 FOR IP): Performed by: INTERNAL MEDICINE

## 2024-06-20 PROCEDURE — 82962 GLUCOSE BLOOD TEST: CPT

## 2024-06-20 PROCEDURE — 6360000002 HC RX W HCPCS: Performed by: REGISTERED NURSE

## 2024-06-20 PROCEDURE — 1200000000 HC SEMI PRIVATE

## 2024-06-20 PROCEDURE — 76937 US GUIDE VASCULAR ACCESS: CPT

## 2024-06-20 PROCEDURE — 6370000000 HC RX 637 (ALT 250 FOR IP)

## 2024-06-20 PROCEDURE — 2580000003 HC RX 258: Performed by: SPECIALIST

## 2024-06-20 PROCEDURE — C1751 CATH, INF, PER/CENT/MIDLINE: HCPCS

## 2024-06-20 PROCEDURE — 02HV33Z INSERTION OF INFUSION DEVICE INTO SUPERIOR VENA CAVA, PERCUTANEOUS APPROACH: ICD-10-PCS | Performed by: INTERNAL MEDICINE

## 2024-06-20 PROCEDURE — 6370000000 HC RX 637 (ALT 250 FOR IP): Performed by: HOSPITALIST

## 2024-06-20 PROCEDURE — 6360000002 HC RX W HCPCS

## 2024-06-20 PROCEDURE — 94640 AIRWAY INHALATION TREATMENT: CPT

## 2024-06-20 PROCEDURE — 2580000003 HC RX 258: Performed by: INTERNAL MEDICINE

## 2024-06-20 PROCEDURE — 2580000003 HC RX 258: Performed by: REGISTERED NURSE

## 2024-06-20 PROCEDURE — 36569 INSJ PICC 5 YR+ W/O IMAGING: CPT

## 2024-06-20 RX ORDER — OXYCODONE HYDROCHLORIDE AND ACETAMINOPHEN 5; 325 MG/1; MG/1
1 TABLET ORAL EVERY 6 HOURS
Refills: 0 | Status: SHIPPED | DISCHARGE
Start: 2024-06-20 | End: 2024-06-23

## 2024-06-20 RX ORDER — SODIUM CHLORIDE 0.9 % (FLUSH) 0.9 %
5-40 SYRINGE (ML) INJECTION EVERY 12 HOURS SCHEDULED
Status: DISCONTINUED | OUTPATIENT
Start: 2024-06-20 | End: 2024-06-21 | Stop reason: HOSPADM

## 2024-06-20 RX ORDER — METHOCARBAMOL 1000 MG/1
1000 TABLET, COATED ORAL 4 TIMES DAILY
DISCHARGE
Start: 2024-06-20 | End: 2024-06-30

## 2024-06-20 RX ORDER — GLUCAGON 1 MG/ML
1 KIT INJECTION PRN
Qty: 1 EACH | DISCHARGE
Start: 2024-06-20

## 2024-06-20 RX ORDER — LACTULOSE 10 G/15ML
30 SOLUTION ORAL 2 TIMES DAILY
Refills: 1 | DISCHARGE
Start: 2024-06-20 | End: 2024-06-21

## 2024-06-20 RX ORDER — SODIUM CHLORIDE 9 MG/ML
INJECTION, SOLUTION INTRAVENOUS PRN
Status: DISCONTINUED | OUTPATIENT
Start: 2024-06-20 | End: 2024-06-21 | Stop reason: HOSPADM

## 2024-06-20 RX ORDER — SODIUM CHLORIDE 0.9 % (FLUSH) 0.9 %
5-40 SYRINGE (ML) INJECTION PRN
Status: DISCONTINUED | OUTPATIENT
Start: 2024-06-20 | End: 2024-06-21 | Stop reason: HOSPADM

## 2024-06-20 RX ORDER — NICOTINE 21 MG/24HR
1 PATCH, TRANSDERMAL 24 HOURS TRANSDERMAL DAILY
Qty: 30 PATCH | Refills: 3 | DISCHARGE
Start: 2024-06-21

## 2024-06-20 RX ORDER — ENOXAPARIN SODIUM 100 MG/ML
30 INJECTION SUBCUTANEOUS 2 TIMES DAILY
DISCHARGE
Start: 2024-06-20

## 2024-06-20 RX ADMIN — OXYCODONE HYDROCHLORIDE AND ACETAMINOPHEN 1 TABLET: 5; 325 TABLET ORAL at 09:45

## 2024-06-20 RX ADMIN — DULOXETINE HYDROCHLORIDE 60 MG: 60 CAPSULE, DELAYED RELEASE ORAL at 09:45

## 2024-06-20 RX ADMIN — ENOXAPARIN SODIUM 30 MG: 100 INJECTION SUBCUTANEOUS at 21:21

## 2024-06-20 RX ADMIN — SODIUM CHLORIDE: 9 INJECTION, SOLUTION INTRAVENOUS at 09:44

## 2024-06-20 RX ADMIN — BUDESONIDE 250 MCG: 0.25 SUSPENSION RESPIRATORY (INHALATION) at 20:42

## 2024-06-20 RX ADMIN — OXYCODONE HYDROCHLORIDE AND ACETAMINOPHEN 1 TABLET: 5; 325 TABLET ORAL at 16:35

## 2024-06-20 RX ADMIN — METHOCARBAMOL TABLETS 1000 MG: 500 TABLET, COATED ORAL at 12:22

## 2024-06-20 RX ADMIN — FENOFIBRATE 160 MG: 160 TABLET ORAL at 09:45

## 2024-06-20 RX ADMIN — OXYCODONE HYDROCHLORIDE AND ACETAMINOPHEN 1 TABLET: 5; 325 TABLET ORAL at 04:42

## 2024-06-20 RX ADMIN — METHOCARBAMOL TABLETS 1000 MG: 500 TABLET, COATED ORAL at 16:35

## 2024-06-20 RX ADMIN — PIPERACILLIN AND TAZOBACTAM 3375 MG: 3; .375 INJECTION, POWDER, LYOPHILIZED, FOR SOLUTION INTRAVENOUS at 05:51

## 2024-06-20 RX ADMIN — ARFORMOTEROL TARTRATE 15 MCG: 15 SOLUTION RESPIRATORY (INHALATION) at 20:42

## 2024-06-20 RX ADMIN — OXYCODONE HYDROCHLORIDE AND ACETAMINOPHEN 1 TABLET: 5; 325 TABLET ORAL at 21:23

## 2024-06-20 RX ADMIN — PIPERACILLIN AND TAZOBACTAM 3375 MG: 3; .375 INJECTION, POWDER, LYOPHILIZED, FOR SOLUTION INTRAVENOUS at 21:30

## 2024-06-20 RX ADMIN — METHOCARBAMOL TABLETS 1000 MG: 500 TABLET, COATED ORAL at 21:22

## 2024-06-20 RX ADMIN — INSULIN LISPRO 2 UNITS: 100 INJECTION, SOLUTION INTRAVENOUS; SUBCUTANEOUS at 16:35

## 2024-06-20 RX ADMIN — PIPERACILLIN AND TAZOBACTAM 3375 MG: 3; .375 INJECTION, POWDER, LYOPHILIZED, FOR SOLUTION INTRAVENOUS at 12:24

## 2024-06-20 RX ADMIN — PREGABALIN 100 MG: 50 CAPSULE ORAL at 12:22

## 2024-06-20 RX ADMIN — TRAZODONE HYDROCHLORIDE 100 MG: 50 TABLET ORAL at 21:22

## 2024-06-20 RX ADMIN — SODIUM CHLORIDE, PRESERVATIVE FREE 10 ML: 5 INJECTION INTRAVENOUS at 21:24

## 2024-06-20 RX ADMIN — PREGABALIN 100 MG: 50 CAPSULE ORAL at 09:45

## 2024-06-20 RX ADMIN — LEVOTHYROXINE SODIUM 25 MCG: 25 TABLET ORAL at 05:51

## 2024-06-20 RX ADMIN — PREGABALIN 100 MG: 50 CAPSULE ORAL at 21:27

## 2024-06-20 RX ADMIN — METHOCARBAMOL TABLETS 1000 MG: 500 TABLET, COATED ORAL at 09:45

## 2024-06-20 RX ADMIN — INSULIN GLARGINE 30 UNITS: 100 INJECTION, SOLUTION SUBCUTANEOUS at 21:21

## 2024-06-20 RX ADMIN — ENOXAPARIN SODIUM 30 MG: 100 INJECTION SUBCUTANEOUS at 09:45

## 2024-06-20 RX ADMIN — ROSUVASTATIN CALCIUM 20 MG: 20 TABLET, FILM COATED ORAL at 21:22

## 2024-06-20 ASSESSMENT — PAIN DESCRIPTION - ORIENTATION
ORIENTATION: RIGHT
ORIENTATION: RIGHT

## 2024-06-20 ASSESSMENT — PAIN SCALES - GENERAL
PAINLEVEL_OUTOF10: 2
PAINLEVEL_OUTOF10: 8
PAINLEVEL_OUTOF10: 2
PAINLEVEL_OUTOF10: 8

## 2024-06-20 ASSESSMENT — PAIN DESCRIPTION - LOCATION
LOCATION: FOOT
LOCATION: FOOT

## 2024-06-20 ASSESSMENT — PAIN DESCRIPTION - DESCRIPTORS
DESCRIPTORS: SHARP
DESCRIPTORS: ACHING;SHARP

## 2024-06-20 NOTE — PROGRESS NOTES
Physical Therapy  Facility/Department: [address] MED SURG    Name: Henry Fuentes  : 1960  MRN: [id number]  Date of Service: 2024    PT treatment was attempted this afternoon and pt was having PICC line placed. Will re-attempt PT another time.    Tre Yan Jr., P.T.  License Number: [id number]

## 2024-06-20 NOTE — PROGRESS NOTES
Comprehensive Nutrition Assessment    Type and Reason for Visit:  Initial, RD Nutrition Re-Screen/LOS    Nutrition Recommendations/Plan:   Continue current diet & monitor  Will add Silverio BID & Ensure HP BID     Malnutrition Assessment:  Malnutrition Status:  At risk for malnutrition (Comment) (r/t wound) (06/20/24 1117)    Context:  Acute Illness     Findings of the 6 clinical characteristics of malnutrition:  Energy Intake:  No significant decrease in energy intake  Weight Loss:  No significant weight loss     Body Fat Loss:  No significant body fat loss     Muscle Mass Loss:  No significant muscle mass loss    Fluid Accumulation:  No significant fluid accumulation     Strength:  Not Performed    Nutrition Assessment:    Pt w/ R DM foot infection w/ OM s/p R 5th digit amputation and I&D 6/14. Hx DM, COPD. Pt consuming >75% of meals, will add ONS to promote wound healing & monitor.    Nutrition Related Findings:    A&O X4, -7.6L, no edema, obese abd, +BS, constipation/bloating   Wound Type: Surgical Incision, Wound Vac       Current Nutrition Intake & Therapies:    Average Meal Intake: %  Average Supplements Intake: None Ordered  ADULT DIET; Regular; 4 carb choices (60 gm/meal)    Anthropometric Measures:  Height: 182.9 cm (6')  Ideal Body Weight (IBW): 178 lbs (81 kg)    Admission Body Weight: 120.2 kg (265 lb) (6/14 standing (question accuracy of 1st measured wt 6/14 171#))  Current Body Weight: 133.4 kg (294 lb) (6/20), 165.2 % IBW. Weight Source: Bed Scale  Current BMI (kg/m2): 39.9  Usual Body Weight: 121.6 kg (268 lb) (3/27/24 actual per EMR)  % Weight Change (Calculated): 9.7                    BMI Categories: Obese Class 2 (BMI 35.0 -39.9)    Estimated Daily Nutrient Needs:  Energy Requirements Based On: Kcal/kg  Weight Used for Energy Requirements: Admission  Energy (kcal/day):   Weight Used for Protein Requirements: Ideal  Protein (g/day): 110-120  Method Used for Fluid Requirements: 1

## 2024-06-20 NOTE — PROGRESS NOTES
[facility]  [facility]  Progress Note    SUBJECTIVE:  Chief Complaint   Patient presents with    Wound Check     Sent in by dr david for partial foot amputation on right side, hx of dm type 2     The patient is tolerating the antibiotic.  Minimal pain.  No nausea.    Review of systems:  As stated above in the chief complaint, otherwise negative.    Medications:  Scheduled Meds:   oxyCODONE-acetaminophen  1 tablet Oral Q6H    methocarbamol  1,000 mg Oral 4x Daily    enoxaparin  30 mg SubCUTAneous BID    nicotine  1 patch TransDERmal Daily    DULoxetine  60 mg Oral QAM    levothyroxine  25 mcg Oral QAM AC    fentanNYL  50 mcg IntraVENous Once    piperacillin-tazobactam  3,375 mg IntraVENous Q8H    sodium chloride flush  5-40 mL IntraVENous 2 times per day    insulin lispro  0-8 Units SubCUTAneous TID WC    insulin lispro  0-4 Units SubCUTAneous Nightly    insulin glargine  30 Units SubCUTAneous Nightly    fenofibrate  160 mg Oral Daily    [Held by provider] metoprolol tartrate  25 mg Oral BID    pregabalin  100 mg Oral TID    rosuvastatin  20 mg Oral Nightly    traZODone  100 mg Oral Nightly    budesonide  0.25 mg Nebulization BID RT    And    arformoterol tartrate  15 mcg Nebulization BID RT     Continuous Infusions:   sodium chloride 100 mL/hr at 24 0944    sodium chloride      dextrose       PRN Meds:traMADol, sodium chloride flush, sodium chloride, potassium chloride **OR** potassium alternative oral replacement **OR** potassium chloride, magnesium sulfate, polyethylene glycol, acetaminophen **OR** acetaminophen, dextrose bolus **OR** dextrose bolus, glucagon (rDNA), dextrose, Glucose, albuterol    OBJECTIVE:  BP (!) 147/69   Pulse 70   Temp 97.8 °F (36.6 °C) (Oral)   Resp 20   Ht 1.829 m (6')   Wt 133.4 kg (294 lb)   SpO2 96%   BMI 39.87 kg/m²   Temp  Av °F (36.7 °C)  Min: 97.8 °F (36.6 °C)  Max: 98.2 °F (36.8 °C)  Constitutional: The patient is awake, alert, and

## 2024-06-20 NOTE — PROGRESS NOTES
Spoke with Darcy, Bea states that they can not confirm if all supplies would be delivered today for pt's arrival. Bea stated that they can accept the patient tomorrow. Pt's niece was called and notified of this. States that she will try and find another family member that can transport the pt tomorrow to [facility]. Dr. Pryor was called and updated on this. Electronically signed by Umu Champion RN on 6/20/2024 at 3:20 PM    Destiny pt's niece was present in the room and stated that his ride will be able to get him tomorrow between 12-1 to take him to [facility]. Called to update Bea from [facility], voicemail was left. Electronically signed by Umu Champion RN on 6/20/2024 at 4:06 PM

## 2024-06-20 NOTE — DISCHARGE SUMMARY
Discharge Summary    Date: 6/20/2024  Patient Name: Henry Fuentes    YOB: 1960     Age: 64 y.o.    Admit Date: 6/13/2024  Discharge Date:  Discharge Condition:    Admission Diagnosis  ALVINO (acute kidney injury) ([facility]) [N17.9];Diabetic ulcer of right foot due to type 2 diabetes mellitus ([facility]) [E11.621, L97.519];Open wound of foot excluding toes [S91.309A];Hyperglycemia due to diabetes mellitus ([facility]) [E11.65]      Discharge Diagnosis  Principal Problem:    Diabetic ulcer of right foot due to type 2 diabetes mellitus ([facility])  Active Problems:    COPD (chronic obstructive pulmonary disease) ([facility])    HTN (hypertension)    Type 2 diabetes mellitus with hyperglycemia, with long-term current use of insulin ([facility])    Hyperlipidemia    Thyroid disease    Open wound of foot excluding toes  Resolved Problems:    * No resolved hospital problems. *      Hospital Stay  Narrative of Hospital Course:  Diabetic Foot Infection requiring partial 5th ray amputation and wound vacuum.  Infectious disease managed his antibiotics. Pain managed with lyrica, robaxin & opioid.    Consultants:  IP CONSULT TO PODIATRY  IP CONSULT TO INFECTIOUS DISEASES  IP CONSULT TO CASE MANAGEMENT  IP CONSULT TO VASCULAR ACCESS TEAM  IP CONSULT TO DIABETES EDUCATOR  IP CONSULT TO VASCULAR ACCESS TEAM    Surgeries/procedures Performed:  RIGHT FOOT DEBRIDEMENT PARTIAL FIFTH RAY RESECTION     Treatments:            Discharge Plan/Disposition:  Home    Hospital/Incidental Findings Requiring Follow Up:    Patient Instructions:    Diet: Regular Diet and Diabetic Diet    Activity:No Lifting, Driving or Strenuous Excercise  For number of days (if applicable):      Other Instructions:    Provider Follow-Up:   No follow-ups on file.     Significant Diagnostic Studies:    Recent Labs:  Admission on 06/13/2024  No results displayed because visit has over 200 results.    ------------    Radiology last 7 days:  XR FOOT RIGHT (MIN 3 VIEWS)    Result Date:

## 2024-06-20 NOTE — CARE COORDINATION
Transition of care update. Spoke with Bea at [facility], ( [phone]). She stated that authorization was back yesterday, and are waiting on wound care measurements from podiatry to order supplies. Updated RN that they need the wound vac and wound measurements to have supplies ordered as soon as they are obtained. CM also updated Bea that patient to be discharging with a PICC line, and per ID, will need IV Zosyn for 4 weeks. These need faxed to  [phone]. Bea also would like the list of meds and AVS faxed to them to order the patients meds as soon as it is completed.  They are over 2 hours away, and would like that in place prior to the patients arrival. RN updated and will fax needed info to facility as soon as able. Lennie to transport to the facility. TIERRA Spoke with Lennie Ac today, who is providing transportation. She stated she was able to transport today, but not sure if able tomorrow. TIERRA will follow.  Electronically signed by Cuong Tyler RN on 6/20/2024 at 11:57 AM

## 2024-06-20 NOTE — PROGRESS NOTES
Faxed requested paperwork to Bea at [phone] per request. Called and attempted to give nurse to nurse to Bea at [facility]. States she can not take nurse to nurse until she receives the faxed paperwork and confirms with her DON that wound vac supplies could be obtained tonight. This RN's name and nursing station phone number was provided to Bea. Per eBa she states that as soon as she hears that supplies can be delivered she will call and notify us. Electronically signed by Umu Champion RN on 6/20/2024 at 2:30 PM

## 2024-06-20 NOTE — PROGRESS NOTES
Department of Podiatry  Progress Note    SUBJECTIVE:  Patient seen bedside s/p right foot I&D, amputation right 5th digit. Patient feeling well today. Patient states that pain is manageable with medication.Wound vac placed today. All questions answered. No acute events overnight. Patient denies any N/V/D/F/C/SOB/CP and has no other pedal complaints at this time.     OBJECTIVE:    Scheduled Meds:   sodium chloride flush  5-40 mL IntraVENous 2 times per day    oxyCODONE-acetaminophen  1 tablet Oral Q6H    methocarbamol  1,000 mg Oral 4x Daily    enoxaparin  30 mg SubCUTAneous BID    nicotine  1 patch TransDERmal Daily    DULoxetine  60 mg Oral QAM    levothyroxine  25 mcg Oral QAM AC    fentanNYL  50 mcg IntraVENous Once    piperacillin-tazobactam  3,375 mg IntraVENous Q8H    sodium chloride flush  5-40 mL IntraVENous 2 times per day    insulin lispro  0-8 Units SubCUTAneous TID WC    insulin lispro  0-4 Units SubCUTAneous Nightly    insulin glargine  30 Units SubCUTAneous Nightly    fenofibrate  160 mg Oral Daily    [Held by provider] metoprolol tartrate  25 mg Oral BID    pregabalin  100 mg Oral TID    rosuvastatin  20 mg Oral Nightly    traZODone  100 mg Oral Nightly    budesonide  0.25 mg Nebulization BID RT    And    arformoterol tartrate  15 mcg Nebulization BID RT     Continuous Infusions:   sodium chloride      sodium chloride 100 mL/hr at 06/20/24 0944    sodium chloride      dextrose       PRN Meds:.sodium chloride flush, sodium chloride, traMADol, sodium chloride flush, sodium chloride, potassium chloride **OR** potassium alternative oral replacement **OR** potassium chloride, magnesium sulfate, polyethylene glycol, acetaminophen **OR** acetaminophen, dextrose bolus **OR** dextrose bolus, glucagon (rDNA), dextrose, Glucose, albuterol    No Known Allergies    BP (!) 147/69   Pulse 70   Temp 97.8 °F (36.6 °C) (Oral)   Resp 20   Ht 1.829 m (6')   Wt 133.4 kg (294 lb)   SpO2 96%   BMI 39.87 kg/m²

## 2024-06-20 NOTE — DISCHARGE INSTR - COC
Patient Infection Status       Infection Onset Added Last Indicated Last Indicated By Review Planned Expiration Resolved Resolved By    None active    Resolved    MRSA 04/21/22 04/23/22 05/07/22 Culture, Surgical   05/17/22 Shiloh Julian, AYE    MRSA wound foot 4/21/2022  MRSA bone right 2nd toe 4/22/2022  MRSA nares 5/6/2022  MRSA wound toe 5/6/2022  MRSA bone 5/7/2022  MRSA right foot abscess 5/7/2022                       Nurse Assessment:  Last Vital Signs: BP (!) 147/69   Pulse 70   Temp 97.8 °F (36.6 °C) (Oral)   Resp 18   Ht 1.829 m (6')   Wt 133.4 kg (294 lb)   SpO2 96%   BMI 39.87 kg/m²     Last documented pain score (0-10 scale): Pain Level: 2  Last Weight:   Wt Readings from Last 1 Encounters:   06/20/24 133.4 kg (294 lb)     Mental Status:  oriented and alert    IV Access:  - PICC - site  L Upper Arm, insertion date: 6/20/24    Nursing Mobility/ADLs:  Walking   Assisted  Transfer  Assisted  Bathing  Assisted  Dressing  Assisted  Toileting  Assisted  Feeding  Independent  Med Admin  Independent  Med Delivery   whole    Wound Care Documentation and Therapy:  Negative Pressure Wound Therapy Foot Right (Active)   Wound Type Diabetic foot ulcer 06/18/24 2200   Dressing Type Black Foam 06/17/24 0805   Cycle Continuous 06/18/24 2200   Target Pressure (mmHg) 125 06/18/24 2200   Canister changed? Yes 06/16/24 1508   Dressing Status Clean, dry & intact 06/18/24 2200   Dressing Changed Changed/New 06/16/24 1508   Drainage Amount Scant 06/18/24 2200   Drainage Description Sanguinous 06/18/24 2200   Number of days: 4       Wound 06/14/24 Foot Right;Lateral (Active)   Dressing Status Clean;Dry;Intact 06/19/24 2158   Dressing/Treatment Petroleum impregnated gauze;Gauze dressing/dressing sponge;ABD 06/14/24 0100   Number of days: 6       Incision 06/14/24 Foot Right (Active)   Dressing Status Clean;Dry;Intact 06/18/24 2200   Incision Cleansed Cleansed with saline 06/14/24 1230   Dressing/Treatment Ace  is to follow the orders below:  Draw Vancomycin trough 30 minutes before the third dose  After starting drug, or   After the dosing or interval is changed.  If the trough level is between 5 and 20 continue dose as ordered.  Draw troughs twice weekly thereafter until troughs are stable (i.e. until trough is between 10 and 20 mcg/ml for two consecutive laboratory values).  Once stable check troughs once weekly or every third dose.  Please do not call physician unless the trough is < 5 or >20.  If the trough is <20 continue dosing as ordered.  If the trough is >20 call the office for further orders.  Do not hold the dose while waiting for the trough result.  Amingoglycosides (e.g. Gentamicin, Tobramycin and Amikacin) peaks and troughs should be drawn twice weekly (preferably on Mondays and Thursdays) or every third dose.  Aminoglycoside peaks are not to be drawn if patient on Once-Daily Dosing (ODD).  Call physician or office if the trough is:     >1 for gentamicin,   >2 for tobramycin, or   >5 for amikacin  When clinically indicated obtain:  Urine culture. If the patient has a fever with purulent drainage from Bryant or suprapubic catheter, or foul smelling urine. Do not irrigate a clogged Bryant catheter. Replace it.  Blood cultures and Wound Gram stain with culture & sensitivity. If the patient has a fever or increasing drainage or foul odor from a wound. Notify the treating physician in a timely manner  Stool specimen. If diarrhea occurs while on antibiotics, send stools for C. difficile and WBCs.  When a drug is discontinued due to a low white blood cell count (WBCs) draw two consecutive CBC with differential and BUN, Ceatinine.         ALLERGIC OR ADVERSE REACTIONS TO MEDICATIONS  Mild reaction: (itching, with or without rash):  Administer Benadryl 50mg po x 1, then 25mg po q6h prn.   Notify office or physician in a timely matter.  Moderate reaction (itching with or without rash and/or wheezing, dyspnea, itchy

## 2024-06-20 NOTE — PATIENT CARE CONFERENCE
P Quality Flow/Interdisciplinary Rounds Progress Note        Quality Flow Rounds held on June 20, 2024    Disciplines Attending:  Bedside Nurse, , , and Nursing Unit Leadership    Henry Fuentes was admitted on 6/13/2024  6:35 PM    Anticipated Discharge Date:       Disposition:    Jose Score:  Jose Scale Score: 21    Readmission Risk              Risk of Unplanned Readmission:  29           Discussed patient goal for the day, patient clinical progression, and barriers to discharge.  The following Goal(s) of the Day/Commitment(s) have been identified:  Labs - Report Results      Nunu Dobbs RN  June 20, 2024

## 2024-06-20 NOTE — PROGRESS NOTES
Consent for PICC was signed by patient and placed in soft chart. Electronically signed by Umu Champion RN on 6/20/2024 at 11:43 AM

## 2024-06-21 VITALS
RESPIRATION RATE: 16 BRPM | SYSTOLIC BLOOD PRESSURE: 155 MMHG | OXYGEN SATURATION: 95 % | TEMPERATURE: 98 F | DIASTOLIC BLOOD PRESSURE: 74 MMHG | HEART RATE: 69 BPM | WEIGHT: 292 LBS | HEIGHT: 72 IN | BODY MASS INDEX: 39.55 KG/M2

## 2024-06-21 LAB
GLUCOSE BLD-MCNC: 132 MG/DL (ref 74–99)
SURGICAL PATHOLOGY REPORT: NORMAL

## 2024-06-21 PROCEDURE — 94640 AIRWAY INHALATION TREATMENT: CPT

## 2024-06-21 PROCEDURE — 6360000002 HC RX W HCPCS

## 2024-06-21 PROCEDURE — 82962 GLUCOSE BLOOD TEST: CPT

## 2024-06-21 PROCEDURE — 6370000000 HC RX 637 (ALT 250 FOR IP): Performed by: INTERNAL MEDICINE

## 2024-06-21 PROCEDURE — 6370000000 HC RX 637 (ALT 250 FOR IP)

## 2024-06-21 PROCEDURE — 6360000002 HC RX W HCPCS: Performed by: REGISTERED NURSE

## 2024-06-21 PROCEDURE — 2580000003 HC RX 258: Performed by: REGISTERED NURSE

## 2024-06-21 RX ORDER — FENOFIBRATE 54 MG/1
54 TABLET ORAL DAILY
Status: DISCONTINUED | OUTPATIENT
Start: 2024-06-21 | End: 2024-06-21 | Stop reason: HOSPADM

## 2024-06-21 RX ADMIN — BUDESONIDE 250 MCG: 0.25 SUSPENSION RESPIRATORY (INHALATION) at 08:58

## 2024-06-21 RX ADMIN — ARFORMOTEROL TARTRATE 15 MCG: 15 SOLUTION RESPIRATORY (INHALATION) at 08:58

## 2024-06-21 RX ADMIN — PIPERACILLIN AND TAZOBACTAM 3375 MG: 3; .375 INJECTION, POWDER, LYOPHILIZED, FOR SOLUTION INTRAVENOUS at 05:26

## 2024-06-21 RX ADMIN — DULOXETINE HYDROCHLORIDE 60 MG: 60 CAPSULE, DELAYED RELEASE ORAL at 09:55

## 2024-06-21 RX ADMIN — OXYCODONE HYDROCHLORIDE AND ACETAMINOPHEN 1 TABLET: 5; 325 TABLET ORAL at 09:55

## 2024-06-21 RX ADMIN — LEVOTHYROXINE SODIUM 25 MCG: 25 TABLET ORAL at 05:25

## 2024-06-21 RX ADMIN — METHOCARBAMOL TABLETS 1000 MG: 500 TABLET, COATED ORAL at 09:55

## 2024-06-21 RX ADMIN — PREGABALIN 100 MG: 50 CAPSULE ORAL at 09:54

## 2024-06-21 RX ADMIN — ENOXAPARIN SODIUM 30 MG: 100 INJECTION SUBCUTANEOUS at 09:56

## 2024-06-21 RX ADMIN — FENOFIBRATE 54 MG: 54 TABLET ORAL at 09:54

## 2024-06-21 RX ADMIN — OXYCODONE HYDROCHLORIDE AND ACETAMINOPHEN 1 TABLET: 5; 325 TABLET ORAL at 05:25

## 2024-06-21 RX ADMIN — PIPERACILLIN AND TAZOBACTAM 3375 MG: 3; .375 INJECTION, POWDER, LYOPHILIZED, FOR SOLUTION INTRAVENOUS at 11:14

## 2024-06-21 ASSESSMENT — PAIN DESCRIPTION - LOCATION
LOCATION: FOOT
LOCATION: FOOT

## 2024-06-21 ASSESSMENT — PAIN DESCRIPTION - ORIENTATION
ORIENTATION: RIGHT
ORIENTATION: RIGHT

## 2024-06-21 ASSESSMENT — PAIN SCALES - GENERAL
PAINLEVEL_OUTOF10: 2
PAINLEVEL_OUTOF10: 6
PAINLEVEL_OUTOF10: 9

## 2024-06-21 ASSESSMENT — PAIN DESCRIPTION - DESCRIPTORS
DESCRIPTORS: SHARP
DESCRIPTORS: STABBING;THROBBING

## 2024-06-21 ASSESSMENT — PAIN - FUNCTIONAL ASSESSMENT: PAIN_FUNCTIONAL_ASSESSMENT: PREVENTS OR INTERFERES SOME ACTIVE ACTIVITIES AND ADLS

## 2024-06-21 NOTE — PLAN OF CARE
Problem: Safety - Adult  Goal: Free from fall injury  Outcome: Adequate for Discharge     Problem: Chronic Conditions and Co-morbidities  Goal: Patient's chronic conditions and co-morbidity symptoms are monitored and maintained or improved  Outcome: Adequate for Discharge     Problem: Discharge Planning  Goal: Discharge to home or other facility with appropriate resources  Outcome: Adequate for Discharge     Problem: Pain  Goal: Verbalizes/displays adequate comfort level or baseline comfort level  Outcome: Adequate for Discharge     Problem: ABCDS Injury Assessment  Goal: Absence of physical injury  Outcome: Adequate for Discharge     Problem: Skin/Tissue Integrity  Goal: Absence of new skin breakdown  Description: 1.  Monitor for areas of redness and/or skin breakdown  2.  Assess vascular access sites hourly  3.  Every 4-6 hours minimum:  Change oxygen saturation probe site  4.  Every 4-6 hours:  If on nasal continuous positive airway pressure, respiratory therapy assess nares and determine need for appliance change or resting period.  Outcome: Adequate for Discharge     Problem: Nutrition Deficit:  Goal: Optimize nutritional status  Outcome: Adequate for Discharge

## 2024-06-21 NOTE — PROGRESS NOTES
[facility]  [facility]  Progress Note    SUBJECTIVE:  Chief Complaint   Patient presents with    Wound Check     Sent in by dr david for partial foot amputation on right side, hx of dm type 2     No new complaints.  Tolerating antibiotic.  No pain.  No nausea, vomiting or diarrhea.    Review of systems:  As stated above in the chief complaint, otherwise negative.    Medications:  Scheduled Meds:   fenofibrate  54 mg Oral Daily    sodium chloride flush  5-40 mL IntraVENous 2 times per day    oxyCODONE-acetaminophen  1 tablet Oral Q6H    methocarbamol  1,000 mg Oral 4x Daily    enoxaparin  30 mg SubCUTAneous BID    nicotine  1 patch TransDERmal Daily    DULoxetine  60 mg Oral QAM    levothyroxine  25 mcg Oral QAM AC    fentanNYL  50 mcg IntraVENous Once    piperacillin-tazobactam  3,375 mg IntraVENous Q8H    sodium chloride flush  5-40 mL IntraVENous 2 times per day    insulin lispro  0-8 Units SubCUTAneous TID WC    insulin lispro  0-4 Units SubCUTAneous Nightly    insulin glargine  30 Units SubCUTAneous Nightly    [Held by provider] metoprolol tartrate  25 mg Oral BID    pregabalin  100 mg Oral TID    rosuvastatin  20 mg Oral Nightly    traZODone  100 mg Oral Nightly    budesonide  0.25 mg Nebulization BID RT    And    arformoterol tartrate  15 mcg Nebulization BID RT     Continuous Infusions:   sodium chloride      sodium chloride 100 mL/hr at 06/20/24 0944    sodium chloride      dextrose       PRN Meds:sodium chloride flush, sodium chloride, traMADol, sodium chloride flush, sodium chloride, potassium chloride **OR** potassium alternative oral replacement **OR** potassium chloride, magnesium sulfate, polyethylene glycol, acetaminophen **OR** acetaminophen, dextrose bolus **OR** dextrose bolus, glucagon (rDNA), dextrose, Glucose, albuterol    OBJECTIVE:  BP (!) 155/74   Pulse 69   Temp 98 °F (36.7 °C) (Oral)   Resp 16   Ht 1.829 m (6')   Wt 132.5 kg (292 lb)   SpO2 95%   BMI

## 2024-06-21 NOTE — CARE COORDINATION
Transition of care update. Per previous note, [facility] was unable to get supplies ordered for wound vac until today. Delay day noted. Family is able to transport between 12-1 pm today. Bea at [facility] was updated. Updated Charge at [facility].  will follow.  Electronically signed by Cuong Tyler RN on 6/21/2024 at 8:30 AM

## 2024-06-21 NOTE — PROGRESS NOTES
Department of Podiatry  Progress Note    SUBJECTIVE:  Patient seen bedside s/p right foot I&D, amputation right 5th digit. Patient feeling well today. Patient states that pain is manageable with medication.Wound vac placed today. All questions answered. No acute events overnight. Patient denies any N/V/D/F/C/SOB/CP and has no other pedal complaints at this time.     OBJECTIVE:    Scheduled Meds:   fenofibrate  54 mg Oral Daily    sodium chloride flush  5-40 mL IntraVENous 2 times per day    oxyCODONE-acetaminophen  1 tablet Oral Q6H    methocarbamol  1,000 mg Oral 4x Daily    enoxaparin  30 mg SubCUTAneous BID    nicotine  1 patch TransDERmal Daily    DULoxetine  60 mg Oral QAM    levothyroxine  25 mcg Oral QAM AC    fentanNYL  50 mcg IntraVENous Once    piperacillin-tazobactam  3,375 mg IntraVENous Q8H    sodium chloride flush  5-40 mL IntraVENous 2 times per day    insulin lispro  0-8 Units SubCUTAneous TID WC    insulin lispro  0-4 Units SubCUTAneous Nightly    insulin glargine  30 Units SubCUTAneous Nightly    [Held by provider] metoprolol tartrate  25 mg Oral BID    pregabalin  100 mg Oral TID    rosuvastatin  20 mg Oral Nightly    traZODone  100 mg Oral Nightly    budesonide  0.25 mg Nebulization BID RT    And    arformoterol tartrate  15 mcg Nebulization BID RT     Continuous Infusions:   sodium chloride      sodium chloride 100 mL/hr at 06/20/24 0944    sodium chloride      dextrose       PRN Meds:.sodium chloride flush, sodium chloride, traMADol, sodium chloride flush, sodium chloride, potassium chloride **OR** potassium alternative oral replacement **OR** potassium chloride, magnesium sulfate, polyethylene glycol, acetaminophen **OR** acetaminophen, dextrose bolus **OR** dextrose bolus, glucagon (rDNA), dextrose, Glucose, albuterol    No Known Allergies    BP (!) 155/74   Pulse 69   Temp 98 °F (36.7 °C) (Oral)   Resp 16   Ht 1.829 m (6')   Wt 132.5 kg (292 lb)   SpO2 95%   BMI 39.60 kg/m²

## 2024-06-21 NOTE — PROGRESS NOTES
Renal Dose Adjustment Policy (Generic)     This patient is on medication that requires renal, weight, and/or indication dose adjustment.      Date Body Weight IBW  Adjusted BW SCr  CrCl Dialysis status   6/21/2024 132.5 kg (292 lb) Ideal body weight: 77.6 kg (171 lb 1.2 oz)  Adjusted ideal body weight: 99.5 kg (219 lb 7.1 oz) Serum creatinine: 1.4 mg/dL (H) 06/19/24 0619  Estimated creatinine clearance: 77 mL/min (A) N/a       Pharmacy has dose-adjusted the following medication(s):    Date Previous Order Adjusted Order   6/21/2024 Fenofibrate 160 mg daily Fenofibrate 54 mg daily       These changes were made per protocol according to the [facility]   Automatic Renal Dose Adjustment Policy.     *Please note this dose may need readjusted if patient's condition changes.    Please contact pharmacy with any questions regarding these changes.    ferny fernandez RPH  6/21/2024  6:23 AM

## 2024-06-21 NOTE — PATIENT CARE CONFERENCE
P Quality Flow/Interdisciplinary Rounds Progress Note        Quality Flow Rounds held on June 21, 2024    Disciplines Attending:  Bedside Nurse, , , and Nursing Unit Leadership    Henry Fuentes was admitted on 6/13/2024  6:35 PM    Anticipated Discharge Date:       Disposition:    Jose Score:  Jose Scale Score: 21    Readmission Risk              Risk of Unplanned Readmission:  32           Discussed patient goal for the day, patient clinical progression, and barriers to discharge.  The following Goal(s) of the Day/Commitment(s) have been identified:  Discharge - Obtain Order      Nunu Dobbs RN  June 21, 2024

## 2024-06-25 LAB
MICROORGANISM SPEC CULT: NORMAL
MICROORGANISM SPEC CULT: NORMAL
MICROORGANISM/AGENT SPEC: NORMAL
MICROORGANISM/AGENT SPEC: NORMAL
SERVICE CMNT-IMP: NORMAL
SERVICE CMNT-IMP: NORMAL
SPECIMEN DESCRIPTION: NORMAL
SPECIMEN DESCRIPTION: NORMAL

## 2024-07-16 LAB
MICROORGANISM SPEC CULT: NORMAL
MICROORGANISM/AGENT SPEC: NORMAL
SERVICE CMNT-IMP: NORMAL
SPECIMEN DESCRIPTION: NORMAL

## 2024-07-29 RX ORDER — INSULIN GLARGINE 100 [IU]/ML
50 INJECTION, SOLUTION SUBCUTANEOUS NIGHTLY
Qty: 50 ML | Refills: 0 | Status: SHIPPED
Start: 2024-07-29 | End: 2024-07-30

## 2024-07-30 RX ORDER — INSULIN GLARGINE 100 [IU]/ML
INJECTION, SOLUTION SUBCUTANEOUS
Qty: 5 ML | Refills: 4 | Status: SHIPPED | OUTPATIENT
Start: 2024-07-30

## 2024-08-28 ENCOUNTER — OFFICE VISIT (OUTPATIENT)
Dept: ENDOCRINOLOGY | Age: 64
End: 2024-08-28

## 2024-08-28 VITALS
OXYGEN SATURATION: 98 % | SYSTOLIC BLOOD PRESSURE: 117 MMHG | DIASTOLIC BLOOD PRESSURE: 27 MMHG | BODY MASS INDEX: 37.03 KG/M2 | HEART RATE: 68 BPM | WEIGHT: 273 LBS

## 2024-08-28 DIAGNOSIS — E66.09 CLASS 2 OBESITY DUE TO EXCESS CALORIES WITHOUT SERIOUS COMORBIDITY WITH BODY MASS INDEX (BMI) OF 39.0 TO 39.9 IN ADULT: ICD-10-CM

## 2024-08-28 DIAGNOSIS — E78.2 MIXED HYPERLIPIDEMIA: ICD-10-CM

## 2024-08-28 DIAGNOSIS — E03.9 PRIMARY HYPOTHYROIDISM: ICD-10-CM

## 2024-08-28 DIAGNOSIS — E11.65 UNCONTROLLED TYPE 2 DIABETES MELLITUS WITH HYPERGLYCEMIA (HCC): Primary | ICD-10-CM

## 2024-08-28 LAB
ESTIMATED AVERAGE GLUCOSE: NORMAL
HBA1C MFR BLD: 8 %

## 2024-08-28 RX ORDER — ATORVASTATIN CALCIUM 40 MG/1
40 TABLET, FILM COATED ORAL
COMMUNITY
Start: 2024-07-17

## 2024-08-28 NOTE — PROGRESS NOTES
[facility]  [facility] Department of Endocrinology Diabetes and Metabolism   [address], [place], [place], [address]   Phone: [phone]  Fax: [phone]    Date of Service: 8/28/2024    Primary Care Physician: Jason Brandt DO  Referring physician: No ref. provider found  Provider: CHRIST Zeng - CNS     Reason for the visit:  Type 2 DM       History of Present Illness:  The history is provided by the patient. No  was used. Accuracy of the patient data is excellent.  Henry Fuentes is a very pleasant 64 y.o. male seen today for diabetes management     Henry Fuentes was diagnosed with diabetes dx around 5 years ago and currently on Lantus 30 units at in am and 50 units PM , Hlog 25 units TID with meals plus mod dose, jardiance 10 mg daily   Ozempic was stopped after he went to nursing home   He is in rehab for right 5th toe amputation   Has insulin pump but did not like so he stopped   Previously on jardiance, metformin, and glimepiride   Trulicity was not covered  The patient has been checking blood sugar 4 times per day   Rehab facility did not send BG log with patient       Most recent A1c results summarized below  Lab Results   Component Value Date/Time    LABA1C 8.0 08/28/2024 12:00 AM    LABA1C 11.9 06/14/2024 04:25 AM    LABA1C 10.7 03/28/2024 03:52 PM     Patient has had no hypoglycemic episodes   The patient has been mindful of what has been eating and following diabetic diet as encouraged  I reviewed current medications and the patient has no issues with diabetes medications  The patient is due for an eye exam. Last eye exam was > 1 year ago  , no h/o diabetic retinopathy  The patient seeing podiatrist every 3 months.   And also performs  own feet care  Microvascular complications:  No Retinopathy, Nephropathy or +Neuropathy  Recent left 5th toe amputated d/t infection osteomyelitis   Right second toe in the past amputated       Macrovascular  mass index (BMI) of 39.0 to 39.9 in adult        4. Primary hypothyroidism  TSH    T4, Free                Plan:     1. Uncontrolled type 2 diabetes mellitus with hyperglycemia (HCC)   Patient's diabetes improved from previous.  Hemoglobin A1c 8.0%  Continue Lantus 30 units in the morning and 50 units at night  Continue Humalog 25 units 3 times daily with meals plus moderate dose insulin sliding scale  Continue Jardiance 10 mg daily  Unfortunately blood glucose logs were not sent with patient for today's visit  We will call nursing facility and have them fax glucose logs  The patient was advised to check blood sugars 4 times a day before meals and at bedtime and send BS readings to our office in a week.  Discussed with patient A1c and blood sugar goals   Optimal blood sugars: 100-140 pre-prandial, < 180 peak post-prandial  The patient counseled about the complications of uncontrolled diabetes   Patient was counselled about the importance of self-blood glucose monitoring and eating consistent carb diet to avoid blood sugar fluctuations   Patient will need routine diabetes maintenance and prevention  The patient will meet with dietician at today's visit    Discussed lifestyle changes including diet and exercise with patient; recommended 150 minutes of moderate intensity exercise per week.   Counseled on optimal foot care  Advised follow-up with ophthalmology  Check labs   2. Mixed hyperlipidemia   Patient is no longer on fenofibrate or statin therapy  Will defer to PCP  I encouraged diet and exercise  Check lipids   3. Class 2 obesity due to excess calories without serious comorbidity  Discussed lifestyle changes including diet and exercise with patient in depth. Also discussed with patient cardiovascular risk associated with obesity   4. Primary hypothyroidism   Pt was previously on levothyroxine 25 mcg 1 tablet daily  He is not currently on levothyroxine at this point  Check TFT's          I personally spent  30

## 2024-09-12 RX ORDER — SEMAGLUTIDE 0.68 MG/ML
INJECTION, SOLUTION SUBCUTANEOUS
Qty: 9 ML | Refills: 3 | Status: SHIPPED | OUTPATIENT
Start: 2024-09-12

## 2024-11-20 RX ORDER — HYDROCHLOROTHIAZIDE 12.5 MG/1
CAPSULE ORAL
Qty: 2 EACH | Refills: 2 | Status: SHIPPED | OUTPATIENT
Start: 2024-11-20

## 2024-12-25 ENCOUNTER — APPOINTMENT (OUTPATIENT)
Dept: CT IMAGING | Age: 64
End: 2024-12-25
Payer: MEDICARE

## 2024-12-25 ENCOUNTER — HOSPITAL ENCOUNTER (EMERGENCY)
Age: 64
Discharge: HOME | End: 2024-12-26
Attending: STUDENT IN AN ORGANIZED HEALTH CARE EDUCATION/TRAINING PROGRAM
Payer: MEDICARE

## 2024-12-25 DIAGNOSIS — R51.9 NONINTRACTABLE EPISODIC HEADACHE, UNSPECIFIED HEADACHE TYPE: ICD-10-CM

## 2024-12-25 DIAGNOSIS — K02.9 PAIN DUE TO DENTAL CARIES: ICD-10-CM

## 2024-12-25 DIAGNOSIS — R10.84 GENERALIZED ABDOMINAL PAIN: Primary | ICD-10-CM

## 2024-12-25 DIAGNOSIS — K56.41 FECAL IMPACTION (HCC): ICD-10-CM

## 2024-12-25 DIAGNOSIS — K59.00 CONSTIPATION, UNSPECIFIED CONSTIPATION TYPE: ICD-10-CM

## 2024-12-25 LAB
ALBUMIN SERPL-MCNC: 3.8 G/DL (ref 3.5–5.2)
ALP SERPL-CCNC: 108 U/L (ref 40–129)
ALT SERPL-CCNC: 32 U/L (ref 0–40)
ANION GAP SERPL CALCULATED.3IONS-SCNC: 11 MMOL/L (ref 7–16)
AST SERPL-CCNC: 34 U/L (ref 0–39)
BASOPHILS # BLD: 0.06 K/UL (ref 0–0.2)
BASOPHILS NFR BLD: 1 % (ref 0–2)
BILIRUB SERPL-MCNC: 0.4 MG/DL (ref 0–1.2)
BILIRUB UR QL STRIP: NEGATIVE
BUN SERPL-MCNC: 28 MG/DL (ref 6–23)
CALCIUM SERPL-MCNC: 9 MG/DL (ref 8.6–10.2)
CASTS #/AREA URNS LPF: ABNORMAL /LPF
CHLORIDE SERPL-SCNC: 93 MMOL/L (ref 98–107)
CLARITY UR: CLEAR
CO2 SERPL-SCNC: 22 MMOL/L (ref 22–29)
COLOR UR: YELLOW
CREAT SERPL-MCNC: 0.9 MG/DL (ref 0.7–1.2)
EOSINOPHIL # BLD: 0.14 K/UL (ref 0.05–0.5)
EOSINOPHILS RELATIVE PERCENT: 2 % (ref 0–6)
ERYTHROCYTE [DISTWIDTH] IN BLOOD BY AUTOMATED COUNT: 13.6 % (ref 11.5–15)
GFR, ESTIMATED: >90 ML/MIN/1.73M2
GLUCOSE SERPL-MCNC: 404 MG/DL (ref 74–99)
GLUCOSE UR STRIP-MCNC: >=1000 MG/DL
HCT VFR BLD AUTO: 46.6 % (ref 37–54)
HGB BLD-MCNC: 16.1 G/DL (ref 12.5–16.5)
HGB UR QL STRIP.AUTO: NEGATIVE
IMM GRANULOCYTES # BLD AUTO: <0.03 K/UL (ref 0–0.58)
IMM GRANULOCYTES NFR BLD: 0 % (ref 0–5)
KETONES UR STRIP-MCNC: NEGATIVE MG/DL
LACTATE BLDV-SCNC: 1.5 MMOL/L (ref 0.5–1.9)
LACTATE BLDV-SCNC: 1.7 MMOL/L (ref 0.5–1.9)
LEUKOCYTE ESTERASE UR QL STRIP: NEGATIVE
LIPASE SERPL-CCNC: 42 U/L (ref 13–60)
LYMPHOCYTES NFR BLD: 2.19 K/UL (ref 1.5–4)
LYMPHOCYTES RELATIVE PERCENT: 28 % (ref 20–42)
MAGNESIUM SERPL-MCNC: 1.9 MG/DL (ref 1.6–2.6)
MCH RBC QN AUTO: 28.4 PG (ref 26–35)
MCHC RBC AUTO-ENTMCNC: 34.5 G/DL (ref 32–34.5)
MCV RBC AUTO: 82.2 FL (ref 80–99.9)
MONOCYTES NFR BLD: 0.56 K/UL (ref 0.1–0.95)
MONOCYTES NFR BLD: 7 % (ref 2–12)
NEUTROPHILS NFR BLD: 62 % (ref 43–80)
NEUTS SEG NFR BLD: 4.97 K/UL (ref 1.8–7.3)
NITRITE UR QL STRIP: NEGATIVE
PH UR STRIP: 5.5 [PH] (ref 5–9)
PLATELET # BLD AUTO: 153 K/UL (ref 130–450)
PMV BLD AUTO: 11.8 FL (ref 7–12)
POTASSIUM SERPL-SCNC: 5 MMOL/L (ref 3.5–5)
POTASSIUM SERPL-SCNC: 5.6 MMOL/L (ref 3.5–5)
PROT SERPL-MCNC: 7.3 G/DL (ref 6.4–8.3)
PROT UR STRIP-MCNC: 100 MG/DL
RBC # BLD AUTO: 5.67 M/UL (ref 3.8–5.8)
RBC #/AREA URNS HPF: ABNORMAL /HPF
SODIUM SERPL-SCNC: 126 MMOL/L (ref 132–146)
SP GR UR STRIP: 1.02 (ref 1–1.03)
TROPONIN I SERPL HS-MCNC: 12 NG/L (ref 0–11)
TROPONIN I SERPL HS-MCNC: 12 NG/L (ref 0–11)
TROPONIN I SERPL HS-MCNC: NORMAL NG/L (ref 0–22)
UROBILINOGEN UR STRIP-ACNC: 0.2 EU/DL (ref 0–1)
WBC #/AREA URNS HPF: ABNORMAL /HPF
WBC OTHER # BLD: 7.9 K/UL (ref 4.5–11.5)

## 2024-12-25 PROCEDURE — 85025 COMPLETE CBC W/AUTO DIFF WBC: CPT

## 2024-12-25 PROCEDURE — 99285 EMERGENCY DEPT VISIT HI MDM: CPT

## 2024-12-25 PROCEDURE — 87086 URINE CULTURE/COLONY COUNT: CPT

## 2024-12-25 PROCEDURE — 81001 URINALYSIS AUTO W/SCOPE: CPT

## 2024-12-25 PROCEDURE — 83690 ASSAY OF LIPASE: CPT

## 2024-12-25 PROCEDURE — 2580000003 HC RX 258: Performed by: STUDENT IN AN ORGANIZED HEALTH CARE EDUCATION/TRAINING PROGRAM

## 2024-12-25 PROCEDURE — 83605 ASSAY OF LACTIC ACID: CPT

## 2024-12-25 PROCEDURE — 84132 ASSAY OF SERUM POTASSIUM: CPT

## 2024-12-25 PROCEDURE — 96375 TX/PRO/DX INJ NEW DRUG ADDON: CPT

## 2024-12-25 PROCEDURE — 83735 ASSAY OF MAGNESIUM: CPT

## 2024-12-25 PROCEDURE — 70450 CT HEAD/BRAIN W/O DYE: CPT

## 2024-12-25 PROCEDURE — 96374 THER/PROPH/DIAG INJ IV PUSH: CPT

## 2024-12-25 PROCEDURE — 93005 ELECTROCARDIOGRAM TRACING: CPT | Performed by: STUDENT IN AN ORGANIZED HEALTH CARE EDUCATION/TRAINING PROGRAM

## 2024-12-25 PROCEDURE — 84484 ASSAY OF TROPONIN QUANT: CPT

## 2024-12-25 PROCEDURE — 6360000004 HC RX CONTRAST MEDICATION: Performed by: RADIOLOGY

## 2024-12-25 PROCEDURE — 6360000002 HC RX W HCPCS: Performed by: EMERGENCY MEDICINE

## 2024-12-25 PROCEDURE — 80053 COMPREHEN METABOLIC PANEL: CPT

## 2024-12-25 PROCEDURE — 6360000002 HC RX W HCPCS: Performed by: STUDENT IN AN ORGANIZED HEALTH CARE EDUCATION/TRAINING PROGRAM

## 2024-12-25 PROCEDURE — 74177 CT ABD & PELVIS W/CONTRAST: CPT

## 2024-12-25 RX ORDER — 0.9 % SODIUM CHLORIDE 0.9 %
1000 INTRAVENOUS SOLUTION INTRAVENOUS ONCE
Status: COMPLETED | OUTPATIENT
Start: 2024-12-25 | End: 2024-12-25

## 2024-12-25 RX ORDER — POLYETHYLENE GLYCOL 3350 17 G/17G
17 POWDER, FOR SOLUTION ORAL DAILY PRN
Qty: 1530 DEVICE | Refills: 0 | Status: SHIPPED | OUTPATIENT
Start: 2024-12-25

## 2024-12-25 RX ORDER — ONDANSETRON 4 MG/1
4 TABLET, ORALLY DISINTEGRATING ORAL EVERY 8 HOURS PRN
Qty: 15 TABLET | Refills: 0 | Status: SHIPPED | OUTPATIENT
Start: 2024-12-25

## 2024-12-25 RX ORDER — DOCUSATE SODIUM 100 MG/1
100 CAPSULE, LIQUID FILLED ORAL 2 TIMES DAILY
Qty: 20 CAPSULE | Refills: 0 | Status: SHIPPED | OUTPATIENT
Start: 2024-12-25 | End: 2025-01-04

## 2024-12-25 RX ORDER — ONDANSETRON 2 MG/ML
4 INJECTION INTRAMUSCULAR; INTRAVENOUS ONCE
Status: COMPLETED | OUTPATIENT
Start: 2024-12-25 | End: 2024-12-25

## 2024-12-25 RX ORDER — MORPHINE SULFATE 4 MG/ML
4 INJECTION, SOLUTION INTRAMUSCULAR; INTRAVENOUS ONCE
Status: COMPLETED | OUTPATIENT
Start: 2024-12-25 | End: 2024-12-25

## 2024-12-25 RX ORDER — OXYCODONE AND ACETAMINOPHEN 5; 325 MG/1; MG/1
1 TABLET ORAL EVERY 8 HOURS PRN
Qty: 5 TABLET | Refills: 0 | Status: SHIPPED | OUTPATIENT
Start: 2024-12-25 | End: 2024-12-28

## 2024-12-25 RX ORDER — METOCLOPRAMIDE HYDROCHLORIDE 5 MG/ML
10 INJECTION INTRAMUSCULAR; INTRAVENOUS ONCE
Status: COMPLETED | OUTPATIENT
Start: 2024-12-25 | End: 2024-12-25

## 2024-12-25 RX ORDER — DIPHENHYDRAMINE HYDROCHLORIDE 50 MG/ML
25 INJECTION INTRAMUSCULAR; INTRAVENOUS ONCE
Status: COMPLETED | OUTPATIENT
Start: 2024-12-25 | End: 2024-12-25

## 2024-12-25 RX ORDER — KETOROLAC TROMETHAMINE 15 MG/ML
15 INJECTION, SOLUTION INTRAMUSCULAR; INTRAVENOUS ONCE
Status: COMPLETED | OUTPATIENT
Start: 2024-12-25 | End: 2024-12-25

## 2024-12-25 RX ORDER — IOPAMIDOL 755 MG/ML
75 INJECTION, SOLUTION INTRAVASCULAR
Status: COMPLETED | OUTPATIENT
Start: 2024-12-25 | End: 2024-12-25

## 2024-12-25 RX ORDER — LORAZEPAM 2 MG/ML
1 INJECTION INTRAMUSCULAR ONCE
Status: COMPLETED | OUTPATIENT
Start: 2024-12-25 | End: 2024-12-25

## 2024-12-25 RX ADMIN — SODIUM CHLORIDE 1000 ML: 9 INJECTION, SOLUTION INTRAVENOUS at 19:38

## 2024-12-25 RX ADMIN — METOCLOPRAMIDE HYDROCHLORIDE 10 MG: 5 INJECTION, SOLUTION INTRAMUSCULAR; INTRAVENOUS at 21:48

## 2024-12-25 RX ADMIN — MORPHINE SULFATE 4 MG: 4 INJECTION, SOLUTION INTRAMUSCULAR; INTRAVENOUS at 18:30

## 2024-12-25 RX ADMIN — ONDANSETRON 4 MG: 2 INJECTION, SOLUTION INTRAMUSCULAR; INTRAVENOUS at 18:29

## 2024-12-25 RX ADMIN — LORAZEPAM 1 MG: 2 INJECTION INTRAMUSCULAR; INTRAVENOUS at 20:20

## 2024-12-25 RX ADMIN — DIPHENHYDRAMINE HYDROCHLORIDE 25 MG: 50 INJECTION INTRAMUSCULAR; INTRAVENOUS at 20:20

## 2024-12-25 RX ADMIN — IOPAMIDOL 75 ML: 755 INJECTION, SOLUTION INTRAVENOUS at 20:19

## 2024-12-25 RX ADMIN — KETOROLAC TROMETHAMINE 15 MG: 15 INJECTION, SOLUTION INTRAMUSCULAR; INTRAVENOUS at 22:48

## 2024-12-25 ASSESSMENT — ENCOUNTER SYMPTOMS
BLOOD IN STOOL: 0
ABDOMINAL PAIN: 1
CONSTIPATION: 1
FACIAL SWELLING: 0
VOICE CHANGE: 0
VOMITING: 0
TROUBLE SWALLOWING: 0
SORE THROAT: 0
DIARRHEA: 0
SHORTNESS OF BREATH: 0
NAUSEA: 1
COUGH: 0

## 2024-12-25 ASSESSMENT — PAIN SCALES - GENERAL
PAINLEVEL_OUTOF10: 8
PAINLEVEL_OUTOF10: 8
PAINLEVEL_OUTOF10: 9
PAINLEVEL_OUTOF10: 10

## 2024-12-25 ASSESSMENT — PAIN - FUNCTIONAL ASSESSMENT
PAIN_FUNCTIONAL_ASSESSMENT: ACTIVITIES ARE NOT PREVENTED
PAIN_FUNCTIONAL_ASSESSMENT: 0-10
PAIN_FUNCTIONAL_ASSESSMENT: PREVENTS OR INTERFERES SOME ACTIVE ACTIVITIES AND ADLS

## 2024-12-25 ASSESSMENT — PAIN DESCRIPTION - LOCATION
LOCATION: HEAD;ABDOMEN;BACK
LOCATION: EAR
LOCATION: ABDOMEN

## 2024-12-25 ASSESSMENT — PAIN DESCRIPTION - DESCRIPTORS: DESCRIPTORS: SHARP

## 2024-12-25 ASSESSMENT — PAIN DESCRIPTION - ORIENTATION: ORIENTATION: LEFT;RIGHT;UPPER;LOWER

## 2024-12-26 VITALS
SYSTOLIC BLOOD PRESSURE: 115 MMHG | RESPIRATION RATE: 16 BRPM | BODY MASS INDEX: 36.57 KG/M2 | HEIGHT: 72 IN | OXYGEN SATURATION: 95 % | DIASTOLIC BLOOD PRESSURE: 58 MMHG | TEMPERATURE: 97.3 F | HEART RATE: 76 BPM | WEIGHT: 270 LBS

## 2024-12-26 LAB
EKG ATRIAL RATE: 78 BPM
EKG P AXIS: 25 DEGREES
EKG P-R INTERVAL: 210 MS
EKG Q-T INTERVAL: 340 MS
EKG QRS DURATION: 86 MS
EKG QTC CALCULATION (BAZETT): 387 MS
EKG R AXIS: 25 DEGREES
EKG T AXIS: 29 DEGREES
EKG VENTRICULAR RATE: 78 BPM

## 2024-12-26 PROCEDURE — 93010 ELECTROCARDIOGRAM REPORT: CPT | Performed by: INTERNAL MEDICINE

## 2024-12-26 NOTE — ED PROVIDER NOTES
medical history of Anxiety, COPD (chronic obstructive pulmonary disease) ([facility]), Depression, DM (diabetes mellitus) ([facility]), DVT of leg (deep venous thrombosis) ([facility]), Frostbite, HLD (hyperlipidemia), HTN (hypertension), and Sleep apnea.     EMERGENCY DEPARTMENT COURSE    Vitals:    Vitals:    12/25/24 1708 12/25/24 1846 12/25/24 2150 12/26/24 0007   BP: 133/78  115/72 (!) 115/58   Pulse: 83 79 87 76   Resp: 18 17 16 16   Temp: 97.3 °F (36.3 °C)      TempSrc: Oral      SpO2: 100% 96% 95% 95%   Weight: 122.5 kg (270 lb)      Height: 1.829 m (6')          Patient was given the following medications:  Medications   morphine sulfate (PF) injection 4 mg (4 mg IntraVENous Given 12/25/24 1830)   ondansetron (ZOFRAN) injection 4 mg (4 mg IntraVENous Given 12/25/24 1829)   sodium chloride 0.9 % bolus 1,000 mL (0 mLs IntraVENous Stopped 12/25/24 2020)   iopamidol (ISOVUE-370) 76 % injection 75 mL (75 mLs IntraVENous Given 12/25/24 2019)   metoclopramide (REGLAN) injection 10 mg (10 mg IntraVENous Given 12/25/24 2148)   diphenhydrAMINE (BENADRYL) injection 25 mg (25 mg IntraVENous Given 12/25/24 2020)   LORazepam (ATIVAN) injection 1 mg (1 mg IntraVENous Given 12/25/24 2020)   ketorolac (TORADOL) injection 15 mg (15 mg IntraVENous Given 12/25/24 2248)             Medical Decision Making/Differential Diagnosis:    CC/HPI Summary, Social Determinants of health, Records Reviewed, DDx, testing done/not done, ED Course, Reassessment, disposition considerations/shared decision making with patient, consults, disposition:      x      [facility]    ED Course as of 12/28/24 1034   Wed Dec 25, 2024   1922 CMP(!):    Sodium 126(!)   Potassium 5.6(!)   Chloride 93(!)   CARBON DIOXIDE 22   Anion Gap 11   Glucose 404(!)   BUN,BUNPL 28(!)   Creatinine 0.9   Est, Glom Filt Rate >90   Calcium 9.0   Total Protein 7.3   Albumin 3.8   Total Bilirubin 0.4   Alkaline Phosphatase 108   ALT 32   AST 34 [VG]   1922 Magnesium: 1.9 [VG]   1922 Lipase: 42 [VG]  hyponatremia 126.  He was treated with IV fluids.  Potassium is hemolyzed 5.6.  Repeat is 5.0.  Transfer shows no acute intracranial abnormality; chronic dental caries findings noted and findings suggestive of chronic paranasal pansinusitis changes.  CN pelvis with IV contrast shows possible cystitis, though they do recommend short-term follow-up to exclude neoplastic disease.  Urinalysis is not suggestive of UTI.  During this encounter patient was treated with IV fluids, Reglan and Benadryl, morphine and Zofran.  He was also given Ativan prior to CT for anxiety.  His CT showed some moderate amount of stool in the rectum, so we did discuss possibly performing an enema, but the patient did not want to do this.  He is otherwise stable, so he was provided prescriptions for MiraLAX and Colace in addition to Zofran, Percocet and Augmentin.  He is stable and appropriate for discharge.  He was referred to the [facility] dental clinic for follow-up, and was advised to follow-up outpatient with his PCP as well.  Results and plan for discharge were discussed.  He voiced understanding and is agreeable.  Return precautions were given.    While not exhaustive, the following diagnoses and their severity were considered: Constipation, fecal impaction, intra-abdominal infection, SBO, dehydration, electrolyte abnormality, dental abscess, pain due to dental caries.      Independent interpretation of Laboratory tests by Colleen Diaz DO: Please see ED course for documentation of interpreted abnormal and/or relevant lab results.    Independent interpretation of Radiology tests by Colleen Diaz, DO: as above         Non-plain film images such as CT, Ultrasound and MRI are read by the radiologist. Plain radiographic images are visualized and preliminarily interpreted by the ED Provider with the above-noted findings. Interpretation per the Radiologist below, if available at the time of this note are included below.      EKG

## 2024-12-26 NOTE — ED NOTES
Pt states he would rather not do an enema and would like to discuss alternative with provider. Provider notified

## 2024-12-26 NOTE — DISCHARGE INSTRUCTIONS
Please return to the ER for any new or worsening symptoms  If prescribed, please be sure to  your prescriptions from the pharmacy  Please follow-up with Primary care provider and the dental clinic  as instructed

## 2024-12-27 LAB
MICROORGANISM SPEC CULT: ABNORMAL
SPECIMEN DESCRIPTION: ABNORMAL

## 2025-01-16 ENCOUNTER — OFFICE VISIT (OUTPATIENT)
Dept: ENDOCRINOLOGY | Age: 65
End: 2025-01-16

## 2025-01-16 VITALS
SYSTOLIC BLOOD PRESSURE: 146 MMHG | WEIGHT: 277 LBS | HEIGHT: 72 IN | OXYGEN SATURATION: 100 % | HEART RATE: 90 BPM | BODY MASS INDEX: 37.52 KG/M2 | DIASTOLIC BLOOD PRESSURE: 80 MMHG

## 2025-01-16 DIAGNOSIS — E03.9 PRIMARY HYPOTHYROIDISM: ICD-10-CM

## 2025-01-16 DIAGNOSIS — E78.2 MIXED HYPERLIPIDEMIA: ICD-10-CM

## 2025-01-16 DIAGNOSIS — E11.65 UNCONTROLLED TYPE 2 DIABETES MELLITUS WITH HYPERGLYCEMIA (HCC): Primary | ICD-10-CM

## 2025-01-16 DIAGNOSIS — E66.812 CLASS 2 OBESITY DUE TO EXCESS CALORIES WITHOUT SERIOUS COMORBIDITY WITH BODY MASS INDEX (BMI) OF 36.0 TO 36.9 IN ADULT: ICD-10-CM

## 2025-01-16 DIAGNOSIS — E66.09 CLASS 2 OBESITY DUE TO EXCESS CALORIES WITHOUT SERIOUS COMORBIDITY WITH BODY MASS INDEX (BMI) OF 36.0 TO 36.9 IN ADULT: ICD-10-CM

## 2025-01-16 LAB — HBA1C MFR BLD: 10.7 %

## 2025-01-16 NOTE — PROGRESS NOTES
[facility]  [facility] Department of Endocrinology Diabetes and Metabolism   [address], [place], [place], [address]   Phone: [phone]  Fax: [phone]    Date of Service: 1/16/2025    Primary Care Physician: Jason Brandt DO  Referring physician: No ref. provider found  Provider: CHRIST Zeng - CNS     Reason for the visit:  Type 2 DM       History of Present Illness:  The history is provided by the patient. No  was used. Accuracy of the patient data is excellent.  Henry Fuentes is a very pleasant 64 y.o. male seen today for diabetes management     Henry Fuentes was diagnosed with diabetes dx around 5 years ago and currently on Lantus 30 units at in am and 50 units PM , Hlog 25 units TID with meals plus mod dose, jardiance 10 mg daily   He misses doses frequently   Ozempic 0.5 mg weekly   Has insulin pump but did not like so he stopped   Trulicity was not covered  The patient has been checking blood sugar 4 times per day     Uses raquel  Ambulatory continuous glucose monitoring of interstitial tissue fluid via a subcutaneous sensor for a minimum of 72 hours; analysis, interpretation and report  Average sensor glucose 295  Time in range 7%  Hyperglycemia 93%  Hypoglycemia 0%        Most recent A1c results summarized below  Lab Results   Component Value Date/Time    LABA1C 8.0 08/28/2024 12:00 AM    LABA1C 11.9 06/14/2024 04:25 AM    LABA1C 10.7 03/28/2024 03:52 PM     Patient has had no hypoglycemic episodes   The patient has been mindful of what has been eating and following diabetic diet as encouraged  I reviewed current medications and the patient has no issues with diabetes medications  The patient is due for an eye exam. Last eye exam was > 1 year ago  , no h/o diabetic retinopathy  The patient seeing podiatrist every 3 months.   And also performs  own feet care  Microvascular complications:  No Retinopathy, Nephropathy or +Neuropathy  Recent left

## 2025-02-06 ENCOUNTER — HOSPITAL ENCOUNTER (EMERGENCY)
Age: 65
Discharge: HOME | End: 2025-02-07
Attending: EMERGENCY MEDICINE
Payer: MEDICARE

## 2025-02-06 DIAGNOSIS — R51.9 ACUTE NONINTRACTABLE HEADACHE, UNSPECIFIED HEADACHE TYPE: ICD-10-CM

## 2025-02-06 DIAGNOSIS — N17.9 AKI (ACUTE KIDNEY INJURY) (HCC): Primary | ICD-10-CM

## 2025-02-06 DIAGNOSIS — R07.9 CHEST PAIN, UNSPECIFIED TYPE: ICD-10-CM

## 2025-02-06 PROCEDURE — 96375 TX/PRO/DX INJ NEW DRUG ADDON: CPT

## 2025-02-06 PROCEDURE — 96361 HYDRATE IV INFUSION ADD-ON: CPT

## 2025-02-06 PROCEDURE — 93005 ELECTROCARDIOGRAM TRACING: CPT | Performed by: EMERGENCY MEDICINE

## 2025-02-06 PROCEDURE — 99285 EMERGENCY DEPT VISIT HI MDM: CPT

## 2025-02-06 PROCEDURE — 96374 THER/PROPH/DIAG INJ IV PUSH: CPT

## 2025-02-06 ASSESSMENT — PAIN - FUNCTIONAL ASSESSMENT: PAIN_FUNCTIONAL_ASSESSMENT: 0-10

## 2025-02-06 ASSESSMENT — PAIN SCALES - GENERAL: PAINLEVEL_OUTOF10: 9

## 2025-02-07 ENCOUNTER — APPOINTMENT (OUTPATIENT)
Dept: CT IMAGING | Age: 65
End: 2025-02-07
Payer: MEDICARE

## 2025-02-07 ENCOUNTER — APPOINTMENT (OUTPATIENT)
Dept: GENERAL RADIOLOGY | Age: 65
End: 2025-02-07
Payer: MEDICARE

## 2025-02-07 VITALS
BODY MASS INDEX: 37.25 KG/M2 | OXYGEN SATURATION: 96 % | DIASTOLIC BLOOD PRESSURE: 64 MMHG | SYSTOLIC BLOOD PRESSURE: 110 MMHG | RESPIRATION RATE: 18 BRPM | HEIGHT: 72 IN | HEART RATE: 88 BPM | TEMPERATURE: 98.1 F | WEIGHT: 275 LBS

## 2025-02-07 LAB
ALBUMIN SERPL-MCNC: 4 G/DL (ref 3.5–5.2)
ALP SERPL-CCNC: 95 U/L (ref 40–129)
ALT SERPL-CCNC: 20 U/L (ref 0–40)
ANION GAP SERPL CALCULATED.3IONS-SCNC: 13 MMOL/L (ref 7–16)
AST SERPL-CCNC: 19 U/L (ref 0–39)
BACTERIA URNS QL MICRO: ABNORMAL
BASOPHILS # BLD: 0.06 K/UL (ref 0–0.2)
BASOPHILS NFR BLD: 1 % (ref 0–2)
BILIRUB SERPL-MCNC: 0.2 MG/DL (ref 0–1.2)
BILIRUB UR QL STRIP: NEGATIVE
BNP SERPL-MCNC: 47 PG/ML (ref 0–125)
BUN SERPL-MCNC: 27 MG/DL (ref 6–23)
CALCIUM SERPL-MCNC: 9.5 MG/DL (ref 8.6–10.2)
CHLORIDE SERPL-SCNC: 98 MMOL/L (ref 98–107)
CHP ED QC CHECK: NORMAL
CLARITY UR: CLEAR
CO2 SERPL-SCNC: 25 MMOL/L (ref 22–29)
COLOR UR: YELLOW
CREAT SERPL-MCNC: 1.6 MG/DL (ref 0.7–1.2)
D-DIMER QUANTITATIVE: <200 NG/ML DDU (ref 0–230)
EOSINOPHIL # BLD: 0.06 K/UL (ref 0.05–0.5)
EOSINOPHILS RELATIVE PERCENT: 1 % (ref 0–6)
EPI CELLS #/AREA URNS HPF: ABNORMAL /HPF
ERYTHROCYTE [DISTWIDTH] IN BLOOD BY AUTOMATED COUNT: 14.1 % (ref 11.5–15)
GFR, ESTIMATED: 50 ML/MIN/1.73M2
GLUCOSE BLD-MCNC: 227 MG/DL
GLUCOSE SERPL-MCNC: 214 MG/DL (ref 74–99)
GLUCOSE UR STRIP-MCNC: >=1000 MG/DL
HCT VFR BLD AUTO: 48 % (ref 37–54)
HGB BLD-MCNC: 16.3 G/DL (ref 12.5–16.5)
HGB UR QL STRIP.AUTO: ABNORMAL
IMM GRANULOCYTES # BLD AUTO: 0.05 K/UL (ref 0–0.58)
IMM GRANULOCYTES NFR BLD: 0 % (ref 0–5)
INFLUENZA A BY PCR: NOT DETECTED
INFLUENZA B BY PCR: NOT DETECTED
KETONES UR STRIP-MCNC: NEGATIVE MG/DL
LACTATE BLDV-SCNC: 2 MMOL/L (ref 0.5–2.2)
LEUKOCYTE ESTERASE UR QL STRIP: NEGATIVE
LIPASE SERPL-CCNC: 29 U/L (ref 13–60)
LYMPHOCYTES NFR BLD: 2.79 K/UL (ref 1.5–4)
LYMPHOCYTES RELATIVE PERCENT: 23 % (ref 20–42)
MCH RBC QN AUTO: 28.5 PG (ref 26–35)
MCHC RBC AUTO-ENTMCNC: 34 G/DL (ref 32–34.5)
MCV RBC AUTO: 84.1 FL (ref 80–99.9)
MONOCYTES NFR BLD: 0.88 K/UL (ref 0.1–0.95)
MONOCYTES NFR BLD: 7 % (ref 2–12)
NEUTROPHILS NFR BLD: 68 % (ref 43–80)
NEUTS SEG NFR BLD: 8.31 K/UL (ref 1.8–7.3)
NITRITE UR QL STRIP: NEGATIVE
PH UR STRIP: 6 [PH] (ref 5–8)
PLATELET # BLD AUTO: 184 K/UL (ref 130–450)
PMV BLD AUTO: 11.4 FL (ref 7–12)
POTASSIUM SERPL-SCNC: 4.4 MMOL/L (ref 3.5–5)
PROT SERPL-MCNC: 7.8 G/DL (ref 6.4–8.3)
PROT UR STRIP-MCNC: 100 MG/DL
RBC # BLD AUTO: 5.71 M/UL (ref 3.8–5.8)
RBC #/AREA URNS HPF: ABNORMAL /HPF
SARS-COV-2 RDRP RESP QL NAA+PROBE: NOT DETECTED
SODIUM SERPL-SCNC: 136 MMOL/L (ref 132–146)
SP GR UR STRIP: 1.02 (ref 1–1.03)
SPECIMEN DESCRIPTION: NORMAL
TROPONIN I SERPL HS-MCNC: 15 NG/L (ref 0–11)
TROPONIN I SERPL HS-MCNC: 15 NG/L (ref 0–11)
UROBILINOGEN UR STRIP-ACNC: 0.2 EU/DL (ref 0–1)
WBC #/AREA URNS HPF: ABNORMAL /HPF
WBC OTHER # BLD: 12.2 K/UL (ref 4.5–11.5)

## 2025-02-07 PROCEDURE — 81001 URINALYSIS AUTO W/SCOPE: CPT

## 2025-02-07 PROCEDURE — 96374 THER/PROPH/DIAG INJ IV PUSH: CPT

## 2025-02-07 PROCEDURE — 85025 COMPLETE CBC W/AUTO DIFF WBC: CPT

## 2025-02-07 PROCEDURE — 87086 URINE CULTURE/COLONY COUNT: CPT

## 2025-02-07 PROCEDURE — 2580000003 HC RX 258

## 2025-02-07 PROCEDURE — 83880 ASSAY OF NATRIURETIC PEPTIDE: CPT

## 2025-02-07 PROCEDURE — 80053 COMPREHEN METABOLIC PANEL: CPT

## 2025-02-07 PROCEDURE — 87635 SARS-COV-2 COVID-19 AMP PRB: CPT

## 2025-02-07 PROCEDURE — 84484 ASSAY OF TROPONIN QUANT: CPT

## 2025-02-07 PROCEDURE — 96361 HYDRATE IV INFUSION ADD-ON: CPT

## 2025-02-07 PROCEDURE — 96375 TX/PRO/DX INJ NEW DRUG ADDON: CPT

## 2025-02-07 PROCEDURE — 6370000000 HC RX 637 (ALT 250 FOR IP)

## 2025-02-07 PROCEDURE — 71045 X-RAY EXAM CHEST 1 VIEW: CPT

## 2025-02-07 PROCEDURE — 83690 ASSAY OF LIPASE: CPT

## 2025-02-07 PROCEDURE — 83605 ASSAY OF LACTIC ACID: CPT

## 2025-02-07 PROCEDURE — 82962 GLUCOSE BLOOD TEST: CPT

## 2025-02-07 PROCEDURE — 85379 FIBRIN DEGRADATION QUANT: CPT

## 2025-02-07 PROCEDURE — 87502 INFLUENZA DNA AMP PROBE: CPT

## 2025-02-07 PROCEDURE — 6360000002 HC RX W HCPCS

## 2025-02-07 PROCEDURE — 70450 CT HEAD/BRAIN W/O DYE: CPT

## 2025-02-07 RX ORDER — DIPHENHYDRAMINE HYDROCHLORIDE 50 MG/ML
25 INJECTION INTRAMUSCULAR; INTRAVENOUS ONCE
Status: COMPLETED | OUTPATIENT
Start: 2025-02-07 | End: 2025-02-07

## 2025-02-07 RX ORDER — LORAZEPAM 0.5 MG/1
0.5 TABLET ORAL ONCE
Status: COMPLETED | OUTPATIENT
Start: 2025-02-07 | End: 2025-02-07

## 2025-02-07 RX ORDER — 0.9 % SODIUM CHLORIDE 0.9 %
1000 INTRAVENOUS SOLUTION INTRAVENOUS ONCE
Status: COMPLETED | OUTPATIENT
Start: 2025-02-07 | End: 2025-02-07

## 2025-02-07 RX ORDER — METOCLOPRAMIDE HYDROCHLORIDE 5 MG/ML
10 INJECTION INTRAMUSCULAR; INTRAVENOUS ONCE
Status: COMPLETED | OUTPATIENT
Start: 2025-02-07 | End: 2025-02-07

## 2025-02-07 RX ADMIN — LORAZEPAM 0.5 MG: 0.5 TABLET ORAL at 01:52

## 2025-02-07 RX ADMIN — DIPHENHYDRAMINE HYDROCHLORIDE 25 MG: 50 INJECTION INTRAMUSCULAR; INTRAVENOUS at 00:25

## 2025-02-07 RX ADMIN — METOCLOPRAMIDE HYDROCHLORIDE 10 MG: 5 INJECTION INTRAMUSCULAR; INTRAVENOUS at 00:25

## 2025-02-07 RX ADMIN — SODIUM CHLORIDE 1000 ML: 9 INJECTION, SOLUTION INTRAVENOUS at 02:32

## 2025-02-07 RX ADMIN — SODIUM CHLORIDE 1000 ML: 9 INJECTION, SOLUTION INTRAVENOUS at 00:25

## 2025-02-07 NOTE — ED PROVIDER NOTES
sodium chloride 0.9 % bolus 1,000 mL (0 mLs IntraVENous Stopped 2/7/25 7026)   LORazepam (ATIVAN) tablet 0.5 mg (0.5 mg Oral Given 2/7/25 5556)       Medical Decision Making/Differential Diagnosis:    CC/HPI Summary, Pertinent Physical Exam Findings, Social Determinants of health, Records Reviewed, DDx, testing done/not done, ED Course, Reassessment, disposition considerations/shared decision making with patient, consults, disposition:      Medical Decision Making:   I, Dr. Mike Guillen am the resident physician of record.      MDM: Henry Fuentes is a 65 y.o. male who presents to the ED for chest pain, shortness of breath, headache that began prior to arrival.  Patient states symptoms began while undergoing physical therapy.  On arrival to the ED patient is afebrile, slight tachycardic but in no acute distress.  NIH of 0 on arrival.  No neurologic deficits on exam arrival.  GCS of 15.  Patient is alert and orient x 3.  Lungs clear auscultation bilateral.  Patient is tachycardic with a regular rhythm.  Patient given migraine cocktail consisting of 10 mg Reglan, 25 mg Benadryl.  Patient states he felt anxious after Reglan administration and was given 0.5 mg IV Ativan.  Patient was given 2 L normal saline fluid bolus.  Twelve-lead EKG obtained showing sinus tachycardia with first-degree AV block as well as unremarkable at this time.  Chest ray obtained showed no acute cardiopulmonary abnormality per CT head obtained showed no acute cranial normality.  Labs interpreted by me shows CBC with slight leukocytosis but no sign of acute anemia.  Lactic acid not elevated.  Patient COVID and influenza negative.  D-dimer less than 200 and there is low concern for PE at this time.  BNP not elevated.  Glucose on arrival elevated at 227 but is otherwise unremarkable.  UA showing trace bacteria but no WBCs or RBCs.  Low concern for UTI at this time.  CMP showing evidence of ALVINO with a creatinine of 1.6 and GFR 50.  Also  at this time. Shared decision making had with patient who agrees with plan for discharge with outpatient follow-up.  All questions answered.  Strict return precautions given.   [JH]      ED Course User Index  [] Mike Guillen MD       This patient's ED course included: History, physical examination, reevaluation prior to disposition    This patient has remained hemodynamically stable during their ED course.    Counseling:   The emergency provider has spoken with the patient and discussed today’s results, in addition to providing specific details for the plan of care and counseling regarding the diagnosis and prognosis.  Questions are answered at this time and they are agreeable with the plan.     --------------------------------- IMPRESSION AND DISPOSITION ---------------------------------    IMPRESSION  1. ALVINO (acute kidney injury) (HCC)    2. Chest pain, unspecified type    3. Acute nonintractable headache, unspecified headache type        DISPOSITION  Disposition: Discharge to home  Patient condition is stable    NOTE: This report was transcribed using voice recognition software. Every effort was made to ensure accuracy; however, inadvertent computerized transcription errors may be present

## 2025-02-08 LAB
EKG ATRIAL RATE: 103 BPM
EKG P AXIS: 53 DEGREES
EKG P-R INTERVAL: 216 MS
EKG Q-T INTERVAL: 338 MS
EKG QRS DURATION: 86 MS
EKG QTC CALCULATION (BAZETT): 442 MS
EKG R AXIS: 51 DEGREES
EKG T AXIS: 57 DEGREES
EKG VENTRICULAR RATE: 103 BPM
GLUCOSE BLD-MCNC: 227 MG/DL (ref 74–99)
MICROORGANISM SPEC CULT: NO GROWTH
SERVICE CMNT-IMP: NORMAL
SPECIMEN DESCRIPTION: NORMAL

## 2025-02-08 PROCEDURE — 93010 ELECTROCARDIOGRAM REPORT: CPT | Performed by: INTERNAL MEDICINE

## 2025-04-01 ENCOUNTER — TELEPHONE (OUTPATIENT)
Dept: ENDOCRINOLOGY | Age: 65
End: 2025-04-01

## 2025-04-01 NOTE — TELEPHONE ENCOUNTER
Pharmacy called to state that the patient has been receiving ozempic 2mg from the pharmacy since 11/2024. They want to clarify if you want to reduce the dose to 1mg       LOV states to increase to 1mg

## 2025-04-07 RX ORDER — HYDROCHLOROTHIAZIDE 12.5 MG/1
CAPSULE ORAL
Qty: 6 EACH | Refills: 3 | Status: SHIPPED | OUTPATIENT
Start: 2025-04-07

## 2025-05-20 ENCOUNTER — OFFICE VISIT (OUTPATIENT)
Dept: ENDOCRINOLOGY | Age: 65
End: 2025-05-20
Payer: MEDICARE

## 2025-05-20 VITALS
WEIGHT: 265.6 LBS | DIASTOLIC BLOOD PRESSURE: 65 MMHG | SYSTOLIC BLOOD PRESSURE: 114 MMHG | BODY MASS INDEX: 36.02 KG/M2 | OXYGEN SATURATION: 95 % | HEART RATE: 103 BPM | TEMPERATURE: 97.4 F

## 2025-05-20 DIAGNOSIS — E11.65 UNCONTROLLED TYPE 2 DIABETES MELLITUS WITH HYPERGLYCEMIA (HCC): Primary | ICD-10-CM

## 2025-05-20 DIAGNOSIS — E66.09 CLASS 2 OBESITY DUE TO EXCESS CALORIES WITHOUT SERIOUS COMORBIDITY WITH BODY MASS INDEX (BMI) OF 37.0 TO 37.9 IN ADULT: ICD-10-CM

## 2025-05-20 DIAGNOSIS — E78.2 MIXED HYPERLIPIDEMIA: ICD-10-CM

## 2025-05-20 DIAGNOSIS — E66.812 CLASS 2 OBESITY DUE TO EXCESS CALORIES WITHOUT SERIOUS COMORBIDITY WITH BODY MASS INDEX (BMI) OF 37.0 TO 37.9 IN ADULT: ICD-10-CM

## 2025-05-20 DIAGNOSIS — E03.9 PRIMARY HYPOTHYROIDISM: ICD-10-CM

## 2025-05-20 LAB — HBA1C MFR BLD: 8 %

## 2025-05-20 PROCEDURE — 3078F DIAST BP <80 MM HG: CPT | Performed by: CLINICAL NURSE SPECIALIST

## 2025-05-20 PROCEDURE — 3074F SYST BP LT 130 MM HG: CPT | Performed by: CLINICAL NURSE SPECIALIST

## 2025-05-20 PROCEDURE — 3017F COLORECTAL CA SCREEN DOC REV: CPT | Performed by: CLINICAL NURSE SPECIALIST

## 2025-05-20 PROCEDURE — G8427 DOCREV CUR MEDS BY ELIG CLIN: HCPCS | Performed by: CLINICAL NURSE SPECIALIST

## 2025-05-20 PROCEDURE — 99214 OFFICE O/P EST MOD 30 MIN: CPT | Performed by: CLINICAL NURSE SPECIALIST

## 2025-05-20 PROCEDURE — 83036 HEMOGLOBIN GLYCOSYLATED A1C: CPT | Performed by: CLINICAL NURSE SPECIALIST

## 2025-05-20 PROCEDURE — 95251 CONT GLUC MNTR ANALYSIS I&R: CPT | Performed by: CLINICAL NURSE SPECIALIST

## 2025-05-20 PROCEDURE — G8417 CALC BMI ABV UP PARAM F/U: HCPCS | Performed by: CLINICAL NURSE SPECIALIST

## 2025-05-20 PROCEDURE — 4004F PT TOBACCO SCREEN RCVD TLK: CPT | Performed by: CLINICAL NURSE SPECIALIST

## 2025-05-20 PROCEDURE — 2022F DILAT RTA XM EVC RTNOPTHY: CPT | Performed by: CLINICAL NURSE SPECIALIST

## 2025-05-20 PROCEDURE — 3052F HG A1C>EQUAL 8.0%<EQUAL 9.0%: CPT | Performed by: CLINICAL NURSE SPECIALIST

## 2025-05-20 RX ORDER — INSULIN LISPRO 100 [IU]/ML
INJECTION, SOLUTION INTRAVENOUS; SUBCUTANEOUS
Qty: 10 ADJUSTABLE DOSE PRE-FILLED PEN SYRINGE | Refills: 4
Start: 2025-05-20

## 2025-05-20 RX ORDER — INSULIN GLARGINE 100 [IU]/ML
INJECTION, SOLUTION SUBCUTANEOUS
Qty: 5 ML | Refills: 4
Start: 2025-05-20

## 2025-05-20 NOTE — PROGRESS NOTES
[facility] [employer]  [facility] Department of Endocrinology Diabetes and Metabolism   [address], [place], [address]   Phone: [phone]  Fax: [phone]    Date of Service: 5/20/2025    Primary Care Physician: Jason Brandt DO  Referring physician: No ref. provider found  Provider: CHRIST Zeng - CNS     Reason for the visit:  Type 2 DM       History of Present Illness:  The history is provided by the patient. No  was used. Accuracy of the patient data is excellent.  Henry Fuentes is a very pleasant 65 y.o. male seen today for diabetes management     Henry Fuentes was diagnosed with diabetes dx around 5 years ago and currently on Lantus 25 units at in am and 50 units PM , Hlog 25 units TID with meals plus mod dose, jardiance 10 mg daily   Ozempic 2 mg weekly   He missed doses at times   Has insulin pump but did not like so he stopped   Trulicity was not covered  The patient has been checking blood sugar 4 times per day     Uses raquel  Ambulatory continuous glucose monitoring of interstitial tissue fluid via a subcutaneous sensor for a minimum of 72 hours; analysis, interpretation and report  Average sensor glucose 143  Time in range 76%  Hyperglycemia 19%  Hypoglycemia 5%        Most recent A1c results summarized below  Lab Results   Component Value Date/Time    LABA1C 8.0 05/20/2025 02:29 PM    LABA1C 10.7 01/16/2025 02:59 PM    LABA1C 8.0 08/28/2024 12:00 AM     Patient has had frequent hypoglycemic episodes   The patient has been mindful of what has been eating and following diabetic diet as encouraged  I reviewed current medications and the patient has no issues with diabetes medications  The patient is due for an eye exam. Last eye exam was > 1 year ago  , no h/o diabetic retinopathy  The patient seeing podiatrist every 3 months.   And also performs  own feet care  Microvascular complications:  No Retinopathy, Nephropathy or +Neuropathy  Recent left

## 2025-07-01 DIAGNOSIS — E11.65 UNCONTROLLED TYPE 2 DIABETES MELLITUS WITH HYPERGLYCEMIA (HCC): ICD-10-CM

## 2025-07-01 RX ORDER — SEMAGLUTIDE 1.34 MG/ML
1 INJECTION, SOLUTION SUBCUTANEOUS
Refills: 1 | OUTPATIENT
Start: 2025-07-01

## (undated) DEVICE — GLOVE ORTHO 7   MSG9470

## (undated) DEVICE — BLOCK BITE 60FR RUBBER ADLT DENTAL

## (undated) DEVICE — INTENDED FOR TISSUE SEPARATION, AND OTHER PROCEDURES THAT REQUIRE A SHARP SURGICAL BLADE TO PUNCTURE OR CUT.: Brand: BARD-PARKER ® STAINLESS STEEL BLADES

## (undated) DEVICE — 4-PORT MANIFOLD: Brand: NEPTUNE 2

## (undated) DEVICE — DOUBLE BASIN SET: Brand: MEDLINE INDUSTRIES, INC.

## (undated) DEVICE — SOLUTION IV IRRIG POUR BRL 0.9% SODIUM CHL 2F7124

## (undated) DEVICE — TOWEL,OR,DSP,ST,BLUE,STD,6/PK,12PK/CS: Brand: MEDLINE

## (undated) DEVICE — NEEDLE FLTR 18GA L1.5IN MEM THK5UM BLNT DISP

## (undated) DEVICE — BNDG,ELSTC,MATRIX,STRL,3"X5YD,LF,HOOK&LP: Brand: MEDLINE

## (undated) DEVICE — 6 X 9  1.75MIL 4-WALL LABGUARD: Brand: MINIGRIP COMMERCIAL LLC

## (undated) DEVICE — APPLICATOR PREP 26ML 0.7% IOD POVACRYLEX 74% ISO ALC ST

## (undated) DEVICE — TRAP,MUCUS SPECIMEN,40CC: Brand: MEDLINE

## (undated) DEVICE — BANDAGE,GAUZE,BULKEE II,4.5"X4.1YD,STRL: Brand: MEDLINE

## (undated) DEVICE — CURETTE ORTHO

## (undated) DEVICE — DRAPE,EXTREMITY,89X128,STERILE: Brand: MEDLINE

## (undated) DEVICE — FORCEPS BX OVL CUP FEN DISPOSABLE CAP L 160CM RAD JAW 4

## (undated) DEVICE — SYSTEM TPS ORTHO

## (undated) DEVICE — PACK PROCEDURE SURG GEN CUST

## (undated) DEVICE — BANDAGE,GAUZE,CONFORMING,4"X75",STRL,LF: Brand: MEDLINE

## (undated) DEVICE — DRESSING,GAUZE,XEROFORM,CURAD,1"X8",ST: Brand: CURAD

## (undated) DEVICE — BNDG,ELSTC,MATRIX,STRL,4"X5YD,LF,HOOK&LP: Brand: MEDLINE

## (undated) DEVICE — NEEDLE HYPO 25GA L1.5IN BLU POLYPR HUB S STL REG BVL STR

## (undated) DEVICE — ELECTRODE PT RET AD L9FT HI MOIST COND ADH HYDRGEL CORDED

## (undated) DEVICE — DRAPE,REIN 53X77,STERILE: Brand: MEDLINE

## (undated) DEVICE — SPONGE,LAP,4"X18",XR,ST,5/PK,40PK/CS: Brand: MEDLINE INDUSTRIES, INC.

## (undated) DEVICE — Z INACTIVE NO ACTIVE SUPPLIER APPLICATOR MEDICATED 26 CC TINT HI-LITE ORNG STRL CHLORAPREP

## (undated) DEVICE — SOLUTION IRRIG 1000ML 0.9% SOD CHL USP POUR PLAS BTL

## (undated) DEVICE — SOLUTION IV 1000ML 0.9% SOD CHL PH 5 INJ USP VIAFLX PLAS

## (undated) DEVICE — SPONGE LAP W18XL18IN WHT COT 4 PLY FLD STRUNG RADPQ DISP ST

## (undated) DEVICE — BANDAGE,GAUZE,CONFORMING,3"X75",STRL,LF: Brand: MEDLINE

## (undated) DEVICE — AGENT HEMSTAT W2XL3IN OXIDIZED REGENERATED CELOS ABSRB

## (undated) DEVICE — SET ORTHO STD STORTSTD1

## (undated) DEVICE — CHLORAPREP 26ML ORANGE

## (undated) DEVICE — SPONGE GZ W4XL4IN RAYON POLY CVR W/NONWOVEN FAB STRL 2/PK

## (undated) DEVICE — PADDING,UNDERCAST,COTTON, 4"X4YD STERILE: Brand: MEDLINE

## (undated) DEVICE — GLOVE ORANGE PI 7 1/2   MSG9075

## (undated) DEVICE — DRESSING PETRO W3XL8IN OIL EMUL N ADH GZ KNIT IMPREG CELOS

## (undated) DEVICE — GRADUATE TRIANG MEASURE 1000ML BLK PRNT

## (undated) DEVICE — SYRINGE MED 30ML STD CLR PLAS LUERLOCK TIP N CTRL DISP

## (undated) DEVICE — TRAP,MUCUS SPECIMEN, 80CC: Brand: MEDLINE

## (undated) DEVICE — REAGENT TEST UREASE RAPD CLOTEST F/

## (undated) DEVICE — NEEDLE JAMSH BNE MAR 13G X 2

## (undated) DEVICE — COVER HNDL LT DISP

## (undated) DEVICE — STERILE PVP: Brand: MEDLINE INDUSTRIES, INC.

## (undated) DEVICE — PAD,ABDOMINAL,5"X9",ST,LF,25/BX: Brand: MEDLINE INDUSTRIES, INC.

## (undated) DEVICE — SKIN PREP TRAY 4 COMPARTM TRAY: Brand: MEDLINE INDUSTRIES, INC.

## (undated) DEVICE — GAUZE,SPONGE,4"X4",8PLY,STRL,LF,10/TRAY: Brand: MEDLINE

## (undated) DEVICE — GAUZE,PACKING STRIP,PLAIN,1/2"X5YD,STRL: Brand: CURAD

## (undated) DEVICE — Device

## (undated) DEVICE — BLADE,STAINLESS-STEEL,10,STRL,DISPOSABLE: Brand: MEDLINE

## (undated) DEVICE — SET PSI

## (undated) DEVICE — GOWN,SIRUS,FABRNF,XL,20/CS: Brand: MEDLINE

## (undated) DEVICE — NEEDLE,22GX1.5",REG,BEVEL: Brand: MEDLINE

## (undated) DEVICE — CONTROL SYRINGE LUER-LOCK TIP: Brand: MONOJECT

## (undated) DEVICE — APPLICATOR MEDICATED 26 CC SOLUTION HI LT ORNG CHLORAPREP

## (undated) DEVICE — PRECISION THIN (7.0 X 0.38 X 18.5MM)

## (undated) DEVICE — PRECISION THIN (9.0 X 0.38 X 25.0MM)